# Patient Record
Sex: MALE | Race: WHITE | NOT HISPANIC OR LATINO | Employment: OTHER | URBAN - METROPOLITAN AREA
[De-identification: names, ages, dates, MRNs, and addresses within clinical notes are randomized per-mention and may not be internally consistent; named-entity substitution may affect disease eponyms.]

---

## 2019-08-22 ENCOUNTER — TRANSFERRED RECORDS (OUTPATIENT)
Dept: HEALTH INFORMATION MANAGEMENT | Facility: CLINIC | Age: 64
End: 2019-08-22

## 2020-01-01 ENCOUNTER — INFUSION THERAPY VISIT (OUTPATIENT)
Dept: INFUSION THERAPY | Facility: CLINIC | Age: 65
End: 2020-01-01
Attending: INTERNAL MEDICINE
Payer: MEDICARE

## 2020-01-01 ENCOUNTER — PATIENT OUTREACH (OUTPATIENT)
Dept: CARE COORDINATION | Facility: CLINIC | Age: 65
End: 2020-01-01

## 2020-01-01 ENCOUNTER — APPOINTMENT (OUTPATIENT)
Dept: LAB | Facility: CLINIC | Age: 65
End: 2020-01-01
Payer: COMMERCIAL

## 2020-01-01 ENCOUNTER — APPOINTMENT (OUTPATIENT)
Dept: MEDSURG UNIT | Facility: CLINIC | Age: 65
End: 2020-01-01
Attending: RADIOLOGY
Payer: MEDICARE

## 2020-01-01 ENCOUNTER — APPOINTMENT (OUTPATIENT)
Dept: INTERVENTIONAL RADIOLOGY/VASCULAR | Facility: CLINIC | Age: 65
End: 2020-01-01
Attending: RADIOLOGY
Payer: MEDICARE

## 2020-01-01 ENCOUNTER — TELEPHONE (OUTPATIENT)
Dept: RADIOLOGY | Facility: CLINIC | Age: 65
End: 2020-01-01

## 2020-01-01 ENCOUNTER — HOSPITAL ENCOUNTER (OUTPATIENT)
Facility: CLINIC | Age: 65
Discharge: HOME | End: 2020-10-02
Attending: RADIOLOGY | Admitting: RADIOLOGY
Payer: MEDICARE

## 2020-01-01 ENCOUNTER — TELEPHONE (OUTPATIENT)
Dept: ONCOLOGY | Facility: CLINIC | Age: 65
End: 2020-01-01

## 2020-01-01 ENCOUNTER — OFFICE VISIT (OUTPATIENT)
Dept: SURGERY | Facility: CLINIC | Age: 65
End: 2020-01-01
Payer: COMMERCIAL

## 2020-01-01 ENCOUNTER — MYC MEDICAL ADVICE (OUTPATIENT)
Dept: ONCOLOGY | Facility: CLINIC | Age: 65
End: 2020-01-01

## 2020-01-01 ENCOUNTER — VIRTUAL VISIT (OUTPATIENT)
Dept: ONCOLOGY | Facility: CLINIC | Age: 65
End: 2020-01-01
Attending: NURSE PRACTITIONER
Payer: COMMERCIAL

## 2020-01-01 ENCOUNTER — TELEPHONE (OUTPATIENT)
Dept: SURGERY | Facility: CLINIC | Age: 65
End: 2020-01-01

## 2020-01-01 ENCOUNTER — APPOINTMENT (OUTPATIENT)
Dept: MEDSURG UNIT | Facility: CLINIC | Age: 65
End: 2020-01-01
Payer: MEDICARE

## 2020-01-01 ENCOUNTER — TELEPHONE (OUTPATIENT)
Dept: INTERVENTIONAL RADIOLOGY/VASCULAR | Facility: CLINIC | Age: 65
End: 2020-01-01

## 2020-01-01 ENCOUNTER — APPOINTMENT (OUTPATIENT)
Dept: INTERVENTIONAL RADIOLOGY/VASCULAR | Facility: CLINIC | Age: 65
End: 2020-01-01
Attending: PHYSICIAN ASSISTANT
Payer: MEDICARE

## 2020-01-01 ENCOUNTER — VIRTUAL VISIT (OUTPATIENT)
Dept: ONCOLOGY | Facility: CLINIC | Age: 65
End: 2020-01-01
Attending: INTERNAL MEDICINE
Payer: MEDICARE

## 2020-01-01 ENCOUNTER — HOSPITAL ENCOUNTER (INPATIENT)
Facility: CLINIC | Age: 65
LOS: 1 days | Discharge: HOME | DRG: 445 | End: 2020-11-19
Attending: INTERNAL MEDICINE | Admitting: INTERNAL MEDICINE
Payer: MEDICARE

## 2020-01-01 ENCOUNTER — PATIENT OUTREACH (OUTPATIENT)
Dept: ONCOLOGY | Facility: CLINIC | Age: 65
End: 2020-01-01

## 2020-01-01 ENCOUNTER — APPOINTMENT (OUTPATIENT)
Dept: GENERAL RADIOLOGY | Facility: CLINIC | Age: 65
End: 2020-01-01
Attending: SURGERY
Payer: MEDICARE

## 2020-01-01 ENCOUNTER — TELEPHONE (OUTPATIENT)
Dept: SURGERY | Facility: CLINIC | Age: 65
End: 2020-01-01
Payer: COMMERCIAL

## 2020-01-01 ENCOUNTER — HOSPITAL ENCOUNTER (OUTPATIENT)
Facility: CLINIC | Age: 65
Discharge: HOME | End: 2020-08-25
Attending: RADIOLOGY | Admitting: RADIOLOGY
Payer: MEDICARE

## 2020-01-01 ENCOUNTER — MYC MEDICAL ADVICE (OUTPATIENT)
Dept: SURGERY | Facility: CLINIC | Age: 65
End: 2020-01-01

## 2020-01-01 ENCOUNTER — VIRTUAL VISIT (OUTPATIENT)
Dept: ONCOLOGY | Facility: CLINIC | Age: 65
End: 2020-01-01
Attending: INTERNAL MEDICINE
Payer: COMMERCIAL

## 2020-01-01 ENCOUNTER — INFUSION THERAPY VISIT (OUTPATIENT)
Dept: ONCOLOGY | Facility: CLINIC | Age: 65
End: 2020-01-01
Attending: NURSE PRACTITIONER
Payer: MEDICARE

## 2020-01-01 ENCOUNTER — HOSPITAL ENCOUNTER (OUTPATIENT)
Facility: CLINIC | Age: 65
Discharge: HOME | End: 2020-09-28
Attending: SURGERY | Admitting: SURGERY
Payer: MEDICARE

## 2020-01-01 ENCOUNTER — HOSPITAL ENCOUNTER (OUTPATIENT)
Facility: CLINIC | Age: 65
Discharge: HOME | End: 2020-08-11
Attending: INTERNAL MEDICINE | Admitting: INTERNAL MEDICINE
Payer: MEDICARE

## 2020-01-01 ENCOUNTER — ANESTHESIA EVENT (OUTPATIENT)
Dept: GASTROENTEROLOGY | Facility: CLINIC | Age: 65
End: 2020-01-01
Payer: MEDICARE

## 2020-01-01 ENCOUNTER — PATIENT OUTREACH (OUTPATIENT)
Dept: GASTROENTEROLOGY | Facility: CLINIC | Age: 65
End: 2020-01-01

## 2020-01-01 ENCOUNTER — DOCUMENTATION ONLY (OUTPATIENT)
Facility: CLINIC | Age: 65
End: 2020-01-01

## 2020-01-01 ENCOUNTER — ANESTHESIA EVENT (OUTPATIENT)
Dept: SURGERY | Facility: CLINIC | Age: 65
End: 2020-01-01
Payer: MEDICARE

## 2020-01-01 ENCOUNTER — HOSPITAL ENCOUNTER (OUTPATIENT)
Facility: CLINIC | Age: 65
Discharge: HOME | End: 2020-09-25
Attending: RADIOLOGY | Admitting: RADIOLOGY
Payer: MEDICARE

## 2020-01-01 ENCOUNTER — HOSPITAL ENCOUNTER (OUTPATIENT)
Facility: CLINIC | Age: 65
Discharge: HOME | End: 2020-10-19
Attending: RADIOLOGY | Admitting: RADIOLOGY
Payer: MEDICARE

## 2020-01-01 ENCOUNTER — APPOINTMENT (OUTPATIENT)
Dept: INTERVENTIONAL RADIOLOGY/VASCULAR | Facility: CLINIC | Age: 65
End: 2020-01-01
Attending: NURSE PRACTITIONER
Payer: MEDICARE

## 2020-01-01 ENCOUNTER — TRANSFERRED RECORDS (OUTPATIENT)
Dept: HEALTH INFORMATION MANAGEMENT | Facility: CLINIC | Age: 65
End: 2020-01-01

## 2020-01-01 ENCOUNTER — INFUSION THERAPY VISIT (OUTPATIENT)
Dept: INFUSION THERAPY | Facility: CLINIC | Age: 65
End: 2020-01-01
Attending: NURSE PRACTITIONER
Payer: MEDICARE

## 2020-01-01 ENCOUNTER — ANCILLARY PROCEDURE (OUTPATIENT)
Dept: GENERAL RADIOLOGY | Facility: CLINIC | Age: 65
End: 2020-01-01
Attending: NURSE PRACTITIONER
Payer: COMMERCIAL

## 2020-01-01 ENCOUNTER — HOSPITAL ENCOUNTER (OUTPATIENT)
Dept: CT IMAGING | Facility: CLINIC | Age: 65
Discharge: HOME | End: 2020-08-19
Attending: INTERNAL MEDICINE | Admitting: INTERNAL MEDICINE
Payer: MEDICARE

## 2020-01-01 ENCOUNTER — HOSPITAL ENCOUNTER (OUTPATIENT)
Facility: CLINIC | Age: 65
Discharge: HOME | End: 2020-10-13
Attending: RADIOLOGY | Admitting: RADIOLOGY
Payer: MEDICARE

## 2020-01-01 ENCOUNTER — HOSPITAL ENCOUNTER (OUTPATIENT)
Facility: AMBULATORY SURGERY CENTER | Age: 65
End: 2020-01-01
Attending: RADIOLOGY
Payer: COMMERCIAL

## 2020-01-01 ENCOUNTER — HOSPITAL ENCOUNTER (OUTPATIENT)
Facility: CLINIC | Age: 65
Discharge: HOME | End: 2020-09-14
Attending: RADIOLOGY | Admitting: RADIOLOGY
Payer: MEDICARE

## 2020-01-01 ENCOUNTER — ANESTHESIA (OUTPATIENT)
Dept: SURGERY | Facility: CLINIC | Age: 65
End: 2020-01-01
Payer: MEDICARE

## 2020-01-01 ENCOUNTER — VIRTUAL VISIT (OUTPATIENT)
Dept: DERMATOLOGY | Facility: CLINIC | Age: 65
End: 2020-01-01
Attending: RADIOLOGY
Payer: COMMERCIAL

## 2020-01-01 ENCOUNTER — APPOINTMENT (OUTPATIENT)
Dept: MRI IMAGING | Facility: CLINIC | Age: 65
DRG: 445 | End: 2020-01-01
Attending: INTERNAL MEDICINE
Payer: MEDICARE

## 2020-01-01 ENCOUNTER — APPOINTMENT (OUTPATIENT)
Dept: MEDSURG UNIT | Facility: CLINIC | Age: 65
End: 2020-01-01
Attending: NURSE PRACTITIONER
Payer: MEDICARE

## 2020-01-01 ENCOUNTER — ANESTHESIA (OUTPATIENT)
Dept: GASTROENTEROLOGY | Facility: CLINIC | Age: 65
End: 2020-01-01
Payer: MEDICARE

## 2020-01-01 ENCOUNTER — HOSPITAL ENCOUNTER (OUTPATIENT)
Dept: CT IMAGING | Facility: CLINIC | Age: 65
End: 2020-12-11
Attending: NURSE PRACTITIONER
Payer: MEDICARE

## 2020-01-01 ENCOUNTER — ANCILLARY PROCEDURE (OUTPATIENT)
Dept: CT IMAGING | Facility: CLINIC | Age: 65
End: 2020-01-01
Attending: NURSE PRACTITIONER
Payer: COMMERCIAL

## 2020-01-01 ENCOUNTER — TELEPHONE (OUTPATIENT)
Dept: GASTROENTEROLOGY | Facility: CLINIC | Age: 65
End: 2020-01-01

## 2020-01-01 ENCOUNTER — PATIENT OUTREACH (OUTPATIENT)
Dept: SURGERY | Facility: CLINIC | Age: 65
End: 2020-01-01

## 2020-01-01 ENCOUNTER — HOSPITAL ENCOUNTER (OUTPATIENT)
Facility: CLINIC | Age: 65
Discharge: HOME | End: 2020-09-18
Attending: RADIOLOGY | Admitting: RADIOLOGY
Payer: MEDICARE

## 2020-01-01 ENCOUNTER — HOSPITAL ENCOUNTER (OUTPATIENT)
Dept: CT IMAGING | Facility: CLINIC | Age: 65
End: 2020-10-19
Attending: PHYSICIAN ASSISTANT | Admitting: RADIOLOGY
Payer: MEDICARE

## 2020-01-01 ENCOUNTER — HOSPITAL ENCOUNTER (OUTPATIENT)
Dept: CT IMAGING | Facility: CLINIC | Age: 65
End: 2020-10-13
Attending: NURSE PRACTITIONER
Payer: MEDICARE

## 2020-01-01 VITALS
HEART RATE: 75 BPM | SYSTOLIC BLOOD PRESSURE: 118 MMHG | RESPIRATION RATE: 14 BRPM | DIASTOLIC BLOOD PRESSURE: 76 MMHG | TEMPERATURE: 97.8 F | OXYGEN SATURATION: 98 %

## 2020-01-01 VITALS
DIASTOLIC BLOOD PRESSURE: 86 MMHG | TEMPERATURE: 98 F | SYSTOLIC BLOOD PRESSURE: 129 MMHG | HEIGHT: 73 IN | HEART RATE: 77 BPM | WEIGHT: 179.1 LBS | OXYGEN SATURATION: 100 % | BODY MASS INDEX: 23.74 KG/M2 | RESPIRATION RATE: 16 BRPM

## 2020-01-01 VITALS
HEART RATE: 62 BPM | DIASTOLIC BLOOD PRESSURE: 65 MMHG | RESPIRATION RATE: 16 BRPM | TEMPERATURE: 97.9 F | SYSTOLIC BLOOD PRESSURE: 118 MMHG | OXYGEN SATURATION: 100 %

## 2020-01-01 VITALS
HEIGHT: 73 IN | TEMPERATURE: 98.5 F | SYSTOLIC BLOOD PRESSURE: 135 MMHG | OXYGEN SATURATION: 99 % | BODY MASS INDEX: 23.52 KG/M2 | DIASTOLIC BLOOD PRESSURE: 77 MMHG | WEIGHT: 177.5 LBS | RESPIRATION RATE: 16 BRPM | HEART RATE: 70 BPM

## 2020-01-01 VITALS
RESPIRATION RATE: 18 BRPM | SYSTOLIC BLOOD PRESSURE: 146 MMHG | BODY MASS INDEX: 24.18 KG/M2 | TEMPERATURE: 97.8 F | OXYGEN SATURATION: 98 % | WEIGHT: 183.3 LBS | DIASTOLIC BLOOD PRESSURE: 88 MMHG | HEART RATE: 83 BPM

## 2020-01-01 VITALS
TEMPERATURE: 97.6 F | OXYGEN SATURATION: 98 % | RESPIRATION RATE: 16 BRPM | WEIGHT: 184.9 LBS | DIASTOLIC BLOOD PRESSURE: 90 MMHG | SYSTOLIC BLOOD PRESSURE: 148 MMHG | HEART RATE: 70 BPM | BODY MASS INDEX: 24.39 KG/M2

## 2020-01-01 VITALS
OXYGEN SATURATION: 100 % | SYSTOLIC BLOOD PRESSURE: 118 MMHG | DIASTOLIC BLOOD PRESSURE: 68 MMHG | RESPIRATION RATE: 16 BRPM | HEART RATE: 62 BPM | TEMPERATURE: 97.9 F

## 2020-01-01 VITALS
SYSTOLIC BLOOD PRESSURE: 123 MMHG | RESPIRATION RATE: 16 BRPM | HEART RATE: 68 BPM | WEIGHT: 174.4 LBS | OXYGEN SATURATION: 99 % | DIASTOLIC BLOOD PRESSURE: 88 MMHG | TEMPERATURE: 98.2 F | HEIGHT: 73 IN | BODY MASS INDEX: 23.11 KG/M2

## 2020-01-01 VITALS
OXYGEN SATURATION: 100 % | TEMPERATURE: 97.7 F | SYSTOLIC BLOOD PRESSURE: 121 MMHG | RESPIRATION RATE: 18 BRPM | HEART RATE: 71 BPM | DIASTOLIC BLOOD PRESSURE: 72 MMHG

## 2020-01-01 VITALS
RESPIRATION RATE: 22 BRPM | OXYGEN SATURATION: 98 % | DIASTOLIC BLOOD PRESSURE: 93 MMHG | SYSTOLIC BLOOD PRESSURE: 133 MMHG | HEART RATE: 66 BPM

## 2020-01-01 VITALS
BODY MASS INDEX: 23.33 KG/M2 | TEMPERATURE: 97.5 F | HEART RATE: 76 BPM | DIASTOLIC BLOOD PRESSURE: 85 MMHG | SYSTOLIC BLOOD PRESSURE: 127 MMHG | HEIGHT: 73 IN | OXYGEN SATURATION: 98 % | RESPIRATION RATE: 16 BRPM | WEIGHT: 176 LBS

## 2020-01-01 VITALS
WEIGHT: 176.3 LBS | RESPIRATION RATE: 18 BRPM | HEART RATE: 73 BPM | TEMPERATURE: 97.4 F | OXYGEN SATURATION: 98 % | DIASTOLIC BLOOD PRESSURE: 80 MMHG | BODY MASS INDEX: 23.26 KG/M2 | SYSTOLIC BLOOD PRESSURE: 148 MMHG

## 2020-01-01 VITALS
BODY MASS INDEX: 24.25 KG/M2 | RESPIRATION RATE: 18 BRPM | WEIGHT: 183 LBS | SYSTOLIC BLOOD PRESSURE: 152 MMHG | HEIGHT: 73 IN | HEART RATE: 71 BPM | TEMPERATURE: 97.9 F | OXYGEN SATURATION: 100 % | DIASTOLIC BLOOD PRESSURE: 93 MMHG

## 2020-01-01 VITALS
RESPIRATION RATE: 16 BRPM | HEART RATE: 76 BPM | DIASTOLIC BLOOD PRESSURE: 57 MMHG | WEIGHT: 176 LBS | OXYGEN SATURATION: 97 % | SYSTOLIC BLOOD PRESSURE: 110 MMHG | HEIGHT: 73 IN | BODY MASS INDEX: 23.33 KG/M2 | TEMPERATURE: 98.4 F

## 2020-01-01 VITALS
TEMPERATURE: 96.4 F | SYSTOLIC BLOOD PRESSURE: 139 MMHG | RESPIRATION RATE: 16 BRPM | BODY MASS INDEX: 23.39 KG/M2 | OXYGEN SATURATION: 99 % | HEART RATE: 71 BPM | DIASTOLIC BLOOD PRESSURE: 80 MMHG | WEIGHT: 177.3 LBS

## 2020-01-01 VITALS
RESPIRATION RATE: 16 BRPM | DIASTOLIC BLOOD PRESSURE: 71 MMHG | HEART RATE: 68 BPM | SYSTOLIC BLOOD PRESSURE: 118 MMHG | OXYGEN SATURATION: 99 % | TEMPERATURE: 96.1 F

## 2020-01-01 VITALS
RESPIRATION RATE: 16 BRPM | DIASTOLIC BLOOD PRESSURE: 83 MMHG | WEIGHT: 176 LBS | TEMPERATURE: 98 F | HEIGHT: 73 IN | SYSTOLIC BLOOD PRESSURE: 128 MMHG | OXYGEN SATURATION: 99 % | BODY MASS INDEX: 23.33 KG/M2 | HEART RATE: 68 BPM

## 2020-01-01 VITALS
HEART RATE: 75 BPM | WEIGHT: 174.4 LBS | RESPIRATION RATE: 16 BRPM | DIASTOLIC BLOOD PRESSURE: 75 MMHG | TEMPERATURE: 96 F | SYSTOLIC BLOOD PRESSURE: 131 MMHG | OXYGEN SATURATION: 100 % | HEIGHT: 73 IN | BODY MASS INDEX: 23.11 KG/M2

## 2020-01-01 VITALS
TEMPERATURE: 98.2 F | RESPIRATION RATE: 16 BRPM | HEART RATE: 75 BPM | DIASTOLIC BLOOD PRESSURE: 85 MMHG | OXYGEN SATURATION: 98 % | WEIGHT: 183.6 LBS | BODY MASS INDEX: 24.33 KG/M2 | HEIGHT: 73 IN | SYSTOLIC BLOOD PRESSURE: 135 MMHG

## 2020-01-01 VITALS
HEART RATE: 64 BPM | TEMPERATURE: 97.6 F | OXYGEN SATURATION: 98 % | RESPIRATION RATE: 14 BRPM | WEIGHT: 178 LBS | SYSTOLIC BLOOD PRESSURE: 116 MMHG | BODY MASS INDEX: 23.48 KG/M2 | DIASTOLIC BLOOD PRESSURE: 78 MMHG

## 2020-01-01 VITALS
BODY MASS INDEX: 22.74 KG/M2 | OXYGEN SATURATION: 98 % | WEIGHT: 171.6 LBS | TEMPERATURE: 98 F | HEART RATE: 68 BPM | HEIGHT: 73 IN | SYSTOLIC BLOOD PRESSURE: 123 MMHG | RESPIRATION RATE: 16 BRPM | DIASTOLIC BLOOD PRESSURE: 74 MMHG

## 2020-01-01 VITALS
TEMPERATURE: 97.6 F | DIASTOLIC BLOOD PRESSURE: 78 MMHG | HEART RATE: 69 BPM | RESPIRATION RATE: 20 BRPM | OXYGEN SATURATION: 100 % | BODY MASS INDEX: 23.71 KG/M2 | SYSTOLIC BLOOD PRESSURE: 133 MMHG | WEIGHT: 179.7 LBS

## 2020-01-01 VITALS
SYSTOLIC BLOOD PRESSURE: 138 MMHG | WEIGHT: 176.3 LBS | TEMPERATURE: 97.9 F | RESPIRATION RATE: 16 BRPM | HEART RATE: 69 BPM | OXYGEN SATURATION: 99 % | HEIGHT: 73 IN | BODY MASS INDEX: 23.37 KG/M2 | DIASTOLIC BLOOD PRESSURE: 78 MMHG

## 2020-01-01 VITALS
HEIGHT: 73 IN | WEIGHT: 180 LBS | DIASTOLIC BLOOD PRESSURE: 78 MMHG | BODY MASS INDEX: 23.86 KG/M2 | SYSTOLIC BLOOD PRESSURE: 138 MMHG | TEMPERATURE: 97.9 F

## 2020-01-01 DIAGNOSIS — K83.09 CHOLANGITIS (H): ICD-10-CM

## 2020-01-01 DIAGNOSIS — Z20.822 COVID-19 RULED OUT: Primary | ICD-10-CM

## 2020-01-01 DIAGNOSIS — C24.0 HILAR CHOLANGIOCARCINOMA (H): Primary | ICD-10-CM

## 2020-01-01 DIAGNOSIS — C22.1 CHOLANGIOCARCINOMA (H): ICD-10-CM

## 2020-01-01 DIAGNOSIS — K83.9 BILE LEAK: Primary | ICD-10-CM

## 2020-01-01 DIAGNOSIS — E11.9 TYPE 2 DIABETES MELLITUS WITHOUT COMPLICATION, WITHOUT LONG-TERM CURRENT USE OF INSULIN (H): ICD-10-CM

## 2020-01-01 DIAGNOSIS — C24.0 HILAR CHOLANGIOCARCINOMA (H): ICD-10-CM

## 2020-01-01 DIAGNOSIS — K83.9 BILE LEAK: ICD-10-CM

## 2020-01-01 DIAGNOSIS — K83.09 CHOLANGITIS (H): Primary | ICD-10-CM

## 2020-01-01 DIAGNOSIS — Z11.59 ENCOUNTER FOR SCREENING FOR OTHER VIRAL DISEASES: Primary | ICD-10-CM

## 2020-01-01 DIAGNOSIS — R93.3 ABNORMAL FINDING ON GI TRACT IMAGING: ICD-10-CM

## 2020-01-01 DIAGNOSIS — C22.1 CHOLANGIOCARCINOMA (H): Primary | ICD-10-CM

## 2020-01-01 DIAGNOSIS — Z11.59 SCREENING FOR VIRAL DISEASE: ICD-10-CM

## 2020-01-01 DIAGNOSIS — T45.1X5A ANEMIA DUE TO ANTINEOPLASTIC CHEMOTHERAPY: ICD-10-CM

## 2020-01-01 DIAGNOSIS — K65.1 INTRA-ABDOMINAL ABSCESS (H): ICD-10-CM

## 2020-01-01 DIAGNOSIS — Z20.822 ENCOUNTER FOR LABORATORY TESTING FOR COVID-19 VIRUS: Primary | ICD-10-CM

## 2020-01-01 DIAGNOSIS — Z20.822 COVID-19 RULED OUT: ICD-10-CM

## 2020-01-01 DIAGNOSIS — Z11.59 SCREENING FOR VIRAL DISEASE: Primary | ICD-10-CM

## 2020-01-01 DIAGNOSIS — Z11.59 ENCOUNTER FOR SCREENING FOR OTHER VIRAL DISEASES: ICD-10-CM

## 2020-01-01 DIAGNOSIS — E63.9 NUTRITIONAL DEFICIENCY: ICD-10-CM

## 2020-01-01 DIAGNOSIS — R53.0 NEOPLASTIC MALIGNANT RELATED FATIGUE: ICD-10-CM

## 2020-01-01 DIAGNOSIS — D64.81 ANEMIA DUE TO ANTINEOPLASTIC CHEMOTHERAPY: ICD-10-CM

## 2020-01-01 LAB
ABO + RH BLD: NORMAL
ABO + RH BLD: NORMAL
ALBUMIN FLD-MCNC: 0.6 G/DL
ALBUMIN SERPL-MCNC: 1.8 G/DL (ref 3.4–5)
ALBUMIN SERPL-MCNC: 1.9 G/DL (ref 3.4–5)
ALBUMIN SERPL-MCNC: 2.1 G/DL (ref 3.4–5)
ALBUMIN SERPL-MCNC: 2.2 G/DL (ref 3.4–5)
ALBUMIN SERPL-MCNC: 2.2 G/DL (ref 3.4–5)
ALBUMIN SERPL-MCNC: 2.4 G/DL (ref 3.4–5)
ALBUMIN SERPL-MCNC: 2.4 G/DL (ref 3.4–5)
ALBUMIN SERPL-MCNC: 2.5 G/DL (ref 3.4–5)
ALBUMIN SERPL-MCNC: 2.6 G/DL (ref 3.4–5)
ALBUMIN SERPL-MCNC: 2.7 G/DL (ref 3.4–5)
ALP SERPL-CCNC: 264 U/L (ref 40–150)
ALP SERPL-CCNC: 286 U/L (ref 40–150)
ALP SERPL-CCNC: 304 U/L (ref 40–150)
ALP SERPL-CCNC: 384 U/L (ref 40–150)
ALP SERPL-CCNC: 465 U/L (ref 40–150)
ALP SERPL-CCNC: 508 U/L (ref 40–150)
ALP SERPL-CCNC: 514 U/L (ref 40–150)
ALP SERPL-CCNC: 529 U/L (ref 40–150)
ALP SERPL-CCNC: 590 U/L (ref 40–150)
ALP SERPL-CCNC: 591 U/L (ref 40–150)
ALP SERPL-CCNC: 611 U/L (ref 40–150)
ALP SERPL-CCNC: 637 U/L (ref 40–150)
ALP SERPL-CCNC: 687 U/L (ref 40–150)
ALP SERPL-CCNC: 733 U/L (ref 40–150)
ALP SERPL-CCNC: 915 U/L (ref 40–150)
ALT SERPL W P-5'-P-CCNC: 136 U/L (ref 0–70)
ALT SERPL W P-5'-P-CCNC: 29 U/L (ref 0–70)
ALT SERPL W P-5'-P-CCNC: 30 U/L (ref 0–70)
ALT SERPL W P-5'-P-CCNC: 36 U/L (ref 0–70)
ALT SERPL W P-5'-P-CCNC: 41 U/L (ref 0–70)
ALT SERPL W P-5'-P-CCNC: 46 U/L (ref 0–70)
ALT SERPL W P-5'-P-CCNC: 50 U/L (ref 0–70)
ALT SERPL W P-5'-P-CCNC: 51 U/L (ref 0–70)
ALT SERPL W P-5'-P-CCNC: 58 U/L (ref 0–70)
ALT SERPL W P-5'-P-CCNC: 58 U/L (ref 0–70)
ALT SERPL W P-5'-P-CCNC: 60 U/L (ref 0–70)
ALT SERPL W P-5'-P-CCNC: 60 U/L (ref 0–70)
ALT SERPL W P-5'-P-CCNC: 63 U/L (ref 0–70)
ALT SERPL W P-5'-P-CCNC: 63 U/L (ref 0–70)
ALT SERPL W P-5'-P-CCNC: 81 U/L (ref 0–70)
ANION GAP SERPL CALCULATED.3IONS-SCNC: 4 MMOL/L (ref 3–14)
ANION GAP SERPL CALCULATED.3IONS-SCNC: 4 MMOL/L (ref 3–14)
ANION GAP SERPL CALCULATED.3IONS-SCNC: 5 MMOL/L (ref 3–14)
ANION GAP SERPL CALCULATED.3IONS-SCNC: 6 MMOL/L (ref 3–14)
ANION GAP SERPL CALCULATED.3IONS-SCNC: 6 MMOL/L (ref 3–14)
ANION GAP SERPL CALCULATED.3IONS-SCNC: 7 MMOL/L (ref 3–14)
ANION GAP SERPL CALCULATED.3IONS-SCNC: 8 MMOL/L (ref 3–14)
ANION GAP SERPL CALCULATED.3IONS-SCNC: 9 MMOL/L (ref 3–14)
ANION GAP SERPL CALCULATED.3IONS-SCNC: 9 MMOL/L (ref 3–14)
ANISOCYTOSIS BLD QL SMEAR: SLIGHT
APPEARANCE FLD: CLEAR
APTT PPP: 30 SEC (ref 22–37)
AST SERPL W P-5'-P-CCNC: 101 U/L (ref 0–45)
AST SERPL W P-5'-P-CCNC: 169 U/L (ref 0–45)
AST SERPL W P-5'-P-CCNC: 47 U/L (ref 0–45)
AST SERPL W P-5'-P-CCNC: 48 U/L (ref 0–45)
AST SERPL W P-5'-P-CCNC: 57 U/L (ref 0–45)
AST SERPL W P-5'-P-CCNC: 68 U/L (ref 0–45)
AST SERPL W P-5'-P-CCNC: 68 U/L (ref 0–45)
AST SERPL W P-5'-P-CCNC: 70 U/L (ref 0–45)
AST SERPL W P-5'-P-CCNC: 70 U/L (ref 0–45)
AST SERPL W P-5'-P-CCNC: 72 U/L (ref 0–45)
AST SERPL W P-5'-P-CCNC: 76 U/L (ref 0–45)
AST SERPL W P-5'-P-CCNC: 78 U/L (ref 0–45)
AST SERPL W P-5'-P-CCNC: 80 U/L (ref 0–45)
AST SERPL W P-5'-P-CCNC: 86 U/L (ref 0–45)
AST SERPL W P-5'-P-CCNC: 88 U/L (ref 0–45)
BACTERIA SPEC CULT: NO GROWTH
BACTERIA SPEC CULT: NORMAL
BASOPHILS # BLD AUTO: 0 10E9/L (ref 0–0.2)
BASOPHILS # BLD AUTO: 0.1 10E9/L (ref 0–0.2)
BASOPHILS NFR BLD AUTO: 0 %
BASOPHILS NFR BLD AUTO: 0.2 %
BASOPHILS NFR BLD AUTO: 0.3 %
BASOPHILS NFR BLD AUTO: 0.4 %
BASOPHILS NFR BLD AUTO: 0.5 %
BASOPHILS NFR BLD AUTO: 0.6 %
BASOPHILS NFR BLD AUTO: 0.7 %
BASOPHILS NFR BLD AUTO: 0.7 %
BASOPHILS NFR BLD AUTO: 0.8 %
BASOPHILS NFR BLD AUTO: 1 %
BASOPHILS NFR BLD AUTO: 2 %
BILIRUB SERPL-MCNC: 1.6 MG/DL (ref 0.2–1.3)
BILIRUB SERPL-MCNC: 1.7 MG/DL (ref 0.2–1.3)
BILIRUB SERPL-MCNC: 1.7 MG/DL (ref 0.2–1.3)
BILIRUB SERPL-MCNC: 1.8 MG/DL (ref 0.2–1.3)
BILIRUB SERPL-MCNC: 1.8 MG/DL (ref 0.2–1.3)
BILIRUB SERPL-MCNC: 1.9 MG/DL (ref 0.2–1.3)
BILIRUB SERPL-MCNC: 2.2 MG/DL (ref 0.2–1.3)
BILIRUB SERPL-MCNC: 2.4 MG/DL (ref 0.2–1.3)
BILIRUB SERPL-MCNC: 2.5 MG/DL (ref 0.2–1.3)
BILIRUB SERPL-MCNC: 2.9 MG/DL (ref 0.2–1.3)
BILIRUB SERPL-MCNC: 3 MG/DL (ref 0.2–1.3)
BILIRUB SERPL-MCNC: 3.2 MG/DL (ref 0.2–1.3)
BILIRUB SERPL-MCNC: 4.8 MG/DL (ref 0.2–1.3)
BLD GP AB SCN SERPL QL: NORMAL
BLD PROD TYP BPU: NORMAL
BLD PROD TYP BPU: NORMAL
BLD UNIT ID BPU: 0
BLOOD BANK CMNT PATIENT-IMP: NORMAL
BLOOD PRODUCT CODE: NORMAL
BPU ID: NORMAL
BUN SERPL-MCNC: 12 MG/DL (ref 7–30)
BUN SERPL-MCNC: 13 MG/DL (ref 7–30)
BUN SERPL-MCNC: 15 MG/DL (ref 7–30)
BUN SERPL-MCNC: 16 MG/DL (ref 7–30)
BUN SERPL-MCNC: 17 MG/DL (ref 7–30)
BUN SERPL-MCNC: 26 MG/DL (ref 7–30)
BUN SERPL-MCNC: 6 MG/DL (ref 7–30)
BUN SERPL-MCNC: 6 MG/DL (ref 7–30)
BUN SERPL-MCNC: 7 MG/DL (ref 7–30)
BUN SERPL-MCNC: 8 MG/DL (ref 7–30)
BUN SERPL-MCNC: 9 MG/DL (ref 7–30)
CALCIUM SERPL-MCNC: 8.1 MG/DL (ref 8.5–10.1)
CALCIUM SERPL-MCNC: 8.2 MG/DL (ref 8.5–10.1)
CALCIUM SERPL-MCNC: 8.3 MG/DL (ref 8.5–10.1)
CALCIUM SERPL-MCNC: 8.4 MG/DL (ref 8.5–10.1)
CALCIUM SERPL-MCNC: 8.5 MG/DL (ref 8.5–10.1)
CALCIUM SERPL-MCNC: 8.6 MG/DL (ref 8.5–10.1)
CALCIUM SERPL-MCNC: 8.6 MG/DL (ref 8.5–10.1)
CALCIUM SERPL-MCNC: 8.8 MG/DL (ref 8.5–10.1)
CALCIUM SERPL-MCNC: 8.8 MG/DL (ref 8.5–10.1)
CALCIUM SERPL-MCNC: 8.9 MG/DL (ref 8.5–10.1)
CALCIUM SERPL-MCNC: 9.5 MG/DL (ref 8.5–10.1)
CANCER AG19-9 SERPL-ACNC: 325 U/ML (ref 0–37)
CHLORIDE SERPL-SCNC: 100 MMOL/L (ref 94–109)
CHLORIDE SERPL-SCNC: 101 MMOL/L (ref 94–109)
CHLORIDE SERPL-SCNC: 101 MMOL/L (ref 94–109)
CHLORIDE SERPL-SCNC: 102 MMOL/L (ref 94–109)
CHLORIDE SERPL-SCNC: 103 MMOL/L (ref 94–109)
CHLORIDE SERPL-SCNC: 104 MMOL/L (ref 94–109)
CHLORIDE SERPL-SCNC: 106 MMOL/L (ref 94–109)
CHLORIDE SERPL-SCNC: 107 MMOL/L (ref 94–109)
CO2 SERPL-SCNC: 22 MMOL/L (ref 20–32)
CO2 SERPL-SCNC: 23 MMOL/L (ref 20–32)
CO2 SERPL-SCNC: 25 MMOL/L (ref 20–32)
CO2 SERPL-SCNC: 25 MMOL/L (ref 20–32)
CO2 SERPL-SCNC: 27 MMOL/L (ref 20–32)
CO2 SERPL-SCNC: 28 MMOL/L (ref 20–32)
COLOR FLD: YELLOW
COPATH REPORT: NORMAL
COPATH REPORT: NORMAL
CREAT BLD-MCNC: 0.8 MG/DL (ref 0.66–1.25)
CREAT SERPL-MCNC: 0.66 MG/DL (ref 0.66–1.25)
CREAT SERPL-MCNC: 0.7 MG/DL (ref 0.66–1.25)
CREAT SERPL-MCNC: 0.72 MG/DL (ref 0.66–1.25)
CREAT SERPL-MCNC: 0.74 MG/DL (ref 0.66–1.25)
CREAT SERPL-MCNC: 0.75 MG/DL (ref 0.66–1.25)
CREAT SERPL-MCNC: 0.76 MG/DL (ref 0.66–1.25)
CREAT SERPL-MCNC: 0.78 MG/DL (ref 0.66–1.25)
CREAT SERPL-MCNC: 0.8 MG/DL (ref 0.66–1.25)
CREAT SERPL-MCNC: 0.82 MG/DL (ref 0.66–1.25)
CREAT SERPL-MCNC: 0.83 MG/DL (ref 0.66–1.25)
CREAT SERPL-MCNC: 0.84 MG/DL (ref 0.66–1.25)
CREAT SERPL-MCNC: 0.84 MG/DL (ref 0.66–1.25)
CREAT SERPL-MCNC: 0.88 MG/DL (ref 0.66–1.25)
CREAT SERPL-MCNC: 0.89 MG/DL (ref 0.66–1.25)
CREAT SERPL-MCNC: 0.91 MG/DL (ref 0.66–1.25)
DIFFERENTIAL METHOD BLD: ABNORMAL
EOSINOPHIL # BLD AUTO: 0 10E9/L (ref 0–0.7)
EOSINOPHIL # BLD AUTO: 0 10E9/L (ref 0–0.7)
EOSINOPHIL # BLD AUTO: 0.1 10E9/L (ref 0–0.7)
EOSINOPHIL # BLD AUTO: 0.1 10E9/L (ref 0–0.7)
EOSINOPHIL NFR BLD AUTO: 0 %
EOSINOPHIL NFR BLD AUTO: 0 %
EOSINOPHIL NFR BLD AUTO: 0.8 %
EOSINOPHIL NFR BLD AUTO: 1 %
EOSINOPHIL NFR BLD AUTO: 1 %
EOSINOPHIL NFR BLD AUTO: 1.1 %
EOSINOPHIL NFR BLD AUTO: 1.3 %
EOSINOPHIL NFR BLD AUTO: 1.7 %
EOSINOPHIL NFR BLD AUTO: 1.8 %
EOSINOPHIL NFR BLD AUTO: 2.1 %
EOSINOPHIL NFR BLD AUTO: 2.1 %
EOSINOPHIL NFR BLD AUTO: 2.9 %
EOSINOPHIL NFR BLD AUTO: 3.2 %
ERYTHROCYTE [DISTWIDTH] IN BLOOD BY AUTOMATED COUNT: 15.8 % (ref 10–15)
ERYTHROCYTE [DISTWIDTH] IN BLOOD BY AUTOMATED COUNT: 15.8 % (ref 10–15)
ERYTHROCYTE [DISTWIDTH] IN BLOOD BY AUTOMATED COUNT: 16 % (ref 10–15)
ERYTHROCYTE [DISTWIDTH] IN BLOOD BY AUTOMATED COUNT: 16.1 % (ref 10–15)
ERYTHROCYTE [DISTWIDTH] IN BLOOD BY AUTOMATED COUNT: 16.6 % (ref 10–15)
ERYTHROCYTE [DISTWIDTH] IN BLOOD BY AUTOMATED COUNT: 16.7 % (ref 10–15)
ERYTHROCYTE [DISTWIDTH] IN BLOOD BY AUTOMATED COUNT: 16.9 % (ref 10–15)
ERYTHROCYTE [DISTWIDTH] IN BLOOD BY AUTOMATED COUNT: 17.3 % (ref 10–15)
ERYTHROCYTE [DISTWIDTH] IN BLOOD BY AUTOMATED COUNT: 17.4 % (ref 10–15)
ERYTHROCYTE [DISTWIDTH] IN BLOOD BY AUTOMATED COUNT: 17.7 % (ref 10–15)
ERYTHROCYTE [DISTWIDTH] IN BLOOD BY AUTOMATED COUNT: 18.3 % (ref 10–15)
ERYTHROCYTE [DISTWIDTH] IN BLOOD BY AUTOMATED COUNT: 18.8 % (ref 10–15)
ERYTHROCYTE [DISTWIDTH] IN BLOOD BY AUTOMATED COUNT: 19.4 % (ref 10–15)
ERYTHROCYTE [DISTWIDTH] IN BLOOD BY AUTOMATED COUNT: 20.1 % (ref 10–15)
ERYTHROCYTE [DISTWIDTH] IN BLOOD BY AUTOMATED COUNT: 20.2 % (ref 10–15)
GFR SERPL CREATININE-BSD FRML MDRD: 87 ML/MIN/{1.73_M2}
GFR SERPL CREATININE-BSD FRML MDRD: 88 ML/MIN/{1.73_M2}
GFR SERPL CREATININE-BSD FRML MDRD: 88 ML/MIN/{1.73_M2}
GFR SERPL CREATININE-BSD FRML MDRD: >90 ML/MIN/{1.73_M2}
GLUCOSE BLDC GLUCOMTR-MCNC: 110 MG/DL (ref 70–99)
GLUCOSE BLDC GLUCOMTR-MCNC: 111 MG/DL (ref 70–99)
GLUCOSE BLDC GLUCOMTR-MCNC: 149 MG/DL (ref 70–99)
GLUCOSE BLDC GLUCOMTR-MCNC: 149 MG/DL (ref 70–99)
GLUCOSE BLDC GLUCOMTR-MCNC: 177 MG/DL (ref 70–99)
GLUCOSE BLDC GLUCOMTR-MCNC: 190 MG/DL (ref 70–99)
GLUCOSE BLDC GLUCOMTR-MCNC: 205 MG/DL (ref 70–99)
GLUCOSE BLDC GLUCOMTR-MCNC: 208 MG/DL (ref 70–99)
GLUCOSE BLDC GLUCOMTR-MCNC: 228 MG/DL (ref 70–99)
GLUCOSE BLDC GLUCOMTR-MCNC: 93 MG/DL (ref 70–99)
GLUCOSE SERPL-MCNC: 105 MG/DL (ref 70–99)
GLUCOSE SERPL-MCNC: 127 MG/DL (ref 70–99)
GLUCOSE SERPL-MCNC: 151 MG/DL (ref 70–99)
GLUCOSE SERPL-MCNC: 162 MG/DL (ref 70–99)
GLUCOSE SERPL-MCNC: 174 MG/DL (ref 70–99)
GLUCOSE SERPL-MCNC: 186 MG/DL (ref 70–99)
GLUCOSE SERPL-MCNC: 188 MG/DL (ref 70–99)
GLUCOSE SERPL-MCNC: 192 MG/DL (ref 70–99)
GLUCOSE SERPL-MCNC: 206 MG/DL (ref 70–99)
GLUCOSE SERPL-MCNC: 228 MG/DL (ref 70–99)
GLUCOSE SERPL-MCNC: 229 MG/DL (ref 70–99)
GLUCOSE SERPL-MCNC: 266 MG/DL (ref 70–99)
GLUCOSE SERPL-MCNC: 268 MG/DL (ref 70–99)
GLUCOSE SERPL-MCNC: 275 MG/DL (ref 70–99)
GLUCOSE SERPL-MCNC: 97 MG/DL (ref 70–99)
GRAM STN SPEC: NORMAL
HCT VFR BLD AUTO: 23.7 % (ref 40–53)
HCT VFR BLD AUTO: 24.4 % (ref 40–53)
HCT VFR BLD AUTO: 24.7 % (ref 40–53)
HCT VFR BLD AUTO: 25 % (ref 40–53)
HCT VFR BLD AUTO: 25.9 % (ref 40–53)
HCT VFR BLD AUTO: 26.4 % (ref 40–53)
HCT VFR BLD AUTO: 27.5 % (ref 40–53)
HCT VFR BLD AUTO: 28 % (ref 40–53)
HCT VFR BLD AUTO: 29.1 % (ref 40–53)
HCT VFR BLD AUTO: 32.3 % (ref 40–53)
HCT VFR BLD AUTO: 33.8 % (ref 40–53)
HCT VFR BLD AUTO: 34.8 % (ref 40–53)
HCT VFR BLD AUTO: 36.6 % (ref 40–53)
HCT VFR BLD AUTO: 37.6 % (ref 40–53)
HCT VFR BLD AUTO: 39.6 % (ref 40–53)
HGB BLD-MCNC: 10.6 G/DL (ref 13.3–17.7)
HGB BLD-MCNC: 11.2 G/DL (ref 13.3–17.7)
HGB BLD-MCNC: 11.3 G/DL (ref 13.3–17.7)
HGB BLD-MCNC: 11.9 G/DL (ref 13.3–17.7)
HGB BLD-MCNC: 12.4 G/DL (ref 13.3–17.7)
HGB BLD-MCNC: 12.8 G/DL (ref 13.3–17.7)
HGB BLD-MCNC: 7.9 G/DL (ref 13.3–17.7)
HGB BLD-MCNC: 8 G/DL (ref 13.3–17.7)
HGB BLD-MCNC: 8.1 G/DL (ref 13.3–17.7)
HGB BLD-MCNC: 8.3 G/DL (ref 13.3–17.7)
HGB BLD-MCNC: 8.6 G/DL (ref 13.3–17.7)
HGB BLD-MCNC: 8.7 G/DL (ref 13.3–17.7)
HGB BLD-MCNC: 9 G/DL (ref 13.3–17.7)
HGB BLD-MCNC: 9.2 G/DL (ref 13.3–17.7)
HGB BLD-MCNC: 9.5 G/DL (ref 13.3–17.7)
IMM GRANULOCYTES # BLD: 0 10E9/L (ref 0–0.4)
IMM GRANULOCYTES # BLD: 0.1 10E9/L (ref 0–0.4)
IMM GRANULOCYTES NFR BLD: 0.3 %
IMM GRANULOCYTES NFR BLD: 0.3 %
IMM GRANULOCYTES NFR BLD: 0.4 %
IMM GRANULOCYTES NFR BLD: 0.5 %
IMM GRANULOCYTES NFR BLD: 0.5 %
IMM GRANULOCYTES NFR BLD: 0.6 %
IMM GRANULOCYTES NFR BLD: 0.7 %
IMM GRANULOCYTES NFR BLD: 0.8 %
IMM GRANULOCYTES NFR BLD: 1.1 %
IMM GRANULOCYTES NFR BLD: 1.2 %
INR PPP: 1.4 (ref 0.86–1.14)
LABORATORY COMMENT REPORT: NORMAL
LYMPHOCYTES # BLD AUTO: 0.5 10E9/L (ref 0.8–5.3)
LYMPHOCYTES # BLD AUTO: 0.6 10E9/L (ref 0.8–5.3)
LYMPHOCYTES # BLD AUTO: 0.6 10E9/L (ref 0.8–5.3)
LYMPHOCYTES # BLD AUTO: 0.7 10E9/L (ref 0.8–5.3)
LYMPHOCYTES # BLD AUTO: 0.7 10E9/L (ref 0.8–5.3)
LYMPHOCYTES # BLD AUTO: 0.8 10E9/L (ref 0.8–5.3)
LYMPHOCYTES # BLD AUTO: 0.9 10E9/L (ref 0.8–5.3)
LYMPHOCYTES # BLD AUTO: 1 10E9/L (ref 0.8–5.3)
LYMPHOCYTES # BLD AUTO: 1.1 10E9/L (ref 0.8–5.3)
LYMPHOCYTES # BLD AUTO: 1.2 10E9/L (ref 0.8–5.3)
LYMPHOCYTES # BLD AUTO: 1.5 10E9/L (ref 0.8–5.3)
LYMPHOCYTES # BLD AUTO: 1.7 10E9/L (ref 0.8–5.3)
LYMPHOCYTES # BLD AUTO: 1.7 10E9/L (ref 0.8–5.3)
LYMPHOCYTES # BLD AUTO: 2.3 10E9/L (ref 0.8–5.3)
LYMPHOCYTES # BLD AUTO: 2.8 10E9/L (ref 0.8–5.3)
LYMPHOCYTES NFR BLD AUTO: 14.8 %
LYMPHOCYTES NFR BLD AUTO: 14.9 %
LYMPHOCYTES NFR BLD AUTO: 16.7 %
LYMPHOCYTES NFR BLD AUTO: 18.8 %
LYMPHOCYTES NFR BLD AUTO: 19.4 %
LYMPHOCYTES NFR BLD AUTO: 20.5 %
LYMPHOCYTES NFR BLD AUTO: 23.4 %
LYMPHOCYTES NFR BLD AUTO: 23.8 %
LYMPHOCYTES NFR BLD AUTO: 24 %
LYMPHOCYTES NFR BLD AUTO: 24.3 %
LYMPHOCYTES NFR BLD AUTO: 33.9 %
LYMPHOCYTES NFR BLD AUTO: 34.3 %
LYMPHOCYTES NFR BLD AUTO: 35.1 %
LYMPHOCYTES NFR BLD AUTO: 47 %
LYMPHOCYTES NFR BLD AUTO: 51 %
LYMPHOCYTES NFR FLD MANUAL: 38 %
Lab: NORMAL
MAGNESIUM SERPL-MCNC: 1.4 MG/DL (ref 1.6–2.3)
MAGNESIUM SERPL-MCNC: 1.5 MG/DL (ref 1.6–2.3)
MAGNESIUM SERPL-MCNC: 1.6 MG/DL (ref 1.6–2.3)
MAGNESIUM SERPL-MCNC: 1.9 MG/DL (ref 1.6–2.3)
MAGNESIUM SERPL-MCNC: 1.9 MG/DL (ref 1.6–2.3)
MAGNESIUM SERPL-MCNC: 2 MG/DL (ref 1.6–2.3)
MCH RBC QN AUTO: 26.5 PG (ref 26.5–33)
MCH RBC QN AUTO: 26.6 PG (ref 26.5–33)
MCH RBC QN AUTO: 26.8 PG (ref 26.5–33)
MCH RBC QN AUTO: 26.9 PG (ref 26.5–33)
MCH RBC QN AUTO: 27.2 PG (ref 26.5–33)
MCH RBC QN AUTO: 27.7 PG (ref 26.5–33)
MCH RBC QN AUTO: 28 PG (ref 26.5–33)
MCH RBC QN AUTO: 28.3 PG (ref 26.5–33)
MCH RBC QN AUTO: 28.7 PG (ref 26.5–33)
MCH RBC QN AUTO: 28.7 PG (ref 26.5–33)
MCH RBC QN AUTO: 28.8 PG (ref 26.5–33)
MCH RBC QN AUTO: 28.9 PG (ref 26.5–33)
MCH RBC QN AUTO: 29 PG (ref 26.5–33)
MCH RBC QN AUTO: 29.2 PG (ref 26.5–33)
MCH RBC QN AUTO: 29.4 PG (ref 26.5–33)
MCHC RBC AUTO-ENTMCNC: 32.3 G/DL (ref 31.5–36.5)
MCHC RBC AUTO-ENTMCNC: 32.5 G/DL (ref 31.5–36.5)
MCHC RBC AUTO-ENTMCNC: 32.5 G/DL (ref 31.5–36.5)
MCHC RBC AUTO-ENTMCNC: 32.6 G/DL (ref 31.5–36.5)
MCHC RBC AUTO-ENTMCNC: 32.7 G/DL (ref 31.5–36.5)
MCHC RBC AUTO-ENTMCNC: 32.8 G/DL (ref 31.5–36.5)
MCHC RBC AUTO-ENTMCNC: 32.9 G/DL (ref 31.5–36.5)
MCHC RBC AUTO-ENTMCNC: 33 G/DL (ref 31.5–36.5)
MCHC RBC AUTO-ENTMCNC: 33 G/DL (ref 31.5–36.5)
MCHC RBC AUTO-ENTMCNC: 33.1 G/DL (ref 31.5–36.5)
MCHC RBC AUTO-ENTMCNC: 33.2 G/DL (ref 31.5–36.5)
MCHC RBC AUTO-ENTMCNC: 33.2 G/DL (ref 31.5–36.5)
MCHC RBC AUTO-ENTMCNC: 33.3 G/DL (ref 31.5–36.5)
MCV RBC AUTO: 81 FL (ref 78–100)
MCV RBC AUTO: 81 FL (ref 78–100)
MCV RBC AUTO: 82 FL (ref 78–100)
MCV RBC AUTO: 83 FL (ref 78–100)
MCV RBC AUTO: 84 FL (ref 78–100)
MCV RBC AUTO: 84 FL (ref 78–100)
MCV RBC AUTO: 86 FL (ref 78–100)
MCV RBC AUTO: 87 FL (ref 78–100)
MCV RBC AUTO: 88 FL (ref 78–100)
MCV RBC AUTO: 88 FL (ref 78–100)
MCV RBC AUTO: 89 FL (ref 78–100)
MCV RBC AUTO: 89 FL (ref 78–100)
MONOCYTES # BLD AUTO: 0 10E9/L (ref 0–1.3)
MONOCYTES # BLD AUTO: 0.1 10E9/L (ref 0–1.3)
MONOCYTES # BLD AUTO: 0.2 10E9/L (ref 0–1.3)
MONOCYTES # BLD AUTO: 0.3 10E9/L (ref 0–1.3)
MONOCYTES # BLD AUTO: 0.3 10E9/L (ref 0–1.3)
MONOCYTES # BLD AUTO: 0.5 10E9/L (ref 0–1.3)
MONOCYTES # BLD AUTO: 0.6 10E9/L (ref 0–1.3)
MONOCYTES # BLD AUTO: 0.8 10E9/L (ref 0–1.3)
MONOCYTES # BLD AUTO: 0.8 10E9/L (ref 0–1.3)
MONOCYTES NFR BLD AUTO: 1.1 %
MONOCYTES NFR BLD AUTO: 11.7 %
MONOCYTES NFR BLD AUTO: 12.4 %
MONOCYTES NFR BLD AUTO: 12.6 %
MONOCYTES NFR BLD AUTO: 13.7 %
MONOCYTES NFR BLD AUTO: 14.2 %
MONOCYTES NFR BLD AUTO: 14.5 %
MONOCYTES NFR BLD AUTO: 3 %
MONOCYTES NFR BLD AUTO: 6.5 %
MONOCYTES NFR BLD AUTO: 8.2 %
MONOCYTES NFR BLD AUTO: 8.3 %
MONOCYTES NFR BLD AUTO: 9 %
MONOCYTES NFR BLD AUTO: 9.6 %
NEUTROPHILS # BLD AUTO: 1.2 10E9/L (ref 1.6–8.3)
NEUTROPHILS # BLD AUTO: 1.5 10E9/L (ref 1.6–8.3)
NEUTROPHILS # BLD AUTO: 1.9 10E9/L (ref 1.6–8.3)
NEUTROPHILS # BLD AUTO: 2 10E9/L (ref 1.6–8.3)
NEUTROPHILS # BLD AUTO: 2 10E9/L (ref 1.6–8.3)
NEUTROPHILS # BLD AUTO: 2.3 10E9/L (ref 1.6–8.3)
NEUTROPHILS # BLD AUTO: 2.4 10E9/L (ref 1.6–8.3)
NEUTROPHILS # BLD AUTO: 2.5 10E9/L (ref 1.6–8.3)
NEUTROPHILS # BLD AUTO: 2.6 10E9/L (ref 1.6–8.3)
NEUTROPHILS # BLD AUTO: 2.7 10E9/L (ref 1.6–8.3)
NEUTROPHILS # BLD AUTO: 2.9 10E9/L (ref 1.6–8.3)
NEUTROPHILS # BLD AUTO: 3.6 10E9/L (ref 1.6–8.3)
NEUTROPHILS # BLD AUTO: 3.9 10E9/L (ref 1.6–8.3)
NEUTROPHILS # BLD AUTO: 4.2 10E9/L (ref 1.6–8.3)
NEUTROPHILS # BLD AUTO: 4.4 10E9/L (ref 1.6–8.3)
NEUTROPHILS NFR BLD AUTO: 39 %
NEUTROPHILS NFR BLD AUTO: 45 %
NEUTROPHILS NFR BLD AUTO: 46.6 %
NEUTROPHILS NFR BLD AUTO: 50.3 %
NEUTROPHILS NFR BLD AUTO: 53.7 %
NEUTROPHILS NFR BLD AUTO: 59.1 %
NEUTROPHILS NFR BLD AUTO: 63.4 %
NEUTROPHILS NFR BLD AUTO: 64.5 %
NEUTROPHILS NFR BLD AUTO: 65.1 %
NEUTROPHILS NFR BLD AUTO: 65.9 %
NEUTROPHILS NFR BLD AUTO: 67.6 %
NEUTROPHILS NFR BLD AUTO: 68.9 %
NEUTROPHILS NFR BLD AUTO: 70.3 %
NEUTROPHILS NFR BLD AUTO: 73.9 %
NEUTROPHILS NFR BLD AUTO: 74 %
NEUTS BAND NFR FLD MANUAL: 3 %
NRBC # BLD AUTO: 0 10*3/UL
NRBC BLD AUTO-RTO: 0 /100
NUM BPU REQUESTED: 1
OTHER CELLS # BLD MANUAL: 0.1 10E9/L
OTHER CELLS FLD MANUAL: 59 %
OTHER CELLS NFR BLD MANUAL: 1 %
OVALOCYTES BLD QL SMEAR: SLIGHT
PLATELET # BLD AUTO: 140 10E9/L (ref 150–450)
PLATELET # BLD AUTO: 158 10E9/L (ref 150–450)
PLATELET # BLD AUTO: 171 10E9/L (ref 150–450)
PLATELET # BLD AUTO: 172 10E9/L (ref 150–450)
PLATELET # BLD AUTO: 174 10E9/L (ref 150–450)
PLATELET # BLD AUTO: 180 10E9/L (ref 150–450)
PLATELET # BLD AUTO: 205 10E9/L (ref 150–450)
PLATELET # BLD AUTO: 220 10E9/L (ref 150–450)
PLATELET # BLD AUTO: 230 10E9/L (ref 150–450)
PLATELET # BLD AUTO: 271 10E9/L (ref 150–450)
PLATELET # BLD AUTO: 318 10E9/L (ref 150–450)
PLATELET # BLD AUTO: 323 10E9/L (ref 150–450)
PLATELET # BLD AUTO: 540 10E9/L (ref 150–450)
PLATELET # BLD AUTO: 587 10E9/L (ref 150–450)
PLATELET # BLD AUTO: 85 10E9/L (ref 150–450)
PLATELET # BLD EST: ABNORMAL 10*3/UL
POIKILOCYTOSIS BLD QL SMEAR: SLIGHT
POTASSIUM SERPL-SCNC: 3.3 MMOL/L (ref 3.4–5.3)
POTASSIUM SERPL-SCNC: 3.4 MMOL/L (ref 3.4–5.3)
POTASSIUM SERPL-SCNC: 3.5 MMOL/L (ref 3.4–5.3)
POTASSIUM SERPL-SCNC: 3.5 MMOL/L (ref 3.4–5.3)
POTASSIUM SERPL-SCNC: 3.6 MMOL/L (ref 3.4–5.3)
POTASSIUM SERPL-SCNC: 3.7 MMOL/L (ref 3.4–5.3)
POTASSIUM SERPL-SCNC: 3.7 MMOL/L (ref 3.4–5.3)
POTASSIUM SERPL-SCNC: 3.8 MMOL/L (ref 3.4–5.3)
POTASSIUM SERPL-SCNC: 3.8 MMOL/L (ref 3.4–5.3)
POTASSIUM SERPL-SCNC: 3.9 MMOL/L (ref 3.4–5.3)
POTASSIUM SERPL-SCNC: 3.9 MMOL/L (ref 3.4–5.3)
POTASSIUM SERPL-SCNC: 4 MMOL/L (ref 3.4–5.3)
PROMYELOCYTES # BLD MANUAL: 0.1 10E9/L
PROMYELOCYTES NFR BLD MANUAL: 1 %
PROT FLD-MCNC: 1.6 G/DL
PROT SERPL-MCNC: 5.8 G/DL (ref 6.8–8.8)
PROT SERPL-MCNC: 5.8 G/DL (ref 6.8–8.8)
PROT SERPL-MCNC: 5.9 G/DL (ref 6.8–8.8)
PROT SERPL-MCNC: 6 G/DL (ref 6.8–8.8)
PROT SERPL-MCNC: 6.3 G/DL (ref 6.8–8.8)
PROT SERPL-MCNC: 6.5 G/DL (ref 6.8–8.8)
PROT SERPL-MCNC: 6.7 G/DL (ref 6.8–8.8)
PROT SERPL-MCNC: 6.7 G/DL (ref 6.8–8.8)
PROT SERPL-MCNC: 6.9 G/DL (ref 6.8–8.8)
PROT SERPL-MCNC: 7.1 G/DL (ref 6.8–8.8)
PROT SERPL-MCNC: 7.2 G/DL (ref 6.8–8.8)
PROT SERPL-MCNC: 7.3 G/DL (ref 6.8–8.8)
PROT SERPL-MCNC: 7.4 G/DL (ref 6.8–8.8)
PROT SERPL-MCNC: 7.8 G/DL (ref 6.8–8.8)
PROT SERPL-MCNC: 8 G/DL (ref 6.8–8.8)
RBC # BLD AUTO: 2.72 10E12/L (ref 4.4–5.9)
RBC # BLD AUTO: 2.77 10E12/L (ref 4.4–5.9)
RBC # BLD AUTO: 2.82 10E12/L (ref 4.4–5.9)
RBC # BLD AUTO: 2.82 10E12/L (ref 4.4–5.9)
RBC # BLD AUTO: 2.99 10E12/L (ref 4.4–5.9)
RBC # BLD AUTO: 3.03 10E12/L (ref 4.4–5.9)
RBC # BLD AUTO: 3.15 10E12/L (ref 4.4–5.9)
RBC # BLD AUTO: 3.18 10E12/L (ref 4.4–5.9)
RBC # BLD AUTO: 3.39 10E12/L (ref 4.4–5.9)
RBC # BLD AUTO: 4 10E12/L (ref 4.4–5.9)
RBC # BLD AUTO: 4.05 10E12/L (ref 4.4–5.9)
RBC # BLD AUTO: 4.16 10E12/L (ref 4.4–5.9)
RBC # BLD AUTO: 4.43 10E12/L (ref 4.4–5.9)
RBC # BLD AUTO: 4.63 10E12/L (ref 4.4–5.9)
RBC # BLD AUTO: 4.81 10E12/L (ref 4.4–5.9)
RBC INCLUSIONS BLD: SLIGHT
RBC MORPH BLD: ABNORMAL
RBC MORPH BLD: ABNORMAL
SARS-COV-2 RNA SPEC QL NAA+PROBE: NEGATIVE
SARS-COV-2 RNA SPEC QL NAA+PROBE: NORMAL
SARS-COV-2 RNA SPEC QL NAA+PROBE: NOT DETECTED
SARS-COV-2 RNA SPEC QL NAA+PROBE: NOT DETECTED
SODIUM SERPL-SCNC: 132 MMOL/L (ref 133–144)
SODIUM SERPL-SCNC: 133 MMOL/L (ref 133–144)
SODIUM SERPL-SCNC: 133 MMOL/L (ref 133–144)
SODIUM SERPL-SCNC: 134 MMOL/L (ref 133–144)
SODIUM SERPL-SCNC: 134 MMOL/L (ref 133–144)
SODIUM SERPL-SCNC: 135 MMOL/L (ref 133–144)
SODIUM SERPL-SCNC: 136 MMOL/L (ref 133–144)
SODIUM SERPL-SCNC: 136 MMOL/L (ref 133–144)
SODIUM SERPL-SCNC: 137 MMOL/L (ref 133–144)
SODIUM SERPL-SCNC: 139 MMOL/L (ref 133–144)
SODIUM SERPL-SCNC: 139 MMOL/L (ref 133–144)
SPECIMEN EXP DATE BLD: NORMAL
SPECIMEN SOURCE FLD: NORMAL
SPECIMEN SOURCE: NORMAL
TRANSFUSION STATUS PATIENT QL: NORMAL
TRANSFUSION STATUS PATIENT QL: NORMAL
UPPER EUS: NORMAL
VARIANT LYMPHS BLD QL SMEAR: PRESENT
WBC # BLD AUTO: 2.3 10E9/L (ref 4–11)
WBC # BLD AUTO: 2.7 10E9/L (ref 4–11)
WBC # BLD AUTO: 2.8 10E9/L (ref 4–11)
WBC # BLD AUTO: 3.3 10E9/L (ref 4–11)
WBC # BLD AUTO: 3.4 10E9/L (ref 4–11)
WBC # BLD AUTO: 3.7 10E9/L (ref 4–11)
WBC # BLD AUTO: 3.8 10E9/L (ref 4–11)
WBC # BLD AUTO: 4.2 10E9/L (ref 4–11)
WBC # BLD AUTO: 4.3 10E9/L (ref 4–11)
WBC # BLD AUTO: 4.9 10E9/L (ref 4–11)
WBC # BLD AUTO: 5.9 10E9/L (ref 4–11)
WBC # BLD AUTO: 5.9 10E9/L (ref 4–11)
WBC # BLD AUTO: 6 10E9/L (ref 4–11)
WBC # BLD AUTO: 6.6 10E9/L (ref 4–11)
WBC # BLD AUTO: 6.8 10E9/L (ref 4–11)
WBC # FLD AUTO: 447 /UL

## 2020-01-01 PROCEDURE — 250N000011 HC RX IP 250 OP 636: Performed by: NURSE PRACTITIONER

## 2020-01-01 PROCEDURE — 85025 COMPLETE CBC W/AUTO DIFF WBC: CPT | Performed by: INTERNAL MEDICINE

## 2020-01-01 PROCEDURE — 83735 ASSAY OF MAGNESIUM: CPT | Performed by: NURSE PRACTITIONER

## 2020-01-01 PROCEDURE — 85025 COMPLETE CBC W/AUTO DIFF WBC: CPT | Performed by: NURSE PRACTITIONER

## 2020-01-01 PROCEDURE — 96417 CHEMO IV INFUS EACH ADDL SEQ: CPT

## 2020-01-01 PROCEDURE — 99215 OFFICE O/P EST HI 40 MIN: CPT | Mod: 95 | Performed by: INTERNAL MEDICINE

## 2020-01-01 PROCEDURE — 96523 IRRIG DRUG DELIVERY DEVICE: CPT

## 2020-01-01 PROCEDURE — 25000128 H RX IP 250 OP 636: Performed by: PHYSICIAN ASSISTANT

## 2020-01-01 PROCEDURE — G0463 HOSPITAL OUTPT CLINIC VISIT: HCPCS | Mod: 25

## 2020-01-01 PROCEDURE — P9016 RBC LEUKOCYTES REDUCED: HCPCS | Performed by: NURSE PRACTITIONER

## 2020-01-01 PROCEDURE — 88112 CYTOPATH CELL ENHANCE TECH: CPT | Mod: 26 | Performed by: PATHOLOGY

## 2020-01-01 PROCEDURE — C1788 PORT, INDWELLING, IMP: HCPCS | Performed by: SURGERY

## 2020-01-01 PROCEDURE — 25000125 ZZHC RX 250: Performed by: SURGERY

## 2020-01-01 PROCEDURE — 96367 TX/PROPH/DG ADDL SEQ IV INF: CPT

## 2020-01-01 PROCEDURE — 25000128 H RX IP 250 OP 636: Performed by: NURSE PRACTITIONER

## 2020-01-01 PROCEDURE — 250N000011 HC RX IP 250 OP 636: Performed by: INTERNAL MEDICINE

## 2020-01-01 PROCEDURE — 96366 THER/PROPH/DIAG IV INF ADDON: CPT

## 2020-01-01 PROCEDURE — 36415 COLL VENOUS BLD VENIPUNCTURE: CPT | Performed by: INTERNAL MEDICINE

## 2020-01-01 PROCEDURE — 86301 IMMUNOASSAY TUMOR CA 19-9: CPT | Mod: 90 | Performed by: INTERNAL MEDICINE

## 2020-01-01 PROCEDURE — 96413 CHEMO IV INFUSION 1 HR: CPT

## 2020-01-01 PROCEDURE — 40000166 ZZH STATISTIC PP CARE STAGE 1

## 2020-01-01 PROCEDURE — 999N000132 HC STATISTIC PP CARE STAGE 1

## 2020-01-01 PROCEDURE — 49185 SCLEROTX FLUID COLLECTION: CPT | Performed by: RADIOLOGY

## 2020-01-01 PROCEDURE — 99214 OFFICE O/P EST MOD 30 MIN: CPT | Mod: 95 | Performed by: NURSE PRACTITIONER

## 2020-01-01 PROCEDURE — 999N001193 HC VIDEO/TELEPHONE VISIT; NO CHARGE

## 2020-01-01 PROCEDURE — 37000009 ZZH ANESTHESIA TECHNICAL FEE, EACH ADDTL 15 MIN: Performed by: SURGERY

## 2020-01-01 PROCEDURE — 99207 PR APP CREDIT; MD BILLING SHARED VISIT: CPT | Performed by: INTERNAL MEDICINE

## 2020-01-01 PROCEDURE — 83735 ASSAY OF MAGNESIUM: CPT | Performed by: INTERNAL MEDICINE

## 2020-01-01 PROCEDURE — 27210995 ZZH RX 272: Performed by: RADIOLOGY

## 2020-01-01 PROCEDURE — 36561 INSERT TUNNELED CV CATH: CPT | Performed by: SURGERY

## 2020-01-01 PROCEDURE — 99153 MOD SED SAME PHYS/QHP EA: CPT

## 2020-01-01 PROCEDURE — 80053 COMPREHEN METABOLIC PANEL: CPT | Performed by: INTERNAL MEDICINE

## 2020-01-01 PROCEDURE — 99223 1ST HOSP IP/OBS HIGH 75: CPT | Mod: AI | Performed by: INTERNAL MEDICINE

## 2020-01-01 PROCEDURE — 250N000011 HC RX IP 250 OP 636: Performed by: STUDENT IN AN ORGANIZED HEALTH CARE EDUCATION/TRAINING PROGRAM

## 2020-01-01 PROCEDURE — U0003 INFECTIOUS AGENT DETECTION BY NUCLEIC ACID (DNA OR RNA); SEVERE ACUTE RESPIRATORY SYNDROME CORONAVIRUS 2 (SARS-COV-2) (CORONAVIRUS DISEASE [COVID-19]), AMPLIFIED PROBE TECHNIQUE, MAKING USE OF HIGH THROUGHPUT TECHNOLOGIES AS DESCRIBED BY CMS-2020-01-R: HCPCS | Performed by: SURGERY

## 2020-01-01 PROCEDURE — 250N000011 HC RX IP 250 OP 636: Performed by: RADIOLOGY

## 2020-01-01 PROCEDURE — 25800030 ZZH RX IP 258 OP 636: Performed by: PHYSICIAN ASSISTANT

## 2020-01-01 PROCEDURE — 258N000003 HC RX IP 258 OP 636: Performed by: RADIOLOGY

## 2020-01-01 PROCEDURE — 99214 OFFICE O/P EST MOD 30 MIN: CPT | Mod: 95 | Performed by: INTERNAL MEDICINE

## 2020-01-01 PROCEDURE — 258N000003 HC RX IP 258 OP 636: Performed by: INTERNAL MEDICINE

## 2020-01-01 PROCEDURE — 40000306 ZZH STATISTIC PRE PROC ASSESS II: Performed by: SURGERY

## 2020-01-01 PROCEDURE — 36591 DRAW BLOOD OFF VENOUS DEVICE: CPT

## 2020-01-01 PROCEDURE — U0003 INFECTIOUS AGENT DETECTION BY NUCLEIC ACID (DNA OR RNA); SEVERE ACUTE RESPIRATORY SYNDROME CORONAVIRUS 2 (SARS-COV-2) (CORONAVIRUS DISEASE [COVID-19]), AMPLIFIED PROBE TECHNIQUE, MAKING USE OF HIGH THROUGHPUT TECHNOLOGIES AS DESCRIBED BY CMS-2020-01-R: HCPCS | Performed by: INTERNAL MEDICINE

## 2020-01-01 PROCEDURE — 272N000004 HC RX 272: Performed by: PHYSICIAN ASSISTANT

## 2020-01-01 PROCEDURE — 99152 MOD SED SAME PHYS/QHP 5/>YRS: CPT | Mod: 26 | Performed by: RADIOLOGY

## 2020-01-01 PROCEDURE — 86850 RBC ANTIBODY SCREEN: CPT | Performed by: NURSE PRACTITIONER

## 2020-01-01 PROCEDURE — 36430 TRANSFUSION BLD/BLD COMPNT: CPT

## 2020-01-01 PROCEDURE — 25000132 ZZH RX MED GY IP 250 OP 250 PS 637: Mod: GY | Performed by: INTERNAL MEDICINE

## 2020-01-01 PROCEDURE — 25500064 ZZH RX 255 OP 636: Performed by: RADIOLOGY

## 2020-01-01 PROCEDURE — 74177 CT ABD & PELVIS W/CONTRAST: CPT

## 2020-01-01 PROCEDURE — 49185 SCLEROTX FLUID COLLECTION: CPT

## 2020-01-01 PROCEDURE — 25000125 ZZHC RX 250: Performed by: NURSE ANESTHETIST, CERTIFIED REGISTERED

## 2020-01-01 PROCEDURE — 87075 CULTR BACTERIA EXCEPT BLOOD: CPT | Performed by: INTERNAL MEDICINE

## 2020-01-01 PROCEDURE — 27210794 ZZH OR GENERAL SUPPLY STERILE: Performed by: SURGERY

## 2020-01-01 PROCEDURE — 0W9G3ZZ DRAINAGE OF PERITONEAL CAVITY, PERCUTANEOUS APPROACH: ICD-10-PCS | Performed by: STUDENT IN AN ORGANIZED HEALTH CARE EDUCATION/TRAINING PROGRAM

## 2020-01-01 PROCEDURE — 258N000003 HC RX IP 258 OP 636: Performed by: NURSE PRACTITIONER

## 2020-01-01 PROCEDURE — 85027 COMPLETE CBC AUTOMATED: CPT | Performed by: INTERNAL MEDICINE

## 2020-01-01 PROCEDURE — 88173 CYTOPATH EVAL FNA REPORT: CPT | Mod: XS | Performed by: INTERNAL MEDICINE

## 2020-01-01 PROCEDURE — 25000128 H RX IP 250 OP 636: Performed by: INTERNAL MEDICINE

## 2020-01-01 PROCEDURE — 99231 SBSQ HOSP IP/OBS SF/LOW 25: CPT | Performed by: INTERNAL MEDICINE

## 2020-01-01 PROCEDURE — 99222 1ST HOSP IP/OBS MODERATE 55: CPT | Mod: GC | Performed by: INTERNAL MEDICINE

## 2020-01-01 PROCEDURE — 77001 FLUOROGUIDE FOR VEIN DEVICE: CPT | Mod: 26 | Performed by: SURGERY

## 2020-01-01 PROCEDURE — 74176 CT ABD & PELVIS W/O CONTRAST: CPT

## 2020-01-01 PROCEDURE — 999N001017 HC STATISTIC GLUCOSE BY METER IP

## 2020-01-01 PROCEDURE — 250N000013 HC RX MED GY IP 250 OP 250 PS 637: Mod: GY | Performed by: INTERNAL MEDICINE

## 2020-01-01 PROCEDURE — 27210886 ZZH ACCESSORY CR5

## 2020-01-01 PROCEDURE — 84157 ASSAY OF PROTEIN OTHER: CPT | Performed by: INTERNAL MEDICINE

## 2020-01-01 PROCEDURE — 258N000003 HC RX IP 258 OP 636: Performed by: PHYSICIAN ASSISTANT

## 2020-01-01 PROCEDURE — 25800030 ZZH RX IP 258 OP 636: Performed by: NURSE ANESTHETIST, CERTIFIED REGISTERED

## 2020-01-01 PROCEDURE — 255N000002 HC RX 255 OP 636: Performed by: RADIOLOGY

## 2020-01-01 PROCEDURE — 87205 SMEAR GRAM STAIN: CPT | Performed by: INTERNAL MEDICINE

## 2020-01-01 PROCEDURE — 49083 ABD PARACENTESIS W/IMAGING: CPT | Performed by: STUDENT IN AN ORGANIZED HEALTH CARE EDUCATION/TRAINING PROGRAM

## 2020-01-01 PROCEDURE — 250N000013 HC RX MED GY IP 250 OP 250 PS 637: Performed by: INTERNAL MEDICINE

## 2020-01-01 PROCEDURE — 37000008 ZZH ANESTHESIA TECHNICAL FEE, 1ST 30 MIN: Performed by: SURGERY

## 2020-01-01 PROCEDURE — U0003 INFECTIOUS AGENT DETECTION BY NUCLEIC ACID (DNA OR RNA); SEVERE ACUTE RESPIRATORY SYNDROME CORONAVIRUS 2 (SARS-COV-2) (CORONAVIRUS DISEASE [COVID-19]), AMPLIFIED PROBE TECHNIQUE, MAKING USE OF HIGH THROUGHPUT TECHNOLOGIES AS DESCRIBED BY CMS-2020-01-R: HCPCS | Performed by: RADIOLOGY

## 2020-01-01 PROCEDURE — 76080 X-RAY EXAM OF FISTULA: CPT

## 2020-01-01 PROCEDURE — 250N000011 HC RX IP 250 OP 636: Performed by: PHYSICIAN ASSISTANT

## 2020-01-01 PROCEDURE — 80053 COMPREHEN METABOLIC PANEL: CPT | Performed by: NURSE PRACTITIONER

## 2020-01-01 PROCEDURE — 250N000009 HC RX 250: Performed by: RADIOLOGY

## 2020-01-01 PROCEDURE — 120N000002 HC R&B MED SURG/OB UMMC

## 2020-01-01 PROCEDURE — 85730 THROMBOPLASTIN TIME PARTIAL: CPT | Performed by: INTERNAL MEDICINE

## 2020-01-01 PROCEDURE — 25000128 H RX IP 250 OP 636: Performed by: NURSE ANESTHETIST, CERTIFIED REGISTERED

## 2020-01-01 PROCEDURE — 71260 CT THORAX DX C+: CPT

## 2020-01-01 PROCEDURE — 25000125 ZZHC RX 250: Performed by: PHYSICIAN ASSISTANT

## 2020-01-01 PROCEDURE — 99239 HOSP IP/OBS DSCHRG MGMT >30: CPT | Performed by: INTERNAL MEDICINE

## 2020-01-01 PROCEDURE — 250N000009 HC RX 250: Performed by: STUDENT IN AN ORGANIZED HEALTH CARE EDUCATION/TRAINING PROGRAM

## 2020-01-01 PROCEDURE — A9585 GADOBUTROL INJECTION: HCPCS | Performed by: INTERNAL MEDICINE

## 2020-01-01 PROCEDURE — 27210995 ZZH RX 272: Performed by: PHYSICIAN ASSISTANT

## 2020-01-01 PROCEDURE — 96368 THER/DIAG CONCURRENT INF: CPT

## 2020-01-01 PROCEDURE — 88313 SPECIAL STAINS GROUP 2: CPT | Mod: TC | Performed by: PATHOLOGY

## 2020-01-01 PROCEDURE — G0008 ADMIN INFLUENZA VIRUS VAC: HCPCS | Performed by: INTERNAL MEDICINE

## 2020-01-01 PROCEDURE — 96361 HYDRATE IV INFUSION ADD-ON: CPT

## 2020-01-01 PROCEDURE — 86901 BLOOD TYPING SEROLOGIC RH(D): CPT | Performed by: NURSE PRACTITIONER

## 2020-01-01 PROCEDURE — 25800030 ZZH RX IP 258 OP 636: Performed by: RADIOLOGY

## 2020-01-01 PROCEDURE — 999N001014 HC STATISTIC CYTO WRIGHT STAIN TC: Performed by: PHYSICIAN ASSISTANT

## 2020-01-01 PROCEDURE — 25800030 ZZH RX IP 258 OP 636: Performed by: NURSE PRACTITIONER

## 2020-01-01 PROCEDURE — 25000125 ZZHC RX 250: Performed by: RADIOLOGY

## 2020-01-01 PROCEDURE — 25000128 H RX IP 250 OP 636: Performed by: SURGERY

## 2020-01-01 PROCEDURE — 99152 MOD SED SAME PHYS/QHP 5/>YRS: CPT

## 2020-01-01 PROCEDURE — 255N000002 HC RX 255 OP 636: Performed by: INTERNAL MEDICINE

## 2020-01-01 PROCEDURE — 88305 TISSUE EXAM BY PATHOLOGIST: CPT | Mod: 26 | Performed by: PATHOLOGY

## 2020-01-01 PROCEDURE — 89051 BODY FLUID CELL COUNT: CPT | Performed by: INTERNAL MEDICINE

## 2020-01-01 PROCEDURE — 272N000686 HC HEADBAND, CERIBELL EEG

## 2020-01-01 PROCEDURE — 74183 MRI ABD W/O CNTR FLWD CNTR: CPT | Mod: 26 | Performed by: RADIOLOGY

## 2020-01-01 PROCEDURE — 37000009 ZZH ANESTHESIA TECHNICAL FEE, EACH ADDTL 15 MIN: Performed by: INTERNAL MEDICINE

## 2020-01-01 PROCEDURE — 43237 ENDOSCOPIC US EXAM ESOPH: CPT | Performed by: INTERNAL MEDICINE

## 2020-01-01 PROCEDURE — 88112 CYTOPATH CELL ENHANCE TECH: CPT | Mod: TC | Performed by: PHYSICIAN ASSISTANT

## 2020-01-01 PROCEDURE — 74183 MRI ABD W/O CNTR FLWD CNTR: CPT

## 2020-01-01 PROCEDURE — 36000054 ZZH SURGERY LEVEL 2 W FLUORO 1ST 30 MIN: Performed by: SURGERY

## 2020-01-01 PROCEDURE — 74177 CT ABD & PELVIS W/CONTRAST: CPT | Mod: 26 | Performed by: RADIOLOGY

## 2020-01-01 PROCEDURE — 71045 X-RAY EXAM CHEST 1 VIEW: CPT | Mod: TC,59

## 2020-01-01 PROCEDURE — 40000277 XR SURGERY CARM FLUORO LESS THAN 5 MIN W STILLS

## 2020-01-01 PROCEDURE — 99215 OFFICE O/P EST HI 40 MIN: CPT | Mod: 95 | Performed by: NURSE PRACTITIONER

## 2020-01-01 PROCEDURE — 82962 GLUCOSE BLOOD TEST: CPT

## 2020-01-01 PROCEDURE — 25500064 ZZH RX 255 OP 636: Performed by: PHYSICIAN ASSISTANT

## 2020-01-01 PROCEDURE — 82962 GLUCOSE BLOOD TEST: CPT | Mod: GZ

## 2020-01-01 PROCEDURE — 87015 SPECIMEN INFECT AGNT CONCNTJ: CPT | Performed by: INTERNAL MEDICINE

## 2020-01-01 PROCEDURE — 86923 COMPATIBILITY TEST ELECTRIC: CPT | Performed by: NURSE PRACTITIONER

## 2020-01-01 PROCEDURE — 99000 SPECIMEN HANDLING OFFICE-LAB: CPT | Performed by: INTERNAL MEDICINE

## 2020-01-01 PROCEDURE — 85004 AUTOMATED DIFF WBC COUNT: CPT | Performed by: INTERNAL MEDICINE

## 2020-01-01 PROCEDURE — 99496 TRANSJ CARE MGMT HIGH F2F 7D: CPT | Mod: 95 | Performed by: NURSE PRACTITIONER

## 2020-01-01 PROCEDURE — 90662 IIV NO PRSV INCREASED AG IM: CPT | Performed by: INTERNAL MEDICINE

## 2020-01-01 PROCEDURE — 74176 CT ABD & PELVIS W/O CONTRAST: CPT | Mod: 26 | Performed by: RADIOLOGY

## 2020-01-01 PROCEDURE — 25800030 ZZH RX IP 258 OP 636: Performed by: INTERNAL MEDICINE

## 2020-01-01 PROCEDURE — 250N000009 HC RX 250: Performed by: NURSE PRACTITIONER

## 2020-01-01 PROCEDURE — 27210903 ZZH KIT CR5

## 2020-01-01 PROCEDURE — 37000008 ZZH ANESTHESIA TECHNICAL FEE, 1ST 30 MIN: Performed by: INTERNAL MEDICINE

## 2020-01-01 PROCEDURE — 88305 TISSUE EXAM BY PATHOLOGIST: CPT | Mod: TC | Performed by: PHYSICIAN ASSISTANT

## 2020-01-01 PROCEDURE — 87070 CULTURE OTHR SPECIMN AEROBIC: CPT | Performed by: INTERNAL MEDICINE

## 2020-01-01 PROCEDURE — 88305 TISSUE EXAM BY PATHOLOGIST: CPT | Performed by: INTERNAL MEDICINE

## 2020-01-01 PROCEDURE — 99203 OFFICE O/P NEW LOW 30 MIN: CPT | Performed by: SURGERY

## 2020-01-01 PROCEDURE — 36000052 ZZH SURGERY LEVEL 2 EA 15 ADDTL MIN: Performed by: SURGERY

## 2020-01-01 PROCEDURE — 43242 EGD US FINE NEEDLE BX/ASPIR: CPT | Performed by: INTERNAL MEDICINE

## 2020-01-01 PROCEDURE — 82042 OTHER SOURCE ALBUMIN QUAN EA: CPT | Performed by: INTERNAL MEDICINE

## 2020-01-01 PROCEDURE — 25800030 ZZH RX IP 258 OP 636: Performed by: SURGERY

## 2020-01-01 PROCEDURE — 00000155 ZZHCL STATISTIC H-CELL BLOCK W/STAIN: Performed by: INTERNAL MEDICINE

## 2020-01-01 PROCEDURE — 86900 BLOOD TYPING SEROLOGIC ABO: CPT | Performed by: NURSE PRACTITIONER

## 2020-01-01 PROCEDURE — 99223 1ST HOSP IP/OBS HIGH 75: CPT | Performed by: INTERNAL MEDICINE

## 2020-01-01 PROCEDURE — 36415 COLL VENOUS BLD VENIPUNCTURE: CPT | Performed by: NURSE PRACTITIONER

## 2020-01-01 PROCEDURE — 85610 PROTHROMBIN TIME: CPT | Performed by: INTERNAL MEDICINE

## 2020-01-01 PROCEDURE — 250N000009 HC RX 250: Performed by: PHYSICIAN ASSISTANT

## 2020-01-01 PROCEDURE — 71000027 ZZH RECOVERY PHASE 2 EACH 15 MINS: Performed by: SURGERY

## 2020-01-01 PROCEDURE — 99152 MOD SED SAME PHYS/QHP 5/>YRS: CPT | Performed by: RADIOLOGY

## 2020-01-01 PROCEDURE — 88172 CYTP DX EVAL FNA 1ST EA SITE: CPT | Performed by: INTERNAL MEDICINE

## 2020-01-01 PROCEDURE — 27110028 ZZH OR GENERAL SUPPLY NON-STERILE: Performed by: SURGERY

## 2020-01-01 PROCEDURE — 999N001018 HC STATISTIC H-CELL BLOCK W/STAIN: Performed by: PHYSICIAN ASSISTANT

## 2020-01-01 PROCEDURE — 99213 OFFICE O/P EST LOW 20 MIN: CPT | Mod: GC | Performed by: RADIOLOGY

## 2020-01-01 PROCEDURE — 250N000012 HC RX MED GY IP 250 OP 636 PS 637: Performed by: INTERNAL MEDICINE

## 2020-01-01 PROCEDURE — 25000128 H RX IP 250 OP 636: Performed by: RADIOLOGY

## 2020-01-01 DEVICE — CATH PORT POWERPORT 8FR SL W/SUTURE PLUGS 1708000: Type: IMPLANTABLE DEVICE | Site: NECK | Status: FUNCTIONAL

## 2020-01-01 RX ORDER — HEPARIN SODIUM (PORCINE) LOCK FLUSH IV SOLN 100 UNIT/ML 100 UNIT/ML
5 SOLUTION INTRAVENOUS
Status: DISCONTINUED | OUTPATIENT
Start: 2020-01-01 | End: 2020-01-01 | Stop reason: HOSPADM

## 2020-01-01 RX ORDER — LORAZEPAM 2 MG/ML
0.5 INJECTION INTRAMUSCULAR EVERY 4 HOURS PRN
Status: CANCELLED
Start: 2020-01-01

## 2020-01-01 RX ORDER — HEPARIN SODIUM (PORCINE) LOCK FLUSH IV SOLN 100 UNIT/ML 100 UNIT/ML
5 SOLUTION INTRAVENOUS
Status: CANCELLED
Start: 2020-01-01

## 2020-01-01 RX ORDER — FENTANYL CITRATE 50 UG/ML
25-50 INJECTION, SOLUTION INTRAMUSCULAR; INTRAVENOUS EVERY 5 MIN PRN
Status: DISCONTINUED | OUTPATIENT
Start: 2020-01-01 | End: 2020-01-01 | Stop reason: HOSPADM

## 2020-01-01 RX ORDER — MEPERIDINE HYDROCHLORIDE 25 MG/ML
25 INJECTION INTRAMUSCULAR; INTRAVENOUS; SUBCUTANEOUS EVERY 30 MIN PRN
Status: CANCELLED | OUTPATIENT
Start: 2020-01-01

## 2020-01-01 RX ORDER — SODIUM CHLORIDE 9 MG/ML
INJECTION, SOLUTION INTRAVENOUS CONTINUOUS
Status: DISCONTINUED | OUTPATIENT
Start: 2020-01-01 | End: 2020-01-01 | Stop reason: HOSPADM

## 2020-01-01 RX ORDER — NALOXONE HYDROCHLORIDE 0.4 MG/ML
.1-.4 INJECTION, SOLUTION INTRAMUSCULAR; INTRAVENOUS; SUBCUTANEOUS
Status: CANCELLED | OUTPATIENT
Start: 2020-01-01

## 2020-01-01 RX ORDER — EPINEPHRINE 1 MG/ML
0.3 INJECTION, SOLUTION INTRAMUSCULAR; SUBCUTANEOUS EVERY 5 MIN PRN
Status: CANCELLED | OUTPATIENT
Start: 2020-01-01

## 2020-01-01 RX ORDER — CEFAZOLIN SODIUM 2 G/100ML
2 INJECTION, SOLUTION INTRAVENOUS
Status: CANCELLED | OUTPATIENT
Start: 2020-01-01

## 2020-01-01 RX ORDER — NALOXONE HYDROCHLORIDE 0.4 MG/ML
.1-.4 INJECTION, SOLUTION INTRAMUSCULAR; INTRAVENOUS; SUBCUTANEOUS
Status: DISCONTINUED | OUTPATIENT
Start: 2020-01-01 | End: 2020-01-01 | Stop reason: HOSPADM

## 2020-01-01 RX ORDER — METHYLPREDNISOLONE SODIUM SUCCINATE 125 MG/2ML
125 INJECTION, POWDER, LYOPHILIZED, FOR SOLUTION INTRAMUSCULAR; INTRAVENOUS
Status: CANCELLED
Start: 2020-01-01

## 2020-01-01 RX ORDER — NICOTINE POLACRILEX 4 MG
15-30 LOZENGE BUCCAL
Status: DISCONTINUED | OUTPATIENT
Start: 2020-01-01 | End: 2020-01-01 | Stop reason: HOSPADM

## 2020-01-01 RX ORDER — HEPARIN SODIUM (PORCINE) LOCK FLUSH IV SOLN 100 UNIT/ML 100 UNIT/ML
5 SOLUTION INTRAVENOUS
Status: COMPLETED | OUTPATIENT
Start: 2020-01-01 | End: 2020-01-01

## 2020-01-01 RX ORDER — LIDOCAINE 40 MG/G
CREAM TOPICAL
Status: DISCONTINUED | OUTPATIENT
Start: 2020-01-01 | End: 2020-01-01 | Stop reason: HOSPADM

## 2020-01-01 RX ORDER — CEFAZOLIN SODIUM 2 G/100ML
2 INJECTION, SOLUTION INTRAVENOUS
Status: COMPLETED | OUTPATIENT
Start: 2020-01-01 | End: 2020-01-01

## 2020-01-01 RX ORDER — METRONIDAZOLE 500 MG/1
500 TABLET ORAL 3 TIMES DAILY
Qty: 15 TABLET | Refills: 0 | Status: SHIPPED | OUTPATIENT
Start: 2020-01-01 | End: 2020-01-01

## 2020-01-01 RX ORDER — SODIUM CHLORIDE 9 MG/ML
1000 INJECTION, SOLUTION INTRAVENOUS CONTINUOUS PRN
Status: CANCELLED
Start: 2020-01-01

## 2020-01-01 RX ORDER — LORAZEPAM 0.5 MG/1
0.5 TABLET ORAL EVERY 4 HOURS PRN
Qty: 30 TABLET | Refills: 5 | Status: CANCELLED | OUTPATIENT
Start: 2020-01-01

## 2020-01-01 RX ORDER — DIPHENHYDRAMINE HYDROCHLORIDE 50 MG/ML
50 INJECTION INTRAMUSCULAR; INTRAVENOUS
Status: CANCELLED
Start: 2020-01-01

## 2020-01-01 RX ORDER — HEPARIN SODIUM (PORCINE) LOCK FLUSH IV SOLN 100 UNIT/ML 100 UNIT/ML
5 SOLUTION INTRAVENOUS
Status: CANCELLED | OUTPATIENT
Start: 2020-01-01

## 2020-01-01 RX ORDER — IODIXANOL 320 MG/ML
50 INJECTION, SOLUTION INTRAVASCULAR ONCE
Status: COMPLETED | OUTPATIENT
Start: 2020-01-01 | End: 2020-01-01

## 2020-01-01 RX ORDER — DRONABINOL 2.5 MG/1
5 CAPSULE ORAL
Status: DISCONTINUED | OUTPATIENT
Start: 2020-01-01 | End: 2020-01-01 | Stop reason: HOSPADM

## 2020-01-01 RX ORDER — AMOXICILLIN 250 MG
1 CAPSULE ORAL 2 TIMES DAILY
Status: DISCONTINUED | OUTPATIENT
Start: 2020-01-01 | End: 2020-01-01 | Stop reason: HOSPADM

## 2020-01-01 RX ORDER — CIPROFLOXACIN 500 MG/1
500 TABLET, FILM COATED ORAL 2 TIMES DAILY
Qty: 14 TABLET | Refills: 0 | Status: SHIPPED | OUTPATIENT
Start: 2020-01-01 | End: 2020-01-01

## 2020-01-01 RX ORDER — PROCHLORPERAZINE MALEATE 10 MG
5 TABLET ORAL EVERY 6 HOURS PRN
Qty: 30 TABLET | Refills: 5 | Status: CANCELLED | OUTPATIENT
Start: 2020-01-01

## 2020-01-01 RX ORDER — HEPARIN SODIUM,PORCINE 10 UNIT/ML
5 VIAL (ML) INTRAVENOUS
Status: CANCELLED | OUTPATIENT
Start: 2020-01-01

## 2020-01-01 RX ORDER — FLUMAZENIL 0.1 MG/ML
0.2 INJECTION, SOLUTION INTRAVENOUS
Status: DISCONTINUED | OUTPATIENT
Start: 2020-01-01 | End: 2020-01-01 | Stop reason: HOSPADM

## 2020-01-01 RX ORDER — ALBUTEROL SULFATE 0.83 MG/ML
2.5 SOLUTION RESPIRATORY (INHALATION)
Status: CANCELLED | OUTPATIENT
Start: 2020-01-01

## 2020-01-01 RX ORDER — DRONABINOL 5 MG/1
5 CAPSULE ORAL
Qty: 60 CAPSULE | Refills: 0 | Status: SHIPPED | OUTPATIENT
Start: 2020-01-01 | End: 2020-01-01

## 2020-01-01 RX ORDER — DOXYCYCLINE 100 MG/10ML
200 INJECTION, POWDER, LYOPHILIZED, FOR SOLUTION INTRAVENOUS ONCE
Status: COMPLETED | OUTPATIENT
Start: 2020-01-01 | End: 2020-01-01

## 2020-01-01 RX ORDER — ONDANSETRON 8 MG/1
8 TABLET, FILM COATED ORAL EVERY 8 HOURS PRN
Qty: 10 TABLET | Refills: 5 | Status: CANCELLED | OUTPATIENT
Start: 2020-01-01

## 2020-01-01 RX ORDER — AMPICILLIN AND SULBACTAM 2; 1 G/1; G/1
3 INJECTION, POWDER, FOR SOLUTION INTRAMUSCULAR; INTRAVENOUS
Status: CANCELLED | OUTPATIENT
Start: 2020-01-01

## 2020-01-01 RX ORDER — CIPROFLOXACIN 500 MG/1
500 TABLET, FILM COATED ORAL 2 TIMES DAILY
Qty: 10 TABLET | Refills: 0 | Status: SHIPPED | OUTPATIENT
Start: 2020-01-01 | End: 2020-01-01

## 2020-01-01 RX ORDER — ALBUTEROL SULFATE 90 UG/1
1-2 AEROSOL, METERED RESPIRATORY (INHALATION)
Status: CANCELLED
Start: 2020-01-01

## 2020-01-01 RX ORDER — CEFAZOLIN SODIUM 1 G/3ML
1 INJECTION, POWDER, FOR SOLUTION INTRAMUSCULAR; INTRAVENOUS SEE ADMIN INSTRUCTIONS
Status: CANCELLED | OUTPATIENT
Start: 2020-01-01

## 2020-01-01 RX ORDER — DEXTROSE MONOHYDRATE 25 G/50ML
25-50 INJECTION, SOLUTION INTRAVENOUS
Status: DISCONTINUED | OUTPATIENT
Start: 2020-01-01 | End: 2020-01-01 | Stop reason: HOSPADM

## 2020-01-01 RX ORDER — LORAZEPAM 0.5 MG/1
0.5 TABLET ORAL EVERY 4 HOURS PRN
Qty: 30 TABLET | Refills: 5 | Status: SHIPPED | OUTPATIENT
Start: 2020-01-01 | End: 2021-01-01

## 2020-01-01 RX ORDER — ZOLPIDEM TARTRATE 5 MG/1
5 TABLET ORAL
Qty: 30 TABLET | Refills: 1 | Status: SHIPPED | OUTPATIENT
Start: 2020-01-01 | End: 2020-01-01

## 2020-01-01 RX ORDER — ONDANSETRON 8 MG/1
8 TABLET, FILM COATED ORAL EVERY 8 HOURS PRN
Status: DISCONTINUED | OUTPATIENT
Start: 2020-01-01 | End: 2020-01-01 | Stop reason: HOSPADM

## 2020-01-01 RX ORDER — SODIUM CHLORIDE, SODIUM LACTATE, POTASSIUM CHLORIDE, CALCIUM CHLORIDE 600; 310; 30; 20 MG/100ML; MG/100ML; MG/100ML; MG/100ML
INJECTION, SOLUTION INTRAVENOUS CONTINUOUS
Status: DISCONTINUED | OUTPATIENT
Start: 2020-01-01 | End: 2020-01-01 | Stop reason: HOSPADM

## 2020-01-01 RX ORDER — ZOLPIDEM TARTRATE 10 MG/1
10 TABLET ORAL
Qty: 30 TABLET | Refills: 0 | Status: SHIPPED | OUTPATIENT
Start: 2020-01-01 | End: 2021-01-01

## 2020-01-01 RX ORDER — LIDOCAINE HYDROCHLORIDE 20 MG/ML
INJECTION, SOLUTION INFILTRATION; PERINEURAL PRN
Status: DISCONTINUED | OUTPATIENT
Start: 2020-01-01 | End: 2020-01-01

## 2020-01-01 RX ORDER — LIDOCAINE 40 MG/G
CREAM TOPICAL
Status: CANCELLED | OUTPATIENT
Start: 2020-01-01

## 2020-01-01 RX ORDER — POTASSIUM CHLORIDE 1500 MG/1
20 TABLET, EXTENDED RELEASE ORAL ONCE
Status: COMPLETED | OUTPATIENT
Start: 2020-01-01 | End: 2020-01-01

## 2020-01-01 RX ORDER — PROPOFOL 10 MG/ML
INJECTION, EMULSION INTRAVENOUS CONTINUOUS PRN
Status: DISCONTINUED | OUTPATIENT
Start: 2020-01-01 | End: 2020-01-01

## 2020-01-01 RX ORDER — DOXYCYCLINE 100 MG/10ML
5 INJECTION, POWDER, LYOPHILIZED, FOR SOLUTION INTRAVENOUS ONCE
Status: COMPLETED | OUTPATIENT
Start: 2020-01-01 | End: 2020-01-01

## 2020-01-01 RX ORDER — ZOLPIDEM TARTRATE 5 MG/1
10 TABLET ORAL
Qty: 30 TABLET | Refills: 0 | Status: SHIPPED | OUTPATIENT
Start: 2020-01-01 | End: 2020-01-01

## 2020-01-01 RX ORDER — SODIUM CHLORIDE, SODIUM LACTATE, POTASSIUM CHLORIDE, CALCIUM CHLORIDE 600; 310; 30; 20 MG/100ML; MG/100ML; MG/100ML; MG/100ML
INJECTION, SOLUTION INTRAVENOUS CONTINUOUS PRN
Status: DISCONTINUED | OUTPATIENT
Start: 2020-01-01 | End: 2020-01-01

## 2020-01-01 RX ORDER — CAPECITABINE 500 MG/1
1000 TABLET, FILM COATED ORAL 2 TIMES DAILY
Qty: 112 TABLET | Refills: 0 | Status: ON HOLD | OUTPATIENT
Start: 2020-01-01 | End: 2020-01-01

## 2020-01-01 RX ORDER — AMOXICILLIN 250 MG
2 CAPSULE ORAL 2 TIMES DAILY
Status: DISCONTINUED | OUTPATIENT
Start: 2020-01-01 | End: 2020-01-01 | Stop reason: HOSPADM

## 2020-01-01 RX ORDER — IOPAMIDOL 755 MG/ML
500 INJECTION, SOLUTION INTRAVASCULAR ONCE
Status: COMPLETED | OUTPATIENT
Start: 2020-01-01 | End: 2020-01-01

## 2020-01-01 RX ORDER — POLYETHYLENE GLYCOL 3350 17 G/17G
17 POWDER, FOR SOLUTION ORAL DAILY
Status: DISCONTINUED | OUTPATIENT
Start: 2020-01-01 | End: 2020-01-01 | Stop reason: HOSPADM

## 2020-01-01 RX ORDER — ATORVASTATIN CALCIUM 40 MG/1
40 TABLET, FILM COATED ORAL AT BEDTIME
Status: DISCONTINUED | OUTPATIENT
Start: 2020-01-01 | End: 2020-01-01 | Stop reason: HOSPADM

## 2020-01-01 RX ORDER — FENTANYL CITRATE 50 UG/ML
INJECTION, SOLUTION INTRAMUSCULAR; INTRAVENOUS PRN
Status: DISCONTINUED | OUTPATIENT
Start: 2020-01-01 | End: 2020-01-01

## 2020-01-01 RX ORDER — ONDANSETRON 8 MG/1
8 TABLET, FILM COATED ORAL EVERY 8 HOURS PRN
Qty: 10 TABLET | Refills: 5 | Status: SHIPPED | OUTPATIENT
Start: 2020-01-01 | End: 2021-01-01

## 2020-01-01 RX ORDER — FENOFIBRATE 160 MG/1
160 TABLET ORAL AT BEDTIME
Status: DISCONTINUED | OUTPATIENT
Start: 2020-01-01 | End: 2020-01-01 | Stop reason: HOSPADM

## 2020-01-01 RX ORDER — NICOTINE POLACRILEX 4 MG
15-30 LOZENGE BUCCAL
Status: CANCELLED | OUTPATIENT
Start: 2020-01-01

## 2020-01-01 RX ORDER — PROPOFOL 10 MG/ML
INJECTION, EMULSION INTRAVENOUS PRN
Status: DISCONTINUED | OUTPATIENT
Start: 2020-01-01 | End: 2020-01-01

## 2020-01-01 RX ORDER — IOPAMIDOL 510 MG/ML
50 INJECTION, SOLUTION INTRAVASCULAR ONCE
Status: COMPLETED | OUTPATIENT
Start: 2020-01-01 | End: 2020-01-01

## 2020-01-01 RX ORDER — MAGNESIUM SULFATE HEPTAHYDRATE 40 MG/ML
2 INJECTION, SOLUTION INTRAVENOUS ONCE
Status: COMPLETED | OUTPATIENT
Start: 2020-01-01 | End: 2020-01-01

## 2020-01-01 RX ORDER — ACETAMINOPHEN 325 MG/1
650 TABLET ORAL
Status: DISCONTINUED | OUTPATIENT
Start: 2020-01-01 | End: 2020-01-01 | Stop reason: HOSPADM

## 2020-01-01 RX ORDER — AMPICILLIN AND SULBACTAM 2; 1 G/1; G/1
3 INJECTION, POWDER, FOR SOLUTION INTRAMUSCULAR; INTRAVENOUS
Status: COMPLETED | OUTPATIENT
Start: 2020-01-01 | End: 2020-01-01

## 2020-01-01 RX ORDER — DEXTROSE MONOHYDRATE 25 G/50ML
25-50 INJECTION, SOLUTION INTRAVENOUS
Status: CANCELLED | OUTPATIENT
Start: 2020-01-01

## 2020-01-01 RX ORDER — PROCHLORPERAZINE MALEATE 5 MG
5 TABLET ORAL EVERY 6 HOURS PRN
Status: DISCONTINUED | OUTPATIENT
Start: 2020-01-01 | End: 2020-01-01 | Stop reason: HOSPADM

## 2020-01-01 RX ORDER — LORAZEPAM 0.5 MG/1
0.5 TABLET ORAL EVERY 4 HOURS PRN
Status: DISCONTINUED | OUTPATIENT
Start: 2020-01-01 | End: 2020-01-01 | Stop reason: HOSPADM

## 2020-01-01 RX ORDER — PIPERACILLIN SODIUM, TAZOBACTAM SODIUM 3; .375 G/15ML; G/15ML
3.38 INJECTION, POWDER, LYOPHILIZED, FOR SOLUTION INTRAVENOUS EVERY 6 HOURS
Status: DISCONTINUED | OUTPATIENT
Start: 2020-01-01 | End: 2020-01-01 | Stop reason: HOSPADM

## 2020-01-01 RX ORDER — IOPAMIDOL 755 MG/ML
112 INJECTION, SOLUTION INTRAVASCULAR ONCE
Status: COMPLETED | OUTPATIENT
Start: 2020-01-01 | End: 2020-01-01

## 2020-01-01 RX ORDER — SIMETHICONE
LIQUID (ML) MISCELLANEOUS PRN
Status: DISCONTINUED | OUTPATIENT
Start: 2020-01-01 | End: 2020-01-01 | Stop reason: HOSPADM

## 2020-01-01 RX ORDER — ZOLPIDEM TARTRATE 5 MG/1
10 TABLET ORAL
Status: DISCONTINUED | OUTPATIENT
Start: 2020-01-01 | End: 2020-01-01 | Stop reason: HOSPADM

## 2020-01-01 RX ORDER — DRONABINOL 5 MG/1
5 CAPSULE ORAL
Qty: 60 CAPSULE | Refills: 0 | Status: SHIPPED | OUTPATIENT
Start: 2020-01-01 | End: 2021-01-01

## 2020-01-01 RX ORDER — BUPIVACAINE HYDROCHLORIDE AND EPINEPHRINE 2.5; 5 MG/ML; UG/ML
INJECTION, SOLUTION INFILTRATION; PERINEURAL PRN
Status: DISCONTINUED | OUTPATIENT
Start: 2020-01-01 | End: 2020-01-01 | Stop reason: HOSPADM

## 2020-01-01 RX ORDER — IODIXANOL 320 MG/ML
150 INJECTION, SOLUTION INTRAVASCULAR ONCE
Status: COMPLETED | OUTPATIENT
Start: 2020-01-01 | End: 2020-01-01

## 2020-01-01 RX ORDER — HEPARIN SODIUM (PORCINE) LOCK FLUSH IV SOLN 100 UNIT/ML 100 UNIT/ML
SOLUTION INTRAVENOUS PRN
Status: DISCONTINUED | OUTPATIENT
Start: 2020-01-01 | End: 2020-01-01 | Stop reason: HOSPADM

## 2020-01-01 RX ORDER — IODIXANOL 320 MG/ML
50 INJECTION, SOLUTION INTRAVASCULAR ONCE
Status: DISCONTINUED | OUTPATIENT
Start: 2020-01-01 | End: 2020-01-01 | Stop reason: HOSPADM

## 2020-01-01 RX ORDER — CEFAZOLIN SODIUM 2 G/100ML
2 INJECTION, SOLUTION INTRAVENOUS
Status: DISCONTINUED | OUTPATIENT
Start: 2020-01-01 | End: 2020-01-01 | Stop reason: HOSPADM

## 2020-01-01 RX ORDER — OXYCODONE HYDROCHLORIDE 5 MG/1
5-10 TABLET ORAL EVERY 4 HOURS PRN
Status: DISCONTINUED | OUTPATIENT
Start: 2020-01-01 | End: 2020-01-01 | Stop reason: HOSPADM

## 2020-01-01 RX ORDER — METRONIDAZOLE 500 MG/1
500 TABLET ORAL 3 TIMES DAILY
Qty: 21 TABLET | Refills: 0 | Status: SHIPPED | OUTPATIENT
Start: 2020-01-01 | End: 2020-01-01

## 2020-01-01 RX ORDER — CIPROFLOXACIN 500 MG/1
500 TABLET, FILM COATED ORAL 2 TIMES DAILY
Qty: 28 TABLET | Refills: 0 | Status: SHIPPED | OUTPATIENT
Start: 2020-01-01 | End: 2021-01-01

## 2020-01-01 RX ORDER — ONDANSETRON 2 MG/ML
INJECTION INTRAMUSCULAR; INTRAVENOUS PRN
Status: DISCONTINUED | OUTPATIENT
Start: 2020-01-01 | End: 2020-01-01

## 2020-01-01 RX ORDER — DEXAMETHASONE 4 MG/1
4 TABLET ORAL
Qty: 3 TABLET | Refills: 1 | Status: SHIPPED | OUTPATIENT
Start: 2020-01-01 | End: 2020-01-01

## 2020-01-01 RX ORDER — METRONIDAZOLE 500 MG/1
500 TABLET ORAL 3 TIMES DAILY
Qty: 42 TABLET | Refills: 0 | Status: SHIPPED | OUTPATIENT
Start: 2020-01-01 | End: 2021-01-01

## 2020-01-01 RX ORDER — CEFAZOLIN SODIUM 1 G/3ML
1 INJECTION, POWDER, FOR SOLUTION INTRAMUSCULAR; INTRAVENOUS SEE ADMIN INSTRUCTIONS
Status: DISCONTINUED | OUTPATIENT
Start: 2020-01-01 | End: 2020-01-01 | Stop reason: HOSPADM

## 2020-01-01 RX ORDER — CLOPIDOGREL BISULFATE 75 MG/1
75 TABLET ORAL AT BEDTIME
Status: DISCONTINUED | OUTPATIENT
Start: 2020-01-01 | End: 2020-01-01 | Stop reason: HOSPADM

## 2020-01-01 RX ORDER — DEXAMETHASONE 4 MG/1
4 TABLET ORAL
Qty: 3 TABLET | Refills: 1 | Status: SHIPPED | OUTPATIENT
Start: 2020-01-01 | End: 2021-01-01

## 2020-01-01 RX ORDER — IOPAMIDOL 755 MG/ML
119 INJECTION, SOLUTION INTRAVASCULAR ONCE
Status: COMPLETED | OUTPATIENT
Start: 2020-01-01 | End: 2020-01-01

## 2020-01-01 RX ORDER — DOXYCYCLINE 100 MG/10ML
200 INJECTION, POWDER, LYOPHILIZED, FOR SOLUTION INTRAVENOUS ONCE
Status: CANCELLED | OUTPATIENT
Start: 2020-01-01 | End: 2020-01-01

## 2020-01-01 RX ORDER — FUROSEMIDE 20 MG
20 TABLET ORAL EVERY EVENING
Status: DISCONTINUED | OUTPATIENT
Start: 2020-01-01 | End: 2020-01-01 | Stop reason: HOSPADM

## 2020-01-01 RX ORDER — GADOBUTROL 604.72 MG/ML
7.5 INJECTION INTRAVENOUS ONCE
Status: COMPLETED | OUTPATIENT
Start: 2020-01-01 | End: 2020-01-01

## 2020-01-01 RX ORDER — PROCHLORPERAZINE MALEATE 10 MG
5 TABLET ORAL EVERY 6 HOURS PRN
Qty: 30 TABLET | Refills: 5 | Status: ON HOLD | OUTPATIENT
Start: 2020-01-01 | End: 2021-01-01

## 2020-01-01 RX ORDER — MAGNESIUM SULFATE HEPTAHYDRATE 40 MG/ML
4 INJECTION, SOLUTION INTRAVENOUS ONCE
Status: COMPLETED | OUTPATIENT
Start: 2020-01-01 | End: 2020-01-01

## 2020-01-01 RX ORDER — SODIUM CHLORIDE 9 MG/ML
INJECTION, SOLUTION INTRAVENOUS CONTINUOUS
Status: CANCELLED | OUTPATIENT
Start: 2020-01-01

## 2020-01-01 RX ADMIN — IODIXANOL 5 ML: 320 INJECTION, SOLUTION INTRAVASCULAR at 11:20

## 2020-01-01 RX ADMIN — Medication 5 ML: at 12:17

## 2020-01-01 RX ADMIN — IODIXANOL 3 ML: 320 INJECTION, SOLUTION INTRAVASCULAR at 10:53

## 2020-01-01 RX ADMIN — MAGNESIUM SULFATE 2 G: 2 INJECTION INTRAVENOUS at 11:13

## 2020-01-01 RX ADMIN — CISPLATIN 53 MG: 1 INJECTION, SOLUTION INTRAVENOUS at 10:09

## 2020-01-01 RX ADMIN — POTASSIUM CHLORIDE: 2 INJECTION, SOLUTION, CONCENTRATE INTRAVENOUS at 10:00

## 2020-01-01 RX ADMIN — DEXAMETHASONE SODIUM PHOSPHATE: 10 INJECTION, SOLUTION INTRAMUSCULAR; INTRAVENOUS at 10:15

## 2020-01-01 RX ADMIN — DOXYCYCLINE 200 MG: 100 INJECTION, POWDER, LYOPHILIZED, FOR SOLUTION INTRAVENOUS at 12:00

## 2020-01-01 RX ADMIN — GADOBUTROL 7.5 ML: 604.72 INJECTION INTRAVENOUS at 20:35

## 2020-01-01 RX ADMIN — SODIUM CHLORIDE 250 ML: 9 INJECTION, SOLUTION INTRAVENOUS at 08:55

## 2020-01-01 RX ADMIN — MIDAZOLAM 0.5 MG: 1 INJECTION INTRAMUSCULAR; INTRAVENOUS at 11:00

## 2020-01-01 RX ADMIN — IODIXANOL 5 ML: 320 INJECTION, SOLUTION INTRAVASCULAR at 10:30

## 2020-01-01 RX ADMIN — ONDANSETRON 4 MG: 2 INJECTION INTRAMUSCULAR; INTRAVENOUS at 14:45

## 2020-01-01 RX ADMIN — DEXAMETHASONE SODIUM PHOSPHATE: 10 INJECTION, SOLUTION INTRAMUSCULAR; INTRAVENOUS at 12:32

## 2020-01-01 RX ADMIN — IOPAMIDOL 112 ML: 755 INJECTION, SOLUTION INTRAVENOUS at 10:14

## 2020-01-01 RX ADMIN — IOPAMIDOL 119 ML: 755 INJECTION, SOLUTION INTRAVASCULAR at 14:02

## 2020-01-01 RX ADMIN — CISPLATIN 53 MG: 1 INJECTION, SOLUTION INTRAVENOUS at 11:47

## 2020-01-01 RX ADMIN — CISPLATIN 53 MG: 1 INJECTION, SOLUTION INTRAVENOUS at 11:55

## 2020-01-01 RX ADMIN — Medication 5 ML: at 15:03

## 2020-01-01 RX ADMIN — GEMCITABINE 2200 MG: 38 INJECTION, SOLUTION INTRAVENOUS at 13:02

## 2020-01-01 RX ADMIN — SODIUM CHLORIDE, POTASSIUM CHLORIDE, SODIUM LACTATE AND CALCIUM CHLORIDE: 600; 310; 30; 20 INJECTION, SOLUTION INTRAVENOUS at 13:58

## 2020-01-01 RX ADMIN — LIDOCAINE HYDROCHLORIDE 4 ML: 10 INJECTION, SOLUTION EPIDURAL; INFILTRATION; INTRACAUDAL; PERINEURAL at 14:18

## 2020-01-01 RX ADMIN — FENOFIBRATE 160 MG: 160 TABLET, FILM COATED ORAL at 04:43

## 2020-01-01 RX ADMIN — CISPLATIN 53 MG: 1 INJECTION, SOLUTION INTRAVENOUS at 10:38

## 2020-01-01 RX ADMIN — SODIUM CHLORIDE 250 ML: 9 INJECTION, SOLUTION INTRAVENOUS at 08:50

## 2020-01-01 RX ADMIN — SODIUM CHLORIDE: 9 INJECTION, SOLUTION INTRAVENOUS at 13:30

## 2020-01-01 RX ADMIN — SODIUM CHLORIDE 250 ML: 9 INJECTION, SOLUTION INTRAVENOUS at 09:57

## 2020-01-01 RX ADMIN — LIDOCAINE HYDROCHLORIDE 50 MG: 20 INJECTION, SOLUTION INFILTRATION; PERINEURAL at 14:01

## 2020-01-01 RX ADMIN — CISPLATIN 53 MG: 1 INJECTION INTRAVENOUS at 10:57

## 2020-01-01 RX ADMIN — LORAZEPAM 0.5 MG: 0.5 TABLET ORAL at 11:46

## 2020-01-01 RX ADMIN — ALCOHOL 5 ML: 0.98 INJECTION INTRASPINAL at 11:39

## 2020-01-01 RX ADMIN — Medication 5 ML: at 14:22

## 2020-01-01 RX ADMIN — DOXYCYCLINE 200 MG: 100 INJECTION, POWDER, LYOPHILIZED, FOR SOLUTION INTRAVENOUS at 15:19

## 2020-01-01 RX ADMIN — FENTANYL CITRATE 25 MCG: 50 INJECTION, SOLUTION INTRAMUSCULAR; INTRAVENOUS at 07:52

## 2020-01-01 RX ADMIN — INSULIN ASPART 1 UNITS: 100 INJECTION, SOLUTION INTRAVENOUS; SUBCUTANEOUS at 21:10

## 2020-01-01 RX ADMIN — LORAZEPAM 0.5 MG: 0.5 TABLET ORAL at 18:35

## 2020-01-01 RX ADMIN — FENTANYL CITRATE 100 MCG: 50 INJECTION, SOLUTION INTRAMUSCULAR; INTRAVENOUS at 11:21

## 2020-01-01 RX ADMIN — SODIUM CHLORIDE, POTASSIUM CHLORIDE, SODIUM LACTATE AND CALCIUM CHLORIDE: 600; 310; 30; 20 INJECTION, SOLUTION INTRAVENOUS at 06:51

## 2020-01-01 RX ADMIN — GEMCITABINE 2200 MG: 38 INJECTION, SOLUTION INTRAVENOUS at 11:27

## 2020-01-01 RX ADMIN — FENOFIBRATE 160 MG: 160 TABLET, FILM COATED ORAL at 22:05

## 2020-01-01 RX ADMIN — IOPAMIDOL 88 ML: 755 INJECTION, SOLUTION INTRAVENOUS at 14:24

## 2020-01-01 RX ADMIN — GEMCITABINE 2200 MG: 38 INJECTION, SOLUTION INTRAVENOUS at 13:08

## 2020-01-01 RX ADMIN — CEFAZOLIN SODIUM 2 G: 2 INJECTION, SOLUTION INTRAVENOUS at 09:51

## 2020-01-01 RX ADMIN — FENTANYL CITRATE 50 MCG: 50 INJECTION, SOLUTION INTRAMUSCULAR; INTRAVENOUS at 10:43

## 2020-01-01 RX ADMIN — INFLUENZA A VIRUS A/MICHIGAN/45/2015 X-275 (H1N1) ANTIGEN (FORMALDEHYDE INACTIVATED), INFLUENZA A VIRUS A/SINGAPORE/INFIMH-16-0019/2016 IVR-186 (H3N2) ANTIGEN (FORMALDEHYDE INACTIVATED), INFLUENZA B VIRUS B/PHUKET/3073/2013 ANTIGEN (FORMALDEHYDE INACTIVATED), AND INFLUENZA B VIRUS B/MARYLAND/15/2016 BX-69A ANTIGEN (FORMALDEHYDE INACTIVATED) 0.7 ML: 60; 60; 60; 60 INJECTION, SUSPENSION INTRAMUSCULAR at 10:58

## 2020-01-01 RX ADMIN — CLOPIDOGREL BISULFATE 75 MG: 75 TABLET ORAL at 22:05

## 2020-01-01 RX ADMIN — FUROSEMIDE 20 MG: 20 TABLET ORAL at 20:48

## 2020-01-01 RX ADMIN — PROPOFOL 30 MG: 10 INJECTION, EMULSION INTRAVENOUS at 07:32

## 2020-01-01 RX ADMIN — CEFAZOLIN SODIUM 2 G: 2 INJECTION, SOLUTION INTRAVENOUS at 07:32

## 2020-01-01 RX ADMIN — LISINOPRIL 30 MG: 20 TABLET ORAL at 20:47

## 2020-01-01 RX ADMIN — POTASSIUM CHLORIDE: 2 INJECTION, SOLUTION, CONCENTRATE INTRAVENOUS at 09:28

## 2020-01-01 RX ADMIN — PIPERACILLIN AND TAZOBACTAM 3.38 G: 3; .375 INJECTION, POWDER, LYOPHILIZED, FOR SOLUTION INTRAVENOUS at 06:33

## 2020-01-01 RX ADMIN — POTASSIUM CHLORIDE: 2 INJECTION, SOLUTION, CONCENTRATE INTRAVENOUS at 09:44

## 2020-01-01 RX ADMIN — DEXAMETHASONE SODIUM PHOSPHATE: 10 INJECTION, SOLUTION INTRAMUSCULAR; INTRAVENOUS at 09:28

## 2020-01-01 RX ADMIN — LIDOCAINE HYDROCHLORIDE 5 ML: 10 INJECTION, SOLUTION EPIDURAL; INFILTRATION; INTRACAUDAL; PERINEURAL at 10:55

## 2020-01-01 RX ADMIN — SODIUM CHLORIDE 5 MG: 9 INJECTION INTRAMUSCULAR; INTRAVENOUS; SUBCUTANEOUS at 11:30

## 2020-01-01 RX ADMIN — CISPLATIN 53 MG: 1 INJECTION, SOLUTION INTRAVENOUS at 10:19

## 2020-01-01 RX ADMIN — ALCOHOL 4 ML: 0.98 INJECTION INTRASPINAL at 14:29

## 2020-01-01 RX ADMIN — PIPERACILLIN AND TAZOBACTAM 3.38 G: 3; .375 INJECTION, POWDER, LYOPHILIZED, FOR SOLUTION INTRAVENOUS at 04:40

## 2020-01-01 RX ADMIN — ATORVASTATIN CALCIUM 40 MG: 40 TABLET, FILM COATED ORAL at 04:42

## 2020-01-01 RX ADMIN — Medication 5 ML: at 14:54

## 2020-01-01 RX ADMIN — MIDAZOLAM 0.5 MG: 1 INJECTION INTRAMUSCULAR; INTRAVENOUS at 14:10

## 2020-01-01 RX ADMIN — GEMCITABINE 2000 MG: 38 INJECTION, SOLUTION INTRAVENOUS at 11:11

## 2020-01-01 RX ADMIN — DRONABINOL 5 MG: 2.5 CAPSULE ORAL at 16:23

## 2020-01-01 RX ADMIN — ATORVASTATIN CALCIUM 40 MG: 40 TABLET, FILM COATED ORAL at 22:05

## 2020-01-01 RX ADMIN — PIPERACILLIN AND TAZOBACTAM 3.38 G: 3; .375 INJECTION, POWDER, LYOPHILIZED, FOR SOLUTION INTRAVENOUS at 11:06

## 2020-01-01 RX ADMIN — SODIUM CHLORIDE 250 ML: 9 INJECTION, SOLUTION INTRAVENOUS at 09:19

## 2020-01-01 RX ADMIN — SODIUM CHLORIDE 60 ML: 9 INJECTION, SOLUTION INTRAVENOUS at 14:24

## 2020-01-01 RX ADMIN — INSULIN ASPART 1 UNITS: 100 INJECTION, SOLUTION INTRAVENOUS; SUBCUTANEOUS at 04:16

## 2020-01-01 RX ADMIN — PIPERACILLIN AND TAZOBACTAM 3.38 G: 3; .375 INJECTION, POWDER, LYOPHILIZED, FOR SOLUTION INTRAVENOUS at 00:31

## 2020-01-01 RX ADMIN — AMPICILLIN SODIUM AND SULBACTAM SODIUM 3 G: 2; 1 INJECTION, POWDER, FOR SOLUTION INTRAMUSCULAR; INTRAVENOUS at 11:31

## 2020-01-01 RX ADMIN — INSULIN ASPART 1 UNITS: 100 INJECTION, SOLUTION INTRAVENOUS; SUBCUTANEOUS at 00:35

## 2020-01-01 RX ADMIN — FENTANYL CITRATE 25 MCG: 50 INJECTION, SOLUTION INTRAMUSCULAR; INTRAVENOUS at 14:10

## 2020-01-01 RX ADMIN — INSULIN ASPART 1 UNITS: 100 INJECTION, SOLUTION INTRAVENOUS; SUBCUTANEOUS at 07:53

## 2020-01-01 RX ADMIN — LIDOCAINE HYDROCHLORIDE 5 ML: 10 INJECTION, SOLUTION EPIDURAL; INFILTRATION; INTRACAUDAL; PERINEURAL at 10:50

## 2020-01-01 RX ADMIN — SODIUM CHLORIDE: 9 INJECTION, SOLUTION INTRAVENOUS at 09:51

## 2020-01-01 RX ADMIN — SODIUM CHLORIDE 250 ML: 9 INJECTION, SOLUTION INTRAVENOUS at 11:14

## 2020-01-01 RX ADMIN — MIDAZOLAM 1 MG: 1 INJECTION INTRAMUSCULAR; INTRAVENOUS at 07:27

## 2020-01-01 RX ADMIN — MIDAZOLAM 1 MG: 1 INJECTION INTRAMUSCULAR; INTRAVENOUS at 11:01

## 2020-01-01 RX ADMIN — DEXAMETHASONE SODIUM PHOSPHATE: 10 INJECTION, SOLUTION INTRAMUSCULAR; INTRAVENOUS at 11:04

## 2020-01-01 RX ADMIN — DEXAMETHASONE SODIUM PHOSPHATE: 10 INJECTION, SOLUTION INTRAMUSCULAR; INTRAVENOUS at 10:06

## 2020-01-01 RX ADMIN — SODIUM CHLORIDE 250 ML: 9 INJECTION, SOLUTION INTRAVENOUS at 09:25

## 2020-01-01 RX ADMIN — SODIUM CHLORIDE 200 MG: 9 INJECTION INTRAMUSCULAR; INTRAVENOUS; SUBCUTANEOUS at 11:20

## 2020-01-01 RX ADMIN — MIDAZOLAM 2 MG: 1 INJECTION INTRAMUSCULAR; INTRAVENOUS at 12:51

## 2020-01-01 RX ADMIN — LIDOCAINE HYDROCHLORIDE 5 ML: 10 INJECTION, SOLUTION EPIDURAL; INFILTRATION; INTRACAUDAL; PERINEURAL at 10:46

## 2020-01-01 RX ADMIN — PROPOFOL 20 MG: 10 INJECTION, EMULSION INTRAVENOUS at 14:06

## 2020-01-01 RX ADMIN — LIDOCAINE HYDROCHLORIDE 5 ML: 10 INJECTION, SOLUTION EPIDURAL; INFILTRATION; INTRACAUDAL; PERINEURAL at 11:17

## 2020-01-01 RX ADMIN — LORAZEPAM 0.5 MG: 0.5 TABLET ORAL at 11:14

## 2020-01-01 RX ADMIN — CEFAZOLIN SODIUM 2 G: 2 INJECTION, SOLUTION INTRAVENOUS at 09:17

## 2020-01-01 RX ADMIN — SODIUM CHLORIDE, POTASSIUM CHLORIDE, SODIUM LACTATE AND CALCIUM CHLORIDE: 600; 310; 30; 20 INJECTION, SOLUTION INTRAVENOUS at 07:25

## 2020-01-01 RX ADMIN — CEFAZOLIN SODIUM 2 G: 2 INJECTION, SOLUTION INTRAVENOUS at 13:31

## 2020-01-01 RX ADMIN — FENTANYL CITRATE 25 MCG: 50 INJECTION, SOLUTION INTRAMUSCULAR; INTRAVENOUS at 07:34

## 2020-01-01 RX ADMIN — Medication 5 ML: at 12:10

## 2020-01-01 RX ADMIN — FENTANYL CITRATE 25 MCG: 50 INJECTION, SOLUTION INTRAMUSCULAR; INTRAVENOUS at 07:39

## 2020-01-01 RX ADMIN — CEFAZOLIN SODIUM 2 G: 2 INJECTION, SOLUTION INTRAVENOUS at 09:04

## 2020-01-01 RX ADMIN — IODIXANOL 5 ML: 320 INJECTION, SOLUTION INTRAVASCULAR at 14:00

## 2020-01-01 RX ADMIN — POTASSIUM CHLORIDE: 2 INJECTION, SOLUTION, CONCENTRATE INTRAVENOUS at 12:53

## 2020-01-01 RX ADMIN — ALCOHOL 4 ML: 0.98 INJECTION INTRASPINAL at 13:00

## 2020-01-01 RX ADMIN — Medication 5 ML: at 09:37

## 2020-01-01 RX ADMIN — SODIUM CHLORIDE 250 ML: 9 INJECTION, SOLUTION INTRAVENOUS at 09:56

## 2020-01-01 RX ADMIN — SODIUM CHLORIDE 250 ML: 9 INJECTION, SOLUTION INTRAVENOUS at 09:11

## 2020-01-01 RX ADMIN — POTASSIUM CHLORIDE: 2 INJECTION, SOLUTION, CONCENTRATE INTRAVENOUS at 10:28

## 2020-01-01 RX ADMIN — TOPICAL ANESTHETIC 1 EACH: 200 SPRAY DENTAL; PERIODONTAL at 13:58

## 2020-01-01 RX ADMIN — CISPLATIN 53 MG: 1 INJECTION INTRAVENOUS at 13:06

## 2020-01-01 RX ADMIN — GEMCITABINE 2200 MG: 38 INJECTION, SOLUTION INTRAVENOUS at 12:21

## 2020-01-01 RX ADMIN — MIDAZOLAM 2 MG: 1 INJECTION INTRAMUSCULAR; INTRAVENOUS at 12:06

## 2020-01-01 RX ADMIN — FENTANYL CITRATE 50 MCG: 50 INJECTION, SOLUTION INTRAMUSCULAR; INTRAVENOUS at 11:02

## 2020-01-01 RX ADMIN — GEMCITABINE 2200 MG: 38 INJECTION, SOLUTION INTRAVENOUS at 12:01

## 2020-01-01 RX ADMIN — CISPLATIN 53 MG: 1 INJECTION, SOLUTION INTRAVENOUS at 10:47

## 2020-01-01 RX ADMIN — POTASSIUM CHLORIDE: 2 INJECTION, SOLUTION, CONCENTRATE INTRAVENOUS at 10:30

## 2020-01-01 RX ADMIN — LIDOCAINE HYDROCHLORIDE 4 ML: 10 INJECTION, SOLUTION EPIDURAL; INFILTRATION; INTRACAUDAL; PERINEURAL at 13:01

## 2020-01-01 RX ADMIN — GEMCITABINE 2200 MG: 38 INJECTION, SOLUTION INTRAVENOUS at 12:07

## 2020-01-01 RX ADMIN — Medication 500 UNITS: at 14:37

## 2020-01-01 RX ADMIN — FENTANYL CITRATE 25 MCG: 50 INJECTION, SOLUTION INTRAMUSCULAR; INTRAVENOUS at 11:00

## 2020-01-01 RX ADMIN — PROPOFOL 150 MCG/KG/MIN: 10 INJECTION, EMULSION INTRAVENOUS at 07:32

## 2020-01-01 RX ADMIN — CLOPIDOGREL BISULFATE 75 MG: 75 TABLET ORAL at 04:43

## 2020-01-01 RX ADMIN — POTASSIUM CHLORIDE: 2 INJECTION, SOLUTION, CONCENTRATE INTRAVENOUS at 09:50

## 2020-01-01 RX ADMIN — Medication 5 ML: at 10:04

## 2020-01-01 RX ADMIN — MIDAZOLAM 1 MG: 1 INJECTION INTRAMUSCULAR; INTRAVENOUS at 10:44

## 2020-01-01 RX ADMIN — FENTANYL CITRATE 100 MCG: 50 INJECTION, SOLUTION INTRAMUSCULAR; INTRAVENOUS at 12:06

## 2020-01-01 RX ADMIN — IOPAMIDOL 94 ML: 755 INJECTION, SOLUTION INTRAVENOUS at 15:15

## 2020-01-01 RX ADMIN — Medication 5 ML: at 12:48

## 2020-01-01 RX ADMIN — SODIUM CHLORIDE, PRESERVATIVE FREE 500 UNITS: 5 INJECTION INTRAVENOUS at 12:45

## 2020-01-01 RX ADMIN — FENTANYL CITRATE 100 MCG: 50 INJECTION, SOLUTION INTRAMUSCULAR; INTRAVENOUS at 12:50

## 2020-01-01 RX ADMIN — GEMCITABINE 2200 MG: 38 INJECTION, SOLUTION INTRAVENOUS at 13:36

## 2020-01-01 RX ADMIN — MIDAZOLAM 4 MG: 1 INJECTION INTRAMUSCULAR; INTRAVENOUS at 11:21

## 2020-01-01 RX ADMIN — CISPLATIN 53 MG: 1 INJECTION, SOLUTION INTRAVENOUS at 12:21

## 2020-01-01 RX ADMIN — POTASSIUM CHLORIDE: 2 INJECTION, SOLUTION, CONCENTRATE INTRAVENOUS at 10:44

## 2020-01-01 RX ADMIN — SODIUM CHLORIDE: 9 INJECTION, SOLUTION INTRAVENOUS at 11:31

## 2020-01-01 RX ADMIN — MAGNESIUM SULFATE HEPTAHYDRATE: 500 INJECTION, SOLUTION INTRAMUSCULAR; INTRAVENOUS at 09:21

## 2020-01-01 RX ADMIN — Medication 5 ML: at 12:50

## 2020-01-01 RX ADMIN — DEXAMETHASONE SODIUM PHOSPHATE: 10 INJECTION, SOLUTION INTRAMUSCULAR; INTRAVENOUS at 08:57

## 2020-01-01 RX ADMIN — MIDAZOLAM 1 MG: 1 INJECTION INTRAMUSCULAR; INTRAVENOUS at 07:32

## 2020-01-01 RX ADMIN — INSULIN ASPART 1 UNITS: 100 INJECTION, SOLUTION INTRAVENOUS; SUBCUTANEOUS at 16:31

## 2020-01-01 RX ADMIN — IOPAMIDOL 20 ML: 510 INJECTION, SOLUTION INTRAVASCULAR at 14:21

## 2020-01-01 RX ADMIN — SODIUM CHLORIDE: 9 INJECTION, SOLUTION INTRAVENOUS at 09:16

## 2020-01-01 RX ADMIN — FENTANYL CITRATE 25 MCG: 50 INJECTION, SOLUTION INTRAMUSCULAR; INTRAVENOUS at 08:02

## 2020-01-01 RX ADMIN — SODIUM CHLORIDE: 9 INJECTION, SOLUTION INTRAVENOUS at 09:03

## 2020-01-01 RX ADMIN — DEXAMETHASONE SODIUM PHOSPHATE: 10 INJECTION, SOLUTION INTRAMUSCULAR; INTRAVENOUS at 11:14

## 2020-01-01 RX ADMIN — PIPERACILLIN AND TAZOBACTAM 3.38 G: 3; .375 INJECTION, POWDER, LYOPHILIZED, FOR SOLUTION INTRAVENOUS at 16:25

## 2020-01-01 RX ADMIN — SODIUM CHLORIDE 200 MG: 9 INJECTION INTRAMUSCULAR; INTRAVENOUS; SUBCUTANEOUS at 13:19

## 2020-01-01 RX ADMIN — SODIUM CHLORIDE 250 ML: 9 INJECTION, SOLUTION INTRAVENOUS at 11:43

## 2020-01-01 RX ADMIN — GEMCITABINE 2200 MG: 38 INJECTION, SOLUTION INTRAVENOUS at 11:31

## 2020-01-01 RX ADMIN — DEXAMETHASONE SODIUM PHOSPHATE: 10 INJECTION, SOLUTION INTRAMUSCULAR; INTRAVENOUS at 10:07

## 2020-01-01 RX ADMIN — POTASSIUM CHLORIDE 20 MEQ: 1500 TABLET, EXTENDED RELEASE ORAL at 11:37

## 2020-01-01 RX ADMIN — DEXAMETHASONE SODIUM PHOSPHATE: 10 INJECTION, SOLUTION INTRAMUSCULAR; INTRAVENOUS at 09:26

## 2020-01-01 RX ADMIN — CISPLATIN 50 MG: 1 INJECTION, SOLUTION INTRAVENOUS at 10:07

## 2020-01-01 RX ADMIN — SODIUM CHLORIDE, PRESERVATIVE FREE 5 ML: 5 INJECTION INTRAVENOUS at 11:10

## 2020-01-01 RX ADMIN — GEMCITABINE 2200 MG: 38 INJECTION, SOLUTION INTRAVENOUS at 14:22

## 2020-01-01 RX ADMIN — PROPOFOL 150 MCG/KG/MIN: 10 INJECTION, EMULSION INTRAVENOUS at 14:01

## 2020-01-01 RX ADMIN — ALCOHOL 5 ML: 0.98 INJECTION INTRASPINAL at 12:07

## 2020-01-01 RX ADMIN — MAGNESIUM SULFATE IN WATER 4 G: 40 INJECTION, SOLUTION INTRAVENOUS at 10:29

## 2020-01-01 RX ADMIN — LIDOCAINE HYDROCHLORIDE 60 MG: 20 INJECTION, SOLUTION INFILTRATION; PERINEURAL at 07:32

## 2020-01-01 RX ADMIN — LIDOCAINE HYDROCHLORIDE 5 ML: 10 INJECTION, SOLUTION EPIDURAL; INFILTRATION; INTRACAUDAL; PERINEURAL at 10:57

## 2020-01-01 RX ADMIN — IODIXANOL 2 ML: 320 INJECTION, SOLUTION INTRAVASCULAR at 14:17

## 2020-01-01 RX ADMIN — IODIXANOL 5 ML: 320 INJECTION, SOLUTION INTRAVASCULAR at 13:00

## 2020-01-01 SDOH — HEALTH STABILITY: MENTAL HEALTH: CURRENT SMOKER: 0

## 2020-01-01 ASSESSMENT — PAIN SCALES - GENERAL
PAINLEVEL: NO PAIN (0)
PAINLEVEL: NO PAIN (0)
PAINLEVEL: MODERATE PAIN (4)
PAINLEVEL: NO PAIN (0)

## 2020-01-01 ASSESSMENT — ACTIVITIES OF DAILY LIVING (ADL)
ADLS_ACUITY_SCORE: 13
ADLS_ACUITY_SCORE: 13
ADLS_ACUITY_SCORE: 15
ADLS_ACUITY_SCORE: 13

## 2020-01-01 ASSESSMENT — MIFFLIN-ST. JEOR
SCORE: 1627.21
SCORE: 1625.4
SCORE: 1627.21
SCORE: 1627.21
SCORE: 1641.27
SCORE: 1661.68
SCORE: 1645.35
SCORE: 1619.95
SCORE: 1634.01
SCORE: 1619.82
SCORE: 1658.96
SCORE: 1627.21
SCORE: 1628.57
SCORE: 1607.25

## 2020-01-01 ASSESSMENT — LIFESTYLE VARIABLES: TOBACCO_USE: 0

## 2020-03-27 ENCOUNTER — TELEPHONE (OUTPATIENT)
Dept: GASTROENTEROLOGY | Facility: CLINIC | Age: 65
End: 2020-03-27

## 2020-03-27 NOTE — TELEPHONE ENCOUNTER
Referral from Abby Ramos    [facility], failed ERCP per EUS something in bile duct. Patient is outpatient now, holding plavix. Coordinating outpatient procedures under Covid protocol. Called wife to update would have more info but procedure not likely for Monday    [phone] wife mobile phone, Marcia  [phone], patients cell phone    Advised procedure note likely Monday, will keep in contact regarding when schedule to occur.    Rosalind Whatley, RN Care Coordinator

## 2020-03-30 ENCOUNTER — PREP FOR PROCEDURE (OUTPATIENT)
Dept: GASTROENTEROLOGY | Facility: CLINIC | Age: 65
End: 2020-03-30

## 2020-03-30 ENCOUNTER — TRANSCRIBE ORDERS (OUTPATIENT)
Dept: OTHER | Age: 65
End: 2020-03-30

## 2020-03-30 ENCOUNTER — TELEPHONE (OUTPATIENT)
Dept: GASTROENTEROLOGY | Facility: CLINIC | Age: 65
End: 2020-03-30

## 2020-03-30 DIAGNOSIS — K83.1 BILIARY STRICTURE (H): Primary | ICD-10-CM

## 2020-03-30 RX ORDER — FENOFIBRATE 160 MG/1
160 TABLET ORAL AT BEDTIME
Status: ON HOLD | COMMUNITY
End: 2021-01-01

## 2020-03-30 RX ORDER — SILDENAFIL CITRATE 20 MG/1
20 TABLET ORAL PRN
Status: ON HOLD | COMMUNITY
End: 2021-01-01

## 2020-03-30 RX ORDER — ATORVASTATIN CALCIUM 40 MG/1
40 TABLET, FILM COATED ORAL AT BEDTIME
Status: ON HOLD | COMMUNITY
End: 2021-01-01

## 2020-03-30 RX ORDER — LISINOPRIL 30 MG/1
30 TABLET ORAL EVERY EVENING
Status: ON HOLD | COMMUNITY
End: 2021-01-01

## 2020-03-30 RX ORDER — GLIPIZIDE 10 MG/1
10 TABLET ORAL AT BEDTIME
Status: ON HOLD | COMMUNITY
End: 2021-01-01

## 2020-03-30 RX ORDER — CLOPIDOGREL BISULFATE 75 MG/1
75 TABLET ORAL AT BEDTIME
Status: ON HOLD | COMMUNITY
End: 2021-01-01

## 2020-03-30 RX ORDER — FUROSEMIDE 20 MG
20 TABLET ORAL
COMMUNITY
End: 2021-01-01

## 2020-03-30 NOTE — TELEPHONE ENCOUNTER
Spoke to patient in regards to scheduled procedure. Informed patient he is scheduled with Dr. Strauss on 3/31/2020. Patient asked that I call his wife to go over scheduling details because she writes down the details.    LVM for patient wife to call back to go over scheduling information.

## 2020-03-31 ENCOUNTER — ANESTHESIA (OUTPATIENT)
Dept: SURGERY | Facility: CLINIC | Age: 65
End: 2020-03-31
Payer: MEDICARE

## 2020-03-31 ENCOUNTER — PATIENT OUTREACH (OUTPATIENT)
Dept: GASTROENTEROLOGY | Facility: CLINIC | Age: 65
End: 2020-03-31

## 2020-03-31 ENCOUNTER — HOSPITAL ENCOUNTER (OUTPATIENT)
Facility: CLINIC | Age: 65
Discharge: HOME | End: 2020-03-31
Attending: INTERNAL MEDICINE | Admitting: INTERNAL MEDICINE
Payer: MEDICARE

## 2020-03-31 ENCOUNTER — ANESTHESIA EVENT (OUTPATIENT)
Dept: SURGERY | Facility: CLINIC | Age: 65
End: 2020-03-31
Payer: MEDICARE

## 2020-03-31 ENCOUNTER — APPOINTMENT (OUTPATIENT)
Dept: GENERAL RADIOLOGY | Facility: CLINIC | Age: 65
End: 2020-03-31
Attending: INTERNAL MEDICINE
Payer: MEDICARE

## 2020-03-31 VITALS
HEART RATE: 71 BPM | HEIGHT: 73 IN | WEIGHT: 208.56 LBS | RESPIRATION RATE: 16 BRPM | DIASTOLIC BLOOD PRESSURE: 91 MMHG | SYSTOLIC BLOOD PRESSURE: 130 MMHG | TEMPERATURE: 98.3 F | BODY MASS INDEX: 27.64 KG/M2 | OXYGEN SATURATION: 96 %

## 2020-03-31 DIAGNOSIS — K83.1 BILIARY STRICTURE (H): Primary | ICD-10-CM

## 2020-03-31 DIAGNOSIS — K83.1 BILIARY STRICTURE (H): ICD-10-CM

## 2020-03-31 LAB
ALBUMIN SERPL-MCNC: 3.4 G/DL (ref 3.4–5)
ALP SERPL-CCNC: 335 U/L (ref 40–150)
ALT SERPL W P-5'-P-CCNC: 124 U/L (ref 0–70)
AMYLASE SERPL-CCNC: 169 U/L (ref 30–110)
AMYLASE SERPL-CCNC: 191 U/L (ref 30–110)
ANION GAP SERPL CALCULATED.3IONS-SCNC: 8 MMOL/L (ref 3–14)
AST SERPL W P-5'-P-CCNC: 86 U/L (ref 0–45)
BILIRUB SERPL-MCNC: 10.8 MG/DL (ref 0.2–1.3)
BUN SERPL-MCNC: 18 MG/DL (ref 7–30)
CALCIUM SERPL-MCNC: 9.7 MG/DL (ref 8.5–10.1)
CHLORIDE SERPL-SCNC: 101 MMOL/L (ref 94–109)
CO2 SERPL-SCNC: 23 MMOL/L (ref 20–32)
CREAT SERPL-MCNC: 1.08 MG/DL (ref 0.66–1.25)
ERCP: NORMAL
ERYTHROCYTE [DISTWIDTH] IN BLOOD BY AUTOMATED COUNT: 13.2 % (ref 10–15)
GFR SERPL CREATININE-BSD FRML MDRD: 71 ML/MIN/{1.73_M2}
GLUCOSE BLDC GLUCOMTR-MCNC: 220 MG/DL (ref 70–99)
GLUCOSE BLDC GLUCOMTR-MCNC: 268 MG/DL (ref 70–99)
GLUCOSE SERPL-MCNC: 234 MG/DL (ref 70–99)
HCT VFR BLD AUTO: 50.3 % (ref 40–53)
HGB BLD-MCNC: 16.3 G/DL (ref 13.3–17.7)
INR PPP: 1.07 (ref 0.86–1.14)
INTERPRETATION ECG - MUSE: NORMAL
LIPASE SERPL-CCNC: 2170 U/L (ref 73–393)
LIPASE SERPL-CCNC: 2520 U/L (ref 73–393)
MCH RBC QN AUTO: 30.5 PG (ref 26.5–33)
MCHC RBC AUTO-ENTMCNC: 32.4 G/DL (ref 31.5–36.5)
MCV RBC AUTO: 94 FL (ref 78–100)
PLATELET # BLD AUTO: 295 10E9/L (ref 150–450)
POTASSIUM SERPL-SCNC: 4 MMOL/L (ref 3.4–5.3)
PROT SERPL-MCNC: 7.6 G/DL (ref 6.8–8.8)
RBC # BLD AUTO: 5.34 10E12/L (ref 4.4–5.9)
SODIUM SERPL-SCNC: 132 MMOL/L (ref 133–144)
WBC # BLD AUTO: 7.4 10E9/L (ref 4–11)

## 2020-03-31 PROCEDURE — 36000059 ZZH SURGERY LEVEL 3 EA 15 ADDTL MIN UMMC: Performed by: INTERNAL MEDICINE

## 2020-03-31 PROCEDURE — 93010 ELECTROCARDIOGRAM REPORT: CPT | Mod: 59 | Performed by: INTERNAL MEDICINE

## 2020-03-31 PROCEDURE — 25800030 ZZH RX IP 258 OP 636: Performed by: NURSE ANESTHETIST, CERTIFIED REGISTERED

## 2020-03-31 PROCEDURE — 71000027 ZZH RECOVERY PHASE 2 EACH 15 MINS: Performed by: INTERNAL MEDICINE

## 2020-03-31 PROCEDURE — 27210794 ZZH OR GENERAL SUPPLY STERILE: Performed by: INTERNAL MEDICINE

## 2020-03-31 PROCEDURE — 37000008 ZZH ANESTHESIA TECHNICAL FEE, 1ST 30 MIN: Performed by: INTERNAL MEDICINE

## 2020-03-31 PROCEDURE — C1769 GUIDE WIRE: HCPCS | Performed by: INTERNAL MEDICINE

## 2020-03-31 PROCEDURE — 25000125 ZZHC RX 250: Performed by: NURSE ANESTHETIST, CERTIFIED REGISTERED

## 2020-03-31 PROCEDURE — 25500064 ZZH RX 255 OP 636: Performed by: INTERNAL MEDICINE

## 2020-03-31 PROCEDURE — 71000012 ZZH RECOVERY PHASE 1 LEVEL 1 FIRST HR: Performed by: INTERNAL MEDICINE

## 2020-03-31 PROCEDURE — 40000170 ZZH STATISTIC PRE-PROCEDURE ASSESSMENT II: Performed by: INTERNAL MEDICINE

## 2020-03-31 PROCEDURE — 82962 GLUCOSE BLOOD TEST: CPT | Mod: 91

## 2020-03-31 PROCEDURE — 25000566 ZZH SEVOFLURANE, EA 15 MIN: Performed by: INTERNAL MEDICINE

## 2020-03-31 PROCEDURE — 85610 PROTHROMBIN TIME: CPT | Performed by: INTERNAL MEDICINE

## 2020-03-31 PROCEDURE — 88112 CYTOPATH CELL ENHANCE TECH: CPT | Performed by: INTERNAL MEDICINE

## 2020-03-31 PROCEDURE — 37000009 ZZH ANESTHESIA TECHNICAL FEE, EACH ADDTL 15 MIN: Performed by: INTERNAL MEDICINE

## 2020-03-31 PROCEDURE — 40000065 ZZH STATISTIC EKG NON-CHARGEABLE

## 2020-03-31 PROCEDURE — C1877 STENT, NON-COAT/COV W/O DEL: HCPCS | Performed by: INTERNAL MEDICINE

## 2020-03-31 PROCEDURE — 40000279 XR SURGERY CARM FLUORO GREATER THAN 5 MIN W STILLS: Mod: TC

## 2020-03-31 PROCEDURE — 36000061 ZZH SURGERY LEVEL 3 W FLUORO 1ST 30 MIN - UMMC: Performed by: INTERNAL MEDICINE

## 2020-03-31 PROCEDURE — 25000125 ZZHC RX 250: Performed by: INTERNAL MEDICINE

## 2020-03-31 PROCEDURE — 83690 ASSAY OF LIPASE: CPT | Mod: 91 | Performed by: INTERNAL MEDICINE

## 2020-03-31 PROCEDURE — C1726 CATH, BAL DIL, NON-VASCULAR: HCPCS | Performed by: INTERNAL MEDICINE

## 2020-03-31 PROCEDURE — 36415 COLL VENOUS BLD VENIPUNCTURE: CPT | Performed by: INTERNAL MEDICINE

## 2020-03-31 PROCEDURE — 25800030 ZZH RX IP 258 OP 636: Performed by: ANESTHESIOLOGY

## 2020-03-31 PROCEDURE — 80053 COMPREHEN METABOLIC PANEL: CPT | Performed by: INTERNAL MEDICINE

## 2020-03-31 PROCEDURE — 25000128 H RX IP 250 OP 636: Performed by: NURSE ANESTHETIST, CERTIFIED REGISTERED

## 2020-03-31 PROCEDURE — 82150 ASSAY OF AMYLASE: CPT | Performed by: INTERNAL MEDICINE

## 2020-03-31 PROCEDURE — 85027 COMPLETE CBC AUTOMATED: CPT | Performed by: INTERNAL MEDICINE

## 2020-03-31 PROCEDURE — 88305 TISSUE EXAM BY PATHOLOGIST: CPT | Performed by: INTERNAL MEDICINE

## 2020-03-31 DEVICE — IMPLANTABLE DEVICE
Type: IMPLANTABLE DEVICE | Site: BILE DUCT | Status: NON-FUNCTIONAL
Removed: 2020-05-12

## 2020-03-31 RX ORDER — NALOXONE HYDROCHLORIDE 0.4 MG/ML
.1-.4 INJECTION, SOLUTION INTRAMUSCULAR; INTRAVENOUS; SUBCUTANEOUS
Status: DISCONTINUED | OUTPATIENT
Start: 2020-03-31 | End: 2020-03-31 | Stop reason: HOSPADM

## 2020-03-31 RX ORDER — METOPROLOL TARTRATE 1 MG/ML
1-2 INJECTION, SOLUTION INTRAVENOUS EVERY 5 MIN PRN
Status: DISCONTINUED | OUTPATIENT
Start: 2020-03-31 | End: 2020-03-31 | Stop reason: HOSPADM

## 2020-03-31 RX ORDER — MEPERIDINE HYDROCHLORIDE 25 MG/ML
12.5 INJECTION INTRAMUSCULAR; INTRAVENOUS; SUBCUTANEOUS
Status: DISCONTINUED | OUTPATIENT
Start: 2020-03-31 | End: 2020-03-31 | Stop reason: HOSPADM

## 2020-03-31 RX ORDER — FLUMAZENIL 0.1 MG/ML
0.2 INJECTION, SOLUTION INTRAVENOUS
Status: DISCONTINUED | OUTPATIENT
Start: 2020-03-31 | End: 2020-03-31 | Stop reason: HOSPADM

## 2020-03-31 RX ORDER — ONDANSETRON 2 MG/ML
4 INJECTION INTRAMUSCULAR; INTRAVENOUS EVERY 30 MIN PRN
Status: DISCONTINUED | OUTPATIENT
Start: 2020-03-31 | End: 2020-03-31 | Stop reason: HOSPADM

## 2020-03-31 RX ORDER — LIDOCAINE HYDROCHLORIDE 20 MG/ML
INJECTION, SOLUTION INFILTRATION; PERINEURAL PRN
Status: DISCONTINUED | OUTPATIENT
Start: 2020-03-31 | End: 2020-03-31

## 2020-03-31 RX ORDER — SODIUM CHLORIDE, SODIUM LACTATE, POTASSIUM CHLORIDE, CALCIUM CHLORIDE 600; 310; 30; 20 MG/100ML; MG/100ML; MG/100ML; MG/100ML
INJECTION, SOLUTION INTRAVENOUS CONTINUOUS
Status: DISCONTINUED | OUTPATIENT
Start: 2020-03-31 | End: 2020-03-31 | Stop reason: HOSPADM

## 2020-03-31 RX ORDER — LIDOCAINE 40 MG/G
CREAM TOPICAL
Status: DISCONTINUED | OUTPATIENT
Start: 2020-03-31 | End: 2020-03-31 | Stop reason: HOSPADM

## 2020-03-31 RX ORDER — ALBUTEROL SULFATE 0.83 MG/ML
2.5 SOLUTION RESPIRATORY (INHALATION) EVERY 4 HOURS PRN
Status: DISCONTINUED | OUTPATIENT
Start: 2020-03-31 | End: 2020-03-31 | Stop reason: HOSPADM

## 2020-03-31 RX ORDER — IOPAMIDOL 510 MG/ML
INJECTION, SOLUTION INTRAVASCULAR PRN
Status: DISCONTINUED | OUTPATIENT
Start: 2020-03-31 | End: 2020-03-31 | Stop reason: HOSPADM

## 2020-03-31 RX ORDER — HYDROMORPHONE HYDROCHLORIDE 1 MG/ML
.3-.5 INJECTION, SOLUTION INTRAMUSCULAR; INTRAVENOUS; SUBCUTANEOUS EVERY 10 MIN PRN
Status: DISCONTINUED | OUTPATIENT
Start: 2020-03-31 | End: 2020-03-31 | Stop reason: HOSPADM

## 2020-03-31 RX ORDER — FENTANYL CITRATE 50 UG/ML
25-50 INJECTION, SOLUTION INTRAMUSCULAR; INTRAVENOUS
Status: DISCONTINUED | OUTPATIENT
Start: 2020-03-31 | End: 2020-03-31 | Stop reason: HOSPADM

## 2020-03-31 RX ORDER — DIMENHYDRINATE 50 MG/ML
25 INJECTION, SOLUTION INTRAMUSCULAR; INTRAVENOUS
Status: DISCONTINUED | OUTPATIENT
Start: 2020-03-31 | End: 2020-03-31 | Stop reason: HOSPADM

## 2020-03-31 RX ORDER — EPHEDRINE SULFATE 50 MG/ML
INJECTION, SOLUTION INTRAMUSCULAR; INTRAVENOUS; SUBCUTANEOUS PRN
Status: DISCONTINUED | OUTPATIENT
Start: 2020-03-31 | End: 2020-03-31

## 2020-03-31 RX ORDER — ONDANSETRON 2 MG/ML
INJECTION INTRAMUSCULAR; INTRAVENOUS PRN
Status: DISCONTINUED | OUTPATIENT
Start: 2020-03-31 | End: 2020-03-31

## 2020-03-31 RX ORDER — LEVOFLOXACIN 5 MG/ML
INJECTION, SOLUTION INTRAVENOUS PRN
Status: DISCONTINUED | OUTPATIENT
Start: 2020-03-31 | End: 2020-03-31

## 2020-03-31 RX ORDER — ONDANSETRON 4 MG/1
4 TABLET, ORALLY DISINTEGRATING ORAL EVERY 30 MIN PRN
Status: DISCONTINUED | OUTPATIENT
Start: 2020-03-31 | End: 2020-03-31 | Stop reason: HOSPADM

## 2020-03-31 RX ORDER — HYDRALAZINE HYDROCHLORIDE 20 MG/ML
2.5-5 INJECTION INTRAMUSCULAR; INTRAVENOUS EVERY 10 MIN PRN
Status: DISCONTINUED | OUTPATIENT
Start: 2020-03-31 | End: 2020-03-31 | Stop reason: HOSPADM

## 2020-03-31 RX ORDER — FENTANYL CITRATE 50 UG/ML
INJECTION, SOLUTION INTRAMUSCULAR; INTRAVENOUS PRN
Status: DISCONTINUED | OUTPATIENT
Start: 2020-03-31 | End: 2020-03-31

## 2020-03-31 RX ORDER — INDOMETHACIN 50 MG/1
100 SUPPOSITORY RECTAL
Status: COMPLETED | OUTPATIENT
Start: 2020-03-31 | End: 2020-03-31

## 2020-03-31 RX ADMIN — FENTANYL CITRATE 125 MCG: 50 INJECTION, SOLUTION INTRAMUSCULAR; INTRAVENOUS at 09:58

## 2020-03-31 RX ADMIN — ONDANSETRON 4 MG: 2 INJECTION INTRAMUSCULAR; INTRAVENOUS at 10:46

## 2020-03-31 RX ADMIN — LEVOFLOXACIN 500 MG: 5 INJECTION, SOLUTION INTRAVENOUS at 10:15

## 2020-03-31 RX ADMIN — FENTANYL CITRATE 25 MCG: 50 INJECTION, SOLUTION INTRAMUSCULAR; INTRAVENOUS at 10:34

## 2020-03-31 RX ADMIN — PHENYLEPHRINE HYDROCHLORIDE 100 MCG: 10 INJECTION INTRAVENOUS at 10:06

## 2020-03-31 RX ADMIN — PHENYLEPHRINE HYDROCHLORIDE 200 MCG: 10 INJECTION INTRAVENOUS at 10:10

## 2020-03-31 RX ADMIN — SODIUM CHLORIDE, POTASSIUM CHLORIDE, SODIUM LACTATE AND CALCIUM CHLORIDE: 600; 310; 30; 20 INJECTION, SOLUTION INTRAVENOUS at 09:48

## 2020-03-31 RX ADMIN — Medication 10 MG: at 10:14

## 2020-03-31 RX ADMIN — GLUCAGON HYDROCHLORIDE 0.4 MG: KIT at 10:31

## 2020-03-31 RX ADMIN — PHENYLEPHRINE HYDROCHLORIDE 200 MCG: 10 INJECTION INTRAVENOUS at 10:22

## 2020-03-31 RX ADMIN — ROCURONIUM BROMIDE 100 MG: 10 INJECTION INTRAVENOUS at 09:58

## 2020-03-31 RX ADMIN — LIDOCAINE HYDROCHLORIDE 100 MG: 20 INJECTION, SOLUTION INFILTRATION; PERINEURAL at 09:58

## 2020-03-31 RX ADMIN — SUGAMMADEX 300 MG: 100 INJECTION, SOLUTION INTRAVENOUS at 11:01

## 2020-03-31 ASSESSMENT — MIFFLIN-ST. JEOR: SCORE: 1774.88

## 2020-03-31 NOTE — ANESTHESIA PREPROCEDURE EVALUATION
"Anesthesia Pre-Procedure Evaluation    Patient: Fuentes Estrada   MRN:     [id number] Gender:   male   Age:    65 year old :      1955        Preoperative Diagnosis: Biliary stricture [K83.1]   Procedure(s):  ENDOSCOPIC RETROGRADE CHOLANGIOPANCREATOGRAPHY     LABS:  CBC:   Lab Results   Component Value Date    WBC 6.5 2002    HGB 16.8 2002    HCT 47.2 2002     BMP:   Lab Results   Component Value Date     (H) 2002    POTASSIUM 3.7 2002    CHLORIDE 101 2002    CO2 28 2002    BUN 12 2002    CR 1.2 2002     (H) 2002     COAGS: No results found for: PTT, INR, FIBR  POC: No results found for: BGM, HCG, HCGS  OTHER:   Lab Results   Component Value Date    PH 5.5 2002    ERIC 9.7 2002    ALBUMIN 5.5 (H) 2002    PROTTOTAL 8.1 2002    ALT 70 2002    AST 46 2002    ALKPHOS 90 2002    BILITOTAL 1.6 2002    BILIDIRECT 0.4 2002    TSH 3.60 2002    T4 0.7 2002        Preop Vitals    BP Readings from Last 3 Encounters:   02 128/88   02 138/88   02 144/100    Pulse Readings from Last 3 Encounters:   02 65   02 76   02 64      Resp Readings from Last 3 Encounters:   No data found for Resp    SpO2 Readings from Last 3 Encounters:   No data found for SpO2      Temp Readings from Last 1 Encounters:   No data found for Temp    Ht Readings from Last 1 Encounters:   02 1.861 m (6' 1.25\")      Wt Readings from Last 1 Encounters:   02 102.5 kg (226 lb)    Estimated body mass index is 29.61 kg/m  as calculated from the following:    Height as of 02: 1.861 m (6' 1.25\").    Weight as of 02: 102.5 kg (226 lb).     LDA:        Past Medical History:   Diagnosis Date     Essential hypertension, benign     Hypertension, Benign     Nonspecific abnormal results of liver function study     Hx of elevated LFT's      Past Surgical History:   Procedure " "Laterality Date     HC KNEE SCOPE, DIAGNOSTIC  1995    Arthroscopy, Knee; left     HC VASECTOMY UNILAT/BILAT W POSTOP SEMEN      Vasectomy      Allergies   Allergen Reactions     Metformin Other (See Comments)     \" I think my kidneys shut down\"     No Known Drug Allergies         Anesthesia Evaluation     . Pt has had prior anesthetic. Type: General    History of anesthetic complications          ROS/MED HX    ENT/Pulmonary:  - neg pulmonary ROS     Neurologic:     (+)CVA without deficits    Cardiovascular:     (+) Dyslipidemia, hypertension----. Taking blood thinners : Instructions Given to patient: plavix. . . :. .       METS/Exercise Tolerance:     Hematologic:  - neg hematologic  ROS       Musculoskeletal:  - neg musculoskeletal ROS       GI/Hepatic: Comment: Biliary stricture  Pancreatitis     (+) GERD       Renal/Genitourinary:  - ROS Renal section negative       Endo:     (+) type II DM Using insulin .      Psychiatric:  - neg psychiatric ROS       Infectious Disease:  - neg infectious disease ROS       Malignancy:      - no malignancy   Other: Comment: Hx EtOH                    JZG FV AN PHYSICAL EXAM    Assessment:   ASA SCORE: 2    H&P: History and physical reviewed and following examination; no interval change.   Smoking Status:  Non-Smoker/Unknown   NPO Status: NPO Appropriate     Plan:   Anes. Type:  General   Pre-Medication: None   Induction:  IV (Standard)   Airway: ETT; Oral   Access/Monitoring: PIV   Maintenance: Balanced     Postop Plan:   Postop Pain: Opioids  Postop Sedation/Airway: Not planned  Disposition: Outpatient     PONV Management:   Adult Risk Factors:, Non-Smoker, Postop Opioids   Prevention: Ondansetron     CONSENT:      Blood Products: Consent Deferred (Minimal Blood Loss)       Comments for Plan/Consent:  03/27/20; 1032; ETT; 8.0; Cuffed; Oral, Grade 1, IV Induction, Preoxygenation, Atraumatic, Dentition Unchanged, ETCO2, Glottis Clear, Bilateral Equal Breath Sounds, Stylet; Easy; " Videoscope; 4, Adult; 1; 03/27/20; 5783                 Monae Saucedo MD

## 2020-03-31 NOTE — ANESTHESIA POSTPROCEDURE EVALUATION
Anesthesia POST Procedure Evaluation    Patient: Fuentes Estrada   MRN:     [id number] Gender:   male   Age:    65 year old :      1955        Preoperative Diagnosis: Biliary stricture [K83.1]   Procedure(s):  ENDOSCOPIC RETROGRADE CHOLANGIOPANCREATOGRAPHY, WITH with biliary sphincterotomy, biopsies and brushings, biliary stent placement   Postop Comments: No value filed.     Anesthesia Type: General       Disposition: Outpatient   Postop Pain Control: Uneventful            Sign Out: Well controlled pain   PONV: No   Neuro/Psych: Uneventful            Sign Out: Acceptable/Baseline neuro status   Airway/Respiratory: Uneventful            Sign Out: Acceptable/Baseline resp. status   CV/Hemodynamics: Uneventful            Sign Out: Acceptable CV status   Other NRE: NONE   DID A NON-ROUTINE EVENT OCCUR? No         Last Anesthesia Record Vitals:  CRNA VITALS  3/31/2020 1036 - 3/31/2020 1136      3/31/2020             Pulse:  100    SpO2:  99 %    Resp Rate (observed):  (!) 3          Last PACU Vitals:  Vitals Value Taken Time   /91 3/31/2020 11:50 AM   Temp 36.8  C (98.3  F) 3/31/2020 11:30 AM   Pulse 71 3/31/2020 11:50 AM   Resp 18 3/31/2020 11:45 AM   SpO2 96 % 3/31/2020 11:54 AM   Temp src     NIBP     Pulse     SpO2     Resp     Temp     Ht Rate     Temp 2     Vitals shown include unvalidated device data.      Electronically Signed By: Monae Saucedo MD, 2020, 1:22 PM

## 2020-03-31 NOTE — OR NURSING
Discharge instructions reviewed with patient's wife Robyn over the phone.  Amylase and lipase results reviewed with MD and pt okay for discharge.

## 2020-03-31 NOTE — DISCHARGE INSTRUCTIONS
[facility]  Same-Day Surgery   Adult Discharge Orders & Instructions     For 24 hours after surgery    1. Get plenty of rest.  A responsible adult must stay with you for at least 24 hours after you leave the hospital.   2. Do not drive or use heavy equipment.  If you have weakness or tingling, don't drive or use heavy equipment until this feeling goes away.  3. Do not drink alcohol.  4. Avoid strenuous or risky activities.  Ask for help when climbing stairs.   5. You may feel lightheaded.  IF so, sit for a few minutes before standing.  Have someone help you get up.   6. If you have nausea (feel sick to your stomach): Drink only clear liquids such as apple juice, ginger ale, broth or 7-Up.  Rest may also help.  Be sure to drink enough fluids.  Move to a regular diet as you feel able.  7. You may have a slight fever. Call the doctor if your fever is over 100 F (37.7 C) (taken under the tongue) or lasts longer than 24 hours.  8. You may have a dry mouth, a sore throat, muscle aches or trouble sleeping.  These should go away after 24 hours.  9. Do not make important or legal decisions.   Call your doctor for any of the followin.  Signs of infection (fever, growing tenderness at the surgery site, a large amount of drainage or bleeding, severe pain, foul-smelling drainage, redness, swelling).    2. It has been over 8 to 10 hours since surgery and you are still not able to urinate (pass water).    3.  Headache for over 24 hours.    4.  Numbness, tingling or weakness the day after surgery (if you had spinal anesthesia).  To contact a doctor, call Dr Strauss's office at [phone] or:        [phone] and ask for the resident on call for Gastroenterology (answered 24 hours a day)      Emergency Department:    [facility]: [phone]

## 2020-03-31 NOTE — H&P
"[facility]  GASTROENTEROLOGY H&P NOTE  Fuentes Estrada [id number]   03/31/2020    CC: Biliary stricture    HPI:  64 yo M with HTN, DM, with history of stroke on Plavix, s/p cholecystectomy 8/2019 presented with nausea/vomiting and was found to have a biliary stricture (images not available for review) subsequently developed jaundice. He underwent ERCP on 3/25/20 with failed cannulation. Repeat ERCP with biliary rendezvous was attempted on 3/27/20, which was also unsuccessful. The mass was sampled by FNA with results pending. Subsequently, he was referred for ERCP.     Currently, he is asymptomatic without any abdominal pain, fever, nausea, vomiting.       ERCP 3/25/20 Dr. Ramos   - Failed biliary cannulation despite precut sphincterotomy .  - Normal pancreatogram. One temporary stent was placed  into the ventral pancreatic duct.      EUS/ERCP 3/27/20  Impression:                              - Failed CBD cannulation                       - 2.3 x 1.3 mass seen in proximal CBD sp FNA                       - Unable to pass wire for EUS rendezvous                       - Indomethacin given to decrease risk of post-ERCP                        pancreatitis.                       - Failed CBD cannulation                       - 2.3 x 1.3 mass seen in proximal CBD sp FNA                       - Unable to pass wire for EUS rendezvous                       - Indomethacin given to decrease risk of post-ERCP                        pancreatitis.      Past Medical History:   Diagnosis Date     Essential hypertension, benign     Hypertension, Benign     Nonspecific abnormal results of liver function study     Hx of elevated LFT's       Past Surgical History:   Procedure Laterality Date     HC KNEE SCOPE, DIAGNOSTIC  1995    Arthroscopy, Knee; left     HC VASECTOMY UNILAT/BILAT W POSTOP SEMEN      Vasectomy          Allergies   Allergen Reactions     Metformin Other (See Comments)     \" I think my kidneys shut down\"     No " Known Drug Allergies          Current Facility-Administered Medications:      indomethacin (INDOCIN) 50 MG Suppository 100 mg, 100 mg, Rectal, Once PRN, Philip Solorio MD     lactated ringers infusion, , Intravenous, Continuous, Monae Saucedo MD     lidocaine (LMX4) cream, , Topical, Q1H PRN, Philip Solorio MD     lidocaine (LMX4) cream, , Topical, Q1H PRN, Monae Saucedo MD     lidocaine 1 % 0.1-1 mL, 0.1-1 mL, Other, Q1H PRN, Philip Solorio MD     lidocaine 1 % 0.1-1 mL, 0.1-1 mL, Other, Q1H PRN, Monae Saucedo MD     sodium chloride (PF) 0.9% PF flush 3 mL, 3 mL, Intracatheter, q1 min prn, Philip Solorio MD     sodium chloride (PF) 0.9% PF flush 3 mL, 3 mL, Intracatheter, Q8H, Philip Solorio MD     sodium chloride (PF) 0.9% PF flush 3 mL, 3 mL, Intravenous, q1 min prn, Philip Solorio MD     sodium chloride (PF) 0.9% PF flush 3 mL, 3 mL, Intracatheter, q1 min prn, Monae Saucedo MD     sodium chloride (PF) 0.9% PF flush 3 mL, 3 mL, Intracatheter, Q8H, Monae Saucedo MD    Family History   Problem Relation Age of Onset     Arthritis Mother         RA     Diabetes Father         diet controlled     Hypertension Father        Social History     Socioeconomic History     Marital status:      Spouse name: Robyn     Number of children: 2     Years of education: 14     Highest education level: Not on file   Occupational History     Occupation: self employed   Social Needs     Financial resource strain: Not on file     Food insecurity     Worry: Not on file     Inability: Not on file     Transportation needs     Medical: Not on file     Non-medical: Not on file   Tobacco Use     Smoking status: Never Smoker   Substance and Sexual Activity     Alcohol use: Yes     Comment: occaisional      Drug use: No     Sexual activity: Yes     Partners: Female   Lifestyle     Physical activity     Days per week: Not on file     Minutes per session: Not on file     Stress: Not on file   Relationships  "    Social connections     Talks on phone: Not on file     Gets together: Not on file     Attends Sikh service: Not on file     Active member of club or organization: Not on file     Attends meetings of clubs or organizations: Not on file     Relationship status: Not on file     Intimate partner violence     Fear of current or ex partner: Not on file     Emotionally abused: Not on file     Physically abused: Not on file     Forced sexual activity: Not on file   Other Topics Concern      Service No     Blood Transfusions No     Caffeine Concern No     Occupational Exposure No     Hobby Hazards No     Sleep Concern No     Stress Concern No     Weight Concern No     Special Diet No     Back Care No     Exercise Yes     Bike Helmet No     Seat Belt Yes     Self-Exams No   Social History Narrative     Not on file       Review Of Systems  Skin: negative  Eyes: negative  Ears/Nose/Throat: negative  Respiratory: No shortness of breath, dyspnea on exertion, cough, or hemoptysis  Cardiovascular: negative  Gastrointestinal: negative  Genitourinary: negative  Musculoskeletal: negative  Neurologic: negative  Psychiatric: negative  Hematologic/Lymphatic/Immunologic: negative  Endocrine: negative      OBJECTIVE:  VS: /86   Temp 98.2  F (36.8  C) (Oral)   Resp 16   Ht 1.854 m (6' 1\")   Wt 94.6 kg (208 lb 8.9 oz)   SpO2 96%   BMI 27.52 kg/m     GEN: NAD, comfortable  CV:  RRR, no M/G/R  PULM:  CTA B/L  ABD: Normoactive bowel sounds, soft, ND, NT, no HSM  SKIN: Jaundice   EXT: No edema   NEURO: A&Ox3    REVIEW OF LABORATORY, PATHOLOGY AND IMAGING RESULTS:  BMP  Recent Labs   Lab 03/31/20 0902   *   POTASSIUM 4.0   CHLORIDE 101   ERIC 9.7   CO2 23   BUN 18   CR 1.08   *       CBC  Recent Labs   Lab 03/31/20 0902   WBC 7.4   RBC 5.34   HGB 16.3   HCT 50.3   MCV 94   MCH 30.5   MCHC 32.4   RDW 13.2          INRNo lab results found in last 7 days.    LFTs  Recent Labs   Lab 03/31/20 0902 "   ALKPHOS 335*   AST 86*   *   BILITOTAL 10.8*   PROTTOTAL 7.6   ALBUMIN 3.4        PANC  Recent Labs   Lab 03/31/20  0902   AMYLASE 191*       IMPRESSION:  Fuentes Estrada is a 65 year old male with biliary stricture s/p multiple ERCP and EUS with FNA at outside hospital who is now presenting for repeat ERCP.    1) Biliary stricture:  -Pre-procedure labs  -Procedure along with risks and benefits were discussed  -Proceed with ERCP      Philip Solorio MD  Therapeutic Endoscopy Fellow

## 2020-03-31 NOTE — OR NURSING
Blood sugar of 268 discussed with Dr. Saucedo and patient at bedside.  Plan for patient to resume home regimen when pt leaves hospital.  No intervention at this time.

## 2020-03-31 NOTE — PROGRESS NOTES
Called [facility]- requested images pushed asap for procedure today.    Rosalind Whatley, RN Care Coordinator

## 2020-04-01 LAB
COPATH REPORT: NORMAL
COPATH REPORT: NORMAL

## 2020-04-02 ENCOUNTER — ANCILLARY PROCEDURE (OUTPATIENT)
Dept: CT IMAGING | Facility: CLINIC | Age: 65
End: 2020-04-02
Attending: INTERNAL MEDICINE
Payer: COMMERCIAL

## 2020-04-02 DIAGNOSIS — K83.1 BILIARY STRICTURE (H): ICD-10-CM

## 2020-04-02 RX ORDER — IOPAMIDOL 755 MG/ML
127 INJECTION, SOLUTION INTRAVASCULAR ONCE
Status: COMPLETED | OUTPATIENT
Start: 2020-04-02 | End: 2020-04-02

## 2020-04-02 RX ADMIN — IOPAMIDOL 127 ML: 755 INJECTION, SOLUTION INTRAVASCULAR at 11:02

## 2020-04-02 NOTE — TELEPHONE ENCOUNTER
RECORDS STATUS - ALL OTHER DIAGNOSIS      RECORDS RECEIVED FROM: [facility]/CE - [facility] (Internal Referral)   DATE RECEIVED: 4/2/20   NOTES STATUS DETAILS   OFFICE NOTE from referring provider [facility]    OFFICE NOTE from medical oncologist     DISCHARGE SUMMARY from hospital Epic & CE 3/31/20: Epic    3/24/20, 3/23/20: [facility]   DISCHARGE REPORT from the ER NA    OPERATIVE REPORT [facility] &  - [facility] 3/31/20: Endoscopic Retrograde - [facility]    3/23/20: ERCP ([facility])   MEDICATION LIST [facility]   CLINICAL TRIAL TREATMENTS TO DATE     LABS     PATHOLOGY REPORTS [facility] 3/31/20: Surg Path ([facility])    3/27/20: [facility]   ANYTHING RELATED TO DIAGNOSIS Epic 3/31/20   GENONOMIC TESTING     TYPE:     IMAGING (NEED IMAGES & REPORT)     XR PACS 3/31/20 - [facility]    3/25/20 - [facility], Report in CE   CT SCANS PACS 3/31/20 - [facility]    3/23/20 - [facility], Report in CE   MRI PACS 3/24/20 - [facility], Report in CE   MAMMO     ULTRASOUND     PET

## 2020-04-03 ENCOUNTER — PATIENT OUTREACH (OUTPATIENT)
Dept: GASTROENTEROLOGY | Facility: CLINIC | Age: 65
End: 2020-04-03

## 2020-04-03 ENCOUNTER — TELEPHONE (OUTPATIENT)
Dept: GASTROENTEROLOGY | Facility: CLINIC | Age: 65
End: 2020-04-03

## 2020-04-05 ENCOUNTER — ANESTHESIA (OUTPATIENT)
Dept: SURGERY | Facility: CLINIC | Age: 65
DRG: 919 | End: 2020-04-05
Payer: MEDICARE

## 2020-04-05 ENCOUNTER — TELEPHONE (OUTPATIENT)
Dept: GASTROENTEROLOGY | Facility: CLINIC | Age: 65
End: 2020-04-05

## 2020-04-05 ENCOUNTER — TRANSFERRED RECORDS (OUTPATIENT)
Dept: HEALTH INFORMATION MANAGEMENT | Facility: CLINIC | Age: 65
End: 2020-04-05

## 2020-04-05 ENCOUNTER — HOSPITAL ENCOUNTER (INPATIENT)
Facility: CLINIC | Age: 65
LOS: 3 days | Discharge: HOME | DRG: 919 | End: 2020-04-08
Attending: INTERNAL MEDICINE | Admitting: INTERNAL MEDICINE
Payer: MEDICARE

## 2020-04-05 ENCOUNTER — ANESTHESIA EVENT (OUTPATIENT)
Dept: SURGERY | Facility: CLINIC | Age: 65
DRG: 919 | End: 2020-04-05
Payer: MEDICARE

## 2020-04-05 DIAGNOSIS — K83.1 BILIARY STRICTURE (H): ICD-10-CM

## 2020-04-05 DIAGNOSIS — D50.0 IRON DEFICIENCY ANEMIA DUE TO CHRONIC BLOOD LOSS: Primary | ICD-10-CM

## 2020-04-05 DIAGNOSIS — K92.1 MELENA: ICD-10-CM

## 2020-04-05 LAB — GLUCOSE BLDC GLUCOMTR-MCNC: 375 MG/DL (ref 70–99)

## 2020-04-05 PROCEDURE — 86901 BLOOD TYPING SEROLOGIC RH(D): CPT | Performed by: STUDENT IN AN ORGANIZED HEALTH CARE EDUCATION/TRAINING PROGRAM

## 2020-04-05 PROCEDURE — 85610 PROTHROMBIN TIME: CPT | Performed by: HOSPITALIST

## 2020-04-05 PROCEDURE — 36415 COLL VENOUS BLD VENIPUNCTURE: CPT | Performed by: HOSPITALIST

## 2020-04-05 PROCEDURE — 36415 COLL VENOUS BLD VENIPUNCTURE: CPT | Performed by: INTERNAL MEDICINE

## 2020-04-05 PROCEDURE — 40000141 ZZH STATISTIC PERIPHERAL IV START W/O US GUIDANCE

## 2020-04-05 PROCEDURE — 99223 1ST HOSP IP/OBS HIGH 75: CPT | Mod: AI | Performed by: INTERNAL MEDICINE

## 2020-04-05 PROCEDURE — 36415 COLL VENOUS BLD VENIPUNCTURE: CPT | Performed by: STUDENT IN AN ORGANIZED HEALTH CARE EDUCATION/TRAINING PROGRAM

## 2020-04-05 PROCEDURE — 80053 COMPREHEN METABOLIC PANEL: CPT | Performed by: INTERNAL MEDICINE

## 2020-04-05 PROCEDURE — 86900 BLOOD TYPING SEROLOGIC ABO: CPT | Performed by: STUDENT IN AN ORGANIZED HEALTH CARE EDUCATION/TRAINING PROGRAM

## 2020-04-05 PROCEDURE — 00000146 ZZHCL STATISTIC GLUCOSE BY METER IP

## 2020-04-05 PROCEDURE — 86850 RBC ANTIBODY SCREEN: CPT | Performed by: STUDENT IN AN ORGANIZED HEALTH CARE EDUCATION/TRAINING PROGRAM

## 2020-04-05 PROCEDURE — 86923 COMPATIBILITY TEST ELECTRIC: CPT | Performed by: STUDENT IN AN ORGANIZED HEALTH CARE EDUCATION/TRAINING PROGRAM

## 2020-04-05 PROCEDURE — 85027 COMPLETE CBC AUTOMATED: CPT | Performed by: INTERNAL MEDICINE

## 2020-04-05 PROCEDURE — 12000001 ZZH R&B MED SURG/OB UMMC

## 2020-04-05 RX ORDER — ONDANSETRON 4 MG/1
4 TABLET, ORALLY DISINTEGRATING ORAL EVERY 6 HOURS PRN
Status: DISCONTINUED | OUTPATIENT
Start: 2020-04-05 | End: 2020-04-08 | Stop reason: HOSPADM

## 2020-04-05 RX ORDER — LANOLIN ALCOHOL/MO/W.PET/CERES
6 CREAM (GRAM) TOPICAL ONCE
Status: COMPLETED | OUTPATIENT
Start: 2020-04-05 | End: 2020-04-06

## 2020-04-05 RX ORDER — NICOTINE POLACRILEX 4 MG
15-30 LOZENGE BUCCAL
Status: DISCONTINUED | OUTPATIENT
Start: 2020-04-05 | End: 2020-04-05

## 2020-04-05 RX ORDER — DEXTROSE MONOHYDRATE 25 G/50ML
25-50 INJECTION, SOLUTION INTRAVENOUS
Status: DISCONTINUED | OUTPATIENT
Start: 2020-04-05 | End: 2020-04-08 | Stop reason: HOSPADM

## 2020-04-05 RX ORDER — NALOXONE HYDROCHLORIDE 0.4 MG/ML
.1-.4 INJECTION, SOLUTION INTRAMUSCULAR; INTRAVENOUS; SUBCUTANEOUS
Status: DISCONTINUED | OUTPATIENT
Start: 2020-04-05 | End: 2020-04-08 | Stop reason: HOSPADM

## 2020-04-05 RX ORDER — NICOTINE POLACRILEX 4 MG
15-30 LOZENGE BUCCAL
Status: DISCONTINUED | OUTPATIENT
Start: 2020-04-05 | End: 2020-04-08 | Stop reason: HOSPADM

## 2020-04-05 RX ORDER — ONDANSETRON 2 MG/ML
4 INJECTION INTRAMUSCULAR; INTRAVENOUS EVERY 6 HOURS PRN
Status: DISCONTINUED | OUTPATIENT
Start: 2020-04-05 | End: 2020-04-08 | Stop reason: HOSPADM

## 2020-04-05 RX ORDER — DEXTROSE MONOHYDRATE 25 G/50ML
25-50 INJECTION, SOLUTION INTRAVENOUS
Status: DISCONTINUED | OUTPATIENT
Start: 2020-04-05 | End: 2020-04-05

## 2020-04-05 RX ORDER — SODIUM CHLORIDE, SODIUM LACTATE, POTASSIUM CHLORIDE, CALCIUM CHLORIDE 600; 310; 30; 20 MG/100ML; MG/100ML; MG/100ML; MG/100ML
INJECTION, SOLUTION INTRAVENOUS CONTINUOUS
Status: ACTIVE | OUTPATIENT
Start: 2020-04-05 | End: 2020-04-06

## 2020-04-05 RX ORDER — ACETAMINOPHEN 325 MG/1
975 TABLET ORAL EVERY 8 HOURS PRN
Status: DISCONTINUED | OUTPATIENT
Start: 2020-04-05 | End: 2020-04-08 | Stop reason: HOSPADM

## 2020-04-05 RX ORDER — LIDOCAINE 40 MG/G
CREAM TOPICAL
Status: DISCONTINUED | OUTPATIENT
Start: 2020-04-05 | End: 2020-04-08 | Stop reason: HOSPADM

## 2020-04-05 ASSESSMENT — ACTIVITIES OF DAILY LIVING (ADL)
TOILETING: 0-->INDEPENDENT
TRANSFERRING: 0-->INDEPENDENT
COGNITION: 0 - NO COGNITION ISSUES REPORTED
BATHING: 0-->INDEPENDENT
DRESS: 0-->INDEPENDENT
AMBULATION: 0-->INDEPENDENT
RETIRED_COMMUNICATION: 0-->UNDERSTANDS/COMMUNICATES WITHOUT DIFFICULTY
FALL_HISTORY_WITHIN_LAST_SIX_MONTHS: NO
SWALLOWING: 0-->SWALLOWS FOODS/LIQUIDS WITHOUT DIFFICULTY
RETIRED_EATING: 0-->INDEPENDENT

## 2020-04-05 NOTE — TELEPHONE ENCOUNTER
Called from wife on Sunday. Notes ERCP went well, was feeling better after first couple days - improved jaundice, pruritis, urine getting lighter in color. Yesterday, noted decreased appetite and early satiety. Having normal BMs and still ongoing improvement in jaundice and pruritis. This AM, woke up with unsteadiness on feet/feeling weak. Feeling better now. Temp 97.1 this AM, however had sensation of alternating of hot/cold last night. No abdominal pain, headache, shortness of breath, new cough, loss of taste/smell, chest tightness.    Seeing Dr. Hale tomorrow for virtual visit.     As feeling better now, planning to monitor symptomatically and recheck temperature later today. Advised to call back or seek care more urgently if recurrent/progressive symptoms or new concerning symptoms. Lower suspicion for COVID without respiratory symptoms/fever, however with recent healthcare contact and anorexia/fatigue, advised to call local health system in [facility] do discuss if testing indicated - wife is familiar with their system and will call.     Cody Vargas MD  GI Fellow  p [phone]

## 2020-04-05 NOTE — TELEPHONE ENCOUNTER
Wife called back around 1:45 pm.     Note still no fever (97.1). However, her  now notes that he has been having black, tarry BMs for past couple of days. 3 episodes today. No red blood in BMs. Vomited last night, had appearance of food, no blood. Still feeling very weak. Able to eat couple bites of sandwich and jello for lunch. No lightheadedness/dizziness. No abdominal pain. No shortness of breath. On Plavix, restarted Thursday - took on Thursday and Friday. No NSAIDs.     Overall concerning for UGIB such as postsphincterotomy bleeding given recent ERCP. On way to [facility] ED now for evaluation. Advised they can call [facility] to discuss need for transfer here based on initial evaluation.     Cody Vargas MD  GI Fellow  p [phone]

## 2020-04-05 NOTE — PROGRESS NOTES
[facility]  Transfer Triage Note    Date of call: 04/05/20  Time of call: 4:52 PM    Is pandemic COVID-19 a concern? NO    Reason for transfer: Patient has established care here  Further diagnostic work up, management, and consultation for specialized care   Diagnosis: New diagnosis of bile duct cancer, presenting to OSH with melena    Outside Records: Not available  Additional records requested to be faxed to [phone].    Stability of Patient: Patient is vitally stable, with no critical labs, and will likely remain stable throughout the transfer process  ICU: No    Expected Time of Arrival for Transfer: 0-8 hours    Arrival Location:  [address] [address] Phone: [phone]    Recommendations for Management and Stabilization: Not needed    Additional Comments     Mr. Estrada is a 65 year old man with a new diagnosis of bile duct cancer with biliary strictures, HTN, DM, CVA on Plavix, s/p cholecystectomy in 2019 who presented to an outside hospital in [facility] with melena and generalized weakness.  No concern for COVID.  ED provider had a discussion with GI fellow at [facility] Cody Vargas.  Basic labs sent, notable for lipase 969, glucose in the 500s, Hgb 12.  Vitally stable.    Will transfer to med/surg bed, no tele.    Liana Rehman MD

## 2020-04-05 NOTE — ANESTHESIA PREPROCEDURE EVALUATION
Anesthesia Pre-Procedure Evaluation    Patient: Fuentes Estrada   MRN:     [id number] Gender:   male   Age:    65 year old :      1955        Preoperative Diagnosis: GI bleed [K92.2]   Procedure(s):  ESOPHAGOGASTRODUODENOSCOPY (EGD)  ENDOSCOPIC RETROGRADE CHOLANGIOPANCREATOGRAPHY         Current Outpatient Medications   Medication Sig Dispense Refill     atorvastatin (LIPITOR) 40 MG tablet Take 40 mg by mouth daily       clopidogrel (PLAVIX) 75 MG tablet Take 75 mg by mouth daily       empagliflozin (JARDIANCE) 10 MG TABS tablet Take 10 mg by mouth daily       fenofibrate (TRIGLIDE/LOFIBRA) 160 MG tablet Take 160 mg by mouth daily       furosemide (LASIX) 20 MG tablet Take 20 mg by mouth every evening        glipiZIDE (GLUCOTROL) 10 MG tablet Take 10 mg by mouth 2 times daily (before meals)       insulin glargine (LANTUS PEN) 100 UNIT/ML pen Inject 24 Units Subcutaneous At Bedtime        lisinopril (ZESTRIL) 30 MG tablet Take 30 mg by mouth every evening        NORVASC 10 MG OR TABS 1 tab po QD (Once per day) 90 3     sildenafil (REVATIO) 20 MG tablet Take 20 mg by mouth as needed          LABS:  CBC:   Lab Results   Component Value Date    WBC 7.4 2020    WBC 6.5 2002    HGB 16.3 2020    HGB 16.8 2002    HCT 50.3 2020    HCT 47.2 2002     2020     BMP:   Lab Results   Component Value Date     (L) 2020     (H) 2002    POTASSIUM 4.0 2020    POTASSIUM 3.7 2002    CHLORIDE 101 2020    CHLORIDE 101 2002    CO2 23 2020    CO2 28 2002    BUN 18 2020    BUN 12 2002    CR 1.08 2020    CR 1.2 2002     (H) 2020     (H) 2002     COAGS:   Lab Results   Component Value Date    INR 1.07 2020     POC:   Lab Results   Component Value Date     (H) 2020     OTHER:   Lab Results   Component Value Date    PH 5.5 2002    ERIC 9.7 2020     "ALBUMIN 3.4 03/31/2020    PROTTOTAL 7.6 03/31/2020     (H) 03/31/2020    AST 86 (H) 03/31/2020    ALKPHOS 335 (H) 03/31/2020    BILITOTAL 10.8 (H) 03/31/2020    BILIDIRECT 0.4 06/12/2002    LIPASE 2,170 (H) 03/31/2020    AMYLASE 169 (H) 03/31/2020    TSH 3.60 01/21/2002    T4 0.7 01/21/2002        Preop Vitals    BP Readings from Last 3 Encounters:   03/31/20 (!) 130/91   06/12/02 128/88   01/31/02 138/88    Pulse Readings from Last 3 Encounters:   03/31/20 71   06/12/02 65   01/31/02 76      Resp Readings from Last 3 Encounters:   03/31/20 16    SpO2 Readings from Last 3 Encounters:   03/31/20 96%      Temp Readings from Last 1 Encounters:   03/31/20 36.8  C (98.3  F) (Oral)    Ht Readings from Last 1 Encounters:   03/31/20 1.854 m (6' 1\")      Wt Readings from Last 1 Encounters:   03/31/20 94.6 kg (208 lb 8.9 oz)    Estimated body mass index is 27.52 kg/m  as calculated from the following:    Height as of 3/31/20: 1.854 m (6' 1\").    Weight as of 3/31/20: 94.6 kg (208 lb 8.9 oz).     LDA:        Past Medical History:   Diagnosis Date     Essential hypertension, benign     Hypertension, Benign     Nonspecific abnormal results of liver function study     Hx of elevated LFT's      Past Surgical History:   Procedure Laterality Date     ENDOSCOPIC RETROGRADE CHOLANGIOPANCREATOGRAM N/A 3/31/2020    Procedure: ENDOSCOPIC RETROGRADE CHOLANGIOPANCREATOGRAPHY, WITH with biliary sphincterotomy, biopsies and brushings, biliary stent placement;  Surgeon: Win Strauss MD;  Location: [facility] OR      KNEE SCOPE, DIAGNOSTIC  1995    Arthroscopy, Knee; left     HC VASECTOMY UNILAT/BILAT W POSTOP SEMEN      Vasectomy      Allergies   Allergen Reactions     Metformin Other (See Comments)     \" I think my kidneys shut down\"     No Known Drug Allergies         Anesthesia Evaluation     . Pt has had prior anesthetic. Type: General    History of anesthetic complications          ROS/MED HX    ENT/Pulmonary:  - neg pulmonary " ROS     Neurologic:     (+)CVA without deficits    Cardiovascular:     (+) Dyslipidemia, hypertension----. Taking blood thinners : . . . :. .       METS/Exercise Tolerance:     Hematologic:  - neg hematologic  ROS       Musculoskeletal:  - neg musculoskeletal ROS       GI/Hepatic: Comment: Biliary stricture  Pancreatitis     (+) GERD       Renal/Genitourinary:  - ROS Renal section negative       Endo:     (+) type II DM Using insulin .      Psychiatric:  - neg psychiatric ROS       Infectious Disease:  - neg infectious disease ROS       Malignancy:      - no malignancy   Other: Comment: Hx EtOH                        PHYSICAL EXAM:   Mental Status/Neuro: A/A/O; Age Appropriate   Airway: Facies: Feasible  Mallampati: II  Mouth/Opening: Full  TM distance: > 6 cm  Neck ROM: Full   Respiratory: Auscultation: CTAB     Resp. Rate: Normal     Resp. Effort: Normal      CV: Rhythm: Regular  Rate: Age appropriate  Heart: Normal Sounds   Comments:      Dental: Normal Dentition                Assessment:   ASA SCORE: 3 emergent     Smoking Status:  Non-Smoker/Unknown   NPO Status: NPO Appropriate     Plan:   Anes. Type:  General   Pre-Medication: None   Induction:  IV (Standard)   Airway: ETT; Oral   Access/Monitoring: PIV   Maintenance: Balanced     Postop Plan:   Postop Pain: Opioids  Postop Sedation/Airway: Not planned  Disposition: Inpatient/Admit     PONV Management:   Adult Risk Factors:, Non-Smoker, Postop Opioids   Prevention: Ondansetron     CONSENT: Direct conversation   Plan and risks discussed with: Patient   Blood Products: Consented (ALL Blood Products)       Comments for Plan/Consent:  65 year old male, ASA 3E, presenting for EGD with repeat ERCP for possible UGIB in setting of recent ERCP.  PMH significant for DM2, HTN, GERD, prior CVA on Plavix.   - GETA, RSI   - single IV    Alex Delarosa III, MD on 4/5/2020 at 4:01 PM                   Alex Delarosa III, MD

## 2020-04-06 ENCOUNTER — PRE VISIT (OUTPATIENT)
Dept: SURGERY | Facility: CLINIC | Age: 65
End: 2020-04-06

## 2020-04-06 ENCOUNTER — VIRTUAL VISIT (OUTPATIENT)
Dept: SURGERY | Facility: CLINIC | Age: 65
End: 2020-04-06
Attending: SURGERY
Payer: COMMERCIAL

## 2020-04-06 ENCOUNTER — APPOINTMENT (OUTPATIENT)
Dept: GENERAL RADIOLOGY | Facility: CLINIC | Age: 65
DRG: 919 | End: 2020-04-06
Attending: INTERNAL MEDICINE
Payer: MEDICARE

## 2020-04-06 ENCOUNTER — TELEPHONE (OUTPATIENT)
Dept: GASTROENTEROLOGY | Facility: CLINIC | Age: 65
End: 2020-04-06

## 2020-04-06 ENCOUNTER — APPOINTMENT (OUTPATIENT)
Dept: CT IMAGING | Facility: CLINIC | Age: 65
DRG: 919 | End: 2020-04-06
Attending: STUDENT IN AN ORGANIZED HEALTH CARE EDUCATION/TRAINING PROGRAM
Payer: MEDICARE

## 2020-04-06 DIAGNOSIS — Z53.9 ERRONEOUS ENCOUNTER--DISREGARD: Primary | ICD-10-CM

## 2020-04-06 LAB
ABO + RH BLD: NORMAL
ABO + RH BLD: NORMAL
ALBUMIN SERPL-MCNC: 2.6 G/DL (ref 3.4–5)
ALP SERPL-CCNC: 146 U/L (ref 40–150)
ALT SERPL W P-5'-P-CCNC: 49 U/L (ref 0–70)
AMYLASE SERPL-CCNC: 76 U/L (ref 30–110)
ANION GAP SERPL CALCULATED.3IONS-SCNC: 10 MMOL/L (ref 3–14)
AST SERPL W P-5'-P-CCNC: 37 U/L (ref 0–45)
BILIRUB SERPL-MCNC: 3.4 MG/DL (ref 0.2–1.3)
BLD GP AB SCN SERPL QL: NORMAL
BLD PROD TYP BPU: NORMAL
BLD PROD TYP BPU: NORMAL
BLD UNIT ID BPU: 0
BLOOD BANK CMNT PATIENT-IMP: NORMAL
BLOOD PRODUCT CODE: NORMAL
BPU ID: NORMAL
BUN SERPL-MCNC: 66 MG/DL (ref 7–30)
CALCIUM SERPL-MCNC: 8.8 MG/DL (ref 8.5–10.1)
CHLORIDE SERPL-SCNC: 105 MMOL/L (ref 94–109)
CO2 SERPL-SCNC: 20 MMOL/L (ref 20–32)
CREAT SERPL-MCNC: 1.05 MG/DL (ref 0.66–1.25)
ERCP: NORMAL
ERYTHROCYTE [DISTWIDTH] IN BLOOD BY AUTOMATED COUNT: 12.1 % (ref 10–15)
ERYTHROCYTE [DISTWIDTH] IN BLOOD BY AUTOMATED COUNT: 12.4 % (ref 10–15)
ERYTHROCYTE [DISTWIDTH] IN BLOOD BY AUTOMATED COUNT: 12.4 % (ref 10–15)
GFR SERPL CREATININE-BSD FRML MDRD: 73 ML/MIN/{1.73_M2}
GLUCOSE BLDC GLUCOMTR-MCNC: 240 MG/DL (ref 70–99)
GLUCOSE BLDC GLUCOMTR-MCNC: 240 MG/DL (ref 70–99)
GLUCOSE BLDC GLUCOMTR-MCNC: 246 MG/DL (ref 70–99)
GLUCOSE BLDC GLUCOMTR-MCNC: 252 MG/DL (ref 70–99)
GLUCOSE BLDC GLUCOMTR-MCNC: 259 MG/DL (ref 70–99)
GLUCOSE BLDC GLUCOMTR-MCNC: 267 MG/DL (ref 70–99)
GLUCOSE BLDC GLUCOMTR-MCNC: 287 MG/DL (ref 70–99)
GLUCOSE BLDC GLUCOMTR-MCNC: 370 MG/DL (ref 70–99)
GLUCOSE SERPL-MCNC: 389 MG/DL (ref 70–99)
HCT VFR BLD AUTO: 22 % (ref 40–53)
HCT VFR BLD AUTO: 25.4 % (ref 40–53)
HCT VFR BLD AUTO: 30.1 % (ref 40–53)
HGB BLD-MCNC: 7.3 G/DL (ref 13.3–17.7)
HGB BLD-MCNC: 8.2 G/DL (ref 13.3–17.7)
HGB BLD-MCNC: 8.4 G/DL (ref 13.3–17.7)
HGB BLD-MCNC: 9.9 G/DL (ref 13.3–17.7)
INR PPP: 1.2 (ref 0.86–1.14)
LACTATE BLD-SCNC: 1.5 MMOL/L (ref 0.7–2)
LIPASE SERPL-CCNC: 830 U/L (ref 73–393)
MCH RBC QN AUTO: 30.7 PG (ref 26.5–33)
MCH RBC QN AUTO: 31.3 PG (ref 26.5–33)
MCH RBC QN AUTO: 31.8 PG (ref 26.5–33)
MCHC RBC AUTO-ENTMCNC: 32.9 G/DL (ref 31.5–36.5)
MCHC RBC AUTO-ENTMCNC: 33.1 G/DL (ref 31.5–36.5)
MCHC RBC AUTO-ENTMCNC: 33.2 G/DL (ref 31.5–36.5)
MCV RBC AUTO: 93 FL (ref 78–100)
MCV RBC AUTO: 94 FL (ref 78–100)
MCV RBC AUTO: 96 FL (ref 78–100)
NUM BPU REQUESTED: 1
PLATELET # BLD AUTO: 190 10E9/L (ref 150–450)
PLATELET # BLD AUTO: 257 10E9/L (ref 150–450)
PLATELET # BLD AUTO: 350 10E9/L (ref 150–450)
POTASSIUM SERPL-SCNC: 4.5 MMOL/L (ref 3.4–5.3)
PROT SERPL-MCNC: 5.8 G/DL (ref 6.8–8.8)
RBC # BLD AUTO: 2.33 10E12/L (ref 4.4–5.9)
RBC # BLD AUTO: 2.64 10E12/L (ref 4.4–5.9)
RBC # BLD AUTO: 3.23 10E12/L (ref 4.4–5.9)
SODIUM SERPL-SCNC: 135 MMOL/L (ref 133–144)
SPECIMEN EXP DATE BLD: NORMAL
TRANSFUSION STATUS PATIENT QL: NORMAL
TRANSFUSION STATUS PATIENT QL: NORMAL
WBC # BLD AUTO: 11.6 10E9/L (ref 4–11)
WBC # BLD AUTO: 11.7 10E9/L (ref 4–11)
WBC # BLD AUTO: 8.7 10E9/L (ref 4–11)

## 2020-04-06 PROCEDURE — 99233 SBSQ HOSP IP/OBS HIGH 50: CPT | Mod: GC | Performed by: INTERNAL MEDICINE

## 2020-04-06 PROCEDURE — 40000279 XR SURGERY CARM FLUORO GREATER THAN 5 MIN W STILLS: Mod: TC

## 2020-04-06 PROCEDURE — 83605 ASSAY OF LACTIC ACID: CPT | Performed by: INTERNAL MEDICINE

## 2020-04-06 PROCEDURE — 36415 COLL VENOUS BLD VENIPUNCTURE: CPT | Performed by: STUDENT IN AN ORGANIZED HEALTH CARE EDUCATION/TRAINING PROGRAM

## 2020-04-06 PROCEDURE — 83690 ASSAY OF LIPASE: CPT | Performed by: STUDENT IN AN ORGANIZED HEALTH CARE EDUCATION/TRAINING PROGRAM

## 2020-04-06 PROCEDURE — C1877 STENT, NON-COAT/COV W/O DEL: HCPCS | Performed by: INTERNAL MEDICINE

## 2020-04-06 PROCEDURE — C1726 CATH, BAL DIL, NON-VASCULAR: HCPCS | Performed by: INTERNAL MEDICINE

## 2020-04-06 PROCEDURE — 25000128 H RX IP 250 OP 636: Performed by: STUDENT IN AN ORGANIZED HEALTH CARE EDUCATION/TRAINING PROGRAM

## 2020-04-06 PROCEDURE — C1769 GUIDE WIRE: HCPCS | Performed by: INTERNAL MEDICINE

## 2020-04-06 PROCEDURE — 25000128 H RX IP 250 OP 636: Performed by: ANESTHESIOLOGY

## 2020-04-06 PROCEDURE — C1874 STENT, COATED/COV W/DEL SYS: HCPCS | Performed by: INTERNAL MEDICINE

## 2020-04-06 PROCEDURE — 82962 GLUCOSE BLOOD TEST: CPT | Mod: GT

## 2020-04-06 PROCEDURE — P9016 RBC LEUKOCYTES REDUCED: HCPCS | Performed by: STUDENT IN AN ORGANIZED HEALTH CARE EDUCATION/TRAINING PROGRAM

## 2020-04-06 PROCEDURE — 71000015 ZZH RECOVERY PHASE 1 LEVEL 2 EA ADDTL HR: Performed by: INTERNAL MEDICINE

## 2020-04-06 PROCEDURE — 71000014 ZZH RECOVERY PHASE 1 LEVEL 2 FIRST HR: Performed by: INTERNAL MEDICINE

## 2020-04-06 PROCEDURE — 40000170 ZZH STATISTIC PRE-PROCEDURE ASSESSMENT II: Performed by: INTERNAL MEDICINE

## 2020-04-06 PROCEDURE — 27210794 ZZH OR GENERAL SUPPLY STERILE: Performed by: INTERNAL MEDICINE

## 2020-04-06 PROCEDURE — 25000125 ZZHC RX 250: Performed by: INTERNAL MEDICINE

## 2020-04-06 PROCEDURE — 25000132 ZZH RX MED GY IP 250 OP 250 PS 637: Performed by: STUDENT IN AN ORGANIZED HEALTH CARE EDUCATION/TRAINING PROGRAM

## 2020-04-06 PROCEDURE — 36000059 ZZH SURGERY LEVEL 3 EA 15 ADDTL MIN UMMC: Performed by: INTERNAL MEDICINE

## 2020-04-06 PROCEDURE — 25000125 ZZHC RX 250: Performed by: NURSE ANESTHETIST, CERTIFIED REGISTERED

## 2020-04-06 PROCEDURE — 25000131 ZZH RX MED GY IP 250 OP 636 PS 637: Performed by: STUDENT IN AN ORGANIZED HEALTH CARE EDUCATION/TRAINING PROGRAM

## 2020-04-06 PROCEDURE — 36000061 ZZH SURGERY LEVEL 3 W FLUORO 1ST 30 MIN - UMMC: Performed by: INTERNAL MEDICINE

## 2020-04-06 PROCEDURE — 85018 HEMOGLOBIN: CPT | Performed by: INTERNAL MEDICINE

## 2020-04-06 PROCEDURE — 25000128 H RX IP 250 OP 636: Performed by: NURSE ANESTHETIST, CERTIFIED REGISTERED

## 2020-04-06 PROCEDURE — 71250 CT THORAX DX C-: CPT

## 2020-04-06 PROCEDURE — 37000008 ZZH ANESTHESIA TECHNICAL FEE, 1ST 30 MIN: Performed by: INTERNAL MEDICINE

## 2020-04-06 PROCEDURE — 0F7D8DZ DILATION OF PANCREATIC DUCT WITH INTRALUMINAL DEVICE, VIA NATURAL OR ARTIFICIAL OPENING ENDOSCOPIC: ICD-10-PCS | Performed by: INTERNAL MEDICINE

## 2020-04-06 PROCEDURE — 37000009 ZZH ANESTHESIA TECHNICAL FEE, EACH ADDTL 15 MIN: Performed by: INTERNAL MEDICINE

## 2020-04-06 PROCEDURE — 25000566 ZZH SEVOFLURANE, EA 15 MIN: Performed by: INTERNAL MEDICINE

## 2020-04-06 PROCEDURE — 25000132 ZZH RX MED GY IP 250 OP 250 PS 637: Performed by: ANESTHESIOLOGY

## 2020-04-06 PROCEDURE — 36415 COLL VENOUS BLD VENIPUNCTURE: CPT | Performed by: INTERNAL MEDICINE

## 2020-04-06 PROCEDURE — 25800030 ZZH RX IP 258 OP 636: Performed by: STUDENT IN AN ORGANIZED HEALTH CARE EDUCATION/TRAINING PROGRAM

## 2020-04-06 PROCEDURE — C9113 INJ PANTOPRAZOLE SODIUM, VIA: HCPCS | Performed by: STUDENT IN AN ORGANIZED HEALTH CARE EDUCATION/TRAINING PROGRAM

## 2020-04-06 PROCEDURE — 12000001 ZZH R&B MED SURG/OB UMMC

## 2020-04-06 PROCEDURE — 82150 ASSAY OF AMYLASE: CPT | Performed by: STUDENT IN AN ORGANIZED HEALTH CARE EDUCATION/TRAINING PROGRAM

## 2020-04-06 PROCEDURE — 25800030 ZZH RX IP 258 OP 636: Performed by: INTERNAL MEDICINE

## 2020-04-06 PROCEDURE — 85027 COMPLETE CBC AUTOMATED: CPT | Performed by: STUDENT IN AN ORGANIZED HEALTH CARE EDUCATION/TRAINING PROGRAM

## 2020-04-06 PROCEDURE — 0F798DZ DILATION OF COMMON BILE DUCT WITH INTRALUMINAL DEVICE, VIA NATURAL OR ARTIFICIAL OPENING ENDOSCOPIC: ICD-10-PCS | Performed by: INTERNAL MEDICINE

## 2020-04-06 PROCEDURE — 0W3P8ZZ CONTROL BLEEDING IN GASTROINTESTINAL TRACT, VIA NATURAL OR ARTIFICIAL OPENING ENDOSCOPIC: ICD-10-PCS | Performed by: INTERNAL MEDICINE

## 2020-04-06 PROCEDURE — 25800030 ZZH RX IP 258 OP 636: Performed by: ANESTHESIOLOGY

## 2020-04-06 PROCEDURE — 25500064 ZZH RX 255 OP 636: Performed by: INTERNAL MEDICINE

## 2020-04-06 PROCEDURE — 25000125 ZZHC RX 250: Performed by: ANESTHESIOLOGY

## 2020-04-06 PROCEDURE — 25000128 H RX IP 250 OP 636: Performed by: INTERNAL MEDICINE

## 2020-04-06 PROCEDURE — 25800030 ZZH RX IP 258 OP 636: Performed by: NURSE ANESTHETIST, CERTIFIED REGISTERED

## 2020-04-06 DEVICE — STENT ENDOPROS BILIARY GORE VIABIL W/O HL 10X80MM VN1008200
Type: IMPLANTABLE DEVICE | Site: BILE DUCT | Status: NON-FUNCTIONAL
Removed: 2020-04-16

## 2020-04-06 DEVICE — STENT FREEMAN PANCREA FLEX 4FRX2CM STR: Type: IMPLANTABLE DEVICE | Site: PANCREATIC DUCT | Status: FUNCTIONAL

## 2020-04-06 RX ORDER — PROPOFOL 10 MG/ML
INJECTION, EMULSION INTRAVENOUS PRN
Status: DISCONTINUED | OUTPATIENT
Start: 2020-04-06 | End: 2020-04-06

## 2020-04-06 RX ORDER — LEVOFLOXACIN 5 MG/ML
INJECTION, SOLUTION INTRAVENOUS PRN
Status: DISCONTINUED | OUTPATIENT
Start: 2020-04-06 | End: 2020-04-06

## 2020-04-06 RX ORDER — SODIUM CHLORIDE, SODIUM LACTATE, POTASSIUM CHLORIDE, CALCIUM CHLORIDE 600; 310; 30; 20 MG/100ML; MG/100ML; MG/100ML; MG/100ML
INJECTION, SOLUTION INTRAVENOUS CONTINUOUS
Status: DISCONTINUED | OUTPATIENT
Start: 2020-04-06 | End: 2020-04-07

## 2020-04-06 RX ORDER — NALOXONE HYDROCHLORIDE 0.4 MG/ML
.1-.4 INJECTION, SOLUTION INTRAMUSCULAR; INTRAVENOUS; SUBCUTANEOUS
Status: ACTIVE | OUTPATIENT
Start: 2020-04-06 | End: 2020-04-07

## 2020-04-06 RX ORDER — LIDOCAINE HYDROCHLORIDE 20 MG/ML
INJECTION, SOLUTION INFILTRATION; PERINEURAL PRN
Status: DISCONTINUED | OUTPATIENT
Start: 2020-04-06 | End: 2020-04-06

## 2020-04-06 RX ORDER — ONDANSETRON 2 MG/ML
4 INJECTION INTRAMUSCULAR; INTRAVENOUS EVERY 30 MIN PRN
Status: DISCONTINUED | OUTPATIENT
Start: 2020-04-06 | End: 2020-04-06 | Stop reason: HOSPADM

## 2020-04-06 RX ORDER — SODIUM CHLORIDE 9 MG/ML
INJECTION, SOLUTION INTRAVENOUS CONTINUOUS PRN
Status: DISCONTINUED | OUTPATIENT
Start: 2020-04-06 | End: 2020-04-06

## 2020-04-06 RX ORDER — ESMOLOL HYDROCHLORIDE 10 MG/ML
INJECTION INTRAVENOUS PRN
Status: DISCONTINUED | OUTPATIENT
Start: 2020-04-06 | End: 2020-04-06

## 2020-04-06 RX ORDER — ONDANSETRON 4 MG/1
4 TABLET, ORALLY DISINTEGRATING ORAL EVERY 30 MIN PRN
Status: DISCONTINUED | OUTPATIENT
Start: 2020-04-06 | End: 2020-04-06 | Stop reason: HOSPADM

## 2020-04-06 RX ORDER — FENOFIBRATE 160 MG/1
160 TABLET ORAL DAILY
Status: DISCONTINUED | OUTPATIENT
Start: 2020-04-06 | End: 2020-04-08 | Stop reason: HOSPADM

## 2020-04-06 RX ORDER — HYDROMORPHONE HYDROCHLORIDE 1 MG/ML
.3-.5 INJECTION, SOLUTION INTRAMUSCULAR; INTRAVENOUS; SUBCUTANEOUS EVERY 5 MIN PRN
Status: DISCONTINUED | OUTPATIENT
Start: 2020-04-06 | End: 2020-04-06 | Stop reason: HOSPADM

## 2020-04-06 RX ORDER — ATORVASTATIN CALCIUM 40 MG/1
40 TABLET, FILM COATED ORAL DAILY
Status: DISCONTINUED | OUTPATIENT
Start: 2020-04-06 | End: 2020-04-08 | Stop reason: HOSPADM

## 2020-04-06 RX ORDER — INDOMETHACIN 50 MG/1
SUPPOSITORY RECTAL PRN
Status: DISCONTINUED | OUTPATIENT
Start: 2020-04-06 | End: 2020-04-06 | Stop reason: HOSPADM

## 2020-04-06 RX ORDER — EPHEDRINE SULFATE 50 MG/ML
INJECTION, SOLUTION INTRAMUSCULAR; INTRAVENOUS; SUBCUTANEOUS PRN
Status: DISCONTINUED | OUTPATIENT
Start: 2020-04-06 | End: 2020-04-06

## 2020-04-06 RX ORDER — FENTANYL CITRATE 50 UG/ML
25-50 INJECTION, SOLUTION INTRAMUSCULAR; INTRAVENOUS
Status: DISCONTINUED | OUTPATIENT
Start: 2020-04-06 | End: 2020-04-06 | Stop reason: HOSPADM

## 2020-04-06 RX ORDER — FLUMAZENIL 0.1 MG/ML
0.2 INJECTION, SOLUTION INTRAVENOUS
Status: ACTIVE | OUTPATIENT
Start: 2020-04-06 | End: 2020-04-07

## 2020-04-06 RX ORDER — IOPAMIDOL 510 MG/ML
INJECTION, SOLUTION INTRAVASCULAR PRN
Status: DISCONTINUED | OUTPATIENT
Start: 2020-04-06 | End: 2020-04-06 | Stop reason: HOSPADM

## 2020-04-06 RX ORDER — SODIUM CHLORIDE, SODIUM LACTATE, POTASSIUM CHLORIDE, CALCIUM CHLORIDE 600; 310; 30; 20 MG/100ML; MG/100ML; MG/100ML; MG/100ML
INJECTION, SOLUTION INTRAVENOUS CONTINUOUS
Status: DISCONTINUED | OUTPATIENT
Start: 2020-04-06 | End: 2020-04-06 | Stop reason: HOSPADM

## 2020-04-06 RX ORDER — LABETALOL 20 MG/4 ML (5 MG/ML) INTRAVENOUS SYRINGE
5 EVERY 10 MIN PRN
Status: DISCONTINUED | OUTPATIENT
Start: 2020-04-06 | End: 2020-04-06 | Stop reason: HOSPADM

## 2020-04-06 RX ORDER — LANOLIN ALCOHOL/MO/W.PET/CERES
6 CREAM (GRAM) TOPICAL
Status: DISCONTINUED | OUTPATIENT
Start: 2020-04-06 | End: 2020-04-08 | Stop reason: HOSPADM

## 2020-04-06 RX ADMIN — HUMAN INSULIN 3 UNITS: 100 INJECTION, SOLUTION SUBCUTANEOUS at 14:31

## 2020-04-06 RX ADMIN — PHENYLEPHRINE HYDROCHLORIDE 150 MCG: 10 INJECTION INTRAVENOUS at 12:50

## 2020-04-06 RX ADMIN — PANTOPRAZOLE SODIUM 40 MG: 40 INJECTION, POWDER, LYOPHILIZED, FOR SOLUTION INTRAVENOUS at 20:18

## 2020-04-06 RX ADMIN — INSULIN ASPART 3 UNITS: 100 INJECTION, SOLUTION INTRAVENOUS; SUBCUTANEOUS at 20:11

## 2020-04-06 RX ADMIN — MELATONIN TAB 3 MG 6 MG: 3 TAB at 00:05

## 2020-04-06 RX ADMIN — SODIUM CHLORIDE: 900 INJECTION INTRAVENOUS at 12:34

## 2020-04-06 RX ADMIN — SODIUM CHLORIDE, POTASSIUM CHLORIDE, SODIUM LACTATE AND CALCIUM CHLORIDE: 600; 310; 30; 20 INJECTION, SOLUTION INTRAVENOUS at 21:22

## 2020-04-06 RX ADMIN — INSULIN ASPART 5 UNITS: 100 INJECTION, SOLUTION INTRAVENOUS; SUBCUTANEOUS at 03:55

## 2020-04-06 RX ADMIN — SUGAMMADEX 200 MG: 100 INJECTION, SOLUTION INTRAVENOUS at 14:04

## 2020-04-06 RX ADMIN — PROPOFOL 100 MG: 10 INJECTION, EMULSION INTRAVENOUS at 12:48

## 2020-04-06 RX ADMIN — PROPOFOL 50 MG: 10 INJECTION, EMULSION INTRAVENOUS at 12:50

## 2020-04-06 RX ADMIN — PANTOPRAZOLE SODIUM 40 MG: 40 INJECTION, POWDER, LYOPHILIZED, FOR SOLUTION INTRAVENOUS at 06:09

## 2020-04-06 RX ADMIN — PHENYLEPHRINE HYDROCHLORIDE 150 MCG: 10 INJECTION INTRAVENOUS at 12:48

## 2020-04-06 RX ADMIN — INSULIN ASPART 5 UNITS: 100 INJECTION, SOLUTION INTRAVENOUS; SUBCUTANEOUS at 00:54

## 2020-04-06 RX ADMIN — FENTANYL CITRATE 100 MCG: 50 INJECTION, SOLUTION INTRAMUSCULAR; INTRAVENOUS at 12:55

## 2020-04-06 RX ADMIN — LABETALOL 20 MG/4 ML (5 MG/ML) INTRAVENOUS SYRINGE 5 MG: at 15:17

## 2020-04-06 RX ADMIN — INSULIN ASPART 3 UNITS: 100 INJECTION, SOLUTION INTRAVENOUS; SUBCUTANEOUS at 16:05

## 2020-04-06 RX ADMIN — ROCURONIUM BROMIDE 20 MG: 10 INJECTION INTRAVENOUS at 13:43

## 2020-04-06 RX ADMIN — SODIUM CHLORIDE, POTASSIUM CHLORIDE, SODIUM LACTATE AND CALCIUM CHLORIDE: 600; 310; 30; 20 INJECTION, SOLUTION INTRAVENOUS at 03:56

## 2020-04-06 RX ADMIN — Medication 100 MG: at 12:49

## 2020-04-06 RX ADMIN — FENOFIBRATE 160 MG: 160 TABLET ORAL at 08:18

## 2020-04-06 RX ADMIN — Medication 5 MG: at 12:48

## 2020-04-06 RX ADMIN — GLUCAGON HYDROCHLORIDE 0.4 MG: KIT at 13:24

## 2020-04-06 RX ADMIN — SODIUM CHLORIDE, POTASSIUM CHLORIDE, SODIUM LACTATE AND CALCIUM CHLORIDE 500 ML: 600; 310; 30; 20 INJECTION, SOLUTION INTRAVENOUS at 00:04

## 2020-04-06 RX ADMIN — ROCURONIUM BROMIDE 50 MG: 10 INJECTION INTRAVENOUS at 12:49

## 2020-04-06 RX ADMIN — ESMOLOL HYDROCHLORIDE 10 MG: 10 INJECTION, SOLUTION INTRAVENOUS at 13:31

## 2020-04-06 RX ADMIN — INSULIN GLARGINE 10 UNITS: 100 INJECTION, SOLUTION SUBCUTANEOUS at 00:04

## 2020-04-06 RX ADMIN — EMPAGLIFLOZIN 10 MG: 10 TABLET, FILM COATED ORAL at 08:18

## 2020-04-06 RX ADMIN — SODIUM CHLORIDE, POTASSIUM CHLORIDE, SODIUM LACTATE AND CALCIUM CHLORIDE: 600; 310; 30; 20 INJECTION, SOLUTION INTRAVENOUS at 15:00

## 2020-04-06 RX ADMIN — MIDAZOLAM 2 MG: 1 INJECTION INTRAMUSCULAR; INTRAVENOUS at 12:34

## 2020-04-06 RX ADMIN — MELATONIN TAB 3 MG 6 MG: 3 TAB at 20:33

## 2020-04-06 RX ADMIN — INSULIN ASPART 3 UNITS: 100 INJECTION, SOLUTION INTRAVENOUS; SUBCUTANEOUS at 08:18

## 2020-04-06 RX ADMIN — LIDOCAINE HYDROCHLORIDE 100 MG: 20 INJECTION, SOLUTION INFILTRATION; PERINEURAL at 12:48

## 2020-04-06 RX ADMIN — SODIUM CHLORIDE, POTASSIUM CHLORIDE, SODIUM LACTATE AND CALCIUM CHLORIDE: 600; 310; 30; 20 INJECTION, SOLUTION INTRAVENOUS at 00:05

## 2020-04-06 RX ADMIN — LABETALOL 20 MG/4 ML (5 MG/ML) INTRAVENOUS SYRINGE 5 MG: at 15:07

## 2020-04-06 RX ADMIN — LEVOFLOXACIN 500 MG: 5 INJECTION, SOLUTION INTRAVENOUS at 13:00

## 2020-04-06 RX ADMIN — ONDANSETRON 4 MG: 2 INJECTION INTRAMUSCULAR; INTRAVENOUS at 14:02

## 2020-04-06 RX ADMIN — ATORVASTATIN CALCIUM 40 MG: 40 TABLET, FILM COATED ORAL at 08:18

## 2020-04-06 RX ADMIN — ESMOLOL HYDROCHLORIDE 10 MG: 10 INJECTION, SOLUTION INTRAVENOUS at 14:04

## 2020-04-06 ASSESSMENT — ACTIVITIES OF DAILY LIVING (ADL)
ADLS_ACUITY_SCORE: 12
ADLS_ACUITY_SCORE: 12
ADLS_ACUITY_SCORE: 15
ADLS_ACUITY_SCORE: 12
ADLS_ACUITY_SCORE: 12

## 2020-04-06 NOTE — PLAN OF CARE
Pt afebrile. Pt BP remains orthostatic MD aware. HR WNL except with exertion. UP to BR 's but recovers within minutes once at rest. Pt refusing to sit at bs to void or use bsc. Up with SBA of 2 persons due to light headedness. Pt has received 500cc LR bolus x1 with IVF cont at 125/hr. Pt voiding good amts w/o difficulty. Urine clear soledad. Triggered Sirs earlier MD notified. Lactic=1.5  BS checks 389 with pm lantus 10 units given and 5 units novolog s/s. Recheck =370 with 5 units s/s coverage. Pt has had 1 med size black stool, soft and formed. Abd round, +bs, +gas. Pt denied any nausea. IV protonix to start this am. NPO since 0000 for probable EGD/ERCP. Hgb=9.9 Cont to monitor closely.

## 2020-04-06 NOTE — PROGRESS NOTES
"CLINICAL NUTRITION SERVICES - ASSESSMENT NOTE     Nutrition Prescription    RECOMMENDATIONS FOR MDs/PROVIDERS TO ORDER:  Recommend checking Mg and PO4 for review d/t concern for depletion secondary to poor po intakes PTA.  Once pt appropriate to resume solid foods, recommend High Calorie/High Protein diet d/t pt's higher estimated nutritional needs.    Malnutrition Status:    Unable to determine at this time      Recommendations already ordered by Registered Dietitian (RD):  None at this time    Future/Additional Recommendations:  Once diet adv beyond CLs, pst-op, recommend obtaining calorie counts to monitor po progress and assess adequacy d/t h/o wt loss PTA and help determine need for supplementation.     REASON FOR ASSESSMENT  Fuentes Estrada is a/an 65 year old male assessed by the dietitian for Admission Nutrition Risk Screen for unintentional loss of 10# or more in the past two months and reduced oral intake over the last month    NUTRITION HISTORY  Unable to obtain secondary to pt in OR. Per H&P, pt presents with episodes of  vomiting, after eating heavier foods and consuming liquids and bowel movements reported to be irregular over the past week. No c/o nausea reported.     CURRENT NUTRITION ORDERS  Diet: NPO since MN      LABS  No BMP, Mg or PO4 obtained yet today  BUN 66 (high); suspect r/t GI bleed    MEDICATIONS  Medications reviewed    ANTHROPOMETRICS  Height: 0 cm (Data Unavailable). Height of 185.4 cm (6'1\") per chart review.  Most Recent Weight: 93.1 kg (205 lb 3.2 oz) - Admit wt (4/5)  IBW: 83.6 kg  BMI: Overweight BMI 25-29.9  Weight History: Unable to obtain secondary to pt off unit for OR. Per review of Care Everywhere, noted pt weighed 96.6 kg (213 lbs) on 3/27/20. Wt loss of 8 lbs (3.8% loss) x > week.  Wt Readings from Last 10 Encounters:   04/05/20 93.1 kg (205 lb 3.2 oz)   03/31/20 94.6 kg (208 lb 8.9 oz)   06/12/02 102.5 kg (226 lb)   01/31/02 105.5 kg (232 lb 8 oz)   01/21/02 103.9 kg (229 " lb)       Dosing Weight: 93 kg    ASSESSED NUTRITION NEEDS  Estimated Energy Needs: 1276-7836 kcals/day (25 - 30 kcals/kg)  Justification: Maintenance  Estimated Protein Needs: 110-140 grams protein/day (1.2 - 1.5 grams of pro/kg)  Justification: Post-op and Repletion  Estimated Fluid Needs: (1 mL/kcal)   Justification: Maintenance    PHYSICAL FINDINGS  See malnutrition section below.  Unable to assess at this time    MALNUTRITION  % Intake: Unable to assess  % Weight Loss: > 2% in 1 week (severe)  Subcutaneous Fat Loss: Unable to assess d/t unable to see pt at this time.  Muscle Loss: Unable to assess at this time  Fluid Accumulation/Edema: None noted per chart review  Malnutrition Diagnosis: Unable to determine at this time    NUTRITION DIAGNOSIS  Unintended weight loss related to question inadequate nutritional intakes and dehydration secondary to episodes of vomiting, after eating heavier foods and consuming liquids PTA as evidenced by Wt loss of 8 lbs (3.8% loss) x > week.      INTERVENTIONS  Implementation  Nutrition Education: No education needs assessed at this time     Goals  1. Wt not to decline < 93 kg   2. Diet adv within 24 to 48 hrs and pt able to consume > 75% of his meals TID over next 5-7 days.      Monitoring/Evaluation  Progress toward goals will be monitored and evaluated per protocol.  Fern Reyes RD,LD  7C pager [phone]

## 2020-04-06 NOTE — TELEPHONE ENCOUNTER
[facility] Call Center    Phone Message    May a detailed message be left on voicemail: yes     Reason for Call: Patient is currently inpatient at [facility] and had a procedure today. Patient's wife Robyn is trying to get information on how patient is doing. Robyn stated she called the hospital and was told Dr. Strauss is included on how patient is doing.  Please call Robyn at [phone] to discuss if Dr. Strauss is aware of how patient is doing.    Action Taken: Message routed to:  [facility] ([facility]    Travel Screening: Not Applicable

## 2020-04-06 NOTE — PROVIDER NOTIFICATION
Notified MD at 0140   Regarding: Pt triggered SIRS with changes in HR and orthostatic BP. Pt with noted COTO and light headedness OOB.  Hgb=9.9, Pt had Med size BM soft, formed and black.    Spoke with: Dr. Desouza at 0145    Orders were obtained.     Comments: Monitor pt at this time for stools, changes in status. Await result from Lactic acid level.

## 2020-04-06 NOTE — PLAN OF CARE
/65 (BP Location: Left arm)   Pulse 73   Temp 97.7  F (36.5  C) (Oral)   Resp 16   Wt 93.1 kg (205 lb 3.2 oz)   SpO2 98%   BMI 27.07 kg/m      A&Ox4. Flat affect. Up from procedure around 1530. Denies pain and nausea. Taking in sips of clears. Voiding spont in commode. Still complaining of lightheadedness when getting up. PIV infusing MIVF. BG checks q4h. Pt to go down to CT soon.

## 2020-04-06 NOTE — PROGRESS NOTES
[facility], [place]    Progress Note - [facility] 4 Service        Date of Admission:  4/5/2020    Assessment & Plan   Fuentes Estrada is a 65 year old male with past medical history of HTN, insulin dependent diabetes, CVA, and recently diagnosed cholangiocarcinoma who was seen at OSH for melena and lightheadedness and subsequently transferred to [facility] on 4/5/2020 for further care.      # Suspected GI bleed with acute blood loss anemia  # Cholangiocarcinoma, recent dx   ERCPs on 3/25, 3/27, and 3/31 with 10fr x 7cm Sofflex plastic stent placed in CBD at time of last procedure. Brushings consistent with adenocarcinoma. Onset of melena was after procedure on 3/31. Was noted to have acute hgb drop from 16 to 8.2.    - pantoprazole 40 mg IV BID  - continue LR @ 150 cc/hr   - trend hgb q8h, transfuse for <7  - trend LFTs  - type and screen, consented for blood  - NPO  - GI consult, plan for EGD/ERCP this AM     # Hyperglycemia  # Insulin-Dependent Diabetes  Home regimen of empaglifozin 10 mg, glipizide 10 mg BID and lantus 24 U at bedtime. Has not been taking much PO including medications. BG >500 at OSH and trending down.   - 10 units glargine now, with resumption of PTA 24 units qPM  - holding PTA glipizide, empaglifozin  - medium SSI, hypoglycemic protocol     # Hx CVA  # HTN  - cont PTA atorvastatin, fenofibrate   - hold PTA Plavix (last dose on Fri 4/3)  - hold PTA amlodipine, fenofibrate, lisinopril, until hgb and BP stable       Diet: NPO for Medical/Clinical Reasons Except for: Meds    Fluids: LR at 150 ml/hr  Lines: PIV  DVT Prophylaxis: Pneumatic Compression Devices  Em Catheter: not present  Code Status: Full Code    Family: Updated Wife Robyn ([phone]) on 4/6    Disposition Plan   Expected discharge: 2 - 3 days, recommended once hemoglobin stable.  Entered: Hussain Irizarry DO 04/06/2020, 6:41 AM     The patient's care was discussed with the Attending Physician,   Santiago.    Hussain Irizarry, DO  Internal Medicine PGY3  Pager [phone] (Text Page)  ______________________________________________________________________    Interval History   Nursing notes reviewed. Patient reports a pink tinged stool yesterday, but has noted multiple dark stools since his last ERCP. Currently denies any chest pain, nausea, abdominal pain, or bloating.     Data reviewed today: I reviewed all medications, new labs and imaging results over the last 24 hours.     Physical Exam   Vital Signs: Temp: 97.7  F (36.5  C) Temp src: Oral BP: 100/63(sitting) Pulse: 101   Resp: 18 SpO2: 98 % O2 Device: None (Room air)    Weight: 205 lbs 3.2 oz  Constitutional: NAD, pleasant, cooperative  HEENT: Sclera anicteric, Normal oropharynx without ulcers or exudate, MMM  CV: RRR, no murmurs, gallops or rubs. JVD normal   Respiratory: CTAB, no wheezing, crackles or rales. Non-labored  Abd: Soft, NT/ND, +bs  Skin: No lesions or rashes over exposed areas, normal color, texture and turgor  Neuro: AAO x 3    Data   Recent Labs   Lab 04/06/20  0613 04/05/20  2312 03/31/20  1300 03/31/20  0950 03/31/20  0902   WBC  --  11.6*  --   --  7.4   HGB 8.2* 9.9*  --   --  16.3   MCV  --  93  --   --  94   PLT  --  350  --   --  295   INR  --  1.20*  --  1.07  --    NA  --  135  --   --  132*   POTASSIUM  --  4.5  --   --  4.0   CHLORIDE  --  105  --   --  101   CO2  --  20  --   --  23   BUN  --  66*  --   --  18   CR  --  1.05  --   --  1.08   ANIONGAP  --  10  --   --  8   ERIC  --  8.8  --   --  9.7   GLC  --  389*  --   --  234*   ALBUMIN  --  2.6*  --   --  3.4   PROTTOTAL  --  5.8*  --   --  7.6   BILITOTAL  --  3.4*  --   --  10.8*   ALKPHOS  --  146  --   --  335*   ALT  --  49  --   --  124*   AST  --  37  --   --  86*   LIPASE 830*  --  2,170*  --  2,520*     No results found for this or any previous visit (from the past 24 hour(s)).

## 2020-04-06 NOTE — CONSULTS
GASTROENTEROLOGY CONSULTATION      Date of Admission:  4/5/2020          ASSESSMENT AND RECOMMENDATIONS:      65 year old male with a history of HTN, DM, CVA on Plavix as well as a history of CCY 8/2019; who was diagnosed with CCA via ERCP on 3/31/2019 (failed to cannulate biliary duct on ERCP 3/25, and with rendezvous 3/27 after her had presented with nausea, vomiting and jaundice, with elevated CA 19-9) with biopsy showing poorly differentiated adenocarcinoma; admitted from an OSH with melena and lightheadedness.    #. Upper GI bleed  Based on presentation with melena,  as well as elevated BUN/Cr ratio (high +LR). Risk factors include recent sphincterotomy on Plavix. Possible differentials include post-sphincterotomy bleed but also considering PUD, gastritis, severe (LA Grade C/D) esophagitis, AVMs, Dieulafoy lesions.   No history of liver disease.   Last Plavix dose 4/3/2020     #. Cholangiocarcinoma  Recently diagnosed, poorly differentiated based on biopsy and located in the mid-duct. Improved TB since his ERCP (10.8 - 3.4), normalized liver enzymes.  Per chart, yet to have planned visits with Medical and Surgical Oncology.    RECOMMENDATIONS  -- Ensure two large bore IVs  -- Adequate volume resuscitation with IVF, pRBC  -- Monitor Hgb closely   Transfuse for Hgb<7 (improved outcomes with restricted vs liberal threshold)  -- Continue to hold Plavix   -- IV pantoprazole 40 mg BID  -- Monitor stool output  -- NPO    -- Plan for EGD/ERCP today   Will likely need a covered stent and spyglass with biopsies in upstream bile ducts  -- Trend LFTs  -- Additional recommendations after procedure    Gastroenterology follow up recommendations: TBD    Thank you for involving us in this patient's care. Please do not hesitate to contact the GI service with any questions or concerns.     Patient care plan discussed with Dr. Strauss, GI staff physician.    Arik Cervantes MD  Gastroenterology   PGY 5  ([phone])            Chief Complaint:   We were asked by Dr. Desouza of Medicine to evaluate this patient with melena  History is obtained from the patient and the medical record.          History of Present Illness:   Fuentes Estrada is a 65 year old male with a history of HTN, DM, CVA on Plavix as well as a history of CCY 8/2019; who was diagnosed with CCA via ERCP on 3/31/2019 (failed to cannulate biliary duct on ERCP 3/25, and with rendezvous 3/27 after her had presented with nausea, vomiting and jaundice, with elevated CA 19-9) with biopsy showing poorly differentiated adenocarcinoma; admitted from an OSH with melena and lightheadedness.  He has also had nausea and non-bloody vomiting and after development of melena, decided to be seen in the ED. He was noted to have a ~4 g drop in Hgb on evaluation at the OSH and was transferred her given the need for a possible ERCP.             Past Medical History:   Reviewed and edited as appropriate  Past Medical History:   Diagnosis Date     Essential hypertension, benign     Hypertension, Benign     Nonspecific abnormal results of liver function study     Hx of elevated LFT's            Past Surgical History:   Reviewed and edited as appropriate   Past Surgical History:   Procedure Laterality Date     ENDOSCOPIC RETROGRADE CHOLANGIOPANCREATOGRAM N/A 3/31/2020    Procedure: ENDOSCOPIC RETROGRADE CHOLANGIOPANCREATOGRAPHY, WITH with biliary sphincterotomy, biopsies and brushings, biliary stent placement;  Surgeon: Win Strauss MD;  Location: [facility] OR      KNEE SCOPE, DIAGNOSTIC  1995    Arthroscopy, Knee; left     HC VASECTOMY UNILAT/BILAT W POSTOP SEMEN      Vasectomy          Previous Endoscopy:   No results found for this or any previous visit.         Social History:   Reviewed and edited as appropriate  Social History     Socioeconomic History     Marital status:      Spouse name: Robyn     Number of children: 2     Years of education: 14      "Highest education level: Not on file   Occupational History     Occupation: self employed   Social Needs     Financial resource strain: Not on file     Food insecurity     Worry: Not on file     Inability: Not on file     Transportation needs     Medical: Not on file     Non-medical: Not on file   Tobacco Use     Smoking status: Never Smoker   Substance and Sexual Activity     Alcohol use: Yes     Comment: occaisional      Drug use: No     Sexual activity: Yes     Partners: Female   Lifestyle     Physical activity     Days per week: Not on file     Minutes per session: Not on file     Stress: Not on file   Relationships     Social connections     Talks on phone: Not on file     Gets together: Not on file     Attends Jain service: Not on file     Active member of club or organization: Not on file     Attends meetings of clubs or organizations: Not on file     Relationship status: Not on file     Intimate partner violence     Fear of current or ex partner: Not on file     Emotionally abused: Not on file     Physically abused: Not on file     Forced sexual activity: Not on file   Other Topics Concern      Service No     Blood Transfusions No     Caffeine Concern No     Occupational Exposure No     Hobby Hazards No     Sleep Concern No     Stress Concern No     Weight Concern No     Special Diet No     Back Care No     Exercise Yes     Bike Helmet No     Seat Belt Yes     Self-Exams No   Social History Narrative     Not on file          Family History:   Reviewed and edited as appropriate  No known history of gastrointestinal/liver disease or  gastrointestinal malignancies       Allergies:   Reviewed and edited as appropriate     Allergies   Allergen Reactions     Metformin Other (See Comments)     \" I think my kidneys shut down\"     No Known Drug Allergies           Medications:     Medications Prior to Admission   Medication Sig Dispense Refill Last Dose     atorvastatin (LIPITOR) 40 MG tablet Take 40 mg " by mouth daily        clopidogrel (PLAVIX) 75 MG tablet Take 75 mg by mouth daily        empagliflozin (JARDIANCE) 10 MG TABS tablet Take 10 mg by mouth daily        fenofibrate (TRIGLIDE/LOFIBRA) 160 MG tablet Take 160 mg by mouth daily        furosemide (LASIX) 20 MG tablet Take 20 mg by mouth every evening         glipiZIDE (GLUCOTROL) 10 MG tablet Take 10 mg by mouth 2 times daily (before meals)        insulin glargine (LANTUS PEN) 100 UNIT/ML pen Inject 24 Units Subcutaneous At Bedtime         lisinopril (ZESTRIL) 30 MG tablet Take 30 mg by mouth every evening         NORVASC 10 MG OR TABS 1 tab po QD (Once per day) 90 3      sildenafil (REVATIO) 20 MG tablet Take 20 mg by mouth as needed              Review of Systems:     A complete review of systems was performed and is negative except as noted in the HPI           Physical Exam:   /61 (BP Location: Left arm)   Pulse 91   Temp 97.7  F (36.5  C) (Oral)   Resp 16   Wt 93.1 kg (205 lb 3.2 oz)   SpO2 98%   BMI 27.07 kg/m    Wt:   Wt Readings from Last 2 Encounters:   04/05/20 93.1 kg (205 lb 3.2 oz)   03/31/20 94.6 kg (208 lb 8.9 oz)      Constitutional: cooperative, pleasant,    Eyes: Sclera anicteric   Ears/nose/mouth/throat: Normal oropharynx    Neck: supple   CV: No edema  Respiratory: Unlabored breathing  Lymph: NAD  Abdomen: Soft, nondistended, +bs, no hepatosplenomegaly, nontender, no peritoneal signs  Skin: warm, perfused   Neuro: AAO x 3   Psych: Normal affect  MSK: In bed         Data:   Labs and imaging below were independently reviewed and interpreted    BMP  Recent Labs   Lab 04/05/20 2312 03/31/20  0902    132*   POTASSIUM 4.5 4.0   CHLORIDE 105 101   ERIC 8.8 9.7   CO2 20 23   BUN 66* 18   CR 1.05 1.08   * 234*     CBC  Recent Labs   Lab 04/06/20  0613 04/05/20 2312 03/31/20  0902   WBC  --  11.6* 7.4   RBC  --  3.23* 5.34   HGB 8.2* 9.9* 16.3   HCT  --  30.1* 50.3   MCV  --  93 94   MCH  --  30.7 30.5   MCHC  --  32.9  32.4   RDW  --  12.1 13.2   PLT  --  350 295     INR  Recent Labs   Lab 04/05/20  2312 03/31/20  0950   INR 1.20* 1.07     LFTs  Recent Labs   Lab 04/05/20  2312 03/31/20  0902   ALKPHOS 146 335*   AST 37 86*   ALT 49 124*   BILITOTAL 3.4* 10.8*   PROTTOTAL 5.8* 7.6   ALBUMIN 2.6* 3.4      PANC  Recent Labs   Lab 04/06/20  0613 03/31/20  1300 03/31/20  0902   LIPASE 830* 2,170* 2,520*   AMYLASE 76 169* 191*       Imaging:  CT ABDOMEN PELVIS W CONTRAST, 4/2/2020 11:28 AM  FINDINGS:   Abdomen and pelvis:   Postsurgical changes of cholecystectomy. Medial to the surgical clips,  there is ill-defined soft tissue attenuation measuring 2.1 x 1.2 cm  (series 7, image 133). This appears to be at the level of the biliary  structures seen on cholangiogram 3/31/2020. Ill-defined hazy  attenuation extends from this region superiorly into the hilum (series  7, image 127), with apparent intraluminal involvement of the central  right hepatic duct (series 7, image 123). Extending superiorly from  the tip of the biliary stent, there is circumferential biliary  enhancement extending into the proximal left and right bile ducts.  There is no left intrahepatic biliary dilation, similar to CT  3/23/2020, and there is unchanged mild posterior right intrahepatic  biliary dilation upstream to the soft tissue described above without  anterior right intrahepatic biliary dilation. Suspected type IIIB  biliary anatomy although this is not well visualized. A  hyperdense/enhancing focus seen within the upper common bile duct on  CT 3/23/2020 is no longer appreciated, possibly removed during recent  biopsy (series 3 image 52 of exam dated 3/23/2020).    There are a few mildly prominent upper abdominal lymph nodes, for  example a 1.2 cm lymph node anterior to the common hepatic artery  (series 7, image 126). Replaced right common hepatic artery arising  from the SMA.   No areas of abnormal focal arterial enhancement in the liver. No  suspicious  liver mass. The spleen, adrenal glands, pancreas, kidneys,  and urinary bladder appear normal. Mildly enlarged prostate. No  intra-abdominal free air or free fluid. No abnormally dilated loops of  bowel. Mild colonic diverticulosis. The appendix is normal. Mild  atherosclerotic calcifications in the normal caliber abdominal aorta.  The major abdominal vasculature appears patent.   Lower chest:   The heart is not enlarged. No pericardial or pleural effusion.  Bibasilar dependent atelectasis without consolidation.   Bones and soft tissues:   Tiny fat-containing umbilical hernia. Mild multilevel degenerative  changes in the spine. No acute or worrisome osseous lesions.                                                                    IMPRESSION:   1. Ill-defined hazy lesion in the yousuf hepatis and ill-defined  intraluminal density in the central posterior right hepatic duct with  unchanged mild upstream posterior right intrahepatic biliary dilation,  likely in the setting of type IIIB biliary anatomy although this is  not well visualized. Findings are compatible with cholangiocarcinoma.  2. Soft tissue attenuation medial to the cholecystectomy clips may  represent a portion of the mass versus an ill-defined lymph node.  3. Biliary enhancement in the yousuf hepatis involving the central left  and anterior right hepatic ducts may represent neoplasm versus  inflammation from recent intervention.  4. Several borderline enlarged upper abdominal lymph nodes.

## 2020-04-06 NOTE — ANESTHESIA POSTPROCEDURE EVALUATION
Anesthesia POST Procedure Evaluation    Patient: Fuentes Estrada   MRN:     [id number] Gender:   male   Age:    65 year old :      1955        Preoperative Diagnosis: GI bleed [K92.2]   Procedure(s):  ESOPHAGOGASTRODUODENOSCOPY (EGD)  ENDOSCOPIC RETROGRADE CHOLANGIOPANCREATOGRAPHY   Postop Comments: No value filed.     Anesthesia Type: General       Disposition: Admission   Postop Pain Control: Uneventful            Sign Out: Well controlled pain   PONV: No   Neuro/Psych: Uneventful            Sign Out: Acceptable/Baseline neuro status   Airway/Respiratory: Uneventful            Sign Out: Acceptable/Baseline resp. status   CV/Hemodynamics: Uneventful            Sign Out: Acceptable CV status   Other NRE: NONE   DID A NON-ROUTINE EVENT OCCUR? No    Event details/Postop Comments:  I assessed the patient in PACU prior to discharge.  The patient was awake and alert with minimal to moderate pain which was well controlled with medications.  The patient denied any significant nausea.  The patient's heart rate and blood pressure with consistent with his preoperative measurements, and he was breathing comfortably without any signs of respiratory distress.  There were no readily apparent anesthetic complications.  All his questions were answered.         Last Anesthesia Record Vitals:  CRNA VITALS  2020 1344 - 2020 1444      2020             Pulse:  81    SpO2:  100 %    Resp Rate (observed):  (!) 3    Resp Rate (set):  10          Last PACU Vitals:  Vitals Value Taken Time   /94 2020  3:00 PM   Temp 36.7  C (98.1  F) 2020  2:45 PM   Pulse 82 2020  3:00 PM   Resp 19 2020  2:45 PM   SpO2 99 % 2020  3:03 PM   Temp src     NIBP     Pulse     SpO2     Resp     Temp     Ht Rate     Temp 2     Vitals shown include unvalidated device data.      Electronically Signed By: Edmund Guajardo MD, 2020, 3:05 PM

## 2020-04-06 NOTE — BRIEF OP NOTE
[facility], [place]    Brief Operative Note    Pre-operative diagnosis: GI bleed [K92.2]  Post-operative diagnosis Post sphincterotomy bleeding    Procedure: Procedure(s):  ESOPHAGOGASTRODUODENOSCOPY (EGD)  ENDOSCOPIC RETROGRADE CHOLANGIOPANCREATOGRAPHY  Surgeon: Surgeon(s) and Role:     * Win Strauss MD - Primary     * Philip Solorio MD - Fellow - Assisting  Anesthesia: General   Estimated blood loss: None  Drains: None  Specimens: * No specimens in log *  Complications: None.  Implants:   Implant Name Type Inv. Item Serial No.  Lot No. LRB No. Used Action   STENT STRAUSS PANCREA FLEX [id number] STR Stent STENT STRAUSS PANCREA FLEX [id number]  [place] [id number] N/A 1 Implanted   STENT ENDOPROS BILIARY GORE VIABIL W/O HL [id number] Stent STENT ENDOPROS BILIARY GORE VIABIL W/O HL [id number] [place]  N/A 1 Implanted       Findings:  - A previously placed stent had migrated out of the biliary tree.   - After careful examination and interrogation very mild bleeding was noted at the prior sphincterotomy.   - Selective ventral duct cannulation was followed by prophylactic pancreatic stenting with a soft 4Fr by 2cm Strauss stent.   - The area was then treated with epinephrine and bipolar cautery.  - A single moderate biliary stricture was found in the upper third of the main bile duct.   - One 10mm by 8cm Viabil covered metal stent was placed into the common bile duct.       Recommendations:  - Observe patient in PACU. Then transfer to hospital [facility] for ongoing care.   - Resume Plavix (clopidogrel) at prior dose in 5 days.   - If he develops abdominal pain, then check amylase/lipase.  - Confirm spontaneous stent passage by performing a KUB x-ray in 7 weeks.   - Findings were discussed with the primary team.      Philip Solorio MD  Interventional Endoscopy Fellow

## 2020-04-06 NOTE — OR NURSING
Blood retrieved from blood bank and two person verified with CRNA and RN. Hand off given to Crissy HIGH.  CRNA will start blood.

## 2020-04-06 NOTE — ANESTHESIA CARE TRANSFER NOTE
Patient: Fuentes Estrada    Procedure(s):  ESOPHAGOGASTRODUODENOSCOPY (EGD)  ENDOSCOPIC RETROGRADE CHOLANGIOPANCREATOGRAPHY    Diagnosis: GI bleed [K92.2]  Diagnosis Additional Information: No value filed.    Anesthesia Type:   General     Note:    Patient transferred to:PACU  Comments: Anesthesia Care Transfer Note    Patient: Fuentes Estrada    Transferred to: PACU with supplemental O2    Patient vital signs: stable    Airway: none    Monitors on, VSS, breathing spontaneously, report to RN      Crissy Elizabeth CRNA   4/6/2020 2:17 PMHandoff Report: Identifed the Patient, Identified the Reponsible Provider, Reviewed the pertinent medical history, Discussed the surgical course, Reviewed Intra-OP anesthesia mangement and issues during anesthesia, Set expectations for post-procedure period and Allowed opportunity for questions and acknowledgement of understanding      Vitals: (Last set prior to Anesthesia Care Transfer)    CRNA VITALS  4/6/2020 1344 - 4/6/2020 1417      4/6/2020             Pulse:  81    SpO2:  100 %    Resp Rate (observed):  14    Resp Rate (set):  10                Electronically Signed By: VICKI Buenrostro CRNA  April 6, 2020  2:17 PM

## 2020-04-06 NOTE — PROGRESS NOTES
Brief GI progress note    --Please check STAT labs including CBC, CMP, lipase, amylase, INR  --Given tachycardia to 105, please ensure adequate fluid resuscitation (LR 250cc bolus, [id number])  --Please page GI if signs of clinical decompensation  --NPO at midnight for anticipated EGD/ERCP in AM     Recommendations shared with Dr. Quan.     Staffed with Dr. Carlos A Jerome MD, CHQ  Gastroenterology Fellow, PGY4  Pager [phone]

## 2020-04-06 NOTE — H&P
[facility], [place]    History and Physical - [facility] Night Service        Date of Admission:  4/5/2020    Assessment & Plan   Fuentes Estrada is a 65 year old male with past medical history of HTN, insulin dependent diabetes, CVA, and recently diagnosed cholangiocarcinoma who was seen at OSH for melena and lightheadedness and subsequently transferred to [facility] on 4/5/2020 for further care.     Suspected GI Bleed  Cholangiocarcinoma  Recent ERCP on 3/31/2020  Acute Normocytic Anemia  Patient presented with melena and symptomatic anemia (tachycardia, lightheadedness) x 1 day. Concern for post-ERCP complication vs. alternative GI bleed (e.g., PUD). Will fluid replete. Patient's most recent hospitalization complicated by pancreatitis, will check lipase, though no abd pain at this time.   - pantoprazole 40 mg IV BID  - IV LR bolus + maintenance   - CBC, type & screen, lipase, amylase now  - patient has been consented for blood  - hemoglobin check if acute bleeding  - insertion of 2nd peripheral IV  - NPO at [facility]  - GI consult (plan for EGD/ERCP on 4/6)    Hyperglycemia  Insulin-Dependent Diabetes  Patient's glucose >500 at OSH. Secondary to missing multiple doses of PTA medications. Glucoses down to 300s at time of presentation to [facility].  - 10 units glargine now, with resumption of PTA 24 units qPM  - cont PTA jardiance  - hold PTA glipizide  - medium SSI, hypoglycemic protocol    Hx CVA  HTN  - cont PTA atorvastatin, fenofibrate   - hold PTA Plavix (last dose on Fri 4/3)  - hold PTA amlodipine, fenofibrate, lisinopril - due to orthostatic symptoms       Diet: NPO at midnight  Fluids: 250 cc lR bolus + 125 cc continuous  DVT Prophylaxis: Pneumatic Compression Devices  Em Catheter: not present  Code Status: Full    Disposition Plan   Expected discharge: 2 - 3 days, recommended to prior living arrangement once hemoglobin stable and consultations/procedures completed..  Entered: Timothy Ortiz  MD Deo 04/05/2020, 11:21 PM       The patient's care was discussed with the Attending Physician, Dr. Quan.    Sapna Desouza MD  [facility], [place]  Pager: [phone]  Please see sticky note for cross cover information  ______________________________________________________________________    Chief Complaint   Melena, Lightheadedness    History is obtained from the patient    History of Present Illness   Fuentes Estrada is a 65 year old male with past medical history of HTN, insulin dependent diabetes, CVA, and recently diagnosed cholangiocarcinoma who was seen at OSH for melena and lightheadedness and subsequently transferred to [facility] on 4/5/2020 for further care.     Patient states that his bowel movements have been irregular over the past week and a half due to multiple procedures; however, on morning of admission patient reports that stools were black and tarry. He has never had this happen to him before. In addition, the patient reports generalized weakness and lightheadedness with sitting and standing. He denies abdominal pain with bowel movements.     Patient has intermittently has vomiting with eating. He denies feelings of nausea throughout the day but will have the urge to vomiting after eating heavier foods and consuming liquids. Emesis is nonbilious and nobloody. Patient mentions that he has been unable to take his medications and has not taken his lantus x 2 days.     OSH Course  - Patient tachycardia with intermittent hypotension (lowest SBP to 80s)  - Hgb 12.6, Na 129, Cr 1.2, Gluc 566  - Rx: 2 L fluid bolus, 10 units lantus    Review of Systems    The 10 point Review of Systems is negative other than noted in the HPI or here.     Past Medical History    I have reviewed this patient's medical history and updated it with pertinent information if needed.   Past Medical History:   Diagnosis Date     Essential hypertension, benign     Hypertension,  Benign     Nonspecific abnormal results of liver function study     Hx of elevated LFT's        Past Surgical History   I have reviewed this patient's surgical history and updated it with pertinent information if needed.  Past Surgical History:   Procedure Laterality Date     ENDOSCOPIC RETROGRADE CHOLANGIOPANCREATOGRAM N/A 3/31/2020    Procedure: ENDOSCOPIC RETROGRADE CHOLANGIOPANCREATOGRAPHY, WITH with biliary sphincterotomy, biopsies and brushings, biliary stent placement;  Surgeon: Win Strauss MD;  Location: [facility] OR     HC KNEE SCOPE, DIAGNOSTIC      Arthroscopy, Knee; left     HC VASECTOMY UNILAT/BILAT W POSTOP SEMEN      Vasectomy        Social History   I have reviewed this patient's social history and updated it with pertinent information if needed. Fuentes Estrada  reports that he has never smoked. He does not have any smokeless tobacco history on file. He reports current alcohol use (4-5 beers a day). He reports that he does not use drugs.    Family History   I have reviewed this patient's family history and updated it with pertinent information if needed.   Family History   Problem Relation Age of Onset     Arthritis Mother         RA     Diabetes Father         diet controlled     Hypertension Father        Prior to Admission Medications   Prior to Admission Medications   Prescriptions Last Dose Informant Patient Reported? Taking?   NORVASC 10 MG OR TABS   No No   Si tab po QD (Once per day)   atorvastatin (LIPITOR) 40 MG tablet   Yes No   Sig: Take 40 mg by mouth daily   clopidogrel (PLAVIX) 75 MG tablet   Yes No   Sig: Take 75 mg by mouth daily   empagliflozin (JARDIANCE) 10 MG TABS tablet   Yes No   Sig: Take 10 mg by mouth daily   fenofibrate (TRIGLIDE/LOFIBRA) 160 MG tablet   Yes No   Sig: Take 160 mg by mouth daily   furosemide (LASIX) 20 MG tablet   Yes No   Sig: Take 20 mg by mouth every evening    glipiZIDE (GLUCOTROL) 10 MG tablet   Yes No   Sig: Take 10 mg by mouth 2 times  "daily (before meals)   insulin glargine (LANTUS PEN) 100 UNIT/ML pen   Yes No   Sig: Inject 24 Units Subcutaneous At Bedtime    lisinopril (ZESTRIL) 30 MG tablet   Yes No   Sig: Take 30 mg by mouth every evening    sildenafil (REVATIO) 20 MG tablet   Yes No   Sig: Take 20 mg by mouth as needed      Facility-Administered Medications: None     Allergies   Allergies   Allergen Reactions     Metformin Other (See Comments)     \" I think my kidneys shut down\"     No Known Drug Allergies        Physical Exam   Vital Signs: Temp: 98.4  F (36.9  C) Temp src: Oral BP: (!) 142/82 Pulse: 105   Resp: 18 SpO2: 99 % O2 Device: None (Room air)    Weight: 205 lbs 3.2 oz  GENERAL: well-appearing, alert and no distress  EYES: EOMI, sclera icterus bilaterally   HENT: NCAT, oropharynx without ulcers or lesions  NECK: no cervical lymphadenopathy, no asymmetry, masses, or scars; RESP: normal respiratory effort on room air, CTAB - no rales, rhonchi or wheezes  CV: tachycardia, regular rhythm, normal S1 S2, no S3 or S4, no murmur, click or rub; pedal and posterior tibial pulses +2 bilaterally   ABDOMEN: normoactive bowel sounds, soft, nontender, nondistended, no HSM or masses   MS/Ext: WWP, no pedal edema  SKIN: no suspicious lesions or rashes on exposed skin  NEURO: AOx3, spontaneous movement of all extremitiesbilaterally  PSYCH: interactive, affect normal/bright, speech normal      Data   Data reviewed today: I reviewed all medications, new labs and imaging results over the last 24 hours. I personally reviewed outside hospital labs and previous CT imaging and EGD/ERCP reports.     Recent Labs   Lab 04/05/20  2312 03/31/20  1300 03/31/20  0950 03/31/20  0902   WBC 11.6*  --   --  7.4   HGB 9.9*  --   --  16.3   MCV 93  --   --  94     --   --  295   INR 1.20*  --  1.07  --      --   --  132*   POTASSIUM 4.5  --   --  4.0   CHLORIDE 105  --   --  101   CO2 20  --   --  23   BUN 66*  --   --  18   CR 1.05  --   --  1.08 "   ANIONGAP 10  --   --  8   ERIC 8.8  --   --  9.7   *  --   --  234*   ALBUMIN 2.6*  --   --  3.4   PROTTOTAL 5.8*  --   --  7.6   BILITOTAL 3.4*  --   --  10.8*   ALKPHOS 146  --   --  335*   ALT 49  --   --  124*   AST 37  --   --  86*   LIPASE  --  2,170*  --  2,520*

## 2020-04-06 NOTE — TELEPHONE ENCOUNTER
Returned call. Robyn very anxious. Said OR RN will work to have Dr. Strauss call her after his EGD today.     Rosalind Whatley, JASMIN Care Coordinator

## 2020-04-06 NOTE — PROGRESS NOTES
Admitted/transferred from:   2 RN full except under PIV x2  skin assessment completed by Rosalind Kaye, JASMIN and Cody Wagner RN.  Skin assessment finding: skin intact, no problems   Interventions/actions: other N/A     Will continue to monitor.

## 2020-04-07 LAB
ALBUMIN SERPL-MCNC: 2.5 G/DL (ref 3.4–5)
ALP SERPL-CCNC: 115 U/L (ref 40–150)
ALT SERPL W P-5'-P-CCNC: 44 U/L (ref 0–70)
AMYLASE SERPL-CCNC: 302 U/L (ref 30–110)
ANION GAP SERPL CALCULATED.3IONS-SCNC: 7 MMOL/L (ref 3–14)
AST SERPL W P-5'-P-CCNC: 48 U/L (ref 0–45)
BILIRUB SERPL-MCNC: 2.6 MG/DL (ref 0.2–1.3)
BUN SERPL-MCNC: 47 MG/DL (ref 7–30)
CALCIUM SERPL-MCNC: 8.4 MG/DL (ref 8.5–10.1)
CHLORIDE SERPL-SCNC: 108 MMOL/L (ref 94–109)
CO2 SERPL-SCNC: 25 MMOL/L (ref 20–32)
CREAT SERPL-MCNC: 1.19 MG/DL (ref 0.66–1.25)
ERYTHROCYTE [DISTWIDTH] IN BLOOD BY AUTOMATED COUNT: 12.2 % (ref 10–15)
GFR SERPL CREATININE-BSD FRML MDRD: 63 ML/MIN/{1.73_M2}
GLUCOSE BLDC GLUCOMTR-MCNC: 129 MG/DL (ref 70–99)
GLUCOSE BLDC GLUCOMTR-MCNC: 135 MG/DL (ref 70–99)
GLUCOSE BLDC GLUCOMTR-MCNC: 171 MG/DL (ref 70–99)
GLUCOSE BLDC GLUCOMTR-MCNC: 201 MG/DL (ref 70–99)
GLUCOSE BLDC GLUCOMTR-MCNC: 205 MG/DL (ref 70–99)
GLUCOSE BLDC GLUCOMTR-MCNC: 237 MG/DL (ref 70–99)
GLUCOSE SERPL-MCNC: 136 MG/DL (ref 70–99)
HCT VFR BLD AUTO: 20.8 % (ref 40–53)
HGB BLD-MCNC: 7 G/DL (ref 13.3–17.7)
HGB BLD-MCNC: 7.5 G/DL (ref 13.3–17.7)
LIPASE SERPL-CCNC: 2301 U/L (ref 73–393)
LIPASE SERPL-CCNC: 3722 U/L (ref 73–393)
MCH RBC QN AUTO: 31.8 PG (ref 26.5–33)
MCHC RBC AUTO-ENTMCNC: 33.7 G/DL (ref 31.5–36.5)
MCV RBC AUTO: 95 FL (ref 78–100)
PLATELET # BLD AUTO: 187 10E9/L (ref 150–450)
POTASSIUM SERPL-SCNC: 4 MMOL/L (ref 3.4–5.3)
PROT SERPL-MCNC: 4.7 G/DL (ref 6.8–8.8)
RBC # BLD AUTO: 2.2 10E12/L (ref 4.4–5.9)
SODIUM SERPL-SCNC: 140 MMOL/L (ref 133–144)
WBC # BLD AUTO: 6.9 10E9/L (ref 4–11)

## 2020-04-07 PROCEDURE — 80053 COMPREHEN METABOLIC PANEL: CPT | Performed by: STUDENT IN AN ORGANIZED HEALTH CARE EDUCATION/TRAINING PROGRAM

## 2020-04-07 PROCEDURE — 36415 COLL VENOUS BLD VENIPUNCTURE: CPT

## 2020-04-07 PROCEDURE — 00000146 ZZHCL STATISTIC GLUCOSE BY METER IP

## 2020-04-07 PROCEDURE — 99233 SBSQ HOSP IP/OBS HIGH 50: CPT | Mod: GC | Performed by: INTERNAL MEDICINE

## 2020-04-07 PROCEDURE — 85018 HEMOGLOBIN: CPT | Performed by: HOSPITALIST

## 2020-04-07 PROCEDURE — 25000132 ZZH RX MED GY IP 250 OP 250 PS 637

## 2020-04-07 PROCEDURE — 85027 COMPLETE CBC AUTOMATED: CPT | Performed by: STUDENT IN AN ORGANIZED HEALTH CARE EDUCATION/TRAINING PROGRAM

## 2020-04-07 PROCEDURE — C9113 INJ PANTOPRAZOLE SODIUM, VIA: HCPCS | Performed by: STUDENT IN AN ORGANIZED HEALTH CARE EDUCATION/TRAINING PROGRAM

## 2020-04-07 PROCEDURE — 25000128 H RX IP 250 OP 636: Performed by: STUDENT IN AN ORGANIZED HEALTH CARE EDUCATION/TRAINING PROGRAM

## 2020-04-07 PROCEDURE — 25000132 ZZH RX MED GY IP 250 OP 250 PS 637: Performed by: STUDENT IN AN ORGANIZED HEALTH CARE EDUCATION/TRAINING PROGRAM

## 2020-04-07 PROCEDURE — 83690 ASSAY OF LIPASE: CPT

## 2020-04-07 PROCEDURE — 12000001 ZZH R&B MED SURG/OB UMMC

## 2020-04-07 PROCEDURE — 36415 COLL VENOUS BLD VENIPUNCTURE: CPT | Performed by: STUDENT IN AN ORGANIZED HEALTH CARE EDUCATION/TRAINING PROGRAM

## 2020-04-07 PROCEDURE — 82150 ASSAY OF AMYLASE: CPT

## 2020-04-07 PROCEDURE — 25800030 ZZH RX IP 258 OP 636: Performed by: STUDENT IN AN ORGANIZED HEALTH CARE EDUCATION/TRAINING PROGRAM

## 2020-04-07 PROCEDURE — 36415 COLL VENOUS BLD VENIPUNCTURE: CPT | Performed by: HOSPITALIST

## 2020-04-07 PROCEDURE — 83690 ASSAY OF LIPASE: CPT | Performed by: STUDENT IN AN ORGANIZED HEALTH CARE EDUCATION/TRAINING PROGRAM

## 2020-04-07 RX ORDER — POLYETHYLENE GLYCOL 3350 17 G/17G
17 POWDER, FOR SOLUTION ORAL DAILY
Status: DISCONTINUED | OUTPATIENT
Start: 2020-04-08 | End: 2020-04-08 | Stop reason: HOSPADM

## 2020-04-07 RX ADMIN — ATORVASTATIN CALCIUM 40 MG: 40 TABLET, FILM COATED ORAL at 08:18

## 2020-04-07 RX ADMIN — MENTHOL 1 PATCH: 205.5 PATCH TOPICAL at 20:06

## 2020-04-07 RX ADMIN — MELATONIN TAB 3 MG 6 MG: 3 TAB at 20:09

## 2020-04-07 RX ADMIN — PANTOPRAZOLE SODIUM 40 MG: 40 INJECTION, POWDER, LYOPHILIZED, FOR SOLUTION INTRAVENOUS at 08:18

## 2020-04-07 RX ADMIN — ACETAMINOPHEN 975 MG: 325 TABLET, FILM COATED ORAL at 21:11

## 2020-04-07 RX ADMIN — OMEPRAZOLE 20 MG: 20 CAPSULE, DELAYED RELEASE ORAL at 16:01

## 2020-04-07 RX ADMIN — SODIUM CHLORIDE, POTASSIUM CHLORIDE, SODIUM LACTATE AND CALCIUM CHLORIDE: 600; 310; 30; 20 INJECTION, SOLUTION INTRAVENOUS at 03:49

## 2020-04-07 RX ADMIN — FENOFIBRATE 160 MG: 160 TABLET ORAL at 08:18

## 2020-04-07 RX ADMIN — ACETAMINOPHEN 975 MG: 325 TABLET, FILM COATED ORAL at 14:20

## 2020-04-07 RX ADMIN — INSULIN ASPART 1 UNITS: 100 INJECTION, SOLUTION INTRAVENOUS; SUBCUTANEOUS at 00:22

## 2020-04-07 ASSESSMENT — ACTIVITIES OF DAILY LIVING (ADL)
ADLS_ACUITY_SCORE: 12

## 2020-04-07 ASSESSMENT — PAIN DESCRIPTION - DESCRIPTORS: DESCRIPTORS: ACHING;DULL;CONSTANT

## 2020-04-07 NOTE — PROGRESS NOTES
[facility], [place]    Progress Note - [facility] 4 Service        Date of Admission:  4/5/2020    Assessment & Plan   Fuentes Estrada is a 65 year old male with past medical history of HTN, insulin dependent diabetes, CVA, and recently diagnosed cholangiocarcinoma who was seen at OSH for melena and lightheadedness and subsequently transferred to [facility] on 4/5/2020 for further care.     Changes for today:   - Hgb check q12 hours   - Carb consistent diet   - PO omeprazole BID   - Discontinue LR      # Suspected GI bleed with acute blood loss anemia  # Cholangiocarcinoma, recent dx   ERCPs on 3/25, 3/27, and 3/31 with 10fr x 7cm Sofflex plastic stent placed in CBD at time of last procedure. Brushings consistent with adenocarcinoma. Onset of melena was after procedure on 3/31. Was noted to have acute hgb drop from 16 to 8.2.  GI performed ERCP on 4/6/2020, where it was noted that there was bleeding from prior sphincterotomy and ventral duct was cannulated with prophylactic pancreatic stenting. There was also noted to be a moderate biliary stricture in the upper third of the main bile duct; stent was placed in common bile duct.    - discontinue pantoprazole 40 mg IV BID  - start PO omeprazole BID   - trend hgb q12h, transfuse for <7 (next check at 2 pm)  - trend LFTs  - type and screen, consented for blood  - GI consulted, appreciate recs   - will need KUB within ~ 1 week to assess for passage of stent      # Hyperglycemia  # Insulin-Dependent Diabetes  Home regimen of empaglifozin 10 mg, glipizide 10 mg BID and lantus 24 U at bedtime. Has not been taking much PO including medications. BG >500 at OSH and trending down.   - 24 units glargine qPM  - holding PTA glipizide, empaglifozin  - medium SSI, hypoglycemic protocol  - carb consistent diet      # Hx CVA  # HTN  - cont PTA atorvastatin, fenofibrate   - hold PTA Plavix (last dose on Fri 4/3); resume 4/11/2020 (5 days after EGD)  - hold PTA  amlodipine, fenofibrate, lisinopril, until hgb and BP stable    # Pulmonary nodules   Seen on prior CT chest; repeat CT chest from 4/6/2020 confirms presence.   - Will need repeat CT chest in 3-6 months      Diet: Consistent Carbohydrate Diet 0686-0132 Calories: Moderate Consistent CHO (4-6 CHO units/meal)    Fluids: Stopped   Lines: PIV  DVT Prophylaxis: Pneumatic Compression Devices  Em Catheter: not present  Code Status: Full Code    Family: Updated Wife Robyn ([phone]) on 4/7/2020    Disposition Plan   Expected discharge: 2 - 3 days, recommended once hemoglobin stable.  Entered: Emerald Slater MD 04/07/2020, 11:31 AM     The patient's care was discussed with the Attending Physician, Dr. Henderson.    Emerald Slater,  PGY-2  [phone]  ______________________________________________________________________    Interval History   Nursing and provider notes reviewed. Patient reports no abdominal pain. Still having some fatigue. Feeling hungry. Asking whether he can leave today.     Data reviewed today: I reviewed all medications, new labs and imaging results over the last 24 hours.     Physical Exam   Vital Signs: Temp: 98.3  F (36.8  C) Temp src: Oral BP: 122/65 Pulse: 76 Heart Rate: 72 Resp: 16 SpO2: 98 % O2 Device: None (Room air) Oxygen Delivery: 2 LPM  Weight: 205 lbs 3.2 oz  Constitutional: NAD, pleasant, cooperative  HEENT: Sclera anicteric, Normal oropharynx without ulcers or exudate, MMM  CV: RRR, no murmurs, gallops or rubs. JVD normal   Respiratory: CTAB, no wheezing, crackles or rales. Non-labored  Abd: Soft, NT/ND, +bs  Skin: No lesions or rashes over exposed areas, normal color, texture and turgor  Neuro: AAO x 3    Data   Recent Labs   Lab 04/07/20  0604 04/06/20  2228 04/06/20  1628 04/06/20  0613 04/05/20  2312   WBC 6.9 8.7 11.7*  --  11.6*   HGB 7.0* 7.3* 8.4* 8.2* 9.9*   MCV 95 94 96  --  93    190 257  --  350   INR  --   --   --   --  1.20*     --   --   --  135    POTASSIUM 4.0  --   --   --  4.5   CHLORIDE 108  --   --   --  105   CO2 25  --   --   --  20   BUN 47*  --   --   --  66*   CR 1.19  --   --   --  1.05   ANIONGAP 7  --   --   --  10   ERIC 8.4*  --   --   --  8.8   *  --   --   --  389*   ALBUMIN 2.5*  --   --   --  2.6*   PROTTOTAL 4.7*  --   --   --  5.8*   BILITOTAL 2.6*  --   --   --  3.4*   ALKPHOS 115  --   --   --  146   ALT 44  --   --   --  49   AST 48*  --   --   --  37   LIPASE 3,722*  --   --  830*  --      Recent Results (from the past 24 hour(s))   XR Surgery BRIGID G/T 5 Min Fluoro w Stills    Narrative    This exam was marked as non-reportable because it will not be read by a   radiologist or a [place] non-radiologist provider.         CT Chest w/o Contrast    Narrative    CT CHEST W/O CONTRAST 4/6/2020 5:58 PM    Comparison: CT abdomen pelvis 4/2/2020, 3/23/2020, MR abdomen  3/24/2020     History: Assessing for possible pulm metastasis. Recent dx of  cholangiocarcinoma with multiple recent ERCP. Concern at OSH for  possible lung nodules.    Technique: CT of the chest obtained without intravenous contrast.  Axial, coronal, and sagittal reconstructions were obtained and  reviewed.    Findings:    Chest: The heart size is within normal limits. No pericardial  effusion. Mild to moderate coronary artery calcification. There is an  aberrant course of the right subclavian artery with a diverticulum of  K[place]. No enlarged thoracic lymph nodes or thyroid nodule.    The central tracheobronchial tree is patent. No pleural effusion or  pneumothorax. Mild linear lung base atelectasis.    Several subcentimeter pulmonary nodules, with index nodules described  below from series 4:  Image 273: Solid 4 mm pulmonary nodule in the medial right lower lobe  base, unchanged from 3/23/2020.  Image 51: Solid 3 mm pulmonary nodule in the right upper lobe apex.  Image 94: Solid 3 mm pulmonary nodule in the lateral right upper lobe.  Images 162 and 161:2 solid 3  mm pulmonary nodules in the lateral right  upper lobe adjacent to the minor fissure.  Image 98: Solid 2 mm pulmonary nodule in the lateral left upper lobe.  Additional sub-4 mm pulmonary nodules bilaterally.     Upper abdomen: Evaluation of the upper abdomen is limited both by the  field of view and lack of intravenous contrast. Postoperative changes  of cholecystectomy. There is a common bile duct stent. The spleen and  adrenal glands are normal. Small sliding-type hiatal hernia. Linear  hyperattenuating structure in the splenic flexure of the colon,  possibly a biliary stent.    Bones: Flowing ossification of the right anterior vertebral bodies  from T8 to T11 with relative preservation of the disc spaces.      Impression    Impression:   1. Multiple indeterminate sub-5 mm pulmonary nodules in the lungs.  While the 4 mm pulmonary nodule in the medial right lower lobe is  unchanged from 3/23/2020, the remainder of the nodules were not  visualized on the prior abdomen/pelvis CT. Recommend follow-up CT  chest in 3-6 months.  2. Flowing right anterior ossification from T8-T11 with relative  preservation of disc spaces, which may represent diffuse idiopathic  skeletal hyperostosis.  3. Biliary stent within the common bile duct. Additional suspected  biliary stent in the splenic flexure of the colon.    I have personally reviewed the examination and initial interpretation  and I agree with the findings.    LUIS FERNANDO MARTINEZ MD

## 2020-04-07 NOTE — PLAN OF CARE
/65   Pulse 76   Temp 98.3  F (36.8  C)   Resp 16   Wt 93.1 kg (205 lb 3.2 oz)   SpO2 98%   BMI 27.07 kg/m      A&Ox4. Denies nausea. Having lower back pain, gave tylenol and heat pack PIV x2 saline locked. BG checked before meals and sliding scale insulin given. Hgb lower today. Recheck hgb 7.5. No s/s of active bleeding. No stool. Voiding spont. Eating well. SBA. Walked in the halls.     Addendum: per pt he stated he had a small formed black stool

## 2020-04-07 NOTE — PLAN OF CARE
/55 (BP Location: Right arm)   Pulse 69   Temp 97.7  F (36.5  C) (Oral)   Resp 18   Wt 93.1 kg (205 lb 3.2 oz)   SpO2 98%   BMI 27.07 kg/m      Reason for Admission: GI bleed    Neuro:A&Ox4  Behavior: Calm and cooperative.   Activity:SBA to bathroom with IV pump, wc ride in hallway per pt request.   Vitals: VSS on room air. Capno cont.   Lines: L PIV SL, R PIV infusing LR @ 150ml/hr.  Cardiac:WNL, denies chest pain. Denies lightheadedness this shift.   Respiratory:Lungs clear bilat, no SOB noted.   GI/: +BS all quad. Voiding spont adequate amounts-soledad color.   Skin:Juandiced skin including yellow scleras.   Endo:BG Q 4 hr with sliding scale coverage. Lantas given this HS.   Pain: Denies pain.   Diet:Clear liquids tolerating well.   Labs/Imaging:  Hgb 7.3, hematocrit 22.0.  , 171, 129.    New Changes this shift:  -Melatonin given at HS per pt req.  -Hgb 7.3, hematocrit 22.0. No transfusion needed at 0000. Cont to observe and recheck Hgb in am and plan to transfuse if hgb <7.0.     Plan today:  Cont POC

## 2020-04-07 NOTE — PROGRESS NOTES
GI Progress Note  Date of Service:  4/7/2020      Assessment:    65 year old male with a history of HTN, DM, CVA on Plavix as well as a history of CCY 8/2019; who was diagnosed with CCA via ERCP on 3/31/2019 (failed to cannulate biliary duct on ERCP 3/25, and with rendezvous 3/27 after her had presented with nausea, vomiting and jaundice, with elevated CA 19-9) with biopsy showing poorly differentiated adenocarcinoma; admitted from an OSH with melena and lightheadedness.     #. Upper GI bleed  Likely related to post-sphincterotomy now s/p EGD and ERCP noting a slow bleed at sphincterotomy s/p epi/bipolar cautery and covered stent placement (of note, previously placed stent had migrated out of the biliary tree). Prophylactic pancreatic stenting with a soft 4Fr by 2cm Strauss stent.      #. Cholangiocarcinoma  Recently diagnosed, poorly differentiated based on biopsy and located in the mid-duct. Improved TB since his ERCP (10.8 - 3.4), normalized liver enzymes. Per chart, yet to have planned visits with Medical and Surgical Oncology.  There is a concern for metastasis in lung per outside radiologist.     RECOMMENDATIONS  -- CT Chest to assess for any lesions  -- Ensure two large bore IVs  -- Adequate volume resuscitation with IVF, pRBC  -- Monitor Hgb closely                  Transfuse for Hgb<7 (improved outcomes with restricted vs liberal threshold)  -- Continue to hold Plavix, resume after 5 days   -- IV pantoprazole 40 mg BID  -- Monitor stool output  -- Confirm spontaneous stent passage by performing a KUB x-ray in 7 weeks.     -- Trend LFTs  -- Additional recommendations after procedure        Patient was discussed with Dr. Carlos A Cervantes MD   GI Fellow  Pager: [phone]      __________________________________________________________________________________  Subjective:  Nursing notes reviewed and noted.  Tolerating a diet, no pain.  No nausea or vomiting, no melena    Physical  Examination:  Vital Signs:  /55 (BP Location: Right arm)   Pulse 69   Temp 97.7  F (36.5  C) (Oral)   Resp 18   Wt 93.1 kg (205 lb 3.2 oz)   SpO2 98%   BMI 27.07 kg/m     General:  NAD. Resting comfortably.  Chest:  Unlabored   Cardiovascular: RRR. No m/r/g.   Abdomen:  Soft, NT, ND.   Extremities:  No peripheral edema.    Neurological:  Grossly normal.     DATA:  Labs:    Reviewed and noted

## 2020-04-07 NOTE — PROVIDER NOTIFICATION
0000: Notified Provider of Hgb 7.3, hematocrit 22.0.    Return call back: No transfusion at this time. Cont to observe. Recheck Hgb in am and plan to transfuse if hgb <7.0.

## 2020-04-08 ENCOUNTER — PATIENT OUTREACH (OUTPATIENT)
Dept: CARE COORDINATION | Facility: CLINIC | Age: 65
End: 2020-04-08

## 2020-04-08 VITALS
OXYGEN SATURATION: 99 % | SYSTOLIC BLOOD PRESSURE: 131 MMHG | BODY MASS INDEX: 27.07 KG/M2 | WEIGHT: 205.2 LBS | TEMPERATURE: 97.5 F | HEART RATE: 72 BPM | DIASTOLIC BLOOD PRESSURE: 72 MMHG | RESPIRATION RATE: 18 BRPM

## 2020-04-08 LAB
ALBUMIN SERPL-MCNC: 2.6 G/DL (ref 3.4–5)
ALP SERPL-CCNC: 132 U/L (ref 40–150)
ALT SERPL W P-5'-P-CCNC: 60 U/L (ref 0–70)
ANION GAP SERPL CALCULATED.3IONS-SCNC: 4 MMOL/L (ref 3–14)
AST SERPL W P-5'-P-CCNC: 68 U/L (ref 0–45)
BILIRUB SERPL-MCNC: 2.3 MG/DL (ref 0.2–1.3)
BUN SERPL-MCNC: 22 MG/DL (ref 7–30)
CALCIUM SERPL-MCNC: 8.2 MG/DL (ref 8.5–10.1)
CHLORIDE SERPL-SCNC: 111 MMOL/L (ref 94–109)
CO2 SERPL-SCNC: 25 MMOL/L (ref 20–32)
CREAT SERPL-MCNC: 0.98 MG/DL (ref 0.66–1.25)
ERYTHROCYTE [DISTWIDTH] IN BLOOD BY AUTOMATED COUNT: 12.2 % (ref 10–15)
GFR SERPL CREATININE-BSD FRML MDRD: 79 ML/MIN/{1.73_M2}
GLUCOSE BLDC GLUCOMTR-MCNC: 136 MG/DL (ref 70–99)
GLUCOSE BLDC GLUCOMTR-MCNC: 142 MG/DL (ref 70–99)
GLUCOSE BLDC GLUCOMTR-MCNC: 221 MG/DL (ref 70–99)
GLUCOSE SERPL-MCNC: 130 MG/DL (ref 70–99)
HCT VFR BLD AUTO: 21 % (ref 40–53)
HGB BLD-MCNC: 7 G/DL (ref 13.3–17.7)
MCH RBC QN AUTO: 31.7 PG (ref 26.5–33)
MCHC RBC AUTO-ENTMCNC: 33.3 G/DL (ref 31.5–36.5)
MCV RBC AUTO: 95 FL (ref 78–100)
PLATELET # BLD AUTO: 228 10E9/L (ref 150–450)
POTASSIUM SERPL-SCNC: 3.8 MMOL/L (ref 3.4–5.3)
PROT SERPL-MCNC: 5.2 G/DL (ref 6.8–8.8)
RBC # BLD AUTO: 2.21 10E12/L (ref 4.4–5.9)
SODIUM SERPL-SCNC: 140 MMOL/L (ref 133–144)
WBC # BLD AUTO: 6 10E9/L (ref 4–11)

## 2020-04-08 PROCEDURE — 00000146 ZZHCL STATISTIC GLUCOSE BY METER IP

## 2020-04-08 PROCEDURE — 99239 HOSP IP/OBS DSCHRG MGMT >30: CPT | Mod: GC | Performed by: INTERNAL MEDICINE

## 2020-04-08 PROCEDURE — 85027 COMPLETE CBC AUTOMATED: CPT | Performed by: STUDENT IN AN ORGANIZED HEALTH CARE EDUCATION/TRAINING PROGRAM

## 2020-04-08 PROCEDURE — 25000132 ZZH RX MED GY IP 250 OP 250 PS 637: Performed by: STUDENT IN AN ORGANIZED HEALTH CARE EDUCATION/TRAINING PROGRAM

## 2020-04-08 PROCEDURE — 40000893 ZZH STATISTIC PT IP EVAL DEFER: Performed by: REHABILITATION PRACTITIONER

## 2020-04-08 PROCEDURE — 80053 COMPREHEN METABOLIC PANEL: CPT | Performed by: STUDENT IN AN ORGANIZED HEALTH CARE EDUCATION/TRAINING PROGRAM

## 2020-04-08 PROCEDURE — 36415 COLL VENOUS BLD VENIPUNCTURE: CPT | Performed by: STUDENT IN AN ORGANIZED HEALTH CARE EDUCATION/TRAINING PROGRAM

## 2020-04-08 RX ORDER — PNV NO.95/FERROUS FUM/FOLIC AC 28MG-0.8MG
1 TABLET ORAL
Qty: 180 TABLET | Refills: 1 | Status: ON HOLD | OUTPATIENT
Start: 2020-04-08 | End: 2021-01-01

## 2020-04-08 RX ADMIN — OMEPRAZOLE 20 MG: 20 CAPSULE, DELAYED RELEASE ORAL at 08:15

## 2020-04-08 RX ADMIN — ATORVASTATIN CALCIUM 40 MG: 40 TABLET, FILM COATED ORAL at 08:15

## 2020-04-08 RX ADMIN — FENOFIBRATE 160 MG: 160 TABLET ORAL at 08:15

## 2020-04-08 ASSESSMENT — ACTIVITIES OF DAILY LIVING (ADL)
ADLS_ACUITY_SCORE: 12
ADLS_ACUITY_SCORE: 12
ADLS_ACUITY_SCORE: 10
ADLS_ACUITY_SCORE: 12

## 2020-04-08 NOTE — PROGRESS NOTES
Gastroenterology Inpatient Sign Off Note     65 year old male with a history of HTN, DM, CVA on Plavix as well as a history of CCY 8/2019; who was diagnosed with CCA via ERCP on 3/31/2019 (failed to cannulate biliary duct on ERCP 3/25, and with rendezvous 3/27 after her had presented with nausea, vomiting and jaundice, with elevated CA 19-9) with biopsy showing poorly differentiated adenocarcinoma; admitted from an OSH with melena and lightheadedness.     His upper GI bleed is related to post-sphincterotomy and he underwent an EGD and ERCP noting a slow bleed at sphincterotomy s/p epi/bipolar cautery and covered stent placement (of note, previously placed stent had migrated out of the biliary tree). Prophylactic pancreatic stenting with a soft 4Fr by 2cm Strauss stent.       For his CCA, CT of the chest was not very revealing. He will have an EUS for LN biopsy and follow up with Dr. Hale.      PLAN  -- Continue to hold Plavix, resume after 5 days post procedure  -- Start Iron supplementation   Please educate on dark stools and constipation associated with iron   Bowel regimen with MiraLAX  -- Can use omeprazole 20 mg daily  -- Confirm spontaneous stent passage by performing a KUB x-ray in 6 weeks.     -- EUS as an outpatient    Inpatient GI consults service will sign off. No further recommendations at this time. If primary team has addition questions, please page consult fellow listed in [place].    Current GI Consult Staff: Dr. Arellano       Staff responsible for outpatient care: Dr. Baumann for EUS     Arik Cervantes MD  GI fellow

## 2020-04-08 NOTE — TELEPHONE ENCOUNTER
*See Pre-Visit from 4/7/20*    I spoke with Fuentes's wife Robyn & rescheduled an appt with Dr Hale for 4/14/20 at 1030am as requested via IB from Carey MURILLO. Pt was IP at time of previous scheduled appt.

## 2020-04-08 NOTE — DISCHARGE SUMMARY
[facility], [place]  Discharge Summary - Medicine & Pediatrics       Date of Admission:  4/5/2020  Date of Discharge:  4/8/2020  1:28 PM  Discharging Provider: Dr. Henderson  Discharge Service: Umesh Ferrer    Discharge Diagnoses   GI bleed  Acute blood loss anemia  Cholangiocarcinoma   Hyperglycemia  Pulmonary nodules  Type II DM  Hx of CVA  HTN    Follow-ups Needed After Discharge   Follow-up Appointments     Adult [facility] Follow-up and recommended labs and tests      Follow up with primary care provider, Dahlia Alvarez, within 7 days for   hospital follow- up.  The following labs/tests are recommended: CBC.      Appointments on [place] and/or [facility] (with [facility] or [facility]   provider or service). Call [phone] if you haven't heard regarding   these appointments within 7 days of discharge.         Follow Up and recommended labs and tests      Follow up in GI clinic  Abdominal xray in 7 days  Repeat CBC tomorrow  Follow up with PCP in 7 days             Discharge Disposition   Discharged to home  Condition at discharge: Stable    Hospital Course   Fuentes Estrada was admitted on 4/5/2020 for acute blood loss anemia.  The following problems were addressed during his hospitalization:    # Suspected GI bleed with acute blood loss anemia  # Cholangiocarcinoma, recent dx   ERCPs on 3/25, 3/27, and 3/31 with 10fr x 7cm Sofflex plastic stent placed in CBD at time of last procedure and brushings consistent with adenocarcinoma. Onset of melena was after procedure on 3/31. Was noted to have acute hgb drop from 16 to 8.2.  GI performed ERCP on 4/6/2020, where it was noted that there was bleeding from prior sphincterotomy and ventral duct was cannulated with prophylactic pancreatic stenting. There was also noted to be a moderate biliary stricture in the upper third of the main bile duct; stent was placed in common bile duct. He is planning to meet with surgery team to discuss possible  intervention but will require EUS to assess lymphadenopathy (reactive in setting of multiple ERCPs vs metastasis) to determine need for possible chemotherapy. He did have CT chest here to assess for possible lung nodules that were appreciated at OSH, but was felt these sub 5mm nodules are less likely to represent metastasis.   - KUB in 1 week to assess for passage of stent  - Follow up with GI as an outpatient   - CBC tomorrow    # Hyperglycemia  # Insulin-Dependent Diabetes  Home regimen of empaglifozin 10 mg, glipizide 10 mg BID and lantus 24 U at bedtime. Has not been taking much PO including medications. BG >500 at OSH and trending down.   - 24 units glargine qPM  - Resume glipizide, empaglifozin     # Hx CVA  # HTN  - Resume PTA atorvastatin, fenofibrate, amlodipine, fenofibrate, lisinopril  - Resume plavix on 4/12    # Pulmonary nodules   4 mm nodule in medial RLL unchanged from 3/23; Multple indeterminate <5mm nodules new from 3/23. Less likely to represtent   - Repeat CT chest in 3-6 months     Consultations This Hospital Stay      GI PANCREATICOBILIARY ADULT IP CONSULT    Code Status   Full Code     The patient was discussed with DO Umesh Dean 4 Service  [facility], [place]  Pager: [phone]  ______________________________________________________________________    Physical Exam   Vital Signs: Temp: 97.5  F (36.4  C) Temp src: Oral BP: 131/72 Pulse: 72 Heart Rate: 83 Resp: 18 SpO2: 99 % O2 Device: None (Room air)    Weight: 205 lbs 3.2 oz  Constitutional: NAD, pleasant, cooperative  HEENT: Sclera anicteric, Normal oropharynx without ulcers or exudate, MMM  CV: RRR, no murmurs, gallops or rubs. JVD normal   Respiratory: CTAB, no wheezing, crackles or rales. Non-labored  Abd: Soft, NT/ND, +bs, no HSM  Skin: No lesions or rashes over exposed areas, normal color, texture and turgor  Neuro: AAO x 3        Primary Care Physician   Dahlia Alvarez    Discharge  Orders      X-ray KUB     CBC with platelets    Last Lab Result: Hemoglobin (g/dL)       Date                     Value                 04/08/2020               7.0 (L)          ----------     GASTROENTEROLOGY ADULT REF CONSULT ONLY      Discharge Instructions    Resume pre procedure diet     Discharge Instructions    Restart home medications.     Reason for your hospital stay    Dear Fuentes Estrada    Your were hospitalized at [facility] with blood loss anemia and treated with repeat ERCP.  You will need repeat labs drawn tomorrow to check your blood counts and an X-ray in 7 days.     We are suggesting the following medication changes:  - Start iron supplements  - Resume plavix on 4/12    Please get the following tests done:  - CBC on 4/9    Please set up an appointment with:  - GI  - your PCP in 7 days     Adult [facility] Follow-up and recommended labs and tests    Follow up with primary care provider, Dahlia Alvarez, within 7 days for hospital follow- up.  The following labs/tests are recommended: CBC.      Appointments on [place] and/or [facility] (with [facility] or [facility] provider or service). Call [phone] if you haven't heard regarding these appointments within 7 days of discharge.     Follow Up and recommended labs and tests    Follow up in GI clinic  Abdominal xray in 7 days  Repeat CBC tomorrow  Follow up with PCP in 7 days     Activity    Your activity upon discharge: activity as tolerated     Full Code     Diet    Follow this diet upon discharge: Orders Placed This Encounter      Consistent Carbohydrate Diet 6259-7360 Calories: Moderate Consistent CHO (4-6 CHO units/meal)       Significant Results and Procedures   Most Recent 3 CBC's:  Recent Labs   Lab Test 04/08/20  0624 04/07/20  1342 04/07/20  0604 04/06/20  2228   WBC 6.0  --  6.9 8.7   HGB 7.0* 7.5* 7.0* 7.3*   MCV 95  --  95 94     --  187 190     Most Recent 3 BMP's:  Recent Labs   Lab Test 04/08/20  0624  04/07/20  0604 04/05/20  2312    140 135   POTASSIUM 3.8 4.0 4.5   CHLORIDE 111* 108 105   CO2 25 25 20   BUN 22 47* 66*   CR 0.98 1.19 1.05   ANIONGAP 4 7 10   ERIC 8.2* 8.4* 8.8   * 136* 389*     Most Recent 2 LFT's:  Recent Labs   Lab Test 04/08/20  0624 04/07/20  0604   AST 68* 48*   ALT 60 44   ALKPHOS 132 115   BILITOTAL 2.3* 2.6*   ,   Results for orders placed or performed during the hospital encounter of 04/05/20   XR Surgery BRIGID G/T 5 Min Fluoro w Stills    Narrative    This exam was marked as non-reportable because it will not be read by a   radiologist or a [place] non-radiologist provider.         CT Chest w/o Contrast    Narrative    CT CHEST W/O CONTRAST 4/6/2020 5:58 PM    Comparison: CT abdomen pelvis 4/2/2020, 3/23/2020, MR abdomen  3/24/2020     History: Assessing for possible pulm metastasis. Recent dx of  cholangiocarcinoma with multiple recent ERCP. Concern at OSH for  possible lung nodules.    Technique: CT of the chest obtained without intravenous contrast.  Axial, coronal, and sagittal reconstructions were obtained and  reviewed.    Findings:    Chest: The heart size is within normal limits. No pericardial  effusion. Mild to moderate coronary artery calcification. There is an  aberrant course of the right subclavian artery with a diverticulum of  Komme[place]. No enlarged thoracic lymph nodes or thyroid nodule.    The central tracheobronchial tree is patent. No pleural effusion or  pneumothorax. Mild linear lung base atelectasis.    Several subcentimeter pulmonary nodules, with index nodules described  below from series 4:  Image 273: Solid 4 mm pulmonary nodule in the medial right lower lobe  base, unchanged from 3/23/2020.  Image 51: Solid 3 mm pulmonary nodule in the right upper lobe apex.  Image 94: Solid 3 mm pulmonary nodule in the lateral right upper lobe.  Images 162 and 161:2 solid 3 mm pulmonary nodules in the lateral right  upper lobe adjacent to the minor  fissure.  Image 98: Solid 2 mm pulmonary nodule in the lateral left upper lobe.  Additional sub-4 mm pulmonary nodules bilaterally.     Upper abdomen: Evaluation of the upper abdomen is limited both by the  field of view and lack of intravenous contrast. Postoperative changes  of cholecystectomy. There is a common bile duct stent. The spleen and  adrenal glands are normal. Small sliding-type hiatal hernia. Linear  hyperattenuating structure in the splenic flexure of the colon,  possibly a biliary stent.    Bones: Flowing ossification of the right anterior vertebral bodies  from T8 to T11 with relative preservation of the disc spaces.      Impression    Impression:   1. Multiple indeterminate sub-5 mm pulmonary nodules in the lungs.  While the 4 mm pulmonary nodule in the medial right lower lobe is  unchanged from 3/23/2020, the remainder of the nodules were not  visualized on the prior abdomen/pelvis CT. Recommend follow-up CT  chest in 3-6 months.  2. Flowing right anterior ossification from T8-T11 with relative  preservation of disc spaces, which may represent diffuse idiopathic  skeletal hyperostosis.  3. Biliary stent within the common bile duct. Additional suspected  biliary stent in the splenic flexure of the colon.    I have personally reviewed the examination and initial interpretation  and I agree with the findings.    LUIS FERNANDO MARTINEZ MD       Discharge Medications   Current Discharge Medication List      START taking these medications    Details   Ferrous Sulfate (IRON) 325 (65 Fe) MG tablet Take 1 tablet by mouth 3 times daily (with meals)  Qty: 180 tablet, Refills: 1    Associated Diagnoses: Iron deficiency anemia due to chronic blood loss         CONTINUE these medications which have NOT CHANGED    Details   atorvastatin (LIPITOR) 40 MG tablet Take 40 mg by mouth daily      clopidogrel (PLAVIX) 75 MG tablet Take 75 mg by mouth daily      empagliflozin (JARDIANCE) 10 MG TABS tablet Take 10 mg by  "mouth daily      fenofibrate (TRIGLIDE/LOFIBRA) 160 MG tablet Take 160 mg by mouth daily      furosemide (LASIX) 20 MG tablet Take 20 mg by mouth every evening       glipiZIDE (GLUCOTROL) 10 MG tablet Take 10 mg by mouth 2 times daily (before meals)      insulin glargine (LANTUS PEN) 100 UNIT/ML pen Inject 24 Units Subcutaneous At Bedtime     Comments: If Lantus is not covered by insurance, may substitute Basaglar at same dose and frequency.        lisinopril (ZESTRIL) 30 MG tablet Take 30 mg by mouth every evening       NORVASC 10 MG OR TABS 1 tab po QD (Once per day)  Qty: 90, Refills: 3      sildenafil (REVATIO) 20 MG tablet Take 20 mg by mouth as needed           Allergies   Allergies   Allergen Reactions     Metformin Other (See Comments)     \" I think my kidneys shut down\"     No Known Drug Allergies       "

## 2020-04-08 NOTE — PLAN OF CARE
Shift: 0700 - 1330  VS: Temp: 97.5  F (36.4  C) Temp src: Oral BP: 131/72 Pulse: 72 Heart Rate: 83 Resp: 18 SpO2: 99 % O2 Device: None (Room air)    Pain: Denies pain.   Neuro: A&Ox4. Pleasant and cooperative with care.   Cardiac:   WDL  Respiratory: Lung sounds clear/diminished on RA.   GI/Diet/Appetite: Mod Consistent carb/CHO 1600 - 1900. Appetite is fair/good. LBM 4/6. Refused miralax.  & 221 this shift.   :  Voiding w/o Difficulty.   LDA's: PIV to Left and right upper extremities removed and dressings applied prior to discharge.   Skin: Intact.   Activity: independent  Tests/Procedures:   Pertinent Labs/Lab Collection:      Plan: Patient discharged today at ~1330. Discharge orders reviewed with patient, questions and concerns addressed. PIV's removed. Personal belongings sent with patient. Patient's spouse here to  patient to transfer home. Patient transferred to [facility] via  by staff.

## 2020-04-08 NOTE — PLAN OF CARE
AVSS on RA. A+OX4. Pain in low back controlled with tylenol, heat packs, icy hot patch (took off aqua K when patch placed), and ambulated. Denies abdominal pain and nausea. Moderate carb diet with good PO. BG AC+HS with sliding scale coverage. UAL, steady on feet. Voiding, not saving, reports having BM earlier today. Sclera jaundiced. Melatonin given for sleep.     Plan: check Hgb in AM, if stable, patient plans to discharge to home.

## 2020-04-08 NOTE — PLAN OF CARE
VSS. Pt denies c/o pain. Pt tolerating DM diet; denies N/V. +gas/stool. UOP adequate- pt not always saving. PIV SLx2. Pt up ad yonas. PLAN: continue POC, possible discharge today if HGB remains >7.

## 2020-04-08 NOTE — PLAN OF CARE
PT-7C- PT Consult Order received, per chart review and communication with interdisciplinary care team, pt is mobilizing IND in room and PT witnessed pt walking hallways IND, no skilled need for PT at this time. PT will defer evaluation.

## 2020-04-09 ENCOUNTER — CARE COORDINATION (OUTPATIENT)
Dept: GASTROENTEROLOGY | Facility: CLINIC | Age: 65
End: 2020-04-09

## 2020-04-09 ENCOUNTER — HOSPITAL ENCOUNTER (OUTPATIENT)
Facility: CLINIC | Age: 65
End: 2020-04-09
Attending: INTERNAL MEDICINE | Admitting: INTERNAL MEDICINE
Payer: MEDICARE

## 2020-04-09 ENCOUNTER — TRANSFERRED RECORDS (OUTPATIENT)
Dept: HEALTH INFORMATION MANAGEMENT | Facility: CLINIC | Age: 65
End: 2020-04-09

## 2020-04-09 ENCOUNTER — TELEPHONE (OUTPATIENT)
Dept: GASTROENTEROLOGY | Facility: CLINIC | Age: 65
End: 2020-04-09

## 2020-04-09 DIAGNOSIS — K83.1 BILIARY STRICTURE (H): Primary | ICD-10-CM

## 2020-04-09 NOTE — TELEPHONE ENCOUNTER
Returned call, also discussed post op items:  Post ERCP (4/6/2020) with Dr. Strauss: Follow-up    Post procedure recommendations:   Resume Plavix (clopidogrel) at prior dose in 5 days.    - Confirm spontaneous pancreatic stent passage by     performing a KUB x-ray in 6 weeks.   - Findings were discussed with the primary team and pts   wife     - Jose felt not appropraite for this session, but     further evealuation of staging including review of chest    CT, consideration of abdominal imaging, possible EUS,     and discussion with oncology as well as surgical oncology     Orders placed: KUB, stent book noted    Patient states; stamina reduced, but overall feeling better. Hgb 8.1, up from 7.  No bm in 1 day, no abd pain, will give Miralax if no BM tonight.    Wondering about when to check hgb next. Will ask Dr. Strauss    Advised for upcoming EUS w/Pastor. Visit scheduled with Geoffrey on 4/13    Clinic contact and scheduling numbers verified for future questions/concerns.    Rosalind Whatley, RN Care Coordinator

## 2020-04-09 NOTE — TELEPHONE ENCOUNTER
[facility] Call Center    Phone Message    May a detailed message be left on voicemail: yes     Reason for Call: Other: Pt had a procedure recently with Dr Strauss, and was discharged yesterday. They were instructed to keep an eye on his stool to detemine if the bleeding has stopped. Please call to discuss, as pt has not had any yet. Thanks!     Action Taken: Message routed to:  [facility] ([facility]): Jana Campa    Travel Screening: Not Applicable

## 2020-04-09 NOTE — PROGRESS NOTES
[facility]: Post-Discharge Note  SITUATION                                                      Admission:    Admission Date: 04/05/20   Reason for Admission: GI bleed  Discharge:   Discharge Date: 04/08/20  Discharge Diagnosis: GI bleed  Discharge Service: Medicine and Pediatrics    BACKGROUND                                                      Fuentes Estrada is a 65 year old male with past medical history of HTN, insulin dependent diabetes, CVA, and recently diagnosed cholangiocarcinoma who was seen at OSH for melena and lightheadedness and subsequently transferred to [facility] on 4/5/2020 for further care.      Patient states that his bowel movements have been irregular over the past week and a half due to multiple procedures; however, on morning of admission patient reports that stools were black and tarry. He has never had this happen to him before. In addition, the patient reports generalized weakness and lightheadedness with sitting and standing. He denies abdominal pain with bowel movements.      Patient has intermittently has vomiting with eating. He denies feelings of nausea throughout the day but will have the urge to vomiting after eating heavier foods and consuming liquids. Emesis is nonbilious and nobloody. Patient mentions that he has been unable to take his medications and has not taken his lantus x 2 days.     ASSESSMENT      Discharge Assessment  Patient reports symptoms are: Improved  Does the patient have all of their medications?: Yes  Does patient know what their new medications are for?: Yes  Does patient have a follow-up appointment scheduled?: Yes  Does patient have any other questions or concerns?: No    Post-op  Did the patient have surgery or a procedure: Yes  Bleeding: none  Fever: No  Chills: No  Eating & Drinking: eating and drinking without complaints/concerns  PO Intake: other(Consistent Carbohydrate Diet 8548-2741 Calories: Moderate Consistent CHO (4-6 CHO  units/meal))  Bowel Function: (No BM yet.)  Urinary Status: voiding without complaint/concerns    PLAN                                                      Outpatient Plan:      Follow up with primary care provider, Dahlia Alvarez, within 7 days for   hospital follow- up.  The following labs/tests are recommended: CBC.       Appointments on [place] and/or [facility] (with [facility] or [facility]   provider or service). Call [phone] if you haven't heard regarding   these appointments within 7 days of discharge.        Follow Up and recommended labs and tests     Follow up in GI clinic  Abdominal xray in 7 days  Repeat CBC tomorrow  Follow up with PCP in 7 days       Future Appointments   Date Time Provider Department Center   4/13/2020 10:30 AM Oswaldo Hale MD [facility]           Praveena Sheth [facility]

## 2020-04-13 ENCOUNTER — PRE VISIT (OUTPATIENT)
Dept: SURGERY | Facility: CLINIC | Age: 65
End: 2020-04-13

## 2020-04-13 ENCOUNTER — VIRTUAL VISIT (OUTPATIENT)
Dept: SURGERY | Facility: CLINIC | Age: 65
End: 2020-04-13
Attending: SURGERY
Payer: MEDICARE

## 2020-04-13 DIAGNOSIS — K83.1 BILIARY STRICTURE (H): ICD-10-CM

## 2020-04-13 PROCEDURE — 40000114 ZZH STATISTIC NO CHARGE CLINIC VISIT

## 2020-04-13 NOTE — PROGRESS NOTES
"Fuentes Estrada is a 65 year old male who is being evaluated via a billable video visit.      The patient has been notified of following:     \"This video visit will be conducted via a call between you and your physician/provider. We have found that certain health care needs can be provided without the need for an in-person physical exam.  This service lets us provide the care you need with a video conversation.  If a prescription is necessary we can send it directly to your pharmacy.  If lab work is needed we can place an order for that and you can then stop by our lab to have the test done at a later time.    Video visits are billed at different rates depending on your insurance coverage.  Please reach out to your insurance provider with any questions.    If during the course of the call the physician/provider feels a video visit is not appropriate, you will not be charged for this service.\"    Patient has given verbal consent for Video visit? Yes    How would you like to obtain your AVS? [place]art    Patient would like the video invitation sent by: Text to cell phone: [phone]          I have reviewed and updated the patient's allergies and medication list.    Concerns: Patient has no new concerns since referral.      Refills: N/A      GENARO Nichols            Video Start Time: 10:40    Additional provider notes: TELEPHONE NOTE       REASON FOR EVALUATION:  Newly diagnosed cholangiocarcinoma.      HISTORY OF PRESENT ILLNESS:  Mr. Estrada is a 65-year-old gentleman who has been found to have a cholangiocarcinoma of the mid bile duct, essentially adjacent to the cystic duct confluence.  He has been evaluated by Dr. Win Strauss.  The patient has had a CT scan of his chest, abdomen and pelvis as well as an outside MRI.  Although he was initially felt to have an extrahepatic bile duct tumor alone, on his CT scan of the abdomen and pelvis, there was concern for a possible right hepatic duct obstruction due to " a second lesion as well.  In addition to that, he has a CT scan of his chest, which reveals multifocal bilateral small lung nodules, several of which appear to be new.  He has not had a  evaluated.      We spent 30 minutes in an effort to get video conferencing up and running; however, we could not make the connection with the patient, and therefore his visit was performed as a phone call with the patient, his wife and their daughter.  I explained the implications of a cholangiocarcinoma and the typical recommendation of surgical resection.  However, there are a lot of unknowns currently for this patient, which I made clear to them.  I discussed several options.   1.  If this turns out to be extrahepatic disease only, that would be a fairly straightforward extrahepatic bile duct resection with Bianca-en-Y reconstruction.  I discussed, however, that there is a lesion in the right duct that is identified on further testing and that this would also require a right hepatectomy in addition to the extrahepatic biliary resection.     2.  Finally, I discussed the need for further evaluation of these lung lesions.  Although they are very small measuring only about 3 mm, they do appear to be new and therefore in my mind require further evaluation to be sure that they do not represent metastatic disease.  I did make it clear to the patient and his family that if he did have metastatic disease proven that there would be no role for surgical intervention and that he would be treated with chemotherapy instead.      The patient will be undergoing an endoscopic ultrasound next week to evaluate the nadira basin to be sure that he does not have lymph node metastases.  If he does, I would recommend upfront chemotherapy.  In addition, they will be evaluating his right duct by ultrasound.  If they can see a mass, certainly they could biopsy it.  Otherwise, I have also spoken to Dr. Strauss about a SpyGlass procedure to evaluate the  duct with direct visualization.      I will plan to forward Mr. Garay chart to our Pulmonary Nodule Clinic to assess the best means by which to assess these liver lesions and whether a biopsy can or will be performed.  Otherwise, we will wait for the final results of the endoscopic ultrasound for lymph node evaluation as well as evaluation of the right-sided hepatic duct.      I offered to the family that we would communicate with them by the end of the day on Friday as to the plan for the pulmonary nodules.  With regard to overall surgical planning, I offered that I would speak to them next week a few days after the endoscopic ultrasound so we will have biopsy results back.  Assuming he does not have any evidence of nadira or metastatic disease, we can get him on the operating room schedule within the next few weeks.  If the lymph nodes or lung lesions turn out to be positive, then chemotherapy would be preferable rather than upfront surgery.  The patient and his family voiced understanding of all these issues, although they are somewhat complicated.  We will keep them informed as we gather more information and the plan develops.        As I said, we spent 30 minutes trying to get a video conferencing set up.  Ultimately, we switched to a phone conversation, which lasted 29-1/2 minutes.  I spent an additional 20 minutes in review of his imaging history and coordination of his care with my colleagues.           Video-Visit Details    Type of service:  Video Visit    Video End Time (time video stopped): 11:10    Originating Location (pt. Location): home  Distant Location (provider location):  [facility]     Mode of Communication:  Video Conference via "[employer]

## 2020-04-14 ENCOUNTER — PREP FOR PROCEDURE (OUTPATIENT)
Dept: GASTROENTEROLOGY | Facility: CLINIC | Age: 65
End: 2020-04-14

## 2020-04-14 ENCOUNTER — CARE COORDINATION (OUTPATIENT)
Dept: GASTROENTEROLOGY | Facility: CLINIC | Age: 65
End: 2020-04-14

## 2020-04-14 DIAGNOSIS — K83.1 BILE DUCT STRICTURE (H): Primary | ICD-10-CM

## 2020-04-14 NOTE — PROGRESS NOTES
Care Coordination Telephone Call  [facility]     Called patient to discuss plan for change of procedure for next week to include ERCP and possible spyglass.  Wife voiced understanding.  However they had been told to restart Plavix on Sunday but wife held the Plavix because of possible procedure.  Last dose of Plavix was 4/4/20.      I have reviewed preoperative instructions with his wife.   NPO solid food for 8 hours prior to procedure; clear liquids up to 2 hours prior to procedure.  They will contact the PCP to discuss Insulin dosage prior to procedure.  She is aware of location and date of procedure.    They will await call with time of procedure    I have asked the patient to call with any additional questions or concerns and have provided my contact information.    Plan:  EUS/ERCP possible spyglass    Mary MORGANN, HNBC, STAR-T  RN Care Coordinator  [facility]  Ph: [phone]  FAX: [phone]

## 2020-04-16 ENCOUNTER — APPOINTMENT (OUTPATIENT)
Dept: GENERAL RADIOLOGY | Facility: CLINIC | Age: 65
End: 2020-04-16
Attending: INTERNAL MEDICINE
Payer: MEDICARE

## 2020-04-16 ENCOUNTER — ANESTHESIA EVENT (OUTPATIENT)
Dept: SURGERY | Facility: CLINIC | Age: 65
End: 2020-04-16
Payer: MEDICARE

## 2020-04-16 ENCOUNTER — ANESTHESIA (OUTPATIENT)
Dept: SURGERY | Facility: CLINIC | Age: 65
End: 2020-04-16
Payer: MEDICARE

## 2020-04-16 ENCOUNTER — HOSPITAL ENCOUNTER (OUTPATIENT)
Facility: CLINIC | Age: 65
Discharge: HOME | End: 2020-04-16
Attending: INTERNAL MEDICINE | Admitting: INTERNAL MEDICINE
Payer: MEDICARE

## 2020-04-16 VITALS
BODY MASS INDEX: 27.14 KG/M2 | WEIGHT: 204.81 LBS | HEART RATE: 58 BPM | TEMPERATURE: 98 F | OXYGEN SATURATION: 100 % | HEIGHT: 73 IN | RESPIRATION RATE: 16 BRPM | SYSTOLIC BLOOD PRESSURE: 141 MMHG | DIASTOLIC BLOOD PRESSURE: 83 MMHG

## 2020-04-16 DIAGNOSIS — K83.1 BILE DUCT STRICTURE (H): ICD-10-CM

## 2020-04-16 LAB
ALBUMIN SERPL-MCNC: 3.4 G/DL (ref 3.4–5)
ALP SERPL-CCNC: 162 U/L (ref 40–150)
ALT SERPL W P-5'-P-CCNC: 68 U/L (ref 0–70)
AMYLASE SERPL-CCNC: 164 U/L (ref 30–110)
ANION GAP SERPL CALCULATED.3IONS-SCNC: 8 MMOL/L (ref 3–14)
AST SERPL W P-5'-P-CCNC: 51 U/L (ref 0–45)
BILIRUB SERPL-MCNC: 1.9 MG/DL (ref 0.2–1.3)
BUN SERPL-MCNC: 8 MG/DL (ref 7–30)
CALCIUM SERPL-MCNC: 8.6 MG/DL (ref 8.5–10.1)
CHLORIDE SERPL-SCNC: 106 MMOL/L (ref 94–109)
CO2 SERPL-SCNC: 24 MMOL/L (ref 20–32)
CREAT SERPL-MCNC: 1.03 MG/DL (ref 0.66–1.25)
ERYTHROCYTE [DISTWIDTH] IN BLOOD BY AUTOMATED COUNT: 15.8 % (ref 10–15)
GFR SERPL CREATININE-BSD FRML MDRD: 75 ML/MIN/{1.73_M2}
GLUCOSE BLDC GLUCOMTR-MCNC: 104 MG/DL (ref 70–99)
GLUCOSE BLDC GLUCOMTR-MCNC: 156 MG/DL (ref 70–99)
GLUCOSE SERPL-MCNC: 167 MG/DL (ref 70–99)
HCT VFR BLD AUTO: 32.7 % (ref 40–53)
HGB BLD-MCNC: 10.3 G/DL (ref 13.3–17.7)
LIPASE SERPL-CCNC: 585 U/L (ref 73–393)
MCH RBC QN AUTO: 30.8 PG (ref 26.5–33)
MCHC RBC AUTO-ENTMCNC: 31.5 G/DL (ref 31.5–36.5)
MCV RBC AUTO: 98 FL (ref 78–100)
PLATELET # BLD AUTO: 408 10E9/L (ref 150–450)
POTASSIUM SERPL-SCNC: 3.4 MMOL/L (ref 3.4–5.3)
PROT SERPL-MCNC: 6.7 G/DL (ref 6.8–8.8)
RBC # BLD AUTO: 3.34 10E12/L (ref 4.4–5.9)
SODIUM SERPL-SCNC: 138 MMOL/L (ref 133–144)
WBC # BLD AUTO: 5.5 10E9/L (ref 4–11)

## 2020-04-16 PROCEDURE — 71000027 ZZH RECOVERY PHASE 2 EACH 15 MINS: Performed by: INTERNAL MEDICINE

## 2020-04-16 PROCEDURE — 82962 GLUCOSE BLOOD TEST: CPT

## 2020-04-16 PROCEDURE — 40000279 XR SURGERY CARM FLUORO GREATER THAN 5 MIN W STILLS: Mod: TC

## 2020-04-16 PROCEDURE — 36000064 ZZH SURGERY LEVEL 4 EA 15 ADDTL MIN - UMMC: Performed by: INTERNAL MEDICINE

## 2020-04-16 PROCEDURE — 88172 CYTP DX EVAL FNA 1ST EA SITE: CPT | Performed by: INTERNAL MEDICINE

## 2020-04-16 PROCEDURE — 25800030 ZZH RX IP 258 OP 636: Performed by: NURSE ANESTHETIST, CERTIFIED REGISTERED

## 2020-04-16 PROCEDURE — 88173 CYTOPATH EVAL FNA REPORT: CPT | Performed by: INTERNAL MEDICINE

## 2020-04-16 PROCEDURE — C1874 STENT, COATED/COV W/DEL SYS: HCPCS | Performed by: INTERNAL MEDICINE

## 2020-04-16 PROCEDURE — 82150 ASSAY OF AMYLASE: CPT | Performed by: INTERNAL MEDICINE

## 2020-04-16 PROCEDURE — 25000125 ZZHC RX 250: Performed by: INTERNAL MEDICINE

## 2020-04-16 PROCEDURE — 83690 ASSAY OF LIPASE: CPT | Performed by: INTERNAL MEDICINE

## 2020-04-16 PROCEDURE — 25000128 H RX IP 250 OP 636: Performed by: NURSE ANESTHETIST, CERTIFIED REGISTERED

## 2020-04-16 PROCEDURE — 71000014 ZZH RECOVERY PHASE 1 LEVEL 2 FIRST HR: Performed by: INTERNAL MEDICINE

## 2020-04-16 PROCEDURE — 40000170 ZZH STATISTIC PRE-PROCEDURE ASSESSMENT II: Performed by: INTERNAL MEDICINE

## 2020-04-16 PROCEDURE — 25500064 ZZH RX 255 OP 636: Performed by: INTERNAL MEDICINE

## 2020-04-16 PROCEDURE — 37000009 ZZH ANESTHESIA TECHNICAL FEE, EACH ADDTL 15 MIN: Performed by: INTERNAL MEDICINE

## 2020-04-16 PROCEDURE — 88305 TISSUE EXAM BY PATHOLOGIST: CPT | Performed by: INTERNAL MEDICINE

## 2020-04-16 PROCEDURE — 27210794 ZZH OR GENERAL SUPPLY STERILE: Performed by: INTERNAL MEDICINE

## 2020-04-16 PROCEDURE — 80053 COMPREHEN METABOLIC PANEL: CPT | Performed by: INTERNAL MEDICINE

## 2020-04-16 PROCEDURE — 37000008 ZZH ANESTHESIA TECHNICAL FEE, 1ST 30 MIN: Performed by: INTERNAL MEDICINE

## 2020-04-16 PROCEDURE — 85027 COMPLETE CBC AUTOMATED: CPT | Performed by: INTERNAL MEDICINE

## 2020-04-16 PROCEDURE — 00000155 ZZHCL STATISTIC H-CELL BLOCK W/STAIN: Performed by: INTERNAL MEDICINE

## 2020-04-16 PROCEDURE — 36415 COLL VENOUS BLD VENIPUNCTURE: CPT | Performed by: INTERNAL MEDICINE

## 2020-04-16 PROCEDURE — 25000566 ZZH SEVOFLURANE, EA 15 MIN: Performed by: INTERNAL MEDICINE

## 2020-04-16 PROCEDURE — C1726 CATH, BAL DIL, NON-VASCULAR: HCPCS | Performed by: INTERNAL MEDICINE

## 2020-04-16 PROCEDURE — 36000066 ZZH SURGERY LEVEL 4 W FLUORO 1ST 30 MIN - UMMC: Performed by: INTERNAL MEDICINE

## 2020-04-16 PROCEDURE — C1769 GUIDE WIRE: HCPCS | Performed by: INTERNAL MEDICINE

## 2020-04-16 PROCEDURE — 25000125 ZZHC RX 250: Performed by: NURSE ANESTHETIST, CERTIFIED REGISTERED

## 2020-04-16 DEVICE — STENT ENDOPROS BILIARY GORE VIABIL W/O HL 10X80MM VN1008200: Type: IMPLANTABLE DEVICE | Site: BILE DUCT | Status: FUNCTIONAL

## 2020-04-16 RX ORDER — LIDOCAINE 40 MG/G
CREAM TOPICAL
Status: DISCONTINUED | OUTPATIENT
Start: 2020-04-16 | End: 2020-04-16 | Stop reason: HOSPADM

## 2020-04-16 RX ORDER — INDOMETHACIN 50 MG/1
100 SUPPOSITORY RECTAL
Status: COMPLETED | OUTPATIENT
Start: 2020-04-16 | End: 2020-04-16

## 2020-04-16 RX ORDER — GLYCOPYRROLATE 0.2 MG/ML
INJECTION, SOLUTION INTRAMUSCULAR; INTRAVENOUS PRN
Status: DISCONTINUED | OUTPATIENT
Start: 2020-04-16 | End: 2020-04-16

## 2020-04-16 RX ORDER — ONDANSETRON 2 MG/ML
INJECTION INTRAMUSCULAR; INTRAVENOUS PRN
Status: DISCONTINUED | OUTPATIENT
Start: 2020-04-16 | End: 2020-04-16

## 2020-04-16 RX ORDER — SODIUM CHLORIDE, SODIUM LACTATE, POTASSIUM CHLORIDE, CALCIUM CHLORIDE 600; 310; 30; 20 MG/100ML; MG/100ML; MG/100ML; MG/100ML
INJECTION, SOLUTION INTRAVENOUS CONTINUOUS
Status: DISCONTINUED | OUTPATIENT
Start: 2020-04-16 | End: 2020-04-16 | Stop reason: HOSPADM

## 2020-04-16 RX ORDER — NALOXONE HYDROCHLORIDE 0.4 MG/ML
.1-.4 INJECTION, SOLUTION INTRAMUSCULAR; INTRAVENOUS; SUBCUTANEOUS
Status: CANCELLED | OUTPATIENT
Start: 2020-04-16 | End: 2020-04-17

## 2020-04-16 RX ORDER — FENTANYL CITRATE 50 UG/ML
25-50 INJECTION, SOLUTION INTRAMUSCULAR; INTRAVENOUS
Status: DISCONTINUED | OUTPATIENT
Start: 2020-04-16 | End: 2020-04-16 | Stop reason: HOSPADM

## 2020-04-16 RX ORDER — LIDOCAINE HYDROCHLORIDE 20 MG/ML
INJECTION, SOLUTION INFILTRATION; PERINEURAL PRN
Status: DISCONTINUED | OUTPATIENT
Start: 2020-04-16 | End: 2020-04-16

## 2020-04-16 RX ORDER — MEPERIDINE HYDROCHLORIDE 25 MG/ML
12.5 INJECTION INTRAMUSCULAR; INTRAVENOUS; SUBCUTANEOUS
Status: DISCONTINUED | OUTPATIENT
Start: 2020-04-16 | End: 2020-04-16 | Stop reason: HOSPADM

## 2020-04-16 RX ORDER — FENTANYL CITRATE 50 UG/ML
INJECTION, SOLUTION INTRAMUSCULAR; INTRAVENOUS PRN
Status: DISCONTINUED | OUTPATIENT
Start: 2020-04-16 | End: 2020-04-16

## 2020-04-16 RX ORDER — NALOXONE HYDROCHLORIDE 0.4 MG/ML
.1-.4 INJECTION, SOLUTION INTRAMUSCULAR; INTRAVENOUS; SUBCUTANEOUS
Status: DISCONTINUED | OUTPATIENT
Start: 2020-04-16 | End: 2020-04-16 | Stop reason: HOSPADM

## 2020-04-16 RX ORDER — IOPAMIDOL 510 MG/ML
INJECTION, SOLUTION INTRAVASCULAR PRN
Status: DISCONTINUED | OUTPATIENT
Start: 2020-04-16 | End: 2020-04-16 | Stop reason: HOSPADM

## 2020-04-16 RX ORDER — LEVOFLOXACIN 5 MG/ML
INJECTION, SOLUTION INTRAVENOUS PRN
Status: DISCONTINUED | OUTPATIENT
Start: 2020-04-16 | End: 2020-04-16

## 2020-04-16 RX ORDER — ONDANSETRON 4 MG/1
4 TABLET, ORALLY DISINTEGRATING ORAL EVERY 30 MIN PRN
Status: DISCONTINUED | OUTPATIENT
Start: 2020-04-16 | End: 2020-04-16 | Stop reason: HOSPADM

## 2020-04-16 RX ORDER — ONDANSETRON 2 MG/ML
4 INJECTION INTRAMUSCULAR; INTRAVENOUS EVERY 30 MIN PRN
Status: DISCONTINUED | OUTPATIENT
Start: 2020-04-16 | End: 2020-04-16 | Stop reason: HOSPADM

## 2020-04-16 RX ORDER — SODIUM CHLORIDE, SODIUM LACTATE, POTASSIUM CHLORIDE, CALCIUM CHLORIDE 600; 310; 30; 20 MG/100ML; MG/100ML; MG/100ML; MG/100ML
INJECTION, SOLUTION INTRAVENOUS CONTINUOUS PRN
Status: DISCONTINUED | OUTPATIENT
Start: 2020-04-16 | End: 2020-04-16

## 2020-04-16 RX ORDER — FLUMAZENIL 0.1 MG/ML
0.2 INJECTION, SOLUTION INTRAVENOUS
Status: CANCELLED | OUTPATIENT
Start: 2020-04-16 | End: 2020-04-17

## 2020-04-16 RX ORDER — PROPOFOL 10 MG/ML
INJECTION, EMULSION INTRAVENOUS PRN
Status: DISCONTINUED | OUTPATIENT
Start: 2020-04-16 | End: 2020-04-16

## 2020-04-16 RX ORDER — HYDROMORPHONE HYDROCHLORIDE 1 MG/ML
.3-.5 INJECTION, SOLUTION INTRAMUSCULAR; INTRAVENOUS; SUBCUTANEOUS EVERY 10 MIN PRN
Status: DISCONTINUED | OUTPATIENT
Start: 2020-04-16 | End: 2020-04-16 | Stop reason: HOSPADM

## 2020-04-16 RX ADMIN — PHENYLEPHRINE HYDROCHLORIDE 100 MCG: 10 INJECTION INTRAVENOUS at 12:02

## 2020-04-16 RX ADMIN — LEVOFLOXACIN 500 MG: 5 INJECTION, SOLUTION INTRAVENOUS at 11:40

## 2020-04-16 RX ADMIN — PHENYLEPHRINE HYDROCHLORIDE 100 MCG: 10 INJECTION INTRAVENOUS at 11:55

## 2020-04-16 RX ADMIN — SODIUM CHLORIDE, POTASSIUM CHLORIDE, SODIUM LACTATE AND CALCIUM CHLORIDE: 600; 310; 30; 20 INJECTION, SOLUTION INTRAVENOUS at 14:23

## 2020-04-16 RX ADMIN — FENTANYL CITRATE 100 MCG: 50 INJECTION, SOLUTION INTRAMUSCULAR; INTRAVENOUS at 11:02

## 2020-04-16 RX ADMIN — ROCURONIUM BROMIDE 20 MG: 10 INJECTION INTRAVENOUS at 12:47

## 2020-04-16 RX ADMIN — SUGAMMADEX 200 MG: 100 INJECTION, SOLUTION INTRAVENOUS at 14:17

## 2020-04-16 RX ADMIN — Medication 90 MG: at 11:02

## 2020-04-16 RX ADMIN — GLYCOPYRROLATE 0.2 MG: 0.2 INJECTION, SOLUTION INTRAMUSCULAR; INTRAVENOUS at 11:26

## 2020-04-16 RX ADMIN — PHENYLEPHRINE HYDROCHLORIDE 100 MCG: 10 INJECTION INTRAVENOUS at 12:05

## 2020-04-16 RX ADMIN — PHENYLEPHRINE HYDROCHLORIDE 100 MCG: 10 INJECTION INTRAVENOUS at 11:26

## 2020-04-16 RX ADMIN — PHENYLEPHRINE HYDROCHLORIDE 100 MCG: 10 INJECTION INTRAVENOUS at 12:21

## 2020-04-16 RX ADMIN — PROPOFOL 130 MG: 10 INJECTION, EMULSION INTRAVENOUS at 11:02

## 2020-04-16 RX ADMIN — ONDANSETRON 4 MG: 2 INJECTION INTRAMUSCULAR; INTRAVENOUS at 14:11

## 2020-04-16 RX ADMIN — LIDOCAINE HYDROCHLORIDE 100 MG: 20 INJECTION, SOLUTION INFILTRATION; PERINEURAL at 11:02

## 2020-04-16 RX ADMIN — ROCURONIUM BROMIDE 50 MG: 10 INJECTION INTRAVENOUS at 11:09

## 2020-04-16 RX ADMIN — PHENYLEPHRINE HYDROCHLORIDE 0.4 MCG/KG/MIN: 10 INJECTION INTRAVENOUS at 12:31

## 2020-04-16 RX ADMIN — PHENYLEPHRINE HYDROCHLORIDE 100 MCG: 10 INJECTION INTRAVENOUS at 12:12

## 2020-04-16 RX ADMIN — SODIUM CHLORIDE, POTASSIUM CHLORIDE, SODIUM LACTATE AND CALCIUM CHLORIDE: 600; 310; 30; 20 INJECTION, SOLUTION INTRAVENOUS at 10:53

## 2020-04-16 RX ADMIN — PHENYLEPHRINE HYDROCHLORIDE 100 MCG: 10 INJECTION INTRAVENOUS at 11:46

## 2020-04-16 RX ADMIN — FENTANYL CITRATE 50 MCG: 50 INJECTION, SOLUTION INTRAMUSCULAR; INTRAVENOUS at 13:45

## 2020-04-16 RX ADMIN — PHENYLEPHRINE HYDROCHLORIDE 100 MCG: 10 INJECTION INTRAVENOUS at 11:18

## 2020-04-16 ASSESSMENT — MIFFLIN-ST. JEOR: SCORE: 1757.88

## 2020-04-16 NOTE — ANESTHESIA CARE TRANSFER NOTE
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    Anesthesia Type:   General     Note:  Airway :Nasal Cannula  Patient transferred to:PACU  Comments: Awake with good patent airway.  VSSHandoff Report: Identifed the Patient, Identified the Reponsible Provider, Reviewed the pertinent medical history, Discussed the surgical course, Reviewed Intra-OP anesthesia mangement and issues during anesthesia, Set expectations for post-procedure period and Allowed opportunity for questions and acknowledgement of understanding      Vitals: (Last set prior to Anesthesia Care Transfer)    CRNA VITALS  4/16/2020 1403 - 4/16/2020 1439      4/16/2020             Pulse:  73    SpO2:  100 %                Electronically Signed By: VICKI Thacker CRNA  April 16, 2020  2:39 PM

## 2020-04-16 NOTE — OR NURSING
Dr. Baumann here discussing procedure findings with Pt.  Pt. Also needs to be seen by Dr. Strauss prior to discharge per Dr. BRITTANI Strauss.

## 2020-04-16 NOTE — OR NURSING
Dr. BRITTANI Strauss called writer. He will call Pt's wife.  Pt to hold Plavix for 2 days. Pt understands and he is comfortable with discharge instructions.  Dr. Sanchez here now speaking with Pt.  Pt to restart Plavix on Saturday 4/18/16 and Pt verbally confirms understanding.

## 2020-04-16 NOTE — DISCHARGE INSTRUCTIONS
[facility]  Same-Day Surgery   Adult Discharge Orders & Instructions     For 24 hours after surgery    1. Get plenty of rest.  A responsible adult must stay with you for at least 24 hours after you leave the hospital.   2. Do not drive or use heavy equipment.  If you have weakness or tingling, don't drive or use heavy equipment until this feeling goes away.  3. Do not drink alcohol.  4. Avoid strenuous or risky activities.  Ask for help when climbing stairs.   5. You may feel lightheaded.  IF so, sit for a few minutes before standing.  Have someone help you get up.   6. If you have nausea (feel sick to your stomach): Drink only clear liquids such as apple juice, ginger ale, broth or 7-Up.  Rest may also help.  Be sure to drink enough fluids.  Move to a regular diet as you feel able.  7. You may have a slight fever. Call the doctor if your fever is over 100 F (37.7 C) (taken under the tongue) or lasts longer than 24 hours.  8. You may have a dry mouth, a sore throat, muscle aches or trouble sleeping.  These should go away after 24 hours.  9. Do not make important or legal decisions.   Call your doctor for any of the followin.  Signs of infection (fever, growing tenderness at the surgery site, a large amount of drainage or bleeding, severe pain, foul-smelling drainage, redness, swelling).    2. It has been over 8 to 10 hours since surgery and you are still not able to urinate (pass water).    3.  Headache for over 24 hours.      To contact doctor hernandez, call [phone]  or:        [phone] and ask for the resident on call for gastroenterology (answered 24 hours a day)      Emergency Department:    [facility]: [phone]

## 2020-04-16 NOTE — OR NURSING
Dr. Baumann has been text paged to clarify when Pt can resume his Plavix and to call Pt's wife.

## 2020-04-16 NOTE — BRIEF OP NOTE
[facility], [place]    Brief Operative Note    Pre-operative diagnosis: Bile duct stricture [K83.1]  Post-operative diagnosis Same as pre-operative diagnosis    Procedure: Procedure(s):  ENDOSCOPIC ULTRASOUND, ESOPHAGOSCOPY / UPPER GASTROINTESTINAL TRACT (GI)with Fine Needle biopsy  ENDOSCOPIC RETROGRADE CHOLANGIOPANCREATOGRAPHY, WITH DIRECT DUCT VISUALIZATION, USING PANCREATICOBILIARY FIBEROPTIC PROBE; Bile Duct Stent Exchange and Biopsy,balloon sweep of bile duct  Surgeon: Surgeon(s) and Role:  Panel 1:     * Cody Baumann MD - Primary  Panel 2:     * Win Strauss MD - Primary     * Philip Solorio MD - Fellow - Assisting  Anesthesia: General   Estimated blood loss: None  Drains: None  Specimens:   ID Type Source Tests Collected by Time Destination   A : Gerardo Hepatis Lymph  node Tissue Lymph Node, Gerardo Hepatis FINE NEEDLE ASPIRATION, OR DOCUMENTATION ONLY Cody Baumann MD 4/16/2020 12:40 PM    B : Bile Duct at Bifurcation  Tissue Bile Duct SURGICAL PATHOLOGY EXAM Cody Baumann MD 4/16/2020  1:48 PM    C : Common Hepatic Bile Duct    R/O Tumor vs. Inflammation Tissue Bile Duct SURGICAL PATHOLOGY EXAM Win Strauss MD 4/16/2020  1:51 PM        Complications: None.  Implants:   Implant Name Type Inv. Item Serial No.  Lot No. LRB No. Used Action   STENT ENDOPROS BILIARY GORE VIABIL W/O HL [id number] Stent STENT ENDOPROS BILIARY GORE VIABIL W/O HL [id number] Foremost  N/A 1 Explanted   STENT ENDOPROS BILIARY GORE VIABIL W/O HL [id number] Stent STENT ENDOPROS BILIARY GORE VIABIL W/O HL [id number] Foremost   1 Implanted         Findings:   Please see procedure note for details    EUS:  -The right posterior duct appears to drain in to the cystic duct remnant, both of which had wall thickening and contained tumor.   - The bifurcation and right interior duct did not  appear to be involved.   - Gerardo hepatis lymph nodes were biopsies.   - There were not masses seen in the liver or the left adrenal gland.      ERCP  -Previously placed metal stent was removed prior to EUS.  -The plastic pancreatic stent was not removed.  -Cholangiogram again demonstrated improvement of the stricture and normal right anterior and left intrahepatics.  -SpyGlass was used for ductoscopy. The bifurcation was normal and was biopsied. There was inflammation at the common hepatic artery, which was also biopsied  -Successful placement of [id number] covered metal stent.         Recommendations:  -Monitor patient in PACU as per protocol with possible discharge home today.  -Ice chips for now.  -Hold antiplatelets.  -Follow-up path.      Philip Solorio MD  Interventional Endoscopy Fellow

## 2020-04-16 NOTE — ANESTHESIA POSTPROCEDURE EVALUATION
Anesthesia POST Procedure Evaluation    Patient: Fuentes Estrada   MRN:     [id number] Gender:   male   Age:    65 year old :      1955        Preoperative Diagnosis: Bile duct stricture [K83.1]   Procedure(s):  ENDOSCOPIC ULTRASOUND, ESOPHAGOSCOPY / UPPER GASTROINTESTINAL TRACT (GI)with Fine Needle biopsy  ENDOSCOPIC RETROGRADE CHOLANGIOPANCREATOGRAPHY, WITH DIRECT DUCT VISUALIZATION, USING PANCREATICOBILIARY FIBEROPTIC PROBE; Bile Duct Stent Exchange and Biopsy,balloon sweep of bile duct   Postop Comments: No value filed.     Anesthesia Type: General       Disposition: Outpatient   Postop Pain Control: Uneventful            Sign Out: Well controlled pain   PONV: No   Neuro/Psych: Uneventful            Sign Out: Acceptable/Baseline neuro status   Airway/Respiratory: Uneventful            Sign Out: Acceptable/Baseline resp. status   CV/Hemodynamics: Uneventful            Sign Out: Acceptable CV status   Other NRE: NONE   DID A NON-ROUTINE EVENT OCCUR? No         Last Anesthesia Record Vitals:  CRNA VITALS  2020 1403 - 2020 1503      2020             Pulse:  73    SpO2:  100 %          Last PACU Vitals:  Vitals Value Taken Time   /72 2020  3:00 PM   Temp 36.9  C (98.5  F) 2020  3:00 PM   Pulse 60 2020  3:00 PM   Resp 16 2020  3:00 PM   SpO2 100 % 2020  3:06 PM   Temp src     NIBP     Pulse     SpO2     Resp     Temp     Ht Rate     Temp 2     Vitals shown include unvalidated device data.      Electronically Signed By: Margaret Diaz MD, 2020, 3:06 PM

## 2020-04-16 NOTE — OR NURSING
Dr. BRITTANI Strauss was text paged by RENATE Arredondo RN re: his need to speak with Pt. Prior to his discharge.  No response as of this time.

## 2020-04-16 NOTE — ANESTHESIA PREPROCEDURE EVALUATION
Anesthesia Pre-Procedure Evaluation    Patient: Fuentes Estrada   MRN:     [id number] Gender:   male   Age:    65 year old :      1955        Preoperative Diagnosis: Bile duct stricture [K83.1]   Procedure(s):  ENDOSCOPIC ULTRASOUND, ESOPHAGOSCOPY / UPPER GASTROINTESTINAL TRACT (GI)with possible biopsy  ENDOSCOPIC RETROGRADE CHOLANGIOPANCREATOGRAPHY, WITH DIRECT DUCT VISUALIZATION, USING PANCREATICOBILIARY FIBEROPTIC PROBE     LABS:  CBC:   Lab Results   Component Value Date    WBC 6.0 2020    WBC 6.9 2020    HGB 7.0 (L) 2020    HGB 7.5 (L) 2020    HCT 21.0 (L) 2020    HCT 20.8 (L) 2020     2020     2020     BMP:   Lab Results   Component Value Date     2020     2020    POTASSIUM 3.8 2020    POTASSIUM 4.0 2020    CHLORIDE 111 (H) 2020    CHLORIDE 108 2020    CO2 25 2020    CO2 25 2020    BUN 22 2020    BUN 47 (H) 2020    CR 0.98 2020    CR 1.19 2020     (H) 2020     (H) 2020     COAGS:   Lab Results   Component Value Date    INR 1.20 (H) 2020     POC:   Lab Results   Component Value Date     (H) 2020     OTHER:   Lab Results   Component Value Date    PH 5.5 2002    ERIC 8.2 (L) 2020    ALBUMIN 2.6 (L) 2020    PROTTOTAL 5.2 (L) 2020    ALT 60 2020    AST 68 (H) 2020    ALKPHOS 132 2020    BILITOTAL 2.3 (H) 2020    BILIDIRECT 0.4 2002    LIPASE 2,301 (H) 2020    AMYLASE 302 (H) 2020    TSH 3.60 2002    T4 0.7 2002        Preop Vitals    BP Readings from Last 3 Encounters:   20 123/74   20 131/72   20 (!) 130/91    Pulse Readings from Last 3 Encounters:   20 67   20 72   20 71      Resp Readings from Last 3 Encounters:   20 18   20 18   20 16    SpO2 Readings from Last 3 Encounters:   20  "99%   04/08/20 99%   03/31/20 96%      Temp Readings from Last 1 Encounters:   04/16/20 37  C (98.6  F) (Oral)    Ht Readings from Last 1 Encounters:   04/16/20 1.854 m (6' 1\")      Wt Readings from Last 1 Encounters:   04/16/20 92.9 kg (204 lb 12.9 oz)    Estimated body mass index is 27.02 kg/m  as calculated from the following:    Height as of this encounter: 1.854 m (6' 1\").    Weight as of this encounter: 92.9 kg (204 lb 12.9 oz).     LDA:        Past Medical History:   Diagnosis Date     Essential hypertension, benign     Hypertension, Benign     Nonspecific abnormal results of liver function study     Hx of elevated LFT's      Past Surgical History:   Procedure Laterality Date     ENDOSCOPIC RETROGRADE CHOLANGIOPANCREATOGRAM N/A 3/31/2020    Procedure: ENDOSCOPIC RETROGRADE CHOLANGIOPANCREATOGRAPHY, WITH with biliary sphincterotomy, biopsies and brushings, biliary stent placement;  Surgeon: Win Strauss MD;  Location: [facility] OR     ENDOSCOPIC RETROGRADE CHOLANGIOPANCREATOGRAM N/A 4/6/2020    Procedure: ENDOSCOPIC RETROGRADE CHOLANGIOPANCREATOGRAPHY;  Surgeon: Win Strauss MD;  Location: [facility] OR     ESOPHAGOSCOPY, GASTROSCOPY, DUODENOSCOPY (EGD), COMBINED N/A 4/6/2020    Procedure: ESOPHAGOGASTRODUODENOSCOPY (EGD);  Surgeon: Win Strauss MD;  Location: [facility] OR      KNEE SCOPE, DIAGNOSTIC  1995    Arthroscopy, Knee; left     HC VASECTOMY UNILAT/BILAT W POSTOP SEMEN      Vasectomy      Allergies   Allergen Reactions     Metformin Other (See Comments)     \" I think my kidneys shut down\"     No Known Drug Allergies         Anesthesia Evaluation     . Pt has had prior anesthetic. Type: General    History of anesthetic complications          ROS/MED HX    ENT/Pulmonary:  - neg pulmonary ROS     Neurologic:     (+)CVA without deficits    Cardiovascular:     (+) Dyslipidemia, hypertension----. Taking blood thinners : . . . :. .       METS/Exercise Tolerance:     Hematologic:  - neg hematologic  " ROS       Musculoskeletal:  - neg musculoskeletal ROS       GI/Hepatic: Comment: Biliary stricture  Pancreatitis     (+) GERD       Renal/Genitourinary:  - ROS Renal section negative       Endo:     (+) type II DM Using insulin .      Psychiatric:  - neg psychiatric ROS       Infectious Disease:  - neg infectious disease ROS       Malignancy:      - no malignancy   Other: Comment: Hx EtOH                        PHYSICAL EXAM:   Mental Status/Neuro: A/A/O; Age Appropriate   Airway: Facies: Feasible  Mallampati: II  Mouth/Opening: Full  TM distance: > 6 cm  Neck ROM: Full   Respiratory: Auscultation: CTAB     Resp. Rate: Normal     Resp. Effort: Normal      CV: Rhythm: Regular  Rate: Age appropriate  Heart: Normal Sounds   Comments:      Dental: Normal Dentition                Assessment:   ASA SCORE: 3    H&P: History and physical reviewed and following examination; no interval change.   Smoking Status:  Non-Smoker/Unknown   NPO Status: NPO Appropriate     Plan:   Anes. Type:  General   Pre-Medication: Midazolam   Induction:  IV (Standard)   Airway: ETT; Oral   Access/Monitoring: PIV   Maintenance: Balanced     Postop Plan:   Postop Pain: Opioids  Postop Sedation/Airway: Not planned  Disposition: Outpatient     PONV Management:   Adult Risk Factors:, Non-Smoker, Postop Opioids   Prevention: Ondansetron     CONSENT: Direct conversation   Plan and risks discussed with: Patient                      Margaret Diaz MD

## 2020-04-17 LAB
COPATH REPORT: NORMAL
COPATH REPORT: NORMAL
ERCP: NORMAL

## 2020-04-20 ENCOUNTER — VIRTUAL VISIT (OUTPATIENT)
Dept: SURGERY | Facility: CLINIC | Age: 65
End: 2020-04-20
Attending: SURGERY
Payer: MEDICARE

## 2020-04-20 DIAGNOSIS — C24.9 CHOLANGIOCARCINOMA DETERMINED BY BIOPSY OF BILIARY TRACT (H): Primary | ICD-10-CM

## 2020-04-20 PROCEDURE — 40000114 ZZH STATISTIC NO CHARGE CLINIC VISIT

## 2020-04-20 NOTE — PROGRESS NOTES
"Fuentes Estrada is a 65 year old male who is being evaluated via a billable telephone visit.      The patient has been notified of following:     \"This telephone visit will be conducted via a call between you and your physician/provider. We have found that certain health care needs can be provided without the need for a physical exam.  This service lets us provide the care you need with a short phone conversation.  If a prescription is necessary we can send it directly to your pharmacy.  If lab work is needed we can place an order for that and you can then stop by our lab to have the test done at a later time.    Telephone visits are billed at different rates depending on your insurance coverage. During this emergency period, for some insurers they may be billed the same as an in-person visit.  Please reach out to your insurance provider with any questions.    If during the course of the call the physician/provider feels a telephone visit is not appropriate, you will not be charged for this service.\"    Pt has no needs or concerns today.  Manda Lopez CMA on 4/20/2020 at 11:14 AM      Patient has given verbal consent for Telephone visit?  Yes    How would you like to obtain your AVS? MyChart    Additional provider notes: ESTABLISHED PATIENT VISIT      REASON FOR EVALUATION:  Followup extrahepatic cholangiocarcinoma.      HISTORY OF PRESENT ILLNESS:  Mr. Estrada had an endoscopic ultrasound as well as a SpyGlass procedure performed last week.  In addition, his chest CT was reviewed by our [facility].  CT scan review essentially led to a discussion between me and one of the thoracic surgeons here and because of the small size of these lesions in the chest and their somewhat benign-appearing features, we decided against pursuing any additional biopsies at this point for the lung.  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  However, he has an aberrant right hepatic duct that drains into the cystic duct and therefore is also obstructed by tumor.  Furthermore, on the endoscopic ultrasound, Dr. Baumann felt that the tumor was extending along the right posterior duct into the liver parenchyma itself.  A lymph node was biopsied and returned negative.      I had a long discussion with Mr. Estrada, his wife and daughter during which I recommended that the patient undergo an extrahepatic bile duct resection in addition to a hepatectomy because of the tumor extension into the liver.  I discussed some of the details of a right hepatectomy with extrahepatic bile duct resection as well as risks which include but are not limited to bleeding, infection, anastomotic leak, bile leak, venous thromboembolism, pneumonia and a risk of mortality that I quoted at less than 2%.  I also discussed the risk of temporary liver insufficiency with ascites, jaundice, etc.      I offered to get the patient on the operating room schedule in the coming weeks.  He stated that he was somewhat weak because of his recent procedures and would like to delay just a little bit.  Based on that, we will get him on the operating room schedule in 3 weeks' time.  Prior to that visit, he will have a preop visit as well.  We will touch base with him prior to surgery to be sure that he has recovered his strength adequately before moving forward.      I will look forward to seeing Mr. Estrada in the operating room for a planned open right hepatectomy with extrahepatic bile duct resection and portal lymph node dissection in approximately 3 weeks' time.      A total of over 30 minutes was spent phone call Mr. Estrada and his family and additional 15 minutes was spent in coordination of his care.         Phone call duration:

## 2020-04-21 LAB — UPPER EUS: NORMAL

## 2020-04-22 PROBLEM — C24.9: Status: ACTIVE | Noted: 2020-04-22

## 2020-04-23 ENCOUNTER — PATIENT OUTREACH (OUTPATIENT)
Dept: SURGERY | Facility: CLINIC | Age: 65
End: 2020-04-23

## 2020-04-23 NOTE — TELEPHONE ENCOUNTER
Surgical Oncology RN Care Coordination Note:     Called and spoke with patients wife regarding medication question. Informed her the plavix will need to be held for 5 days prior to surgery and that he needs to touch base with his PCP regarding whether they are ok with him just holding or if they feel he needs to be bridged. Informed her its best the touch base with managing team especially since he has had to hold the medication a number of times in the base few weeks for previous procedures. Informed her the PAC team will go over in detail what medications they want him to take otherwise at the visit that is scheduled. She has our number should any further questions arise or she will send mychart.     Carey Rodriguez RN, BSN  Care Coordinator   [phone]

## 2020-04-23 NOTE — TELEPHONE ENCOUNTER
FUTURE VISIT INFORMATION      SURGERY INFORMATION:    Date: 20    Location: [facility] OR    Surgeon:  Oswaldo Hale MD     Anesthesia Type:  General    Procedure: Open right hepatectomy extra-hepatic bile duct resection portal lymph node dissection, intraoperative liver ultrasound     Consult: Virtual visit     RECORDS REQUESTED FROM:       Primary Care Provider:  Tolu Noland      Most recent EKG+ Tracin20

## 2020-04-27 ENCOUNTER — NURSE TRIAGE (OUTPATIENT)
Dept: NURSING | Facility: CLINIC | Age: 65
End: 2020-04-27

## 2020-04-27 NOTE — TELEPHONE ENCOUNTER
Returned call to discuss symptoms.     Stents placed 4/16.    Patient states he's weak and head hurts, has fever. No other pain, no nausea or vomiting. No jaundice/dark urine. Took the boat out Saturday, thinks maybe he picked something up.    Symptoms of pain/headache less likely stent related. Referred patient to call his PCP to continue fever work up. Patient agreed to call regular MD.    Rosalind Whatley, RN Care Coordinator

## 2020-04-27 NOTE — TELEPHONE ENCOUNTER
"[facility]: Nurse Triage Note  SITUATION/BACKGROUND                                                      Fuentes Estrada is a 65 year old male who's  calls to report he is running a fever    Description:  Chills,fever at 7:30 am -102.0  Given tylenol  9am-101.0  Onset/duration: chill last night -fever noted this morning  Precip. factors:  ENDOSCOPIC RETROGRADE CHOLANGIOPANCREATOGRAPHY -done 4/16/20 Dr. Strauss  Scheduled for Open right hepatectomy and extra-hepatic bile duct resection and portal lymph node dissection, intra-operative liver ultrasound, Open right hepatectomy and extra-hepatic bile duct resection and portal lymph node dissection-5/12/20  Associated symptoms:  headache  Improves/worsens symptoms:  -  Pain scale (0-10)   0/10    MEDICATIONS:   Taking medication(s) as prescribed? Yes  Taking over the counter medication(s?) Yes  Any barriers to taking medication(s) as prescribed?  No  Medication(s) improving/managing symptoms?  Yes    Allergies:   Allergies   Allergen Reactions     Metformin Other (See Comments)     \" I think my kidneys shut down\"     No Known Drug Allergies        ASSESSMENT      65 year old male who has been found to have a cholangiocarcinoma of the mid bile duct.  He is scheduled for surgery with Dr. Hale on 5/12/20  He had a ERCP on 4/16/20 by Dr. Strauss.  He has been recovering at home His wife calls this morning because last night he was very chilled and again this morning   She checked his temperature this morning at 7:30am -102.0  She gave him Tylenol -which brought it down to 101.0.  He c/o headache but no other symptoms   He denies cough,confusion,SOB,N/V,muscle aches,sore thorat.  Denies issues or changes with urination or bowel movements.  Bri states he has been lightly active.He has been drinking and eating well.      Instructed her to continue to give him tylenol as direct for comfort.  Cool compress to forehead or luke warm shower to bring down " fever.    Continue to monitor temperature every 4 hours    Rest..  Call back if new symptoms arise    Will forward to surgery pool to call back with further recommendations   ...  Call back if   RECOMMENDATION/PLAN                                                      RECOMMENDED DISPOSITION:  See above  Will comply with recommendation: Yes    If further questions/concerns or if symptoms do not improve, worsen or new symptoms develop, call your PCP or [phone] to talk with the Resident on call, as soon as possible.    Guideline used: fever  Telephone Triage Protocols for Nurses, Fifth Edition, Kriss Borrego, JASMIN, RN

## 2020-05-01 DIAGNOSIS — Z11.59 ENCOUNTER FOR SCREENING FOR OTHER VIRAL DISEASES: Primary | ICD-10-CM

## 2020-05-04 ENCOUNTER — OFFICE VISIT (OUTPATIENT)
Dept: SURGERY | Facility: CLINIC | Age: 65
End: 2020-05-04
Payer: COMMERCIAL

## 2020-05-04 ENCOUNTER — PRE VISIT (OUTPATIENT)
Dept: SURGERY | Facility: CLINIC | Age: 65
End: 2020-05-04

## 2020-05-04 ENCOUNTER — ANESTHESIA EVENT (OUTPATIENT)
Dept: SURGERY | Facility: CLINIC | Age: 65
End: 2020-05-04

## 2020-05-04 VITALS
HEIGHT: 73 IN | TEMPERATURE: 98.1 F | OXYGEN SATURATION: 99 % | BODY MASS INDEX: 27.17 KG/M2 | RESPIRATION RATE: 15 BRPM | WEIGHT: 205 LBS | DIASTOLIC BLOOD PRESSURE: 79 MMHG | SYSTOLIC BLOOD PRESSURE: 117 MMHG | HEART RATE: 70 BPM

## 2020-05-04 DIAGNOSIS — Z01.818 PREOP EXAMINATION: ICD-10-CM

## 2020-05-04 DIAGNOSIS — Z01.818 PREOP EXAMINATION: Primary | ICD-10-CM

## 2020-05-04 LAB
ABO + RH BLD: NORMAL
ABO + RH BLD: NORMAL
ALBUMIN SERPL-MCNC: 3.9 G/DL (ref 3.4–5)
ALP SERPL-CCNC: 162 U/L (ref 40–150)
ALT SERPL W P-5'-P-CCNC: 60 U/L (ref 0–70)
ANION GAP SERPL CALCULATED.3IONS-SCNC: 7 MMOL/L (ref 3–14)
AST SERPL W P-5'-P-CCNC: 50 U/L (ref 0–45)
BILIRUB SERPL-MCNC: 1.1 MG/DL (ref 0.2–1.3)
BLD GP AB SCN SERPL QL: NORMAL
BLOOD BANK CMNT PATIENT-IMP: NORMAL
BUN SERPL-MCNC: 10 MG/DL (ref 7–30)
CALCIUM SERPL-MCNC: 9.3 MG/DL (ref 8.5–10.1)
CHLORIDE SERPL-SCNC: 103 MMOL/L (ref 94–109)
CO2 SERPL-SCNC: 25 MMOL/L (ref 20–32)
CREAT SERPL-MCNC: 1.19 MG/DL (ref 0.66–1.25)
ERYTHROCYTE [DISTWIDTH] IN BLOOD BY AUTOMATED COUNT: 14.2 % (ref 10–15)
GFR SERPL CREATININE-BSD FRML MDRD: 63 ML/MIN/{1.73_M2}
GLUCOSE SERPL-MCNC: 296 MG/DL (ref 70–99)
HBA1C MFR BLD: 6 % (ref 0–5.6)
HCT VFR BLD AUTO: 39.9 % (ref 40–53)
HGB BLD-MCNC: 12.7 G/DL (ref 13.3–17.7)
INR PPP: 1.16 (ref 0.86–1.14)
MCH RBC QN AUTO: 30.4 PG (ref 26.5–33)
MCHC RBC AUTO-ENTMCNC: 31.8 G/DL (ref 31.5–36.5)
MCV RBC AUTO: 96 FL (ref 78–100)
PLATELET # BLD AUTO: 359 10E9/L (ref 150–450)
POTASSIUM SERPL-SCNC: 4.1 MMOL/L (ref 3.4–5.3)
PREALB SERPL IA-MCNC: 23 MG/DL (ref 15–45)
PROT SERPL-MCNC: 7.7 G/DL (ref 6.8–8.8)
RBC # BLD AUTO: 4.18 10E12/L (ref 4.4–5.9)
SODIUM SERPL-SCNC: 135 MMOL/L (ref 133–144)
SPECIMEN EXP DATE BLD: NORMAL
WBC # BLD AUTO: 7.2 10E9/L (ref 4–11)

## 2020-05-04 ASSESSMENT — LIFESTYLE VARIABLES: TOBACCO_USE: 0

## 2020-05-04 ASSESSMENT — PAIN SCALES - GENERAL: PAINLEVEL: NO PAIN (0)

## 2020-05-04 ASSESSMENT — MIFFLIN-ST. JEOR: SCORE: 1758.75

## 2020-05-04 NOTE — ANESTHESIA PREPROCEDURE EVALUATION
"Anesthesia Pre-Procedure Evaluation    Patient: Fuentes Estrada   MRN:     [id number] Gender:   male   Age:    65 year old :      1955        Preoperative Diagnosis: * No surgery found *        LABS:  CBC:   Lab Results   Component Value Date    WBC 5.5 2020    WBC 6.0 2020    HGB 10.3 (L) 2020    HGB 7.0 (L) 2020    HCT 32.7 (L) 2020    HCT 21.0 (L) 2020     2020     2020     BMP:   Lab Results   Component Value Date     2020     2020    POTASSIUM 3.4 2020    POTASSIUM 3.8 2020    CHLORIDE 106 2020    CHLORIDE 111 (H) 2020    CO2 24 2020    CO2 25 2020    BUN 8 2020    BUN 22 2020    CR 1.03 2020    CR 0.98 2020     (H) 2020     (H) 2020     COAGS:   Lab Results   Component Value Date    INR 1.20 (H) 2020     POC:   Lab Results   Component Value Date     (H) 2020     OTHER:   Lab Results   Component Value Date    PH 5.5 2002    ERIC 8.6 2020    ALBUMIN 3.4 2020    PROTTOTAL 6.7 (L) 2020    ALT 68 2020    AST 51 (H) 2020    ALKPHOS 162 (H) 2020    BILITOTAL 1.9 (H) 2020    BILIDIRECT 0.4 2002    LIPASE 585 (H) 2020    AMYLASE 164 (H) 2020    TSH 3.60 2002    T4 0.7 2002        Preop Vitals    BP Readings from Last 3 Encounters:   20 (!) 141/83   20 131/72   20 (!) 130/91    Pulse Readings from Last 3 Encounters:   20 58   20 72   20 71      Resp Readings from Last 3 Encounters:   20 16   20 18   20 16    SpO2 Readings from Last 3 Encounters:   20 100%   20 99%   20 96%      Temp Readings from Last 1 Encounters:   20 98  F (36.7  C) (Oral)    Ht Readings from Last 1 Encounters:   20 1.854 m (6' 1\")      Wt Readings from Last 1 Encounters:   20 92.9 kg (204 lb " "12.9 oz)    Estimated body mass index is 27.02 kg/m  as calculated from the following:    Height as of 4/16/20: 1.854 m (6' 1\").    Weight as of 4/16/20: 92.9 kg (204 lb 12.9 oz).     LDA:        Past Medical History:   Diagnosis Date     Essential hypertension, benign     Hypertension, Benign     Nonspecific abnormal results of liver function study     Hx of elevated LFT's      Past Surgical History:   Procedure Laterality Date     ENDOSCOPIC RETROGRADE CHOLANGIOPANCREATOGRAM N/A 3/31/2020    Procedure: ENDOSCOPIC RETROGRADE CHOLANGIOPANCREATOGRAPHY, WITH with biliary sphincterotomy, biopsies and brushings, biliary stent placement;  Surgeon: Win Strauss MD;  Location: [facility] OR     ENDOSCOPIC RETROGRADE CHOLANGIOPANCREATOGRAM N/A 4/6/2020    Procedure: ENDOSCOPIC RETROGRADE CHOLANGIOPANCREATOGRAPHY;  Surgeon: Win Strauss MD;  Location: [facility] OR     ENDOSCOPIC RETROGRADE CHOLANGIOPANCREATOGRAM WITH SPYGLASS  4/16/2020    Procedure: ENDOSCOPIC RETROGRADE CHOLANGIOPANCREATOGRAPHY, WITH DIRECT DUCT VISUALIZATION, USING PANCREATICOBILIARY FIBEROPTIC PROBE; Bile Duct Stent Exchange and Biopsy,balloon sweep of bile duct;  Surgeon: Win Strauss MD;  Location: [facility] OR     ENDOSCOPIC ULTRASOUND UPPER GASTROINTESTINAL TRACT (GI) N/A 4/16/2020    Procedure: ENDOSCOPIC ULTRASOUND, ESOPHAGOSCOPY / UPPER GASTROINTESTINAL TRACT (GI)with Fine Needle biopsy;  Surgeon: Cody Baumann MD;  Location: [facility] OR     ESOPHAGOSCOPY, GASTROSCOPY, DUODENOSCOPY (EGD), COMBINED N/A 4/6/2020    Procedure: ESOPHAGOGASTRODUODENOSCOPY (EGD);  Surgeon: Win Strauss MD;  Location: [facility] OR      KNEE SCOPE, DIAGNOSTIC  1995    Arthroscopy, Knee; left     HC VASECTOMY UNILAT/BILAT W POSTOP SEMEN      Vasectomy      Allergies   Allergen Reactions     Metformin Other (See Comments)     \" I think my kidneys shut down\"     No Known Drug Allergies         Anesthesia Evaluation     . Pt has had prior anesthetic.     No " history of anesthetic complications          ROS/MED HX    ENT/Pulmonary:     (+)ELENA risk factors snores loudly, hypertension, , . .   (-) tobacco use and asthma   Neurologic:     (+)CVA date: 2010 with deficits- short term memory difficulties,     Cardiovascular:     (+) Dyslipidemia, hypertension----. Taking blood thinners Pt has received instructions: Instructions Given to patient: last dose will be evening of 5/6/2020. . . :. . Previous cardiac testing date:results:date: results:ECG reviewed date:4/5/2020 results:SR  Non specific T abnormalities, lateral leads date: results:          METS/Exercise Tolerance:  3 - Able to walk 1-2 blocks without stopping   Hematologic:  - neg hematologic  ROS       Musculoskeletal:  - neg musculoskeletal ROS       GI/Hepatic:     (+) cholecystitis/cholelithiasis (s/p cholecystectomy 8/2019), Other GI/Hepatic cholangiocarcinoma     (-) GERD   Renal/Genitourinary:  - ROS Renal section negative       Endo:     (+) type II DM Last HgA1c: 6.0 date: 5/4/2020 Using insulin - not using insulin pump .      Psychiatric:         Infectious Disease: Comment: Fever 102 one week ago, had negative COVID test in [place], [place] per patient report  (+) Recent Fever,       Malignancy:   (+) Malignancy History of GI  GI CA Active status post,         Other:    (+) No chance of pregnancy no H/O Chronic Pain,                       PHYSICAL EXAM:   Mental Status/Neuro: A/A/O; Age Appropriate   Airway: Facies: Feasible  Mallampati: III  Mouth/Opening: Full  TM distance: > 6 cm  Neck ROM: Full   Respiratory: Auscultation: CTAB     Resp. Rate: Normal     Resp. Effort: Normal      CV: Rhythm: Regular  Rate: Age appropriate  Heart: Normal Sounds  Edema: None   Comments:      Dental: Normal Dentition                JZG FV AN PLAN NO PONV RULE       PAC Discussion and Assessment    ASA Classification: 3  Case is suitable for: [place]  Anesthetic techniques and relevant risks discussed: GA with regional block  for post-op pain control  Invasive monitoring and risk discussed: No  Types:   Possibility and Risk of blood transfusion discussed: Yes  NPO instructions given:   Additional anesthetic preparation and risks discussed:   Needs early admission to pre-op area:   Other:     PAC Resident/NP Anesthesia Assessment:  Fuentes Estrada is a 65 year old male scheduled for Open right hepatectomy, extra-hepatic bile duct resection, portal lymph node dissection, intraoperative liver ultrasound on 5/12/2020 by Dr. Hale in treatment of cholangiocarcinoma.  PAC referral for risk assessment and optimization for anesthesia with comorbid conditions of hypertension, hyperlipidemia,h/o CVA in 2010, insulin-dependent diabetes:    Pre-operative considerations:  1.  Cardiac:  Functional status- METS 3-4. Pt is very fatigued lately.  He states he can climb a flight of stairs without cardiopulmonary symptoms.  High risk surgery with 11% (RCRI 3) risk of major adverse cardiac event.   -hypertension, lasix and lisinopril at bedtime.  BP of 117/79 in clinic today  -hyperlipidemia, Lipitor at bedtime  -EKG 4/5/2020: SR, non specific T abnormalities, lateral leads  -denies chest pain, SOB, COTO, palpitations    2.  Pulm:  Airway feasible.  ELENA risk: Intermediate  -never smoker    3.  GI:  Risk of PONV score = 3.  If > 2, anti-emetic intervention recommended.  Pt reported PONV after knee surgery 30 years ago.  -s/p cholecystectomy 8/2019  -cholangiocarcinoma, procedure as above    4.  Endo:  -IDDM, will adjust Lantus to 80% of ususal dose evening before surgery.  Oral diabetes medicines also taken in the evening.    5. Neuro  -h/o CVA x 2.  Sept and Dec of 2010.  Has some short term memory difficulties still.  No other deficits.  -on Plavix, last dose scheduled for evening of 5/6/2020    6.  ID:  -had 102 temperature 4/26 for two days.  Had COVID19 test in [place], [place] which was negative per patient report.  -pt will repeat COVID19 test within 72 hours  of DOS    VTE risk: 3%    Patient is optimized and is acceptable candidate for the proposed procedure.      **For further details of assessment, testing, and physical exam please see H and P completed on same date.          Kraon Dent PA-C, [facility]      Reviewed and Signed by PAC Mid-Level Provider/Resident  Mid-Level Provider/Resident: Karon Dent PA-C  Date: 5/4/2020  Time:     Attending Anesthesiologist Anesthesia Assessment:        Anesthesiologist:   Date:   Time:   Pass/Fail:   Disposition:     PAC Pharmacist Assessment:        Pharmacist:   Date:   Time:    aKron Dent PA-C

## 2020-05-04 NOTE — PATIENT INSTRUCTIONS
Preparing for Your Surgery      Name:  Fuentes Estrada   MRN:  [id number]   :  1955   Today's Date:  2020     Arriving for surgery:  Surgery date:  2020  Arrival time:  6:00 am  Due to the COVID 19 crisis, we are trying to keep our patients safe from others who might have respiratory illnesses so the hospital is implementing a no visitor policy.  Please come to:       [facility] [place] [address]  [address] [place]  [address]    - ? parking has been suspended due to the COVID 19 Crisis   -    Please proceed to the Surgery Lounge on the 3rd floor. [phone]?  - ?If you are in need of directions, wheelchair or escort please stop at the Information Desk in the lobby.      What can I eat or drink?  -  You may have solid food or milk products until 8 hours prior to your surgery.(midnight)  -  You may have water, apple juice or 7up/Sprite until 2 hours prior to your surgery.(until 6 am arrival time)    Which medicines can I take?          Hold Aspirin, vitamins and supplements one week prior to surgery.        Hold Ibuprofen for 24 hours and/or Naproxen for 48 hours prior to surgery.         Plan for Plavix (clopidogre)---take last dose on 20    Please take 80% of usual pm insulin glargine (lantus) the evening before surgery---(19 units)      -  Please take these medications the day of surgery  NONE      How do I prepare myself?  -  Take two showers: one the night before surgery; and one the morning of surgery.         Use Scrubcare or Hibiclens to wash from neck down, leave soap on your skin for up to one minute.        Do not get soap in your eyes or ears.  You may use your own shampoo and conditioner; no other hair products.   -  Do NOT use lotion, powder, deodorant, or antiperspirant the day of your surgery.  -  Do NOT wear any makeup, fingernail polish or jewelry.  - Do not bring your own medications to the hospital, except for inhalers and eye    drops.  -  Bring your ID and insurance card.         Questions or Concerns:  -Please call the Pre Admission Nursing Office  at [phone].         -If you have health changes between today and your surgery please call your surgeon.     For questions after surgery please call your surgeons office.           AFTER YOUR SURGERY  Breathing exercises   Breathing exercises help you recover faster. Take deep breaths and let the air out slowly. This will:     Help you wake up after surgery.    Help prevent complications like pneumonia.  Preventing complications will help you go home sooner.   We may give you a breathing device (incentive spirometer) to encourage you to breathe deeply.   Nausea and vomiting   You may feel sick to your stomach after surgery; if so, let your nurse know.    Pain control:  After surgery, you may have pain. Our goal is to help you manage your pain. Pain medicine will help you feel comfortable enough to do activities that will help you heal.  These activities may include breathing exercises, walking and physical therapy.   To help your health care team treat your pain we will ask: 1) If you have pain  2) where it is located 3) describe your pain in your words  Methods of pain control include medications given by mouth, vein or by nerve block for some surgeries.  Sequential Compression Device (SCD):  You may need to wear SCD S (also called pneumo boots)on your legs or feet. These are wraps connected to a machine that pumps in air and releases it. The repeated pumping helps prevent blood clots from forming.

## 2020-05-04 NOTE — H&P
Pre-Operative H & P     CC:  Preoperative exam to assess for increased cardiopulmonary risk while undergoing surgery and anesthesia.    Date of Encounter: 5/4/2020  Primary Care Physician:  Tolu Noland  Associated Diagnosis: cholangiocarcinoma    HPI  Fuentes Estrada is a 65 year old male who presents for pre-operative H & P in preparation for  Open right hepatectomy, extra-hepatic bile duct resection, portal lymph node dissection, intraoperative liver ultrasound  with Dr. Hale on 5/12/2020 at [facility]. General anesthesia with a block.    Guerrero Estrada is a 65-year-old male with a history of hypertension, hyperlipidemia, CVA x2 in 2010, and insulin-dependent diabetes.  Patient had a cholecystectomy in August 2019.  In March 2020 he presented with nausea and vomiting and was found to have a biliary stricture, he then developed jaundice.  He underwent ERCP with failed cannulation on both 3/25/2020 and 3/27/2020 at outside facility.  On 3/31/2020 he underwent ERCP here at the [facility] with successful placement of stent into the common bile duct.  He was then admitted from 4/5/2020 to 4/8/2020 due to GI bleed and acute blood loss anemia.  Subsequent ERCP inpatient revealed a migrated stent out of the biliary tree and mild bleeding at the prior sphincterotomy.  Mr. Estrada had an endoscopic ultrasound as well as a SpyGlass procedure on 4/16/2020. In addition, his chest CT was reviewed by the [facility] and due to the small size of the lung lesions and benign appearing features biopsies were not pursued.  Endoscopic ultrasound revealed an extrahepatic cholangiocarcinoma arising at the cystic duct confluence with the common hepatic duct.  However, he has an aberrant right hepatic duct that drains into the cystic duct and therefore is also obstructed by tumor.  Furthermore, on the endoscopic ultrasound, Dr. Baumann felt that the tumor was extending along the  right posterior duct into the liver parenchyma itself.  A lymph node was biopsied and returned negative.  Patient has been counseled on the above procedure.    Today the patient denies any cardiopulmonary symptoms.  He was tested approximately 1 week ago for COVID-19 and per his report the result was negative.  He will have to be tested again 72 hours prior to the day of surgery.    History is obtained from the patient.     Past Medical History  Past Medical History:   Diagnosis Date     Cerebrovascular accident (CVA) (H) 12/2010     Essential hypertension, benign     Hypertension, Benign     Nonspecific abnormal results of liver function study     Hx of elevated LFT's       Past Surgical History  Past Surgical History:   Procedure Laterality Date     ENDOSCOPIC RETROGRADE CHOLANGIOPANCREATOGRAM N/A 3/31/2020    Procedure: ENDOSCOPIC RETROGRADE CHOLANGIOPANCREATOGRAPHY, WITH with biliary sphincterotomy, biopsies and brushings, biliary stent placement;  Surgeon: Win Strauss MD;  [facility]     ENDOSCOPIC RETROGRADE CHOLANGIOPANCREATOGRAM N/A 4/6/2020    Procedure: ENDOSCOPIC RETROGRADE CHOLANGIOPANCREATOGRAPHY;  Surgeon: Win Strauss MD;  Location: [facility]     ENDOSCOPIC RETROGRADE CHOLANGIOPANCREATOGRAM WITH SPYGLASS  4/16/2020    Procedure: ENDOSCOPIC RETROGRADE CHOLANGIOPANCREATOGRAPHY, WITH DIRECT DUCT VISUALIZATION, USING PANCREATICOBILIARY FIBEROPTIC PROBE; Bile Duct Stent Exchange and Biopsy,balloon sweep of bile duct;  Surgeon: Win Strauss MD;  Location: [facility] OR     ENDOSCOPIC ULTRASOUND UPPER GASTROINTESTINAL TRACT (GI) N/A 4/16/2020    Procedure: ENDOSCOPIC ULTRASOUND, ESOPHAGOSCOPY / UPPER GASTROINTESTINAL TRACT (GI)with Fine Needle biopsy;  Surgeon: Cody Baumann MD;  Location: [facility] OR     ESOPHAGOSCOPY, GASTROSCOPY, DUODENOSCOPY (EGD), COMBINED N/A 4/6/2020    Procedure: ESOPHAGOGASTRODUODENOSCOPY (EGD);  Surgeon: Win Strauss MD;  [facility]     HC  "KNEE SCOPE, DIAGNOSTIC  1995    Arthroscopy, Knee; left     HC VASECTOMY UNILAT/BILAT W POSTOP SEMEN      Vasectomy       Hx of Blood transfusions/reactions: denies     Hx of abnormal bleeding or anti-platelet use: Plavix, last dose prior to surgery will be 5/6/2020    Menstrual history: No LMP for male patient.:     Steroid use in the last year: denies    Personal or FH with difficulty with Anesthesia:  PONV after knee surgery 30 years ago.  No recurrence.    Prior to Admission Medications  Current Outpatient Medications   Medication Sig Dispense Refill     atorvastatin (LIPITOR) 40 MG tablet Take 40 mg by mouth At Bedtime        clopidogrel (PLAVIX) 75 MG tablet Take 75 mg by mouth At Bedtime        empagliflozin (JARDIANCE) 10 MG TABS tablet Take 10 mg by mouth At Bedtime        fenofibrate (TRIGLIDE/LOFIBRA) 160 MG tablet Take 160 mg by mouth At Bedtime        Ferrous Sulfate (IRON) 325 (65 Fe) MG tablet Take 1 tablet by mouth 3 times daily (with meals) 180 tablet 1     furosemide (LASIX) 20 MG tablet Take 20 mg by mouth every evening        glipiZIDE (GLUCOTROL) 10 MG tablet Take 10 mg by mouth At Bedtime        insulin glargine (LANTUS PEN) 100 UNIT/ML pen Inject 24 Units Subcutaneous At Bedtime        lisinopril (ZESTRIL) 30 MG tablet Take 30 mg by mouth every evening        sildenafil (REVATIO) 20 MG tablet Take 20 mg by mouth as needed         Allergies  Allergies   Allergen Reactions     Metformin Other (See Comments)     \" I think my kidneys shut down\"     No Known Drug Allergies        Social History  Social History     Socioeconomic History     Marital status:      Spouse name: Robyn     Number of children: 2     Years of education: 14     Highest education level: Not on file   Occupational History     Occupation: self employed   Social Needs     Financial resource strain: Not on file     Food insecurity     Worry: Not on file     Inability: Not on file     Transportation needs     Medical: Not " on file     Non-medical: Not on file   Tobacco Use     Smoking status: Never Smoker     Smokeless tobacco: Never Used   Substance and Sexual Activity     Alcohol use: Yes     Comment: occaisional      Drug use: No     Sexual activity: Yes     Partners: Female   Lifestyle     Physical activity     Days per week: Not on file     Minutes per session: Not on file     Stress: Not on file   Relationships     Social connections     Talks on phone: Not on file     Gets together: Not on file     Attends Faith service: Not on file     Active member of club or organization: Not on file     Attends meetings of clubs or organizations: Not on file     Relationship status: Not on file     Intimate partner violence     Fear of current or ex partner: Not on file     Emotionally abused: Not on file     Physically abused: Not on file     Forced sexual activity: Not on file   Other Topics Concern      Service No     Blood Transfusions No     Caffeine Concern No     Occupational Exposure No     Hobby Hazards No     Sleep Concern No     Stress Concern No     Weight Concern No     Special Diet No     Back Care No     Exercise Yes     Bike Helmet No     Seat Belt Yes     Self-Exams No   Social History Narrative     Not on file       Family History  Family History   Problem Relation Age of Onset     Arthritis Mother         RA     Diabetes Father         diet controlled     Hypertension Father            Anesthesia Evaluation     . Pt has had prior anesthetic.     No history of anesthetic complications          ROS/MED HX    The complete review of systems is negative other than noted in the HPI or here.   ENT/Pulmonary:     (+)ELENA risk factors snores loudly, hypertension, , . .   (-) tobacco use and asthma   Neurologic:     (+)CVA date: 2010 with deficits- short term memory difficulties,     Cardiovascular:     (+) Dyslipidemia, hypertension----. Taking blood thinners Pt has received instructions: Instructions Given to patient:  "last dose will be evening of 5/6/2020. . . :. . Previous cardiac testing date:results:date: results:ECG reviewed date:4/5/2020 results:SR  Non specific T abnormalities, lateral leads date: results:          METS/Exercise Tolerance:  3 - Able to walk 1-2 blocks without stopping   Hematologic:  - neg hematologic  ROS       Musculoskeletal:  - neg musculoskeletal ROS       GI/Hepatic:     (+) cholecystitis/cholelithiasis (s/p cholecystectomy 8/2019), Other GI/Hepatic cholangiocarcinoma     (-) GERD   Renal/Genitourinary:  - ROS Renal section negative       Endo:     (+) type II DM Last HgA1c: 6.0 date: 5/4/2020 Using insulin - not using insulin pump .      Psychiatric:         Infectious Disease: Comment: Fever 102 one week ago, had negative COVID test in [place], [place] per patient report  (+) Recent Fever,       Malignancy:   (+) Malignancy History of GI  GI CA Active status post,         Other:    (+) No chance of pregnancy no H/O Chronic Pain,           PHYSICAL EXAM:   Mental Status/Neuro: A/A/O; Age Appropriate   Airway: Facies: Feasible  Mallampati: III  Mouth/Opening: Full  TM distance: > 6 cm  Neck ROM: Full   Respiratory: Auscultation: CTAB     Resp. Rate: Normal     Resp. Effort: Normal      CV: Rhythm: Regular  Rate: Age appropriate  Heart: Normal Sounds  Edema: None   Comments:      Dental: Normal Dentition            Temp: 98.1  F (36.7  C) Temp src: Oral BP: 117/79 Pulse: 70   Resp: 15 SpO2: 99 %         205 lbs 0 oz  6' 1\"   Body mass index is 27.05 kg/m .       Physical Exam  Constitutional: Awake, alert, cooperative, no apparent distress, and appears stated age.  Eyes: Pupils equal, round and reactive to light, extra ocular muscles intact, sclera clear, conjunctiva normal.  HENT: Normocephalic, oral pharynx with moist mucus membranes, good dentition. No goiter appreciated.   Respiratory: Clear to auscultation bilaterally, no crackles or wheezing.  Cardiovascular: Regular rate and rhythm, normal S1 and S2, " and no murmur noted.  Carotids +2, no bruits. No edema. Palpable pulses to radial  DP and PT arteries.   GI: Normal bowel sounds  Lymph/Hematologic: No cervical lymphadenopathy and no supraclavicular lymphadenopathy.  Genitourinary:  deferred  Skin: Warm and dry.   Musculoskeletal: Full ROM of neck. There is no redness, warmth, or swelling of the joints. Gross motor strength is normal.    Neurologic: Awake, alert, oriented to name, place and time. Cranial nerves II-XII are grossly intact. Gait is normal.   Neuropsychiatric: Calm, cooperative. Normal affect.     Labs: (personally reviewed)  Component      Latest Ref Rng & Units 5/4/2020   Sodium      133 - 144 mmol/L 135   Potassium      3.4 - 5.3 mmol/L 4.1   Chloride      94 - 109 mmol/L 103   Carbon Dioxide      20 - 32 mmol/L 25   Anion Gap      3 - 14 mmol/L 7   Glucose      70 - 99 mg/dL 296 (H)   Urea Nitrogen      7 - 30 mg/dL 10   Creatinine      0.66 - 1.25 mg/dL 1.19   GFR Estimate      >60 mL/min/1.73:m2 63   GFR Estimate If Black      >60 mL/min/1.73:m2 73   Calcium      8.5 - 10.1 mg/dL 9.3   Bilirubin Total      0.2 - 1.3 mg/dL 1.1   Albumin      3.4 - 5.0 g/dL 3.9   Protein Total      6.8 - 8.8 g/dL 7.7   Alkaline Phosphatase      40 - 150 U/L 162 (H)   ALT      0 - 70 U/L 60   AST      0 - 45 U/L 50 (H)     Component      Latest Ref Rng & Units 5/4/2020   WBC      4.0 - 11.0 10e9/L 7.2   RBC Count      4.4 - 5.9 10e12/L 4.18 (L)   Hemoglobin      13.3 - 17.7 g/dL 12.7 (L)   Hematocrit      40.0 - 53.0 % 39.9 (L)   MCV      78 - 100 fl 96   MCH      26.5 - 33.0 pg 30.4   MCHC      31.5 - 36.5 g/dL 31.8   RDW      10.0 - 15.0 % 14.2   Platelet Count      150 - 450 10e9/L 359     Component      Latest Ref Rng & Units 5/4/2020   INR      0.86 - 1.14 1.16 (H)     Component      Latest Ref Rng & Units 5/4/2020   Prealbumin      15 - 45 mg/dL 23     Component      Latest Ref Rng & Units 5/4/2020   Hemoglobin A1C      0 - 5.6 % 6.0 (H)     EKG: Personally  reviewed but formal cardiology read pendin2020  SR  Non specific T abnormality, lateral leads    CT CHEST W/O CONTRAST 2020 5:58 PM  Impression:   1. Multiple indeterminate sub-5 mm pulmonary nodules in the lungs.  While the 4 mm pulmonary nodule in the medial right lower lobe is  unchanged from 3/23/2020, the remainder of the nodules were not  visualized on the prior abdomen/pelvis CT. Recommend follow-up CT  chest in 3-6 months.  2. Flowing right anterior ossification from T8-T11 with relative  preservation of disc spaces, which may represent diffuse idiopathic  skeletal hyperostosis.  3. Biliary stent within the common bile duct. Additional suspected  biliary stent in the splenic flexure of the colon.    EXAMINATION: CT ABDOMEN PELVIS W CONTRAST, 2020 11:28 AM  IMPRESSION:   1. Ill-defined hazy lesion in the yousuf hepatis and ill-defined  intraluminal density in the central posterior right hepatic duct with  unchanged mild upstream posterior right intrahepatic biliary dilation,  likely in the setting of type IIIB biliary anatomy although this is  not well visualized. Findings are compatible with cholangiocarcinoma.  2. Soft tissue attenuation medial to the cholecystectomy clips may  represent a portion of the mass versus an ill-defined lymph node.  3. Biliary enhancement in the yousuf hepatis involving the central left  and anterior right hepatic ducts may represent neoplasm versus  inflammation from recent intervention.  4. Several borderline enlarged upper abdominal lymph nodes.    ERCP 2020:  Impression:          - Two stents from the pancreatic duct and the common                        bile duct were seen in the major papilla. The covered                        metal stent was removed prior to EUS.                        - The right anterior and left intrahepatic ducts were                        normal on occlusion cholangiogram, while the the right                        posterior duct  was not seen.                        - Direct ductoscopy demonstrated abnormal mucosa                        extending from the papilla to about mid common hepatic                        duct cw tumor at the cystic takeoff level and stent                        induced changes above that to 2 cm below the                        bifurcation, above which mucosa appeared normal.                        - SpyBites biopsies taken form the normal bifurcation                        and the inflammatory apperaring common hepatic duct                        above obvious tumor                        - One 10mm by 80mm covered Viabil metal stent was placed                        into the common bile duct.     Outside records reviewed from: care everywhere    ASSESSMENT and PLAN  Fuentes Estrada is a 65 year old male scheduled for Open right hepatectomy, extra-hepatic bile duct resection, portal lymph node dissection, intraoperative liver ultrasound on 5/12/2020 by Dr. Hale in treatment of cholangiocarcinoma.  PAC referral for risk assessment and optimization for anesthesia with comorbid conditions of hypertension, hyperlipidemia,h/o CVA in 2010, insulin-dependent diabetes:    Pre-operative considerations:  1.  Cardiac:  Functional status- METS 3-4. Pt is very fatigued lately.  He states he can climb a flight of stairs without cardiopulmonary symptoms.  High risk surgery with 11% (RCRI 3) risk of major adverse cardiac event.   -hypertension, lasix and lisinopril at bedtime.  BP of 117/79 in clinic today  -hyperlipidemia, Lipitor at bedtime  -EKG 4/5/2020: SR, non specific T abnormalities, lateral leads  -denies chest pain, SOB, COTO, palpitations    2.  Pulm:  Airway feasible.  ELENA risk: Intermediate  -never smoker    3.  GI:  Risk of PONV score = 3.  If > 2, anti-emetic intervention recommended.  Pt reported PONV after knee surgery 30 years ago.  -s/p cholecystectomy 8/2019  -cholangiocarcinoma, procedure as above    4.   Endo:  -IDDM, will adjust Lantus to 80% of ususal dose evening before surgery.  Oral diabetes medicines also taken in the evening.    5. Neuro  -h/o CVA x 2.  Sept and Dec of 2010.  Has some short term memory difficulties still.  No other deficits.  -on Plavix, last dose scheduled for evening of 5/6/2020    6.  ID:  -had 102 temperature 4/26 for two days.  Had COVID19 test in [place], [place] which was negative per patient report.  -pt will repeat COVID19 test within 72 hours of DOS    VTE risk: 3%    Patient is optimized and is acceptable candidate for the proposed procedure.        Karon Dent PA-C  Preoperative Assessment Center  [facility]  Cl[facility] and [facility] Center  Phone: [phone]  Fax: [phone]

## 2020-05-06 ENCOUNTER — DOCUMENTATION ONLY (OUTPATIENT)
Dept: SURGERY | Facility: CLINIC | Age: 65
End: 2020-05-06

## 2020-05-06 DIAGNOSIS — Z01.818 PREOP EXAMINATION: Primary | ICD-10-CM

## 2020-05-06 NOTE — PROGRESS NOTES
Blood bank contacted me with report that patient received unit RBC 4/6/20.  Pt had denies prior transfusions.  Pt needs redraw of Type and Screen within 72 hours of surgery.  DOS 5/12/2020.  Spoke to patient and his wife, Janeen.  They will be in town 5/9/2020 for COVID19 test.  They now plan on [place] [facility] [place] [facility] for Type and Screen.  Order placed    Karon Dent PA-C

## 2020-05-09 ENCOUNTER — OFFICE VISIT (OUTPATIENT)
Dept: URGENT CARE | Facility: URGENT CARE | Age: 65
End: 2020-05-09
Attending: PHYSICIAN ASSISTANT
Payer: COMMERCIAL

## 2020-05-09 DIAGNOSIS — Z01.818 PREOP EXAMINATION: ICD-10-CM

## 2020-05-09 PROCEDURE — 99207 ZZC NO BILLABLE SERVICE THIS VISIT: CPT

## 2020-05-09 PROCEDURE — 99000 SPECIMEN HANDLING OFFICE-LAB: CPT | Performed by: PHYSICIAN ASSISTANT

## 2020-05-09 PROCEDURE — 87635 SARS-COV-2 COVID-19 AMP PRB: CPT | Performed by: PHYSICIAN ASSISTANT

## 2020-05-10 LAB
SARS-COV-2 RNA SPEC QL NAA+PROBE: NOT DETECTED
SPECIMEN SOURCE: NORMAL

## 2020-05-11 ENCOUNTER — MYC MEDICAL ADVICE (OUTPATIENT)
Dept: SURGERY | Facility: CLINIC | Age: 65
End: 2020-05-11
Payer: COMMERCIAL

## 2020-05-11 ENCOUNTER — DOCUMENTATION ONLY (OUTPATIENT)
Dept: SURGERY | Facility: CLINIC | Age: 65
End: 2020-05-11

## 2020-05-11 ENCOUNTER — ANESTHESIA EVENT (OUTPATIENT)
Dept: SURGERY | Facility: CLINIC | Age: 65
DRG: 405 | End: 2020-05-11
Payer: MEDICARE

## 2020-05-11 ENCOUNTER — HOSPITAL ENCOUNTER (OUTPATIENT)
Facility: CLINIC | Age: 65
Setting detail: SPECIMEN
DRG: 405 | End: 2020-05-11
Attending: SURGERY | Admitting: SURGERY
Payer: MEDICARE

## 2020-05-11 DIAGNOSIS — Z01.818 PREOP EXAMINATION: Primary | ICD-10-CM

## 2020-05-11 DIAGNOSIS — Z01.818 PREOP EXAMINATION: ICD-10-CM

## 2020-05-11 LAB
ABO + RH BLD: NORMAL
ABO + RH BLD: NORMAL
BLD GP AB SCN SERPL QL: NORMAL
BLOOD BANK CMNT PATIENT-IMP: NORMAL
SPECIMEN EXP DATE BLD: NORMAL

## 2020-05-11 PROCEDURE — 86900 BLOOD TYPING SEROLOGIC ABO: CPT | Performed by: PHYSICIAN ASSISTANT

## 2020-05-11 PROCEDURE — 86901 BLOOD TYPING SEROLOGIC RH(D): CPT | Performed by: PHYSICIAN ASSISTANT

## 2020-05-11 PROCEDURE — 86850 RBC ANTIBODY SCREEN: CPT | Performed by: PHYSICIAN ASSISTANT

## 2020-05-11 ASSESSMENT — LIFESTYLE VARIABLES: TOBACCO_USE: 0

## 2020-05-11 NOTE — PROGRESS NOTES
Spoke to Patient's wife, Janeen regarding Fuentes's Type and Screen.  Janeen and Fuentes are coming to the [facility], the evening before the patient surgery.  They are staying at a local hotel.  Patient's wife tells me that she would like to have his type and screen drawn this evening so as to not delay surgery tomorrow.  Patient had a blood transfusion on 4/6/2020.  I contacted the lab at the [facility] and while they do not do outpatient lab, they understand the patient's need to have this done ahead of time so they have instructed him to enter through the emergency department [facility] to have this lab drawn.  Patient's wife expresses understanding.

## 2020-05-11 NOTE — ANESTHESIA PREPROCEDURE EVALUATION
Anesthesia Pre-Procedure Evaluation    Patient: Fuentes Estrada   MRN:     [id number] Gender:   male   Age:    65 year old :      1955        Preoperative Diagnosis: Cholangiocarcinoma determined by biopsy of biliary tract (H) [C24.9]   Procedure(s):  Open right hepatectomy  extra-hepatic bile duct resection  portal lymph node dissection, intraoperative liver ultrasound     LABS:  CBC:   Lab Results   Component Value Date    WBC 7.2 2020    WBC 5.5 2020    HGB 12.7 (L) 2020    HGB 10.3 (L) 2020    HCT 39.9 (L) 2020    HCT 32.7 (L) 2020     2020     2020     BMP:   Lab Results   Component Value Date     2020     2020    POTASSIUM 4.1 2020    POTASSIUM 3.4 2020    CHLORIDE 103 2020    CHLORIDE 106 2020    CO2 25 2020    CO2 24 2020    BUN 10 2020    BUN 8 2020    CR 1.19 2020    CR 1.03 2020     (H) 2020     (H) 2020     COAGS:   Lab Results   Component Value Date    INR 1.16 (H) 2020     POC:   Lab Results   Component Value Date     (H) 2020     OTHER:   Lab Results   Component Value Date    PH 5.5 2002    A1C 6.0 (H) 2020    ERIC 9.3 2020    ALBUMIN 3.9 2020    PROTTOTAL 7.7 2020    ALT 60 2020    AST 50 (H) 2020    ALKPHOS 162 (H) 2020    BILITOTAL 1.1 2020    BILIDIRECT 0.4 2002    LIPASE 585 (H) 2020    AMYLASE 164 (H) 2020    TSH 3.60 2002    T4 0.7 2002        Preop Vitals    BP Readings from Last 3 Encounters:   20 117/79   20 (!) 141/83   20 131/72    Pulse Readings from Last 3 Encounters:   20 70   20 58   20 72      Resp Readings from Last 3 Encounters:   20 15   20 16   20 18    SpO2 Readings from Last 3 Encounters:   20 99%   20 100%   20 99%      Temp  "Readings from Last 1 Encounters:   05/04/20 36.7  C (98.1  F) (Oral)    Ht Readings from Last 1 Encounters:   05/04/20 1.854 m (6' 1\")      Wt Readings from Last 1 Encounters:   05/04/20 93 kg (205 lb)    Estimated body mass index is 27.05 kg/m  as calculated from the following:    Height as of 5/4/20: 1.854 m (6' 1\").    Weight as of 5/4/20: 93 kg (205 lb).     LDA:        Past Medical History:   Diagnosis Date     Cerebrovascular accident (CVA) (H) 12/2010     Essential hypertension, benign     Hypertension, Benign     Nonspecific abnormal results of liver function study     Hx of elevated LFT's      Past Surgical History:   Procedure Laterality Date     ENDOSCOPIC RETROGRADE CHOLANGIOPANCREATOGRAM N/A 3/31/2020    Procedure: ENDOSCOPIC RETROGRADE CHOLANGIOPANCREATOGRAPHY, WITH with biliary sphincterotomy, biopsies and brushings, biliary stent placement;  Surgeon: Win Strauss MD;  Location: [facility] OR     ENDOSCOPIC RETROGRADE CHOLANGIOPANCREATOGRAM N/A 4/6/2020    Procedure: ENDOSCOPIC RETROGRADE CHOLANGIOPANCREATOGRAPHY;  Surgeon: Win Strauss MD;  [facility]     ENDOSCOPIC RETROGRADE CHOLANGIOPANCREATOGRAM WITH SPYGLASS  4/16/2020    Procedure: ENDOSCOPIC RETROGRADE CHOLANGIOPANCREATOGRAPHY, WITH DIRECT DUCT VISUALIZATION, USING PANCREATICOBILIARY FIBEROPTIC PROBE; Bile Duct Stent Exchange and Biopsy,balloon sweep of bile duct;  Surgeon: Win Strauss MD;  [facility]     ENDOSCOPIC ULTRASOUND UPPER GASTROINTESTINAL TRACT (GI) N/A 4/16/2020    Procedure: ENDOSCOPIC ULTRASOUND, ESOPHAGOSCOPY / UPPER GASTROINTESTINAL TRACT (GI)with Fine Needle biopsy;  Surgeon: Cody Baumann MD;  [facility]     ESOPHAGOSCOPY, GASTROSCOPY, DUODENOSCOPY (EGD), COMBINED N/A 4/6/2020    Procedure: ESOPHAGOGASTRODUODENOSCOPY (EGD);  Surgeon: Win Strauss MD;  Location[facility]      KNEE SCOPE, DIAGNOSTIC  1995    Arthroscopy, Knee; left     HC VASECTOMY UNILAT/BILAT W POSTOP " "SEMEN      Vasectomy      Allergies   Allergen Reactions     Metformin Other (See Comments)     \" I think my kidneys shut down\"     No Known Drug Allergies         Anesthesia Evaluation     . Pt has had prior anesthetic.     No history of anesthetic complications          ROS/MED HX    ENT/Pulmonary:     (+)ELENA risk factors snores loudly, hypertension, , . .   (-) tobacco use   Neurologic:     (+)CVA date: 2010 with deficits- short term memory difficulties,     Cardiovascular:     (+) Dyslipidemia, hypertension----. Taking blood thinners Pt has received instructions: Instructions Given to patient: last dose will be evening of 5/6/2020. . . :. . Previous cardiac testing date:results:date: results:ECG reviewed date:4/5/2020 results:SR  Non specific T abnormalities, lateral leads date: results:          METS/Exercise Tolerance:  3 - Able to walk 1-2 blocks without stopping   Hematologic:  - neg hematologic  ROS       Musculoskeletal:  - neg musculoskeletal ROS       GI/Hepatic:     (+) cholecystitis/cholelithiasis (s/p cholecystectomy 8/2019), Other GI/Hepatic cholangiocarcinoma     (-) GERD   Renal/Genitourinary:  - ROS Renal section negative       Endo:     (+) type II DM Last HgA1c: 6.0 date: 5/4/2020 Using insulin - not using insulin pump .      Psychiatric:         Infectious Disease: Comment: Fever 102 one week ago, had negative COVID test in [place], [place] per patient report  (+) Recent Fever,       Malignancy:   (+) Malignancy History of GI  GI CA Active status post,         Other:    - neg other ROS                     PHYSICAL EXAM:   Mental Status/Neuro: A/A/O; Age Appropriate   Airway: Facies: Feasible  Mallampati: II  Mouth/Opening: Full  TM distance: > 6 cm  Neck ROM: Full   Respiratory: Auscultation: CTAB     Resp. Rate: Normal     Resp. Effort: Normal      CV: Rhythm: Regular  Rate: Age appropriate  Heart: Normal Sounds  Edema: None   Comments:                      Assessment:   ASA SCORE: 3    H&P: History " and physical reviewed and following examination; no interval change.   Smoking Status:  Non-Smoker/Unknown   NPO Status: NPO Appropriate     Plan:   Anes. Type:  General; Epidural; For Post-op pain in coordination with surgeon     Epidural Details:  Catheter; Thoracic   Pre-Medication: None   Induction:  IV (Standard)   Airway: ETT; Oral   Access/Monitoring: PIV; 2nd PIV; A-Line; FloTrac   Maintenance: Balanced     Blood products: PRBC     Drips/Meds: Dexmedetomidine; Sufentanil; Phenylephrine     Advanced Monitoring: BIS     Postop Plan:   Postop Pain: Opioids  Postop Sedation/Airway: Not planned  Disposition: Inpatient/Admit     PONV Management:   Adult Risk Factors:, Non-Smoker, Postop Opioids   Prevention: Ondansetron, Dexamethasone, Propofol     CONSENT: Direct conversation   Plan and risks discussed with: Patient   Blood Products: Consented (ALL Blood Products)                   Bashir Sanchez MD

## 2020-05-12 ENCOUNTER — HOSPITAL ENCOUNTER (INPATIENT)
Facility: CLINIC | Age: 65
LOS: 6 days | Discharge: HOME | DRG: 405 | End: 2020-05-18
Attending: SURGERY | Admitting: SURGERY
Payer: MEDICARE

## 2020-05-12 ENCOUNTER — ANESTHESIA (OUTPATIENT)
Dept: SURGERY | Facility: CLINIC | Age: 65
DRG: 405 | End: 2020-05-12
Payer: MEDICARE

## 2020-05-12 DIAGNOSIS — Z78.9 DEEP VEIN THROMBOSIS (DVT) PROPHYLAXIS PRESCRIBED AT DISCHARGE: Primary | ICD-10-CM

## 2020-05-12 DIAGNOSIS — C22.1 CHOLANGIOCARCINOMA (H): ICD-10-CM

## 2020-05-12 DIAGNOSIS — C24.9 CHOLANGIOCARCINOMA DETERMINED BY BIOPSY OF BILIARY TRACT (H): ICD-10-CM

## 2020-05-12 DIAGNOSIS — R18.8 INTRAABDOMINAL FLUID COLLECTION: ICD-10-CM

## 2020-05-12 LAB
ALBUMIN SERPL-MCNC: 3 G/DL (ref 3.4–5)
ALP SERPL-CCNC: 101 U/L (ref 40–150)
ALT SERPL W P-5'-P-CCNC: 215 U/L (ref 0–70)
ANION GAP SERPL CALCULATED.3IONS-SCNC: 8 MMOL/L (ref 3–14)
AST SERPL W P-5'-P-CCNC: 321 U/L (ref 0–45)
BASE DEFICIT BLDA-SCNC: 0.5 MMOL/L
BASE DEFICIT BLDA-SCNC: 0.6 MMOL/L
BASE DEFICIT BLDA-SCNC: 0.7 MMOL/L
BASE DEFICIT BLDA-SCNC: 1.8 MMOL/L
BASE DEFICIT BLDA-SCNC: 2.5 MMOL/L
BILIRUB SERPL-MCNC: 2 MG/DL (ref 0.2–1.3)
BUN SERPL-MCNC: 13 MG/DL (ref 7–30)
CA-I BLD-MCNC: 3.9 MG/DL (ref 4.4–5.2)
CA-I BLD-MCNC: 4.4 MG/DL (ref 4.4–5.2)
CA-I BLD-MCNC: 4.6 MG/DL (ref 4.4–5.2)
CA-I BLD-MCNC: 4.6 MG/DL (ref 4.4–5.2)
CA-I BLD-MCNC: 4.8 MG/DL (ref 4.4–5.2)
CALCIUM SERPL-MCNC: 8.3 MG/DL (ref 8.5–10.1)
CHLORIDE SERPL-SCNC: 108 MMOL/L (ref 94–109)
CO2 SERPL-SCNC: 23 MMOL/L (ref 20–32)
CREAT SERPL-MCNC: 0.93 MG/DL (ref 0.66–1.25)
CREAT SERPL-MCNC: 1.02 MG/DL (ref 0.66–1.25)
ERYTHROCYTE [DISTWIDTH] IN BLOOD BY AUTOMATED COUNT: 13.2 % (ref 10–15)
GFR SERPL CREATININE-BSD FRML MDRD: 76 ML/MIN/{1.73_M2}
GFR SERPL CREATININE-BSD FRML MDRD: 85 ML/MIN/{1.73_M2}
GLUCOSE BLD-MCNC: 200 MG/DL (ref 70–99)
GLUCOSE BLD-MCNC: 203 MG/DL (ref 70–99)
GLUCOSE BLD-MCNC: 205 MG/DL (ref 70–99)
GLUCOSE BLD-MCNC: 211 MG/DL (ref 70–99)
GLUCOSE BLD-MCNC: 219 MG/DL (ref 70–99)
GLUCOSE BLDC GLUCOMTR-MCNC: 154 MG/DL (ref 70–99)
GLUCOSE BLDC GLUCOMTR-MCNC: 160 MG/DL (ref 70–99)
GLUCOSE BLDC GLUCOMTR-MCNC: 174 MG/DL (ref 70–99)
GLUCOSE SERPL-MCNC: 171 MG/DL (ref 70–99)
GRAM STN SPEC: ABNORMAL
HCO3 BLD-SCNC: 20 MMOL/L (ref 21–28)
HCO3 BLD-SCNC: 22 MMOL/L (ref 21–28)
HCO3 BLD-SCNC: 23 MMOL/L (ref 21–28)
HCT VFR BLD AUTO: 34.6 % (ref 40–53)
HGB BLD-MCNC: 11 G/DL (ref 13.3–17.7)
HGB BLD-MCNC: 11.1 G/DL (ref 13.3–17.7)
HGB BLD-MCNC: 11.9 G/DL (ref 13.3–17.7)
HGB BLD-MCNC: 12.8 G/DL (ref 13.3–17.7)
HGB BLD-MCNC: 12.9 G/DL (ref 13.3–17.7)
HGB BLD-MCNC: 13 G/DL (ref 13.3–17.7)
HGB BLD-MCNC: 13.1 G/DL (ref 13.3–17.7)
INR PPP: 1.4 (ref 0.86–1.14)
INTERPRETATION ECG - MUSE: NORMAL
LACTATE BLD-SCNC: 1.1 MMOL/L (ref 0.7–2)
LACTATE BLD-SCNC: 1.2 MMOL/L (ref 0.7–2)
LACTATE BLD-SCNC: 1.4 MMOL/L (ref 0.7–2)
LACTATE BLD-SCNC: 2 MMOL/L (ref 0.7–2)
MAGNESIUM SERPL-MCNC: 2 MG/DL (ref 1.6–2.3)
MCH RBC QN AUTO: 30 PG (ref 26.5–33)
MCHC RBC AUTO-ENTMCNC: 32.1 G/DL (ref 31.5–36.5)
MCV RBC AUTO: 94 FL (ref 78–100)
O2/TOTAL GAS SETTING VFR VENT: 30 %
O2/TOTAL GAS SETTING VFR VENT: 40 %
OXYHGB MFR BLD: 98 % (ref 92–100)
PCO2 BLD: 29 MM HG (ref 35–45)
PCO2 BLD: 32 MM HG (ref 35–45)
PCO2 BLD: 32 MM HG (ref 35–45)
PCO2 BLD: 33 MM HG (ref 35–45)
PCO2 BLD: 35 MM HG (ref 35–45)
PH BLD: 7.43 PH (ref 7.35–7.45)
PH BLD: 7.44 PH (ref 7.35–7.45)
PH BLD: 7.45 PH (ref 7.35–7.45)
PH BLD: 7.45 PH (ref 7.35–7.45)
PH BLD: 7.46 PH (ref 7.35–7.45)
PHOSPHATE SERPL-MCNC: 3.1 MG/DL (ref 2.5–4.5)
PLATELET # BLD AUTO: 382 10E9/L (ref 150–450)
PO2 BLD: 153 MM HG (ref 80–105)
PO2 BLD: 156 MM HG (ref 80–105)
PO2 BLD: 159 MM HG (ref 80–105)
PO2 BLD: 164 MM HG (ref 80–105)
PO2 BLD: 179 MM HG (ref 80–105)
POTASSIUM BLD-SCNC: 3.8 MMOL/L (ref 3.4–5.3)
POTASSIUM BLD-SCNC: 3.9 MMOL/L (ref 3.4–5.3)
POTASSIUM BLD-SCNC: 4 MMOL/L (ref 3.4–5.3)
POTASSIUM BLD-SCNC: 4.4 MMOL/L (ref 3.4–5.3)
POTASSIUM BLD-SCNC: 4.8 MMOL/L (ref 3.4–5.3)
POTASSIUM SERPL-SCNC: 4.3 MMOL/L (ref 3.4–5.3)
POTASSIUM SERPL-SCNC: 4.4 MMOL/L (ref 3.4–5.3)
PROT SERPL-MCNC: 5.7 G/DL (ref 6.8–8.8)
RBC # BLD AUTO: 3.7 10E12/L (ref 4.4–5.9)
SODIUM BLD-SCNC: 137 MMOL/L (ref 133–144)
SODIUM BLD-SCNC: 138 MMOL/L (ref 133–144)
SODIUM BLD-SCNC: 139 MMOL/L (ref 133–144)
SODIUM SERPL-SCNC: 139 MMOL/L (ref 133–144)
SPECIMEN SOURCE: ABNORMAL
WBC # BLD AUTO: 13.3 10E9/L (ref 4–11)

## 2020-05-12 PROCEDURE — 40000171 ZZH STATISTIC PRE-PROCEDURE ASSESSMENT III: Performed by: SURGERY

## 2020-05-12 PROCEDURE — 85027 COMPLETE CBC AUTOMATED: CPT | Performed by: NEUROLOGICAL SURGERY

## 2020-05-12 PROCEDURE — 25800030 ZZH RX IP 258 OP 636: Performed by: STUDENT IN AN ORGANIZED HEALTH CARE EDUCATION/TRAINING PROGRAM

## 2020-05-12 PROCEDURE — 25000128 H RX IP 250 OP 636: Performed by: STUDENT IN AN ORGANIZED HEALTH CARE EDUCATION/TRAINING PROGRAM

## 2020-05-12 PROCEDURE — 85018 HEMOGLOBIN: CPT | Performed by: ANESTHESIOLOGY

## 2020-05-12 PROCEDURE — 87070 CULTURE OTHR SPECIMN AEROBIC: CPT | Performed by: SURGERY

## 2020-05-12 PROCEDURE — 36000070 ZZH SURGERY LEVEL 5 EA 15 ADDTL MIN - UMMC: Performed by: SURGERY

## 2020-05-12 PROCEDURE — 25800030 ZZH RX IP 258 OP 636: Performed by: ANESTHESIOLOGY

## 2020-05-12 PROCEDURE — 40000014 ZZH STATISTIC ARTERIAL MONITORING DAILY

## 2020-05-12 PROCEDURE — 87015 SPECIMEN INFECT AGNT CONCNTJ: CPT | Performed by: SURGERY

## 2020-05-12 PROCEDURE — 87076 CULTURE ANAEROBE IDENT EACH: CPT | Performed by: SURGERY

## 2020-05-12 PROCEDURE — 88331 PATH CONSLTJ SURG 1 BLK 1SPC: CPT | Performed by: SURGERY

## 2020-05-12 PROCEDURE — 0F160ZB BYPASS LEFT HEPATIC DUCT TO SMALL INTESTINE, OPEN APPROACH: ICD-10-PCS | Performed by: SURGERY

## 2020-05-12 PROCEDURE — 25800030 ZZH RX IP 258 OP 636: Performed by: NEUROLOGICAL SURGERY

## 2020-05-12 PROCEDURE — 00000146 ZZHCL STATISTIC GLUCOSE BY METER IP

## 2020-05-12 PROCEDURE — P9041 ALBUMIN (HUMAN),5%, 50ML: HCPCS | Performed by: STUDENT IN AN ORGANIZED HEALTH CARE EDUCATION/TRAINING PROGRAM

## 2020-05-12 PROCEDURE — 83735 ASSAY OF MAGNESIUM: CPT | Performed by: NEUROLOGICAL SURGERY

## 2020-05-12 PROCEDURE — 07TD0ZZ RESECTION OF AORTIC LYMPHATIC, OPEN APPROACH: ICD-10-PCS | Performed by: SURGERY

## 2020-05-12 PROCEDURE — 25000125 ZZHC RX 250: Performed by: STUDENT IN AN ORGANIZED HEALTH CARE EDUCATION/TRAINING PROGRAM

## 2020-05-12 PROCEDURE — 25000128 H RX IP 250 OP 636

## 2020-05-12 PROCEDURE — 88304 TISSUE EXAM BY PATHOLOGIST: CPT | Performed by: SURGERY

## 2020-05-12 PROCEDURE — 40000275 ZZH STATISTIC RCP TIME EA 10 MIN

## 2020-05-12 PROCEDURE — 82565 ASSAY OF CREATININE: CPT | Performed by: ANESTHESIOLOGY

## 2020-05-12 PROCEDURE — 25000128 H RX IP 250 OP 636: Performed by: NEUROLOGICAL SURGERY

## 2020-05-12 PROCEDURE — 25000132 ZZH RX MED GY IP 250 OP 250 PS 637: Performed by: STUDENT IN AN ORGANIZED HEALTH CARE EDUCATION/TRAINING PROGRAM

## 2020-05-12 PROCEDURE — 37000008 ZZH ANESTHESIA TECHNICAL FEE, 1ST 30 MIN: Performed by: SURGERY

## 2020-05-12 PROCEDURE — 36000068 ZZH SURGERY LEVEL 5 1ST 30 MIN - UMMC: Performed by: SURGERY

## 2020-05-12 PROCEDURE — 85610 PROTHROMBIN TIME: CPT | Performed by: NEUROLOGICAL SURGERY

## 2020-05-12 PROCEDURE — 71000015 ZZH RECOVERY PHASE 1 LEVEL 2 EA ADDTL HR: Performed by: SURGERY

## 2020-05-12 PROCEDURE — 87075 CULTR BACTERIA EXCEPT BLOOD: CPT | Performed by: SURGERY

## 2020-05-12 PROCEDURE — 25000131 ZZH RX MED GY IP 250 OP 636 PS 637: Performed by: NEUROLOGICAL SURGERY

## 2020-05-12 PROCEDURE — 0FT10ZZ RESECTION OF RIGHT LOBE LIVER, OPEN APPROACH: ICD-10-PCS | Performed by: SURGERY

## 2020-05-12 PROCEDURE — 88305 TISSUE EXAM BY PATHOLOGIST: CPT | Performed by: SURGERY

## 2020-05-12 PROCEDURE — 36415 COLL VENOUS BLD VENIPUNCTURE: CPT | Performed by: ANESTHESIOLOGY

## 2020-05-12 PROCEDURE — 25000128 H RX IP 250 OP 636: Performed by: ANESTHESIOLOGY

## 2020-05-12 PROCEDURE — 82947 ASSAY GLUCOSE BLOOD QUANT: CPT | Performed by: SURGERY

## 2020-05-12 PROCEDURE — 25800030 ZZH RX IP 258 OP 636: Performed by: NURSE ANESTHETIST, CERTIFIED REGISTERED

## 2020-05-12 PROCEDURE — 25000566 ZZH SEVOFLURANE, EA 15 MIN: Performed by: SURGERY

## 2020-05-12 PROCEDURE — 25000125 ZZHC RX 250: Performed by: NURSE ANESTHETIST, CERTIFIED REGISTERED

## 2020-05-12 PROCEDURE — 93010 ELECTROCARDIOGRAM REPORT: CPT | Mod: 59 | Performed by: INTERNAL MEDICINE

## 2020-05-12 PROCEDURE — 82803 BLOOD GASES ANY COMBINATION: CPT | Performed by: SURGERY

## 2020-05-12 PROCEDURE — 87185 SC STD ENZYME DETCJ PER NZM: CPT | Performed by: SURGERY

## 2020-05-12 PROCEDURE — 0HB7XZZ EXCISION OF ABDOMEN SKIN, EXTERNAL APPROACH: ICD-10-PCS | Performed by: SURGERY

## 2020-05-12 PROCEDURE — 82330 ASSAY OF CALCIUM: CPT | Performed by: SURGERY

## 2020-05-12 PROCEDURE — 25000128 H RX IP 250 OP 636: Performed by: NURSE ANESTHETIST, CERTIFIED REGISTERED

## 2020-05-12 PROCEDURE — 87077 CULTURE AEROBIC IDENTIFY: CPT | Performed by: SURGERY

## 2020-05-12 PROCEDURE — 40000065 ZZH STATISTIC EKG NON-CHARGEABLE

## 2020-05-12 PROCEDURE — BF0C1ZZ PLAIN RADIOGRAPHY OF HEPATOBILIARY SYSTEM, ALL USING LOW OSMOLAR CONTRAST: ICD-10-PCS | Performed by: SURGERY

## 2020-05-12 PROCEDURE — 87205 SMEAR GRAM STAIN: CPT | Performed by: SURGERY

## 2020-05-12 PROCEDURE — 25000131 ZZH RX MED GY IP 250 OP 636 PS 637: Performed by: SURGERY

## 2020-05-12 PROCEDURE — 82810 BLOOD GASES O2 SAT ONLY: CPT | Performed by: SURGERY

## 2020-05-12 PROCEDURE — 84295 ASSAY OF SERUM SODIUM: CPT | Performed by: SURGERY

## 2020-05-12 PROCEDURE — 12000001 ZZH R&B MED SURG/OB UMMC

## 2020-05-12 PROCEDURE — 80053 COMPREHEN METABOLIC PANEL: CPT | Performed by: NEUROLOGICAL SURGERY

## 2020-05-12 PROCEDURE — 87186 SC STD MICRODIL/AGAR DIL: CPT | Performed by: SURGERY

## 2020-05-12 PROCEDURE — 84132 ASSAY OF SERUM POTASSIUM: CPT | Performed by: ANESTHESIOLOGY

## 2020-05-12 PROCEDURE — 25800030 ZZH RX IP 258 OP 636

## 2020-05-12 PROCEDURE — 84100 ASSAY OF PHOSPHORUS: CPT | Performed by: NEUROLOGICAL SURGERY

## 2020-05-12 PROCEDURE — 88307 TISSUE EXAM BY PATHOLOGIST: CPT | Performed by: SURGERY

## 2020-05-12 PROCEDURE — 27210794 ZZH OR GENERAL SUPPLY STERILE: Performed by: SURGERY

## 2020-05-12 PROCEDURE — 83605 ASSAY OF LACTIC ACID: CPT | Performed by: SURGERY

## 2020-05-12 PROCEDURE — 71000014 ZZH RECOVERY PHASE 1 LEVEL 2 FIRST HR: Performed by: SURGERY

## 2020-05-12 PROCEDURE — 84132 ASSAY OF SERUM POTASSIUM: CPT | Performed by: SURGERY

## 2020-05-12 PROCEDURE — 37000009 ZZH ANESTHESIA TECHNICAL FEE, EACH ADDTL 15 MIN: Performed by: SURGERY

## 2020-05-12 PROCEDURE — 88309 TISSUE EXAM BY PATHOLOGIST: CPT | Performed by: SURGERY

## 2020-05-12 RX ORDER — CEFOXITIN 1 G/1
1 INJECTION, POWDER, FOR SOLUTION INTRAVENOUS
Status: DISCONTINUED | OUTPATIENT
Start: 2020-05-12 | End: 2020-05-12 | Stop reason: HOSPADM

## 2020-05-12 RX ORDER — ACETAMINOPHEN 325 MG/1
975 TABLET ORAL ONCE
Status: COMPLETED | OUTPATIENT
Start: 2020-05-12 | End: 2020-05-12

## 2020-05-12 RX ORDER — NALOXONE HYDROCHLORIDE 0.4 MG/ML
.1-.4 INJECTION, SOLUTION INTRAMUSCULAR; INTRAVENOUS; SUBCUTANEOUS
Status: DISCONTINUED | OUTPATIENT
Start: 2020-05-12 | End: 2020-05-12

## 2020-05-12 RX ORDER — HYDRALAZINE HYDROCHLORIDE 20 MG/ML
2.5-5 INJECTION INTRAMUSCULAR; INTRAVENOUS EVERY 10 MIN PRN
Status: DISCONTINUED | OUTPATIENT
Start: 2020-05-12 | End: 2020-05-12 | Stop reason: HOSPADM

## 2020-05-12 RX ORDER — FENTANYL CITRATE 50 UG/ML
25-50 INJECTION, SOLUTION INTRAMUSCULAR; INTRAVENOUS
Status: DISCONTINUED | OUTPATIENT
Start: 2020-05-12 | End: 2020-05-12 | Stop reason: HOSPADM

## 2020-05-12 RX ORDER — FUROSEMIDE 10 MG/ML
INJECTION INTRAMUSCULAR; INTRAVENOUS PRN
Status: DISCONTINUED | OUTPATIENT
Start: 2020-05-12 | End: 2020-05-12

## 2020-05-12 RX ORDER — DEXTROSE MONOHYDRATE 100 MG/ML
INJECTION, SOLUTION INTRAVENOUS CONTINUOUS PRN
Status: DISCONTINUED | OUTPATIENT
Start: 2020-05-12 | End: 2020-05-18 | Stop reason: HOSPADM

## 2020-05-12 RX ORDER — ONDANSETRON 2 MG/ML
4 INJECTION INTRAMUSCULAR; INTRAVENOUS EVERY 30 MIN PRN
Status: DISCONTINUED | OUTPATIENT
Start: 2020-05-12 | End: 2020-05-12 | Stop reason: HOSPADM

## 2020-05-12 RX ORDER — ALBUTEROL SULFATE 0.83 MG/ML
2.5 SOLUTION RESPIRATORY (INHALATION) EVERY 4 HOURS PRN
Status: DISCONTINUED | OUTPATIENT
Start: 2020-05-12 | End: 2020-05-12 | Stop reason: HOSPADM

## 2020-05-12 RX ORDER — SODIUM CHLORIDE, SODIUM GLUCONATE, SODIUM ACETATE, POTASSIUM CHLORIDE AND MAGNESIUM CHLORIDE 526; 502; 368; 37; 30 MG/100ML; MG/100ML; MG/100ML; MG/100ML; MG/100ML
INJECTION, SOLUTION INTRAVENOUS CONTINUOUS PRN
Status: DISCONTINUED | OUTPATIENT
Start: 2020-05-12 | End: 2020-05-12

## 2020-05-12 RX ORDER — DEXAMETHASONE SODIUM PHOSPHATE 4 MG/ML
INJECTION, SOLUTION INTRA-ARTICULAR; INTRALESIONAL; INTRAMUSCULAR; INTRAVENOUS; SOFT TISSUE PRN
Status: DISCONTINUED | OUTPATIENT
Start: 2020-05-12 | End: 2020-05-12

## 2020-05-12 RX ORDER — SODIUM CHLORIDE, SODIUM LACTATE, POTASSIUM CHLORIDE, CALCIUM CHLORIDE 600; 310; 30; 20 MG/100ML; MG/100ML; MG/100ML; MG/100ML
INJECTION, SOLUTION INTRAVENOUS CONTINUOUS
Status: DISCONTINUED | OUTPATIENT
Start: 2020-05-12 | End: 2020-05-12 | Stop reason: HOSPADM

## 2020-05-12 RX ORDER — GABAPENTIN 300 MG/1
300 CAPSULE ORAL ONCE
Status: COMPLETED | OUTPATIENT
Start: 2020-05-12 | End: 2020-05-12

## 2020-05-12 RX ORDER — LIDOCAINE 40 MG/G
CREAM TOPICAL
Status: DISCONTINUED | OUTPATIENT
Start: 2020-05-12 | End: 2020-05-18 | Stop reason: HOSPADM

## 2020-05-12 RX ORDER — CALCIUM CHLORIDE 100 MG/ML
INJECTION INTRAVENOUS; INTRAVENTRICULAR PRN
Status: DISCONTINUED | OUTPATIENT
Start: 2020-05-12 | End: 2020-05-12

## 2020-05-12 RX ORDER — NALOXONE HYDROCHLORIDE 0.4 MG/ML
.1-.4 INJECTION, SOLUTION INTRAMUSCULAR; INTRAVENOUS; SUBCUTANEOUS
Status: DISCONTINUED | OUTPATIENT
Start: 2020-05-12 | End: 2020-05-18 | Stop reason: HOSPADM

## 2020-05-12 RX ORDER — NALOXONE HYDROCHLORIDE 0.4 MG/ML
.1-.4 INJECTION, SOLUTION INTRAMUSCULAR; INTRAVENOUS; SUBCUTANEOUS
Status: DISCONTINUED | OUTPATIENT
Start: 2020-05-12 | End: 2020-05-12 | Stop reason: HOSPADM

## 2020-05-12 RX ORDER — NICOTINE POLACRILEX 4 MG
15-30 LOZENGE BUCCAL
Status: DISCONTINUED | OUTPATIENT
Start: 2020-05-12 | End: 2020-05-18 | Stop reason: HOSPADM

## 2020-05-12 RX ORDER — SODIUM CHLORIDE, SODIUM LACTATE, POTASSIUM CHLORIDE, CALCIUM CHLORIDE 600; 310; 30; 20 MG/100ML; MG/100ML; MG/100ML; MG/100ML
INJECTION, SOLUTION INTRAVENOUS CONTINUOUS
Status: DISCONTINUED | OUTPATIENT
Start: 2020-05-12 | End: 2020-05-14

## 2020-05-12 RX ORDER — DEXMEDETOMIDINE HYDROCHLORIDE 4 UG/ML
0.2-0.7 INJECTION, SOLUTION INTRAVENOUS CONTINUOUS
Status: DISCONTINUED | OUTPATIENT
Start: 2020-05-12 | End: 2020-05-12 | Stop reason: HOSPADM

## 2020-05-12 RX ORDER — ALBUMIN HUMAN 5 %
INTRAVENOUS SOLUTION INTRAVENOUS PRN
Status: DISCONTINUED | OUTPATIENT
Start: 2020-05-12 | End: 2020-05-12

## 2020-05-12 RX ORDER — SODIUM CHLORIDE, SODIUM LACTATE, POTASSIUM CHLORIDE, CALCIUM CHLORIDE 600; 310; 30; 20 MG/100ML; MG/100ML; MG/100ML; MG/100ML
INJECTION, SOLUTION INTRAVENOUS CONTINUOUS PRN
Status: DISCONTINUED | OUTPATIENT
Start: 2020-05-12 | End: 2020-05-12

## 2020-05-12 RX ORDER — NALBUPHINE HYDROCHLORIDE 20 MG/ML
2.5-5 INJECTION, SOLUTION INTRAMUSCULAR; INTRAVENOUS; SUBCUTANEOUS EVERY 6 HOURS PRN
Status: DISCONTINUED | OUTPATIENT
Start: 2020-05-12 | End: 2020-05-18 | Stop reason: HOSPADM

## 2020-05-12 RX ORDER — LIDOCAINE 40 MG/G
CREAM TOPICAL
Status: DISCONTINUED | OUTPATIENT
Start: 2020-05-12 | End: 2020-05-12 | Stop reason: HOSPADM

## 2020-05-12 RX ORDER — HYDROMORPHONE HYDROCHLORIDE 1 MG/ML
.3-.5 INJECTION, SOLUTION INTRAMUSCULAR; INTRAVENOUS; SUBCUTANEOUS EVERY 5 MIN PRN
Status: DISCONTINUED | OUTPATIENT
Start: 2020-05-12 | End: 2020-05-12 | Stop reason: HOSPADM

## 2020-05-12 RX ORDER — CEFOXITIN 2 G/1
2 INJECTION, POWDER, FOR SOLUTION INTRAVENOUS
Status: COMPLETED | OUTPATIENT
Start: 2020-05-12 | End: 2020-05-12

## 2020-05-12 RX ORDER — DEXTROSE MONOHYDRATE 25 G/50ML
25-50 INJECTION, SOLUTION INTRAVENOUS
Status: DISCONTINUED | OUTPATIENT
Start: 2020-05-12 | End: 2020-05-12

## 2020-05-12 RX ORDER — LIDOCAINE HYDROCHLORIDE AND EPINEPHRINE 15; 5 MG/ML; UG/ML
INJECTION, SOLUTION EPIDURAL PRN
Status: DISCONTINUED | OUTPATIENT
Start: 2020-05-12 | End: 2020-05-12

## 2020-05-12 RX ORDER — DEXTROSE MONOHYDRATE 25 G/50ML
25-50 INJECTION, SOLUTION INTRAVENOUS
Status: DISCONTINUED | OUTPATIENT
Start: 2020-05-12 | End: 2020-05-18 | Stop reason: HOSPADM

## 2020-05-12 RX ORDER — ONDANSETRON 2 MG/ML
INJECTION INTRAMUSCULAR; INTRAVENOUS PRN
Status: DISCONTINUED | OUTPATIENT
Start: 2020-05-12 | End: 2020-05-12

## 2020-05-12 RX ORDER — HEPARIN SODIUM 5000 [USP'U]/.5ML
5000 INJECTION, SOLUTION INTRAVENOUS; SUBCUTANEOUS
Status: COMPLETED | OUTPATIENT
Start: 2020-05-12 | End: 2020-05-12

## 2020-05-12 RX ORDER — NICOTINE POLACRILEX 4 MG
15-30 LOZENGE BUCCAL
Status: DISCONTINUED | OUTPATIENT
Start: 2020-05-12 | End: 2020-05-12

## 2020-05-12 RX ORDER — FLUMAZENIL 0.1 MG/ML
0.2 INJECTION, SOLUTION INTRAVENOUS
Status: DISCONTINUED | OUTPATIENT
Start: 2020-05-12 | End: 2020-05-12 | Stop reason: HOSPADM

## 2020-05-12 RX ORDER — ONDANSETRON 4 MG/1
4 TABLET, ORALLY DISINTEGRATING ORAL EVERY 30 MIN PRN
Status: DISCONTINUED | OUTPATIENT
Start: 2020-05-12 | End: 2020-05-12 | Stop reason: HOSPADM

## 2020-05-12 RX ORDER — PROPOFOL 10 MG/ML
INJECTION, EMULSION INTRAVENOUS PRN
Status: DISCONTINUED | OUTPATIENT
Start: 2020-05-12 | End: 2020-05-12

## 2020-05-12 RX ORDER — LABETALOL HYDROCHLORIDE 5 MG/ML
10 INJECTION, SOLUTION INTRAVENOUS
Status: DISCONTINUED | OUTPATIENT
Start: 2020-05-12 | End: 2020-05-12 | Stop reason: HOSPADM

## 2020-05-12 RX ORDER — NALBUPHINE HYDROCHLORIDE 10 MG/ML
2.5-5 INJECTION, SOLUTION INTRAMUSCULAR; INTRAVENOUS; SUBCUTANEOUS EVERY 6 HOURS PRN
Status: DISCONTINUED | OUTPATIENT
Start: 2020-05-12 | End: 2020-05-12

## 2020-05-12 RX ORDER — PROPOFOL 10 MG/ML
INJECTION, EMULSION INTRAVENOUS CONTINUOUS PRN
Status: DISCONTINUED | OUTPATIENT
Start: 2020-05-12 | End: 2020-05-12

## 2020-05-12 RX ORDER — LIDOCAINE HYDROCHLORIDE 20 MG/ML
INJECTION, SOLUTION INFILTRATION; PERINEURAL PRN
Status: DISCONTINUED | OUTPATIENT
Start: 2020-05-12 | End: 2020-05-12

## 2020-05-12 RX ADMIN — CEFOXITIN SODIUM 1 G: 2 POWDER, FOR SOLUTION INTRAVENOUS at 14:45

## 2020-05-12 RX ADMIN — SODIUM CHLORIDE, SODIUM GLUCONATE, SODIUM ACETATE, POTASSIUM CHLORIDE AND MAGNESIUM CHLORIDE: 526; 502; 368; 37; 30 INJECTION, SOLUTION INTRAVENOUS at 08:46

## 2020-05-12 RX ADMIN — METRONIDAZOLE 500 MG: 500 INJECTION, SOLUTION INTRAVENOUS at 14:45

## 2020-05-12 RX ADMIN — ONDANSETRON 4 MG: 2 INJECTION INTRAMUSCULAR; INTRAVENOUS at 18:33

## 2020-05-12 RX ADMIN — INSULIN ASPART 1 UNITS: 100 INJECTION, SOLUTION INTRAVENOUS; SUBCUTANEOUS at 23:09

## 2020-05-12 RX ADMIN — ALBUMIN (HUMAN) 250 ML: 12.5 SOLUTION INTRAVENOUS at 13:37

## 2020-05-12 RX ADMIN — SODIUM CHLORIDE, SODIUM GLUCONATE, SODIUM ACETATE, POTASSIUM CHLORIDE AND MAGNESIUM CHLORIDE: 526; 502; 368; 37; 30 INJECTION, SOLUTION INTRAVENOUS at 11:50

## 2020-05-12 RX ADMIN — PHENYLEPHRINE HYDROCHLORIDE 25 MCG: 10 INJECTION INTRAVENOUS at 12:52

## 2020-05-12 RX ADMIN — PHENYLEPHRINE HYDROCHLORIDE 200 MCG: 10 INJECTION INTRAVENOUS at 08:55

## 2020-05-12 RX ADMIN — PHENYLEPHRINE HYDROCHLORIDE 25 MCG: 10 INJECTION INTRAVENOUS at 12:32

## 2020-05-12 RX ADMIN — SUGAMMADEX 200 MG: 100 INJECTION, SOLUTION INTRAVENOUS at 18:36

## 2020-05-12 RX ADMIN — SODIUM CHLORIDE, POTASSIUM CHLORIDE, SODIUM LACTATE AND CALCIUM CHLORIDE: 600; 310; 30; 20 INJECTION, SOLUTION INTRAVENOUS at 20:30

## 2020-05-12 RX ADMIN — CALCIUM CHLORIDE 500 MG: 100 INJECTION, SOLUTION INTRAVENOUS at 16:51

## 2020-05-12 RX ADMIN — PROPOFOL 50 MG: 10 INJECTION, EMULSION INTRAVENOUS at 09:20

## 2020-05-12 RX ADMIN — ROCURONIUM BROMIDE 50 MG: 10 INJECTION INTRAVENOUS at 11:14

## 2020-05-12 RX ADMIN — PROPOFOL 100 MG: 10 INJECTION, EMULSION INTRAVENOUS at 08:49

## 2020-05-12 RX ADMIN — PHENYLEPHRINE HYDROCHLORIDE 200 MCG: 10 INJECTION INTRAVENOUS at 11:00

## 2020-05-12 RX ADMIN — LIDOCAINE HYDROCHLORIDE 100 MG: 20 INJECTION, SOLUTION INFILTRATION; PERINEURAL at 08:49

## 2020-05-12 RX ADMIN — LIDOCAINE HYDROCHLORIDE,EPINEPHRINE BITARTRATE 3 ML: 15; .005 INJECTION, SOLUTION EPIDURAL; INFILTRATION; INTRACAUDAL; PERINEURAL at 08:25

## 2020-05-12 RX ADMIN — ACETAMINOPHEN 975 MG: 325 TABLET, FILM COATED ORAL at 08:15

## 2020-05-12 RX ADMIN — PROPOFOL 50 MCG/KG/MIN: 10 INJECTION, EMULSION INTRAVENOUS at 09:25

## 2020-05-12 RX ADMIN — SODIUM CHLORIDE, SODIUM GLUCONATE, SODIUM ACETATE, POTASSIUM CHLORIDE AND MAGNESIUM CHLORIDE: 526; 502; 368; 37; 30 INJECTION, SOLUTION INTRAVENOUS at 09:25

## 2020-05-12 RX ADMIN — ROCURONIUM BROMIDE 100 MG: 10 INJECTION INTRAVENOUS at 08:49

## 2020-05-12 RX ADMIN — FUROSEMIDE 10 MG: 10 INJECTION, SOLUTION INTRAVENOUS at 12:06

## 2020-05-12 RX ADMIN — PHYTONADIONE 5 MG: 10 INJECTION, EMULSION INTRAMUSCULAR; INTRAVENOUS; SUBCUTANEOUS at 18:47

## 2020-05-12 RX ADMIN — ROCURONIUM BROMIDE 30 MG: 10 INJECTION INTRAVENOUS at 17:06

## 2020-05-12 RX ADMIN — SODIUM CHLORIDE, SODIUM GLUCONATE, SODIUM ACETATE, POTASSIUM CHLORIDE AND MAGNESIUM CHLORIDE: 526; 502; 368; 37; 30 INJECTION, SOLUTION INTRAVENOUS at 13:47

## 2020-05-12 RX ADMIN — METRONIDAZOLE 500 MG: 500 INJECTION, SOLUTION INTRAVENOUS at 09:10

## 2020-05-12 RX ADMIN — SUFENTANIL CITRATE 0.1 MCG/KG/HR: 50 INJECTION EPIDURAL; INTRAVENOUS at 09:25

## 2020-05-12 RX ADMIN — BUPIVACAINE HYDROCHLORIDE 10 ML/HR: 7.5 INJECTION, SOLUTION EPIDURAL; RETROBULBAR at 19:29

## 2020-05-12 RX ADMIN — FENTANYL CITRATE 50 MCG: 50 INJECTION, SOLUTION INTRAMUSCULAR; INTRAVENOUS at 08:14

## 2020-05-12 RX ADMIN — ALBUMIN (HUMAN) 250 ML: 12.5 SOLUTION INTRAVENOUS at 14:37

## 2020-05-12 RX ADMIN — CEFOXITIN SODIUM 1 G: 2 POWDER, FOR SOLUTION INTRAVENOUS at 16:45

## 2020-05-12 RX ADMIN — PHENYLEPHRINE HYDROCHLORIDE 100 MCG: 10 INJECTION INTRAVENOUS at 09:25

## 2020-05-12 RX ADMIN — SODIUM CHLORIDE, POTASSIUM CHLORIDE, SODIUM LACTATE AND CALCIUM CHLORIDE: 600; 310; 30; 20 INJECTION, SOLUTION INTRAVENOUS at 17:42

## 2020-05-12 RX ADMIN — FENTANYL CITRATE 100 MCG: 50 INJECTION, SOLUTION INTRAMUSCULAR; INTRAVENOUS at 18:40

## 2020-05-12 RX ADMIN — PHENYLEPHRINE HYDROCHLORIDE 0.3 MCG/KG/MIN: 10 INJECTION INTRAVENOUS at 10:10

## 2020-05-12 RX ADMIN — FUROSEMIDE 10 MG: 10 INJECTION, SOLUTION INTRAVENOUS at 12:18

## 2020-05-12 RX ADMIN — FENTANYL CITRATE 50 MCG: 50 INJECTION, SOLUTION INTRAMUSCULAR; INTRAVENOUS at 09:20

## 2020-05-12 RX ADMIN — PHENYLEPHRINE HYDROCHLORIDE 50 MCG: 10 INJECTION INTRAVENOUS at 10:08

## 2020-05-12 RX ADMIN — ROCURONIUM BROMIDE 50 MG: 10 INJECTION INTRAVENOUS at 12:39

## 2020-05-12 RX ADMIN — DEXAMETHASONE SODIUM PHOSPHATE 6 MG: 4 INJECTION, SOLUTION INTRA-ARTICULAR; INTRALESIONAL; INTRAMUSCULAR; INTRAVENOUS; SOFT TISSUE at 09:15

## 2020-05-12 RX ADMIN — GABAPENTIN 300 MG: 300 CAPSULE ORAL at 08:15

## 2020-05-12 RX ADMIN — HEPARIN SODIUM 5000 UNITS: 5000 INJECTION, SOLUTION INTRAVENOUS; SUBCUTANEOUS at 08:28

## 2020-05-12 RX ADMIN — PROPOFOL 30 MG: 10 INJECTION, EMULSION INTRAVENOUS at 09:35

## 2020-05-12 RX ADMIN — FENTANYL CITRATE 100 MCG: 50 INJECTION, SOLUTION INTRAMUSCULAR; INTRAVENOUS at 08:49

## 2020-05-12 RX ADMIN — DEXMEDETOMIDINE 0.2 MCG/KG/HR: 100 INJECTION, SOLUTION, CONCENTRATE INTRAVENOUS at 09:25

## 2020-05-12 RX ADMIN — CEFOXITIN SODIUM 2 G: 2 POWDER, FOR SOLUTION INTRAVENOUS at 09:10

## 2020-05-12 RX ADMIN — ROCURONIUM BROMIDE 50 MG: 10 INJECTION INTRAVENOUS at 14:28

## 2020-05-12 RX ADMIN — SODIUM CHLORIDE, SODIUM GLUCONATE, SODIUM ACETATE, POTASSIUM CHLORIDE AND MAGNESIUM CHLORIDE: 526; 502; 368; 37; 30 INJECTION, SOLUTION INTRAVENOUS at 14:38

## 2020-05-12 RX ADMIN — INSULIN GLARGINE 10 UNITS: 100 INJECTION, SOLUTION SUBCUTANEOUS at 23:08

## 2020-05-12 RX ADMIN — MIDAZOLAM 1 MG: 1 INJECTION INTRAMUSCULAR; INTRAVENOUS at 08:14

## 2020-05-12 ASSESSMENT — MIFFLIN-ST. JEOR: SCORE: 1750.88

## 2020-05-12 NOTE — ANESTHESIA PROCEDURE NOTES
Arterial Line Procedure Note  Staff -   Anesthesiologist:  Wachter, Sarah, MD  Resident/Fellow: Bashir Sanchez MD    Performed By: resident  Procedure performed by resident/CRNA in presence of a teaching physician.          Location: In OR After Induction  Procedure Start/Stop Times:     patient identified, IV checked, site marked, risks and benefits discussed, informed consent, monitors and equipment checked, pre-op evaluation and at physician/surgeon's request      Correct Patient: Yes      Correct Position: Yes      Correct Site: Yes      Correct Procedure: Yes      Correct Laterality:  N/A    Site Marked:  N/A  Line Placement:     Procedure:  Arterial Line    Insertion Site:  Radial    Insertion laterality:  Right    Skin Prep: Chloraprep      Patient Prep: mask, sterile gloves, hat and hand hygiene      Local skin infiltration:  None    Ultrasound Guided?: No      Catheter size:  20 gauge, Quick cath    Dressing:  Tegaderm    Complications:  None obvious    Arterial waveform: Yes      IBP within 10% of NIBP: Yes

## 2020-05-12 NOTE — PROGRESS NOTES
Epidural placed. Pt tolerated procedure without complications. Meds administered per mar. Heparin ok to administer

## 2020-05-12 NOTE — PROGRESS NOTES
"RN RECOVERY NOTE:  Assigned as pt nurse while in recovery  In pt chart accessing medical records to obtain relevant knowledge of pt health status/background/history/conditions in order to provide pt with safe nursing care while in PACU.   PACU orders released prior to pt arrival   Dr. Tay called and notified that pt is not very responsive and having long periods of apnea   Dr. Tay @ pt bedside @ 2038. States he will place sign out.   @ 2047 Dr. Richard issa paged the following:  \"Fuentes Estrada in PACU 35 has lab results available for review at your convince. Just notifying you Call if you have any new orders. Thank you very much.  Saman RN  *[phone]\"  Mouth moisturizer applied   Art line removed. 4x4 gauze and Tegaderm dressing in place after manual pressure held x15 mins  Wife would like to be notified by 7C Nurse once pt is settled.      "

## 2020-05-12 NOTE — ANESTHESIA PROCEDURE NOTES
Epidural Procedure Note  Staff -   Anesthesiologist:  Cody Rodriguez MD  Resident/Fellow: Cady Tipton MD    Performed By: resident  Procedure performed by resident/CRNA in presence of a teaching physician.          Location: Pre-op     Procedure start time:  5/12/2020 8:00 AM     Procedure end time:  5/12/2020 8:20 AM   Pre-procedure checklist:   patient identified, IV checked, site marked, risks and benefits discussed, informed consent, monitors and equipment checked, pre-op evaluation and post-op pain management      Correct Patient: Yes      Correct Position: Yes      Correct Site: Yes      Correct Procedure: Yes      Correct Laterality:  N/A    Site Marked:  N/A  Procedure:     Procedure:  Epidural catheter    ASA:  3    Position:  Sitting    Sterile Prep: chloraprep      Insertion site:  T11-12 and T8-9    Local skin infiltration:  1% lidocaine    amount (mL):  3    Approach:  Right paramedian    Needle gauge (G):  17    Needle Length (in):  3.5    Block Needle Type:  Touhy    Injection Technique:  LORT air and LORT saline    RAYMOND at (cm):  5.5    Attempts:  1    Redirects:  0    Catheter gauge (G):  20    Catheter threaded easily: Yes      Threaded to cm at skin:  10    Threaded in epidural space (cm):  4.5    Paresthesias:  No    Aspiration negative for Heme or CSF: Yes      Test dose (mL):  3    Test dose negative for signs of intravascular, subdural or intrathecal injection: Yes

## 2020-05-13 ENCOUNTER — APPOINTMENT (OUTPATIENT)
Dept: OCCUPATIONAL THERAPY | Facility: CLINIC | Age: 65
DRG: 405 | End: 2020-05-13
Attending: NEUROLOGICAL SURGERY
Payer: MEDICARE

## 2020-05-13 LAB
ALBUMIN SERPL-MCNC: 3 G/DL (ref 3.4–5)
ALP SERPL-CCNC: 112 U/L (ref 40–150)
ALT SERPL W P-5'-P-CCNC: 457 U/L (ref 0–70)
ANION GAP SERPL CALCULATED.3IONS-SCNC: 10 MMOL/L (ref 3–14)
AST SERPL W P-5'-P-CCNC: 736 U/L (ref 0–45)
BILIRUB SERPL-MCNC: 2.2 MG/DL (ref 0.2–1.3)
BUN SERPL-MCNC: 16 MG/DL (ref 7–30)
CALCIUM SERPL-MCNC: 8.7 MG/DL (ref 8.5–10.1)
CHLORIDE SERPL-SCNC: 107 MMOL/L (ref 94–109)
CO2 SERPL-SCNC: 22 MMOL/L (ref 20–32)
CREAT SERPL-MCNC: 1.16 MG/DL (ref 0.66–1.25)
ERYTHROCYTE [DISTWIDTH] IN BLOOD BY AUTOMATED COUNT: 13.3 % (ref 10–15)
GFR SERPL CREATININE-BSD FRML MDRD: 65 ML/MIN/{1.73_M2}
GLUCOSE BLDC GLUCOMTR-MCNC: 142 MG/DL (ref 70–99)
GLUCOSE BLDC GLUCOMTR-MCNC: 145 MG/DL (ref 70–99)
GLUCOSE BLDC GLUCOMTR-MCNC: 149 MG/DL (ref 70–99)
GLUCOSE BLDC GLUCOMTR-MCNC: 154 MG/DL (ref 70–99)
GLUCOSE BLDC GLUCOMTR-MCNC: 171 MG/DL (ref 70–99)
GLUCOSE SERPL-MCNC: 173 MG/DL (ref 70–99)
HCT VFR BLD AUTO: 36.5 % (ref 40–53)
HGB BLD-MCNC: 11.2 G/DL (ref 13.3–17.7)
INR PPP: 1.53 (ref 0.86–1.14)
MAGNESIUM SERPL-MCNC: 2 MG/DL (ref 1.6–2.3)
MCH RBC QN AUTO: 29.8 PG (ref 26.5–33)
MCHC RBC AUTO-ENTMCNC: 30.7 G/DL (ref 31.5–36.5)
MCV RBC AUTO: 97 FL (ref 78–100)
PHOSPHATE SERPL-MCNC: 3.6 MG/DL (ref 2.5–4.5)
PLATELET # BLD AUTO: 332 10E9/L (ref 150–450)
POTASSIUM SERPL-SCNC: 4.6 MMOL/L (ref 3.4–5.3)
PROT SERPL-MCNC: 5.7 G/DL (ref 6.8–8.8)
RBC # BLD AUTO: 3.76 10E12/L (ref 4.4–5.9)
SODIUM SERPL-SCNC: 139 MMOL/L (ref 133–144)
WBC # BLD AUTO: 12.6 10E9/L (ref 4–11)

## 2020-05-13 PROCEDURE — 36415 COLL VENOUS BLD VENIPUNCTURE: CPT | Performed by: NEUROLOGICAL SURGERY

## 2020-05-13 PROCEDURE — 85027 COMPLETE CBC AUTOMATED: CPT | Performed by: NEUROLOGICAL SURGERY

## 2020-05-13 PROCEDURE — 80053 COMPREHEN METABOLIC PANEL: CPT | Performed by: NEUROLOGICAL SURGERY

## 2020-05-13 PROCEDURE — 00000146 ZZHCL STATISTIC GLUCOSE BY METER IP

## 2020-05-13 PROCEDURE — 84100 ASSAY OF PHOSPHORUS: CPT | Performed by: NEUROLOGICAL SURGERY

## 2020-05-13 PROCEDURE — 97165 OT EVAL LOW COMPLEX 30 MIN: CPT | Mod: GO

## 2020-05-13 PROCEDURE — 25000132 ZZH RX MED GY IP 250 OP 250 PS 637: Performed by: NEUROLOGICAL SURGERY

## 2020-05-13 PROCEDURE — 25000128 H RX IP 250 OP 636: Performed by: NURSE PRACTITIONER

## 2020-05-13 PROCEDURE — 85610 PROTHROMBIN TIME: CPT | Performed by: NEUROLOGICAL SURGERY

## 2020-05-13 PROCEDURE — 25800030 ZZH RX IP 258 OP 636: Performed by: NEUROLOGICAL SURGERY

## 2020-05-13 PROCEDURE — 25800030 ZZH RX IP 258 OP 636: Performed by: NURSE PRACTITIONER

## 2020-05-13 PROCEDURE — 12000001 ZZH R&B MED SURG/OB UMMC

## 2020-05-13 PROCEDURE — 97530 THERAPEUTIC ACTIVITIES: CPT | Mod: GO

## 2020-05-13 PROCEDURE — 25000132 ZZH RX MED GY IP 250 OP 250 PS 637: Performed by: PHYSICIAN ASSISTANT

## 2020-05-13 PROCEDURE — 83735 ASSAY OF MAGNESIUM: CPT | Performed by: NEUROLOGICAL SURGERY

## 2020-05-13 PROCEDURE — 99024 POSTOP FOLLOW-UP VISIT: CPT | Performed by: PHYSICIAN ASSISTANT

## 2020-05-13 RX ORDER — HYDROXYZINE HYDROCHLORIDE 25 MG/1
25 TABLET, FILM COATED ORAL EVERY 6 HOURS PRN
Status: DISCONTINUED | OUTPATIENT
Start: 2020-05-13 | End: 2020-05-18 | Stop reason: HOSPADM

## 2020-05-13 RX ORDER — HYDROXYZINE HYDROCHLORIDE 25 MG/1
50 TABLET, FILM COATED ORAL EVERY 6 HOURS PRN
Status: DISCONTINUED | OUTPATIENT
Start: 2020-05-13 | End: 2020-05-18 | Stop reason: HOSPADM

## 2020-05-13 RX ORDER — NALOXONE HYDROCHLORIDE 0.4 MG/ML
.1-.4 INJECTION, SOLUTION INTRAMUSCULAR; INTRAVENOUS; SUBCUTANEOUS
Status: DISCONTINUED | OUTPATIENT
Start: 2020-05-13 | End: 2020-05-18 | Stop reason: HOSPADM

## 2020-05-13 RX ORDER — DIPHENHYDRAMINE HCL 25 MG
25 CAPSULE ORAL EVERY 6 HOURS PRN
Status: DISCONTINUED | OUTPATIENT
Start: 2020-05-13 | End: 2020-05-18 | Stop reason: HOSPADM

## 2020-05-13 RX ADMIN — INSULIN ASPART 1 UNITS: 100 INJECTION, SOLUTION INTRAVENOUS; SUBCUTANEOUS at 07:13

## 2020-05-13 RX ADMIN — DIPHENHYDRAMINE HYDROCHLORIDE 25 MG: 25 CAPSULE ORAL at 18:44

## 2020-05-13 RX ADMIN — SODIUM CHLORIDE, POTASSIUM CHLORIDE, SODIUM LACTATE AND CALCIUM CHLORIDE: 600; 310; 30; 20 INJECTION, SOLUTION INTRAVENOUS at 03:05

## 2020-05-13 RX ADMIN — BUPIVACAINE HYDROCHLORIDE: 7.5 INJECTION, SOLUTION EPIDURAL; RETROBULBAR at 18:47

## 2020-05-13 RX ADMIN — INSULIN ASPART 1 UNITS: 100 INJECTION, SOLUTION INTRAVENOUS; SUBCUTANEOUS at 03:09

## 2020-05-13 RX ADMIN — INSULIN ASPART 1 UNITS: 100 INJECTION, SOLUTION INTRAVENOUS; SUBCUTANEOUS at 19:52

## 2020-05-13 RX ADMIN — INSULIN ASPART 1 UNITS: 100 INJECTION, SOLUTION INTRAVENOUS; SUBCUTANEOUS at 11:14

## 2020-05-13 RX ADMIN — SODIUM CHLORIDE, POTASSIUM CHLORIDE, SODIUM LACTATE AND CALCIUM CHLORIDE: 600; 310; 30; 20 INJECTION, SOLUTION INTRAVENOUS at 18:44

## 2020-05-13 RX ADMIN — INSULIN ASPART 1 UNITS: 100 INJECTION, SOLUTION INTRAVENOUS; SUBCUTANEOUS at 15:55

## 2020-05-13 RX ADMIN — HYDROXYZINE HYDROCHLORIDE 50 MG: 25 TABLET ORAL at 15:53

## 2020-05-13 ASSESSMENT — ACTIVITIES OF DAILY LIVING (ADL)
ADLS_ACUITY_SCORE: 16
ADLS_ACUITY_SCORE: 15
PREVIOUS_RESPONSIBILITIES: MEAL PREP;HOUSEKEEPING;LAUNDRY;SHOPPING;YARDWORK;MEDICATION MANAGEMENT;FINANCES;DRIVING;WORK
ADLS_ACUITY_SCORE: 14
ADLS_ACUITY_SCORE: 16
ADLS_ACUITY_SCORE: 16
ADLS_ACUITY_SCORE: 14

## 2020-05-13 NOTE — PLAN OF CARE
Discharge Planner OT   Patient plan for discharge: home with assist   Current status: Eval completed and tx initiated. Pt performed bed mobility with SBA, SBA for sit<>transfer, pt c/o dizziness with standing, BP taken and had dropped to 76/51, assisted pt in returning to supine immediately and RN notified. Th returned later in day to trial post-op ambulation, performed bed mobility with SBA using log roll and took seated rest break to adjust to positional change, /69 while sitting. SBA for sit<>stand transfer, BP 76/40 (MAP 53) when standing with dizziness noted. Pt returned back to supine with SBA, BP recovered to normal and RN notified to contact MD team.   Barriers to return to prior living situation: deconditioning, fatigue, post-surgical precautions, medical status   Recommendations for discharge: home with assist pending progress in IP therapy  Rationale for recommendations: Assist as needed for heavier IADLs to maintain post-surgical precautions.        Entered by: Nikki Heller 05/13/2020 2:55 PM

## 2020-05-13 NOTE — PLAN OF CARE
PT-7C-  PT Orders received, per chart review and communication with interdisciplinary care team, pt with low blood pressure when up, unable to tolerate PT Evaluation. PT will hold evaluation, and initiate as indicated.

## 2020-05-13 NOTE — PLAN OF CARE
POD 1 Exploratory laparotomy, Open right hepatectomy, Radical extra hepatic bile duct resection, Portal lymph node dissection, Intraoperative ultrasound, Intraoperative air angiogram,  Bianca-En-Y left hepaticojejunostomy, Excisin of skin lesion (per MD note).   A&OX4. VSS on room air. CAPNO in place. Patient arrived from PACU around 2230. Patient stated that pain was well control with Bupivacaine with Hydromorphone going at 10mL/hr. Denies nausea and vomiting. Epidural site with some dry drainage. NPO, patient given swabs for oral hydration. SAL to bulb suction with red bright output, SAL site with dressing clean dry and intact. Em catheter in place with adequate output. PIV infusing MIVF. On Blood sugar check every 4 hours, insulin coverage given per sliding scale. Transverse abdominal incision with surgical glue open to air, abdominal binder in place. PCD's on. Patient tolerated dangle, stood at the bedside of the bed with 2 assist. IS instruction given. Denies passing gas, no post op BM. Calls appropriately. Continue with plan of care.

## 2020-05-13 NOTE — ANESTHESIA CARE TRANSFER NOTE
Patient: Fuentes Estrada    Procedure(s):  Exploratory Laparotomy, Open right hepatectomy, Radical Extra-Hepatic Bile Duct Resection, Portal Lymph Node Dissection, Intraoperative Ultrasound, Intra Air-Cholangiogram ,Bianca-En-Y Left Hepaticojejunostomy, Excision Skin Lesion  extra-hepatic bile duct resection  portal lymph node dissection, intraoperative liver ultrasound    Diagnosis: Cholangiocarcinoma determined by biopsy of biliary tract (H) [C24.9]  Diagnosis Additional Information: No value filed.    Anesthesia Type:   General, Epidural, For Post-op pain in coordination with surgeon     Note:  Airway :Face Mask  Patient transferred to:PACU  Handoff Report: Identifed the Patient, Identified the Reponsible Provider, Reviewed the pertinent medical history, Discussed the surgical course, Reviewed Intra-OP anesthesia mangement and issues during anesthesia, Set expectations for post-procedure period and Allowed opportunity for questions and acknowledgement of understanding      Vitals: (Last set prior to Anesthesia Care Transfer)    CRNA VITALS  5/12/2020 1841 - 5/12/2020 1919      5/12/2020             Ht Rate:  83    Resp Rate (observed):  16    EKG:  NSR                Electronically Signed By: VICKI Cisneros CRNA  May 12, 2020  7:19 PM

## 2020-05-13 NOTE — PROGRESS NOTES
REGIONAL ANESTHESIA PAIN SERVICE EPIDURAL NOTE  Fuentes Estrada is a 65 year old male with cholangiocarcinoma who is now POD #1 Exploratory laparotomy, right hepatectomy, radical extra hepatic bile duct resection, portal lymph node dissection, intraoperative ultrasound and air angiogram, garry-en-y left hepaticojejunostomy; T7-8 epidural catheter for postoperative pain management.      Subjective and Interval History Overnight no acute events. Patient seen at 0820, reports excellent pain control, rating pain 0/10 at rest and able to get IS to 1250 mL with epidural infusion continuous rate + PCEA.  Denies LE weakness, paresthesias, circumoral numbness, metallic taste or tinnitus.  Patient plans to stand and ambulate with assistance. States he feels hungry, no flatus yet, NPO, urinary catheter in place.    Antithrombotic/Thrombolytic Therapy ordered: None ordered    Analgesic Medications:  Medications related to Pain Management (From now, onward)    Start     Dose/Rate Route Frequency Ordered Stop    05/13/20 0859  hydrOXYzine (ATARAX) tablet 25 mg      25 mg Oral EVERY 6 HOURS PRN 05/13/20 0900      05/13/20 0859  hydrOXYzine (ATARAX) tablet 50 mg      50 mg Oral EVERY 6 HOURS PRN 05/13/20 0900      05/13/20 0845  HYDROmorphone (DILAUDID) 3 mcg/mL, bupivacaine (MARCAINE) 0.0625 % in sodium chloride 0.9 % 250 mL EPIDURAL PCEA (patient controlled epidural)       EPIDURAL PCA 05/13/20 0835      05/12/20 2153  lidocaine 1 % 0.1-1 mL      0.1-1 mL Other EVERY 1 HOUR PRN 05/12/20 2153      05/12/20 2153  lidocaine (LMX4) cream       Topical EVERY 1 HOUR PRN 05/12/20 2153      05/12/20 0629  nalbuphine (NUBAIN) injection 2.5-5 mg      2.5-5 mg Intravenous EVERY 6 HOURS PRN 05/12/20 0629             Objective:  Lab Results:   Recent Labs   Lab Test 05/13/20  0656   WBC 12.6*   RBC 3.76*   HGB 11.2*   HCT 36.5*   MCV 97   MCH 29.8   MCHC 30.7*   RDW 13.3          Lab Results   Component Value Date    INR 1.53 05/13/2020  "   INR 1.40 05/12/2020    INR 1.16 05/04/2020       Vitals:    Temp:  [96.9  F (36.1  C)-99.8  F (37.7  C)] 97.1  F (36.2  C)  Pulse:  [73-84] 84  Heart Rate:  [73-83] 79  Resp:  [12-18] 17  BP: ()/(66-89) 138/79  MAP:  [75 mmHg-105 mmHg] 77 mmHg  Arterial Line BP: (106-131)/(55-95) 108/60  SpO2:  [96 %-100 %] 97 %  /79 (BP Location: Right arm)   Pulse 84   Temp 97.1  F (36.2  C) (Oral)   Resp 17   Ht 1.854 m (6' 1\")   Wt 92.2 kg (203 lb 4.2 oz)   SpO2 97%   BMI 26.82 kg/m         Exam:   GEN: alert and no distress  NEURO/MSK: Strength BLE 5/5  and overall symmetric  SKIN: Epidural catheter site scant maroon heme at insertion site,otherwise dressing c/d/i, no tenderness, erythema, edema     Assessment and Plan:  65 year old male with cholangiocarcinoma who is now POD #1 Exploratory laparotomy, right hepatectomy, radical extra hepatic bile duct resection, portal lymph node dissection, intraoperative ultrasound and air angiogram, garry-en-y left hepaticojejunostomy; T7-8 epidural catheter for postoperative pain management.    Patient is receiving adequate analgesia with current multimodal therapy including epidural infusion HYDROmorphone (DILAUDID) 3 mcg/mL, bupivacaine (MARCAINE) 0.0625 % at 10 mL/hr with PCEA 2 mL Q 30 min lockout.  Motor function intact and adequate sensory block, is meeting activity goals.  No evidence of adverse side effects related to local anesthetic. Adequate urine output.      - continue current epidural infusion HYDROmorphone (DILAUDID) 3 mcg/mL, bupivacaine (MARCAINE) 0.0625 % at 10 mL/hr with PCEA 2 mL Q 30 min lockout. Keep epidural in place until tolerating PO intake, max 5 days  - antithrombotic/thrombolytic therapy None ordered. Please contact RAPS (#[phone]) prior to any medication changes  - will continue to follow and adjust as needed    - discussed plan with attending anesthesiologist    VICKI Davidson CNP  Regional Anesthesia Pain Service  5/13/2020 9:49 " AM    [facility] Contact Info (24 hour job code pager is the last 4 digits) For in-house use only:   Job code ID: [place] [id number]   [place] 0599  Peds [phone]  [employer] phone: dial * * * [phone], enter jobcode ID, then enter call-back number.    Text: Use [employer] on the Intranet <Paging/Directory> tab and enter Jobcode ID.   If no call back at any time, contact the hospital  and ask for RAPS attending or backup

## 2020-05-13 NOTE — PLAN OF CARE
VSS on RA. Pain well controlled with PCEA marcaine/diaudid. Having a lot of itching, Atarax given with no relief (MD notified). Very hypotensive when standing and symptomatic; normalizes once back in bed but unable to ambulate (MD notified). No other LAST symptoms noted, dressing c/d/I. Tolerating sips and ice chips w/o nausea. Denies passing flatus/BM. Abd incision c/d/I, binder on. SAL with bloody drainage. Em in place with good output. BG Q4 hrs, sliding scale coverage. Using IS, SCDs on. Continue attempting with activity out of bed.

## 2020-05-13 NOTE — BRIEF OP NOTE
[facility], [place]    Brief Operative Note    Pre-operative diagnosis: Cholangiocarcinoma determined by biopsy of biliary tract (H) [C24.9]  Post-operative diagnosis Same as pre-operative diagnosis    Procedure:  1. Exploratory laparotomy  2. Open right hepatectomy  3. Radical extra hepatic bile duct resection  4. Portal lymph node dissectin  5. Intraoperative ultrasound  6. Intraoperative air angiogram  7. Bianca-En-Y left hepaticojejunostomy  7. Excisin of skin lesion    Surgeon: Surgeon(s) and Role:     * Oswaldo Hale MD - Primary     * Edmund Lilly MD - Assisting     * Sung Raoms MD - Resident - Assisting  Anesthesia: Combined General with Block   Estimated blood loss: 300 ml  Drains: Vinicio-Chakraborty  Specimens:   ID Type Source Tests Collected by Time Destination   1 : Bile Duct Stent  Tissue Other ANAEROBIC BACTERIAL CULTURE, GRAM STAIN, TISSUE CULTURE AEROBIC BACTERIAL (Canceled) Oswaldo Hale MD 5/12/2020 11:55 AM    A : Skin Lesion Tissue Other SURGICAL PATHOLOGY EXAM Oswaldo Hale MD 5/12/2020  9:21 AM    B : Peritoneal Nodule Tissue Other SURGICAL PATHOLOGY EXAM Oswaldo Hale MD 5/12/2020  9:42 AM    C : Distal Bile Duct Margin Tissue Bile Duct SURGICAL PATHOLOGY EXAM Oswaldo Hale MD 5/12/2020 11:55 AM    D : Common Hepatic Duct Margin Tissue Other SURGICAL PATHOLOGY EXAM Oswaldo Hale MD 5/12/2020 12:31 PM    E : Right posterior hepatic duct debris Tissue Other SURGICAL PATHOLOGY EXAM Oswaldo Hale MD 5/12/2020  1:34 PM    F : Extra hepatic duct Tissue Other SURGICAL PATHOLOGY EXAM Oswaldo Hale MD 5/12/2020  1:35 PM    G : Liver - Right Lobe Organ Liver SURGICAL PATHOLOGY EXAM Oswaldo Hale MD 5/12/2020  3:27 PM    H : Portal Lymph Node 1A Tissue Lymph Node SURGICAL PATHOLOGY EXAM Oswaldo Hale MD 5/12/2020  3:43 PM    I : Portal Lymph Node 1B Tissue Lymph Node SURGICAL PATHOLOGY EXAM Oswaldo Hale MD  5/12/2020  3:44 PM    J : Portal Lymph Node 2A Tissue Lymph Node SURGICAL PATHOLOGY EXAM Oswaldo Hale MD 5/12/2020  3:47 PM    K : Portal Lymph Node 2B Tissue Lymph Node SURGICAL PATHOLOGY EXAM Oswaldo Hale MD 5/12/2020  3:47 PM    L : Portal Lymph Node 3A Tissue Lymph Node SURGICAL PATHOLOGY EXAM Oswaldo Hale MD 5/12/2020  3:48 PM    M : Portal Lymph Node 3B Tissue Lymph Node SURGICAL PATHOLOGY EXAM Oswaldo Hale MD 5/12/2020  3:48 PM    N : Portal Lymph Node 4A Tissue Lymph Node SURGICAL PATHOLOGY EXAM Oswaldo Hale MD 5/12/2020  3:53 PM    O : Portal Lymph Node 4B Tissue Lymph Node SURGICAL PATHOLOGY EXAM Oswaldo Hale MD 5/12/2020  3:53 PM    P : falciform ligament Tissue Other SURGICAL PATHOLOGY EXAM Oswaldo Hale MD 5/12/2020  4:06 PM      Findings:   Somewhat fibrotic appearing liver. No evidence of metastatic disease. One peritoneal nodule was negative for cancer on frozen section. Right sided biliary tree appeared soft, but there was evidence of debris within the duct. This was sent for pathology. Given debris the right hepatic lobe was resected. Frozen section of margins negative for tumor: distal CBD, right hepaiv duct and left hepatic duct.    Complications: None.  Implants:   Implant Name Type Inv. Item Serial No.  Lot No. LRB No. Used Action   STENT [employer] ([place] BILIARY SOF-FLEX [id number] Stent [employer]ENT [employer] ([place] SAMIRA SOF-FLEX [id number]  [facility] [employer] [id number] N/A 1 Explanted

## 2020-05-13 NOTE — PROGRESS NOTES
"POST OP CHECK     S: Patient seen at the bedside, sleeping comfortably but awakens easily. Pain well controlled, denies nausea/vomiting. Lyman in place.     O:   /72   Pulse 82   Temp 98.6  F (37  C) (Oral)   Resp 18   Ht 1.854 m (6' 1\")   Wt 92.2 kg (203 lb 4.2 oz)   SpO2 96%   BMI 26.82 kg/m      General: NAD  CV: Non-cyanotic.   Resp: Nonlabored breathing, 2L NC   Abd: Abd present. soft, appropriately tender, incisions c/d/i. SAL drain, sanguinous output.   : lyman present, draining light yellow urine (~50cc in bag)   Ext: wwp. SCDs in place.     SAL: 100cc    A/P: Fuentes Estrada is a 65 year old male with history of cholangiocarcinoma now POD0 from open right hepatectomy, radical extra hepatic bile duct resection, portal LN dissection, RNY hepaticojejunostomy. Hemodynamically stable, well controlled pain.     PCEA   PRN zofran/compazine   NPO, LR @ 125ml/hr   ISS/glargine at bedtime   Maintain lyman, monitor UOP   SAL drain to bulb suction   AM labs   DVT ppx: SCDs, OOB as tolerated     Remainder of cares per primary team.     Alexandra Isabel MD  General Surgery, PGY1  [phone]          "

## 2020-05-13 NOTE — PROGRESS NOTES
Admitted/transferred from:   2 RN skin assessment completed by Ana Little RN and  Silvana Alfredo RN  Skin assessment finding: Epidural site in middle back with some drainage.   Interventions/actions: None at this time.     Will continue to monitor.

## 2020-05-13 NOTE — ANESTHESIA POSTPROCEDURE EVALUATION
Anesthesia POST Procedure Evaluation    Patient: Fuentes Estrada   MRN:     [id number] Gender:   male   Age:    65 year old :      1955        Preoperative Diagnosis: Cholangiocarcinoma determined by biopsy of biliary tract (H) [C24.9]   Procedure(s):  Exploratory Laparotomy, Open right hepatectomy, Radical Extra-Hepatic Bile Duct Resection, Portal Lymph Node Dissection, Intraoperative Ultrasound, Intra Air-Cholangiogram ,Bianca-En-Y Left Hepaticojejunostomy, Excision Skin Lesion  extra-hepatic bile duct resection  portal lymph node dissection, intraoperative liver ultrasound   Postop Comments: No value filed.     Anesthesia Type: General, Epidural, For Post-op pain in coordination with surgeon       Disposition: Admission   Postop Pain Control: Uneventful            Sign Out: Well controlled pain   PONV: No   Neuro/Psych: Uneventful            Sign Out: Acceptable/Baseline neuro status   Airway/Respiratory: Uneventful            Sign Out: Acceptable/Baseline resp. status   CV/Hemodynamics: Uneventful            Sign Out: Acceptable CV status   Other NRE: NONE   DID A NON-ROUTINE EVENT OCCUR? No         Last Anesthesia Record Vitals:  CRNA VITALS  2020 1841 - 2020 1941      2020             Ht Rate:  83    Resp Rate (observed):  16    EKG:  NSR          Last PACU Vitals:  Vitals Value Taken Time   /74 2020  8:30 PM   Temp 36.3  C (97.4  F) 2020  7:45 PM   Pulse 78 2020  8:30 PM   Resp 17 2020  8:30 PM   SpO2 100 % 2020  8:41 PM   Temp src     NIBP     Pulse     SpO2     Resp     Temp     Ht Rate 83 2020  7:17 PM   Temp 2     Vitals shown include unvalidated device data.      Electronically Signed By: Adam Bonilla MD, May 12, 2020, 8:43 PM

## 2020-05-13 NOTE — PROGRESS NOTES
05/13/20 1400   Quick Adds   Type of Visit Initial Occupational Therapy Evaluation   Living Environment   Lives With spouse   Living Arrangements house   Home Accessibility no concerns   Transportation Anticipated car, drives self;family or friend will provide   Living Environment Comment Pt lives in a house with his wife, reports there are no stairs to enter or within the home, all needs met on main level.    Self-Care   Usual Activity Tolerance good   Current Activity Tolerance fair   Equipment Currently Used at Home none   Activity/Exercise/Self-Care Comment Pt does not report using any AE at home.    Functional Level   Ambulation 0-->independent   Transferring 0-->independent   Toileting 0-->independent   Bathing 0-->independent   Dressing 0-->independent   Eating 0-->independent   Communication 0-->understands/communicates without difficulty   Swallowing 0-->swallows foods/liquids without difficulty   Cognition 0 - no cognition issues reported   Fall history within last six months no   Which of the above functional risks had a recent onset or change? ambulation;transferring;toileting;bathing;dressing   Prior Functional Level Comment Pt is independent in all functional mobility and ADLs at baseline.    General Information   Onset of Illness/Injury or Date of Surgery - Date 05/12/20   Referring Physician Yuliet Lundberg, JULES    Patient/Family Goals Statement return home   Additional Occupational Profile Info/Pertinent History of Current Problem Fuentes Estrada is a 65 year old male with history of cholangiocarcinoma now POD1 from open right hepatectomy, radical extra hepatic bile duct resection, portal LN dissection, RNY hepaticojejunostomy   Precautions/Limitations abdominal precautions   Weight-Bearing Status - LUE full weight-bearing   Weight-Bearing Status - RUE full weight-bearing   Weight-Bearing Status - LLE full weight-bearing   Weight-Bearing Status - RLE full weight-bearing   General Info Comments  Activity: up with assist   Cognitive Status Examination   Orientation orientation to person, place and time   Level of Consciousness alert   Follows Commands (Cognition) WNL   Memory intact   Attention No deficits were identified   Cognitive Comment No cognition concerns noted.    Visual Perception   Visual Perception No deficits were identified;Wears glasses   Sensory Examination   Sensory Quick Adds No deficits were identified   Pain Assessment   Patient Currently in Pain No  (no incisional pain reported)   Integumentary/Edema   Integumentary/Edema no deficits were identifed   Posture   Posture forward head position   Range of Motion (ROM)   ROM Quick Adds No deficits were identified   ROM Comment BUEs WFL    Strength   Manual Muscle Testing Quick Adds No deficits were identified   Strength Comments BUEs WFL    Hand Strength   Hand Strength Comments Bilateral  strength WFL    Coordination   Upper Extremity Coordination No deficits were identified   Gross Motor Coordination No deficits identified    Fine Motor Coordination Not tested    Mobility   Bed Mobility Comments SBA    Transfer Skill: Sit to Stand   Level of [place]: Sit/Stand stand-by assist   Balance   Balance Comments Unable to formally assess balance on this date, no LOB with static standing.    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    Activities of Daily Living Analysis   Impairments Contributing to Impaired Activities of Daily Living strength decreased;post surgical precautions   General Therapy Interventions   Planned Therapy Interventions ADL retraining;IADL retraining;progressive activity/exercise   Clinical Impression   Criteria for Skilled Therapeutic Interventions Met yes, treatment indicated   OT  "Diagnosis Decreased IADL-I   Influenced by the following impairments general deconditioning, fatigue, and post-surgical precautions   Assessment of Occupational Performance 1-3 Performance Deficits   Identified Performance Deficits home management, community mobility, ambulation, bathing    Clinical Decision Making (Complexity) Moderate complexity   Therapy Frequency Daily   Predicted Duration of Therapy Intervention (days/wks) 5/20/2020   Anticipated Discharge Disposition Home with Assist   Risks and Benefits of Treatment have been explained. Yes   Patient, Family & other staff in agreement with plan of care Yes   Clinical Impression Comments Pt presents to OT today with general deconditioning, fatigue, and post-surgical precautions, all leading to decreased IADL-I. Pt to benefit from skilled OT services to address the above problem list.    [facility] AM-PAC  \"6 Clicks\" Daily Activity Inpatient Short Form   1. Putting on and taking off regular lower body clothing? 3 - A Little   2. Bathing (including washing, rinsing, drying)? 3 - A Little   3. Toileting, which includes using toilet, bedpan or urinal? 3 - A Little   4. Putting on and taking off regular upper body clothing? 4 - None   5. Taking care of personal grooming such as brushing teeth? 4 - None   6. Eating meals? 4 - None   Daily Activity Raw Score (Score out of 24.Lower scores equate to lower levels of function) 21   Total Evaluation Time   Total Evaluation Time (Minutes) 4     "

## 2020-05-13 NOTE — PROGRESS NOTES
REGIONAL ANESTHESIA PAIN SERVICE ([facility]APS) EVALUATION:  - Time: 1915  - Evaluation: Patient seen and evaluated in the PACU, following prolonged surgical course. A T8/9 epidural catheter was inserted prior to surgery, though infusion was not initiated during the intraoperative period. Per chart review, it was noted that the patient required pressor support in order to maintain an adequate blood pressure during the case. In light of this, the decision was made to hold epidural infusion until the patient was determined to be hemodynamically stable.    Upon PACU evaluation, the patient was not requiring blood pressure support, therefore the epidural infusion was initiated. Patient reported feeling comfortable, having minimal pain.     General: alert and no distress  Catheter system integrity: intact  Skin: dressing clean, dry and intact    Current vitals: /74, P 94    - Assessment/Plan    INTERVENTION: Epidural infusion started    MEDICATION: Bupivacaine 0.0625 with 3mcg/ml Hydromorphone; rate 10mL/hr     - Follow up @ 2000: Patient reported feeling comfortable, blood pressure noted to be lower. BP: 104/71    - patient can be evaluated to receive local anesthetic bolus Q 12 hr PRN pain not controlled with continuous infusion.  Bedside nurse must page RAPS to request bolus    Alexis Mayfield MD  Anesthesiology    RAPS Contact Info (24 hour job code pager is the last 4 digits) For in-house use only:  [employer] phone: [place] [phone], [place] [phone], [facility] [phone], then enter call-back number.    Text: Use [employer] on the Intranet <Paging/Directory> tab and enter Jobcode ID.   If no call back at any time, contact the hospital  and ask for RAPS attending or backup

## 2020-05-13 NOTE — PROGRESS NOTES
REGIONAL ANESTHESIA PAIN SERVICE EPIDURAL NOTE  Fuentes Estrada is a 65 year old male with cholangiocarcinoma who is now POD #1 Exploratory laparotomy, right hepatectomy, radical extra hepatic bile duct resection, portal lymph node dissection, intraoperative ultrasound and air angiogram, garry-en-y left hepaticojejunostomy; T7-8 epidural catheter for postoperative pain management.      Subjective and Interval History Overnight no acute events. Patient seen at 0820, reports excellent pain control, rating pain 0/10 at rest and able to get IS to 1250 mL with epidural infusion continuous rate + PCEA.  Denies LE weakness, paresthesias, circumoral numbness, metallic taste or tinnitus.  Patient plans to stand and ambulate with assistance. States he feels hungry, no flatus yet, NPO, urinary catheter in place.     Antithrombotic/Thrombolytic Therapy ordered: None ordered     Analgesic Medications:              Medications related to Pain Management (From now, onward)     Start     Dose/Rate Route Frequency Ordered Stop     05/13/20 0859   hydrOXYzine (ATARAX) tablet 25 mg      25 mg Oral EVERY 6 HOURS PRN 05/13/20 0900       05/13/20 0859   hydrOXYzine (ATARAX) tablet 50 mg      50 mg Oral EVERY 6 HOURS PRN 05/13/20 0900       05/13/20 0845   HYDROmorphone (DILAUDID) 3 mcg/mL, bupivacaine (MARCAINE) 0.0625 % in sodium chloride 0.9 % 250 mL EPIDURAL PCEA (patient controlled epidural)        EPIDURAL PCA 05/13/20 0835       05/12/20 2153   lidocaine 1 % 0.1-1 mL      0.1-1 mL Other EVERY 1 HOUR PRN 05/12/20 2153       05/12/20 2153   lidocaine (LMX4) cream        Topical EVERY 1 HOUR PRN 05/12/20 2153       05/12/20 0629   nalbuphine (NUBAIN) injection 2.5-5 mg      2.5-5 mg Intravenous EVERY 6 HOURS PRN 05/12/20 0629               Objective:  Lab Results:       Recent Labs   Lab Test 05/13/20  0656   WBC 12.6*   RBC 3.76*   HGB 11.2*   HCT 36.5*   MCV 97   MCH 29.8   MCHC 30.7*   RDW 13.3                  Lab Results  "  Component Value Date     INR 1.53 05/13/2020     INR 1.40 05/12/2020     INR 1.16 05/04/2020         Vitals:              Temp:  [96.9  F (36.1  C)-99.8  F (37.7  C)] 97.1  F (36.2  C)  Pulse:  [73-84] 84  Heart Rate:  [73-83] 79  Resp:  [12-18] 17  BP: ()/(66-89) 138/79  MAP:  [75 mmHg-105 mmHg] 77 mmHg  Arterial Line BP: (106-131)/(55-95) 108/60  SpO2:  [96 %-100 %] 97 %  /79 (BP Location: Right arm)   Pulse 84   Temp 97.1  F (36.2  C) (Oral)   Resp 17   Ht 1.854 m (6' 1\")   Wt 92.2 kg (203 lb 4.2 oz)   SpO2 97%   BMI 26.82 kg/m          Exam:   GEN: alert and no distress  NEURO/MSK: Strength BLE 5/5  and overall symmetric  SKIN: Epidural catheter site scant maroon heme at insertion site,otherwise dressing c/d/i, no tenderness, erythema, edema              Assessment and Plan:  65 year old male with cholangiocarcinoma who is now POD #1 Exploratory laparotomy, right hepatectomy, radical extra hepatic bile duct resection, portal lymph node dissection, intraoperative ultrasound and air angiogram, garry-en-y left hepaticojejunostomy; T7-8 epidural catheter for postoperative pain management.     Patient is receiving adequate analgesia with current multimodal therapy including epidural infusion HYDROmorphone (DILAUDID) 3 mcg/mL, bupivacaine (MARCAINE) 0.0625 % at 10 mL/hr with PCEA 2 mL Q 30 min lockout.  Motor function intact and adequate sensory block, is meeting activity goals.  No evidence of adverse side effects related to local anesthetic. Adequate urine output.       - continue current epidural infusion HYDROmorphone (DILAUDID) 3 mcg/mL, bupivacaine (MARCAINE) 0.0625 % at 10 mL/hr with PCEA 2 mL Q 30 min lockout. Keep epidural in place until tolerating PO intake, max 5 days  - antithrombotic/thrombolytic therapy None ordered. Please contact RAPS (#[phone]) prior to any medication changes  - will continue to follow and adjust as needed      Dave Lyons MD  Perioperative and " Interventional Pain Service  5/13/2020 12:48 PM  [facility] 24-hour Job Code Pagers (for in-house use only, may text page using [employer])  [place]:  [phone]  [place]:  [phone]  Peds [facility]: [phone]

## 2020-05-13 NOTE — PROGRESS NOTES
Surgical Oncology Progress Note    Interval History:  No acute events overnight. Pain controlled. Denies nausea. No flatus or stool yet.     Physical Exam:   Temp:  [96.9  F (36.1  C)-99.8  F (37.7  C)] 97.1  F (36.2  C)  Pulse:  [73-84] 84  Heart Rate:  [73-83] 79  Resp:  [12-18] 17  BP: ()/(66-89) 138/79  MAP:  [75 mmHg-105 mmHg] 77 mmHg  Arterial Line BP: (106-131)/(55-95) 108/60  SpO2:  [96 %-100 %] 97 %  General: Alert, oriented, appears comfortable, NAD.  Respiratory: breathing non labored  Abdomen: Abdomen is soft, non-tender, non-distended. Incision is clean, dry and intact. Drain with serosanguinous output.     Data:   All laboratory and imaging data in the past 24 hours reviewed  I/O last 3 completed shifts:  In: 6000 [I.V.:2500]  Out: 2785 [Urine:2175; Drains:310; Blood:300]  Recent Labs   Lab Test 05/13/20 0656 05/1955 05/12/20 1646 05/04/20  1327   WBC 12.6* 13.3*  --   --  7.2   HGB 11.2* 11.1* 11.0*   < > 12.7*    382  --   --  359   INR 1.53* 1.40*  --   --  1.16*    < > = values in this interval not displayed.      Recent Labs   Lab Test 05/13/20 0656 05/1955 05/12/20 1646 05/12/20  0809 05/04/20  1327    139 138   < >  --  135   POTASSIUM 4.6 4.4 4.8   < > 4.3 4.1   CHLORIDE 107 108  --   --   --  103   CO2 22 23  --   --   --  25   BUN 16 13  --   --   --  10   CR 1.16 0.93  --   --  1.02 1.19   ANIONGAP 10 8  --   --   --  7   ERIC 8.7 8.3*  --   --   --  9.3   * 171* 205*   < >  --  296*    < > = values in this interval not displayed.     Recent Labs   Lab Test 05/13/20  0656   PROTTOTAL 5.7*   ALBUMIN 3.0*   BILITOTAL 2.2*   ALKPHOS 112   *   *     Assessment and Plan:     Fuentes Estrada is a 65 year old male with history of cholangiocarcinoma now POD1 from open right hepatectomy, radical extra hepatic bile duct resection, portal LN dissection, RNY hepaticojejunostomy.      - PCEA, atarax for itching prn  - NPO, ice chips ok,   -  Keep lyman catheter  - sliding scale insulin, lantus 10  - PT/OT/IS    Seen with Chief resident who will discuss with Staff.     Yuliet Lundberg PA-C   Surgical Oncology

## 2020-05-14 ENCOUNTER — APPOINTMENT (OUTPATIENT)
Dept: GENERAL RADIOLOGY | Facility: CLINIC | Age: 65
DRG: 405 | End: 2020-05-14
Attending: SURGERY
Payer: MEDICARE

## 2020-05-14 ENCOUNTER — APPOINTMENT (OUTPATIENT)
Dept: OCCUPATIONAL THERAPY | Facility: CLINIC | Age: 65
DRG: 405 | End: 2020-05-14
Attending: SURGERY
Payer: MEDICARE

## 2020-05-14 PROBLEM — E83.39 HYPOPHOSPHATEMIA: Status: ACTIVE | Noted: 2020-05-14

## 2020-05-14 PROBLEM — J98.11 ATELECTASIS: Status: ACTIVE | Noted: 2020-05-14

## 2020-05-14 LAB
ALBUMIN SERPL-MCNC: 2.6 G/DL (ref 3.4–5)
ALBUMIN UR-MCNC: NEGATIVE MG/DL
ALP SERPL-CCNC: 85 U/L (ref 40–150)
ALT SERPL W P-5'-P-CCNC: 351 U/L (ref 0–70)
ANION GAP SERPL CALCULATED.3IONS-SCNC: 10 MMOL/L (ref 3–14)
ANION GAP SERPL CALCULATED.3IONS-SCNC: 6 MMOL/L (ref 3–14)
APPEARANCE UR: CLEAR
AST SERPL W P-5'-P-CCNC: 317 U/L (ref 0–45)
BILIRUB SERPL-MCNC: 1.8 MG/DL (ref 0.2–1.3)
BILIRUB UR QL STRIP: NEGATIVE
BUN SERPL-MCNC: 20 MG/DL (ref 7–30)
BUN SERPL-MCNC: 22 MG/DL (ref 7–30)
CALCIUM SERPL-MCNC: 7.9 MG/DL (ref 8.5–10.1)
CALCIUM SERPL-MCNC: 8.5 MG/DL (ref 8.5–10.1)
CHLORIDE SERPL-SCNC: 105 MMOL/L (ref 94–109)
CHLORIDE SERPL-SCNC: 107 MMOL/L (ref 94–109)
CO2 SERPL-SCNC: 23 MMOL/L (ref 20–32)
CO2 SERPL-SCNC: 25 MMOL/L (ref 20–32)
COLOR UR AUTO: YELLOW
CREAT SERPL-MCNC: 0.92 MG/DL (ref 0.66–1.25)
CREAT SERPL-MCNC: 0.93 MG/DL (ref 0.66–1.25)
ERYTHROCYTE [DISTWIDTH] IN BLOOD BY AUTOMATED COUNT: 13.2 % (ref 10–15)
ERYTHROCYTE [DISTWIDTH] IN BLOOD BY AUTOMATED COUNT: 13.2 % (ref 10–15)
GFR SERPL CREATININE-BSD FRML MDRD: 84 ML/MIN/{1.73_M2}
GFR SERPL CREATININE-BSD FRML MDRD: 85 ML/MIN/{1.73_M2}
GLUCOSE BLDC GLUCOMTR-MCNC: 144 MG/DL (ref 70–99)
GLUCOSE BLDC GLUCOMTR-MCNC: 144 MG/DL (ref 70–99)
GLUCOSE BLDC GLUCOMTR-MCNC: 156 MG/DL (ref 70–99)
GLUCOSE BLDC GLUCOMTR-MCNC: 165 MG/DL (ref 70–99)
GLUCOSE BLDC GLUCOMTR-MCNC: 180 MG/DL (ref 70–99)
GLUCOSE BLDC GLUCOMTR-MCNC: 212 MG/DL (ref 70–99)
GLUCOSE BLDC GLUCOMTR-MCNC: 255 MG/DL (ref 70–99)
GLUCOSE SERPL-MCNC: 162 MG/DL (ref 70–99)
GLUCOSE SERPL-MCNC: 170 MG/DL (ref 70–99)
GLUCOSE UR STRIP-MCNC: >1000 MG/DL
HCT VFR BLD AUTO: 30 % (ref 40–53)
HCT VFR BLD AUTO: 33 % (ref 40–53)
HGB BLD-MCNC: 10.3 G/DL (ref 13.3–17.7)
HGB BLD-MCNC: 9.5 G/DL (ref 13.3–17.7)
HGB UR QL STRIP: ABNORMAL
INR PPP: 1.95 (ref 0.86–1.14)
KETONES UR STRIP-MCNC: 5 MG/DL
LACTATE BLD-SCNC: 2.3 MMOL/L (ref 0.7–2)
LACTATE BLD-SCNC: 3 MMOL/L (ref 0.7–2)
LEUKOCYTE ESTERASE UR QL STRIP: ABNORMAL
MAGNESIUM SERPL-MCNC: 1.8 MG/DL (ref 1.6–2.3)
MCH RBC QN AUTO: 30.1 PG (ref 26.5–33)
MCH RBC QN AUTO: 30.2 PG (ref 26.5–33)
MCHC RBC AUTO-ENTMCNC: 31.2 G/DL (ref 31.5–36.5)
MCHC RBC AUTO-ENTMCNC: 31.7 G/DL (ref 31.5–36.5)
MCV RBC AUTO: 95 FL (ref 78–100)
MCV RBC AUTO: 97 FL (ref 78–100)
MUCOUS THREADS #/AREA URNS LPF: PRESENT /LPF
NITRATE UR QL: NEGATIVE
PH UR STRIP: 5.5 PH (ref 5–7)
PHOSPHATE SERPL-MCNC: 2.1 MG/DL (ref 2.5–4.5)
PLATELET # BLD AUTO: 265 10E9/L (ref 150–450)
PLATELET # BLD AUTO: 295 10E9/L (ref 150–450)
POTASSIUM SERPL-SCNC: 4.1 MMOL/L (ref 3.4–5.3)
POTASSIUM SERPL-SCNC: 4.1 MMOL/L (ref 3.4–5.3)
PROT SERPL-MCNC: 4.8 G/DL (ref 6.8–8.8)
RBC # BLD AUTO: 3.16 10E12/L (ref 4.4–5.9)
RBC # BLD AUTO: 3.41 10E12/L (ref 4.4–5.9)
RBC #/AREA URNS AUTO: 6 /HPF (ref 0–2)
SODIUM SERPL-SCNC: 138 MMOL/L (ref 133–144)
SODIUM SERPL-SCNC: 138 MMOL/L (ref 133–144)
SOURCE: ABNORMAL
SP GR UR STRIP: 1.03 (ref 1–1.03)
SQUAMOUS #/AREA URNS AUTO: <1 /HPF (ref 0–1)
UROBILINOGEN UR STRIP-MCNC: NORMAL MG/DL (ref 0–2)
WBC # BLD AUTO: 11.3 10E9/L (ref 4–11)
WBC # BLD AUTO: 11.5 10E9/L (ref 4–11)
WBC #/AREA URNS AUTO: 4 /HPF (ref 0–5)

## 2020-05-14 PROCEDURE — 97530 THERAPEUTIC ACTIVITIES: CPT | Mod: GO

## 2020-05-14 PROCEDURE — 25800030 ZZH RX IP 258 OP 636: Performed by: NEUROLOGICAL SURGERY

## 2020-05-14 PROCEDURE — 25800030 ZZH RX IP 258 OP 636: Performed by: PHYSICIAN ASSISTANT

## 2020-05-14 PROCEDURE — 97535 SELF CARE MNGMENT TRAINING: CPT | Mod: GO

## 2020-05-14 PROCEDURE — 00000146 ZZHCL STATISTIC GLUCOSE BY METER IP

## 2020-05-14 PROCEDURE — 83605 ASSAY OF LACTIC ACID: CPT | Performed by: SURGERY

## 2020-05-14 PROCEDURE — 25000132 ZZH RX MED GY IP 250 OP 250 PS 637: Performed by: SURGERY

## 2020-05-14 PROCEDURE — 85610 PROTHROMBIN TIME: CPT | Performed by: SURGERY

## 2020-05-14 PROCEDURE — 99024 POSTOP FOLLOW-UP VISIT: CPT | Performed by: PHYSICIAN ASSISTANT

## 2020-05-14 PROCEDURE — 71045 X-RAY EXAM CHEST 1 VIEW: CPT

## 2020-05-14 PROCEDURE — 25000125 ZZHC RX 250: Performed by: PHYSICIAN ASSISTANT

## 2020-05-14 PROCEDURE — 84100 ASSAY OF PHOSPHORUS: CPT | Performed by: SURGERY

## 2020-05-14 PROCEDURE — 85027 COMPLETE CBC AUTOMATED: CPT | Performed by: SURGERY

## 2020-05-14 PROCEDURE — 83735 ASSAY OF MAGNESIUM: CPT | Performed by: SURGERY

## 2020-05-14 PROCEDURE — 80048 BASIC METABOLIC PNL TOTAL CA: CPT | Performed by: SURGERY

## 2020-05-14 PROCEDURE — 40000141 ZZH STATISTIC PERIPHERAL IV START W/O US GUIDANCE

## 2020-05-14 PROCEDURE — 81001 URINALYSIS AUTO W/SCOPE: CPT | Performed by: SURGERY

## 2020-05-14 PROCEDURE — 36415 COLL VENOUS BLD VENIPUNCTURE: CPT | Performed by: SURGERY

## 2020-05-14 PROCEDURE — 12000001 ZZH R&B MED SURG/OB UMMC

## 2020-05-14 PROCEDURE — 25800030 ZZH RX IP 258 OP 636: Performed by: SURGERY

## 2020-05-14 PROCEDURE — 25000128 H RX IP 250 OP 636: Performed by: NEUROLOGICAL SURGERY

## 2020-05-14 PROCEDURE — 25800030 ZZH RX IP 258 OP 636: Performed by: NURSE PRACTITIONER

## 2020-05-14 PROCEDURE — 25000128 H RX IP 250 OP 636: Performed by: NURSE PRACTITIONER

## 2020-05-14 PROCEDURE — 87040 BLOOD CULTURE FOR BACTERIA: CPT | Performed by: SURGERY

## 2020-05-14 PROCEDURE — 80053 COMPREHEN METABOLIC PANEL: CPT | Performed by: SURGERY

## 2020-05-14 RX ORDER — ACETAMINOPHEN 325 MG/1
650 TABLET ORAL ONCE
Status: COMPLETED | OUTPATIENT
Start: 2020-05-14 | End: 2020-05-14

## 2020-05-14 RX ORDER — DEXTROSE MONOHYDRATE, SODIUM CHLORIDE, AND POTASSIUM CHLORIDE 50; 1.49; 4.5 G/1000ML; G/1000ML; G/1000ML
INJECTION, SOLUTION INTRAVENOUS CONTINUOUS
Status: DISCONTINUED | OUTPATIENT
Start: 2020-05-14 | End: 2020-05-15

## 2020-05-14 RX ORDER — ACETAMINOPHEN 325 MG/1
650 TABLET ORAL ONCE
Status: COMPLETED | OUTPATIENT
Start: 2020-05-14 | End: 2020-05-15

## 2020-05-14 RX ORDER — PIPERACILLIN SODIUM, TAZOBACTAM SODIUM 3; .375 G/15ML; G/15ML
3.38 INJECTION, POWDER, LYOPHILIZED, FOR SOLUTION INTRAVENOUS EVERY 6 HOURS
Status: DISCONTINUED | OUTPATIENT
Start: 2020-05-14 | End: 2020-05-17

## 2020-05-14 RX ADMIN — POTASSIUM PHOSPHATE, MONOBASIC AND POTASSIUM PHOSPHATE, DIBASIC 15 MMOL: 224; 236 INJECTION, SOLUTION INTRAVENOUS at 13:46

## 2020-05-14 RX ADMIN — PIPERACILLIN AND TAZOBACTAM 3.38 G: 3; .375 INJECTION, POWDER, FOR SOLUTION INTRAVENOUS at 18:35

## 2020-05-14 RX ADMIN — INSULIN ASPART 1 UNITS: 100 INJECTION, SOLUTION INTRAVENOUS; SUBCUTANEOUS at 04:07

## 2020-05-14 RX ADMIN — POTASSIUM CHLORIDE, DEXTROSE MONOHYDRATE AND SODIUM CHLORIDE: 150; 5; 450 INJECTION, SOLUTION INTRAVENOUS at 08:34

## 2020-05-14 RX ADMIN — INSULIN ASPART 1 UNITS: 100 INJECTION, SOLUTION INTRAVENOUS; SUBCUTANEOUS at 12:52

## 2020-05-14 RX ADMIN — BUPIVACAINE HYDROCHLORIDE: 7.5 INJECTION, SOLUTION EPIDURAL; RETROBULBAR at 17:14

## 2020-05-14 RX ADMIN — ACETAMINOPHEN 650 MG: 325 TABLET, FILM COATED ORAL at 00:25

## 2020-05-14 RX ADMIN — INSULIN ASPART 1 UNITS: 100 INJECTION, SOLUTION INTRAVENOUS; SUBCUTANEOUS at 08:34

## 2020-05-14 RX ADMIN — SODIUM CHLORIDE 500 ML: 9 INJECTION, SOLUTION INTRAVENOUS at 00:45

## 2020-05-14 RX ADMIN — INSULIN ASPART 2 UNITS: 100 INJECTION, SOLUTION INTRAVENOUS; SUBCUTANEOUS at 19:55

## 2020-05-14 RX ADMIN — SODIUM CHLORIDE 500 ML: 9 INJECTION, SOLUTION INTRAVENOUS at 00:26

## 2020-05-14 RX ADMIN — PHYTONADIONE 5 MG: 10 INJECTION, EMULSION INTRAMUSCULAR; INTRAVENOUS; SUBCUTANEOUS at 12:49

## 2020-05-14 RX ADMIN — INSULIN ASPART 3 UNITS: 100 INJECTION, SOLUTION INTRAVENOUS; SUBCUTANEOUS at 15:44

## 2020-05-14 RX ADMIN — INSULIN GLARGINE 10 UNITS: 100 INJECTION, SOLUTION SUBCUTANEOUS at 21:30

## 2020-05-14 RX ADMIN — INSULIN ASPART 1 UNITS: 100 INJECTION, SOLUTION INTRAVENOUS; SUBCUTANEOUS at 00:20

## 2020-05-14 RX ADMIN — INSULIN GLARGINE 10 UNITS: 100 INJECTION, SOLUTION SUBCUTANEOUS at 00:20

## 2020-05-14 RX ADMIN — SODIUM CHLORIDE, POTASSIUM CHLORIDE, SODIUM LACTATE AND CALCIUM CHLORIDE: 600; 310; 30; 20 INJECTION, SOLUTION INTRAVENOUS at 00:26

## 2020-05-14 ASSESSMENT — ACTIVITIES OF DAILY LIVING (ADL)
ADLS_ACUITY_SCORE: 14
ADLS_ACUITY_SCORE: 14
ADLS_ACUITY_SCORE: 15
ADLS_ACUITY_SCORE: 16
ADLS_ACUITY_SCORE: 15
ADLS_ACUITY_SCORE: 15

## 2020-05-14 ASSESSMENT — PAIN DESCRIPTION - DESCRIPTORS
DESCRIPTORS: DISCOMFORT

## 2020-05-14 ASSESSMENT — MIFFLIN-ST. JEOR: SCORE: 1795.95

## 2020-05-14 NOTE — PLAN OF CARE
Discharge Planner OT   Patient plan for discharge: Pt would like to return home.   Current status: Therapist educated pt on abdominal precautions and log roll transfer/good body mechanics for transfer supine<->seated EOB. Pt completed transfer sit<->standing ambulation to bathroom with CGA and vc's. Pt completed self cares standing at sink with setup. Pt required Min A and vc's to don/doff shorts. Pt ambulated ~100ft x 2 with CGA, vc's and one seated rest break. Pt tolerated therapy session well. VSS.   Barriers to return to prior living situation: Current medical status, Fatigue, Weakness/decreased endurance.   Recommendations for discharge: Anticipate pt will discharge home with A and HEP.   Rationale for recommendations: Pt will benefit from continued therapy while IP to maximize functional independence, strength and endurance for ADLs/IADLs.        Entered by: Giovanni Lowry 05/14/2020 10:19 AM

## 2020-05-14 NOTE — PLAN OF CARE
Tmax 100.4, team aware. Pain controlled with PCEA marcaine/dilaudid. Having some itching, improved once abd binder removed. No other LAST symptoms, dressing c/d/I. Tolerating clears w/o nausea. Reports passing flatus, no BM. Em in place with good output. SAL with serosanguinous drainage. Phytonadione infused for INR 1.9, lovenox held. Phos currently infusing, recheck tomorrow. Up with 1+GB. Using IS. Continue with POC.

## 2020-05-14 NOTE — OP NOTE
Procedure Date: 05/12/2020      PREOPERATIVE DIAGNOSIS:  Hilar cholangiocarcinoma.      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      PROCEDURES:   1.  Exploratory laparotomy.   2.  Radical extrahepatic bile duct resection.   3.  Open right hepatectomy.   4.  Portal lymph node dissection.   5.  Intraoperative hepatic ultrasound.   6.  Intraoperative air cholangiogram.   7.  Bianca-en-Y hepaticojejunostomy to the left hepatic duct.   8.  Peritoneal nodule biopsy.   9.  Excision of skin lesion.      SURGEON:  Oswaldo Hale MD    FIRST ASSISTANT:  Edmund Lilly MD.  (Please note that the complexity of this case required my partner, Dr. Hussain Lilly to act as first assistant)      SECOND ASSISTANT:  Sung Ramos MD, PGY-5      ANESTHESIA:  General.      ESTIMATED BLOOD LOSS:  300 mL.      SPECIMENS:   1.  Bile duct stent.   2.  Skin lesion.   3.  Peritoneal nodule.   4.  Distal bile duct margin.   5.  Common hepatic duct margin.   6.  Right posterior hepatic duct debris.   7.  Extrahepatic biliary tree.   8.  Liver right lobe including segments 5, 6, 7 and 8.   9.  Portal lymph node 1A.   10. Portal lymph node 1B.   11.  Portal lymph node 2A.   12. Portal lymph node 2B.   13.  Portal lymph node 3A.     14.  Portal lymph node 3B.     15.  Portal lymph node 4A.   16.  Portal lymph node 4B.   17.  Falciform ligament.      DESCRIPTION OF PROCEDURE:  Mr. Estrada was taken to the operating room, placed supine on the operating table, put to sleep by Anesthesia, prepped and draped sterilely.  Pause for the cause was performed and was accurate.        We entered the abdomen through a right subcostal incision with a midline extension.  The patient had what appeared to be a seborrheic keratosis lying along the incision and this was resected and passed off for permanent pathologic evaluation as part of our incision.  We entered the abdomen without difficulty and placed a  self-retaining retractor.  The patient's liver appeared somewhat fibrotic and nodular, but there was no evidence of gross cirrhosis.  We inspected the peritoneal surfaces of the abdomen and we did identify 1 peritoneal nodule on the anterior surface of the duodenum with some local fatty tissue adherent to it.  This was resected sharply and sent for frozen section.  This revealed no evidence of malignancy.        Satisfied with that, we began our portal dissection.  We began by taking down some adhesions to the undersurface of the liver in the area of the previous gallbladder surgery.  We then divided the soft tissue of the yousuf immediately along the superior border of the duodenum and head of the pancreas.  We identified the hepatic artery in this location and dissected the tissue along its lateral border.  We then worked in a medial to lateral fashion, gently removing all of the portal soft tissue.  The tumor itself could be felt in the mid portion of the extrahepatic bile duct extending proximally.  It appeared to also involve the cystic duct stump.  We spent several hours working on this dissection, specifically because the tumor was extremely adherent to the portal vein as well as a replaced right hepatic artery, which subsequently had an anterior and posterior branch that were essentially initially inseparable from the tumor.  Because of the difficulty with this dissection, I required the assistance of 1 of my partners as we moved through the remainder of the case.  After several hours of extremely tedious dissection, we were able to peel initially the left hepatic artery off of the medial aspect of the tumor.  The left hepatic artery was arising from the celiac trunk in typical fashion.  The main portal vein was for the most part free of tumor; however, the portal vein at its proximal extent adjacent to the right portal takeoff was extremely adherent to the tumor, which again led to a prolonged, very tedious  dissection, but ultimately was able to be freed from the tumor.  The patient had a replaced right hepatic artery, which came posterior to the tumor, but then actually coursed somewhat medial and divided into an anterior and posterior branches.  Again, this led to a fairly tedious dissection in order to tease the tumor off of these vessels in an effort initially to preserve them.        At this point in the case, I was considering the possibility of being able to perform a completely extrahepatic resection, particularly in light of the fact that the patient seemed to have some fibrosis, likely due to his biliary obstruction.  From an overall risk stratification, I was hoping that if we could remove the tumor with negative margins on the proximal end, that we might be able to preserve the right lobe of his liver.  Once the major vasculature seemed free of the tumor, at this point I decided to divide the distal bile duct at the neck of the pancreas.  It was divided using the Bovie electrocautery and the biliary stent that was previously placed was removed and sent for cultures.  A rim of tissue from the distal remnant duct was removed and sent for frozen section.  It revealed no evidence of malignancy.  The distal stump was then oversewn using a 3-0 silk suture.  We then continued our proximal dissection, particularly on the lateral aspect there was some remaining tissue that was taken down.  At the hilum of the liver, we were able to encircle what would typically be the common hepatic duct; however, in this patient, he had an aberrant right hepatic system that was essentially entering into his cystic duct/tumor in a separate confluence.  This is what led to the patient having an obstructed right biliary system despite having a metal stent in place.        At this point, I divided the bile duct above the previously-placed metal stent which I labeled as common hepatic duct, although it was essentially the left-sided  biliary system.  We divided it and sent frozen sections again from the margin, which returned as negative.  Of note, where we divided the duct, there were actually 2 lumens, the medial of which seem to be feeding potentially the right anterior biliary tree and the lateral lumen draining the left liver.  Finally, we divided what appeared to be the right hepatic duct in order to remove the extrahepatic biliary tree.  The specimen was passed off for permanent pathologic evaluation; however, in then dividing the right posterior duct, there was fairly solid debris emanating from the liver.  Despite a fairly exhaustive effort to preserve this patient's right lobe, given the clearly chronically damaged nature of the right drainage system and the significant debris that was emanating from it, I felt it if I tried to make an anastomosis in this area, it would essentially no doubt lead to chronic recurrent cholangitis issues.  With that in mind, I decided there was really no other way to ensure complete resection of all the abnormal tissue without removing the right lobe of his liver.  We, therefore, at this point divided his right hepatic artery as well as his right portal vein which had been previously skeletonized.  These were both divided using a white load stapling device.  The liver demarcated along Cantlie's line.  We used Bovie electrocautery to yannick Cantlie's line.  We did perform an exhaustive hepatic ultrasound as well to confirm all of our internal anatomy.  We used the Harmonic scalpel to divide the superficial tissues of the liver and then used multiple firings of the white load stapling device to divide the liver parenchyma itself.  Of note, the liver was extremely fibrotic and difficult to manipulate throughout this portion of the procedure.  The right hepatic vein was similarly divided using a white load stapling device.  Ultimately, the right lobe of the liver including segments 5, 6, 7 and 8 were removed  and passed off the table for permanent evaluation.  The right hepatic duct was marked with a suture for pathologic evaluation.  We controlled a few bleeding points from the cut surface of the liver using the argon beam coagulator as well as a couple of 3-0 silk sutures; however, for the most part, hemostasis throughout the transection was quite good.  We did apply a Quinton maneuver during the resectional portion of this case and the total Quinton time was approximately 20 minutes.        At this point, we went ahead with our portal lymph node dissection.  We removed all of the soft tissue from the medial, lateral and posterior yousuf.  Nodes were somewhat enlarged and in a couple of cases were quite firm.  Each node was divided in 2 to ensure adequate pathologic evaluation.  They were labeled as 1A, 1B, 2A, 2B, 3A, 3B, 4A, 4B accordingly.  Once the entire yousuf had been completely skeletonized and all of the lymphatic tissue removed, we inspected once again for hemostasis, which was excellent.  As part of our dissection, we also used several surgical clips in an effort to yannick our surgical field in the area of the yousuf in the event that this patient required future radiation therapy.        At this point, with the resection complete, we performed an air cholangiogram, particularly because I had a left-sided bile duct that had 2 lumens within it.  We initially injected the medial lumen which immediately filled the entirety of the left lobe remnant biliary tree.  We then injected the medial lumen, which appeared to course through segment 4B, but drain a fairly small area.  The air cholangiogram was performed under ultrasound guidance with the liver under a pool of saline.  At no time during our air injection did we identify any air leakage from the cut surface of the liver.        Satisfied with that, we went ahead with our reconstruction.  We created a Bianca-en-Y limb by dividing a segment of proximal jejunum that  appeared to reach without tension towards the hilum of the liver.  We divided the bowel using a blue load stapling device and then divided the marginal artery using a white load stapling device because this was really the only way we could get a loop to reach easily.  We created a side-to-side jejunojejunostomy using a blue load stapled technique and closed the common enterotomy using a TA-60 blue load.  The Bianca limb was approximately 50 cm long.  We initially tried to make an antecolic reconstruction; however, it seemed that there was slight tension when we laid the limb  there.  We therefore created a defect in the transverse colon mesentery to the right of the middle colic vessels and brought our Bianca limb through this.  In this way, there was absolutely no tension when we created our left-sided hepaticojejunostomy.  We essentially created a double-barrel anastomosis into a single hole in the jejunum.  We sewed the common outer wall of the left-sided hepatic duct into the side of the jejunum using interrupted 4-0 PDS sutures.        Dr. Lilly's involvement in the case ended with the completion of the resectional portion of the surgery.  The reconstruction was performed by me and Dr. Ramos acting as first assistant.      We irrigated the abdomen with multiple liters of saline at this point.  Hemostasis was excellent.  There was no evidence of bile leakage at any time from the cut surface of the liver and our air cholangiogram did not reveal any evidence of leakage.  Given the complexity of the case, I did place a #15 fluted drain out through the patient's left abdomen and left it across our anastomosis and adjacent to the cut surface of the liver in the right upper quadrant.  It was tacked in place using a 3-0 nylon suture.  The abdominal wall was then closed in 2 layers using 0 looped PDS.  The subcutaneous tissues were irrigated thoroughly and the skin was closed in 2 layers using 3-0 Vicryl followed by 4-0  Monocryl followed by Dermabond.  The patient tolerated the procedure well, was extubated in the operating room and transferred to recovery room in stable condition.  I was present throughout the entire procedure as described above.        Of note, this case should qualify for a complexity 22 modifier.  It was extremely difficult to dissect this tumor off of the portal vasculature and ultimately required the assistance of a second attending surgeon.  This case took approximately 10 hours to complete, which was nearly double what a similar case would have taken.         NATHAN SAHA MD             D: 2020   T: 2020   MT: LUCY      Name:     SARAH PAINTING   MRN:      [id number]        Account:        [id number]   :      1955           Procedure Date: 2020      Document: [id number]

## 2020-05-14 NOTE — PROGRESS NOTES
[facility]ESTHESIA PAIN [facility]ICE ([facility]) EVALUATION:  - Time: 1830, patient evaluated on evening rounds   - Evaluation: Patient reports pain intensity with current therapy 7/10 at rest and 10/10 with activity. States that he is having more significant pain since the epidural was turned down this morning, though indicates his pain his most significant when he moves about.   General: healthy, alert and no distress  Catheter system integrity: intact  Skin: dressing clean, dry and intact   .  Current vitals: /88, P 86, MAP 92    - Assessment/Plan    PAIN: moderately well controlled    INTERVENTION: Bolus administered    MEDICATION: PF bupivacaine 0.125%, total bolus 8 mL,  mL via each catheter    PROCEDURE: Clinician bolus via epidural catheter; administered without complication with negative aspirate before and between each mL.    No symptoms of local anesthetic systemic toxicity (LAST). Remained with and assessed patient for 10 min post-injection. BP, P and MAP stable    POST-PROCEDURE: Bedside RN aware of need to continue BP, P and MAP monitoring Q 10 min for an additional 30 min. Contact [facility] (jobcode ID [id number]) if any of the following: patient experiencing any untoward effects, SBP< 90, P < 50 or > 120, MAP < 60     - patient can be evaluated to receive local anesthetic bolus Q 12 hr PRN pain not controlled with continuous infusion.  Bedside nurse must page R[facility] to request bolus    Alexis Mayfield MD  PGY3, Anesthesiology    [facility] Contact Info (24 hour job code pager is the last 4 digits) For in-house use only:  [employer] phone: [place] [phone], [place] [phone], [employer] [phone], then enter call-back number.    Text: Use [email or web address] on the Intranet <Paging/Directory> tab and enter Jobcode ID.   If no call back at any time, contact the hospital  and ask for [facility] attending or backup

## 2020-05-14 NOTE — PLAN OF CARE
[id number] Transverse abdominal incision C/D/I with dermabond. Hypoactive bowel sounds, no flatus. Denies nausea. Pain adequately managed with PCEA. Epidural site C/D/I, denies LAST symptoms. NPO and ice chips. BG every 4 hrs, insulin given per MAR. SAL with small amount of output. Em in place with adequate urine output. Pt reported itchiness to abdomen only; pt requested abdominal binder to be removed while in bed to help minimize itchiness. Facial flushing improving. Not out of bed. Intermittently tachypneic, temp max 100.5, does not meet parameters to notify team. OVSS on room air. Capno discontinued, on continuous pulse ox.

## 2020-05-14 NOTE — PROGRESS NOTES
Surg Onc note    Doing ok. Pain controlled. Having some itching. Had issues with orthostatic hypotension yesterday. No nausea or vomiting. Not yet passing flatus.    Tm 101.4, o/w VS WNL  UOP: 450/1.6  Drain: 60/400    NAD laying in bed  Abd soft, non-distended, incision c/d/i, drain serosanguinous  WWP    Labs: Reviewed. LFTs improving. Cr of 0.92. Hgb of 9.5.    A/P: 65 year-old POD 2 from extrahepatic bile duct resection, right hepatectomy, and portal lymphnode dissection. Doing well.  -Pain: Discussed with the regional pain team. Will decrease his epidural rate in half given itching and ortho stasis. If still having issues tomorrow will perform tap blocks and remove epidural.  -Clear liquids today  -Switch IVF to mIVF today  -SAL bulb to thumbprint suction  -INR is 1.9 will give 5mg of IV vit K  -Em to stay today because of orthostasis  -Hold ppx today (INR of 1.9)    Sung Ramos  PGY 7

## 2020-05-14 NOTE — PLAN OF CARE
POD 2 Exploratory laparotomy, Open right hepatectomy, Radical extra hepatic bile duct resection, Portal lymph node dissection, Intraoperative ultrasound, Intraoperative air angiogram,  Bianca-En-Y left hepaticojejunostomy, Excisin of skin lesion (per MD note).   A&OX4. VSS on room air ex tachycardic and spiked a temp max of 101.4. Patient also triggered sepsis protocol, lactic came back at 3.3. MD notified about temp, pule and lactate. 1L of bolus given. Tylenol given for high temp, recheck was 99.9. Patient stated that pain was well control with Bupivacaine with Hydromorphone going at 10cc/hr. Epidural site with some dry drainage.  NPO ex ice chips and meds. SAL to bulb suction with serosanguinous output. Em catheter in place with adequate output. PIV infusing MIVF. On Blood sugar check every 4 hours, insulin coverage given per sliding scale. Transverse abdominal incision with surgical glue open to air, abdominal binder removed due to itchiness. Passing gas, no post op BM. Calls appropriately. Continue with plan of care

## 2020-05-14 NOTE — PROGRESS NOTES
REGIONAL ANESTHESIA PAIN SERVICE EPIDURAL NOTE  Fuentes Estrada is a 65 year old malewith cholangiocarcinoma who is now POD # 2 Exploratory laparotomy, right hepatectomy, radical extra hepatic bile duct resection, portal lymph node dissection, intraoperative ultrasound and air angiogram, garry-en-y left hepaticojejunostomy; s/p T7-8 epidural catheter (catheter day #2) for postoperative pain management.      Subjective and Interval History Overnight events: no acute event. Patient seen at 0945, reports adequate pain control with epidural infusion and current  analgesic medications (see below).  Denies new weakness, paresthesias, circumoral numbness, metallic taste or tinnitus.  Patient has a h/o CVA and reports baseline LE weakness.  Patient able to ambulate with OT today. Patient is ambulating with standby assit.  Currently tolerating a liquid diet, denies nausea/vomiting, urinary catheter in place.    Pain Intensity using Numerical Rating Scale (NRS):  0/10, with activities: 10/10        Antithrombotic/Thrombolytic Therapy ordered:  None ordered at this time.    Analgesic Medications:  Medications related to Pain Management (From now, onward)    Start     Dose/Rate Route Frequency Ordered Stop    05/13/20 1821  diphenhydrAMINE (BENADRYL) capsule 25 mg      25 mg Oral EVERY 6 HOURS PRN 05/13/20 1821      05/13/20 0859  hydrOXYzine (ATARAX) tablet 25 mg      25 mg Oral EVERY 6 HOURS PRN 05/13/20 0900      05/13/20 0859  hydrOXYzine (ATARAX) tablet 50 mg      50 mg Oral EVERY 6 HOURS PRN 05/13/20 0900      05/13/20 0845  HYDROmorphone (DILAUDID) 3 mcg/mL, bupivacaine (MARCAINE) 0.0625 % in sodium chloride 0.9 % 250 mL EPIDURAL PCEA (patient controlled epidural)       EPIDURAL PCA 05/13/20 0835      05/12/20 2153  lidocaine 1 % 0.1-1 mL      0.1-1 mL Other EVERY 1 HOUR PRN 05/12/20 2153      05/12/20 2153  lidocaine (LMX4) cream       Topical EVERY 1 HOUR PRN 05/12/20 2153      05/12/20 0629  nalbuphine (NUBAIN)  "injection 2.5-5 mg      2.5-5 mg Intravenous EVERY 6 HOURS PRN 05/12/20 0629             Objective:  Lab Results:   Recent Labs   Lab Test 05/14/20  0450   WBC 11.5*   RBC 3.16*   HGB 9.5*   HCT 30.0*   MCV 95   MCH 30.1   MCHC 31.7   RDW 13.2          Lab Results   Component Value Date    INR 1.95 05/14/2020    INR 1.53 05/13/2020    INR 1.40 05/12/2020       Vitals:    Temp:  [96.7  F (35.9  C)-101.4  F (38.6  C)] 100.4  F (38  C)  Pulse:  [] 82  Heart Rate:  [] 107  Resp:  [20-27] 20  BP: ()/(58-83) 92/58  SpO2:  [91 %-100 %] 91 %  BP 92/58 (BP Location: Right arm)   Pulse 82   Temp 100.4  F (38  C) (Oral)   Resp 20   Ht 1.854 m (6' 1\")   Wt 96.7 kg (213 lb 3.2 oz)   SpO2 91%   BMI 28.13 kg/m         Exam:   GEN: alert and no distress  NEURO/MSK: Moving UE and LE independently. Ambulating.  Strength LE strength intact  and overall symmetric.  SKIN: Epidural catheter site with dressing c/d/i, no tenderness, no erythema, dry heme, no edema     Assessment and Plan:  Patient is receiving adequate analgesia with current multimodal therapy including T7-8 epidural catheter infusion of   HYDROmorphone (DILAUDID) 3 mcg/mL, bupivacaine (MARCAINE) 0.0625 % in sodium chloride 0.9 % 250 mL EPIDURAL Infusion 10 mL/h. Primary team was concerned with orthostatic hypotension and patient's report of pruritis.  Primary team discussed possibly discontinuing the epidural and doing TAP block instead.  [facility] staff (Dr. Lyons) recommend decreasing Epidural catheter rate instead to 6ml/hour and this was done at 0950am on 5/14/2020.   Will reassess on 5/15/2020 for possible epidural catheter removal.     Motor function intact and is meeting activity goals.  NO evidence of adverse side effects related to local anesthetic. Adequate urine output.      - continue current epidural infusion of HYDROmorphone (DILAUDID) 3 mcg/mL, bupivacaine (MARCAINE) 0.0625 % in sodium chloride 0.9 % 250 mL,  6ml/hour.  - " antithrombotic/thrombolytic therapy none ordered at this time,  - will continue to follow and adjust as needed    - discussed plan with attending anesthesiologist    Celine Bhatti PA-C  [facility] Anesthesia Pain Service  5/14/2020 1:52 PM    [facility] Contact Info (24 hour job code pager is the last 4 digits) For in-house use only:   Job code ID: [id number]   [place] 0599  Peds [id number]  [place] phone: dial * [phone], enter jobcode ID, then enter call-back number.    Text: Use [employer] on the Intranet <Paging/Directory> tab and enter Jobcode ID.   If no call back at any time, contact the hospital  and ask for [facility] attending or backup

## 2020-05-14 NOTE — PROGRESS NOTES
Sepsis Evaluation Progress Note    I was called to see Fuentes Estrada due to abnormal vital signs triggering the Sepsis SIRS screening alert. He is not known to have an infection.     Physical Exam   Vital Signs:  Temp: 100.7  F (38.2  C) Temp src: Oral BP: 134/70 Pulse: 101 Heart Rate: 107 Resp: 24 SpO2: 94 % O2 Device: None (Room air)      Lab:  Lactic Acid   Date Value Ref Range Status   05/12/2020 2.0 0.7 - 2.0 mmol/L Final     Lactate for Sepsis Protocol   Date Value Ref Range Status   05/14/2020 3.0 (H) 0.7 - 2.0 mmol/L Final     Comment:     Value above critical limit       The patient is at baseline mental status. Patient reports he feels 'fine.' Denies fevers, chills, chest pain or SOB. Hasn't been out of bed today and has not been using IS.     The rest of their physical exam is significant for:   Gen: awake, NAD  CV: tachy low 100s, regular to palpation   Resp: unlabored breathing, sating mid 90s on RA.   Abd: soft, non distended, mildly tender to palpation. No rebound or guarding. Incision c/d/i.   : Em in place, clear soledad urine in bag.   Ext: warm, well perfused. No significant edema.     Assessment & Plan   NO EVIDENCE OF SEPSIS at this time.  Vital sign, physical exam, and lab findings are likely due to hypovolemia, ?atelectasis. .    -1L bolus  -UA, CBC, BMP, CXR, blood cultures  -Recheck lactic acid     Disposition: The patient will remain on the current unit. We will continue to monitor this patient closely..  Alexandra Isabel MD    Sepsis Criteria   Sepsis: 2+ SIRS criteria due to infection  Severe Sepsis: Sepsis AND 1+ new sign of acute organ dysfunction (Note: lactate >2 is organ dysfunction)  Septic Shock: Sepsis AND hypotension despite volume resuscitation with 30 ml/kg crystalloid

## 2020-05-15 ENCOUNTER — HOME INFUSION (PRE-WILLOW HOME INFUSION) (OUTPATIENT)
Dept: PHARMACY | Facility: CLINIC | Age: 65
End: 2020-05-15

## 2020-05-15 ENCOUNTER — APPOINTMENT (OUTPATIENT)
Dept: OCCUPATIONAL THERAPY | Facility: CLINIC | Age: 65
DRG: 405 | End: 2020-05-15
Attending: SURGERY
Payer: MEDICARE

## 2020-05-15 LAB
ALBUMIN SERPL-MCNC: 2.4 G/DL (ref 3.4–5)
ALP SERPL-CCNC: 88 U/L (ref 40–150)
ALT SERPL W P-5'-P-CCNC: 219 U/L (ref 0–70)
ANION GAP SERPL CALCULATED.3IONS-SCNC: 5 MMOL/L (ref 3–14)
AST SERPL W P-5'-P-CCNC: 103 U/L (ref 0–45)
BILIRUB SERPL-MCNC: 2 MG/DL (ref 0.2–1.3)
BUN SERPL-MCNC: 13 MG/DL (ref 7–30)
CALCIUM SERPL-MCNC: 8 MG/DL (ref 8.5–10.1)
CHLORIDE SERPL-SCNC: 106 MMOL/L (ref 94–109)
CO2 SERPL-SCNC: 26 MMOL/L (ref 20–32)
COPATH REPORT: NORMAL
CREAT SERPL-MCNC: 0.95 MG/DL (ref 0.66–1.25)
ERYTHROCYTE [DISTWIDTH] IN BLOOD BY AUTOMATED COUNT: 13.1 % (ref 10–15)
GFR SERPL CREATININE-BSD FRML MDRD: 82 ML/MIN/{1.73_M2}
GLUCOSE BLDC GLUCOMTR-MCNC: 176 MG/DL (ref 70–99)
GLUCOSE BLDC GLUCOMTR-MCNC: 191 MG/DL (ref 70–99)
GLUCOSE BLDC GLUCOMTR-MCNC: 203 MG/DL (ref 70–99)
GLUCOSE BLDC GLUCOMTR-MCNC: 205 MG/DL (ref 70–99)
GLUCOSE BLDC GLUCOMTR-MCNC: 207 MG/DL (ref 70–99)
GLUCOSE BLDC GLUCOMTR-MCNC: 281 MG/DL (ref 70–99)
GLUCOSE SERPL-MCNC: 186 MG/DL (ref 70–99)
HCT VFR BLD AUTO: 32.2 % (ref 40–53)
HGB BLD-MCNC: 10.2 G/DL (ref 13.3–17.7)
INR PPP: 1.77 (ref 0.86–1.14)
MAGNESIUM SERPL-MCNC: 2 MG/DL (ref 1.6–2.3)
MCH RBC QN AUTO: 29.9 PG (ref 26.5–33)
MCHC RBC AUTO-ENTMCNC: 31.7 G/DL (ref 31.5–36.5)
MCV RBC AUTO: 94 FL (ref 78–100)
PHOSPHATE SERPL-MCNC: 1.5 MG/DL (ref 2.5–4.5)
PHOSPHATE SERPL-MCNC: 1.8 MG/DL (ref 2.5–4.5)
PLATELET # BLD AUTO: 214 10E9/L (ref 150–450)
POTASSIUM SERPL-SCNC: 3.7 MMOL/L (ref 3.4–5.3)
PROT SERPL-MCNC: 4.8 G/DL (ref 6.8–8.8)
RBC # BLD AUTO: 3.41 10E12/L (ref 4.4–5.9)
SODIUM SERPL-SCNC: 138 MMOL/L (ref 133–144)
WBC # BLD AUTO: 9.6 10E9/L (ref 4–11)

## 2020-05-15 PROCEDURE — 25000125 ZZHC RX 250: Performed by: PHYSICIAN ASSISTANT

## 2020-05-15 PROCEDURE — 97530 THERAPEUTIC ACTIVITIES: CPT | Mod: GO | Performed by: OCCUPATIONAL THERAPIST

## 2020-05-15 PROCEDURE — 25800030 ZZH RX IP 258 OP 636: Performed by: PHYSICIAN ASSISTANT

## 2020-05-15 PROCEDURE — 97535 SELF CARE MNGMENT TRAINING: CPT | Mod: GO | Performed by: OCCUPATIONAL THERAPIST

## 2020-05-15 PROCEDURE — 25000132 ZZH RX MED GY IP 250 OP 250 PS 637: Performed by: SURGERY

## 2020-05-15 PROCEDURE — 36415 COLL VENOUS BLD VENIPUNCTURE: CPT | Performed by: SURGERY

## 2020-05-15 PROCEDURE — 00000146 ZZHCL STATISTIC GLUCOSE BY METER IP

## 2020-05-15 PROCEDURE — 12000001 ZZH R&B MED SURG/OB UMMC

## 2020-05-15 PROCEDURE — 85027 COMPLETE CBC AUTOMATED: CPT | Performed by: SURGERY

## 2020-05-15 PROCEDURE — 84100 ASSAY OF PHOSPHORUS: CPT | Performed by: SURGERY

## 2020-05-15 PROCEDURE — 25800030 ZZH RX IP 258 OP 636: Performed by: NEUROLOGICAL SURGERY

## 2020-05-15 PROCEDURE — 83735 ASSAY OF MAGNESIUM: CPT | Performed by: SURGERY

## 2020-05-15 PROCEDURE — 85610 PROTHROMBIN TIME: CPT | Performed by: SURGERY

## 2020-05-15 PROCEDURE — 25000128 H RX IP 250 OP 636: Performed by: NEUROLOGICAL SURGERY

## 2020-05-15 PROCEDURE — 80053 COMPREHEN METABOLIC PANEL: CPT | Performed by: SURGERY

## 2020-05-15 RX ADMIN — POTASSIUM CHLORIDE, DEXTROSE MONOHYDRATE AND SODIUM CHLORIDE: 150; 5; 450 INJECTION, SOLUTION INTRAVENOUS at 01:41

## 2020-05-15 RX ADMIN — PIPERACILLIN AND TAZOBACTAM 3.38 G: 3; .375 INJECTION, POWDER, FOR SOLUTION INTRAVENOUS at 12:08

## 2020-05-15 RX ADMIN — INSULIN ASPART 1 UNITS: 100 INJECTION, SOLUTION INTRAVENOUS; SUBCUTANEOUS at 08:09

## 2020-05-15 RX ADMIN — BUPIVACAINE HYDROCHLORIDE: 7.5 INJECTION, SOLUTION EPIDURAL; RETROBULBAR at 21:35

## 2020-05-15 RX ADMIN — INSULIN ASPART 2 UNITS: 100 INJECTION, SOLUTION INTRAVENOUS; SUBCUTANEOUS at 20:30

## 2020-05-15 RX ADMIN — INSULIN ASPART 3 UNITS: 100 INJECTION, SOLUTION INTRAVENOUS; SUBCUTANEOUS at 16:47

## 2020-05-15 RX ADMIN — ACETAMINOPHEN 650 MG: 325 TABLET, FILM COATED ORAL at 01:22

## 2020-05-15 RX ADMIN — INSULIN ASPART 2 UNITS: 100 INJECTION, SOLUTION INTRAVENOUS; SUBCUTANEOUS at 04:50

## 2020-05-15 RX ADMIN — POTASSIUM PHOSPHATE, MONOBASIC AND POTASSIUM PHOSPHATE, DIBASIC 20 MMOL: 224; 236 INJECTION, SOLUTION INTRAVENOUS at 12:46

## 2020-05-15 RX ADMIN — PIPERACILLIN AND TAZOBACTAM 3.38 G: 3; .375 INJECTION, POWDER, FOR SOLUTION INTRAVENOUS at 17:51

## 2020-05-15 RX ADMIN — PIPERACILLIN AND TAZOBACTAM 3.38 G: 3; .375 INJECTION, POWDER, FOR SOLUTION INTRAVENOUS at 06:11

## 2020-05-15 RX ADMIN — INSULIN ASPART 2 UNITS: 100 INJECTION, SOLUTION INTRAVENOUS; SUBCUTANEOUS at 12:08

## 2020-05-15 RX ADMIN — PIPERACILLIN AND TAZOBACTAM 3.38 G: 3; .375 INJECTION, POWDER, FOR SOLUTION INTRAVENOUS at 01:20

## 2020-05-15 RX ADMIN — INSULIN ASPART 2 UNITS: 100 INJECTION, SOLUTION INTRAVENOUS; SUBCUTANEOUS at 01:26

## 2020-05-15 ASSESSMENT — MIFFLIN-ST. JEOR: SCORE: 1809.1

## 2020-05-15 ASSESSMENT — PAIN DESCRIPTION - DESCRIPTORS
DESCRIPTORS: BURNING
DESCRIPTORS: ACHING;DISCOMFORT

## 2020-05-15 ASSESSMENT — ACTIVITIES OF DAILY LIVING (ADL)
ADLS_ACUITY_SCORE: 13
ADLS_ACUITY_SCORE: 16
ADLS_ACUITY_SCORE: 13
ADLS_ACUITY_SCORE: 15

## 2020-05-15 NOTE — PLAN OF CARE
7C PT DEFER - per discussion with OT, patient with no acute PT needs at this time, OT to continue to follow in house.    PT -   Discharge Planner PT   Patient plan for discharge: home with family support  Current status: not assessed  Barriers to return to prior living situation: medical status  Recommendations for discharge: defer to OT  Rationale for recommendations: not assessed  Entered by: MAYA BULL 05/15/2020 2:58 PM

## 2020-05-15 NOTE — PLAN OF CARE
MaxTemp 101.3, hypertensive, OVSS on 1.5L NC. Denies pain, hydromorphone/bupivacaine epidural in use and scheduled tylenol given for fever. Abd incision LATISHA with liquid bandage, no drainage. SAL with minimal output to thumb print bulb suction. Em patent with adequate UOP. Q4hr blood glucose monitorin and 205. Sliding scale insulin given per orders. Right PIV infusing MIVF, left PIV saline locked. On clear liquid diet, denies nausea/vomiting. Continue with POC.

## 2020-05-15 NOTE — PROGRESS NOTES
REGIONAL ANESTHESIA PAIN SERVICE EPIDURAL NOTE  Fuentes Estrada is a 65 year old male POD # 3 Exploratory laparotomy, right hepatectomy, radical extra hepatic bile duct resection, portal lymph node dissection, intraoperative ultrasound and air angiogram, garry-en-y left hepaticojejunostomy; s/p T7-8 epidural catheter (catheter day #2) for postoperative pain management.      Subjective and Interval History Overnight events: elevate temperature of 102.7 in afternoon, improved ow of 98.9F and elevated INR. Patient seen at 2:30pm, reports adequate pain control with epidural infusion and current analgesic medications (see below).  denies weakness, paresthesias, circumoral numbness, metallic taste or tinnitus.  Patient is ambulating without assistance-much improved.  Currently tolerating a liquid diet, denies nausea/vomiting, urinary catheter in place.    Pain Intensity using Numerical Rating Scale (NRS):  0/10 at rest, 5/10 with activity      Antithrombotic/Thrombolytic Therapy ordered:  None ordered at this time.  Last dose of Heparin 5000mg S C on 5/12/2020.    Analgesic Medications:  Medications related to Pain Management (From now, onward)    Start     Dose/Rate Route Frequency Ordered Stop    05/14/20 2200  HYDROmorphone (DILAUDID) 3 mcg/mL, bupivacaine (MARCAINE) 0.0625 % in sodium chloride 0.9 % 250 mL EPIDURAL PCEA (patient controlled epidural)       EPIDURAL PCA 05/14/20 1758      05/13/20 1821  diphenhydrAMINE (BENADRYL) capsule 25 mg      25 mg Oral EVERY 6 HOURS PRN 05/13/20 1821      05/13/20 0859  hydrOXYzine (ATARAX) tablet 25 mg      25 mg Oral EVERY 6 HOURS PRN 05/13/20 0900      05/13/20 0859  hydrOXYzine (ATARAX) tablet 50 mg      50 mg Oral EVERY 6 HOURS PRN 05/13/20 0900      05/12/20 2153  lidocaine 1 % 0.1-1 mL      0.1-1 mL Other EVERY 1 HOUR PRN 05/12/20 2153      05/12/20 2153  lidocaine (LMX4) cream       Topical EVERY 1 HOUR PRN 05/12/20 2153      05/12/20 0629  nalbuphine (NUBAIN) injection  "2.5-5 mg      2.5-5 mg Intravenous EVERY 6 HOURS PRN 05/12/20 0629             Objective:  Lab Results:   Recent Labs   Lab Test 05/15/20  0828   WBC 9.6   RBC 3.41*   HGB 10.2*   HCT 32.2*   MCV 94   MCH 29.9   MCHC 31.7   RDW 13.1          Lab Results   Component Value Date    INR 1.77 05/15/2020    INR 1.95 05/14/2020    INR 1.53 05/13/2020       Vitals:    Temp:  [98.9  F (37.2  C)-102.7  F (39.3  C)] 98.9  F (37.2  C)  Pulse:  [] 101  Heart Rate:  [] 100  Resp:  [18-20] 20  BP: (153-174)/(79-82) 158/81  SpO2:  [87 %-93 %] 92 %  BP (!) 158/81 (BP Location: Right arm)   Pulse 101   Temp 98.9  F (37.2  C) (Oral)   Resp 20   Ht 1.854 m (6' 1\")   Wt 98 kg (216 lb 1.6 oz)   SpO2 92%   BMI 28.51 kg/m         Exam:   GEN: alert and no distress  NEURO/MSK: Moving UE and LE independently  Strength in LE 5/5 overall symmetric.  SKIN: Epidural catheter site is with dressing c/d/i, no tenderness, no erythema, dry heme-unchanged from yesterday, no edema     Assessment and Plan:  Patient is receiving adequate analgesia with current multimodal therapy including T7-8 epidural catheter infusion of   HYDROmorphone (DILAUDID) 3 mcg/mL, bupivacaine (MARCAINE) 0.0625 % in sodium chloride 0.9 % 250 mL EPIDURAL Infusion at 6mL/h.  Is meeting activity goals.  NO evidence of adverse side effects related to local anesthetic. Adequate urine output.      -Because of elevated INR of 1.77 at 0828 on 5/15/2020- the epidural catheter will NOT be removed at this time, will continue to monitor.  Primary team aware. Pt is aware of the situation as well and is okay with epidural infusion at this time.    - continue current epidural infusion:HYDROmorphone (DILAUDID) 3 mcg/mL, bupivacaine (MARCAINE) 0.0625 % in sodium chloride 0.9 % 250 mL EPIDURAL Infusion at 6mL/h.     - antithrombotic/thrombolytic therapy None ordered. Please contact RAPS (#[phone]) prior to any medication changes  - will continue to follow and adjust as " needed    - discussed plan with attending anesthesiologist, Dr. Aburto who has been in communication with the surgery team and had discussed options of using vitamin K to normalize INR safely for epidural catheter removal.    Celine Bhatti PA-C  Regional Anesthesia Pain Service  5/15/2020 10:14 AM    RAPS Contact Info (24 hour job code pager is the last 4 digits) For in-house use only:   Job code ID: [id number]   [place] 0599  Peds [phone]  [place] phone: dial * * * [phone], enter jobcode ID, then enter call-back number.    Text: Use [employer]COM on the Intranet <Paging/Directory> tab and enter Jobcode ID.   If no call back at any time, contact the hospital  and ask for RAPS attending or backup     PAIN MEDICINE ATTENDING ATTESTATION  I saw the patient at the bedside along with the pain medicine team.  I was actively involved in evaluation, physical examination and development of the plan of care.  Please refer to Celine Bhatti PAC note above for the full details.

## 2020-05-15 NOTE — PROGRESS NOTES
Antimicrobial Stewardship Team Note    Antimicrobial Stewardship Program - A joint venture between [facility] and  Physicians to optimize antibiotic management.  NOT a formal consult - Restricted Antimicrobial Review     Patient: Fuentes Estrada  MRN: [id number]  Allergies: Metformin and No known drug allergies    Brief Summary: Fuentes Estrada is a 65 year old male with a PMH of HTN, HLD, CVA x2 in 2010, insulin-dependent diabetes, and recent diagnosis of extrahepatic cholangiocarcinoma in 4/2020. He was admitted on 5/12 for planned open extrahepatic bile duct resection, right hepatectomy, radical extra hepatic bile duct resection, portal lymphnode dissection and left hepaticojejunostomy. SAL drain in place draining 250-300 ml of  serosanguinous fluid. Incision is noted to be clean dry and intact. Patient is being managed with epidural PCEA for postoperative pain.     On 5/14, patient triggered sepsis with fever temp 100.7F, , RR 24 with SpO2 of 94 % on room air, and lactic acid 3. Patient denied chills, chest pain or SOB. Patient was given 1L IVF bolus and blood and urine cultures were collected. Patient later spiked another fever temp 102.7. He was initiated on empiric piperacillin/tazobactam. Blood culture from 5/14 with no growth x1 day. Surgical tissue cultures of bile duct from 5/12 with heavy pan-susceptible Enterococcus faecium and light S. anginosus and coagulase negative staphylococcus (CoNS) in the aerobic culture and Veillonella species in anaerobic culture.         Active Anti-infective Medications   (From admission, onward)                Start     Stop    05/14/20 1800  piperacillin-tazobactam  3.375 g,   Intravenous,   EVERY 6 HOURS     Possible post-op infection        --          Assessment:   Patient with recent open extrahepatic bile duct resection, right hepatectomy, radical extra hepatic bile duct resection, portal lymphnode dissection and left hepaticojejunostomy  initiated on empiric piperacillin/tazobactam. WBC and fever trending down since initiation of empiric antibiotic. Preliminary blood culture with no growth to date and surgical bile duct tissue from 5/12 positive for pan-susceptible Enterococcus faecium, S. anginosus, and Veillonella species in anaerobic culture. Recommend deescalating piperacillin/tazobactam to ampicillin/sulbactam 3g every 6 hours based on culture result as there is no indication for Pseudomonal coverage. Ampicillin/sulbactam will provide adequate coverage for all organisms growing on culture.      Recommendations:  Deescalate piperacillin/tazobactam to ampicillin/sulbactam 3g q6h    Pharmacy took the following actions: Called/paged provider, Electronic note created.    Discussed with ID Staff: Stanley Al MD, [facility]  Fernanda Umanzor, PharmD, BCIDP  Pager: [phone]    Vital Signs/Clinical Features:  Vitals         05/13 0700  -  05/14 0659 05/14 0700  -  05/15 0659 05/15 0700  -  05/15 1233   Most Recent    Temp ( F) 96.7 -  101.4    98.9 -  102.7       98.9 (37.2)    Pulse 96 -  101    82 -  101       101    Heart Rate 92 -  114    90 -  100       100    Resp 20 -  27    18 -  20       20    /72 -  154/74    92/58 -  174/82       158/81    SpO2 (%) 92 -  100    87 -  93    90 -  92     92            Labs  Estimated Creatinine Clearance: 93 mL/min (based on SCr of 0.95 mg/dL).  Recent Labs   Lab Test 05/12/20  0809 05/1953 05/13/20  0656 05/14/20  0015 05/14/20  0450 05/15/20  0828   CR 1.02 0.93 1.16 0.93 0.92 0.95       Recent Labs   Lab Test 05/04/20  1327  05/12/20  1646 05/1953 05/13/20  0656 05/14/20  0015 05/14/20  0450 05/15/20  0828   WBC 7.2  --   --  13.3* 12.6* 11.3* 11.5* 9.6   HGB 12.7*   < > 11.0* 11.1* 11.2* 10.3* 9.5* 10.2*   HCT 39.9*  --   --  34.6* 36.5* 33.0* 30.0* 32.2*   MCV 96  --   --  94 97 97 95 94     --   --  382 332 295 265 214    < > = values in this interval not displayed.        Recent Labs   Lab Test 04/16/20  0851 05/04/20  1327 05/12/20  1953/20  0656 05/14/20  0450 05/15/20  0828   BILITOTAL 1.9* 1.1 2.0* 2.2* 1.8* 2.0*   ALKPHOS 162* 162* 101 112 85 88   ALBUMIN 3.4 3.9 3.0* 3.0* 2.6* 2.4*   AST 51* 50* 321* 736* 317* 103*   ALT 68 60 215* 457* 351* 219*       Recent Labs   Lab Test 05/12/20  1100 05/12/20  1300 05/12/20  1400 05/12/20  1500 05/14/20  0450   LACT 1.1 1.2 1.4 2.0 2.3*             Culture Results:  7-Day Micro Results       Procedure Component Value Units Date/Time    Blood culture [[id number]] Collected:  05/14/20 0019    Order Status:  Completed Lab Status:  Preliminary result Updated:  05/15/20 0656    Specimen:  Blood      Specimen Description Blood Left Arm     Culture Micro No growth after 1 day    Blood culture [[id number]] Collected:  05/14/20 0014    Order Status:  Completed Lab Status:  Preliminary result Updated:  05/15/20 0655    Specimen:  Blood      Specimen Description Blood Right Arm     Culture Micro No growth after 1 day    Anaerobic bacterial culture [[id number]]  (Abnormal) Collected:  05/12/20 1155    Order Status:  Completed Lab Status:  Preliminary result Updated:  05/15/20 1144    Specimen:  Tissue from Other      Specimen Description Bile Stent BILE DUCT STENT     Special Requests Received in anaerobic tubes.     Culture Micro Light growth  Veillonella species  Susceptibility testing not routinely done        Light growth  Prevotella species  Beta lactamase positive  Susceptibility testing not routinely done      Gram stain [[id number]]  (Abnormal) Collected:  05/12/20 1155    Order Status:  Completed Lab Status:  Final result Updated:  05/12/20 1545    Specimen:  Tissue from Other      Specimen Description Bile Stent BILE DUCT STENT SOFT TISSUE USED     Gram Stain Moderate  Gram positive cocci        Rare  Gram positive rods        Many  WBC'S seen  predominantly PMN's        Quantification of host cells and microbiological organisms was done on a  cytocentrifuged   preparation.      Tissue Culture Aerobic Bacterial [[id number]] Collected:  05/12/20 1155    Order Status:  Canceled Lab Status:  No result     Specimen:  Tissue from Other     Miscellaneous Culture Aerobic Bacterial [[id number]]  (Abnormal)  (Susceptibility) Collected:  05/12/20 1155    Order Status:  Completed Lab Status:  Preliminary result Updated:  05/15/20 1431    Specimen:  Bile      Specimen Description Bile Stent BILE DUCT STENT     Culture Micro Heavy growth  Enterococcus faecium        Light growth  Streptococcus anginosus  Susceptibility testing not routinely done        Light growth  Coagulase negative Staphylococcus  Susceptibility testing not routinely done      Susceptibility       Enterococcus faecium (1)       Antibiotic Interpretation Sensitivity Method Status    AMPICILLIN Sensitive <=2 ug/mL MARBELLA Preliminary    CIPROFLOXACIN [*]  Sensitive 1 ug/mL MARBELLA Preliminary    LEVOFLOXACIN [*]  Sensitive 2 ug/mL MARBELLA Preliminary    PENICILLIN Sensitive <=0.12 ug/mL MARBELLA Preliminary    Streptomycin 2000 [*]  Sensitive   MARBELLA Preliminary     No high level streptomycin resistance found    TETRACYCLINE [*]  Sensitive <=1 ug/mL MARBELLA Preliminary    VANCOMYCIN Sensitive <=0.5 ug/mL MARBELLA Preliminary    Gentamicin Screen Sensitive   MARBELLA Preliminary     No high level gentamicin resistance found - If no high level gentamicin   resistance is found, combination therapy with an aminoglycoside may be   indicated for serious enterococcal infections such as bacteremia and   endocarditis.    LINEZOLID [*]  Sensitive 2 ug/mL MARBELLA Preliminary    Quinupristin/Dalfopr [*]  Sensitive 1 ug/ml MARBELLA Preliminary    TIGECYCLINE [*]  Sensitive <=0.12 ug/mL MARBELLA Preliminary    NITROFURANTOIN [*]  Resistant 128 ug/mL MARBELLA Preliminary               [*]   Suppressed Antibiotic                           Recent Labs   Lab Test 05/14/20  0158   URINEPH 5.5   NITRITE Negative   LEUKEST Trace*   WBCU 4                         Imaging: Xr Chest  Port 1 View    Result Date: 5/14/2020  XR CHEST PORT 1 VW on 5/14/2020 12:31 AM. INDICATION: Fever, tachycardia. COMPARISON: CT dated 4/6/2020 FINDINGS: Portable AP semiupright radiograph of the chest. 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 Trachea is clear. Cardiac silhouette is within normal limits. Aortic arch calcification. Pulmonary vasculature is distinct. No pneumothorax or pleural effusions. Right greater than left basilar streaky airspace opacities. Partially visualized stent and surgical clips in the right upper quadrant. No acute osseous abnormality.     IMPRESSION: Right greater than left basilar streaky airspace opacities may represent atelectasis, infection and/or aspiration. I have personally reviewed the examination and initial interpretation and I agree with the findings. ACOSTA IRAHETA MD

## 2020-05-15 NOTE — PROGRESS NOTES
"Surg Onc note  05/15/2020    Pain controlled. Itching persists. Tolerated some water. Febrile to 102.7 yesterday. Hyperglycemic. Elevated INR persists.     BP (!) 158/81 (BP Location: Right arm)   Pulse 101   Temp 98.9  F (37.2  C) (Oral)   Resp 20   Ht 1.854 m (6' 1\")   Wt 98 kg (216 lb 1.6 oz)   SpO2 92%   BMI 28.51 kg/m      UOP 1800, 475 since midnight   , 40 s/s    NAD laying in bed  Abd soft, non-distended, incision c/d/i, drain serosanguinous  WWP    Labs:   WBC 9.6 from 11.5  Hg 10.2 from 9.5  INR 1.77 from 1.95     A/P: 65 year-old POD3 from extrahepatic bile duct resection, right hepatectomy, and portal lymph node dissection. Doing well.    -discussed with the regional pain team. Elevated INR so unable to pull catheter  -clear liquids   -discontinue IVF  -SAL bulb to thumbprint suction  -continue to trend INR  -will leave lyman for today as epidural is in  -hold ppx due to INR    Karon Cummings MD PGY3  General Surgery    "

## 2020-05-15 NOTE — PLAN OF CARE
"BP (!) 174/82 (BP Location: Right arm)   Pulse 101   Temp 102.2  F (39  C) (Oral)   Resp 18   Ht 1.854 m (6' 1\")   Wt 96.7 kg (213 lb 3.2 oz)   SpO2 90%   BMI 28.13 kg/m      Tmax 102.7, HR in 100s, BP elevated. MD aware and told this RN she would look into the possibility for Tylenol/ibuprofen to help decrease temp. Awaiting orders. Pain comfortably controlled with PCEA dilaudid/bupivacaine. Up with SBA. Dizziness/ orthostatic hypotension from yesterday appears to be resolved. Em in place with adequate UOP. Cath cares completed. Em likely to come out tomorrow. Tolerating clear liq diet with no N/V. BG corrected with sliding scale insulin Q4h. Denies passing stool/flatus. Incision with dermabond c/d/intact. SAL to thumbprint suction with serosanguinous output. PIV infusing MIVF. Wife was called and updated around 1800. Pt resting comfortably between cares and prefers to be disrupted as little as possible. Monitor VS closely. Continue POC.   "

## 2020-05-15 NOTE — PROGRESS NOTES
Transition Planning Inquiry     D:  ID has recs for IV antibiotics.  Insurance inquiry pending with [facility] in the event Mr. Estrada may need IV medications once stable for discharge.  A/P:  Inpatient cares continue per MD team plans of care.  The inpatient Care Management Team will continue to follow for transition needs prn.    DWAYNE Barreto., R.N., P.H.N..  Care Coordinator     Pager   [facility]

## 2020-05-15 NOTE — PLAN OF CARE
Discharge Planner OT   Patient plan for discharge: home  Current status: OT: OT educated pt on functional trnasfers w/in precuations to increase ind. Pt completed STS transfers w/ SBA, in room and abdi ambulation ~200ft total w/ SBA. VSS on RA> mod A sitting EOB> supine. VSS  Barriers to return to prior living situation: medical status  Recommendations for discharge: Home w/ OP therapy  Rationale for recommendations: to increase ind in ADLS/IADLS       Entered by: Jane Layne 05/15/2020 4:17 PM

## 2020-05-15 NOTE — PROGRESS NOTES
Therapy: IV ABX  Insurance: [facility] Medicare Plan    No coverage for IV ABX  (Pt would have coverage for short term TCU or IC). Below is what pt would be responsible for if pt wanted to go w [facility]    Pt would have to self-pay for the per-[place] (daily) & Drug      If not homebound, nursing would also be self-pay (per visit)    Cost for Zosyn 3.375gm Q6 is $71.09 per day for drug and supplies.  IF on a CADD monthly charge of $57.40  If not homebound RN visits $90.00 per visit.    IN reference to admission date 05/12/2020 [facility]C to check IV ABX coverage.    Please contact Intake with any questions, [phone] or In Basket pool, [facility] ([phone]).

## 2020-05-15 NOTE — PLAN OF CARE
Tmax 101, OVSS. Pt up with asssit of 1. Ambulated in halls. Em patent with adequate UOP. Passing gas, no BM. Tolerating full liquid diet. Insulin given per sliding scale. SAL with moderate amount of serosang output. Pain controlled with Bupivacaine/Hydromorphone epidural. Cont. POC.

## 2020-05-16 ENCOUNTER — APPOINTMENT (OUTPATIENT)
Dept: INTERVENTIONAL RADIOLOGY/VASCULAR | Facility: CLINIC | Age: 65
DRG: 405 | End: 2020-05-16
Attending: RADIOLOGY
Payer: MEDICARE

## 2020-05-16 ENCOUNTER — APPOINTMENT (OUTPATIENT)
Dept: CT IMAGING | Facility: CLINIC | Age: 65
DRG: 405 | End: 2020-05-16
Attending: NEUROLOGICAL SURGERY
Payer: MEDICARE

## 2020-05-16 LAB
ALBUMIN SERPL-MCNC: 2.4 G/DL (ref 3.4–5)
ALP SERPL-CCNC: 134 U/L (ref 40–150)
ALT SERPL W P-5'-P-CCNC: 166 U/L (ref 0–70)
AMYLASE FLD-CCNC: 48 U/L
ANION GAP SERPL CALCULATED.3IONS-SCNC: 8 MMOL/L (ref 3–14)
AST SERPL W P-5'-P-CCNC: 66 U/L (ref 0–45)
BILIRUB FLD-MCNC: 1.6 MG/DL
BILIRUB FLD-MCNC: 3.4 MG/DL
BILIRUB SERPL-MCNC: 2.5 MG/DL (ref 0.2–1.3)
BUN SERPL-MCNC: 12 MG/DL (ref 7–30)
CALCIUM SERPL-MCNC: 8 MG/DL (ref 8.5–10.1)
CHLORIDE SERPL-SCNC: 104 MMOL/L (ref 94–109)
CO2 SERPL-SCNC: 24 MMOL/L (ref 20–32)
CREAT SERPL-MCNC: 0.95 MG/DL (ref 0.66–1.25)
ERYTHROCYTE [DISTWIDTH] IN BLOOD BY AUTOMATED COUNT: 12.9 % (ref 10–15)
GFR SERPL CREATININE-BSD FRML MDRD: 83 ML/MIN/{1.73_M2}
GLUCOSE BLDC GLUCOMTR-MCNC: 142 MG/DL (ref 70–99)
GLUCOSE BLDC GLUCOMTR-MCNC: 154 MG/DL (ref 70–99)
GLUCOSE BLDC GLUCOMTR-MCNC: 162 MG/DL (ref 70–99)
GLUCOSE BLDC GLUCOMTR-MCNC: 180 MG/DL (ref 70–99)
GLUCOSE BLDC GLUCOMTR-MCNC: 216 MG/DL (ref 70–99)
GLUCOSE SERPL-MCNC: 121 MG/DL (ref 70–99)
GRAM STN SPEC: NORMAL
HCT VFR BLD AUTO: 35.4 % (ref 40–53)
HGB BLD-MCNC: 11.1 G/DL (ref 13.3–17.7)
INR PPP: 1.59 (ref 0.86–1.14)
MCH RBC QN AUTO: 29.5 PG (ref 26.5–33)
MCHC RBC AUTO-ENTMCNC: 31.4 G/DL (ref 31.5–36.5)
MCV RBC AUTO: 94 FL (ref 78–100)
PHOSPHATE SERPL-MCNC: 2.1 MG/DL (ref 2.5–4.5)
PLATELET # BLD AUTO: 296 10E9/L (ref 150–450)
POTASSIUM SERPL-SCNC: 3.3 MMOL/L (ref 3.4–5.3)
PROT SERPL-MCNC: 5.3 G/DL (ref 6.8–8.8)
RBC # BLD AUTO: 3.76 10E12/L (ref 4.4–5.9)
SODIUM SERPL-SCNC: 136 MMOL/L (ref 133–144)
SPECIMEN SOURCE FLD: NORMAL
SPECIMEN SOURCE: NORMAL
SPECIMEN SOURCE: NORMAL
WBC # BLD AUTO: 10.2 10E9/L (ref 4–11)

## 2020-05-16 PROCEDURE — C1729 CATH, DRAINAGE: HCPCS

## 2020-05-16 PROCEDURE — 25000125 ZZHC RX 250: Performed by: PHYSICIAN ASSISTANT

## 2020-05-16 PROCEDURE — 25000128 H RX IP 250 OP 636: Performed by: PHYSICIAN ASSISTANT

## 2020-05-16 PROCEDURE — 32555 ASPIRATE PLEURA W/ IMAGING: CPT

## 2020-05-16 PROCEDURE — 82150 ASSAY OF AMYLASE: CPT | Performed by: SURGERY

## 2020-05-16 PROCEDURE — 87186 SC STD MICRODIL/AGAR DIL: CPT | Performed by: SURGERY

## 2020-05-16 PROCEDURE — 84100 ASSAY OF PHOSPHORUS: CPT | Performed by: SURGERY

## 2020-05-16 PROCEDURE — 25000132 ZZH RX MED GY IP 250 OP 250 PS 637: Performed by: SURGERY

## 2020-05-16 PROCEDURE — 85610 PROTHROMBIN TIME: CPT | Performed by: SURGERY

## 2020-05-16 PROCEDURE — 87070 CULTURE OTHR SPECIMN AEROBIC: CPT | Performed by: SURGERY

## 2020-05-16 PROCEDURE — 40000141 ZZH STATISTIC PERIPHERAL IV START W/O US GUIDANCE

## 2020-05-16 PROCEDURE — 80053 COMPREHEN METABOLIC PANEL: CPT | Performed by: SURGERY

## 2020-05-16 PROCEDURE — 27210732 ZZH ACCESSORY CR1

## 2020-05-16 PROCEDURE — 27211039 ZZH NEEDLE CR2

## 2020-05-16 PROCEDURE — 25000128 H RX IP 250 OP 636: Performed by: NEUROLOGICAL SURGERY

## 2020-05-16 PROCEDURE — 25000128 H RX IP 250 OP 636: Performed by: SURGERY

## 2020-05-16 PROCEDURE — 49405 IMAGE CATH FLUID COLXN VISC: CPT

## 2020-05-16 PROCEDURE — C1769 GUIDE WIRE: HCPCS

## 2020-05-16 PROCEDURE — 74177 CT ABD & PELVIS W/CONTRAST: CPT

## 2020-05-16 PROCEDURE — 12000001 ZZH R&B MED SURG/OB UMMC

## 2020-05-16 PROCEDURE — 0W993ZZ DRAINAGE OF RIGHT PLEURAL CAVITY, PERCUTANEOUS APPROACH: ICD-10-PCS | Performed by: RADIOLOGY

## 2020-05-16 PROCEDURE — 87077 CULTURE AEROBIC IDENTIFY: CPT | Performed by: SURGERY

## 2020-05-16 PROCEDURE — 85027 COMPLETE CBC AUTOMATED: CPT | Performed by: SURGERY

## 2020-05-16 PROCEDURE — 00000146 ZZHCL STATISTIC GLUCOSE BY METER IP

## 2020-05-16 PROCEDURE — 87015 SPECIMEN INFECT AGNT CONCNTJ: CPT | Performed by: SURGERY

## 2020-05-16 PROCEDURE — 87205 SMEAR GRAM STAIN: CPT | Performed by: SURGERY

## 2020-05-16 PROCEDURE — 82247 BILIRUBIN TOTAL: CPT | Performed by: SURGERY

## 2020-05-16 PROCEDURE — 25800030 ZZH RX IP 258 OP 636: Performed by: PHYSICIAN ASSISTANT

## 2020-05-16 PROCEDURE — 36415 COLL VENOUS BLD VENIPUNCTURE: CPT | Performed by: SURGERY

## 2020-05-16 PROCEDURE — 27210738 ZZH ACCESSORY CR2

## 2020-05-16 RX ORDER — DEXTROSE MONOHYDRATE 25 G/50ML
25-50 INJECTION, SOLUTION INTRAVENOUS
Status: DISCONTINUED | OUTPATIENT
Start: 2020-05-16 | End: 2020-05-16

## 2020-05-16 RX ORDER — FENTANYL CITRATE 50 UG/ML
25-50 INJECTION, SOLUTION INTRAMUSCULAR; INTRAVENOUS EVERY 5 MIN PRN
Status: DISCONTINUED | OUTPATIENT
Start: 2020-05-16 | End: 2020-05-16 | Stop reason: HOSPADM

## 2020-05-16 RX ORDER — ACETAMINOPHEN 325 MG/1
650 TABLET ORAL ONCE
Status: COMPLETED | OUTPATIENT
Start: 2020-05-16 | End: 2020-05-16

## 2020-05-16 RX ORDER — NALOXONE HYDROCHLORIDE 0.4 MG/ML
.1-.4 INJECTION, SOLUTION INTRAMUSCULAR; INTRAVENOUS; SUBCUTANEOUS
Status: DISCONTINUED | OUTPATIENT
Start: 2020-05-16 | End: 2020-05-16 | Stop reason: HOSPADM

## 2020-05-16 RX ORDER — IOPAMIDOL 755 MG/ML
132 INJECTION, SOLUTION INTRAVASCULAR ONCE
Status: COMPLETED | OUTPATIENT
Start: 2020-05-16 | End: 2020-05-16

## 2020-05-16 RX ORDER — NICOTINE POLACRILEX 4 MG
15-30 LOZENGE BUCCAL
Status: DISCONTINUED | OUTPATIENT
Start: 2020-05-16 | End: 2020-05-16

## 2020-05-16 RX ADMIN — PIPERACILLIN AND TAZOBACTAM 3.38 G: 3; .375 INJECTION, POWDER, FOR SOLUTION INTRAVENOUS at 00:14

## 2020-05-16 RX ADMIN — INSULIN ASPART 1 UNITS: 100 INJECTION, SOLUTION INTRAVENOUS; SUBCUTANEOUS at 04:23

## 2020-05-16 RX ADMIN — FENTANYL CITRATE 50 MCG: 50 INJECTION INTRAMUSCULAR; INTRAVENOUS at 14:25

## 2020-05-16 RX ADMIN — PIPERACILLIN AND TAZOBACTAM 3.38 G: 3; .375 INJECTION, POWDER, FOR SOLUTION INTRAVENOUS at 18:24

## 2020-05-16 RX ADMIN — INSULIN ASPART 1 UNITS: 100 INJECTION, SOLUTION INTRAVENOUS; SUBCUTANEOUS at 00:14

## 2020-05-16 RX ADMIN — INSULIN ASPART 1 UNITS: 100 INJECTION, SOLUTION INTRAVENOUS; SUBCUTANEOUS at 16:45

## 2020-05-16 RX ADMIN — LIDOCAINE HYDROCHLORIDE 6 ML: 10 INJECTION, SOLUTION EPIDURAL; INFILTRATION; INTRACAUDAL; PERINEURAL at 14:28

## 2020-05-16 RX ADMIN — PIPERACILLIN AND TAZOBACTAM 3.38 G: 3; .375 INJECTION, POWDER, FOR SOLUTION INTRAVENOUS at 06:30

## 2020-05-16 RX ADMIN — IOPAMIDOL 132 ML: 755 INJECTION, SOLUTION INTRAVENOUS at 08:52

## 2020-05-16 RX ADMIN — PIPERACILLIN AND TAZOBACTAM 3.38 G: 3; .375 INJECTION, POWDER, FOR SOLUTION INTRAVENOUS at 12:02

## 2020-05-16 RX ADMIN — INSULIN ASPART 1 UNITS: 100 INJECTION, SOLUTION INTRAVENOUS; SUBCUTANEOUS at 12:02

## 2020-05-16 RX ADMIN — FENTANYL CITRATE 50 MCG: 50 INJECTION INTRAMUSCULAR; INTRAVENOUS at 14:35

## 2020-05-16 RX ADMIN — INSULIN ASPART 2 UNITS: 100 INJECTION, SOLUTION INTRAVENOUS; SUBCUTANEOUS at 20:20

## 2020-05-16 RX ADMIN — ACETAMINOPHEN 650 MG: 325 TABLET, FILM COATED ORAL at 04:26

## 2020-05-16 RX ADMIN — POTASSIUM PHOSPHATE, MONOBASIC AND POTASSIUM PHOSPHATE, DIBASIC 15 MMOL: 224; 236 INJECTION, SOLUTION INTRAVENOUS at 12:02

## 2020-05-16 ASSESSMENT — ACTIVITIES OF DAILY LIVING (ADL)
ADLS_ACUITY_SCORE: 15
ADLS_ACUITY_SCORE: 15
ADLS_ACUITY_SCORE: 13
ADLS_ACUITY_SCORE: 13
ADLS_ACUITY_SCORE: 15

## 2020-05-16 ASSESSMENT — PAIN DESCRIPTION - DESCRIPTORS
DESCRIPTORS: SORE
DESCRIPTORS: BURNING

## 2020-05-16 NOTE — PLAN OF CARE
Assumed care of pt at 1900 on 5/15/20. VSS. Pt c/o pain managed using PRN and scheduled analgesics- including epidural with dilaudid. Pt tolerating full liquid diet well; denies c/o N/V. +BM and gas. UOP adequate via lyman. PIV SL and other running TKO between abx. Incision LATISHA and CDI. SAL with copious output. Pt up with 1-2 assist. PLAN: continue POC

## 2020-05-16 NOTE — PROGRESS NOTES
Surg onc note    Doing ok. Had continued low grade fever. Tolerating fulls. No nausea. Passing flatus.     101.3, VS o/w wnl  UOP: 250/1.3  Drain: 230/280    NAD laying in bed  Abd soft, non-distended, incision c/d/i, drain serous.   WWP    Labs: Pending    A/P: 65 year-old POD 4 from right hepatectomy and extrahepatic bile duct resection. Persistent fevers, otherwise doing well.  -Given persistent fevers we will obtain a CT scan with IV and oral contrast to evaluate for undrained fluid collection.   -Epidural for pain. Pending INR will discuss removal.  -Regular diet today  -SAL to thumbprint suction  -Em to remain until epidural removed.   -Will start lovenox ppx if INR is improved this AM.    Sung Ramos  PGY 7

## 2020-05-16 NOTE — PLAN OF CARE
Hypertensive, within notifying parameters, OVSS. Pt up with assist of 1. Ambulated in halls. Pt had drain placed in IR. Flushed per orders with moderate amount of brown liquid output. Pain controlled with epidural. SAL with moderate amount of serosang output. Em patent with adequate UOP. Tolerating regular diet. Insulin given per sliding scale. Cont. POC.

## 2020-05-16 NOTE — CONSULTS
INTERVENTIONAL RADIOLOGY CONSULT    HPI: Patient is a 64 yo M with cholangioCA, s/p partial hepatectomy and RYHJ, has fever and CT shows right pleural fluid and perihepatic air containing collection. CT drain and dx/ther right thoracentesis requested.    Patient is on IR schedule today for CT drain and right thoracentesis. Labs WNL for procedure. No NPO required. Consent will be done prior to procedure.    Lab Results   Component Value Date    INR 1.59 05/16/2020    INR 1.77 05/15/2020     Lab Results   Component Value Date     05/16/2020     05/15/2020       Please contact the IR charge RN at [phone] for estimated time of procedure.     Discussed with Dr. Palomares.    Reta Purcell MD  Interventional Radiology   Pager [phone]

## 2020-05-16 NOTE — PROGRESS NOTES
REGIONAL ANESTHESIA PAIN SERVICE EPIDURAL NOTE  Fuentes Estrada is a 65 year old male with cholangiocarcinoma who is now POD #4 Exploratory laparotomy, right hepatectomy, radical extra hepatic bile duct resection, portal lymph node dissection, intraoperative ultrasound and air angiogram, garry-en-y left hepaticojejunostomy; T7-8 epidural catheter for postoperative pain management.      Subjective and Interval History Overnight no acute events. Patient  reports excellent pain control, rating pain 0/10 at rest.  Denies LE weakness, paresthesias, circumoral numbness, metallic taste or tinnitus. He plans to increase his mobility today.      Antithrombotic/Thrombolytic Therapy ordered: None ordered     Analgesic Medications:              Medications related to Pain Management (From now, onward)     Start     Dose/Rate Route Frequency Ordered Stop     05/13/20 0859   hydrOXYzine (ATARAX) tablet 25 mg      25 mg Oral EVERY 6 HOURS PRN 05/13/20 0900       05/13/20 0859   hydrOXYzine (ATARAX) tablet 50 mg      50 mg Oral EVERY 6 HOURS PRN 05/13/20 0900       05/13/20 0845   HYDROmorphone (DILAUDID) 3 mcg/mL, bupivacaine (MARCAINE) 0.0625 % in sodium chloride 0.9 % 250 mL EPIDURAL PCEA (patient controlled epidural)        EPIDURAL PCA 05/13/20 0835       05/12/20 2153   lidocaine 1 % 0.1-1 mL      0.1-1 mL Other EVERY 1 HOUR PRN 05/12/20 2153       05/12/20 2153   lidocaine (LMX4) cream        Topical EVERY 1 HOUR PRN 05/12/20 2153       05/12/20 0629   nalbuphine (NUBAIN) injection 2.5-5 mg      2.5-5 mg Intravenous EVERY 6 HOURS PRN 05/12/20 0629               Objective:  Lab Results:       Recent Labs   Lab Test 05/13/20  0656   WBC 12.6*   RBC 3.76*   HGB 11.2*   HCT 36.5*   MCV 97   MCH 29.8   MCHC 30.7*   RDW 13.3                  Lab Results   Component Value Date     INR 1.53 05/13/2020     INR 1.40 05/12/2020     INR 1.16 05/04/2020         Vitals:    /77 (BP Location: Right arm)   Pulse 91   Temp 37  C  "(98.6  F) (Oral)   Resp 18   Ht 1.854 m (6' 1\")   Wt 98 kg (216 lb 1.6 oz)   SpO2 93%   BMI 28.51 kg/m            Exam:   GEN: alert and no distress  NEURO/MSK: Strength BLE 5/5  and overall symmetric  SKIN: Epidural catheter site scant maroon heme at insertion site,otherwise dressing c/d/i, no tenderness, erythema, edema              Assessment and Plan:  65 year old male with cholangiocarcinoma who is now POD #4 Exploratory laparotomy, right hepatectomy, radical extra hepatic bile duct resection, portal lymph node dissection, intraoperative ultrasound and air angiogram, garry-en-y left hepaticojejunostomy; T7-8 epidural catheter for postoperative pain management.     Patient is receiving adequate analgesia with current multimodal therapy including epidural infusion HYDROmorphone (DILAUDID) 3 mcg/mL, bupivacaine (MARCAINE) 0.0625 % at 10 mL/hr with PCEA 2 mL Q 30 min lockout.  Motor function intact and adequate sensory block, is meeting activity goals.  No evidence of adverse side effects related to local anesthetic. Adequate urine output.       - Continue current epidural infusion HYDROmorphone (DILAUDID) 3 mcg/mL, bupivacaine (MARCAINE) 0.0625 % at 10 mL/hr with PCEA 2 mL Q 30 min lockout. Keep epidural in place until INR improves to less than 1.5 at a minimum. Plan to remove tomorrow on 5/17/20 if INR is appropriate  - antithrombotic/thrombolytic therapy None ordered. Please contact [facility] (#[id number]) prior to any medication changes  - will continue to follow and adjust as needed      Oswaldo Byers MD  Perioperative and Interventional Pain Service  5/13/2020 12:48 PM  PIPS 24-hour Job Code Pagers (for in-house use only, may text page using [employer])  [place]:  [phone]  [place]:  [phone]  Peds PIPS: [phone]        "

## 2020-05-16 NOTE — PROCEDURES
[facility], [place]    Procedure: IR Procedure Note    Date/Time: 5/16/2020 3:56 PM  Performed by: Michael Madrid DO  Authorized by: Michael Madrid DO   IR Fellow Physician:  Radiology Resident Physician: Julius  Other(s) attending procedure: Jazzy    UNIVERSAL PROTOCOL   Site Marked: Yes  Prior Images Obtained and Reviewed:  Yes  Required items: Required blood products, implants, devices and special equipment available    Patient identity confirmed:  Verbally with patient, arm band, provided demographic data and hospital-assigned identification number  Patient was reevaluated immediately before administering moderate or deep sedation or anesthesia  Confirmation Checklist:  Patient's identity using two indicators, relevant allergies, procedure was appropriate and matched the consent or emergent situation and correct equipment/implants were available  Time out: Immediately prior to the procedure a time out was called    Universal Protocol: the Joint Commission Universal Protocol was followed    Preparation: Patient was prepped and draped in usual sterile fashion           ANESTHESIA    Anesthesia: Local infiltration  Local Anesthetic:  Lidocaine 1% without epinephrine      SEDATION    Patient Sedated: No (Fentanyl only)    See dictated procedure note for full details.  Findings: 1. RUQ subcapsular walled off collection, abdominal drain with dark brown clear fluid  2. Moderate right pleural effusion, Straw color fluid on aspiration.    Specimens: none and fluid and/or tissue for laboratory analysis and culture    Complications: None    Condition: Stable    Plan: Routine post-procedural management.  Drain cares: RUQ drain to gravity. Flush 10cc NS TID. Record output.  Repeat CT and possibly a Sinogram when output less than 10cc daily.      PROCEDURE   Patient Tolerance:  Patient tolerated the procedure well with no immediate complications    Length of time  physician/provider present for 1:1 monitoring during sedation: 0

## 2020-05-16 NOTE — PROGRESS NOTES
REGIONAL ANESTHESIA PAIN SERVICE EPIDURAL NOTE  Fuentes Estrada is a 65 year old male with cholangiocarcinoma who is now POD #4 Exploratory laparotomy, right hepatectomy, radical extra hepatic bile duct resection, portal lymph node dissection, intraoperative ultrasound and air angiogram, garry-en-y left hepaticojejunostomy; T7-8 epidural catheter for postoperative pain management.      Subjective and Interval History Overnight no acute events. Patient  reports excellent pain control, rating pain 0/10 at rest.  Denies LE weakness, paresthesias, circumoral numbness, metallic taste or tinnitus. He plans to increase his mobility today.      Antithrombotic/Thrombolytic Therapy ordered: None ordered     Analgesic Medications:                                           Medications related to Pain Management (From now, onward)     Start     Dose/Rate Route Frequency Ordered Stop     05/13/20 0859   hydrOXYzine (ATARAX) tablet 25 mg      25 mg Oral EVERY 6 HOURS PRN 05/13/20 0900       05/13/20 0859   hydrOXYzine (ATARAX) tablet 50 mg      50 mg Oral EVERY 6 HOURS PRN 05/13/20 0900       05/13/20 0845   HYDROmorphone (DILAUDID) 3 mcg/mL, bupivacaine (MARCAINE) 0.0625 % in sodium chloride 0.9 % 250 mL EPIDURAL PCEA (patient controlled epidural)        EPIDURAL PCA 05/13/20 0835       05/12/20 2153   lidocaine 1 % 0.1-1 mL      0.1-1 mL Other EVERY 1 HOUR PRN 05/12/20 2153       05/12/20 2153   lidocaine (LMX4) cream        Topical EVERY 1 HOUR PRN 05/12/20 2153       05/12/20 0629   nalbuphine (NUBAIN) injection 2.5-5 mg      2.5-5 mg Intravenous EVERY 6 HOURS PRN 05/12/20 0629               Objective:  Lab Results:         Recent Labs   Lab Test 05/13/20  0656   WBC 12.6*   RBC 3.76*   HGB 11.2*   HCT 36.5*   MCV 97   MCH 29.8   MCHC 30.7*   RDW 13.3                      Lab Results   Component Value Date     INR 1.53 05/13/2020     INR 1.40 05/12/2020     INR 1.16 05/04/2020         Vitals:    /77 (BP Location:  "Right arm)   Pulse 91   Temp 37  C (98.6  F) (Oral)   Resp 18   Ht 1.854 m (6' 1\")   Wt 98 kg (216 lb 1.6 oz)   SpO2 93%   BMI 28.51 kg/m             Exam:   GEN: alert and no distress  NEURO/MSK: Strength BLE 5/5  and overall symmetric  SKIN: Epidural catheter site scant maroon heme at insertion site,otherwise dressing c/d/i, no tenderness, erythema, edema              Assessment and Plan:  65 year old male with cholangiocarcinoma who is now POD #4 Exploratory laparotomy, right hepatectomy, radical extra hepatic bile duct resection, portal lymph node dissection, intraoperative ultrasound and air angiogram, garry-en-y left hepaticojejunostomy; T7-8 epidural catheter for postoperative pain management.     Patient is receiving adequate analgesia with current multimodal therapy including epidural infusion HYDROmorphone (DILAUDID) 3 mcg/mL, bupivacaine (MARCAINE) 0.0625 % at 10 mL/hr with PCEA 2 mL Q 30 min lockout.  Motor function intact and adequate sensory block, is meeting activity goals.  No evidence of adverse side effects related to local anesthetic. Adequate urine output.       - Continue current epidural infusion HYDROmorphone (DILAUDID) 3 mcg/mL, bupivacaine (MARCAINE) 0.0625 % at 10 mL/hr with PCEA 2 mL Q 30 min lockout. Keep epidural in place until INR improves to less than 1.5 at a minimum. Plan to remove tomorrow on 5/17/20 if INR is appropriate  - antithrombotic/thrombolytic therapy None ordered. Please contact [facility] (#[id number]) prior to any medication changes  - will continue to follow and adjust as needed        Cody Rodriguez MD   Perioperative and Interventional Pain Service  5/13/2020 6:08 PM     PIPS 24-hour Job Code Pagers (for in-house use only, may text page using [employer])  [place]:  [phone]  [place]:  [phone]  Peds PIPS: [phone]       "

## 2020-05-16 NOTE — PROGRESS NOTES
Patient Name: Fuentes Estrada  Medical Record Number: [id number]  Today's Date: 5/16/2020    Procedure: Thoracentesis, Abdominal fluid drain placement  Proceduralist: Dr. Madrid, Dr. ADAMS Purcell.    Patient in room: 1353  Abdominal drain Procedure Start: 1425  Abodminal drain Procedure end: 1500  Right thoracentsis start: 1519  Right thoracentesis end: 1541  Sedation medications administered:  100 mcg Fentanyl.   Patient out of room:  1550    Report given to: C. R.N.      Other Notes: Pt arrived to IR room CT 2 from 3308-07 7C. Consent reviewed. Pt denies any questions or concerns regarding procedure. Pt positioned supine for abdominal drain and sitting upright for thoracentesis and monitored per protocol. Pt tolerated procedure without any noted complications. Pt transferred back to [facility].    Labs sent for both procedures.    Right pleural fluid removed:  900cc

## 2020-05-16 NOTE — PLAN OF CARE
OT: Pt at IR when OT attempted. OT will check back as available/appropriate or reschedule for 5/17/20.

## 2020-05-17 ENCOUNTER — APPOINTMENT (OUTPATIENT)
Dept: OCCUPATIONAL THERAPY | Facility: CLINIC | Age: 65
DRG: 405 | End: 2020-05-17
Attending: SURGERY
Payer: MEDICARE

## 2020-05-17 LAB
ALBUMIN SERPL-MCNC: 2.1 G/DL (ref 3.4–5)
ALP SERPL-CCNC: 173 U/L (ref 40–150)
ALT SERPL W P-5'-P-CCNC: 102 U/L (ref 0–70)
ANION GAP SERPL CALCULATED.3IONS-SCNC: 5 MMOL/L (ref 3–14)
AST SERPL W P-5'-P-CCNC: 42 U/L (ref 0–45)
BILIRUB SERPL-MCNC: 2.5 MG/DL (ref 0.2–1.3)
BUN SERPL-MCNC: 11 MG/DL (ref 7–30)
CALCIUM SERPL-MCNC: 7.6 MG/DL (ref 8.5–10.1)
CHLORIDE SERPL-SCNC: 105 MMOL/L (ref 94–109)
CO2 SERPL-SCNC: 25 MMOL/L (ref 20–32)
CREAT SERPL-MCNC: 0.8 MG/DL (ref 0.66–1.25)
ERYTHROCYTE [DISTWIDTH] IN BLOOD BY AUTOMATED COUNT: 13.2 % (ref 10–15)
GFR SERPL CREATININE-BSD FRML MDRD: >90 ML/MIN/{1.73_M2}
GLUCOSE BLDC GLUCOMTR-MCNC: 147 MG/DL (ref 70–99)
GLUCOSE BLDC GLUCOMTR-MCNC: 177 MG/DL (ref 70–99)
GLUCOSE BLDC GLUCOMTR-MCNC: 204 MG/DL (ref 70–99)
GLUCOSE BLDC GLUCOMTR-MCNC: 221 MG/DL (ref 70–99)
GLUCOSE BLDC GLUCOMTR-MCNC: 233 MG/DL (ref 70–99)
GLUCOSE SERPL-MCNC: 127 MG/DL (ref 70–99)
HCT VFR BLD AUTO: 34.1 % (ref 40–53)
HGB BLD-MCNC: 11 G/DL (ref 13.3–17.7)
INR PPP: 1.54 (ref 0.86–1.14)
MAGNESIUM SERPL-MCNC: 1.9 MG/DL (ref 1.6–2.3)
MCH RBC QN AUTO: 29.7 PG (ref 26.5–33)
MCHC RBC AUTO-ENTMCNC: 32.3 G/DL (ref 31.5–36.5)
MCV RBC AUTO: 92 FL (ref 78–100)
PHOSPHATE SERPL-MCNC: 2.3 MG/DL (ref 2.5–4.5)
PLATELET # BLD AUTO: 281 10E9/L (ref 150–450)
POTASSIUM SERPL-SCNC: 3.5 MMOL/L (ref 3.4–5.3)
PROT SERPL-MCNC: 4.8 G/DL (ref 6.8–8.8)
RBC # BLD AUTO: 3.7 10E12/L (ref 4.4–5.9)
SODIUM SERPL-SCNC: 136 MMOL/L (ref 133–144)
WBC # BLD AUTO: 9.8 10E9/L (ref 4–11)

## 2020-05-17 PROCEDURE — 97530 THERAPEUTIC ACTIVITIES: CPT | Mod: GO | Performed by: OCCUPATIONAL THERAPIST

## 2020-05-17 PROCEDURE — 84100 ASSAY OF PHOSPHORUS: CPT | Performed by: NEUROLOGICAL SURGERY

## 2020-05-17 PROCEDURE — 36415 COLL VENOUS BLD VENIPUNCTURE: CPT | Performed by: NEUROLOGICAL SURGERY

## 2020-05-17 PROCEDURE — 83735 ASSAY OF MAGNESIUM: CPT | Performed by: NEUROLOGICAL SURGERY

## 2020-05-17 PROCEDURE — 25000128 H RX IP 250 OP 636: Performed by: NEUROLOGICAL SURGERY

## 2020-05-17 PROCEDURE — 85610 PROTHROMBIN TIME: CPT | Performed by: NEUROLOGICAL SURGERY

## 2020-05-17 PROCEDURE — 85027 COMPLETE CBC AUTOMATED: CPT | Performed by: SURGERY

## 2020-05-17 PROCEDURE — 12000001 ZZH R&B MED SURG/OB UMMC

## 2020-05-17 PROCEDURE — 85027 COMPLETE CBC AUTOMATED: CPT | Performed by: NEUROLOGICAL SURGERY

## 2020-05-17 PROCEDURE — 00000146 ZZHCL STATISTIC GLUCOSE BY METER IP

## 2020-05-17 PROCEDURE — 25000132 ZZH RX MED GY IP 250 OP 250 PS 637: Performed by: SURGERY

## 2020-05-17 PROCEDURE — 80053 COMPREHEN METABOLIC PANEL: CPT | Performed by: NEUROLOGICAL SURGERY

## 2020-05-17 RX ORDER — HYDROMORPHONE HYDROCHLORIDE 1 MG/ML
.3-.5 INJECTION, SOLUTION INTRAMUSCULAR; INTRAVENOUS; SUBCUTANEOUS
Status: DISCONTINUED | OUTPATIENT
Start: 2020-05-17 | End: 2020-05-18 | Stop reason: HOSPADM

## 2020-05-17 RX ORDER — OXYCODONE HYDROCHLORIDE 5 MG/1
5-10 TABLET ORAL EVERY 4 HOURS PRN
Status: DISCONTINUED | OUTPATIENT
Start: 2020-05-17 | End: 2020-05-18 | Stop reason: HOSPADM

## 2020-05-17 RX ORDER — LEVOFLOXACIN 500 MG/1
500 TABLET, FILM COATED ORAL DAILY
Status: DISCONTINUED | OUTPATIENT
Start: 2020-05-17 | End: 2020-05-18 | Stop reason: HOSPADM

## 2020-05-17 RX ORDER — METRONIDAZOLE 250 MG/1
250 TABLET ORAL 3 TIMES DAILY
Status: DISCONTINUED | OUTPATIENT
Start: 2020-05-17 | End: 2020-05-18

## 2020-05-17 RX ADMIN — LEVOFLOXACIN 500 MG: 500 TABLET, FILM COATED ORAL at 10:00

## 2020-05-17 RX ADMIN — PIPERACILLIN AND TAZOBACTAM 3.38 G: 3; .375 INJECTION, POWDER, FOR SOLUTION INTRAVENOUS at 06:31

## 2020-05-17 RX ADMIN — INSULIN ASPART 2 UNITS: 100 INJECTION, SOLUTION INTRAVENOUS; SUBCUTANEOUS at 12:07

## 2020-05-17 RX ADMIN — METRONIDAZOLE 250 MG: 250 TABLET ORAL at 20:26

## 2020-05-17 RX ADMIN — INSULIN ASPART 1 UNITS: 100 INJECTION, SOLUTION INTRAVENOUS; SUBCUTANEOUS at 00:52

## 2020-05-17 RX ADMIN — PIPERACILLIN AND TAZOBACTAM 3.38 G: 3; .375 INJECTION, POWDER, FOR SOLUTION INTRAVENOUS at 00:52

## 2020-05-17 RX ADMIN — METRONIDAZOLE 250 MG: 250 TABLET ORAL at 10:00

## 2020-05-17 RX ADMIN — INSULIN ASPART 2 UNITS: 100 INJECTION, SOLUTION INTRAVENOUS; SUBCUTANEOUS at 16:47

## 2020-05-17 RX ADMIN — METRONIDAZOLE 250 MG: 250 TABLET ORAL at 14:16

## 2020-05-17 RX ADMIN — INSULIN ASPART 2 UNITS: 100 INJECTION, SOLUTION INTRAVENOUS; SUBCUTANEOUS at 20:25

## 2020-05-17 ASSESSMENT — ACTIVITIES OF DAILY LIVING (ADL)
ADLS_ACUITY_SCORE: 15

## 2020-05-17 NOTE — PLAN OF CARE
Assumed care of pt at 1900 on 5/16/20. VSS. Pt c/o pain managed using PRN and scheduled analgesics- including epidural with dilaudid. Pt tolerating regular diet well; denies c/o N/V. +BM and gas. UOP adequate via lyman. PIV running TKO between abx. Incision LATISHA and CDI. SAL with copious output. New IR drain to RLQ with moderate output- irrigated with NS q8h. Pt up with 1 assist. PLAN: continue POC

## 2020-05-17 NOTE — PROGRESS NOTES
REGIONAL ANESTHESIA PAIN SERVICE EPIDURAL NOTE  Fuentes Estrada is a 65 year old male with cholangiocarcinoma who is now POD #5 Exploratory laparotomy, right hepatectomy, radical extra hepatic bile duct resection, portal lymph node dissection, intraoperative ultrasound and air angiogram, garry-en-y left hepaticojejunostomy; T7-8 epidural catheter for postoperative pain management.      Subjective and Interval History Overnight no acute events. Patient  reports excellent pain control, rating pain 0/10 at rest.  Denies LE weakness, paresthesias, circumoral numbness, metallic taste or tinnitus.       Antithrombotic/Thrombolytic Therapy ordered: None ordered     Analgesic Medications:                                           Medications related to Pain Management (From now, onward)     Start     Dose/Rate Route Frequency Ordered Stop     05/13/20 0859   hydrOXYzine (ATARAX) tablet 25 mg      25 mg Oral EVERY 6 HOURS PRN 05/13/20 0900       05/13/20 0859   hydrOXYzine (ATARAX) tablet 50 mg      50 mg Oral EVERY 6 HOURS PRN 05/13/20 0900       05/13/20 0845   HYDROmorphone (DILAUDID) 3 mcg/mL, bupivacaine (MARCAINE) 0.0625 % in sodium chloride 0.9 % 250 mL EPIDURAL PCEA (patient controlled epidural)        EPIDURAL PCA 05/13/20 0835       05/12/20 2153   lidocaine 1 % 0.1-1 mL      0.1-1 mL Other EVERY 1 HOUR PRN 05/12/20 2153       05/12/20 2153   lidocaine (LMX4) cream        Topical EVERY 1 HOUR PRN 05/12/20 2153       05/12/20 0629   nalbuphine (NUBAIN) injection 2.5-5 mg      2.5-5 mg Intravenous EVERY 6 HOURS PRN 05/12/20 0629               Objective:  Lab Results:         Recent Labs   Lab Test 05/13/20  0656   WBC 12.6*   RBC 3.76*   HGB 11.2*   HCT 36.5*   MCV 97   MCH 29.8   MCHC 30.7*   RDW 13.3                      Lab Results   Component Value Date     INR 1.53 05/13/2020     INR 1.40 05/12/2020     INR 1.16 05/04/2020         Vitals:    /77 (BP Location: Right arm)   Pulse 91   Temp 37  C (98.6  " F) (Oral)   Resp 18   Ht 1.854 m (6' 1\")   Wt 98 kg (216 lb 1.6 oz)   SpO2 93%   BMI 28.51 kg/m             Exam:   GEN: alert and no distress  NEURO/MSK: Strength BLE 5/5  and overall symmetric  SKIN: Epidural catheter site scant maroon heme at insertion site,otherwise dressing c/d/i, no tenderness, erythema, edema              Assessment and Plan:  65 year old male with cholangiocarcinoma who is now POD #5 Exploratory laparotomy, right hepatectomy, radical extra hepatic bile duct resection, portal lymph node dissection, intraoperative ultrasound and air angiogram, garry-en-y left hepaticojejunostomy; T7-8 epidural catheter for postoperative pain management.     - epidural catheter removed at 0900  - recommend q4 hr neuro checks today given elevated INR at time removal. This should not hinder discharge if planning to send patient home today.     Bob Quintana MD      PIPS 24-hour Job Code Pagers (for in-house use only, may text page using [employer])  [place]:  [phone]  [place]:  [phone]  Peds PIPS: [phone]       "

## 2020-05-17 NOTE — PLAN OF CARE
Hypertensive, OVSS. Pt up with assist of 1. Ambulated in halls. Voids spont post lyman removal, Pt did not measure fisrt void. Passing gas, no BM. Tolerating regular diet. Epidural removed, denies pain. PIV saline locked. IR drain irrigated per orders with moderate amount of brown liquid drainage. SAL with moderate amount of serosang drainage. Cont. POC.

## 2020-05-17 NOTE — CONSULTS
05/17/20 1140 Marcella Barajas, RN     Pt seen at bedside for SAL and drain cares. Pt. RD correctly on himself with flushing drain with 10ml Sterile NS. Reviewed how to record drainage from both drains. Observed demonstration of emptying SAL and compressing the bulb and site cares. States he has some support at home do help with cares. Literature given: Handwashing and Skin Care, Drainage Tube Home Care Instructions, Flushing Your Drain with Saline. States he understands who to call with problems with drain and understands all information presented.

## 2020-05-17 NOTE — PROGRESS NOTES
"36.5*    MCV 97   MCH 29.8   MCHC 30.7*   RDW 13.3                      Lab Results   Component Value Date     INR 1.53 05/13/2020     INR 1.40 05/12/2020     INR 1.16 05/04/2020         Vitals:    /77 (BP Location: Right arm)   Pulse 91   Temp 37  C (98.6  F) (Oral)   Resp 18   Ht 1.854 m (6' 1\")   Wt 98 kg (216 lb 1.6 oz)   SpO2 93%   BMI 28.51 kg/m             Exam:   GEN: alert and no distress  NEURO/MSK: Strength BLE 5/5  and overall symmetric  SKIN: Epidural catheter site scant maroon heme at insertion site,otherwise dressing c/d/i, no tenderness, erythema, edema              Assessment and Plan:  65 year old male with cholangiocarcinoma who is now POD #5 Exploratory laparotomy, right hepatectomy, radical extra hepatic bile duct resection, portal lymph node dissection, intraoperative ultrasound and air angiogram, garry-en-y left hepaticojejunostomy; T7-8 epidural catheter for postoperative pain management.     - epidural catheter removed at 0900  - recommend q4 hr neuro checks today given INR even very slightly greater than 1.5 at time of removal. This should not hinder discharge if planning to send patient home today.     Cody Rodriguez MD   May 17, 2020 10:43 AM         PIPS 24-hour Job Code Pagers (for in-house use only, may text page using [employer])  [place]:  [phone]  [place]:  [phone]  Peds PIPS: [phone]    "

## 2020-05-17 NOTE — PROGRESS NOTES
REGIONAL ANESTHESIA PAIN SERVICE BRIEF NOTE    Discussed options for erector spinae catheter with the patient. He prefers to leave catheter in place on discharge home. Printed paperwork regarding home use of nerve block catheter and reviewed with patient. Instructions on catheter removal included. Questions answered. The patient was given contact information/when to call if issues arise.    Bob Quintana MD

## 2020-05-17 NOTE — PROGRESS NOTES
"Surgical Oncology Progress Note  05/17/2020    Tolerated IR thoracentesis and abdominal drain placement yesterday. 1L removed during thoracentesis. Bed is very uncomfortable and wants to go home. Tmax 100.2 around 10 pm, has since defervesced.     BP (!) 140/85 (BP Location: Right arm)   Pulse 96   Temp 98  F (36.7  C) (Oral)   Resp 18   Ht 1.854 m (6' 1\")   Wt 98 kg (216 lb 1.6 oz)   SpO2 98%   BMI 28.51 kg/m      UOP 1100, 200 since midnight  LUQ , 110 since midnight  R IR drain 250, 50 since midnight    PE  NAD  NLB RA  Abd soft, nondistended, incision c/d/i  L SAL serous, R drain serosanguinous   Lyman soledad urine     Labs  WBC 0.8 from 10.2  INR 1.54 from 1.59  Tbili 2.5 from 2.5  Alk phos 173   from 166  AST 42 from 66  K 3.5  Mg 1.9  Phos 2.3  Albumin 2.1    Abdominal fluid bili 3.4, amylase 48, gram stain negative, cultures pending   Pleural fluid bili 1.6, gram stain negative, cultures pending     A/P: 65M POD5 s/p right hepatectomy with extrahepatic bile duct resection. Found to have right thoracic and abdominal fluid collection s/p IR thoracentesis and abdominal drain placement 5/16.     -epidural to be removed, will start PO pain meds   -regular diet  -phos repletion   -follow up drain cultures, switch to PO levaquin/flagyl, discontinue zosyn  -hold off on lovenox for today, recheck INR tomorrow  -follow up stable but elevated bilirubin tomorrow   -discontinue lyman  -encourage ambulation  -drain teaching needed    Seen with Dr. William.    Karon Cummings MD PGY3  General Surgery      "

## 2020-05-17 NOTE — PLAN OF CARE
7C OT    Discharge Planner OT   Patient plan for discharge: Home with assist from wife  Current status: Pt accurately describing abdominal precautions. Pt reporting no concerns about ADL performance, eager to return home and sleep in own bed. Ambulated x120 ft with no AE and SBA, pt moving slowly, steady on his feet. Pt demonstrated sit <> stand on toilet using grab bars, within precautions, reporting has counter for support at home. Educated pt in fall risk reduction during showering; pt verbalized understanding. No shower chair in room, alerted RN who plans to get chair for pt if desired for showering later in the morning.   Barriers to return to prior living situation: medical status.  Recommendations for discharge: Home with assist from wife for IADLs  Rationale for recommendations: Pt demonstrating understanding of abdominal precautions during functional mobility. Pt with safe discharge plan home with assist.        Entered by: Vilma Campbell 05/17/2020 11:04 AM

## 2020-05-18 VITALS
RESPIRATION RATE: 18 BRPM | TEMPERATURE: 98.1 F | WEIGHT: 216.1 LBS | HEART RATE: 96 BPM | BODY MASS INDEX: 28.64 KG/M2 | HEIGHT: 73 IN | DIASTOLIC BLOOD PRESSURE: 83 MMHG | SYSTOLIC BLOOD PRESSURE: 146 MMHG | OXYGEN SATURATION: 96 %

## 2020-05-18 PROBLEM — J90 PLEURAL EFFUSION: Status: ACTIVE | Noted: 2020-05-18

## 2020-05-18 PROBLEM — K83.9 BILE LEAK: Status: ACTIVE | Noted: 2020-05-18

## 2020-05-18 PROBLEM — R18.8 INTRAABDOMINAL FLUID COLLECTION: Status: ACTIVE | Noted: 2020-05-18

## 2020-05-18 LAB
ALBUMIN SERPL-MCNC: 2.1 G/DL (ref 3.4–5)
ALP SERPL-CCNC: 199 U/L (ref 40–150)
ALT SERPL W P-5'-P-CCNC: 81 U/L (ref 0–70)
ANION GAP SERPL CALCULATED.3IONS-SCNC: 7 MMOL/L (ref 3–14)
AST SERPL W P-5'-P-CCNC: 40 U/L (ref 0–45)
BILIRUB SERPL-MCNC: 1.8 MG/DL (ref 0.2–1.3)
BUN SERPL-MCNC: 12 MG/DL (ref 7–30)
CALCIUM SERPL-MCNC: 7.6 MG/DL (ref 8.5–10.1)
CHLORIDE SERPL-SCNC: 104 MMOL/L (ref 94–109)
CO2 SERPL-SCNC: 24 MMOL/L (ref 20–32)
CREAT SERPL-MCNC: 0.67 MG/DL (ref 0.66–1.25)
ERYTHROCYTE [DISTWIDTH] IN BLOOD BY AUTOMATED COUNT: 13.5 % (ref 10–15)
GFR SERPL CREATININE-BSD FRML MDRD: >90 ML/MIN/{1.73_M2}
GLUCOSE BLDC GLUCOMTR-MCNC: 131 MG/DL (ref 70–99)
GLUCOSE BLDC GLUCOMTR-MCNC: 149 MG/DL (ref 70–99)
GLUCOSE BLDC GLUCOMTR-MCNC: 150 MG/DL (ref 70–99)
GLUCOSE SERPL-MCNC: 135 MG/DL (ref 70–99)
HCT VFR BLD AUTO: 33 % (ref 40–53)
HGB BLD-MCNC: 10.6 G/DL (ref 13.3–17.7)
INR PPP: 1.42 (ref 0.86–1.14)
MCH RBC QN AUTO: 29.8 PG (ref 26.5–33)
MCHC RBC AUTO-ENTMCNC: 32.1 G/DL (ref 31.5–36.5)
MCV RBC AUTO: 93 FL (ref 78–100)
PLATELET # BLD AUTO: 301 10E9/L (ref 150–450)
POTASSIUM SERPL-SCNC: 3.3 MMOL/L (ref 3.4–5.3)
PROT SERPL-MCNC: 4.8 G/DL (ref 6.8–8.8)
RBC # BLD AUTO: 3.56 10E12/L (ref 4.4–5.9)
SODIUM SERPL-SCNC: 135 MMOL/L (ref 133–144)
WBC # BLD AUTO: 8.1 10E9/L (ref 4–11)

## 2020-05-18 PROCEDURE — 80053 COMPREHEN METABOLIC PANEL: CPT | Performed by: SURGERY

## 2020-05-18 PROCEDURE — 99024 POSTOP FOLLOW-UP VISIT: CPT | Performed by: PHYSICIAN ASSISTANT

## 2020-05-18 PROCEDURE — 36415 COLL VENOUS BLD VENIPUNCTURE: CPT | Performed by: SURGERY

## 2020-05-18 PROCEDURE — 00000146 ZZHCL STATISTIC GLUCOSE BY METER IP

## 2020-05-18 PROCEDURE — 85027 COMPLETE CBC AUTOMATED: CPT | Performed by: SURGERY

## 2020-05-18 PROCEDURE — 85610 PROTHROMBIN TIME: CPT | Performed by: SURGERY

## 2020-05-18 PROCEDURE — 25000132 ZZH RX MED GY IP 250 OP 250 PS 637: Performed by: SURGERY

## 2020-05-18 PROCEDURE — 25000128 H RX IP 250 OP 636: Performed by: NEUROLOGICAL SURGERY

## 2020-05-18 RX ORDER — POLYETHYLENE GLYCOL 3350 17 G/17G
17 POWDER, FOR SOLUTION ORAL DAILY
Status: DISCONTINUED | OUTPATIENT
Start: 2020-05-18 | End: 2020-05-18 | Stop reason: HOSPADM

## 2020-05-18 RX ORDER — POLYETHYLENE GLYCOL 3350 17 G/17G
17 POWDER, FOR SOLUTION ORAL DAILY
Qty: 14 PACKET | Refills: 0 | Status: SHIPPED | OUTPATIENT
Start: 2020-05-18 | End: 2020-01-01

## 2020-05-18 RX ORDER — LEVOFLOXACIN 500 MG/1
500 TABLET, FILM COATED ORAL DAILY
Qty: 3 TABLET | Refills: 0 | Status: SHIPPED | OUTPATIENT
Start: 2020-05-19 | End: 2020-05-22

## 2020-05-18 RX ORDER — BISACODYL 10 MG
10 SUPPOSITORY, RECTAL RECTAL
Status: DISCONTINUED | OUTPATIENT
Start: 2020-05-18 | End: 2020-05-18 | Stop reason: HOSPADM

## 2020-05-18 RX ORDER — METRONIDAZOLE 500 MG/1
500 TABLET ORAL 3 TIMES DAILY
Qty: 9 TABLET | Refills: 0 | Status: SHIPPED | OUTPATIENT
Start: 2020-05-18 | End: 2020-05-21

## 2020-05-18 RX ORDER — OXYCODONE HYDROCHLORIDE 5 MG/1
5-10 TABLET ORAL EVERY 4 HOURS PRN
Qty: 20 TABLET | Refills: 0 | Status: ON HOLD | OUTPATIENT
Start: 2020-05-18 | End: 2021-01-01

## 2020-05-18 RX ORDER — METRONIDAZOLE 500 MG/1
500 TABLET ORAL 3 TIMES DAILY
Status: DISCONTINUED | OUTPATIENT
Start: 2020-05-18 | End: 2020-05-18 | Stop reason: HOSPADM

## 2020-05-18 RX ADMIN — LEVOFLOXACIN 500 MG: 500 TABLET, FILM COATED ORAL at 07:52

## 2020-05-18 RX ADMIN — INSULIN ASPART 1 UNITS: 100 INJECTION, SOLUTION INTRAVENOUS; SUBCUTANEOUS at 00:41

## 2020-05-18 RX ADMIN — METRONIDAZOLE 250 MG: 250 TABLET ORAL at 07:52

## 2020-05-18 RX ADMIN — ENOXAPARIN SODIUM 40 MG: 40 INJECTION SUBCUTANEOUS at 09:51

## 2020-05-18 ASSESSMENT — ACTIVITIES OF DAILY LIVING (ADL)
ADLS_ACUITY_SCORE: 15

## 2020-05-18 NOTE — PROGRESS NOTES
Dr. Amy Thomas here aware of pt status, consents signed for central line and blood REGIONAL ANESTHESIA PAIN SERVICE EPIDURAL NOTE  Fuentes Estrada is a 65 year old malewith cholangiocarcinoma who is now POD # 2 Exploratory laparotomy, right hepatectomy, radical extra hepatic bile duct resection, portal lymph node dissection, intraoperative ultrasound and air angiogram, garry-en-y left hepaticojejunostomy; s/p T7-8 epidural catheter (catheter day #2) for postoperative pain management.       Subjective and Interval History Overnight events: no acute event. Patient seen at 0945, reports adequate pain control with epidural infusion and current  analgesic medications (see below).  Denies new weakness, paresthesias, circumoral numbness, metallic taste or tinnitus.  Patient has a h/o CVA and reports baseline LE weakness.  Patient able to ambulate with OT today. Patient is ambulating with standby assit.  Currently tolerating a liquid diet, denies nausea/vomiting, urinary catheter in place.     Pain Intensity using Numerical Rating Scale (NRS):  0/10, with activities: 10/10           Antithrombotic/Thrombolytic Therapy ordered:  None ordered at this time.     Analgesic Medications:              Medications related to Pain Management (From now, onward)     Start     Dose/Rate Route Frequency Ordered Stop     05/13/20 1821   diphenhydrAMINE (BENADRYL) capsule 25 mg      25 mg Oral EVERY 6 HOURS PRN 05/13/20 1821       05/13/20 0859   hydrOXYzine (ATARAX) tablet 25 mg      25 mg Oral EVERY 6 HOURS PRN 05/13/20 0900       05/13/20 0859   hydrOXYzine (ATARAX) tablet 50 mg      50 mg Oral EVERY 6 HOURS PRN 05/13/20 0900       05/13/20 0845   HYDROmorphone (DILAUDID) 3 mcg/mL, bupivacaine (MARCAINE) 0.0625 % in sodium chloride 0.9 % 250 mL EPIDURAL PCEA (patient controlled epidural)        EPIDURAL PCA 05/13/20 0835       05/12/20 2153   lidocaine 1 % 0.1-1 mL      0.1-1 mL Other EVERY 1 HOUR PRN 05/12/20 2153       05/12/20 2153   lidocaine (LMX4) cream        Topical EVERY 1 HOUR PRN 05/12/20 2153       05/12/20  "0629   nalbuphine (NUBAIN) injection 2.5-5 mg      2.5-5 mg Intravenous EVERY 6 HOURS PRN 05/12/20 0629               Objective:  Lab Results:       Recent Labs   Lab Test 05/14/20  0450   WBC 11.5*   RBC 3.16*   HGB 9.5*   HCT 30.0*   MCV 95   MCH 30.1   MCHC 31.7   RDW 13.2                  Lab Results   Component Value Date     INR 1.95 05/14/2020     INR 1.53 05/13/2020     INR 1.40 05/12/2020         Vitals:              Temp:  [96.7  F (35.9  C)-101.4  F (38.6  C)] 100.4  F (38  C)  Pulse:  [] 82  Heart Rate:  [] 107  Resp:  [20-27] 20  BP: ()/(58-83) 92/58  SpO2:  [91 %-100 %] 91 %  BP 92/58 (BP Location: Right arm)   Pulse 82   Temp 100.4  F (38  C) (Oral)   Resp 20   Ht 1.854 m (6' 1\")   Wt 96.7 kg (213 lb 3.2 oz)   SpO2 91%   BMI 28.13 kg/m          Exam:   GEN: alert and no distress  NEURO/MSK: Moving UE and LE independently. Ambulating.  Strength LE strength intact  and overall symmetric.  SKIN: Epidural catheter site with dressing c/d/i, no tenderness, no erythema, dry heme, no edema                 Assessment and Plan:  Patient is receiving adequate analgesia with current multimodal therapy including T7-8 epidural catheter infusion of   HYDROmorphone (DILAUDID) 3 mcg/mL, bupivacaine (MARCAINE) 0.0625 % in sodium chloride 0.9 % 250 mL EPIDURAL Infusion 10 mL/h. Primary team was concerned with orthostatic hypotension and patient's report of pruritis.  Primary team discussed possibly discontinuing the epidural and doing TAP block instead.  RAPS staff (Dr. Lyons) recommend decreasing Epidural catheter rate instead to 6ml/hour and this was done at 0950am on 5/14/2020.   Will reassess on 5/15/2020 for possible epidural catheter removal.      Motor function intact and is meeting activity goals.  NO evidence of adverse side effects related to local anesthetic. Adequate urine output.       - continue current epidural infusion of HYDROmorphone (DILAUDID) 3 mcg/mL, bupivacaine " (MARCAINE) 0.0625 % in sodium chloride 0.9 % 250 mL,  6ml/hour.  - antithrombotic/thrombolytic therapy none ordered at this time,  - will continue to follow and adjust as needed      Dave Lyons MD  Perioperative and Interventional Pain Service  5/14/2020 2:52 PM  PIPS 24-hour Job Code Pagers (for in-house use only, may text page using [employer])  [place]:  [phone]  [place]:  [phone]  Peds PIPS: [phone]

## 2020-05-18 NOTE — DISCHARGE SUMMARY
[facility] Discharge Summary    Fuentes Estrada MRN# [id number]   Age: 65 year old YOB: 1955     Date of Admission:  5/12/2020  Date of Discharge::  5/18/2020  Admitting Physician:  Oswaldo Hale MD  Discharge Physician:  Oswaldo Hale MD     PCP:  Tolu Noland    Disposition: Patient discharged to home in stable condition.    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  Atelectasis  Pleural effusion  Bile leak leak with peritoneal abscess   Anemia  Hypophosphatemia  Hypokalemia  Hypertension  Diabetes  History of cerebrovascular accident  Hyperlipidemia    Discharge medications  Current Discharge Medication List      START taking these medications    Details   enoxaparin ANTICOAGULANT (LOVENOX) 40 MG/0.4ML syringe Inject 0.4 mLs (40 mg) Subcutaneous every 24 hours for 28 days  Qty: 11.2 mL, Refills: 0    Associated Diagnoses: Deep vein thrombosis (DVT) prophylaxis prescribed at discharge      levofloxacin (LEVAQUIN) 500 MG tablet Take 1 tablet (500 mg) by mouth daily for 3 days  Qty: 3 tablet, Refills: 0    Associated Diagnoses: Intraabdominal fluid collection      metroNIDAZOLE (FLAGYL) 500 MG tablet Take 1 tablet (500 mg) by mouth 3 times daily for 3 days  Qty: 9 tablet, Refills: 0    Associated Diagnoses: Intraabdominal fluid collection      oxyCODONE (ROXICODONE) 5 MG tablet Take 1-2 tablets (5-10 mg) by mouth every 4 hours as needed for moderate to severe pain  Qty: 20 tablet, Refills: 0    Associated Diagnoses: Cholangiocarcinoma (H)      polyethylene glycol (MIRALAX) 17 g packet Take 17 g by mouth daily  Qty: 14 packet, Refills: 0    Associated Diagnoses: Cholangiocarcinoma (H)      sodium chloride, PF, 0.9% PF flush Irrigate with 10 mLs as  directed every 8 hours for 14 days  Qty: 420 mL, Refills: 0    Comments: Dispense as 10 ml sterile syringes for IR drain flushes.  Associated Diagnoses: Intraabdominal fluid collection         CONTINUE these medications which have NOT CHANGED    Details   atorvastatin (LIPITOR) 40 MG tablet Take 40 mg by mouth At Bedtime       empagliflozin (JARDIANCE) 10 MG TABS tablet Take 10 mg by mouth At Bedtime       fenofibrate (TRIGLIDE/LOFIBRA) 160 MG tablet Take 160 mg by mouth At Bedtime       Ferrous Sulfate (IRON) 325 (65 Fe) MG tablet Take 1 tablet by mouth 3 times daily (with meals)  Qty: 180 tablet, Refills: 1    Associated Diagnoses: Iron deficiency anemia due to chronic blood loss      furosemide (LASIX) 20 MG tablet Take 20 mg by mouth every evening       glipiZIDE (GLUCOTROL) 10 MG tablet Take 10 mg by mouth At Bedtime       lisinopril (ZESTRIL) 30 MG tablet Take 30 mg by mouth every evening       sildenafil (REVATIO) 20 MG tablet Take 20 mg by mouth as needed      clopidogrel (PLAVIX) 75 MG tablet Take 75 mg by mouth At Bedtime       insulin glargine (LANTUS PEN) 100 UNIT/ML pen Inject 24 Units Subcutaneous At Bedtime     Comments: If Lantus is not covered by insurance, may substitute Basaglar at same dose and frequency.             Follow up, Special Instructions:  After Care     Future Labs/Procedures    Activity     Comments:    Your activity upon discharge: no lifting more than 10-15 lbs for 6 weeks. Recommend frequent ambulation.    Discharge Instructions     Comments:    If your travel plans upon discharge include prolonged periods of sitting (a lengthy car or plane ride), it is highly beneficial to get up and walk at least once per hour to help prevent swelling and blood clots.     You may shower after operation, let warm soapy water run over incision and pat dry. Do not submerge, soak, or scrub incision.    Take incentive spirometer home for continued frequent use    You will be discharged with narcotic  "pain medications. Please take them only as needed and do not operate a car or heavy machinery for 24 hours after taking them.  Narcotic pain medications like oxycodone are constipating. Please ensure that you are drinking adequate amounts of fluids and taking stool softeners while you are taking narcotics. Take Miralax as needed for constipation.     Do not drive until you can with stand pressing the brake pedal quickly and fully without pain and you are not distracted by pain.     Call for fever greater than 101.5, chills, red skin around incision, drainage from incision, increased swelling from the incision, bleeding from the incision that is not controlled with light pressure, inability to tolerate diet,new nausea/vomiting or other new/worsening symptoms.   You may also call the surgical oncology nurse care coordinator from 8:00am-4:30pm MARISOL Rodriguez at [phone]. For after hours questions or concerns you can contact the on-call surgical oncology resident (nights and weekends [phone] and ask \"I would like to page the Surgical Oncology Resident on call.\"). In emergencies, call [phone]    Follow Up:  Follow up in clinic with Dr. Hale in 2 weeks. You should be called to make an appointment within 3 business days. If you are not contacted, call [phone] to make an appointment.    Tubes and drains     Comments:    You are going home with the following tubes or drains: IR drain  - Flush IR drain with 10 ml of sterile saline 2 times daily  - Record daily output        Procedures:  5/12/20 Dr. Hale  1.  Exploratory laparotomy.   2.  Radical extrahepatic bile duct resection.   3.  Open right hepatectomy.   4.  Portal lymph node dissection.   5.  Intraoperative hepatic ultrasound.   6.  Intraoperative air cholangiogram.   7.  Bianca-en-Y hepaticojejunostomy to the left hepatic duct.   8.  Peritoneal nodule biopsy.   9.  Excision of skin lesion.     Consultations:  PHARMACY IP CONSULT  OCCUPATIONAL THERAPY " ADULT IP CONSULT  PHYSICAL THERAPY ADULT IP CONSULT  VASCULAR ACCESS CARE ADULT IP CONSULT  INTERVENTIONAL RADIOLOGY ADULT/PEDS IP CONSULT  VASCULAR ACCESS CARE ADULT IP CONSULT  PATIENT LEARNING CENTER IP CONSULT  PATIENT LEARNING CENTER IP CONSULT    Brief HPI:  Fuentes Estrada is a 65 year old male with cholangiocarcinoma.    Hospital Course:  The patient was admitted and underwent the above procedure. The patient tolerated the procedure well. His post operative course was significant for the following.      # Cholangiocarcinoma s/p the above procedure. His diet was advanced as bowel function returned and as tolerated. Surgical SAL drain was removed prior to discharge.   - Available for pain is: oxycodone prn  - Regular diet, protein supplements  - Miralax daily  - Lovenox for 28 days for DVT prophylaxis  - Follow up with Dr. Hale in 2 weeks.   - Follow up with Medical Oncology    # Bile leak with peritoneal abscess: He developed fevers to 102.7. Fever work up included negative blood cultures, negative UA, CXR with atelectasis, CT abdomen and pelvis on 5/16 demonstrated a fluid collection within the resection cavity and right pleural effusion. IR drain placed on 5/16. IR drain cultures with light growth of enterococcus faecium, amylase 48, bilirubin 3.4. Zosyn 5/14-5/17. Levofloxacin/flagyl started 5/17.   - Levofloxacin and flagyl (7 total days of antibiotics)  - IR drain flushes 3 times daily. Record daily output.    # Pleurel effusion on CT scan 5/16. S/p IR thoracentesis on 5/16. Cultures negative, bilirubin 1.6.     # Anemia secondary to expected surgical blood loss and dilution. Monitored and remained stable.     # Hypophosphatemia and hypokalemia: monitor and replaced prn    # History of CVA: resume plavix    # Diabetes: resume prior to admission medications    # Hypertension:  resume prior to admission medications    # Hyperlipemia:  resume prior to admission medications    Prior to discharge pain was  controlled with oral pain medication and the patient was able to ambulate and void without difficulty. The patient and his wife received appropriate education post operatively including lovenox and drain teaching. On POD #6 the patient was discharged to home.    Surgical pathology  SPECIMEN(S):   A: Skin lesion   B: Peritoneal nodule   C: Distal bile duct margin   D: Common hepatic duct margin   E: Right posterior hepatic duct debris   F: Extra hepatic duct   G: Liver, right lobe   H: Lymph node, portal 1A   I: Lymph node, protal 1B   J: Lymph node, portal 2A   K: Lymph node, portal 2B   L: Lymph nodes, portal 3A   M: Lymph nodes, portal 3B   N: Lymph node, portal 4A   O: Lymph node, portal 4B   P: Falciform Ligament     FINAL DIAGNOSIS:   A. Skin lesion, excision:   - Seborrheic keratosis     B. Peritoneal nodule:   - Fibroadipose tissue, negative for malignancy     C. Distal bile duct margin:   - Negative for malignancy     D. Common hepatic duct margin:   - Negative for malignancy     E. Right posterior hepatic duct debris:   - Detached fragments of adenocarcinoma     F. Extrahepatic bile duct, excision:   - Invasive poorly differentiated adenocarcinoma, size 1.6 cm   - Tumor extends beyond the bile duct wall into surrounding connective   tissue   - Tumor extends very close (0.1 mm) to the radial margin   - Tumor present at the right posterior hepatic duct margin (see   microscopic description)   - The remaining margins are uninvolved by carcinoma, see specimens C and G   - Extensive lymphovascular invasion   - Extensive perineural invasion   - See synoptic report for additional details     G. Liver, right lobe:   - Chronic liver disease with bridging fibrosis consistent with large duct   obstruction   - No evidence of active hepatitis, steatosis or necrosis   - No evidence of malignancy including negative margins     H. Lymph node, portal 1A, excision:   - One benign lymph node (0/1)     I. Lymph node, protal  1B, excision:   - One benign lymph node (0/1)     J. Lymph node, portal 2A, excision:   - Benign fibroadipose tissue with acute serositis   - No lymph node identified     K. Lymph node, portal 2B, excision:   - Benign fibroadipose tissue with acute serositis   - No lymph node identified     L. Lymph nodes, portal 3A, excision:   - Metastatic adenocarcinoma in one lymph node (1/1)   - Tumor measures 7 mm in greatest dimension     M. Lymph nodes, portal 3B, excision:   - One benign lymph node (0/1)     N. Lymph node, portal 4A, excision:   - One benign lymph node (0/1)     O. Lymph node, portal 4B, excision:   - One benign lymph node (0/1)     P. Falciform ligament, excision:   - Fibroadipose tissue, negative for malignancy     Yuliet Lundberg PA-C

## 2020-05-18 NOTE — PLAN OF CARE
Hypertensive but below notifying parameters, otherwise AVSS. Denies pain and nausea. Passing flatus but no BM. Tolerating a regular diet. BG checked with meals and no sliding scale insulin was needed. Voiding spontaneously. Abdominal incision is CDI. Abdominal SAL removed, leaking at old site. ABD reinforcement placed, gauze given for home. Abdominal drain with yellow/brown output, irrigated per orders. Pt verbalized understanding of drain care and irrigation, also given drain care info package. Pt self administered lovenox, lovenox education given. AVS reviewed with pt and pt verbalized understanding. PIV removed. Pt to discharge home with family at 1100.

## 2020-05-18 NOTE — PLAN OF CARE
Assumed care of pt at 1900 on 5/17/20. VSS. Pt denies c/o pain. Pt tolerating regular diet well; denies c/o N/V. +BM and gas. UOP adequate. PIV SL. Incision LATISHA and CDI. SAL with large output. New IR drain to RLQ with moderate output- irrigated with NS q8h. Pt up ad yonas to SB assist. PLAN: continue POC

## 2020-05-18 NOTE — PROGRESS NOTES
Surgical Oncology Progress Note    Interval History:  No acute events overnight. Afebrile. Pain controlled. Tolerating regular diet without nausea or vomiting. Passing flatus but no stool yet.     Physical Exam:   Temp:  [98.1  F (36.7  C)-98.6  F (37  C)] 98.1  F (36.7  C)  Heart Rate:  [78-87] 78  Resp:  [16-18] 18  BP: (146-157)/(83-90) 146/83  SpO2:  [95 %-96 %] 96 %  General: Alert, oriented, appears comfortable, NAD.  Respiratory: breathing non labored  Abdomen: Abdomen is soft, non-tender, non-distended. Incision is clean, dry and intact. SAL drain with serous output. IR drain with light green output.     Data:   All laboratory and imaging data in the past 24 hours reviewed  I/O last 3 completed shifts:  In: 870 [P.O.:840; Other:30]  Out: 995 [Urine:200; Drains:795]  Recent Labs   Lab Test 05/18/20  0709 05/17/20  0715 05/16/20  0719   WBC 8.1 9.8 10.2   HGB 10.6* 11.0* 11.1*    281 296   INR 1.42* 1.54* 1.59*      Recent Labs   Lab Test 05/18/20  0709 05/17/20  0715 05/16/20  0719    136 136   POTASSIUM 3.3* 3.5 3.3*   CHLORIDE 104 105 104   CO2 24 25 24   BUN 12 11 12   CR 0.67 0.80 0.95   ANIONGAP 7 5 8   ERIC 7.6* 7.6* 8.0*   * 127* 121*      Recent Labs   Lab Test 05/18/20  0709   PROTTOTAL 4.8*   ALBUMIN 2.1*   BILITOTAL 1.8*   ALKPHOS 199*   AST 40   ALT 81*     Assessment and Plan:     Fuentes Estrada is a 65 year old male with cholangiocarcinoma now POD5 s/p right hepatectomy with extrahepatic bile duct resection. Found to have right thoracic and abdominal fluid collection s/p IR thoracentesis and abdominal drain placement 5/16. Surgical drain removed 5/18.      - Available for pain is: oxycodone prn, dilaudid prn, atarax prn. epidural removed 5/17  - Regular diet, protein supplements  - Pleurel effusion on CT scan 5/16. S/p IR thoracentesis on 5/16. Cultures negative, bilirubin 1.6.   - Bile leak and intraabdominal fluid collection: fever work up included negative blood cultures,  negative UA, CXR with atelectasis, CT abdomen on 5/16 demonstrated a fluid collection and right pleural effusion. IR drain placed on 5/16. IR drain cultures with light growth of enterococcus faecium, amylase 48, bilirubin 3.4. Zosyn 5/14-5/17. Levofloxacin/flagyl started 5/17. IR drain teaching needed.   - Diabetes: sliding scale and lutus   - Hypophosphatemia: monitor and replace prn  - Lovenox for DVT prophylaxis, teaching needed  - Discharge to home today     Seen with Chief resident who will discuss with Staff.     ETHAN AlexisC   Surgical Oncology

## 2020-05-19 ENCOUNTER — PATIENT OUTREACH (OUTPATIENT)
Dept: SURGERY | Facility: CLINIC | Age: 65
End: 2020-05-19

## 2020-05-19 ENCOUNTER — PATIENT OUTREACH (OUTPATIENT)
Dept: CARE COORDINATION | Facility: CLINIC | Age: 65
End: 2020-05-19

## 2020-05-19 LAB
BACTERIA SPEC CULT: ABNORMAL
Lab: ABNORMAL
SPECIMEN SOURCE: ABNORMAL
SPECIMEN SOURCE: ABNORMAL

## 2020-05-19 NOTE — TELEPHONE ENCOUNTER
Post Op Discharge Call    Surgery: Open right hepatectomy     Surgery date: 5/12/2020    Discharge Date:  5/18/20    Immediate Concerns: None at this time.     Pain: Controlled well, no issues with pain management. He is taking oxycodone as needed with good relief. Discussed also using ice and heat as alternatives.     Incision:  No concerns, healing well, no redness, drainage or edema reported.      Drains: IR drain to gravity drainage bag. Discussed flushing instructions in detail with patients wife as well as dressing changes and site care. Discussed plan to monitor drainage output for 24 hour periods and that once he is less than 10 ml of output for 24 hour periods he would need to be set up with IR for repeat CT scan and sinogram to assess for removal based on their note and recommendations. All questions answered regarding drain and she will monitor output.     Diet: Regular, tolerating well. Denies nausea, vomiting.     Bowels:  Passing gas, no bowel movement as of yet. Taking stool softeners daily. Denies constipation and cramping.     Activity: No difficulty with ADLs reported. Patient up independently at home.     Post op/follow up plans: Post op appointment scheduled for 6/1/2020 at 11 am, confirmed date and time with patient.     Patient has our direct number for any questions or concerns that may arise.      Carey Rodriguez RN, BSN  Care Coordinator - Dr. Hale  [phone]

## 2020-05-19 NOTE — PROGRESS NOTES
Patient was contacted by an RN for post DC follow up so no duplicate post DC follow up call will be made     Patient and RN are in touch via [employer].  RN expressed she would be calling patient tomorrow by phone as well.

## 2020-05-20 LAB
BACTERIA SPEC CULT: ABNORMAL
BACTERIA SPEC CULT: ABNORMAL
BACTERIA SPEC CULT: NO GROWTH
BACTERIA SPEC CULT: NO GROWTH
SPECIMEN SOURCE: ABNORMAL
SPECIMEN SOURCE: NORMAL
SPECIMEN SOURCE: NORMAL

## 2020-05-21 LAB
BACTERIA SPEC CULT: NO GROWTH
SPECIMEN SOURCE: NORMAL

## 2020-05-24 ENCOUNTER — TELEPHONE (OUTPATIENT)
Dept: SURGERY | Facility: CLINIC | Age: 65
End: 2020-05-24

## 2020-05-24 NOTE — TELEPHONE ENCOUNTER
Received a call from Fuentes Estrada' wife.     He underwent the procedures listed below on 5/12/20 with Dr. Hale:     1.  Exploratory laparotomy.   2.  Radical extrahepatic bile duct resection.   3.  Open right hepatectomy.   4.  Portal lymph node dissection.   5.  Intraoperative hepatic ultrasound.   6.  Intraoperative air cholangiogram.   7.  Bianca-en-Y hepaticojejunostomy to the left hepatic duct.   8.  Peritoneal nodule biopsy    Post operatively he had a bile leak and peritoneal abscess that was drained by IR. The surgical drain was removed by discharge however the IR drain remains and he received 7 days of antibiotics (Levo/flagyl) on 5/17-5/23.     His wife states he is doing well overall and the last couple days the IR drain had been decreasing steadily until this am. Yesterday, he drained 40cc/24hours, but this am it was 115cc. The fluid doesn't have an odor, and is the same color as earlier -clear, pink, thin fluid. He is not complaining of any pain, his tube site looks good per the wife. Denies any fever, headache, chest pain, SOB, abdominal pain, has an improving appetite, is having BM and has been urinating well. Otherwise, he feels very tired.     Explained that it is expected to be fatigued after a big operation however, this should improve. The fluid sounds serosanguinous in nature and not purulent. Advised that it is ok to monitor for now. If symptoms change and he develops fevers, inability to tolerate PO intake, abdominal pain or change in the quality or color of the fluid to come to the emergency room to be evaluated. Otherwise, they can update the clinic nurse on Monday.     Roxanne Andres MD  PGY-2 General Surgery

## 2020-05-25 ENCOUNTER — TELEPHONE (OUTPATIENT)
Dept: SURGERY | Facility: CLINIC | Age: 65
End: 2020-05-25

## 2020-05-25 NOTE — TELEPHONE ENCOUNTER
"I was paged by Ms. Vernon(wife) regarding increased output from intra-abdominal drain, he had 400 ml today and 107 ml yesterday, prior to that he was having 40's per day, there is more fibrinous material continues to be \"pink\" in color, denies bile or blood, afebrile, he feels well. She has been flushing the tube daily. I reassured Ms Vernon the tube is working and there is some pockets opening, to call if bile or blood. She expressed understanding and felt comfortable with the plan. She will call clinic tomorrow.      Raisa Davidson MD  General surgery resident    "

## 2020-05-26 NOTE — TELEPHONE ENCOUNTER
Surgical Oncology RN Care Coordination Note:     Called and spoke with wife as follow up to call over the weekend. Patient is doing well and discussed increase output in the drain. She states its starting to slow again and they will continue to monitor it and keep us updated. All questions answered and they have no further questions at this time.     Carey Rodriguez, RN, BSN  Care Coordinator   [phone]

## 2020-05-27 DIAGNOSIS — C24.9 CHOLANGIOCARCINOMA DETERMINED BY BIOPSY OF BILIARY TRACT (H): Primary | ICD-10-CM

## 2020-05-28 ENCOUNTER — TELEPHONE (OUTPATIENT)
Dept: ONCOLOGY | Facility: CLINIC | Age: 65
End: 2020-05-28

## 2020-05-28 NOTE — TELEPHONE ENCOUNTER
ONCOLOGY INTAKE: Records Information      APPT INFORMATION:  Referring provider:  Dr. Oswaldo Hale MD  Referring provider s clinic:  [facility]  Reason for visit/diagnosis: Cholangiocarcinoma   Has patient been notified of appointment date and time?: Yes    RECORDS INFORMATION:  Were the records received with the referral (via [employer])? No,Internal Referral      Has patient been seen for any external appt for this diagnosis? No    If yes, where? NA    ADDITIONAL INFORMATION:  Pt wants to come in person to the appointment.

## 2020-06-01 ENCOUNTER — OFFICE VISIT (OUTPATIENT)
Dept: SURGERY | Facility: CLINIC | Age: 65
End: 2020-06-01
Attending: SURGERY
Payer: MEDICARE

## 2020-06-01 DIAGNOSIS — C24.9 CHOLANGIOCARCINOMA DETERMINED BY BIOPSY OF BILIARY TRACT (H): Primary | ICD-10-CM

## 2020-06-01 PROCEDURE — 40000114 ZZH STATISTIC NO CHARGE CLINIC VISIT

## 2020-06-01 PROCEDURE — 40001009 ZZH VIDEO/TELEPHONE VISIT; NO CHARGE

## 2020-06-01 ASSESSMENT — ENCOUNTER SYMPTOMS
FATIGUE: 1
ALTERED TEMPERATURE REGULATION: 1
DECREASED APPETITE: 1
WEIGHT LOSS: 1

## 2020-06-01 NOTE — LETTER
6/1/2020         RE: Fuentes Estrada  [address] [facility] [address]        Dear Colleague,    Thank you for referring your patient, Fuentes Estrada, to the [facility]. Please see a copy of my visit note below.    POSTOP VISIT      REASON FOR EVALUATION:  Postop right hepatectomy with extrahepatic bile duct resection and portal lymph node dissection.      HISTORY OF PRESENT ILLNESS:  Mr. Estrada looks great today.  He had a right hepatectomy with extrahepatic bile duct resection for a hilar/type 3 cholangiocarcinoma extending into his right hepatic duct.  All of his margins of resection were negative.  He had 1 lymph node that was positive.  Of note, for clarification, his pathology report indicates a positive right duct margin, but the patient's right hepatic duct and right lobe of his liver was subsequently resected.  Therefore, the only pertinent surgical margins are the left hepatic duct and the common bile duct margin, both of which were widely negative.  In addition, his hepatic parenchymal margins were negative.      Mr. Estrada looks great today overall.  His incision is healing well.  He does have a drain in place because of a small bile leak, but the fluid in the bag currently is not bilious.  He has been irrigating with 30 mL a day and has a total of about 50-80 mL output, but it does appear serous and I wonder if it actually might be related to a small amount of ascites based on the extent of his surgical resection.      I instructed Mr. Estrada to reduce the number of flushes down to 5 mL twice a day.  I asked him to call us later this week to discuss how much was coming out of the drain at that point.  I am hopeful that his drain will be able to be removed within a week or two.  Prior to removal.  We will plan a CT scan at his next visit and that was based on a request by my discussion with Dr. Reta Purcell from our Interventional Radiology group who I spoke to regarding  plans for removal of his drain down the road.  Overall, Mr. Estrada looks great.  His wife joined us by phone today, so she also was part of the discussion.  She had no real additional questions for me today.      We will follow up with Mr. Estrada later this week by telephone and have him back for a CT scan as well as drain removal hopefully in the coming week or two.         Again, thank you for allowing me to participate in the care of your patient.        Sincerely,        Oswaldo Hale MD

## 2020-06-01 NOTE — PROGRESS NOTES
POSTOP VISIT      REASON FOR EVALUATION:  Postop right hepatectomy with extrahepatic bile duct resection and portal lymph node dissection.      HISTORY OF PRESENT ILLNESS:  Mr. Estrada looks great today.  He had a right hepatectomy with extrahepatic bile duct resection for a hilar/type 3 cholangiocarcinoma extending into his right hepatic duct.  All of his margins of resection were negative.  He had 1 lymph node that was positive.  Of note, for clarification, his pathology report indicates a positive right duct margin, but the patient's right hepatic duct and right lobe of his liver was subsequently resected.  Therefore, the only pertinent surgical margins are the left hepatic duct and the common bile duct margin, both of which were widely negative.  In addition, his hepatic parenchymal margins were negative.      Mr. Estrada looks great today overall.  His incision is healing well.  He does have a drain in place because of a small bile leak, but the fluid in the bag currently is not bilious.  He has been irrigating with 30 mL a day and has a total of about 50-80 mL output, but it does appear serous and I wonder if it actually might be related to a small amount of ascites based on the extent of his surgical resection.      I instructed Mr. Estrada to reduce the number of flushes down to 5 mL twice a day.  I asked him to call us later this week to discuss how much was coming out of the drain at that point.  I am hopeful that his drain will be able to be removed within a week or two.  Prior to removal.  We will plan a CT scan at his next visit and that was based on a request by my discussion with Dr. Reta Purcell from our Interventional Radiology group who I spoke to regarding plans for removal of his drain down the road.  Overall, Mr. Estrada looks great.  His wife joined us by phone today, so she also was part of the discussion.  She had no real additional questions for me today.      We will follow up with  Mr. Estrada later this week by telephone and have him back for a CT scan as well as drain removal hopefully in the coming week or two.

## 2020-06-04 ENCOUNTER — PATIENT OUTREACH (OUTPATIENT)
Dept: GASTROENTEROLOGY | Facility: CLINIC | Age: 65
End: 2020-06-04

## 2020-06-04 NOTE — PROGRESS NOTES
"Care Coordination Telephone Call  [facility]     Called patient to check output from SAL drain.       Spoke with his wife.  They have decreased the flush amount since visit with Dr. Hale this past Monday.     Output is slightly cloudy pale yellow about 80 - 100 cc per 24 hours.      Denies fever, chills, nausea or vomiting.  C/o \"exhaustion\" and decreased appetite.  He has intermittent c/o fullness after eating.  He is having formed stool once per day.     They are scheduled for 7/2 oncology appt currently and have not received any change of that at this time.      I have asked the patient to call with any additional questions or concerns and have provided my contact information.    Mary MORGANN, HNBC, STAR-T  RN Care Coordinator  [facility]  Ph: [phone]  FAX: [phone]          "

## 2020-06-05 ENCOUNTER — PATIENT OUTREACH (OUTPATIENT)
Dept: SURGERY | Facility: CLINIC | Age: 65
End: 2020-06-05

## 2020-06-05 DIAGNOSIS — C22.1 CHOLANGIOCARCINOMA (H): Primary | ICD-10-CM

## 2020-06-05 DIAGNOSIS — K83.9 BILE LEAK: ICD-10-CM

## 2020-06-05 NOTE — TELEPHONE ENCOUNTER
Surgical Oncology RN Care Coordination Note:     Called and spoke with patient's wife regarding plan for drain and to have him see the IR team for a drain check with a CT scan prior to the visit d/t ongoing high output from the drain. Informed him they may also set him up for a drain check such as sinogram but that we will defer to their team. She confirmed yesterday he had about 165 ml of clear,yellow output and that they are on track to that same amount today as well. She agreed with plan for patient to see IR and has no further questions at this time.     Message sent to IR team to assist with follow up.     Carey Rodriguez RN, BSN  Care Coordinator   [phone]

## 2020-06-06 ENCOUNTER — TELEPHONE (OUTPATIENT)
Dept: SURGERY | Facility: CLINIC | Age: 65
End: 2020-06-06

## 2020-06-06 NOTE — TELEPHONE ENCOUNTER
Received a call from Fuentes Estrada regarding drainage tube output.     He is post-op right hepatectomy with extrahepatic bile duct resection and portal lymph node dissection on May 12. He had a follow up with Dr. Hale on 6/1 which went well. He still feels well, denying fevers, eating fine, and having BM, but his family member wanted to know if it was ok that the drainage was a darker yellow color. They deny any signs of green fluid suggestive of bile, blood or pus. He will be having a CT scan and drain check with IR soon as well. Discussed that yellow is ok as long as green nor other colors are seen in the fluid. Advised that if he develops fevers or other concerning symptoms, he should be evaluated in the ED.     Roxanne Andres MD  PGY-2 General Surgery

## 2020-06-07 ENCOUNTER — TELEPHONE (OUTPATIENT)
Dept: SURGERY | Facility: CLINIC | Age: 65
End: 2020-06-07

## 2020-06-07 NOTE — TELEPHONE ENCOUNTER
Received call from Fuentes Estrada' wife Marcia.     Surgical Oncology  Dr. Hale   Post op R hepatoectomy w/ extrahepatic bile duct resection and portal lymph node on 5/12/20  Bile leak post operative, currently has a drain in place, in process of coordinating with IR for removal     Patient's wife reports that he has been doing quite well since discharge. His last appt with Dr. Hale went well on 6/1. Yesterday, he engaged in multiple outside activities including boat ride where he pulled the motor up from the water. Since, he has developed intermittent, sharp, R sided abdominal pain along with fatigue. At worst, he rates it 9/10 in severity. He took an oxycodone (left over from surgery) with some relief.     His incision has not changes, looks good without redness or drainage. His drain is putting out yellow fluid, consistent with previous documented reports. Patient denies fever, chills, loss of appetite, nausea, or vomiting. He is tolerating solid foods and staying hydrated. Patient had a bowel movement yesterday, took miralax this morning and continues to pass gas.     I discussed differential for abdominal pain including musculoskeletal vs intraabdominal process. Given new onset of symptoms, I recommend evaluation with imaging and labs. Patient and wife live 2 hours away and [place]. Marcia stated she will continue to monitor pain closely and likely take him to a nearby emergency department if his pain worsens or he develops fever, chills, nausea, vomiting, or any additional symptoms. Encouraged the patient and family to call with any questions or concerns, or present for evaluation promptly.

## 2020-06-08 NOTE — TELEPHONE ENCOUNTER
Action    Action Taken 6/8/20: Imaging from [facility] previously resolved. CE Refreshed, no new  outside information. Path slides requested.    -[facility] Tracking ([facility], Path): [id number]  11:48 AM         RECORDS STATUS - ALL OTHER DIAGNOSIS      RECORDS RECEIVED FROM: [facility] ([facility])   DATE RECEIVED: 6/8/20   NOTES STATUS DETAILS   OFFICE NOTE from referring provider     OFFICE NOTE from medical oncologist     DISCHARGE SUMMARY from hospital     DISCHARGE REPORT from the ER     OPERATIVE REPORT     MEDICATION LIST     CLINICAL TRIAL TREATMENTS TO DATE     LABS     PATHOLOGY REPORTS [facility], Report in CE, Slides requested 6/8/20 3/27/20:    ANYTHING RELATED TO DIAGNOSIS     GENONOMIC TESTING     TYPE:     IMAGING (NEED IMAGES & REPORT)     CT SCANS     MRI     MAMMO     ULTRASOUND     PET

## 2020-06-08 NOTE — TELEPHONE ENCOUNTER
*RESCHEDULED FROM 7/2/20 WITH DR SOLOMON TO 6/9/20 AT 12PM WITH DR SPANGLER*     APPT INFORMATION:  Referring provider:  Dr. Oswaldo Hale MD  Referring provider s clinic:  [facility]  Reason for visit/diagnosis: Cholangiocarcinoma   Has patient been notified of appointment date and time?: Yes     RECORDS INFORMATION:  Were the records received with the referral (via [employer])? No,Internal Referral        Has patient been seen for any external appt for this diagnosis? No     If yes, where? NA     ADDITIONAL INFORMATION:  Rescheduled per Pa  I spoke with Robyn (wife) and confirmed video visit for tomorrow 6/9/20 at 12pm with Dr Spangler

## 2020-06-09 ENCOUNTER — PRE VISIT (OUTPATIENT)
Dept: ONCOLOGY | Facility: CLINIC | Age: 65
End: 2020-06-09

## 2020-06-09 ENCOUNTER — VIRTUAL VISIT (OUTPATIENT)
Dept: ONCOLOGY | Facility: CLINIC | Age: 65
End: 2020-06-09
Attending: SURGERY
Payer: COMMERCIAL

## 2020-06-09 VITALS — HEIGHT: 73 IN | BODY MASS INDEX: 25.58 KG/M2 | WEIGHT: 193 LBS

## 2020-06-09 DIAGNOSIS — C24.0 HILAR CHOLANGIOCARCINOMA (H): ICD-10-CM

## 2020-06-09 DIAGNOSIS — C24.9 CHOLANGIOCARCINOMA DETERMINED BY BIOPSY OF BILIARY TRACT (H): ICD-10-CM

## 2020-06-09 PROBLEM — K83.1 BILE DUCT STRICTURE (H): Status: RESOLVED | Noted: 2020-04-14 | Resolved: 2020-06-09

## 2020-06-09 PROBLEM — R18.8 INTRAABDOMINAL FLUID COLLECTION: Status: RESOLVED | Noted: 2020-05-18 | Resolved: 2020-06-09

## 2020-06-09 PROBLEM — J90 PLEURAL EFFUSION: Status: RESOLVED | Noted: 2020-05-18 | Resolved: 2020-06-09

## 2020-06-09 PROBLEM — J98.11 ATELECTASIS: Status: RESOLVED | Noted: 2020-05-14 | Resolved: 2020-06-09

## 2020-06-09 PROBLEM — K92.1 MELENA: Status: RESOLVED | Noted: 2020-04-05 | Resolved: 2020-06-09

## 2020-06-09 PROBLEM — E83.39 HYPOPHOSPHATEMIA: Status: RESOLVED | Noted: 2020-05-14 | Resolved: 2020-06-09

## 2020-06-09 PROBLEM — K83.9 BILE LEAK: Status: RESOLVED | Noted: 2020-05-18 | Resolved: 2020-06-09

## 2020-06-09 PROBLEM — K83.1 BILIARY STRICTURE (H): Status: RESOLVED | Noted: 2020-03-30 | Resolved: 2020-06-09

## 2020-06-09 PROCEDURE — 99205 OFFICE O/P NEW HI 60 MIN: CPT | Mod: 95 | Performed by: INTERNAL MEDICINE

## 2020-06-09 PROCEDURE — 40001009 ZZH VIDEO/TELEPHONE VISIT; NO CHARGE

## 2020-06-09 ASSESSMENT — MIFFLIN-ST. JEOR: SCORE: 1704.32

## 2020-06-09 ASSESSMENT — PAIN SCALES - GENERAL: PAINLEVEL: NO PAIN (0)

## 2020-06-09 NOTE — PROGRESS NOTES
"Fuentes Estrada is a 65 year old male who is being evaluated via a billable video visit.      The patient has been notified of following:     \"This video visit will be conducted via a call between you and your physician/provider. We have found that certain health care needs can be provided without the need for an in-person physical exam.  This service lets us provide the care you need with a video conversation.  If a prescription is necessary we can send it directly to your pharmacy.  If lab work is needed we can place an order for that and you can then stop by our lab to have the test done at a later time.    Video visits are billed at different rates depending on your insurance coverage.  Please reach out to your insurance provider with any questions.    If during the course of the call the physician/provider feels a video visit is not appropriate, you will not be charged for this service.\"    Patient has given verbal consent for Video visit? Yes    How would you like to obtain your AVS? MyChart    Patient would like the video invitation sent by: Text to cell phone: [phone]     Will anyone else be joining your video visit? No        I have reviewed and updated the patient's allergies and medication list.    Concerns: No concerns  Refills: No refills needed.      Secondary Video Option ([employer]), send text message to: [phone]     Angle Mack CMA    Provider notes:    Mr. Fuentes Estrada is a new patient I am seeing in consultation regarding a recently resected cholangiocarcinoma.    He is 65 years old and about a year ago underwent a cholecystectomy.  About 3 months ago he presented with a stricture in the common bile duct that required stenting and eventually was discovered to have a cholangiocarcinoma.  Aside from the symptoms related to the stricture and attendent procedures he really had not had a lot of symptoms prior to his surgery.  About 4 weeks ago he underwent a right hepatectomy complicated " by a bile leak and intra-abdominal abscess.  He is now back at home recovering.  He is still quite fatigued.  He still has a drain in place which is draining about 40 to 50 cc/day.  He has no ongoing fevers chills or sweats.  He has some increased janelle-incisional pain after he tried to go fishing and lifted a heavy motor earlier this week.  He presently does not have much appetite but that is gradually improving and he feels like he is maintaining his weight.  The remainder of his 10 point review of systems is currently otherwise negative. He has no prior history of liver or bile duct disease.    He has a family history that is notable for multiple breast cancers.  He was unsure of all the details but his maternal grandmother and many other maternal relatives have had breast cancer including a maternal aunt and several cousins.  The youngest was at about age 50. He believes his maternal grandmother also had a separate stomach cancer.    His past medical history is notable for hypertension, elevated lipids, diabetes and a stroke in 2010.    He is  and lives with his wife on [facility] where he works as a .  His daughter lives in [place] also participated in today's video visit.    GENERAL: Healthy, alert, appears uncomfortable, often shifting position during visit  EYES: Eyes grossly normal to inspection.  No discharge or erythema, or obvious scleral/conjunctival abnormalities.  RESP: No audible wheeze, cough, or visible cyanosis.  No visible retractions or increased work of breathing.    SKIN: Visible skin clear. No significant rash, abnormal pigmentation or lesions.  NEURO: Cranial nerves grossly intact.  Mentation and speech appropriate for age.  PSYCH: Mentation appears normal, affect normal/bright, judgement and insight intact, normal speech and appearance well-groomed.    Personally reviewed his imaging and his pathology report and have reviewed all those results with them.  His preoperative  evaluation appeared to show a mass in the common bile duct extending into the right duct.  He has some tiny probably insignificant lung nodules and no other suggestion of metastatic disease on his various imaging studies.  The resected pathology specimen shows that in fact there is a 1.9 cm tumor in a branch of the right hepatic duct which appears to grow out towards and perhaps have compressed the common duct but the anatomy is unclear to me from the report there is no apparent tumor within the common hepatic duct or right hepatic duct.  The surgical margins were negative although the radial margin was quite close.  He had 6 lymph nodes resected 1 of which harbored tumor.  A CA-19-9 level obtained in an outside institution back in March when he had his initial biliary stricture was 562.  His recent lab work here shows normal electrolytes and renal function.  His albumin as of 2 weeks ago was still only 2.1 and his bilirubin was marginally elevated at 1.8.His blood counts were unremarkable with mild normocytic anemia.    Assessment/plan: Stage III  cholangiocarcinoma now status post R0 resection.  Preoperatively this appeared to have type III anatomy of a hilar cholangiocarcinoma, but his pathology specimen suggest is actually an intrahepatic cholangiocarcinoma. (stage is IIIb or IIIC depending on whether we consider this intrahepatic or hilar) For planning purposes going forward that distinction is probably unimportant.  The patient is still recovering from surgery and its complications but seems to be making good progress and barring any further problems anticipate him being ready to start some adjuvant therapy in about a month.    Had a long talk with the patient and his family about the nature of his disease, his prognosis which includes a fairly high risk of recurrence, and the concept of adjuvant chemotherapy.  I explained that the standard for node positive disease would be a course of adjuvant therapy  usually with a fluoropyrimidine, and some would consider the addition of radiation.  I told him I generally do not favor radiation unless the margins were positive and explained that there is relatively little data to make a firm conclusion about that however.  My recommendation was for a 6-month course of adjuvant capecitabine.  I reviewed with him the expected toxicities.  I explained this probably reduces his relative risk of recurrence by about a relative third.  We talked about his diet and he seems to be making of progress we do not need a specific intervention so long as he progresses as he has been.  I offered a visit with a dietitian but he did not feel the need for that right now.  We talked about his family history and I think it would be worthwhile pursuing genetic counseling, and we will go ahead and get that set up for him.    At the end of this long discussion he and his family had had all their questions answered and expressed a good understanding.  We will plan on setting up a visit in approximately 4 weeks at which point we will get a repeat CT scan for baseline imaging purposes and to be sure there is no residual abscess if his scan and lab work and overall condition seem good at that point then we will proceed with the planned course of adjuvant capecitabine.      Video-Visit Details    Type of service:  Video Visit    Video Start Time: 12:00  Video End Time: 1:15    Originating Location (pt. Location): Home    Distant Location (provider location):  [facility]     Platform used for Video Visit: Felipe Beard MD

## 2020-06-09 NOTE — LETTER
"    6/9/2020         RE: Fuentes Estrada  [address] [place]  [place] [address]        Dear Colleague,    Thank you for referring your patient, Fuentes Estrada, to the [facility]. Please see a copy of my visit note below.    Fuentes Estrada is a 65 year old male who is being evaluated via a billable video visit.      The patient has been notified of following:     \"This video visit will be conducted via a call between you and your physician/provider. We have found that certain health care needs can be provided without the need for an in-person physical exam.  This service lets us provide the care you need with a video conversation.  If a prescription is necessary we can send it directly to your pharmacy.  If lab work is needed we can place an order for that and you can then stop by our lab to have the test done at a later time.    Video visits are billed at different rates depending on your insurance coverage.  Please reach out to your insurance provider with any questions.    If during the course of the call the physician/provider feels a video visit is not appropriate, you will not be charged for this service.\"    Patient has given verbal consent for Video visit? Yes    How would you like to obtain your AVS? MyChart    Patient would like the video invitation sent by: Text to cell phone: [phone]     Will anyone else be joining your video visit? No        I have reviewed and updated the patient's allergies and medication list.    Concerns: No concerns  Refills: No refills needed.      Secondary Video Option (D[employer]), send text message to: [phone]     Angle Mack [facility]    Provider notes:    Mr. Fuentes Estrada is a new patient I am seeing in consultation regarding a recently resected cholangiocarcinoma.    He is 65 years old and about a year ago underwent a cholecystectomy.  About 3 months ago he presented with a stricture in the common bile duct that required stenting and eventually was " discovered to have a cholangiocarcinoma.  Aside from the symptoms related to the stricture and attendent procedures he really had not had a lot of symptoms prior to his surgery.  About 4 weeks ago he underwent a right hepatectomy complicated by a bile leak and intra-abdominal abscess.  He is now back at home recovering.  He is still quite fatigued.  He still has a drain in place which is draining about 40 to 50 cc/day.  He has no ongoing fevers chills or sweats.  He has some increased janelle-incisional pain after he tried to go fishing and lifted a heavy motor earlier this week.  He presently does not have much appetite but that is gradually improving and he feels like he is maintaining his weight.  The remainder of his 10 point review of systems is currently otherwise negative. He has no prior history of liver or bile duct disease.    He has a family history that is notable for multiple breast cancers.  He was unsure of all the details but his maternal grandmother and many other maternal relatives have had breast cancer including a maternal aunt and several cousins.  The youngest was at about age 50. He believes his maternal grandmother also had a separate stomach cancer.    His past medical history is notable for hypertension, elevated lipids, diabetes and a stroke in 2010.    He is  and lives with his wife on [facility] where he works as a .  His daughter lives in [place] also participated in today's video visit.    GENERAL: Healthy, alert, appears uncomfortable, often shifting position during visit  EYES: Eyes grossly normal to inspection.  No discharge or erythema, or obvious scleral/conjunctival abnormalities.  RESP: No audible wheeze, cough, or visible cyanosis.  No visible retractions or increased work of breathing.    SKIN: Visible skin clear. No significant rash, abnormal pigmentation or lesions.  NEURO: Cranial nerves grossly intact.  Mentation and speech appropriate for age.  PSYCH:  Mentation appears normal, affect normal/bright, judgement and insight intact, normal speech and appearance well-groomed.    Personally reviewed his imaging and his pathology report and have reviewed all those results with them.  His preoperative evaluation appeared to show a mass in the common bile duct extending into the right duct.  He has some tiny probably insignificant lung nodules and no other suggestion of metastatic disease on his various imaging studies.  The resected pathology specimen shows that in fact there is a 1.9 cm tumor in a branch of the right hepatic duct which appears to grow out towards and perhaps have compressed the common duct but the anatomy is unclear to me from the report there is no apparent tumor within the common hepatic duct or right hepatic duct.  The surgical margins were negative although the radial margin was quite close.  He had 6 lymph nodes resected 1 of which harbored tumor.  A CA-19-9 level obtained in an outside institution back in March when he had his initial biliary stricture was 562.  His recent lab work here shows normal electrolytes and renal function.  His albumin as of 2 weeks ago was still only 2.1 and his bilirubin was marginally elevated at 1.8.His blood counts were unremarkable with mild normocytic anemia.    Assessment/plan: Stage III  cholangiocarcinoma now status post R0 resection.  Preoperatively this appeared to have type III anatomy of a hilar cholangiocarcinoma, but his pathology specimen suggest is actually an intrahepatic cholangiocarcinoma. (stage is IIIb or IIIC depending on whether we consider this intrahepatic or hilar) For planning purposes going forward that distinction is probably unimportant.  The patient is still recovering from surgery and its complications but seems to be making good progress and barring any further problems anticipate him being ready to start some adjuvant therapy in about a month.    Had a long talk with the patient and his  family about the nature of his disease, his prognosis which includes a fairly high risk of recurrence, and the concept of adjuvant chemotherapy.  I explained that the standard for node positive disease would be a course of adjuvant therapy usually with a fluoropyrimidine, and some would consider the addition of radiation.  I told him I generally do not favor radiation unless the margins were positive and explained that there is relatively little data to make a firm conclusion about that however.  My recommendation was for a 6-month course of adjuvant capecitabine.  I reviewed with him the expected toxicities.  I explained this probably reduces his relative risk of recurrence by about a relative third.  We talked about his diet and he seems to be making of progress we do not need a specific intervention so long as he progresses as he has been.  I offered a visit with a dietitian but he did not feel the need for that right now.  We talked about his family history and I think it would be worthwhile pursuing genetic counseling, and we will go ahead and get that set up for him.    At the end of this long discussion he and his family had had all their questions answered and expressed a good understanding.  We will plan on setting up a visit in approximately 4 weeks at which point we will get a repeat CT scan for baseline imaging purposes and to be sure there is no residual abscess if his scan and lab work and overall condition seem good at that point then we will proceed with the planned course of adjuvant capecitabine.      Video-Visit Details    Type of service:  Video Visit    Video Start Time: 12:00  Video End Time: 1:15    Originating Location (pt. Location): Home    Distant Location (provider location):  [facility]     Platform used for Video Visit: Felipe Beard MD

## 2020-06-10 ENCOUNTER — TELEPHONE (OUTPATIENT)
Dept: INTERVENTIONAL RADIOLOGY/VASCULAR | Facility: CLINIC | Age: 65
End: 2020-06-10

## 2020-06-10 ENCOUNTER — PATIENT OUTREACH (OUTPATIENT)
Dept: SURGERY | Facility: CLINIC | Age: 65
End: 2020-06-10

## 2020-06-10 DIAGNOSIS — Z11.59 ENCOUNTER FOR SCREENING FOR OTHER VIRAL DISEASES: ICD-10-CM

## 2020-06-10 PROCEDURE — 99207 ZZC NO CHARGE LOS: CPT

## 2020-06-10 PROCEDURE — 99000 SPECIMEN HANDLING OFFICE-LAB: CPT | Performed by: RADIOLOGY

## 2020-06-10 PROCEDURE — U0003 INFECTIOUS AGENT DETECTION BY NUCLEIC ACID (DNA OR RNA); SEVERE ACUTE RESPIRATORY SYNDROME CORONAVIRUS 2 (SARS-COV-2) (CORONAVIRUS DISEASE [COVID-19]), AMPLIFIED PROBE TECHNIQUE, MAKING USE OF HIGH THROUGHPUT TECHNOLOGIES AS DESCRIBED BY CMS-2020-01-R: HCPCS | Mod: 90 | Performed by: RADIOLOGY

## 2020-06-10 NOTE — TELEPHONE ENCOUNTER
Surgical Oncology RN Care Coordination Note:     Called and spoke with wife regarding drainage color change and likely bile noted in the bag. Informed her that its ok and I had Dr. Hale review imaging as well. He would like them to keep the scheduling consult and imaging studies with IR to further assess the drain and leak. All questions answered and they will keep appointment as scheduled.     Carey Rodriguez RN, BSN  Care Coordinator   [phone]

## 2020-06-11 LAB
SARS-COV-2 RNA SPEC QL NAA+PROBE: NOT DETECTED
SPECIMEN SOURCE: NORMAL

## 2020-06-11 NOTE — TELEPHONE ENCOUNTER
Action 06/11/20 3:25 PM - Martine   Action Taken  Pathology received from [facility] and sent to be reviewed with filled out pathology form.

## 2020-06-12 ENCOUNTER — APPOINTMENT (OUTPATIENT)
Dept: INTERVENTIONAL RADIOLOGY/VASCULAR | Facility: CLINIC | Age: 65
End: 2020-06-12
Attending: NURSE PRACTITIONER
Payer: MEDICARE

## 2020-06-12 ENCOUNTER — APPOINTMENT (OUTPATIENT)
Dept: MEDSURG UNIT | Facility: CLINIC | Age: 65
End: 2020-06-12
Attending: NURSE PRACTITIONER
Payer: MEDICARE

## 2020-06-12 ENCOUNTER — HOSPITAL ENCOUNTER (OUTPATIENT)
Dept: CT IMAGING | Facility: CLINIC | Age: 65
End: 2020-06-12
Attending: NURSE PRACTITIONER
Payer: MEDICARE

## 2020-06-12 ENCOUNTER — HOSPITAL ENCOUNTER (OUTPATIENT)
Facility: CLINIC | Age: 65
Discharge: HOME | End: 2020-06-12
Attending: RADIOLOGY | Admitting: RADIOLOGY
Payer: MEDICARE

## 2020-06-12 VITALS
SYSTOLIC BLOOD PRESSURE: 107 MMHG | TEMPERATURE: 98.5 F | DIASTOLIC BLOOD PRESSURE: 68 MMHG | RESPIRATION RATE: 14 BRPM | HEART RATE: 83 BPM | OXYGEN SATURATION: 96 %

## 2020-06-12 DIAGNOSIS — K83.9 BILE LEAK: ICD-10-CM

## 2020-06-12 DIAGNOSIS — C22.1 CHOLANGIOCARCINOMA (H): ICD-10-CM

## 2020-06-12 DIAGNOSIS — K83.9 BILE LEAK: Primary | ICD-10-CM

## 2020-06-12 LAB — GLUCOSE BLDC GLUCOMTR-MCNC: 131 MG/DL (ref 70–99)

## 2020-06-12 PROCEDURE — C1769 GUIDE WIRE: HCPCS

## 2020-06-12 PROCEDURE — 74150 CT ABDOMEN W/O CONTRAST: CPT

## 2020-06-12 PROCEDURE — 25000128 H RX IP 250 OP 636: Performed by: PHYSICIAN ASSISTANT

## 2020-06-12 PROCEDURE — 25000125 ZZHC RX 250: Performed by: STUDENT IN AN ORGANIZED HEALTH CARE EDUCATION/TRAINING PROGRAM

## 2020-06-12 PROCEDURE — C1729 CATH, DRAINAGE: HCPCS

## 2020-06-12 PROCEDURE — 25500064 ZZH RX 255 OP 636: Performed by: RADIOLOGY

## 2020-06-12 PROCEDURE — 82962 GLUCOSE BLOOD TEST: CPT | Mod: GZ

## 2020-06-12 PROCEDURE — 40000166 ZZH STATISTIC PP CARE STAGE 1

## 2020-06-12 PROCEDURE — 25000128 H RX IP 250 OP 636: Performed by: STUDENT IN AN ORGANIZED HEALTH CARE EDUCATION/TRAINING PROGRAM

## 2020-06-12 PROCEDURE — 49423 EXCHANGE DRAINAGE CATHETER: CPT

## 2020-06-12 PROCEDURE — 99152 MOD SED SAME PHYS/QHP 5/>YRS: CPT

## 2020-06-12 PROCEDURE — 27210886 ZZH ACCESSORY CR5

## 2020-06-12 PROCEDURE — 00000346 ZZHCL STATISTIC REVIEW OUTSIDE SLIDES TC 88321: Performed by: INTERNAL MEDICINE

## 2020-06-12 PROCEDURE — 25800030 ZZH RX IP 258 OP 636: Performed by: PHYSICIAN ASSISTANT

## 2020-06-12 RX ORDER — NALOXONE HYDROCHLORIDE 0.4 MG/ML
.1-.4 INJECTION, SOLUTION INTRAMUSCULAR; INTRAVENOUS; SUBCUTANEOUS
Status: DISCONTINUED | OUTPATIENT
Start: 2020-06-12 | End: 2020-06-12 | Stop reason: HOSPADM

## 2020-06-12 RX ORDER — DEXTROSE MONOHYDRATE 25 G/50ML
25-50 INJECTION, SOLUTION INTRAVENOUS
Status: DISCONTINUED | OUTPATIENT
Start: 2020-06-12 | End: 2020-06-12 | Stop reason: HOSPADM

## 2020-06-12 RX ORDER — AMPICILLIN AND SULBACTAM 2; 1 G/1; G/1
3 INJECTION, POWDER, FOR SOLUTION INTRAMUSCULAR; INTRAVENOUS
Status: COMPLETED | OUTPATIENT
Start: 2020-06-12 | End: 2020-06-12

## 2020-06-12 RX ORDER — NICOTINE POLACRILEX 4 MG
15-30 LOZENGE BUCCAL
Status: DISCONTINUED | OUTPATIENT
Start: 2020-06-12 | End: 2020-06-12 | Stop reason: HOSPADM

## 2020-06-12 RX ORDER — SODIUM CHLORIDE 9 MG/ML
INJECTION, SOLUTION INTRAVENOUS CONTINUOUS
Status: DISCONTINUED | OUTPATIENT
Start: 2020-06-12 | End: 2020-06-12 | Stop reason: HOSPADM

## 2020-06-12 RX ORDER — FENTANYL CITRATE 50 UG/ML
25-50 INJECTION, SOLUTION INTRAMUSCULAR; INTRAVENOUS EVERY 5 MIN PRN
Status: DISCONTINUED | OUTPATIENT
Start: 2020-06-12 | End: 2020-06-12 | Stop reason: HOSPADM

## 2020-06-12 RX ORDER — IODIXANOL 320 MG/ML
50 INJECTION, SOLUTION INTRAVASCULAR ONCE
Status: COMPLETED | OUTPATIENT
Start: 2020-06-12 | End: 2020-06-12

## 2020-06-12 RX ORDER — FLUMAZENIL 0.1 MG/ML
0.2 INJECTION, SOLUTION INTRAVENOUS
Status: DISCONTINUED | OUTPATIENT
Start: 2020-06-12 | End: 2020-06-12 | Stop reason: HOSPADM

## 2020-06-12 RX ORDER — LIDOCAINE 40 MG/G
CREAM TOPICAL
Status: DISCONTINUED | OUTPATIENT
Start: 2020-06-12 | End: 2020-06-12 | Stop reason: HOSPADM

## 2020-06-12 RX ADMIN — IODIXANOL 15 ML: 320 INJECTION, SOLUTION INTRAVASCULAR at 12:45

## 2020-06-12 RX ADMIN — SODIUM CHLORIDE: 9 INJECTION, SOLUTION INTRAVENOUS at 11:47

## 2020-06-12 RX ADMIN — AMPICILLIN SODIUM AND SULBACTAM SODIUM 3 G: 2; 1 INJECTION, POWDER, FOR SOLUTION INTRAMUSCULAR; INTRAVENOUS at 11:47

## 2020-06-12 RX ADMIN — MIDAZOLAM 2 MG: 1 INJECTION INTRAMUSCULAR; INTRAVENOUS at 12:50

## 2020-06-12 RX ADMIN — LIDOCAINE HYDROCHLORIDE 7 ML: 10 INJECTION, SOLUTION EPIDURAL; INFILTRATION; INTRACAUDAL; PERINEURAL at 12:51

## 2020-06-12 RX ADMIN — FENTANYL CITRATE 100 MCG: 50 INJECTION, SOLUTION INTRAMUSCULAR; INTRAVENOUS at 12:50

## 2020-06-12 NOTE — PROGRESS NOTES
Pt tolerated oral intake. Discharge instructions reviewed, copy given to pt. Pt tolerated ambulation. Voided. ALIDA oh'juan. Pt discharged home accompanied by wife.

## 2020-06-12 NOTE — DISCHARGE INSTRUCTIONS
"[facility]  Interventional Radiology   Biliary Tube Change      AFTER YOU GO HOME:    Have an adult stay with you for 24 hours.     Drink plenty of fluids and resume your regular diet.    For 24 hours:     Do not drive or operate machinery at home or at work    No alcoholic beverages    Do not make any important legal decisions    No heavy lifting greater than 10 lb until instructed by your physician     Call Your Physician if:    You develop nausea or vomiting.    Your develop hives or rash or unexplained itching    Additional Instructions: Please call for the following problems:    No fluid draining from the tube, check that the tube is not kinked or if stop cock is closed.    Flush with 10 cc's Normal Saline twice daily, empty bag and monitor output daily subtracting amount inserted.    Skin around tube is red, painful or has any drainage.    You have increased pain in your abdomen    Fever greater than 100.5 F and chills    You feel nauseated and  \"just not right\"      Change dressing every 48 hour or when it gets wet    Interventional Radiology Department                    Physician:  Dr. Zaragoza         Date:June 12, 2020  Telephone numbers: [phone]...Monday-Friday 8:00am-4:30pm                                     [phone].. After 4:30 Monday-Friday, Weekends and Holiday. Ask for the Interventional Radiologist on call. Someone is on call 24 hours a day.                                  "

## 2020-06-12 NOTE — PROGRESS NOTES
Pt arrived on 2a post biliary tube change. VSS RA. Dressing c/d/i. No pain. Pt sipping on water, declines food at this time.

## 2020-06-12 NOTE — PROGRESS NOTES
Patient Name: Fuentes Estrada  Medical Record Number: [id number]  Today's Date: 6/12/2020    Procedure: Sinogram  Proceduralist: Dr. Aaron Reis    Procedure Start: 1230  Procedure end: 1250  Sedation medications administered: 2 mg Versed, 100 mcg Fentanyl     Report given to: 2A RN    Other Notes: Pt arrived to IR room 2 from . Consent reviewed. Pt denies any questions or concerns regarding procedure. Pt positioned on left side and monitored per protocol. Pt tolerated procedure without any noted complications. Pt transferred back to .

## 2020-06-12 NOTE — IP AVS SNAPSHOT
MRN:[id number]                      After Visit Summary   6/12/2020    Fuentes Estrada    MRN: [id number]           Visit Information        Department      6/12/2020 10:35 AM [address] [facility]          Review of your medicines      UNREVIEWED medicines. Ask your doctor about these medicines       Dose / Directions   atorvastatin 40 MG tablet  Commonly known as:  LIPITOR      Dose:  40 mg  Take 40 mg by mouth At Bedtime  Refills:  0     clopidogrel 75 MG tablet  Commonly known as:  PLAVIX      Dose:  75 mg  Take 75 mg by mouth At Bedtime  Refills:  0     empagliflozin 10 MG Tabs tablet  Commonly known as:  JARDIANCE      Dose:  10 mg  Take 10 mg by mouth At Bedtime  Refills:  0     enoxaparin ANTICOAGULANT 40 MG/0.4ML syringe  Commonly known as:  LOVENOX  Used for:  Deep vein thrombosis (DVT) prophylaxis prescribed at discharge      Dose:  40 mg  Inject 0.4 mLs (40 mg) Subcutaneous every 24 hours for 28 days  Quantity:  11.2 mL  Refills:  0     fenofibrate 160 MG tablet  Commonly known as:  TRIGLIDE/LOFIBRA      Dose:  160 mg  Take 160 mg by mouth At Bedtime  Refills:  0     furosemide 20 MG tablet  Commonly known as:  LASIX      Dose:  20 mg  Take 20 mg by mouth every evening  Refills:  0     glipiZIDE 10 MG tablet  Commonly known as:  GLUCOTROL      Dose:  10 mg  Take 10 mg by mouth At Bedtime  Refills:  0     insulin glargine 100 UNIT/ML pen  Commonly known as:  LANTUS PEN      Dose:  24 Units  Inject 24 Units Subcutaneous At Bedtime  Refills:  0     iron 325 (65 Fe) MG tablet  Used for:  Iron deficiency anemia due to chronic blood loss      Dose:  1 tablet  Take 1 tablet by mouth 3 times daily (with meals)  Quantity:  180 tablet  Refills:  1     levofloxacin 500 MG tablet  Commonly known as:  LEVAQUIN  Indication:  Infection Within the Abdomen  Used for:  Intraabdominal fluid collection  Ask about: Should I take this medication?      Dose:  500 mg  Take 1 tablet (500 mg) by mouth daily for  3 days  Quantity:  3 tablet  Refills:  0     lisinopril 30 MG tablet  Commonly known as:  ZESTRIL      Dose:  30 mg  Take 30 mg by mouth every evening  Refills:  0     metroNIDAZOLE 500 MG tablet  Commonly known as:  FLAGYL  Indication:  Infection Within the Abdomen  Used for:  Intraabdominal fluid collection  Ask about: Should I take this medication?      Dose:  500 mg  Take 1 tablet (500 mg) by mouth 3 times daily for 3 days  Quantity:  9 tablet  Refills:  0     oxyCODONE 5 MG tablet  Commonly known as:  ROXICODONE  Used for:  Cholangiocarcinoma (H)      Dose:  5-10 mg  Take 1-2 tablets (5-10 mg) by mouth every 4 hours as needed for moderate to severe pain  Quantity:  20 tablet  Refills:  0     polyethylene glycol 17 g packet  Commonly known as:  MIRALAX  Used for:  Cholangiocarcinoma (H)      Dose:  17 g  Take 17 g by mouth daily  Quantity:  14 packet  Refills:  0     sildenafil 20 MG tablet  Commonly known as:  REVATIO      Dose:  20 mg  Take 20 mg by mouth as needed  Refills:  0     sodium chloride (PF) 0.9% PF flush  Used for:  Intraabdominal fluid collection  Ask about: Should I take this medication?      Dose:  10 mL  Irrigate with 10 mLs as directed every 8 hours for 14 days  Quantity:  420 mL  Refills:  0              Protect others around you: Learn how to safely use, store and throw away your medicines at www.disposemymeds.org.       Follow-ups after your visit       Your next 10 appointments already scheduled    Jun 12, 2020  3:00 PM CDT  LAB with SPECIMEN MGMT  [facility], [place], Lab ([facility], [facility]) [address] [id number]   Please do not eat 10-12 hours before your appointment if you are coming in fasting for labs on lipids, cholesterol, or glucose (sugar). Does not apply to pregnant women. Water, tea and black coffee (with nothing added) is okay. Do not drink other fluids, diet soda or gum. If you have concerns about taking  "your medications, please send a message by clicking on Secure Messaging, Message Your Care Team.        Care Instructions       Further instructions from your care team       [facility]  Interventional Radiology   Biliary Tube Change      AFTER YOU GO HOME:    Have an adult stay with you for 24 hours.     Drink plenty of fluids and resume your regular diet.    For 24 hours:     Do not drive or operate machinery at home or at work    No alcoholic beverages    Do not make any important legal decisions    No heavy lifting greater than 10 lb until instructed by your physician     Call Your Physician if:    You develop nausea or vomiting.    Your develop hives or rash or unexplained itching    Additional Instructions: Please call for the following problems:    No fluid draining from the tube, check that the tube is not kinked or if stop cock is closed.    Flush with 10 cc's Normal Saline twice daily, empty bag and monitor output daily subtracting amount inserted.    Skin around tube is red, painful or has any drainage.    You have increased pain in your abdomen    Fever greater than 100.5 F and chills    You feel nauseated and  \"just not right\"      Change dressing every 48 hour or when it gets wet    Interventional Radiology Department                    Physician:  Dr. Zaragoza         Date:June 12, 2020  Telephone numbers: [phone]...Monday-Friday 8:00am-4:30pm                                     [phone].. After 4:30 Monday-Friday, Weekends and Holiday. Ask for the Interventional Radiologist on call. Someone is on call 24 hours a day.                                    Additional Information About Your Visit       [facility] [employer] Information    [facility] gives you secure access to your electronic health record. If you see a primary care provider, you can also send messages to your care team and make appointments. If you have questions, please call your primary care clinic.  If you do not have a primary " care provider, please call [phone] and they will assist you.       Care EveryWhere ID    This is your Care EveryWhere ID. This could be used by other organizations to access your [facility] medical records  [id number]       Your Vitals Were  Most recent update: 6/12/2020  1:17 PM    Blood Pressure   116/68    Pulse   85    Temperature   98.5  F (36.9  C) (Oral)    Respirations   14    Pulse Oximetry   97%          Primary Care Provider    Tolu Noland      Equal Access to Services    [facility] : Hadii aad ku hadasho Soomaali, waaxda luqadaha, qaybta kaalmada adeegyada, waxay idiin hayaan adeeg kharash la'aan . So [facility] [phone].    ATENCIÓN: Si habla español, tiene a kaufman disposición servicios gratuitos de asistencia lingüística. Llame al [phone].    We comply with applicable federal and state civil rights laws, including the [place] Human Rights Act. We do not discriminate on the basis of race, color, creed, Yarsani, national origin, marital status, age, disability, sex, sexual orientation, or gender identity.       Thank you!    Thank you for choosing [facility] for your care. Our goal is always to provide you with excellent care. Hearing back from our patients is one way we can continue to improve our services. Please take a few minutes to complete the written survey that you may receive in the mail after you visit with us. Thank you!            Medication List      ASK your doctor about these medications          Morning Afternoon Evening Bedtime As Needed    atorvastatin 40 MG tablet  Also known as:  LIPITOR  INSTRUCTIONS:  Take 40 mg by mouth At Bedtime                     clopidogrel 75 MG tablet  Also known as:  PLAVIX  INSTRUCTIONS:  Take 75 mg by mouth At Bedtime                     empagliflozin 10 MG Tabs tablet  Also known as:  JARDIANCE  INSTRUCTIONS:  Take 10 mg by mouth At Bedtime                     enoxaparin ANTICOAGULANT 40 MG/0.4ML syringe  Also known as:   LOVENOX  INSTRUCTIONS:  Inject 0.4 mLs (40 mg) Subcutaneous every 24 hours for 28 days                     fenofibrate 160 MG tablet  Also known as:  TRIGLIDE/LOFIBRA  INSTRUCTIONS:  Take 160 mg by mouth At Bedtime                     furosemide 20 MG tablet  Also known as:  LASIX  INSTRUCTIONS:  Take 20 mg by mouth every evening                     glipiZIDE 10 MG tablet  Also known as:  GLUCOTROL  INSTRUCTIONS:  Take 10 mg by mouth At Bedtime                     insulin glargine 100 UNIT/ML pen  Also known as:  LANTUS PEN  INSTRUCTIONS:  Inject 24 Units Subcutaneous At Bedtime  Doctor's comments:  <!--EPICS-->If Lantus is not covered by insurance, may substitute Basaglar at same dose and frequency.  <!--EPICE-->                     iron 325 (65 Fe) MG tablet  INSTRUCTIONS:  Take 1 tablet by mouth 3 times daily (with meals)                     levofloxacin 500 MG tablet  Also known as:  LEVAQUIN  INSTRUCTIONS:  Take 1 tablet (500 mg) by mouth daily for 3 days  Reason for med:  Infection Within the Abdomen  Ask about: Should I take this medication?                     lisinopril 30 MG tablet  Also known as:  ZESTRIL  INSTRUCTIONS:  Take 30 mg by mouth every evening                     metroNIDAZOLE 500 MG tablet  Also known as:  FLAGYL  INSTRUCTIONS:  Take 1 tablet (500 mg) by mouth 3 times daily for 3 days  Reason for med:  Infection Within the Abdomen  Ask about: Should I take this medication?                     oxyCODONE 5 MG tablet  Also known as:  ROXICODONE  INSTRUCTIONS:  Take 1-2 tablets (5-10 mg) by mouth every 4 hours as needed for moderate to severe pain                     polyethylene glycol 17 g packet  Also known as:  MIRALAX  INSTRUCTIONS:  Take 17 g by mouth daily                     sildenafil 20 MG tablet  Also known as:  REVATIO  INSTRUCTIONS:  Take 20 mg by mouth as needed                     sodium chloride (PF) 0.9% PF flush  INSTRUCTIONS:  Irrigate with 10 mLs as directed every 8 hours for  14 days  Doctor's comments:  Dispense as 10 ml sterile syringes for IR drain flushes.  LAST TAKEN:  June 12, 2020 11:47 AM  Ask about: Should I take this medication?

## 2020-06-12 NOTE — PRE-PROCEDURE
GENERAL PRE-PROCEDURE:   Date/Time:  6/12/2020 11:52 AM    Written consent obtained?: Yes    Risks and benefits: Risks, benefits and alternatives were discussed    Consent given by:  Patient  Patient states understanding of procedure being performed: Yes    Patient's understanding of procedure matches consent: Yes    Procedure consent matches procedure scheduled: Yes    Expected level of sedation:  Moderate  Appropriately NPO:  Yes  ASA Class:  Class 2- mild systemic disease, no acute problems, no functional limitations  Mallampati  :  Grade 1- soft palate, uvula, tonsillar pillars, and posterior pharyngeal wall visible  Lungs:  Lungs clear with good breath sounds bilaterally  Heart:  Normal heart sounds and rate  History & Physical reviewed:  History and physical reviewed and no updates needed  Statement of review:  I have reviewed the lab findings, diagnostic data, medications, and the plan for sedation

## 2020-06-12 NOTE — IP AVS SNAPSHOT
[address] [address]                                    After Visit Summary   6/12/2020    Fuentes Estrada    MRN: [id number]           After Visit Summary Signature Page    I have received my discharge instructions, and my questions have been answered. I have discussed any challenges I see with this plan with the nurse or doctor.    ..........................................................................................................................................  Patient/Patient Representative Signature      ..........................................................................................................................................  Patient Representative Print Name and Relationship to Patient    ..................................................               ................................................  Date                                   Time    ..........................................................................................................................................  Reviewed by Signature/Title    ...................................................              ..............................................  Date                                               Time          22EPIC Rev 08/18

## 2020-06-12 NOTE — PROCEDURES
[facility], [place]    Procedure: IR Procedure Note    Date/Time: 6/12/2020 1:23 PM  Performed by: Alex Walker MD  Authorized by: Alex Walker MD     UNIVERSAL PROTOCOL   Site Marked: NA  Prior Images Obtained and Reviewed:  Yes  Required items: Required blood products, implants, devices and special equipment available    Patient identity confirmed:  Verbally with patient, arm band, provided demographic data and hospital-assigned identification number  Patient was reevaluated immediately before administering moderate or deep sedation or anesthesia  Confirmation Checklist:  Patient's identity using two indicators, relevant allergies, procedure was appropriate and matched the consent or emergent situation and correct equipment/implants were available  Time out: Immediately prior to the procedure a time out was called    Universal Protocol: the Joint Commission Universal Protocol was followed    Preparation: Patient was prepped and draped in usual sterile fashion           ANESTHESIA    Anesthesia: Local infiltration  Local Anesthetic:  Lidocaine 1% without epinephrine      SEDATION    Patient Sedated: Yes    Sedation Type:  Moderate (conscious) sedation  Sedation:  Fentanyl and morphine  Vital signs: Vital signs monitored during sedation    See dictated procedure note for full details.  Findings: Sinogram - no evidence of fistula. There is residual collection, continued outputs  12 Tongan skater exchanged for new same tube    Specimens: none    Complications: None    Condition: Stable    Plan: 1 hr bed rest  Continue flushed 10 ml bid-tid  Return for sinogram, ct, possible removal when outputs <10 cc    PROCEDURE   Patient Tolerance:  Patient tolerated the procedure well with no immediate complications    Length of time physician/provider present for 1:1 monitoring during sedation: 10

## 2020-06-15 LAB — COPATH REPORT: NORMAL

## 2020-06-17 ENCOUNTER — PATIENT OUTREACH (OUTPATIENT)
Dept: ONCOLOGY | Facility: CLINIC | Age: 65
End: 2020-06-17

## 2020-06-17 DIAGNOSIS — C24.0 HILAR CHOLANGIOCARCINOMA (H): Primary | ICD-10-CM

## 2020-06-17 DIAGNOSIS — R63.4 WEIGHT LOSS, UNINTENTIONAL: ICD-10-CM

## 2020-06-18 NOTE — TELEPHONE ENCOUNTER
Surgical Oncology RN Care Coordination Note:     Called and spoke with wife regarding patient s nutrition. Patient s wife reports that he is improving from previous reported my chart message. She states that yesterday he was able to get a good amount of food in. And she will continue to keep track of his oral intake. She also stated that she bought the boost high calorie nutrition shakes and he is going to be drinking two of those a day. Informed her I spoke with Dr. Hale regarding the concerns and he agrees that he needs to make sure that he is getting 2 to 3 nutrition shakes and daily in addition to the other oral intake. She will continue to monitor the oral intake and his weight and call us if there are any concerns or if he continues to lose weight. She will also continue to track the drain output ans keep us updated.     Carey Rodriguez RN, BSN  Care Coordinator   [phone]

## 2020-06-23 ENCOUNTER — TELEPHONE (OUTPATIENT)
Dept: ONCOLOGY | Facility: CLINIC | Age: 65
End: 2020-06-23

## 2020-06-23 NOTE — TELEPHONE ENCOUNTER
Spoke with the patient's wife and she said she need to speak with his insurance before she schedule an appointment to make sure it is covered. She said she will call back to schedule.

## 2020-07-02 ENCOUNTER — PRE VISIT (OUTPATIENT)
Dept: ONCOLOGY | Facility: CLINIC | Age: 65
End: 2020-07-02

## 2020-07-02 ENCOUNTER — HOSPITAL ENCOUNTER (OUTPATIENT)
Dept: CT IMAGING | Facility: CLINIC | Age: 65
Discharge: HOME | End: 2020-07-02
Attending: INTERNAL MEDICINE | Admitting: INTERNAL MEDICINE
Payer: MEDICARE

## 2020-07-02 DIAGNOSIS — C24.0 HILAR CHOLANGIOCARCINOMA (H): ICD-10-CM

## 2020-07-02 LAB
ALBUMIN SERPL-MCNC: 2.7 G/DL (ref 3.4–5)
ALP SERPL-CCNC: 258 U/L (ref 40–150)
ALT SERPL W P-5'-P-CCNC: 44 U/L (ref 0–70)
ANION GAP SERPL CALCULATED.3IONS-SCNC: 6 MMOL/L (ref 3–14)
AST SERPL W P-5'-P-CCNC: 60 U/L (ref 0–45)
BASOPHILS # BLD AUTO: 0 10E9/L (ref 0–0.2)
BASOPHILS NFR BLD AUTO: 0.5 %
BILIRUB SERPL-MCNC: 1.3 MG/DL (ref 0.2–1.3)
BUN SERPL-MCNC: 8 MG/DL (ref 7–30)
CALCIUM SERPL-MCNC: 8.8 MG/DL (ref 8.5–10.1)
CHLORIDE SERPL-SCNC: 99 MMOL/L (ref 94–109)
CO2 SERPL-SCNC: 28 MMOL/L (ref 20–32)
CREAT SERPL-MCNC: 0.87 MG/DL (ref 0.66–1.25)
DIFFERENTIAL METHOD BLD: NORMAL
EOSINOPHIL NFR BLD AUTO: 2.5 %
ERYTHROCYTE [DISTWIDTH] IN BLOOD BY AUTOMATED COUNT: 14 % (ref 10–15)
GFR SERPL CREATININE-BSD FRML MDRD: 89 ML/MIN/{1.73_M2}
GLUCOSE SERPL-MCNC: 277 MG/DL (ref 70–99)
HCT VFR BLD AUTO: 41.2 % (ref 40–53)
HGB BLD-MCNC: 13.4 G/DL (ref 13.3–17.7)
IMM GRANULOCYTES # BLD: 0 10E9/L (ref 0–0.4)
IMM GRANULOCYTES NFR BLD: 0.4 %
LYMPHOCYTES # BLD AUTO: 2.7 10E9/L (ref 0.8–5.3)
LYMPHOCYTES NFR BLD AUTO: 36 %
MCH RBC QN AUTO: 27.7 PG (ref 26.5–33)
MCHC RBC AUTO-ENTMCNC: 32.5 G/DL (ref 31.5–36.5)
MCV RBC AUTO: 85 FL (ref 78–100)
MONOCYTES # BLD AUTO: 0.6 10E9/L (ref 0–1.3)
MONOCYTES NFR BLD AUTO: 7.5 %
NEUTROPHILS # BLD AUTO: 4 10E9/L (ref 1.6–8.3)
NEUTROPHILS NFR BLD AUTO: 53.1 %
NRBC # BLD AUTO: 0 10*3/UL
NRBC BLD AUTO-RTO: 0 /100
PLATELET # BLD AUTO: 297 10E9/L (ref 150–450)
POTASSIUM SERPL-SCNC: 4 MMOL/L (ref 3.4–5.3)
PROT SERPL-MCNC: 7.7 G/DL (ref 6.8–8.8)
RBC # BLD AUTO: 4.84 10E12/L (ref 4.4–5.9)
SODIUM SERPL-SCNC: 133 MMOL/L (ref 133–144)
WBC # BLD AUTO: 7.5 10E9/L (ref 4–11)

## 2020-07-02 PROCEDURE — 71260 CT THORAX DX C+: CPT

## 2020-07-02 PROCEDURE — 36415 COLL VENOUS BLD VENIPUNCTURE: CPT | Performed by: INTERNAL MEDICINE

## 2020-07-02 PROCEDURE — 25000128 H RX IP 250 OP 636: Performed by: INTERNAL MEDICINE

## 2020-07-02 PROCEDURE — 80053 COMPREHEN METABOLIC PANEL: CPT | Performed by: INTERNAL MEDICINE

## 2020-07-02 PROCEDURE — 85025 COMPLETE CBC W/AUTO DIFF WBC: CPT | Performed by: INTERNAL MEDICINE

## 2020-07-02 PROCEDURE — 86301 IMMUNOASSAY TUMOR CA 19-9: CPT | Mod: 90 | Performed by: INTERNAL MEDICINE

## 2020-07-02 PROCEDURE — 99000 SPECIMEN HANDLING OFFICE-LAB: CPT | Performed by: INTERNAL MEDICINE

## 2020-07-02 PROCEDURE — 25000125 ZZHC RX 250: Performed by: INTERNAL MEDICINE

## 2020-07-02 RX ORDER — IOPAMIDOL 755 MG/ML
500 INJECTION, SOLUTION INTRAVASCULAR ONCE
Status: COMPLETED | OUTPATIENT
Start: 2020-07-02 | End: 2020-07-02

## 2020-07-02 RX ADMIN — IOPAMIDOL 95 ML: 755 INJECTION, SOLUTION INTRAVENOUS at 10:56

## 2020-07-02 RX ADMIN — SODIUM CHLORIDE 60 ML: 9 INJECTION, SOLUTION INTRAVENOUS at 10:56

## 2020-07-03 LAB — CANCER AG19-9 SERPL-ACNC: 203 U/ML (ref 0–37)

## 2020-07-06 ENCOUNTER — VIRTUAL VISIT (OUTPATIENT)
Dept: ONCOLOGY | Facility: CLINIC | Age: 65
End: 2020-07-06
Attending: INTERNAL MEDICINE
Payer: COMMERCIAL

## 2020-07-06 ENCOUNTER — MYC MEDICAL ADVICE (OUTPATIENT)
Dept: SURGERY | Facility: CLINIC | Age: 65
End: 2020-07-06

## 2020-07-06 DIAGNOSIS — C24.0 HILAR CHOLANGIOCARCINOMA (H): Primary | ICD-10-CM

## 2020-07-06 PROCEDURE — 99214 OFFICE O/P EST MOD 30 MIN: CPT | Mod: 95 | Performed by: INTERNAL MEDICINE

## 2020-07-06 PROCEDURE — 40001009 ZZH VIDEO/TELEPHONE VISIT; NO CHARGE

## 2020-07-06 NOTE — LETTER
7/6/2020         RE: Fuentes Estrada  [address] [facility] [place] [address]        Dear Colleague,    Thank you for referring your patient, Fuentes Estrada, to the [facility]. Please see a copy of my visit note below.    Fuentes Estrada is a 65 year old male who is being evaluated via a billable video visit.          Secondary Video Option (Doximity), send text message to:  [phone]    I have reviewed and updated the patient's allergies and medication list. Patient was asked if they had any patient reported vital signs to present, if yes, please see documented vitals.  Patient was also asked for their current weight and height, if presented, documented in vitals.    Concerns: Patient would like to follow-up on the drain tube that Dr. Hale had placed. This has been ongoing for 7+ weeks.       Refills: None      César Danielle, EMT    Provider Note:    I am seeing Mr. Estrada today in follow-up of resected cholangiocarcinoma.    He is now about 2 months out from resection of what initially appeared to be a hilar cholangiocarcinoma but in fact may have been a peripheral cholangiocarcinoma growing into or compressing the common bile duct.  He had all of his disease resected as best we can tell with negative margins and one positive lymph node.  His postoperative course was complicated by an abscess which has been drained.  He currently gets about 20 cc of drainage per day.  He is finding the drain to be increasingly uncomfortable and it is interfering with his sleep at night.  He says overall he still not back to his usual self, particularly and that he has some days where he is gets fatigued very easily.  He is eating fairly well at this point.  He has not had any problems with his bowels.  He has no ongoing fevers chills or sweats.  The remainder of a 10 point review of systems is otherwise negative.    GENERAL: Healthy, alert and no distress  EYES: Eyes grossly normal to inspection.  No  discharge or erythema, or obvious scleral/conjunctival abnormalities.  RESP: No audible wheeze, cough, or visible cyanosis.  No visible retractions or increased work of breathing.    SKIN: Visible skin clear. No significant rash, abnormal pigmentation or lesions.  NEURO: Cranial nerves grossly intact.  Mentation and speech appropriate for age.  PSYCH: Mentation appears normal, affect normal/bright, judgement and insight intact, normal speech and appearance well-groomed.    I reviewed his scans and went over the results with him and his family.  The fluid collection in the resection bed is nearly gone but there still is a little bit of fluid there is right pleural effusion is improved but still with a little bit of compressive atelectasis some small lung nodules of uncertain significance do not appear to have changed.  There is nothing readily apparent within his resection bed or liver to suggest tumor growth there.  His lab work shows normal electrolytes and renal function.  His albumin is improved from 2.1 now up to 2.7.  His liver enzymes are nearly normal with mild residual elevation of his alkaline phosphatase.  His blood counts are unremarkable.  His CA-19-9 level is improved but still elevated at about 200.    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  I reviewed our prior discussion about adjuvant therapy and my recommendation for using adjuvant capecitabine.  He has now recovered enough from surgery I think that we can start and I would not want to wait much longer.  I went over the capecitabine in some detail reviewing the expected toxicities primarily mucocutaneous and low blood counts and how we manage them.  He will get some more formal chemotherapy teaching before starting.  We will be touching base with surgery to see that he can get his drain pulled at this point.  He has not  yet met with genetic counseling and will try and get that set up sooner rather than later.    At the end of this long discussion he and his family had had all their questions answered were eager to proceed proceed with the outlined therapy.  We will begin the Xeloda soon as we have his insurance review done and then plan on seeing him before the second cycle for toxicity assessment with further visits as needed for toxicity management.  We will plan on a surveillance visit in approximately 3 months and then every 3 months going forward until 2 years and then every 6 months until 5 years.      Video-Visit Details    Type of service:  Video Visit    Video Start Time: 1:00  Video End Time: 1:30    Originating Location (pt. Location): Home    Distant Location (provider location):  [facility]     Platform used for Video Visit: Felipe Beard MD

## 2020-07-06 NOTE — PROGRESS NOTES
"Fuentes Estrada is a 65 year old male who is being evaluated via a billable video visit.      The patient has been notified of following:     \"This video visit will be conducted via a call between you and your physician/provider. We have found that certain health care needs can be provided without the need for an in-person physical exam.  This service lets us provide the care you need with a video conversation.  If a prescription is necessary we can send it directly to your pharmacy.  If lab work is needed we can place an order for that and you can then stop by our lab to have the test done at a later time.    Video visits are billed at different rates depending on your insurance coverage.  Please reach out to your insurance provider with any questions.    If during the course of the call the physician/provider feels a video visit is not appropriate, you will not be charged for this service.\"    Patient has given verbal consent for Video visit? Yes    How would you like to obtain your AVS? [facility]     Patient would like the video invitation sent by:     Will anyone else be joining your video visit? No         Secondary Video Option (Doximity), send text message to:  [phone]    I have reviewed and updated the patient's allergies and medication list. Patient was asked if they had any patient reported vital signs to present, if yes, please see documented vitals.  Patient was also asked for their current weight and height, if presented, documented in vitals.    Concerns: Patient would like to follow-up on the drain tube that Dr. Hale had placed. This has been ongoing for 7+ weeks.       Refills: None      César Danielle, EMT    Provider Note:    I am seeing Mr. Estrada today in follow-up of resected cholangiocarcinoma.    He is now about 2 months out from resection of what initially appeared to be a hilar cholangiocarcinoma but in fact may have been a peripheral cholangiocarcinoma growing into or compressing the " common bile duct.  He had all of his disease resected as best we can tell with negative margins and one positive lymph node.  His postoperative course was complicated by an abscess which has been drained.  He currently gets about 20 cc of drainage per day.  He is finding the drain to be increasingly uncomfortable and it is interfering with his sleep at night.  He says overall he still not back to his usual self, particularly and that he has some days where he is gets fatigued very easily.  He is eating fairly well at this point.  He has not had any problems with his bowels.  He has no ongoing fevers chills or sweats.  The remainder of a 10 point review of systems is otherwise negative.    GENERAL: Healthy, alert and no distress  EYES: Eyes grossly normal to inspection.  No discharge or erythema, or obvious scleral/conjunctival abnormalities.  RESP: No audible wheeze, cough, or visible cyanosis.  No visible retractions or increased work of breathing.    SKIN: Visible skin clear. No significant rash, abnormal pigmentation or lesions.  NEURO: Cranial nerves grossly intact.  Mentation and speech appropriate for age.  PSYCH: Mentation appears normal, affect normal/bright, judgement and insight intact, normal speech and appearance well-groomed.    I reviewed his scans and went over the results with him and his family.  The fluid collection in the resection bed is nearly gone but there still is a little bit of fluid there is right pleural effusion is improved but still with a little bit of compressive atelectasis some small lung nodules of uncertain significance do not appear to have changed.  There is nothing readily apparent within his resection bed or liver to suggest tumor growth there.  His lab work shows normal electrolytes and renal function.  His albumin is improved from 2.1 now up to 2.7.  His liver enzymes are nearly normal with mild residual elevation of his alkaline phosphatase.  His blood counts are  unremarkable.  His CA-19-9 level is improved but still elevated at about 200.    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  I reviewed our prior discussion about adjuvant therapy and my recommendation for using adjuvant capecitabine.  He has now recovered enough from surgery I think that we can start and I would not want to wait much longer.  I went over the capecitabine in some detail reviewing the expected toxicities primarily mucocutaneous and low blood counts and how we manage them.  He will get some more formal chemotherapy teaching before starting.  We will be touching base with surgery to see that he can get his drain pulled at this point.  He has not yet met with genetic counseling and will try and get that set up sooner rather than later.    At the end of this long discussion he and his family had had all their questions answered were eager to proceed proceed with the outlined therapy.  We will begin the Xeloda soon as we have his insurance review done and then plan on seeing him before the second cycle for toxicity assessment with further visits as needed for toxicity management.  We will plan on a surveillance visit in approximately 3 months and then every 3 months going forward until 2 years and then every 6 months until 5 years.      Video-Visit Details    Type of service:  Video Visit    Video Start Time: 1:00  Video End Time: 1:30    Originating Location (pt. Location): Home    Distant Location (provider location):  [facility]     Platform used for Video Visit: Felipe Beard MD

## 2020-07-09 ENCOUNTER — TELEPHONE (OUTPATIENT)
Dept: ONCOLOGY | Facility: CLINIC | Age: 65
End: 2020-07-09

## 2020-07-09 DIAGNOSIS — C24.0 HILAR CHOLANGIOCARCINOMA (H): Primary | ICD-10-CM

## 2020-07-09 NOTE — TELEPHONE ENCOUNTER
Patient has Medicare Advantage through [employer] [place]. Capecitabine not covered through Medicare Part D. Patient will pay $247.65 for 112 tabs of Capecitabine which is routed through his Part B benefits (20% of total cost of medication.) No grants/funds are currently available to use in conjunction with Medicare Part B for his Dx. Only other financial assistance available is to apply patient for free drug program for Xeloda through the [employer] Access to Care Foundation.     Writer left voicemail requesting call back to discuss.     Joaquin Lee  [facility]   Oncology Pharmacy Liaison  [email or web address]   [phone] (phone)   [phone] (fax)

## 2020-07-09 NOTE — TELEPHONE ENCOUNTER
Oncology/Surgical Oncology Referral Request:     Specialty Requested: Medical Oncology     Referring Provider: Dr Aydin Beard MD    Referring Clinic/Organization: [facility]    Records location: [facility]     Requested Provider (if specified): Not Specified

## 2020-07-10 ENCOUNTER — TELEPHONE (OUTPATIENT)
Dept: ONCOLOGY | Facility: CLINIC | Age: 65
End: 2020-07-10

## 2020-07-10 DIAGNOSIS — Z11.59 ENCOUNTER FOR SCREENING FOR OTHER VIRAL DISEASES: Primary | ICD-10-CM

## 2020-07-10 NOTE — TELEPHONE ENCOUNTER
"Patient has $247.65 (20%) co-pay through Medicare Advantage Plan through [facility] [place]. Writer called and spoke to Robyn, patient's wife, to discuss. Robyn stated \"Fuentes has supplement insurance through [employer]. Would that cover any of the co-pay?\" Writer obtained customer service number off of Fuentes's [employer] card and spoke to [employer] rep regarding patient's coverage. Rep stated that patient's [employer] coverage is Part D only, and does not constitute a \"supplemental\" coverage. Therefore, [employer] coverage cannot be used.    Writer called Robyn back and informed her of this information. Writer then explained the [employer] Access to Care Foundation program and gauged interest in applying. Robyn stated that [employer] and her yearly income is $50k. Writer explained that [facility]'s financial parameters are $100k/annually, and that their financial standing was a good indicator of likely approval.    Writer stated that writer would complete application and have it routed to Dr. Beard. Writer gave an expectation of application being submitted Monday or Tuesday, 7/13 or 7/14, to allow Kisha time to sign. Robyn understood this timeline. Writer gave Robyn writer's direct line should she have any questions/concerns in the meantime.    Robyn thanked writer \"for doing the legwork\" and verbally expressed understanding of our discussion.    [facility]  [facility] [place] [facility]   Oncology Pharmacy Liaison  [email or web address]   [phone] (phone)   [phone] (fax)      "

## 2020-07-11 DIAGNOSIS — Z11.59 ENCOUNTER FOR SCREENING FOR OTHER VIRAL DISEASES: ICD-10-CM

## 2020-07-11 PROCEDURE — U0003 INFECTIOUS AGENT DETECTION BY NUCLEIC ACID (DNA OR RNA); SEVERE ACUTE RESPIRATORY SYNDROME CORONAVIRUS 2 (SARS-COV-2) (CORONAVIRUS DISEASE [COVID-19]), AMPLIFIED PROBE TECHNIQUE, MAKING USE OF HIGH THROUGHPUT TECHNOLOGIES AS DESCRIBED BY CMS-2020-01-R: HCPCS | Performed by: FAMILY MEDICINE

## 2020-07-12 LAB
SARS-COV-2 PCR COMMENT: NORMAL
SARS-COV-2 RNA SPEC QL NAA+PROBE: NEGATIVE
SARS-COV-2 RNA SPEC QL NAA+PROBE: NORMAL
SPECIMEN SOURCE: NORMAL
SPECIMEN SOURCE: NORMAL

## 2020-07-14 ENCOUNTER — MYC MEDICAL ADVICE (OUTPATIENT)
Dept: SURGERY | Facility: CLINIC | Age: 65
End: 2020-07-14

## 2020-07-14 ENCOUNTER — ANCILLARY PROCEDURE (OUTPATIENT)
Dept: GENERAL RADIOLOGY | Facility: CLINIC | Age: 65
End: 2020-07-14
Attending: SURGERY
Payer: COMMERCIAL

## 2020-07-14 DIAGNOSIS — C24.0 HILAR CHOLANGIOCARCINOMA (H): ICD-10-CM

## 2020-07-14 DIAGNOSIS — C22.1 CHOLANGIOCARCINOMA (H): Primary | ICD-10-CM

## 2020-07-14 RX ORDER — IOPAMIDOL 510 MG/ML
150 INJECTION, SOLUTION INTRAVASCULAR ONCE
Status: COMPLETED | OUTPATIENT
Start: 2020-07-14 | End: 2020-07-14

## 2020-07-14 RX ADMIN — IOPAMIDOL 50 ML: 510 INJECTION, SOLUTION INTRAVASCULAR at 14:07

## 2020-07-14 NOTE — PROGRESS NOTES
Interventional Radiology Brief Post Procedure Note    Procedure: Sinogram.    Proceduralist: Lakhwinder Salazar [facility], JULES    Assistant: None    Time Out: Prior to the start of the procedure and with procedural staff participation, I verbally confirmed the patient s identity using two indicators, relevant allergies, that the procedure was appropriate and matched the consent or emergent situation, and that the correct equipment/implants were available. Immediately prior to starting the procedure I conducted the Time Out with the procedural staff and re-confirmed the patient s name, procedure, and site/side. (The Joint Commission universal protocol was followed.)  Yes    Medications   Medication Event Details Admin User Admin Time       Sedation: None.    Findings: Reports net daily output of 20 ml. Denies fever, pain, chills, N/V, or tremor. Sinogram reveals small residual collection, of approximately 5 ml in volume, with communication to small bowel. It is unclear if this communication is via biliary duct or inappropriate fistulization. Drain capped.    Estimated Blood Loss: None    Fluoroscopy Time:  0.5 minute(s)    SPECIMENS: None    Complications: 1. None     Condition: Stable    Plan: Initiate 10 day capping trial. Will discuss with surgery. Follow up per surgery/IR plan.    Comments: See dictated procedure note for full details.    Lakhwinder Salazar PA-C

## 2020-07-16 ENCOUNTER — PRE VISIT (OUTPATIENT)
Dept: ONCOLOGY | Facility: CLINIC | Age: 65
End: 2020-07-16

## 2020-07-17 ENCOUNTER — PATIENT OUTREACH (OUTPATIENT)
Dept: SURGERY | Facility: CLINIC | Age: 65
End: 2020-07-17

## 2020-07-17 ENCOUNTER — MYC MEDICAL ADVICE (OUTPATIENT)
Dept: SURGERY | Facility: CLINIC | Age: 65
End: 2020-07-17

## 2020-07-17 DIAGNOSIS — Z11.59 ENCOUNTER FOR SCREENING FOR OTHER VIRAL DISEASES: Primary | ICD-10-CM

## 2020-07-17 NOTE — TELEPHONE ENCOUNTER
Surgical Oncology RN Care Coordination Note:     Received multiple calls from patients wife regarding drain output after uncapping as directed by Dr. Hale this am. She report he also had a fever of 100.6 but otherwise is feeling well. Reports some exhaustion but that has been continued and apparently is his normal when running a fever.     Informed her that I have forwarded the pictures to Dr. Hale regarding drain output and that I will wait for further direction from him to see if we need to do anything different. At this time advised them to keep the drain attached to the bag until myself or Dr. Hale calls them back today.     They agreed with plan and will await our call.     Carey Rodriguez, RN, BSN  Care Coordinator   [phone]

## 2020-07-20 ENCOUNTER — MYC MEDICAL ADVICE (OUTPATIENT)
Dept: ONCOLOGY | Facility: CLINIC | Age: 65
End: 2020-07-20

## 2020-07-21 ENCOUNTER — TELEPHONE (OUTPATIENT)
Dept: ONCOLOGY | Facility: CLINIC | Age: 65
End: 2020-07-21

## 2020-07-21 NOTE — TELEPHONE ENCOUNTER
Patient's wife, Robyn, called on 7/20 and left a voicemail requesting update on "[employer] Access to Care Foundation application. Writer called Robyn back today, 7/21, at 9:30am. Writer informed Robyn that the application has not yet been submitted as we are still waiting for the application to be signed by Dr. Beard and returned. Robyn verbally understood that writer would contact her once the application is submitted.    Joaquin Lee  [facility]   Oncology Pharmacy Liaison  [email or web address]   [phone] (phone)   [phone] (fax)

## 2020-07-22 DIAGNOSIS — Z11.59 ENCOUNTER FOR SCREENING FOR OTHER VIRAL DISEASES: ICD-10-CM

## 2020-07-22 PROCEDURE — U0003 INFECTIOUS AGENT DETECTION BY NUCLEIC ACID (DNA OR RNA); SEVERE ACUTE RESPIRATORY SYNDROME CORONAVIRUS 2 (SARS-COV-2) (CORONAVIRUS DISEASE [COVID-19]), AMPLIFIED PROBE TECHNIQUE, MAKING USE OF HIGH THROUGHPUT TECHNOLOGIES AS DESCRIBED BY CMS-2020-01-R: HCPCS | Performed by: PHYSICIAN ASSISTANT

## 2020-07-23 DIAGNOSIS — Z01.812 PRE-OPERATIVE LABORATORY EXAMINATION: Primary | ICD-10-CM

## 2020-07-23 LAB
SARS-COV-2 RNA SPEC QL NAA+PROBE: NOT DETECTED
SPECIMEN SOURCE: NORMAL

## 2020-07-24 ENCOUNTER — ANCILLARY PROCEDURE (OUTPATIENT)
Dept: GENERAL RADIOLOGY | Facility: CLINIC | Age: 65
End: 2020-07-24
Attending: PHYSICIAN ASSISTANT
Payer: COMMERCIAL

## 2020-07-24 ENCOUNTER — ANCILLARY PROCEDURE (OUTPATIENT)
Dept: CT IMAGING | Facility: CLINIC | Age: 65
End: 2020-07-24
Attending: PHYSICIAN ASSISTANT
Payer: COMMERCIAL

## 2020-07-24 DIAGNOSIS — C22.1 CHOLANGIOCARCINOMA (H): ICD-10-CM

## 2020-07-24 DIAGNOSIS — K83.9 BILE LEAK: ICD-10-CM

## 2020-07-24 DIAGNOSIS — Z11.59 ENCOUNTER FOR SCREENING FOR OTHER VIRAL DISEASES: Primary | ICD-10-CM

## 2020-07-24 DIAGNOSIS — C22.1 CHOLANGIOCARCINOMA (H): Primary | ICD-10-CM

## 2020-07-24 NOTE — PROGRESS NOTES
Fuentes Estrada  1955  [id number]    Subjective: Mr Estrada is a 65 year old man with a history of cholangiocarcinoma status post partial hepatectomy. He has a perihepatic pigtail drain in place, and was started on a 10 capping trial at last visit. He presents today with his wife for reassessment and possible drain removal.    Patient's wife reports that during the past 10 days the patient experienced fevers on at least 2 occasions up to 100.8F. They responded with Tylenol and uncapped and drained 30-40 ml of fluid. Over the past 3-4 days the uncapped the drain and obtained a few drops of fluid.    Objective:  There were no vitals taken for this visit.     PE:  General: alert, responding appropriately to questions, non-distressed  Resp: non labored    Imaging: Reviewed same day CT abdomen. Drain is stable in position. No enlarging fluid accumulations. Please see radiology report for full details.    Assessment:   Given the reported fevers and drain output during the capping trial we recommend leaving the drain in place for an additional 10 days.    Plan:  -follow up visit in 10 days for repeat CT of the abdomen with IV contrast.   -possible sinogram, pending CT results  -possible drain removal    Assessment and plan discussed with Dr. Hale by Dr. Noemi Talbert, DO  Radiology R4

## 2020-07-30 ENCOUNTER — TELEPHONE (OUTPATIENT)
Dept: RADIOLOGY | Facility: CLINIC | Age: 65
End: 2020-07-30

## 2020-07-31 DIAGNOSIS — Z11.59 ENCOUNTER FOR SCREENING FOR OTHER VIRAL DISEASES: ICD-10-CM

## 2020-07-31 PROCEDURE — U0003 INFECTIOUS AGENT DETECTION BY NUCLEIC ACID (DNA OR RNA); SEVERE ACUTE RESPIRATORY SYNDROME CORONAVIRUS 2 (SARS-COV-2) (CORONAVIRUS DISEASE [COVID-19]), AMPLIFIED PROBE TECHNIQUE, MAKING USE OF HIGH THROUGHPUT TECHNOLOGIES AS DESCRIBED BY CMS-2020-01-R: HCPCS | Performed by: NURSE PRACTITIONER

## 2020-08-01 LAB
SARS-COV-2 RNA SPEC QL NAA+PROBE: NOT DETECTED
SPECIMEN SOURCE: NORMAL

## 2020-08-03 NOTE — PROGRESS NOTES
Interventional Radiology Brief Post Procedure Note    Procedure: Sinogram    Proceduralist: Marcella Dewey PA-C    Assistant: None    Time Out: Prior to the start of the procedure and with procedural staff participation, I verbally confirmed the patient s identity using two indicators, relevant allergies, that the procedure was appropriate and matched the consent or emergent situation, and that the correct equipment/implants were available. Immediately prior to starting the procedure I conducted the Time Out with the procedural staff and re-confirmed the patient s name, procedure, and site/side. (The Joint Commission universal protocol was followed.)  Yes    Medications   Medication Event Details Admin User Admin Time       Sedation: None    Findings: Sinogram of right flank drain showed small cavity with thin fistulous communication to small bowel. Contrast also leaking at the skin with injection. Connected to gravity drainage.    Estimated Blood Loss: None    Fluoroscopy Time:  minute(s)    SPECIMENS: None    Complications: 1. None     Condition: Stable    Plan: Follow up per primary team.     Comments: See dictated procedure note for full details.    Marcella Dewey PA-C

## 2020-08-03 NOTE — TELEPHONE ENCOUNTER
I called and spoke with Bri.  Pt had called on call IR physician 7/30 and was told to uncap his drain.  He was experiencing fevers.  She gave him tylenol as requested, uncapped.  It drained 80cc.  She states that it has put out 30 to 45 ml per day since.  I have spoken with Dr Benavidez IR fellow with update.  Plan is NOT to have CT scan scheduled for today, but to keep the sinogram procedure to assess fistula.  OK for patient to eat as he has been NPO for CT scan.  Dr Purcell to review and plan next steps.  I will get patient scheduled for clinic consult with Dr Purcell to discuss possible interventions per her direction.  DANIELA Dumont, RN, BSN  Interventional Radiology Nurse Coordinator   Phone:  [phone]

## 2020-08-04 NOTE — TELEPHONE ENCOUNTER
"Patient's wife, Robyn, called this writer to discuss Xeloda [employer] Access to [facility] Foundation application progress. Writer informed Robyn that the application had only been returned this morning, 8/4/2020. Writer apologized to Robyn for the long turnaround time, and that further discussion would take place to ensure that these types of requests are more streamlined and expedient. Robyn stated that she and [facility] wanted [facility] to start therapy right away as \"we've been waiting for long enough.\"     Robyn requested further information regarding an initial fill of Xeloda and how delivery would work if [facility] is approved for [facility]. Writer explained that the first prescription could be released to [facility] at any time and can be delivered to their doorstep within a day or two. Writer continued and stated that if [facility] is approved for [facility], the medication will ship directly from [facility]'s partner pharmacy. Robyn verbally expressed understanding, and agreed that they would like to have the therapy started by fulfilling order through [facility] while [employer] processes their application.     Writer explained that SP would reach out to coordinate delivery once the medication is filled/ready. Robyn verbally expressed understanding. Writer reiterated that patient/Robyn could contact this writer with any questions/concerns in the meantime, and apologized again for the delay in application submission.    [facility]   Oncology Pharmacy Liaison  [email or web address]   [phone] (phone)   [phone] (fax)      "

## 2020-08-04 NOTE — TELEPHONE ENCOUNTER
Called to speak to Sarahi. Explained CT findings. I am concerned for recurrent tumor. May also be reason for failure to heal bile leak and prolonged need for drainage.     Have reached out to IR and GI to arrange biopsy asap.    Both voiced understanding.    Will have schedulers contact them as soon as we have definitive biopsy plan.

## 2020-08-04 NOTE — TELEPHONE ENCOUNTER
Called and had long conversation with Robyn regarding CT findings. Voiced concern for recurrent tumor.    I will reach out to GI and IR to determine best approach for biopsy of LN/peritoneal thickening.     Will plan for conference call with Fuentes later today.

## 2020-08-05 NOTE — NURSING NOTE
Chief Complaint   Patient presents with     RECHECK     Patient being seen for follow up.        There were no vitals taken for this visit.    Melisa Hdz CMA  August 5, 2020

## 2020-08-05 NOTE — TELEPHONE ENCOUNTER
Surgical Oncology RN Care Coordination Note:     Called and spoke with patients wife regarding biopsy plans and that I had not heard from Dr. Purcell officially if she feels she would be able to biopsy any thing noted on scan. Informed her that if she is not able to that the GI team did review and we can proceed with arranging an EUS procedure for biopsy ASAP. She verbalized understanding and is aware we will follow up post consult regarding biopsy plans.     Carey Rodriguez RN, BSN  Care Coordinator   [phone]

## 2020-08-05 NOTE — PROGRESS NOTES
"Fuentes Estrada is a 65 year old male who is being evaluated via a billable video visit.      The patient has been notified of following:     \"This video visit will be conducted via a call between you and your physician/provider. We have found that certain health care needs can be provided without the need for an in-person physical exam.  This service lets us provide the care you need with a video conversation.  If a prescription is necessary we can send it directly to your pharmacy.  If lab work is needed we can place an order for that and you can then stop by our lab to have the test done at a later time.    Video visits are billed at different rates depending on your insurance coverage.  Please reach out to your insurance provider with any questions.    If during the course of the call the physician/provider feels a video visit is not appropriate, you will not be charged for this service.\"    Patient has given verbal consent for Video visit? Yes  How would you like to obtain your AVS? MyChart  If you are dropped from the video visit, the video invite should be resent to: Text to cell phone: [phone]  Will anyone else be joining your video visit? No        Video-Visit Details    Type of service:  Video Visit    Distant Location (provider location):  [facility]     Platform used for Video Visit: Felipe Hdz CMA        "

## 2020-08-05 NOTE — PROGRESS NOTES
INTERVENTIONAL RADIOLOGY CONSULTATION    Name: Fuentes Estrada  Age: 65 year old   Referring Physician: Dr. Hale  REASON FOR REFERRAL: Abscess     HPI:   This video visit was conducted with D[employer].  Participants in the video visit include Mr. Estrada, his wife, and his daughter.    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     Postoperative, he developed a collection along the posterior surgical site.  He underwent percutaneous drain placement.  Initial sinogram showed communication with the biliary tree, although the specific location of communication was not well delineated.      His drain remained in place, the output gradually declined, and the drain was ultimately capped.  However, he is now failed to capping trials, as he developed leakage of fluid around the drain site, which is yellow/green by patient description.  He also developed a fever.  Dr. Hale is asked me to manage this drain and consider options to accelerate cavity resolution and drain removal.  At this point, he has undergone a standard drain catheter therapy, but again has failed to pass capping trials.  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  Size of communication not yet determined.  Since uncapping his drain, he has been well, fever free.  Steady daily output in the drainage bag shows a dark yellow to green output consistent with bile.    Unfortunately on his CT performed last week, he has enlarging mesenteric lymph nodes as well as an enlarging soft tissue nodule adjacent to his ascending colon concerning for metastatic disease.  A biopsy has been arranged with Dr. Baumann as percutaneous sampling of the mesenteric nodes is not safe and sampling of the nodule adjacent to right colon will be a challenge  Given close proximity to colon.    PAST MEDICAL HISTORY:   Past  Medical History:   Diagnosis Date     Cerebrovascular accident (CVA) (H) 12/2010     Cholangiocarcinoma (H)      Essential hypertension, benign     Hypertension, Benign     Nonspecific abnormal results of liver function study     Hx of elevated LFT's       PAST SURGICAL HISTORY:   Past Surgical History:   Procedure Laterality Date     ENDOSCOPIC RETROGRADE CHOLANGIOPANCREATOGRAM N/A 3/31/2020    Procedure: ENDOSCOPIC RETROGRADE CHOLANGIOPANCREATOGRAPHY, WITH with biliary sphincterotomy, biopsies and brushings, biliary stent placement;  Surgeon: Win Strauss MD;  [facility]     ENDOSCOPIC RETROGRADE CHOLANGIOPANCREATOGRAM N/A 4/6/2020    Procedure: ENDOSCOPIC RETROGRADE CHOLANGIOPANCREATOGRAPHY;  Surgeon: Win Strauss MD;  [facility]     ENDOSCOPIC RETROGRADE CHOLANGIOPANCREATOGRAM WITH SPYGLASS  4/16/2020    Procedure: ENDOSCOPIC RETROGRADE CHOLANGIOPANCREATOGRAPHY, WITH DIRECT DUCT VISUALIZATION, USING PANCREATICOBILIARY FIBEROPTIC PROBE; Bile Duct Stent Exchange and Biopsy,balloon sweep of bile duct;  Surgeon: Win Strauss MD;  [facility]     ENDOSCOPIC ULTRASOUND UPPER GASTROINTESTINAL TRACT (GI) N/A 4/16/2020    Procedure: ENDOSCOPIC ULTRASOUND, ESOPHAGOSCOPY / UPPER GASTROINTESTINAL TRACT (GI)with Fine Needle biopsy;  Surgeon: Cody Baumann MD;  [facility]     ESOPHAGOSCOPY, GASTROSCOPY, DUODENOSCOPY (EGD), COMBINED N/A 4/6/2020    Procedure: ESOPHAGOGASTRODUODENOSCOPY (EGD);  Surgeon: Win Strauss MD;  [facility]     EXPLORE COMMON BILE DUCT N/A 5/12/2020    Procedure: extra-hepatic bile duct resection;  Surgeon: Oswaldo Hale MD;  [facility]     HC KNEE SCOPE, DIAGNOSTIC  1995    Arthroscopy, Knee; left     HC VASECTOMY UNILAT/BILAT W POSTOP SEMEN      Vasectomy     HEPATECTOMY PARTIAL N/A 5/12/2020    Procedure: Exploratory Laparotomy, Open right hepatectomy, Radical Extra-Hepatic Bile Duct Resection, Portal Lymph Node  Dissection, Intraoperative Ultrasound, Intra Air-Cholangiogram ,Bianca-En-Y Left Hepaticojejunostomy, Excision Skin Lesion;  Surgeon: Oswaldo Hale MD;  [facility]     IR ABSCESS TUBE CHANGE  6/12/2020     IR FOLLOW UP VISIT OUTPATIENT  7/24/2020     IR FOLLOW UP VISIT OUTPATIENT  8/3/2020     IR SINOGRAM INJECTION DIAGNOSTIC  7/14/2020     IR THORACENTESIS  5/16/2020     LYMPHADENECTOMY ABDOMINAL N/A 5/12/2020    Procedure: portal lymph node dissection, intraoperative liver ultrasound;  Surgeon: Oswaldo Hale MD;  Location[facility]       FAMILY HISTORY:   Family History   Problem Relation Age of Onset     Arthritis Mother         RA     Diabetes Father         diet controlled     Hypertension Father        SOCIAL HISTORY:   Social History     Tobacco Use     Smoking status: Never Smoker     Smokeless tobacco: Never Used   Substance Use Topics     Alcohol use: Yes     Comment: occaisional        PROBLEM LIST:   Patient Active Problem List    Diagnosis Date Noted     Hilar cholangiocarcinoma (H) 04/22/2020     Priority: Medium     Mixed hyperlipidemia 06/14/2002     Priority: Medium     Essential hypertension, benign 01/21/2002     Priority: Medium       MEDICATIONS:   Prescription Medications as of 8/5/2020       Rx Number Disp Refills Start End Last Dispensed Date Next Fill Date Owning Pharmacy    atorvastatin (LIPITOR) 40 MG tablet            Sig: Take 40 mg by mouth At Bedtime     Class: Historical    Route: Oral    capecitabine (XELODA) 500 MG tablet  112 tablet 0 8/4/2020 8/18/2020   [facility] - [place], [place] - [address]    Sig: Take 4 tablets (2,000 mg) by mouth 2 times daily for 14 days Days 1 through 14, then off for 7 days.    Class: E-Prescribe    Route: Oral    Non-formulary Exception Code: Specific indication for non-formulary alternative    clopidogrel (PLAVIX) 75 MG tablet            Sig: Take 75 mg by mouth At Bedtime     Class: Historical    Route: Oral     empagliflozin (JARDIANCE) 10 MG TABS tablet            Sig: Take 10 mg by mouth At Bedtime     Class: Historical    Route: Oral    fenofibrate (TRIGLIDE/LOFIBRA) 160 MG tablet            Sig: Take 160 mg by mouth At Bedtime     Class: Historical    Route: Oral    Ferrous Sulfate (IRON) 325 (65 Fe) MG tablet  180 tablet 1 4/8/2020    [facility] [address]    Sig: Take 1 tablet by mouth 3 times daily (with meals)    Class: E-Prescribe    Route: Oral    furosemide (LASIX) 20 MG tablet            Sig: Take 20 mg by mouth every evening     Class: Historical    Route: Oral    glipiZIDE (GLUCOTROL) 10 MG tablet            Sig: Take 10 mg by mouth At Bedtime     Class: Historical    Route: Oral    insulin glargine (LANTUS PEN) 100 UNIT/ML pen            Sig: Inject 24 Units Subcutaneous At Bedtime     Class: Historical    Notes to Pharmacy: If Lantus is not covered by insurance, may substitute Basaglar at same dose and frequency.      Route: Subcutaneous    lisinopril (ZESTRIL) 30 MG tablet            Sig: Take 30 mg by mouth every evening     Class: Historical    Route: Oral    oxyCODONE (ROXICODONE) 5 MG tablet  20 tablet 0 5/18/2020    [address]    Sig: Take 1-2 tablets (5-10 mg) by mouth every 4 hours as needed for moderate to severe pain    Class: E-Prescribe    Earliest Fill Date: 5/18/2020    Route: Oral    polyethylene glycol (MIRALAX) 17 g packet  14 packet 0 5/18/2020    [address]    Sig: Take 17 g by mouth daily    Class: E-Prescribe    Route: Oral    sildenafil (REVATIO) 20 MG tablet            Sig: Take 20 mg by mouth as needed    Class: Historical    Route: Oral    sodium chloride, PF, 0.9% PF flush  300 mL 1 6/22/2020    [facility] [address]    Sig: 10 mLs by Intracatheter route every 24 hours    Class: E-Prescribe     Route: Intracatheter    enoxaparin ANTICOAGULANT (LOVENOX) 40 MG/0.4ML syringe (Ended)  11.2 mL 0 5/18/2020 6/15/2020   [address]    Sig: Inject 0.4 mLs (40 mg) Subcutaneous every 24 hours for 28 days    Class: E-Prescribe    Route: Subcutaneous    levofloxacin (LEVAQUIN) 500 MG tablet (Ended)  3 tablet 0 5/19/2020 5/22/2020   [address]    Sig: Take 1 tablet (500 mg) by mouth daily for 3 days    Class: E-Prescribe    Route: Oral    metroNIDAZOLE (FLAGYL) 500 MG tablet (Ended)  9 tablet 0 5/18/2020 5/21/2020   [address]    Sig: Take 1 tablet (500 mg) by mouth 3 times daily for 3 days    Class: E-Prescribe    Route: Oral    sodium chloride, PF, 0.9% PF flush (Ended)  420 mL 0 5/18/2020 6/1/2020   [address]    Sig: Irrigate with 10 mLs as directed every 8 hours for 14 days    Class: E-Prescribe    Notes to Pharmacy: Dispense as 10 ml sterile syringes for IR drain flushes.    Route: Irrigation          ALLERGIES:   Metformin    ROS:  As above  No dyspnea, CP, fever, n/v.      Physical Examination:   VITALS:   There were no vitals taken for this visit.  Constitutional: healthy, alert and no distress  Head: Normocephalic.   Respiratory: Nonlabored breathing.  Speaks in complete sentences without becoming dyspneic.    Skin: No jaundice  Psychiatric: mentation appears normal, reserved communication    Labs:    BMP RESULTS:  Lab Results   Component Value Date     07/02/2020    POTASSIUM 4.0 07/02/2020    CHLORIDE 99 07/02/2020    CO2 28 07/02/2020    ANIONGAP 6 07/02/2020     (H) 07/02/2020    BUN 8 07/02/2020    CR 0.87 07/02/2020    GFRESTIMATED >90 08/03/2020    GFRESTBLACK >90 08/03/2020    ERIC 8.8 07/02/2020        CBC RESULTS:  Lab Results   Component Value Date     WBC 7.5 07/02/2020    RBC 4.84 07/02/2020    HGB 13.4 07/02/2020    HCT 41.2 07/02/2020    MCV 85 07/02/2020    MCH 27.7 07/02/2020    MCHC 32.5 07/02/2020    RDW 14.0 07/02/2020     07/02/2020       INR/PTT:  Lab Results   Component Value Date    INR 1.42 (H) 05/18/2020       Diagnostic studies: Multiple previous CT and sinogram studies reviewed by me.  Initial sinogram demonstrates communication of abscess cavity with biliary tree although not well delineated given resolution of fluoroscopy. CT shows decompressed abscess cavity, enlarging mesenteric lymph nodes (see report).     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  Although his output declined, he has failed multiple capping trials, and clinically, bile continues to be produced from the cavity.  Further interventional radiology methods to promote cavity resolution requested.      1. Biopsy of mesenteric lymph node with  Dr. Baumann to be done asap.    2. High quality sinogram ( IR room 1, 3, 4 or 5) to identify point of communication with biliary tree (best case scenario would be a small peripheral duct) and probable cavity sclerotherapy with ethanol.  I would rather not put him through biliary diversion or bile duct ethanol ablation at this point, and small leaks typically heal with more conservative measures.     It was a pleasure to conduct this video visit with Mr. Estrada and his family today.  Thank you for involving the interventional radiology service in his care.    I spent a total of 19 minutes on this video visit, over 50% time was for counseling and care coordination.    Reta Purcell MD  Interventional Radiology   Pager [phone]    CC  Patient Care  Team:  Tolu Noland as PCP - General  Layla Hernandez, JANIE as Referring Physician  Rosalind Whatley, RN as Registered Nurse  Carey Rodriguez, RN as Specialty Care Coordinator (Surgery Surgical Oncology)  Oswaldo Hale MD as MD (Surgery)  Aydin Beard MD as MD (Oncology)  Chey Arias, JASMIN as Specialty Care Coordinator (Hematology & Oncology)

## 2020-08-05 NOTE — LETTER
8/5/2020      RE: Fuentes Estrada  [address]  [facility] [address]       Fuentes Estrada is a 65 year old male who is being evaluated via a billable video visit.          Video-Visit Details    Type of service:  Video Visit    Distant Location (provider location):  [facility]     Platform used for Video Visit: Felipe Hdz CMA              INTERVENTIONAL RADIOLOGY CONSULTATION    Name: Fuentes Estrada  Age: 65 year old   Referring Physician: Dr. Hale  REASON FOR REFERRAL: Abscess     HPI:   This video visit was conducted with [employer].  Participants in the video visit include Mr. Estrada, his wife, and his daughter.    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     Postoperative, he developed a collection along the posterior surgical site.  He underwent percutaneous drain placement.  Initial sinogram showed communication with the biliary tree, although the specific location of communication was not well delineated.      His drain remained in place, the output gradually declined, and the drain was ultimately capped.  However, he is now failed to capping trials, as he developed leakage of fluid around the drain site, which is yellow/green by patient description.  He also developed a fever.  Dr. Hale is asked me to manage this drain and consider options to accelerate cavity resolution and drain removal.  At this point, he has undergone a standard drain catheter therapy, but again has failed to pass capping trials.  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  Size of communication not yet determined.  Since uncapping his drain, he has been well, fever free.  Steady daily output in the drainage bag shows a dark yellow to green output consistent with bile.    Unfortunately on his CT performed last week, he has enlarging  mesenteric lymph nodes as well as an enlarging soft tissue nodule adjacent to his ascending colon concerning for metastatic disease.  A biopsy has been arranged with Dr. Baumann as percutaneous sampling of the mesenteric nodes is not safe and sampling of the nodule adjacent to right colon will be a challenge  Given close proximity to colon.    PAST MEDICAL HISTORY:   Past Medical History:   Diagnosis Date     Cerebrovascular accident (CVA) (H) 12/2010     Cholangiocarcinoma (H)      Essential hypertension, benign     Hypertension, Benign     Nonspecific abnormal results of liver function study     Hx of elevated LFT's       PAST SURGICAL HISTORY:   Past Surgical History:   Procedure Laterality Date     ENDOSCOPIC RETROGRADE CHOLANGIOPANCREATOGRAM N/A 3/31/2020    Procedure: ENDOSCOPIC RETROGRADE CHOLANGIOPANCREATOGRAPHY, WITH with biliary sphincterotomy, biopsies and brushings, biliary stent placement;  Surgeon: Win Strauss MD;  [facility]     ENDOSCOPIC RETROGRADE CHOLANGIOPANCREATOGRAM N/A 4/6/2020    Procedure: ENDOSCOPIC RETROGRADE CHOLANGIOPANCREATOGRAPHY;  Surgeon: Win Strauss MD;  [facility]     ENDOSCOPIC RETROGRADE CHOLANGIOPANCREATOGRAM WITH SPYGLASS  4/16/2020    Procedure: ENDOSCOPIC RETROGRADE CHOLANGIOPANCREATOGRAPHY, WITH DIRECT DUCT VISUALIZATION, USING PANCREATICOBILIARY FIBEROPTIC PROBE; Bile Duct Stent Exchange and Biopsy,balloon sweep of bile duct;  Surgeon: Win Strauss MD;  [facility]     ENDOSCOPIC ULTRASOUND UPPER GASTROINTESTINAL TRACT (GI) N/A 4/16/2020    Procedure: ENDOSCOPIC ULTRASOUND, ESOPHAGOSCOPY / UPPER GASTROINTESTINAL TRACT (GI)with Fine Needle biopsy;  Surgeon: Cody Baumann MD;  [facility]     ESOPHAGOSCOPY, GASTROSCOPY, DUODENOSCOPY (EGD), COMBINED N/A 4/6/2020    Procedure: ESOPHAGOGASTRODUODENOSCOPY (EGD);  Surgeon: Win Strauss MD;  [facility]     EXPLORE COMMON BILE DUCT N/A 5/12/2020    Procedure:  extra-hepatic bile duct resection;  Surgeon: Oswaldo Hale MD;  [facility]     HC KNEE SCOPE, DIAGNOSTIC  1995    Arthroscopy, Knee; left     HC VASECTOMY UNILAT/BILAT W POSTOP SEMEN      Vasectomy     HEPATECTOMY PARTIAL N/A 5/12/2020    Procedure: Exploratory Laparotomy, Open right hepatectomy, Radical Extra-Hepatic Bile Duct Resection, Portal Lymph Node Dissection, Intraoperative Ultrasound, Intra Air-Cholangiogram ,Bianca-En-Y Left Hepaticojejunostomy, Excision Skin Lesion;  Surgeon: Oswaldo Hale MD;  [facility]     IR ABSCESS TUBE CHANGE  6/12/2020     IR FOLLOW UP VISIT OUTPATIENT  7/24/2020     IR FOLLOW UP VISIT OUTPATIENT  8/3/2020     IR SINOGRAM INJECTION DIAGNOSTIC  7/14/2020     IR THORACENTESIS  5/16/2020     LYMPHADENECTOMY ABDOMINAL N/A 5/12/2020    Procedure: portal lymph node dissection, intraoperative liver ultrasound;  Surgeon: Oswaldo Hale MD;  [facility]       FAMILY HISTORY:   Family History   Problem Relation Age of Onset     Arthritis Mother         RA     Diabetes Father         diet controlled     Hypertension Father        SOCIAL HISTORY:   Social History     Tobacco Use     Smoking status: Never Smoker     Smokeless tobacco: Never Used   Substance Use Topics     Alcohol use: Yes     Comment: occaisional        PROBLEM LIST:   Patient Active Problem List    Diagnosis Date Noted     Hilar cholangiocarcinoma (H) 04/22/2020     Priority: Medium     Mixed hyperlipidemia 06/14/2002     Priority: Medium     Essential hypertension, benign 01/21/2002     Priority: Medium       MEDICATIONS:   Prescription Medications as of 8/5/2020       Rx Number Disp Refills Start End Last Dispensed Date Next Fill Date Owning Pharmacy    atorvastatin (LIPITOR) 40 MG tablet            Sig: Take 40 mg by mouth At Bedtime     Class: Historical    Route: Oral    capecitabine (XELODA) 500 MG tablet  112 tablet 0 8/4/2020 8/18/2020   [facility] - [place], [place] -  [address]    Sig: Take 4 tablets (2,000 mg) by mouth 2 times daily for 14 days Days 1 through 14, then off for 7 days.    Class: E-Prescribe    Route: Oral    Non-formulary Exception Code: Specific indication for non-formulary alternative    clopidogrel (PLAVIX) 75 MG tablet            Sig: Take 75 mg by mouth At Bedtime     Class: Historical    Route: Oral    empagliflozin (JARDIANCE) 10 MG TABS tablet            Sig: Take 10 mg by mouth At Bedtime     Class: Historical    Route: Oral    fenofibrate (TRIGLIDE/LOFIBRA) 160 MG tablet            Sig: Take 160 mg by mouth At Bedtime     Class: Historical    Route: Oral    Ferrous Sulfate (IRON) 325 (65 Fe) MG tablet  180 tablet 1 4/8/2020    [facility] - [place], [place] - [address]    Sig: Take 1 tablet by mouth 3 times daily (with meals)    Class: E-Prescribe    Route: Oral    furosemide (LASIX) 20 MG tablet            Sig: Take 20 mg by mouth every evening     Class: Historical    Route: Oral    glipiZIDE (GLUCOTROL) 10 MG tablet            Sig: Take 10 mg by mouth At Bedtime     Class: Historical    Route: Oral    insulin glargine (LANTUS PEN) 100 UNIT/ML pen            Sig: Inject 24 Units Subcutaneous At Bedtime     Class: Historical    Notes to Pharmacy: If Lantus is not covered by insurance, may substitute Basaglar at same dose and frequency.      Route: Subcutaneous    lisinopril (ZESTRIL) 30 MG tablet            Sig: Take 30 mg by mouth every evening     Class: Historical    Route: Oral    oxyCODONE (ROXICODONE) 5 MG tablet  20 tablet 0 5/18/2020    [address]    Sig: Take 1-2 tablets (5-10 mg) by mouth every 4 hours as needed for moderate to severe pain    Class: E-Prescribe    Earliest Fill Date: 5/18/2020    Route: Oral    polyethylene glycol (MIRALAX) 17 g packet  14 packet 0 5/18/2020    [address]    Sig: Take 17  g by mouth daily    Class: E-Prescribe    Route: Oral    sildenafil (REVATIO) 20 MG tablet            Sig: Take 20 mg by mouth as needed    Class: Historical    Route: Oral    sodium chloride, PF, 0.9% PF flush  300 mL 1 6/22/2020    [facility] - [place], [place] - [address]    Sig: 10 mLs by Intracatheter route every 24 hours    Class: E-Prescribe    Route: Intracatheter    enoxaparin ANTICOAGULANT (LOVENOX) 40 MG/0.4ML syringe (Ended)  11.2 mL 0 5/18/2020 6/15/2020   [address]    Sig: Inject 0.4 mLs (40 mg) Subcutaneous every 24 hours for 28 days    Class: E-Prescribe    Route: Subcutaneous    levofloxacin (LEVAQUIN) 500 MG tablet (Ended)  3 tablet 0 5/19/2020 5/22/2020   [address]    Sig: Take 1 tablet (500 mg) by mouth daily for 3 days    Class: E-Prescribe    Route: Oral    metroNIDAZOLE (FLAGYL) 500 MG tablet (Ended)  9 tablet 0 5/18/2020 5/21/2020   [address]    Sig: Take 1 tablet (500 mg) by mouth 3 times daily for 3 days    Class: E-Prescribe    Route: Oral    sodium chloride, PF, 0.9% PF flush (Ended)  420 mL 0 5/18/2020 6/1/2020   [address]    Sig: Irrigate with 10 mLs as directed every 8 hours for 14 days    Class: E-Prescribe    Notes to Pharmacy: Dispense as 10 ml sterile syringes for IR drain flushes.    Route: Irrigation          ALLERGIES:   Metformin    ROS:  As above  No dyspnea, CP, fever, n/v.      Physical Examination:   VITALS:   There were no vitals taken for this visit.  Constitutional: healthy, alert and no distress  Head: Normocephalic.   Respiratory: Nonlabored breathing.  Speaks in complete sentences without becoming dyspneic.    Skin: No jaundice  Psychiatric: mentation appears normal, reserved communication    Labs:    BMP  RESULTS:  Lab Results   Component Value Date     07/02/2020    POTASSIUM 4.0 07/02/2020    CHLORIDE 99 07/02/2020    CO2 28 07/02/2020    ANIONGAP 6 07/02/2020     (H) 07/02/2020    BUN 8 07/02/2020    CR 0.87 07/02/2020    GFRESTIMATED >90 08/03/2020    GFRESTBLACK >90 08/03/2020    ERIC 8.8 07/02/2020        CBC RESULTS:  Lab Results   Component Value Date    WBC 7.5 07/02/2020    RBC 4.84 07/02/2020    HGB 13.4 07/02/2020    HCT 41.2 07/02/2020    MCV 85 07/02/2020    MCH 27.7 07/02/2020    MCHC 32.5 07/02/2020    RDW 14.0 07/02/2020     07/02/2020       INR/PTT:  Lab Results   Component Value Date    INR 1.42 (H) 05/18/2020       Diagnostic studies: Multiple previous CT and sinogram studies reviewed by me.  Initial sinogram demonstrates communication of abscess cavity with biliary tree although not well delineated given resolution of fluoroscopy. CT shows decompressed abscess cavity, enlarging mesenteric lymph nodes (see report).     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  Although his output declined, he has failed multiple capping trials, and clinically, bile continues to be produced from the cavity.  Further interventional radiology methods to promote cavity resolution requested.      1. Biopsy of mesenteric lymph node with  Dr. Baumann to be done asap.    2. High quality sinogram ( IR room 1, 3, 4 or 5) to identify point of communication with biliary tree (best case scenario would be a small peripheral duct) and probable cavity sclerotherapy with ethanol.  I would rather not put him through biliary diversion or bile duct ethanol ablation at this point, and small leaks typically heal with more conservative  measures.     It was a pleasure to conduct this video visit with Mr. Estrada and his family today.  Thank you for involving the interventional radiology service in his care.    I spent a total of 19 minutes on this video visit, over 50% time was for counseling and care coordination.    Reta Purcell MD  Interventional Radiology   Pager [phone]    CC  Patient Care Team:  Tolu Noland as PCP - Layla Horton NP as Referring Physician  Rosalind Whatley, RN as Registered Nurse  Carey Rodriguez, RN as Specialty Care Coordinator (Surgery Surgical Oncology)  Oswaldo Hale MD as MD (Surgery)  Aydin Beard MD as MD (Oncology)  Chey Arias, JASMIN as Specialty Care Coordinator (Hematology & Oncology)

## 2020-08-06 NOTE — PATIENT INSTRUCTIONS
Fuentes you have had your virtual visit today with Dr Jazzy HUFF to discuss drain/bile leak.  Your recent CT scan has been reviewed with you during this visit.    Plan:    -Biopsy to be scheduled in IR    -Sinogram with possible sclerotherapy injection to be scheduled in the next month.    IR  or myself will be contacting you to schedule.    Please do not hesitate to contact me with questions or concerns,    DANIELA Dumont RN, BSN  Interventional Radiology Nurse Coordinator   Phone:  [phone]

## 2020-08-07 NOTE — TELEPHONE ENCOUNTER
Per Dr. Rossi  I can do it, please ensure plavix is on hold from today if possible and lovenox on hold 12 hours before procedure     Ordered EUS with FNA, Unit J, 8/11/2020    Will stop Plavix starting today, usually does not bridge.   Orders placed for covid test, scheduling number given.

## 2020-08-07 NOTE — TELEPHONE ENCOUNTER
Patient is scheduled for Eus  with Dr. Grady    Spoke with: wife Robyn    Date of Procedure: 8-11-20    Location: Unit J    Sedation Type  MAC    Informed patient they will need an adult  yes    Informed Patient of COVID Test Requirement yes    Preferred Pharmacy for Pre Prescription chart    Confirmed Nurse will call to complete assessment yes    Additional comments: This was all coordinated by Rosalind Bocanegra . EUS prep was sent to wife via email.

## 2020-08-11 NOTE — ANESTHESIA CARE TRANSFER NOTE
Patient: Fuentes Estrada    Procedure(s):  ENDOSCOPIC ULTRASOUND, ESOPHAGOSCOPY / UPPER GASTROINTESTINAL TRACT (GI)  Esophagogastroduodenoscopy, With Fine Needle Aspiration Biopsy, With Endoscopic Ultrasound Guidance    Diagnosis: Abnormal finding on GI tract imaging [R93.3]  Diagnosis Additional Information: No value filed.    Anesthesia Type:   MAC     Note:  Airway :Room Air  Destination: Endo.  Comments: Alert and exchanging well. VSS. Report given to RN.       Vitals: (Last set prior to Anesthesia Care Transfer)    CRNA VITALS  8/11/2020 1424 - 8/11/2020 1459      8/11/2020             Pulse:  77    Ht Rate:  77    SpO2:  96 %    Resp Rate (observed):  24                Electronically Signed By: VICKI Hewitt CRNA  August 11, 2020  2:59 PM

## 2020-08-11 NOTE — DISCHARGE INSTRUCTIONS
[facility] Endoscopic Ultrasound with Fine Needle Aspiration of soft tissue mass and Amribeth-gastric Lymph node, along with Monitored Anesthesia Care by Dr Guru Rossi  For 24 hours after your procedure  Sedation:  1. Get plenty of rest. A responsible adult must stay with you for at least 24 hours after you leave the hospital.   2. Do not drive or use heavy equipment. If you have weakness or tingling, don't drive or use heavy equipment until this feeling goes away.  3. Do not drink alcohol.  4. Avoid strenuous or risky activities. Ask for help when climbing stairs.   5. You may feel lightheaded. IF so, sit for a few minutes before standing. Have someone help you get up.   6. If you have nausea (feel sick to your stomach): Drink only clear liquids such as apple juice, ginger ale, broth or 7-Up. Rest may also help. Be sure to drink enough fluids. Move to a regular diet as you feel able.  7. You may have a slight fever. Call the doctor if your fever is over 100 F (37.7 C) (taken under the tongue) or lasts longer than 24 hours.  8. You may have a dry mouth, a sore throat, muscle aches or trouble sleeping. These should go away after 24 hours.  9. Do not make important or legal decisions.   Procedural:  1. Start with sips of water. When your gag reflex has returned, you may return to your normal diet as indicated by the physician, along with medicines, and light exercise.  2. Some bloating is normal. You may have large burps or pass air.  3. You may have a sore throat for 2 to 3 days. If so, it may help to:    Use sore throat lozenges.    Gargle as need with salt water up to 4 times a day. Mix 1 cup of warm water with 1 teaspoon of salt. Do not swallow.  4. You may take Tylenol (acetaminophen) for pain unless your doctor has told you not to.   Call right away if you have:  1. Unusual pain in belly or chest pain not relieved with passing air.  2. Severe throat pain or trouble swallowing.  3. Black stools (tar-like looking  bowel movement).  4. Signs of infection (fever).  Follow-up:  __X__ We took small tissue samples. Your doctor will send you the results within two weeks.  If you have severe pain, bleeding, vomit blood, or shortness of breath, go to an emergency room.  If you have questions, call:  Endoscopy: Monday to Friday, 7 a.m. to 4:30 p.m. [phone] (We may have to call you back)  After hours: Hospital  [phone] (Ask for the GI fellow on call)

## 2020-08-11 NOTE — OR NURSING
Procedure: EUS with FNA of GVA soft tissue mass and perigastric lymph node  Sedation: Monitored anesthesia care  Specimens: All specimens collected and taken by pathology  O2: Titrated per anesthesia      Patient tolerated procedure well. Patient stable on transfer to endo recovery by anesthesia staff.

## 2020-08-11 NOTE — ANESTHESIA PREPROCEDURE EVALUATION
Anesthesia Pre-Procedure Evaluation    Patient: Fuentes Estrada   MRN:     [id number] Gender:   male   Age:    65 year old :      1955        Preoperative Diagnosis: Abnormal finding on GI tract imaging [R93.3]   Procedure(s):  ENDOSCOPIC ULTRASOUND, ESOPHAGOSCOPY / UPPER GASTROINTESTINAL TRACT (GI)     LABS:  CBC:   Lab Results   Component Value Date    WBC 7.5 2020    WBC 8.1 2020    HGB 13.4 2020    HGB 10.6 (L) 2020    HCT 41.2 2020    HCT 33.0 (L) 2020     2020     2020     BMP:   Lab Results   Component Value Date     2020     2020    POTASSIUM 4.0 2020    POTASSIUM 3.3 (L) 2020    CHLORIDE 99 2020    CHLORIDE 104 2020    CO2 28 2020    CO2 24 2020    BUN 8 2020    BUN 12 2020    CR 0.87 2020    CR 0.67 2020     (H) 2020     (H) 2020     COAGS:   Lab Results   Component Value Date    INR 1.42 (H) 2020     POC:   Lab Results   Component Value Date     (H) 2020     OTHER:   Lab Results   Component Value Date    PH 7.46 (H) 2020    LACT 2.3 (H) 2020    A1C 6.0 (H) 2020    ERIC 8.8 2020    PHOS 2.3 (L) 2020    MAG 1.9 2020    ALBUMIN 2.7 (L) 2020    PROTTOTAL 7.7 2020    ALT 44 2020    AST 60 (H) 2020    ALKPHOS 258 (H) 2020    BILITOTAL 1.3 2020    BILIDIRECT 0.4 2002    LIPASE 585 (H) 2020    AMYLASE 164 (H) 2020    TSH 3.60 2002    T4 0.7 2002        Preop Vitals    BP Readings from Last 3 Encounters:   20 (P) 112/71   20 (!) 146/83   20 117/79    Pulse Readings from Last 3 Encounters:   20 (P) 80   20 96   20 70      Resp Readings from Last 3 Encounters:   20 (P) 14   20 18   20 15    SpO2 Readings from Last 3 Encounters:   20 (P) 95%   20 96%  "  05/04/20 99%      Temp Readings from Last 1 Encounters:   06/12/20 36.9  C (98.5  F) (Oral)    Ht Readings from Last 1 Encounters:   06/09/20 1.854 m (6' 1\")      Wt Readings from Last 1 Encounters:   06/09/20 87.5 kg (193 lb)    Estimated body mass index is 25.46 kg/m  as calculated from the following:    Height as of 6/9/20: 1.854 m (6' 1\").    Weight as of 6/9/20: 87.5 kg (193 lb).     LDA:  Peripheral IV 07/02/20 Distal;Right Upper arm (Active)   Number of days: 40       Closed/Suction Drain 1 Left LUQ Bulb 15 Hungarian (Active)   Number of days: 91       Open Drain Right Back 12 Hungarian LOT # [id number]  Skater (Active)   Number of days: 60        Past Medical History:   Diagnosis Date     Cerebrovascular accident (CVA) (H) 12/2010     Cholangiocarcinoma (H)      Essential hypertension, benign     Hypertension, Benign     Nonspecific abnormal results of liver function study     Hx of elevated LFT's      Past Surgical History:   Procedure Laterality Date     ENDOSCOPIC RETROGRADE CHOLANGIOPANCREATOGRAM N/A 3/31/2020    Procedure: ENDOSCOPIC RETROGRADE CHOLANGIOPANCREATOGRAPHY, WITH with biliary sphincterotomy, biopsies and brushings, biliary stent placement;  Surgeon: Win Strauss MD;  Location: [facility] OR     ENDOSCOPIC RETROGRADE CHOLANGIOPANCREATOGRAM N/A 4/6/2020    Procedure: ENDOSCOPIC RETROGRADE CHOLANGIOPANCREATOGRAPHY;  Surgeon: Win Strauss MD;  Location: [facility] OR     ENDOSCOPIC RETROGRADE CHOLANGIOPANCREATOGRAM WITH SPYGLASS  4/16/2020    Procedure: ENDOSCOPIC RETROGRADE CHOLANGIOPANCREATOGRAPHY, WITH DIRECT DUCT VISUALIZATION, USING PANCREATICOBILIARY FIBEROPTIC PROBE; Bile Duct Stent Exchange and Biopsy,balloon sweep of bile duct;  Surgeon: Win Strauss MD;  Location: [facility] OR     ENDOSCOPIC ULTRASOUND UPPER GASTROINTESTINAL TRACT (GI) N/A 4/16/2020    Procedure: ENDOSCOPIC ULTRASOUND, ESOPHAGOSCOPY / UPPER GASTROINTESTINAL TRACT (GI)with Fine Needle biopsy;  Surgeon: Cody Baumann " "MD Александр;  Location: [facility] OR     ESOPHAGOSCOPY, GASTROSCOPY, DUODENOSCOPY (EGD), COMBINED N/A 4/6/2020    Procedure: ESOPHAGOGASTRODUODENOSCOPY (EGD);  Surgeon: Win Strauss MD;  Location: [facility] OR     EXPLORE COMMON BILE DUCT N/A 5/12/2020    Procedure: extra-hepatic bile duct resection;  Surgeon: Oswaldo Hale MD;  Location: [facility] OR     HC KNEE SCOPE, DIAGNOSTIC  1995    Arthroscopy, Knee; left     HC VASECTOMY UNILAT/BILAT W POSTOP SEMEN      Vasectomy     HEPATECTOMY PARTIAL N/A 5/12/2020    Procedure: Exploratory Laparotomy, Open right hepatectomy, Radical Extra-Hepatic Bile Duct Resection, Portal Lymph Node Dissection, Intraoperative Ultrasound, Intra Air-Cholangiogram ,Bianca-En-Y Left Hepaticojejunostomy, Excision Skin Lesion;  Surgeon: Oswaldo Hale MD;  Location: [facility] OR     IR ABSCESS TUBE CHANGE  6/12/2020     IR FOLLOW UP VISIT OUTPATIENT  7/24/2020     IR FOLLOW UP VISIT OUTPATIENT  8/3/2020     IR SINOGRAM INJECTION DIAGNOSTIC  7/14/2020     IR THORACENTESIS  5/16/2020     LYMPHADENECTOMY ABDOMINAL N/A 5/12/2020    Procedure: portal lymph node dissection, intraoperative liver ultrasound;  Surgeon: Oswaldo Hale MD;  Location: [facility] OR      Allergies   Allergen Reactions     Metformin Other (See Comments)     \" I think my kidneys shut down\"        Anesthesia Evaluation     . Pt has had prior anesthetic.     No history of anesthetic complications          ROS/MED HX    ENT/Pulmonary:     (+)ELENA risk factors snores loudly, hypertension, , . .   (-) tobacco use   Neurologic:     (+)CVA date: 2010 with deficits- short term memory difficulties,     Cardiovascular:     (+) Dyslipidemia, hypertension----. Taking blood thinners Pt has received instructions: Instructions Given to patient: last dose will be evening of 5/6/2020. . . :. . Previous cardiac testing date:results:date: results:ECG reviewed date:4/5/2020 results:SR  Non specific T abnormalities, lateral leads date: results:        "   METS/Exercise Tolerance:  3 - Able to walk 1-2 blocks without stopping   Hematologic:  - neg hematologic  ROS       Musculoskeletal:  - neg musculoskeletal ROS       GI/Hepatic:     (+) cholecystitis/cholelithiasis (s/p cholecystectomy 8/2019), Other GI/Hepatic cholangiocarcinoma     (-) GERD   Renal/Genitourinary:  - ROS Renal section negative       Endo:     (+) type II DM Last HgA1c: 6.0 date: 5/4/2020 Using insulin - not using insulin pump .      Psychiatric:         Infectious Disease: Comment: Fever 102 one week ago, had negative COVID test in [place], [place] per patient report  (+) Recent Fever,       Malignancy:   (+) Malignancy History of GI  GI CA Active status post,         Other:    - neg other ROS                     PHYSICAL EXAM:   Mental Status/Neuro: A/A/O   Airway: Facies: Feasible  Mallampati: I  Mouth/Opening: Full  TM distance: > 6 cm  Neck ROM: Full   Respiratory: Auscultation: CTAB     Resp. Rate: Normal     Resp. Effort: Normal      CV: Rhythm: Regular  Rate: Age appropriate  Heart: Normal Sounds  Edema: None   Comments:      Dental: Normal Dentition                Assessment:   ASA SCORE: 4    H&P: History and physical reviewed and following examination; no interval change.    NPO Status: NPO Appropriate     Plan:   Anes. Type:  MAC   Pre-Medication: None   Induction:  N/a   Airway: Native Airway   Access/Monitoring: PIV   Maintenance: N/a     Postop Plan:   Postop Pain: None  Postop Sedation/Airway: Not planned       Postop Sedation: Propofol Infusion     PONV Management:    Prevention: Ondansetron, Dexamethasone     CONSENT: Direct conversation   Plan and risks discussed with: Patient   Blood Products: Consent Deferred (Minimal Blood Loss)                   Sarah Wachter, MD

## 2020-08-17 NOTE — LETTER
"    8/17/2020         RE: Fuentes Estrada  [address]  [facility] [place] [facility] [address]        Dear Colleague,    Thank you for referring your patient, Fuentes Estrada, to the [facility]. Please see a copy of my visit note below.    Fuentes Estrada is a 65 year old male who is being evaluated via a billable video visit.      The patient has been notified of following:     \"This video visit will be conducted via a call between you and your physician/provider. We have found that certain health care needs can be provided without the need for an in-person physical exam.  This service lets us provide the care you need with a video conversation.  If a prescription is necessary we can send it directly to your pharmacy.  If lab work is needed we can place an order for that and you can then stop by our lab to have the test done at a later time.    Video visits are billed at different rates depending on your insurance coverage.  Please reach out to your insurance provider with any questions.    If during the course of the call the physician/provider feels a video visit is not appropriate, you will not be charged for this service.\"    Patient has given verbal consent for Video visit? Yes    How would you like to obtain your AVS? MyChart     If you are dropped from the video visit, the video invite should be resent to: Text to cell phone: [phone]     Will anyone else be joining your video visit? No      Vitals - Patient Reported  Weight (Patient Reported): 79.4 kg (175 lb)  Height (Patient Reported): 185.4 cm (6' 0.99\")  BMI (Based on Pt Reported Ht/Wt): 23.09  Pain Score: No Pain (0)    Gio Mace LPN    Video-Visit Details    Type of service:  Video Visit    Video Start Time: 5:30  Video End Time: 5:45    Originating Location (pt. Location): Home    Distant Location (provider location):  [facility]     Platform used for Video Visit: Felipe    I am seeing Mr. Estrada today in regards to " newly recognized metastatic cholangiocarcinoma.    He is now a little over 3 months out from resection of what had initially appeared to be a hilar cholangiocarcinoma but was probably a peripheral cholangiocarcinoma growing into or compressing the common bile duct.  He had all of his disease resected with negative margins and one positive lymph node.  He had a complicated postoperative course with an abscess and ongoing drainage that had delayed starting his adjuvant chemotherapy.  On imaging in the last couple of weeks he appeared to be developing increasing nodularity in the resection bed and regional lymph nodes that has led to an EUS with a fine-needle aspiration of the lymph nodes demonstrating the presence of metastatic disease.  He continues to get 20 to 30 cc of bland drainage from his drain.  He continues to be very fatigued, although he does have days where he is been able to get out and do some yard work or go fishing.  He feels like his appetite is getting worse and he fills up very quickly.  He has no ongoing fevers chills or sweats.  The remainder of his 10 point review of systems otherwise is unremarkable.    GENERAL: Fatigued, somewhat cachectic, alert and no distress  EYES: Eyes grossly normal to inspection.  No discharge or erythema, or obvious scleral/conjunctival abnormalities.  RESP: No audible wheeze, cough, or visible cyanosis.  No visible retractions or increased work of breathing.    SKIN: Visible skin clear. No significant rash, abnormal pigmentation or lesions.  NEURO: Cranial nerves grossly intact.  Mentation and speech appropriate for age.  PSYCH: Mentation appears normal, affect normal/bright, judgement and insight intact, normal speech and appearance well-groomed.    I reviewed with the patient and his wife and daughter the findings of recurrence of his disease on biopsy.  I went over the results of the recent CT scans and where his disease appears to have recurred.    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  I think part of the patient's slow recovery and poor wound healing actually represents the presence of his cancer and while we have not yet been able to remove his drain I do not think I want a wait any longer before getting him started on active treatment.  I am hopeful that with control of his cancer his problems with fatigue and appetite will improve.  At present he does not appear to have any residual active infection and so I do not think we will get into much trouble if we make him neutropenic but I discussed that risk with him and his family.  We discussed that the expectations of treatment are that it would not be curative and the intention is to provide these good control both to improve his quality of life and enhance his survival time.  We discussed the great variability from patient to patient in terms of depth and duration of response.    His most recent CT scan of the abdomen is about 2 weeks old so that is current enough but we have not looked at his chest in more than 6 weeks and so would like to get a fresh CT scan to have a clean baseline before we start.  I am ordering further molecular profiling probably not on this current biopsy which was just an FNA but on his original resected specimen to look for FGF fusions and other alterations that might direct us towards targeted therapies or immunotherapy.      For the immediate term though we will get started on standard chemotherapy with gemcitabine plus cisplatin.  I reviewed the general outline of starting with treatment and reassessing his response every couple of months with continued therapy so long as it is controlling his disease without excess toxicity.  I went over the details of the expected toxicities with the gemcitabine and cisplatin and how we manage those.      He wanted to avoid getting a Port-A-Cath for now and says he has no problems with getting peripheral IVs  at present.      He does not have any significant pain at present that needs management.      He and his family are coping but are a bit overwhelmed at present and are still trying to formulate their questions about the future.      Alex Beard MD        Again, thank you for allowing me to participate in the care of your patient.        Sincerely,        Aydin Beard MD

## 2020-08-17 NOTE — PROGRESS NOTES
"RN Care Coordination Note  Placed call to patient's wife in response to [employer] message. Wife states they did not receive call from Dr Hale last week/weekend. So, they have not officially discussed biopsy results. Explained Dr Beard will be reviewing results during visit today. She also states their daughter from [place] will be joining visit. Wife asks for it not to be discussed they were aware of positive pathology during procedure, as they have been waiting to discuss with daughter until \"offical results were received.\" RNCC will review with Dr Beard.     Wife also states they never followed up with having genetic counseling appointment. She would like to move forward with setting it up. RNCC will send message to scheduling team.           Chey Arias RN, BSN, OCN   RN Care Coordinator   [facility]          "

## 2020-08-17 NOTE — LETTER
"    8/17/2020         RE: Fuentes Estrada  [address] [place] [facility] [place] [address]      Fuentes Estrada is a 65 year old male who is being evaluated via a billable video visit.      The patient has been notified of following:     \"This video visit will be conducted via a call between you and your physician/provider. We have found that certain health care needs can be provided without the need for an in-person physical exam.  This service lets us provide the care you need with a video conversation.  If a prescription is necessary we can send it directly to your pharmacy.  If lab work is needed we can place an order for that and you can then stop by our lab to have the test done at a later time.    Video visits are billed at different rates depending on your insurance coverage.  Please reach out to your insurance provider with any questions.    If during the course of the call the physician/provider feels a video visit is not appropriate, you will not be charged for this service.\"    Patient has given verbal consent for Video visit? Yes    How would you like to obtain your AVS? MyChart     If you are dropped from the video visit, the video invite should be resent to: Text to cell phone: [phone]     Will anyone else be joining your video visit? No      Vitals - Patient Reported  Weight (Patient Reported): 79.4 kg (175 lb)  Height (Patient Reported): 185.4 cm (6' 0.99\")  BMI (Based on Pt Reported Ht/Wt): 23.09  Pain Score: No Pain (0)    Gio Mace LPN    Video-Visit Details    Type of service:  Video Visit    Video Start Time: 5:30  Video End Time: 5:45    Originating Location (pt. Location): Home    Distant Location (provider location):  [facility]     Platform used for Video Visit: Felipe    I am seeing Mr. Estrada today in regards to newly recognized metastatic cholangiocarcinoma.    He is now a little over 3 months out from resection of what had initially appeared to be a hilar " cholangiocarcinoma but was probably a peripheral cholangiocarcinoma growing into or compressing the common bile duct.  He had all of his disease resected with negative margins and one positive lymph node.  He had a complicated postoperative course with an abscess and ongoing drainage that had delayed starting his adjuvant chemotherapy.  On imaging in the last couple of weeks he appeared to be developing increasing nodularity in the resection bed and regional lymph nodes that has led to an EUS with a fine-needle aspiration of the lymph nodes demonstrating the presence of metastatic disease.  He continues to get 20 to 30 cc of bland drainage from his drain.  He continues to be very fatigued, although he does have days where he is been able to get out and do some yard work or go fishing.  He feels like his appetite is getting worse and he fills up very quickly.  He has no ongoing fevers chills or sweats.  The remainder of his 10 point review of systems otherwise is unremarkable.    GENERAL: Fatigued, somewhat cachectic, alert and no distress  EYES: Eyes grossly normal to inspection.  No discharge or erythema, or obvious scleral/conjunctival abnormalities.  RESP: No audible wheeze, cough, or visible cyanosis.  No visible retractions or increased work of breathing.    SKIN: Visible skin clear. No significant rash, abnormal pigmentation or lesions.  NEURO: Cranial nerves grossly intact.  Mentation and speech appropriate for age.  PSYCH: Mentation appears normal, affect normal/bright, judgement and insight intact, normal speech and appearance well-groomed.    I reviewed with the patient and his wife and daughter the findings of recurrence of his disease on biopsy.  I went over the results of the recent CT scans and where his disease appears to have recurred.    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  I think part of the patient's slow recovery and poor wound healing  actually represents the presence of his cancer and while we have not yet been able to remove his drain I do not think I want a wait any longer before getting him started on active treatment.  I am hopeful that with control of his cancer his problems with fatigue and appetite will improve.  At present he does not appear to have any residual active infection and so I do not think we will get into much trouble if we make him neutropenic but I discussed that risk with him and his family.  We discussed that the expectations of treatment are that it would not be curative and the intention is to provide these good control both to improve his quality of life and enhance his survival time.  We discussed the great variability from patient to patient in terms of depth and duration of response.    His most recent CT scan of the abdomen is about 2 weeks old so that is current enough but we have not looked at his chest in more than 6 weeks and so would like to get a fresh CT scan to have a clean baseline before we start.  I am ordering further molecular profiling probably not on this current biopsy which was just an FNA but on his original resected specimen to look for FGF fusions and other alterations that might direct us towards targeted therapies or immunotherapy.      For the immediate term though we will get started on standard chemotherapy with gemcitabine plus cisplatin.  I reviewed the general outline of starting with treatment and reassessing his response every couple of months with continued therapy so long as it is controlling his disease without excess toxicity.  I went over the details of the expected toxicities with the gemcitabine and cisplatin and how we manage those.      He wanted to avoid getting a Port-A-Cath for now and says he has no problems with getting peripheral IVs at present.      He does not have any significant pain at present that needs management.      He and his family are coping but are a bit  overwhelmed at present and are still trying to formulate their questions about the future.      Alex Beard MD

## 2020-08-17 NOTE — PROGRESS NOTES
"Fuentes Estrada is a 65 year old male who is being evaluated via a billable video visit.      The patient has been notified of following:     \"This video visit will be conducted via a call between you and your physician/provider. We have found that certain health care needs can be provided without the need for an in-person physical exam.  This service lets us provide the care you need with a video conversation.  If a prescription is necessary we can send it directly to your pharmacy.  If lab work is needed we can place an order for that and you can then stop by our lab to have the test done at a later time.    Video visits are billed at different rates depending on your insurance coverage.  Please reach out to your insurance provider with any questions.    If during the course of the call the physician/provider feels a video visit is not appropriate, you will not be charged for this service.\"    Patient has given verbal consent for Video visit? Yes    How would you like to obtain your AVS? MyChart     If you are dropped from the video visit, the video invite should be resent to: Text to cell phone: [phone]     Will anyone else be joining your video visit? No      Vitals - Patient Reported  Weight (Patient Reported): 79.4 kg (175 lb)  Height (Patient Reported): 185.4 cm (6' 0.99\")  BMI (Based on Pt Reported Ht/Wt): 23.09  Pain Score: No Pain (0)    Gio Mace LPN    Video-Visit Details    Type of service:  Video Visit    Video Start Time: 5:30  Video End Time: 5:45    Originating Location (pt. Location): Home    Distant Location (provider location):  [facility]     Platform used for Video Visit: Felipe    I am seeing Mr. Estrada today in regards to newly recognized metastatic cholangiocarcinoma.    He is now a little over 3 months out from resection of what had initially appeared to be a hilar cholangiocarcinoma but was probably a peripheral cholangiocarcinoma growing into or " compressing the common bile duct.  He had all of his disease resected with negative margins and one positive lymph node.  He had a complicated postoperative course with an abscess and ongoing drainage that had delayed starting his adjuvant chemotherapy.  On imaging in the last couple of weeks he appeared to be developing increasing nodularity in the resection bed and regional lymph nodes that has led to an EUS with a fine-needle aspiration of the lymph nodes demonstrating the presence of metastatic disease.  He continues to get 20 to 30 cc of bland drainage from his drain.  He continues to be very fatigued, although he does have days where he is been able to get out and do some yard work or go fishing.  He feels like his appetite is getting worse and he fills up very quickly.  He has no ongoing fevers chills or sweats.  The remainder of his 10 point review of systems otherwise is unremarkable.    GENERAL: Fatigued, somewhat cachectic, alert and no distress  EYES: Eyes grossly normal to inspection.  No discharge or erythema, or obvious scleral/conjunctival abnormalities.  RESP: No audible wheeze, cough, or visible cyanosis.  No visible retractions or increased work of breathing.    SKIN: Visible skin clear. No significant rash, abnormal pigmentation or lesions.  NEURO: Cranial nerves grossly intact.  Mentation and speech appropriate for age.  PSYCH: Mentation appears normal, affect normal/bright, judgement and insight intact, normal speech and appearance well-groomed.    I reviewed with the patient and his wife and daughter the findings of recurrence of his disease on biopsy.  I went over the results of the recent CT scans and where his disease appears to have recurred.    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  I think part of the patient's slow recovery and poor wound healing actually represents the presence of his cancer and while we have not yet been able to  remove his drain I do not think I want a wait any longer before getting him started on active treatment.  I am hopeful that with control of his cancer his problems with fatigue and appetite will improve.  At present he does not appear to have any residual active infection and so I do not think we will get into much trouble if we make him neutropenic but I discussed that risk with him and his family.  We discussed that the expectations of treatment are that it would not be curative and the intention is to provide these good control both to improve his quality of life and enhance his survival time.  We discussed the great variability from patient to patient in terms of depth and duration of response.    His most recent CT scan of the abdomen is about 2 weeks old so that is current enough but we have not looked at his chest in more than 6 weeks and so would like to get a fresh CT scan to have a clean baseline before we start.  I am ordering further molecular profiling probably not on this current biopsy which was just an FNA but on his original resected specimen to look for FGF fusions and other alterations that might direct us towards targeted therapies or immunotherapy.      For the immediate term though we will get started on standard chemotherapy with gemcitabine plus cisplatin.  I reviewed the general outline of starting with treatment and reassessing his response every couple of months with continued therapy so long as it is controlling his disease without excess toxicity.  I went over the details of the expected toxicities with the gemcitabine and cisplatin and how we manage those.      He wanted to avoid getting a Port-A-Cath for now and says he has no problems with getting peripheral IVs at present.      He does not have any significant pain at present that needs management.      He and his family are coping but are a bit overwhelmed at present and are still trying to formulate their questions about the  future.      Alex Beard MD

## 2020-08-18 NOTE — ANESTHESIA POSTPROCEDURE EVALUATION
Anesthesia POST Procedure Evaluation    Patient: Fuentes Estrada   MRN:     [id number] Gender:   male   Age:    65 year old :      1955        Preoperative Diagnosis: Abnormal finding on GI tract imaging [R93.3]   Procedure(s):  ENDOSCOPIC ULTRASOUND, ESOPHAGOSCOPY / UPPER GASTROINTESTINAL TRACT (GI)  Esophagogastroduodenoscopy, With Fine Needle Aspiration Biopsy, With Endoscopic Ultrasound Guidance   Postop Comments: No value filed.     Anesthesia Type: MAC       Disposition: Outpatient   Postop Pain Control: Uneventful            Sign Out: Well controlled pain   PONV: No   Neuro/Psych: Uneventful            Sign Out: Acceptable/Baseline neuro status   Airway/Respiratory: Uneventful            Sign Out: Acceptable/Baseline resp. status   CV/Hemodynamics: Uneventful            Sign Out: Acceptable CV status   Other NRE: NONE   DID A NON-ROUTINE EVENT OCCUR? No         Last Anesthesia Record Vitals:  CRNA VITALS  2020 1424 - 2020 1524      2020             Pulse:  77    Ht Rate:  77    SpO2:  96 %    Resp Rate (observed):  24          Last PACU Vitals:  Vitals Value Taken Time   BP     Temp     Pulse     Resp     SpO2     Temp src     NIBP 95/65 2020  2:52 PM   Pulse 77 2020  2:54 PM   SpO2 96 % 2020  2:54 PM   Resp     Temp     Ht Rate 77 2020  2:54 PM   Temp 2           Electronically Signed By: Christopher J. Behrens, MD, 2020, 5:53 PM

## 2020-08-19 NOTE — PROGRESS NOTES
RN Care Coordination Note  Placed call to patient and wife to discuss chemotherapy and resources available at the [facility]. Reviewed administration, side effects (including myelosuppression, nausea/vomiting, diarrhea/constipation, hair loss, mouth sores) and ongoing symptom management by APPs in clinic. Provided phone numbers for triage and after hours care. Highlighted steps to expect when getting chemotherapy (check in, labs, pre- meds, infusion), Discussed that chemo treatment may be delayed a day or two due to blood counts, infusion schedule or patient's need to modify.     Provided patient with [employer] printouts on Cisplatin/Gemzar via [employer].Included a one page resource of symptoms and when to contact the Cancer Clinic with questions or concerns.      [facility] discussed ports, patient will start with peripheral IV's.      Patient's wife also states he has been having trouble sleeping. He has tried melatonin, benadryl, and unisom. He has not found any particularly helpful. Asks if there is something else he can try. RNCC will follow-up with Dr Beard for more recommendations.     Answered all questions to stated satisfaction.                                                                                                                                                  Chey Arias RN, BSN, OCN   RN Care Coordinator   [facility]

## 2020-08-25 NOTE — IP AVS SNAPSHOT
[address] [address]                                    After Visit Summary   8/25/2020    Fuentes Estrada    MRN: [id number]           After Visit Summary Signature Page    I have received my discharge instructions, and my questions have been answered. I have discussed any challenges I see with this plan with the nurse or doctor.    ..........................................................................................................................................  Patient/Patient Representative Signature      ..........................................................................................................................................  Patient Representative Print Name and Relationship to Patient    ..................................................               ................................................  Date                                   Time    ..........................................................................................................................................  Reviewed by Signature/Title    ...................................................              ..............................................  Date                                               Time          22EPIC Rev 08/18

## 2020-08-25 NOTE — IP AVS SNAPSHOT
MRN:[id number]                      After Visit Summary   8/25/2020    Fuentes Estrada    MRN: [id number]           Visit Information        Department      8/25/2020  9:31 AM [address] [facility]          Review of your medicines      UNREVIEWED medicines. Ask your doctor about these medicines       Dose / Directions   atorvastatin 40 MG tablet  Commonly known as:  LIPITOR      Dose:  40 mg  Take 40 mg by mouth At Bedtime  Refills:  0     clopidogrel 75 MG tablet  Commonly known as:  PLAVIX      Dose:  75 mg  Take 75 mg by mouth At Bedtime  Refills:  0     empagliflozin 10 MG Tabs tablet  Commonly known as:  JARDIANCE      Dose:  10 mg  Take 10 mg by mouth At Bedtime  Refills:  0     fenofibrate 160 MG tablet  Commonly known as:  TRIGLIDE/LOFIBRA      Dose:  160 mg  Take 160 mg by mouth At Bedtime  Refills:  0     furosemide 20 MG tablet  Commonly known as:  LASIX      Dose:  20 mg  Take 20 mg by mouth every evening  Refills:  0     glipiZIDE 10 MG tablet  Commonly known as:  GLUCOTROL      Dose:  10 mg  Take 10 mg by mouth At Bedtime  Refills:  0     insulin glargine 100 UNIT/ML pen  Commonly known as:  LANTUS PEN      Dose:  24 Units  Inject 24 Units Subcutaneous At Bedtime  Refills:  0     iron 325 (65 Fe) MG tablet  Used for:  Iron deficiency anemia due to chronic blood loss      Dose:  1 tablet  Take 1 tablet by mouth 3 times daily (with meals)  Quantity:  180 tablet  Refills:  1     lisinopril 30 MG tablet  Commonly known as:  ZESTRIL      Dose:  30 mg  Take 30 mg by mouth every evening  Refills:  0     oxyCODONE 5 MG tablet  Commonly known as:  ROXICODONE  Used for:  Cholangiocarcinoma (H)      Dose:  5-10 mg  Take 1-2 tablets (5-10 mg) by mouth every 4 hours as needed for moderate to severe pain  Quantity:  20 tablet  Refills:  0     polyethylene glycol 17 g packet  Commonly known as:  MIRALAX  Used for:  Cholangiocarcinoma (H)      Dose:  17 g  Take 17 g by mouth daily  Quantity:  14  packet  Refills:  0     sildenafil 20 MG tablet  Commonly known as:  REVATIO      Dose:  20 mg  Take 20 mg by mouth as needed  Refills:  0     zolpidem 5 MG tablet  Commonly known as:  AMBIEN  Used for:  Hilar cholangiocarcinoma (H)      Dose:  5 mg  Take 1 tablet (5 mg) by mouth nightly as needed for sleep  Quantity:  30 tablet  Refills:  1              Protect others around you: Learn how to safely use, store and throw away your medicines at www.disposemymeds.org.       Follow-ups after your visit       Your next 10 appointments already scheduled    Aug 27, 2020  8:00 AM CDT  Level 7 with NL INFUSION CHAIR 5  [facility] ([facility]) [address] [place] [address]  [phone]      Sep 02, 2020  9:00 AM CDT  Level 7 with NL INFUSION CHAIR 4  [facility] ([facility]) [address] [place] [address]  [phone]      Sep 15, 2020  4:20 PM CDT  (Arrive by 4:05 PM)  Video Visit with VICKI Royal CNP  [facility] ([facility]) [address] [place] [address]  [phone] [place] [address]  Note: this is not an onsite visit; there is no need to come to the facility.  Please have a list of all current medications available for appointment.      Sep 16, 2020 12:00 PM CDT  Telephone Visit with Zainab Peguero GC  [facility] ([facility]) [address] [place] [id number]  [phone] [place] [address]  Note: this is not an onsite visit; there is no need to come to the facility.  Please have a list of all current medications available for appointment.      Sep 17, 2020  9:00 AM CDT  Level 7 with NL INFUSION CHAIR 5  [facility] ([facility]) [address] [place] [address]  [phone]      Sep 23, 2020  8:00 AM  CDT  Level 7 with NL INFUSION CHAIR 4  [facility] ([facility]) [address] [place] [address] [address] [place] [address]  [phone]      Oct 23, 2020  9:45 AM CDT  LAB with NL LAB PMC  [facility] ([facility]) [address] [address]  [phone]   Please do not eat 10-12 hours before your appointment if you are coming in fasting for labs on lipids, cholesterol, or glucose (sugar). Does not apply to pregnant women. Water, tea and black coffee (with nothing added) is okay. Do not drink other fluids, diet soda or gum. If you have concerns about taking your medications, please send a message by clicking on Secure Messaging, Message Your Care Team.     Oct 23, 2020 10:30 AM CDT  (Arrive by 10:00 AM)  CT CHEST ABDOMEN PELVIS W/O & W CONTRAST with PHCT1  [facility] CT Scan ([facility]) [address] [address]  [phone]   How do I prepare for my exam? (Food and drink instructions)  To prepare: Do not eat or drink for 2 hours before your exam. If you need to take medicine, you may take it with small sips of water. (We may ask you to take liquid medicine as well.)    How do I prepare for my exam? (Other instructions)  Please arrive 30 minutes early for your CT.  Once in the department you might be asked to drink water 15-20 minutes prior to your exam.  If indicated you may be asked to drink an oral contrast in advance of your CT.  If this is the case, the imaging team will let you know or be in contact with you prior to your appointment    Patients over 70 or patients with diabetes or kidney problems: If you haven t had a blood test (creatinine test) within the last 30 days, the Cardiologist/Radiologist may require you to get this test prior to your exam.    If you have diabetes:  Continue to take your metformin medication on the day of your exam    What should I wear: Please wear loose  clothing, such as a sweat suit or jogging clothes. Avoid snaps, zippers and other metal. We may ask you to undress and put on a hospital gown.    How long does the exam take: Most scans take less than 20 minutes.    What should I bring: Please bring any scans or X-rays taken at other hospitals, if similar tests were done. Also bring a list of your medicines, including vitamins, minerals and over-the-counter drugs. It is safest to leave personal items at home.    Do I need a : No  is needed.    What do I need to tell my doctor?  Be sure to tell your doctor:  * If you have any allergies.  * If there s any chance you are pregnant.  * If you are breastfeeding.    What should I do after the exam: No restrictions, You may resume normal activities.    What is this test: A CT (computed tomography) scan is a series of pictures that allows us to look inside your body. The scanner creates images of the body in cross sections, much like slices of bread. This helps us see any problems more clearly. You may receive contrast (X-ray dye) before or during your scan. You will be asked to drink the contrast.    Who should I call with questions: If you have any questions, please call the Imaging Department where you will have your exam. Directions, parking instructions, and other information is available on our website, [email or web address].     Oct 26, 2020  4:00 PM CDT  (Arrive by 3:45 PM)  Video Visit with Aydin Beard MD  [facility] ([facility]) [address] [place] [address] [phone]   [facility]  Note: this is not an onsite visit; there is no need to come to the facility.  Please have a list of all current medications available for appointment.         Care Instructions       Further instructions from your care team       [facility]  Interventional Radiology   Drainage Tube Instructions      AFTER YOU  "GO HOME:    Have an adult stay with you for 24 hours.     Drink plenty of fluids and resume your regular diet.    For 24 hours:       Do not drive or operate machinery at home or at work    No alcoholic beverages    Do not make any important legal decisions    No heavy lifting greater than 10 lb until instructed by your physician     Call Your Physician if:    You develop nausea or vomiting.    Your develop hives or rash or unexplained itching    Additional Instructions: Please call for the following problems:    No fluid draining from the tube, check that the tube is not kinked or if stop cock is closed.    Flush with 5 cc's Normal Saline every 8 hours, empty bag and monitor output daily subtracting amount inserted.    Skin around tube is red, painful or has any drainage.    You have increased pain in your abdomen    Fever greater than 100.5 F and chills    You feel nauseated and  \"just not right\"      Change dressing every 48 hour or when it gets wet    Interventional Radiology Department    Physician:                  Dr. Purcell          Date:August 25, 2020  Telephone numbers: [phone]...Monday-Friday 8:00am-4:30pm                                     [phone]... After 4:30 Monday-Friday, Weekends and Holiday. Ask for the Interventional Radiologist on call. Someone is on call 24 hours a day.                                     Additional Information About Your Visit       [employer] Information    [employer] gives you secure access to your electronic health record. If you see a primary care provider, you can also send messages to your care team and make appointments. If you have questions, please call your primary care clinic.  If you do not have a primary care provider, please call [phone] and they will assist you.       Care EveryWhere ID    This is your Care EveryWhere ID. This could be used by other organizations to access your [facility] medical records  [id number]       Your Vitals Were  Most " recent update: 8/25/2020 12:09 PM    Blood Pressure   124/86    Pulse   68    Temperature   97.8  F (36.6  C) (Oral)    Respirations   12    Pulse Oximetry   96%          Primary Care Provider    Tolu Noland      Equal Access to Services    DINESH SANTOS : Hadii aad ku hadmeggano Soomaali, waaxda luqadaha, qaybta kaalmada adeegyada, stephanie clevelandn sumeet carbajal laPopeyekarla ilana. So [facility] [phone].    ATENCIÓN: Si habla español, tiene a kaufman disposición servicios gratuitos de asistencia lingüística. Llame al [phone].    We comply with applicable federal and state civil rights laws, including the [place] Human Rights Act. We do not discriminate on the basis of race, color, creed, Congregational, national origin, marital status, age, disability, sex, sexual orientation, or gender identity.       Thank you!    Thank you for choosing [facility] for your care. Our goal is always to provide you with excellent care. Hearing back from our patients is one way we can continue to improve our services. Please take a few minutes to complete the written survey that you may receive in the mail after you visit with us. Thank you!            Medication List      ASK your doctor about these medications          Morning Afternoon Evening Bedtime As Needed    atorvastatin 40 MG tablet  Also known as:  LIPITOR  INSTRUCTIONS:  Take 40 mg by mouth At Bedtime                     clopidogrel 75 MG tablet  Also known as:  PLAVIX  INSTRUCTIONS:  Take 75 mg by mouth At Bedtime                     empagliflozin 10 MG Tabs tablet  Also known as:  JARDIANCE  INSTRUCTIONS:  Take 10 mg by mouth At Bedtime                     fenofibrate 160 MG tablet  Also known as:  TRIGLIDE/LOFIBRA  INSTRUCTIONS:  Take 160 mg by mouth At Bedtime                     furosemide 20 MG tablet  Also known as:  LASIX  INSTRUCTIONS:  Take 20 mg by mouth every evening                     glipiZIDE 10 MG tablet  Also known as:  GLUCOTROL  INSTRUCTIONS:  Take 10 mg by mouth At  Bedtime                     insulin glargine 100 UNIT/ML pen  Also known as:  LANTUS PEN  INSTRUCTIONS:  Inject 24 Units Subcutaneous At Bedtime  Doctor's comments:  <!--EPICS-->If Lantus is not covered by insurance, may substitute Basaglar at same dose and frequency.  <!--EPICE-->                     iron 325 (65 Fe) MG tablet  INSTRUCTIONS:  Take 1 tablet by mouth 3 times daily (with meals)                     lisinopril 30 MG tablet  Also known as:  ZESTRIL  INSTRUCTIONS:  Take 30 mg by mouth every evening                     oxyCODONE 5 MG tablet  Also known as:  ROXICODONE  INSTRUCTIONS:  Take 1-2 tablets (5-10 mg) by mouth every 4 hours as needed for moderate to severe pain                     polyethylene glycol 17 g packet  Also known as:  MIRALAX  INSTRUCTIONS:  Take 17 g by mouth daily                     sildenafil 20 MG tablet  Also known as:  REVATIO  INSTRUCTIONS:  Take 20 mg by mouth as needed                     zolpidem 5 MG tablet  Also known as:  AMBIEN  INSTRUCTIONS:  Take 1 tablet (5 mg) by mouth nightly as needed for sleep

## 2020-08-25 NOTE — PRE-PROCEDURE
GENERAL PRE-PROCEDURE:     Verbal consent obtained?: Yes    Written consent obtained?: Yes    Risks and benefits: Risks, benefits and alternatives were discussed    Consent given by:  Patient  Patient states understanding of procedure being performed: Yes    Patient's understanding of procedure matches consent: Yes    Procedure consent matches procedure scheduled: Yes    Appropriately NPO:  Yes  ASA Class:  Class 2- mild systemic disease, no acute problems, no functional limitations  Mallampati  :  Grade 2- soft palate, base of uvula, tonsillar pillars, and portion of posterior pharyngeal wall visible  Lungs:  Lungs clear with good breath sounds bilaterally (Posterior auscultation not performed. Lungs otherwise clear.)  Heart:  Normal heart sounds and rate and other (comment)  Heart exam comment:  Distant heart sounds  History & Physical reviewed:  History and physical reviewed and no updates needed  Statement of review:  I have reviewed the lab findings, diagnostic data, medications, and the plan for sedation

## 2020-08-25 NOTE — PROCEDURES
Interventional Radiology Brief Post Procedure Note    Procedure: IR SINOGRAM INJECTION THERAPEUTIC    Proceduralist: Reta Purcell MD    Assistant: None    Time Out: Prior to the start of the procedure and with procedural staff participation, I verbally confirmed the patient s identity using two indicators, relevant allergies, that the procedure was appropriate and matched the consent or emergent situation, and that the correct equipment/implants were available. Immediately prior to starting the procedure I conducted the Time Out with the procedural staff and re-confirmed the patient s name, procedure, and site/side. (The Joint Commission universal protocol was followed.)  Yes    Medications   Medication Event Details Admin User Admin Time       Sedation: IR Nurse Monitored Care   Post Procedure Summary:  Prior to the start of the procedure and with procedural staff participation, I verbally confirmed the patient s identity using two indicators, relevant allergies, that the procedure was appropriate and matched the consent or emergent situation, and that the correct equipment/implants were available. Immediately prior to starting the procedure I conducted the Time Out with the procedural staff and re-confirmed the patient s name, procedure, and site/side. (The Joint Commission universal protocol was followed.)  Yes       Sedatives: Fentanyl and Midazolam (Versed) fentanyl 100 mcg, versed 2 mg    Vital signs, airway and pulse oximetry were monitored and remained stable throughout the procedure and sedation was maintained until the procedure was complete.  The patient was monitored by staff until sedation discharge criteria were met.    Patient tolerance: Patient tolerated the procedure well with no immediate complications.    Time of sedation in minutes: 20 minutes minutes from beginning to end of physician one to one monitoring.          Findings:   No identified communication to bile ducts     Estimated Blood  Loss: None    Fluoroscopy Time:  minute(s)    SPECIMENS: None    Complications: 1. None     Condition: Stable    Plan:   1. Drain to gravity, flush TID, record output  2. Return for sinogram, sclerotherapy, possible drain change in 2 weeks. May consider weekly sclerotherapy if patient tolerates and can travel.    Comments: See dictated procedure note for full details.    Reta Purcell MD

## 2020-08-25 NOTE — PROGRESS NOTES
Patient Name: Fuentes Estrada  Medical Record Number: [id number]  Today's Date: 8/25/2020    Procedure: Sinogram with sclerotherapy  Proceduralist: Dr. Purcell    Procedure Start: 1145  Procedure end: 1205  Sedation medications administered: 100 mcg fentanyl and 2 mg versed     Report given to: 2A RN  : n/a    Other Notes: Pt arrived to IR room 1 from . Consent reviewed. Pt denies any questions or concerns regarding procedure. Pt positioned left side down and monitored per protocol. Pt tolerated procedure without any noted complications. Pt transferred back to .

## 2020-08-25 NOTE — PROGRESS NOTES
Discharge instructions completed with all questions answered and copy to patient. Pt ambulates steady on feet and denies pain or dizziness when standing. Tolerates PO fluid well, refuses PO food. Additional supplies provided to patient. PIV removed with catheter intact. Site continues to appear clean, dry, intact, and soft.

## 2020-08-25 NOTE — PROGRESS NOTES
Pt back to unit 2a post sinogram with wife at bedside. Dr. Purcell at bedside to discuss procedure and plan of care. RLQ site appears clean, dry, intact, and soft. Additional supplies and syringes provided to patient. Per IR scheduling, will follow up with patient, wife Robyn aware. Pt declines PO food, takes PO water well.

## 2020-08-25 NOTE — DISCHARGE INSTRUCTIONS
"[facility]  Interventional Radiology   Drainage Tube Instructions      AFTER YOU GO HOME:    Have an adult stay with you for 24 hours.     Drink plenty of fluids and resume your regular diet.    For 24 hours:       Do not drive or operate machinery at home or at work    No alcoholic beverages    Do not make any important legal decisions    No heavy lifting greater than 10 lb until instructed by your physician     Call Your Physician if:    You develop nausea or vomiting.    Your develop hives or rash or unexplained itching    Additional Instructions: Please call for the following problems:    No fluid draining from the tube, check that the tube is not kinked or if stop cock is closed.    Flush with 5 cc's Normal Saline every 8 hours, empty bag and monitor output daily subtracting amount inserted.    Skin around tube is red, painful or has any drainage.    You have increased pain in your abdomen    Fever greater than 100.5 F and chills    You feel nauseated and  \"just not right\"      Change dressing every 48 hour or when it gets wet    Interventional Radiology Department    Physician:                  Dr. Purcell          Date:August 25, 2020  Telephone numbers: [phone]...Monday-Friday 8:00am-4:30pm                                     [phone]... After 4:30 Monday-Friday, Weekends and Holiday. Ask for the Interventional Radiologist on call. Someone is on call 24 hours a day.                                   "

## 2020-08-25 NOTE — PROGRESS NOTES
Pt to unit 2a for sinogram injection, consenting at this time with wife at bedside, PIV in place, abx infusing.

## 2020-08-27 NOTE — PROGRESS NOTES
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     Intervention/Education/Resources Provided:  SW met with him in the infusion center this morning, introduced self and spoke about SW services. Fuentes was pleasant and open to SW visit. SW checked in with Fuentes about how he was feeling today as he was getting started with treatment and he answered, \"oh, wonderful.\" SW checked in about this further asking if that was a true answer or a bit of sarcasm. At this he did get a bit more serious. He states he is doing ok overall, though acknowledges, \"this isn't where I want to be.\" SW agreed and validated those feelings. Brief supportive counseling offered. SW assured Fuentes that these first days are often quite difficult as patients are still adjusting to the diagnosis and don't quite know what to expect. Fuentes agreed. SW checked with Fuentes about how his wife was doing and he states similarly, no specific concerns raised. He denies and resource or financial concerns at this time, but understands SW is available to assist if those needs come up. He feels for now he has good support at home and focus is on getting through treatment.      Assessment/Plan:  SW will continue to remain available for support and resources. SW will try to check in periodically in order to monitor for any challenges with this adjustment to diagnosis and treatment and provide support.     Provided patient/family with contact information and availability.      NORMA Clayton, [facility]  Clinical , Adult Oncology  Pager: [phone]  Phone: [phone]    "

## 2020-08-27 NOTE — PROGRESS NOTES
Infusion Nursing Note:  Fuentes Estrada presents today for C1D1 Cisplatin/Gemzar/IV fluids.    Patient seen by provider today: No   present during visit today: Not Applicable.    Note: Patient and spouse arrived for first chemotherapy cycle. Oriented to Dept., Plan of care, call light. Chemo Education binder given to patient. Discussed chemotherapy regimen for today. Health history reviewed. Questions answered.     Intravenous Access:  Peripheral IV placed. Good blood return. Flushes easily.     Treatment Conditions:  Lab Results   Component Value Date    HGB 11.9 08/27/2020     Lab Results   Component Value Date    WBC 6.8 08/27/2020      Lab Results   Component Value Date    ANEU 4.4 08/27/2020     Lab Results   Component Value Date     08/27/2020      Lab Results   Component Value Date     08/27/2020                   Lab Results   Component Value Date    POTASSIUM 3.6 08/27/2020           Lab Results   Component Value Date    MAG 1.9 08/27/2020            Lab Results   Component Value Date    CR 0.83 08/27/2020                   Lab Results   Component Value Date    ERIC 8.8 08/27/2020                Lab Results   Component Value Date    BILITOTAL 1.6 08/27/2020           Lab Results   Component Value Date    ALBUMIN 2.5 08/27/2020                    Lab Results   Component Value Date    ALT 29 08/27/2020           Lab Results   Component Value Date    AST 57 08/27/2020       Results reviewed, labs MET treatment parameters, ok to proceed with treatment.      Post Infusion Assessment:  Patient tolerated infusion without incident. VSS, asymptomatic.  Patient observed for 15 minutes post chemotherapy.  Blood return noted pre and post infusion.  Site patent and intact, free from redness, edema or discomfort.   No evidence of extravasations.  PIV access discontinued per protocol.       Discharge Plan:   Copy of AVS reviewed with patient and/or family. Reviewed take home meds including paper copy  given for Lorazepam after it was called to pharmacy of their choice. Also reviewed possible side effects and number to call if questions or concerns. Patient will return 9/2 for next appointment.  Patient discharged in stable condition accompanied by: wife.  Departure Mode: Ambulatory.    Catia Bray RN

## 2020-08-27 NOTE — TELEPHONE ENCOUNTER
Prior Authorization Retail Medication Request    Medication/Dose: Lorazepam 0.5mg Tablets  ICD code (if different than what is on RX):    Previously Tried and Failed:    Rationale:      Insurance Name:  [employer]  Insurance ID:        Pharmacy Information (if different than what is on RX)  Name:    Phone:  [phone]

## 2020-08-28 NOTE — TELEPHONE ENCOUNTER
Central Prior Authorization Team   Phone: [phone]      PA Initiation    Medication: Lorazepam 0.5mg Tablets-PA initiated  Insurance Company: [employer] - Phone [phone] Fax [phone]  Pharmacy Filling the Rx: [facility] - [place] - [address]  Filling Pharmacy Phone: [phone]  Filling Pharmacy Fax:    Start Date: 8/28/2020

## 2020-08-31 NOTE — TELEPHONE ENCOUNTER
Prior Authorization Approval    Authorization Effective Date: 8/28/2020  Authorization Expiration Date: 12/31/2020  Medication: Lorazepam 0.5mg Tablets-PA approved  Approved Dose/Quantity:   Reference #:     Insurance Company: [employer] - Phone [phone] Fax [phone]  Expected CoPay:       CoPay Card Available:      Foundation Assistance Needed:    Which Pharmacy is filling the prescription (Not needed for infusion/clinic administered): [facility] - [place], [place] - [address] [facility]  Pharmacy Notified: Yes  Patient Notified: No-Pharmacy will contact

## 2020-09-02 NOTE — PROGRESS NOTES
Infusion Nursing Note:  Fuentes Estrada presents today for C1D8 Cisplatin/Gemzar.    Patient seen by provider today: No   present during visit today: Not Applicable.    Note: Ambulated to IVO with wife. Labs done-OK to proceed with TX. Wife left during infusions.     Intravenous Access:  Peripheral IV placed.    Treatment Conditions:  Lab Results   Component Value Date    HGB 12.4 09/02/2020     Lab Results   Component Value Date    WBC 2.8 09/02/2020      Lab Results   Component Value Date    ANEU 1.9 09/02/2020     Lab Results   Component Value Date     09/02/2020      Lab Results   Component Value Date     09/02/2020                   Lab Results   Component Value Date    POTASSIUM 3.4 09/02/2020           Lab Results   Component Value Date    MAG 1.6 09/02/2020            Lab Results   Component Value Date    CR 0.89 09/02/2020                   Lab Results   Component Value Date    ERIC 8.9 09/02/2020                Lab Results   Component Value Date    BILITOTAL 2.9 09/02/2020           Lab Results   Component Value Date    ALBUMIN 2.7 09/02/2020                    Lab Results   Component Value Date    ALT 58 09/02/2020           Lab Results   Component Value Date    AST 88 09/02/2020       Results reviewed, labs MET treatment parameters, ok to proceed with treatment.      Post Infusion Assessment:  Patient tolerated infusion without incident.  Blood return noted pre and post infusion.  Site patent and intact, free from redness, edema or discomfort.  No evidence of extravasations.  Access discontinued per protocol.       Discharge Plan:   Discharge instructions reviewed with: Patient and Family.  Patient and/or family verbalized understanding of discharge instructions and all questions answered.  Patient discharged in stable condition accompanied by: self and wife.  Departure Mode: Ambulatory.     Leatha Rodriguez RN

## 2020-09-03 NOTE — PROGRESS NOTES
Wife calls with drain concerns/questions.    RUQ drain with persistent bile leak after surgery. Last seen by IR 8/25 for sinogram and sclerotherapy. Drain with 15 mls out per day after flushing (5 ml TID). This is consistent per wife. Pt has had 2 episodes this week where wife has noted leakage on the bandages when she changes them. Usually in the AM. Flushes well, without resistance. No increased pain. No fevers. Site looks WNLs.    Likely due to position of the drain or kinking while sleeping. As the drain seems to be draining and flushing well otherwise, would continue to monitor. As we are going into a long weekend, we can bring him in tomorrow and complete a sinogram to ensure the drain is functional and in adequate position. Robyn states they are 2 hrs away and she is comfortable with my answers to her questions and is willing to continue to monitor. Plan for f/u with IR on 9/14.    Cheri Estrada DNP, APRN  Interventional Radiology   IR on-call pager: [phone]

## 2020-09-04 NOTE — TELEPHONE ENCOUNTER
Patient's wife requests prescriptions be sent to different pharmacy. Request for [facility]. New rx entered and routed to Dr Beard for signature.

## 2020-09-08 NOTE — TELEPHONE ENCOUNTER
Robyn calling with concern about flushes leaking around drain. I instructed her to decrease flushes to 5 mL BID instead of TID. It does not sound like the drain has become displaced. There is no signs of infection or fevers at this time. This is likely a sign of diminishing fluid pocket which may indicate progress. There is no concern to warrant an appointment prior to next Monday's scheduled appointment for sinogram and possible sclerotherapy.    Osmar Laughlin PA-C  Interventional Radiology  [phone]

## 2020-09-14 NOTE — IP AVS SNAPSHOT
[address] [address]                                    After Visit Summary   9/14/2020    Fuentes Estrada    MRN: [id number]           After Visit Summary Signature Page    I have received my discharge instructions, and my questions have been answered. I have discussed any challenges I see with this plan with the nurse or doctor.    ..........................................................................................................................................  Patient/Patient Representative Signature      ..........................................................................................................................................  Patient Representative Print Name and Relationship to Patient    ..................................................               ................................................  Date                                   Time    ..........................................................................................................................................  Reviewed by Signature/Title    ...................................................              ..............................................  Date                                               Time          22EPIC Rev 08/18

## 2020-09-14 NOTE — PROGRESS NOTES
Patient Name: Fuentes Estrada  Medical Record Number: [id number]  Today's Date: 9/14/2020    Procedure: sinogram sclerotherapy possible drain reposition or replacement   Proceduralist:     Procedure Start: 1110  Procedure end: 1230  Sedation medications administered: 4mg versed 100mcg fentanyl     Report given to: Sydnie CASTELLANOS      Pt arrived to IR room 2 from . Consent obtained by Johan VICENTE/ reviewed. Pt denies any questions or concerns regarding procedure. Pt positioned left side laying and monitored per protocol. Per MD cavity volume 5ml . MD injected at 1117-5ml lido 1% to be instilled 20 min per verbal order MD.1139-5ml dehydrated alcohol per MD verbal order instilled. 1200-Doxycycline instilled per order. Pt tolerated procedure without any noted complications. Pt transferred back to .

## 2020-09-14 NOTE — PRE-PROCEDURE
GENERAL PRE-PROCEDURE:     Verbal consent obtained?: Yes    Written consent obtained?: Yes    Risks and benefits: Risks, benefits and alternatives were discussed    Consent given by:  Patient  Patient states understanding of procedure being performed: Yes    Patient's understanding of procedure matches consent: Yes    Procedure consent matches procedure scheduled: Yes    Expected level of sedation:  Moderate  Appropriately NPO:  Yes  ASA Class:  Class 3- Severe systemic disease, definite functional limitations  Mallampati  :  Grade 2- soft palate, base of uvula, tonsillar pillars, and portion of posterior pharyngeal wall visible  Lungs:  Lungs clear with good breath sounds bilaterally  Heart:  Normal heart sounds and rate (Distant heart sounds)  History & Physical reviewed:  History and physical reviewed and no updates needed  Statement of review:  I have reviewed the lab findings, diagnostic data, medications, and the plan for sedation

## 2020-09-14 NOTE — IP AVS SNAPSHOT
MRN:[id number]                      After Visit Summary   9/14/2020    Fuentes Estrada    MRN: [id number]           Visit Information        Department      9/14/2020  9:18 AM [address] [facility]          Review of your medicines      UNREVIEWED medicines. Ask your doctor about these medicines       Dose / Directions   atorvastatin 40 MG tablet  Commonly known as:  LIPITOR      Dose:  40 mg  Take 40 mg by mouth At Bedtime  Refills:  0     clopidogrel 75 MG tablet  Commonly known as:  PLAVIX      Dose:  75 mg  Take 75 mg by mouth At Bedtime  Refills:  0     dronabinol 5 MG capsule  Commonly known as:  MARINOL  Used for:  Hilar cholangiocarcinoma (H)      Dose:  5 mg  Take 1 capsule (5 mg) by mouth 2 times daily (before meals)  Quantity:  60 capsule  Refills:  0     empagliflozin 10 MG Tabs tablet  Commonly known as:  JARDIANCE      Dose:  10 mg  Take 10 mg by mouth At Bedtime  Refills:  0     fenofibrate 160 MG tablet  Commonly known as:  TRIGLIDE/LOFIBRA      Dose:  160 mg  Take 160 mg by mouth At Bedtime  Refills:  0     furosemide 20 MG tablet  Commonly known as:  LASIX      Dose:  20 mg  Take 20 mg by mouth every evening  Refills:  0     glipiZIDE 10 MG tablet  Commonly known as:  GLUCOTROL      Dose:  10 mg  Take 10 mg by mouth At Bedtime  Refills:  0     insulin glargine 100 UNIT/ML pen  Commonly known as:  LANTUS PEN      Dose:  24 Units  Inject 24 Units Subcutaneous At Bedtime  Refills:  0     iron 325 (65 Fe) MG tablet  Used for:  Iron deficiency anemia due to chronic blood loss      Dose:  1 tablet  Take 1 tablet by mouth 3 times daily (with meals)  Quantity:  180 tablet  Refills:  1     lisinopril 30 MG tablet  Commonly known as:  ZESTRIL      Dose:  30 mg  Take 30 mg by mouth every evening  Refills:  0     LORazepam 0.5 MG tablet  Commonly known as:  ATIVAN  Used for:  Hilar cholangiocarcinoma (H)      Dose:  0.5 mg  Take 1 tablet (0.5 mg) by mouth every 4 hours as needed (Anxiety,  Nausea/Vomiting or Sleep)  Quantity:  30 tablet  Refills:  5     ondansetron 8 MG tablet  Commonly known as:  ZOFRAN  Used for:  Hilar cholangiocarcinoma (H)      Dose:  8 mg  Take 1 tablet (8 mg) by mouth every 8 hours as needed (Nausea/Vomiting)  Quantity:  10 tablet  Refills:  5     oxyCODONE 5 MG tablet  Commonly known as:  ROXICODONE  Used for:  Cholangiocarcinoma (H)      Dose:  5-10 mg  Take 1-2 tablets (5-10 mg) by mouth every 4 hours as needed for moderate to severe pain  Quantity:  20 tablet  Refills:  0     polyethylene glycol 17 g packet  Commonly known as:  MIRALAX  Used for:  Cholangiocarcinoma (H)      Dose:  17 g  Take 17 g by mouth daily  Quantity:  14 packet  Refills:  0     prochlorperazine 10 MG tablet  Commonly known as:  COMPAZINE  Used for:  Hilar cholangiocarcinoma (H)      Dose:  5 mg  Take 0.5 tablets (5 mg) by mouth every 6 hours as needed (Nausea/Vomiting)  Quantity:  30 tablet  Refills:  5     sildenafil 20 MG tablet  Commonly known as:  REVATIO      Dose:  20 mg  Take 20 mg by mouth as needed  Refills:  0     zolpidem 10 MG tablet  Commonly known as:  AMBIEN  Used for:  Hilar cholangiocarcinoma (H)      Dose:  10 mg  Take 1 tablet (10 mg) by mouth nightly as needed for sleep  Quantity:  30 tablet  Refills:  0              Protect others around you: Learn how to safely use, store and throw away your medicines at www.disposemymeds.org.       Follow-ups after your visit       Your next 10 appointments already scheduled    Sep 15, 2020  4:20 PM CDT  (Arrive by 4:05 PM)  Video Visit with VICKI Royal CNP  [facility] ([facility]) [address] [place] [address] [phone]   [facility]  Note: this is not an onsite visit; there is no need to come to the facility.  Please have a list of all current medications available for appointment.      Sep 16, 2020  8:30 AM CDT  LAB with NL LAB [facility]3]  [place] ([facility]) [address] [place] [id number] [phone]   Please do not eat 10-12 hours before your appointment if you are coming in fasting for labs on lipids, cholesterol, or glucose (sugar). Does not apply to pregnant women. Water, tea and black coffee (with nothing added) is okay. Do not drink other fluids, diet soda or gum. If you have concerns about taking your medications, please send a message by clicking on Secure Messaging, Message Your Care Team.     Sep 16, 2020  9:00 AM CDT  Level 7 with NL INFUSION CHAIR 2  [facility] ([facility]) [address] [place] [address]  [place] [id number] [phone]      Sep 16, 2020 12:00 PM CDT  Telephone Visit with Zainab Peguero GC  [facility] ([facility]) [address] [facility] [id number] [phone]   [facility]  Note: this is not an onsite visit; there is no need to come to the facility.  Please have a list of all current medications available for appointment.      Sep 21, 2020  9:30 AM CDT  Procedure 4.5 hour with U2A ROOM 4  [address] [facility] ([facility]) [address] [place] [phone]      Sep 21, 2020 11:00 AM CDT  IR SINOGRAM INJECTION THERAPEUTIC with UUIR2  [facility] [place], Interventional Radiology ([facility]) [address] [facility] [address] [phone]   You do not need to do anything special to prepare for this exam.    If you have any questions, please call the Imaging Department where you will have your exam. Directions, parking instructions, and other information are available on our website, [email or web address].     Sep 23, 2020  7:45 AM CDT  LAB with NL LAB [facility]C  [facility] ([facility]) [address] [place] [address]  [address]  [phone]   Please do not eat 10-12 hours before your appointment if you are coming in fasting for labs on lipids, cholesterol, or glucose (sugar). Does not apply to pregnant women. Water, tea and black coffee (with nothing added) is okay. Do not drink other fluids, diet soda or gum. If you have concerns about taking your medications, please send a message by clicking on Secure Messaging, Message Your Care Team.     Sep 23, 2020  8:00 AM CDT  Level 7 with NL INFUSION CHAIR 4  [facility] ([facility]) [address] [place] [address]  [phone]      Oct 07, 2020  8:00 AM CDT  LAB with NL LAB PMC  [facility] ([facility]) [address] [facility] [address]  [phone]   Please do not eat 10-12 hours before your appointment if you are coming in fasting for labs on lipids, cholesterol, or glucose (sugar). Does not apply to pregnant women. Water, tea and black coffee (with nothing added) is okay. Do not drink other fluids, diet soda or gum. If you have concerns about taking your medications, please send a message by clicking on Secure Messaging, Message Your Care Team.     Oct 07, 2020  8:30 AM CDT  Level 7 with NL INFUSION CHAIR 3  [facility] ([facility]) [address] [place] [address] [place] [address]  [phone]         Care Instructions       Further instructions from your care team       [facility]  Interventional Radiology   Drainage Tube Instructions      AFTER YOU GO HOME:    Have an adult stay with you for 24 hours.     Drink plenty of fluids and resume your regular diet.    For 24 hours:       Do not drive or operate machinery at home or at work    No alcoholic beverages    Do not make any important legal decisions    No heavy lifting greater than 10 lb until instructed by your physician     Call Your Physician if:    You develop nausea or  "vomiting.    Your develop hives or rash or unexplained itching    Additional Instructions: Please call for the following problems:    No fluid draining from the tube, check that the tube is not kinked or if stop cock is closed.    Flush with 5 cc's betadine, empty bag and monitor output daily subtracting amount inserted.    Skin around tube is red, painful or has any drainage.    You have increased pain in your abdomen    Fever greater than 100.5 F and chills    You feel nauseated and  \"just not right\"      Change dressing every 48 hour or when it gets wet    Interventional Radiology Department    Physician:                  Dr. Hodge           Date:September 14, 2020  Telephone numbers: [phone]...Monday-Friday 8:00am-4:30pm                                     [phone]... After 4:30 Monday-Friday, Weekends and Holiday. Ask for the Interventional Radiologist on call. Someone is on call 24 hours a day.                                    Additional Information About Your Visit       [facility] Information    [facility] gives you secure access to your electronic health record. If you see a primary care provider, you can also send messages to your care team and make appointments. If you have questions, please call your primary care clinic.  If you do not have a primary care provider, please call [phone] and they will assist you.       Care EveryWhere ID    This is your Care EveryWhere ID. This could be used by other organizations to access your [facility] medical records  [id number]       Your Vitals Were  Most recent update: 9/14/2020 12:58 PM    Blood Pressure   117/69          Pulse   68          Temperature   97.6  F (36.4  C) (Oral)          Respirations   14          Weight   80.7 kg (178 lb)             Pulse Oximetry   99%    BMI (Body Mass Index)   23.48 kg/m           Primary Care Provider    Tolu Noland      Equal Access to Services    DINESH SANTOS AH: Hadii aad jada Charlton " jasen lindahervecatrachita shirareshma weston, stephanie gonzalez ah. So [facility] [phone].    ATENCIÓN: Si devante mendoza, tiene a kaufman disposición servicios gratuitos de asistencia lingüística. Skyeame al [phone].    We comply with applicable federal and state civil rights laws, including the [place] Human Rights Act. We do not discriminate on the basis of race, color, creed, Sabianist, national origin, marital status, age, disability, sex, sexual orientation, or gender identity.       Thank you!    Thank you for choosing [facility] for your care. Our goal is always to provide you with excellent care. Hearing back from our patients is one way we can continue to improve our services. Please take a few minutes to complete the written survey that you may receive in the mail after you visit with us. Thank you!            Medication List      ASK your doctor about these medications          Morning Afternoon Evening Bedtime As Needed    atorvastatin 40 MG tablet  Also known as:  LIPITOR  INSTRUCTIONS:  Take 40 mg by mouth At Bedtime                     clopidogrel 75 MG tablet  Also known as:  PLAVIX  INSTRUCTIONS:  Take 75 mg by mouth At Bedtime                     dronabinol 5 MG capsule  Also known as:  MARINOL  INSTRUCTIONS:  Take 1 capsule (5 mg) by mouth 2 times daily (before meals)                     empagliflozin 10 MG Tabs tablet  Also known as:  JARDIANCE  INSTRUCTIONS:  Take 10 mg by mouth At Bedtime                     fenofibrate 160 MG tablet  Also known as:  TRIGLIDE/LOFIBRA  INSTRUCTIONS:  Take 160 mg by mouth At Bedtime                     furosemide 20 MG tablet  Also known as:  LASIX  INSTRUCTIONS:  Take 20 mg by mouth every evening                     glipiZIDE 10 MG tablet  Also known as:  GLUCOTROL  INSTRUCTIONS:  Take 10 mg by mouth At Bedtime                     insulin glargine 100 UNIT/ML pen  Also known as:  LANTUS PEN  INSTRUCTIONS:  Inject 24 Units Subcutaneous At  Bedtime  Doctor's comments:  <!--EPICS-->If Lantus is not covered by insurance, may substitute Basaglar at same dose and frequency.  <!--EPICE-->                     iron 325 (65 Fe) MG tablet  INSTRUCTIONS:  Take 1 tablet by mouth 3 times daily (with meals)                     lisinopril 30 MG tablet  Also known as:  ZESTRIL  INSTRUCTIONS:  Take 30 mg by mouth every evening                     LORazepam 0.5 MG tablet  Also known as:  ATIVAN  INSTRUCTIONS:  Take 1 tablet (0.5 mg) by mouth every 4 hours as needed (Anxiety, Nausea/Vomiting or Sleep)                     ondansetron 8 MG tablet  Also known as:  ZOFRAN  INSTRUCTIONS:  Take 1 tablet (8 mg) by mouth every 8 hours as needed (Nausea/Vomiting)                     oxyCODONE 5 MG tablet  Also known as:  ROXICODONE  INSTRUCTIONS:  Take 1-2 tablets (5-10 mg) by mouth every 4 hours as needed for moderate to severe pain                     polyethylene glycol 17 g packet  Also known as:  MIRALAX  INSTRUCTIONS:  Take 17 g by mouth daily                     prochlorperazine 10 MG tablet  Also known as:  COMPAZINE  INSTRUCTIONS:  Take 0.5 tablets (5 mg) by mouth every 6 hours as needed (Nausea/Vomiting)                     sildenafil 20 MG tablet  Also known as:  REVATIO  INSTRUCTIONS:  Take 20 mg by mouth as needed                     zolpidem 10 MG tablet  Also known as:  AMBIEN  INSTRUCTIONS:  Take 1 tablet (10 mg) by mouth nightly as needed for sleep

## 2020-09-14 NOTE — PROGRESS NOTES
Discharge instructions completed with all questions answered and copy to patient. Additional supplies and betadine prescription given to patient. Ambulates steady on feet and denies pain or dizziness when standing. Site appears clean, dry, intact, and soft. One week follow-up appointment scheduled, pt and wife aware. PIV removed with catheter intact.

## 2020-09-14 NOTE — PROGRESS NOTES
Pt arrived on 2a post sinogram with sclerotherapy. VSS Ra. Dressing c/d/i. No pain. Family at bedside. Pt sipping on pop.

## 2020-09-16 NOTE — PROGRESS NOTES
Infusion Nursing Note:  Fuentes Estrada presents today for C2 D1 Cisplatin and Gemzar.    Patient seen by provider today: No   present during visit today: Not Applicable.    Note: Spoke with Leeanne Lake regarding missing virtual visit yesterday. RN assessed patient here, no complaints at this time and reported off to Leeanne Lake and she signed chemo for patient for today.We set up an future appt for virtual visit with Leeanne Laek prior to next cycle. Biliary drain intact and denies pain, the tube was manipulated to assist with healing.    Intravenous Access:  Peripheral IV placed.    Treatment Conditions:  Lab Results   Component Value Date    HGB 11.3 09/16/2020     Lab Results   Component Value Date    WBC 4.3 09/16/2020      Lab Results   Component Value Date    ANEU 2.0 09/16/2020     Lab Results   Component Value Date     09/16/2020      Lab Results   Component Value Date     09/16/2020                   Lab Results   Component Value Date    POTASSIUM 3.9 09/16/2020           Lab Results   Component Value Date    MAG 2.0 09/16/2020            Lab Results   Component Value Date    CR 0.84 09/16/2020                   Lab Results   Component Value Date    ERIC 8.9 09/16/2020                Lab Results   Component Value Date    BILITOTAL 1.6 09/16/2020           Lab Results   Component Value Date    ALBUMIN 2.6 09/16/2020                    Lab Results   Component Value Date    ALT 51 09/16/2020           Lab Results   Component Value Date    AST 72 09/16/2020       Results reviewed, labs MET treatment parameters, ok to proceed with treatment.      Post Infusion Assessment:  Patient tolerated infusion without incident  Blood return noted and tolerated infusion without problems.       Discharge Plan:   Discharge instructions reviewed with: Patient.  Patient and/or family verbalized understanding of discharge instructions and all questions answered.  Patient discharged in stable condition  accompanied by: wife.  Departure Mode: Ambulatory.    Lisa Dong RN

## 2020-09-16 NOTE — PATIENT INSTRUCTIONS
Pt to return on 09/23/20 for C2 D8 Cisplatin and Gemzar. Copies of medication list and upcoming appointments given prior to discharge.

## 2020-09-18 NOTE — PROGRESS NOTES
Pt prepped for sinogram.PIV placed and NS started and ABX given.Consent signed and questions answered with wife at bedside Robyn.

## 2020-09-18 NOTE — PROGRESS NOTES
Patient returned to 2A post sinogram and scleroptherapy of R abdomen drain.  VSS.  Denies pain.  Right outer abdominal site C/D/I, no hematoma.  PO fluids at bedside, declined po food.  1 hr bedrest.  Patient and wife aware.

## 2020-09-18 NOTE — IP AVS SNAPSHOT
[address] [address]                                    After Visit Summary   9/18/2020    Fuentes Estrada    MRN: [id number]           After Visit Summary Signature Page    I have received my discharge instructions, and my questions have been answered. I have discussed any challenges I see with this plan with the nurse or doctor.    ..........................................................................................................................................  Patient/Patient Representative Signature      ..........................................................................................................................................  Patient Representative Print Name and Relationship to Patient    ..................................................               ................................................  Date                                   Time    ..........................................................................................................................................  Reviewed by Signature/Title    ...................................................              ..............................................  Date                                               Time          22EPIC Rev 08/18

## 2020-09-18 NOTE — PRE-PROCEDURE
GENERAL PRE-PROCEDURE:   Procedure:  Sclerotherapy  Date/Time:  9/18/2020 9:46 AM    Written consent obtained?: Yes    Risks and benefits: Risks, benefits and alternatives were discussed    Consent given by:  Patient  Patient states understanding of procedure being performed: Yes    Patient's understanding of procedure matches consent: Yes    Procedure consent matches procedure scheduled: Yes    Expected level of sedation:  Moderate  Appropriately NPO:  Yes  ASA Class:  Class 2- mild systemic disease, no acute problems, no functional limitations  Mallampati  :  Grade 2- soft palate, base of uvula, tonsillar pillars, and portion of posterior pharyngeal wall visible  Lungs:  Lungs clear with good breath sounds bilaterally  Heart:  Normal heart sounds and rate  History & Physical reviewed:  History and physical reviewed and no updates needed  Statement of review:  I have reviewed the lab findings, diagnostic data, medications, and the plan for sedation

## 2020-09-18 NOTE — PROGRESS NOTES
Patient tolerated recovery stage well. VSS, right outer abdominal site clean/dry/intact, no hematoma, and denies pain. Patient tolerated PO food and fluids. Teaching was done and discharge instructions were given. Patient ambulated, voided, and PIV was removed.  IR scheduling to call patient/wife to make follow-up appointment for next week.  Patient discharged from the hospital to home with wife @ 1250.

## 2020-09-18 NOTE — PRE-PROCEDURE
GENERAL PRE-PROCEDURE:   Procedure:  Drain sinogram, sclerotherapoy, possible drain exchange  Date/Time:  9/18/2020 9:48 AM    Verbal consent obtained?: Yes    Written consent obtained?: Yes    Risks and benefits: Risks, benefits and alternatives were discussed    Consent given by:  Patient  Patient states understanding of procedure being performed: Yes    Patient's understanding of procedure matches consent: Yes    Procedure consent matches procedure scheduled: Yes    Appropriately NPO:  Yes  ASA Class:  Class 2- mild systemic disease, no acute problems, no functional limitations  Mallampati  :  Grade 2- soft palate, base of uvula, tonsillar pillars, and portion of posterior pharyngeal wall visible  Lungs:  Lungs clear with good breath sounds bilaterally  Heart:  Normal heart sounds and rate  History & Physical reviewed:  History and physical reviewed and no updates needed  Statement of review:  I have reviewed the lab findings, diagnostic data, medications, and the plan for sedation

## 2020-09-18 NOTE — IP AVS SNAPSHOT
MRN:[id number]                      After Visit Summary   9/18/2020    Fuentes Estrada    MRN: [id number]           Visit Information        Department      9/18/2020  8:02 AM [address] [facility]          Review of your medicines      UNREVIEWED medicines. Ask your doctor about these medicines       Dose / Directions   atorvastatin 40 MG tablet  Commonly known as:  LIPITOR      Dose:  40 mg  Take 40 mg by mouth At Bedtime  Refills:  0     clopidogrel 75 MG tablet  Commonly known as:  PLAVIX      Dose:  75 mg  Take 75 mg by mouth At Bedtime  Refills:  0     dronabinol 5 MG capsule  Commonly known as:  MARINOL  Used for:  Hilar cholangiocarcinoma (H)      Dose:  5 mg  Take 1 capsule (5 mg) by mouth 2 times daily (before meals)  Quantity:  60 capsule  Refills:  0     empagliflozin 10 MG Tabs tablet  Commonly known as:  JARDIANCE      Dose:  10 mg  Take 10 mg by mouth At Bedtime  Refills:  0     fenofibrate 160 MG tablet  Commonly known as:  TRIGLIDE/LOFIBRA      Dose:  160 mg  Take 160 mg by mouth At Bedtime  Refills:  0     furosemide 20 MG tablet  Commonly known as:  LASIX      Dose:  20 mg  Take 20 mg by mouth every evening  Refills:  0     glipiZIDE 10 MG tablet  Commonly known as:  GLUCOTROL      Dose:  10 mg  Take 10 mg by mouth At Bedtime  Refills:  0     insulin glargine 100 UNIT/ML pen  Commonly known as:  LANTUS PEN      Dose:  24 Units  Inject 24 Units Subcutaneous At Bedtime  Refills:  0     iron 325 (65 Fe) MG tablet  Used for:  Iron deficiency anemia due to chronic blood loss      Dose:  1 tablet  Take 1 tablet by mouth 3 times daily (with meals)  Quantity:  180 tablet  Refills:  1     lisinopril 30 MG tablet  Commonly known as:  ZESTRIL      Dose:  30 mg  Take 30 mg by mouth every evening  Refills:  0     LORazepam 0.5 MG tablet  Commonly known as:  ATIVAN  Used for:  Hilar cholangiocarcinoma (H)      Dose:  0.5 mg  Take 1 tablet (0.5 mg) by mouth every 4 hours as needed (Anxiety,  Nausea/Vomiting or Sleep)  Quantity:  30 tablet  Refills:  5     ondansetron 8 MG tablet  Commonly known as:  ZOFRAN  Used for:  Hilar cholangiocarcinoma (H)      Dose:  8 mg  Take 1 tablet (8 mg) by mouth every 8 hours as needed (Nausea/Vomiting)  Quantity:  10 tablet  Refills:  5     oxyCODONE 5 MG tablet  Commonly known as:  ROXICODONE  Used for:  Cholangiocarcinoma (H)      Dose:  5-10 mg  Take 1-2 tablets (5-10 mg) by mouth every 4 hours as needed for moderate to severe pain  Quantity:  20 tablet  Refills:  0     polyethylene glycol 17 g packet  Commonly known as:  MIRALAX  Used for:  Cholangiocarcinoma (H)      Dose:  17 g  Take 17 g by mouth daily  Quantity:  14 packet  Refills:  0     prochlorperazine 10 MG tablet  Commonly known as:  COMPAZINE  Used for:  Hilar cholangiocarcinoma (H)      Dose:  5 mg  Take 0.5 tablets (5 mg) by mouth every 6 hours as needed (Nausea/Vomiting)  Quantity:  30 tablet  Refills:  5     sildenafil 20 MG tablet  Commonly known as:  REVATIO      Dose:  20 mg  Take 20 mg by mouth as needed  Refills:  0     zolpidem 10 MG tablet  Commonly known as:  AMBIEN  Used for:  Hilar cholangiocarcinoma (H)      Dose:  10 mg  Take 1 tablet (10 mg) by mouth nightly as needed for sleep  Quantity:  30 tablet  Refills:  0              Protect others around you: Learn how to safely use, store and throw away your medicines at www.disposemymeds.org.       Follow-ups after your visit       Your next 10 appointments already scheduled    Sep 23, 2020  7:45 AM CDT  LAB with NL LAB [facility] ([facility]) [address] [place] [id number] [phone]   Please do not eat 10-12 hours before your appointment if you are coming in fasting for labs on lipids, cholesterol, or glucose (sugar). Does not apply to pregnant women. Water, tea and black coffee (with nothing added) is okay. Do not drink other fluids, diet soda or gum. If you have concerns about taking your  medications, please send a message by clicking on Secure Messaging, Message Your Care Team.     Sep 23, 2020  8:00 AM CDT  Level 7 with NL INFUSION CHAIR 4  [facility] ([facility]) [address] [address]  [phone]      Oct 06, 2020  4:20 PM CDT  (Arrive by 4:05 PM)  Video Visit with VICKI Royal CNP  [facility] ([facility]) [address] [place] [id number] [phone]   [facility]  Note: this is not an onsite visit; there is no need to come to the facility.  Please have a list of all current medications available for appointment.      Oct 07, 2020  8:00 AM CDT  LAB with NL LAB PMC  [facility] ([facility]) [address] [facility] [address]  [phone]   Please do not eat 10-12 hours before your appointment if you are coming in fasting for labs on lipids, cholesterol, or glucose (sugar). Does not apply to pregnant women. Water, tea and black coffee (with nothing added) is okay. Do not drink other fluids, diet soda or gum. If you have concerns about taking your medications, please send a message by clicking on Secure Messaging, Message Your Care Team.     Oct 07, 2020  8:30 AM CDT  Level 7 with NL INFUSION CHAIR 3  [facility] ([facility]) [address] [address]  [phone]      Oct 14, 2020  8:00 AM CDT  LAB with NL LAB PMC  [facility] ([facility]) [address] [address]  [phone]   Please do not eat 10-12 hours before your appointment if you are coming in fasting for labs on lipids, cholesterol, or glucose (sugar). Does not apply to pregnant women. Water, tea and black coffee (with nothing added) is okay. Do not drink other fluids, diet soda or gum. If you have concerns about taking your medications,  please send a message by clicking on Secure Messaging, Message Your Care Team.     Oct 14, 2020  8:30 AM CDT  Level 7 with NL INFUSION CHAIR 3  [facility] ([facility]) [address] [place] [address] [address] [place] [id number] [phone]      Oct 23, 2020  9:45 AM CDT  LAB with NL LAB PMC  [facility] ([facility]) [address] [facility] [id number] [phone]   Please do not eat 10-12 hours before your appointment if you are coming in fasting for labs on lipids, cholesterol, or glucose (sugar). Does not apply to pregnant women. Water, tea and black coffee (with nothing added) is okay. Do not drink other fluids, diet soda or gum. If you have concerns about taking your medications, please send a message by clicking on Secure Messaging, Message Your Care Team.     Oct 23, 2020 10:30 AM CDT  (Arrive by 10:00 AM)  CT CHEST ABDOMEN PELVIS W/O & W CONTRAST with PHCT1  [facility] CT Scan ([facility]) [address] [facility] [id number] [phone]   How do I prepare for my exam? (Food and drink instructions)  To prepare: No Food and Drink Restrictions    How do I prepare for my exam? (Other instructions)  Please arrive 30 minutes early for your CT.  Once in the department you might be asked to drink water 15-20 minutes prior to your exam.  If indicated you may be asked to drink an oral contrast in advance of your CT.  If this is the case, the imaging team will let you know or be in contact with you prior to your appointment    Patients over 70 or patients with diabetes or kidney problems: If you haven t had a blood test (creatinine test) within the last 30 days, the Cardiologist/Radiologist may require you to get this test prior to your exam.    If you have diabetes:  Continue to take your metformin medication on the day of your exam    What should I wear: Please wear loose clothing, such as a sweat suit or jogging  clothes. Avoid snaps, zippers and other metal. We may ask you to undress and put on a hospital gown.    How long does the exam take: Most scans take less than 20 minutes.    What should I bring: Please bring any scans or X-rays taken at other hospitals, if similar tests were done. Also bring a list of your medicines, including vitamins, minerals and over-the-counter drugs. It is safest to leave personal items at home.    Do I need a : No  is needed.    What do I need to tell my doctor?  Be sure to tell your doctor:  * If you have any allergies.  * If there s any chance you are pregnant.  * If you are breastfeeding.    What should I do after the exam: No restrictions, You may resume normal activities.    What is this test: A CT (computed tomography) scan is a series of pictures that allows us to look inside your body. The scanner creates images of the body in cross sections, much like slices of bread. This helps us see any problems more clearly. You may receive contrast (X-ray dye) before or during your scan. You will be asked to drink the contrast.    Who should I call with questions: If you have any questions, please call the Imaging Department where you will have your exam. Directions, parking instructions, and other information is available on our website, [email or web address].     Oct 26, 2020  4:00 PM CDT  (Arrive by 3:45 PM)  Video Visit with Aydin Beard MD  [facility] ([facility]) [address] [place] [id number] [phone]   [facility]  Note: this is not an onsite visit; there is no need to come to the facility.  Please have a list of all current medications available for appointment.         Care Instructions       Further instructions from your care team       [facility]  Interventional Radiology   Drainage Tube Instructions      AFTER YOU GO HOME:    Have an adult stay with you for  "24 hours.     Drink plenty of fluids and resume your regular diet.    For 24 hours:       Do not drive or operate machinery at home or at work    No alcoholic beverages    Do not make any important legal decisions    No heavy lifting greater than 10 lb until instructed by your physician     Call Your Physician if:    You develop nausea or vomiting.    Your develop hives or rash or unexplained itching    Additional Instructions: Please call for the following problems:    No fluid draining from the tube, check that the tube is not kinked or if stop cock is closed.    Flush with 10 cc's Normal Saline, empty bag and monitor output daily subtracting amount inserted.    Skin around tube is red, painful or has any drainage.    You have increased pain in your abdomen    Fever greater than 100.5 F and chills    You feel nauseated and  \"just not right\"      Change dressing every 48 hour or when it gets wet    Interventional Radiology Department    Physician: Dr. Purcell and Aristeo Salazar PA-C                              Date:September 18, 2020  Telephone numbers: [phone]...Monday-Friday 8:00am-4:30pm                                  [phone]... After 4:30 Monday-Friday, Weekends and Holiday. Ask for the Interventional Radiologist on call. Someone is on call 24 hours a day.                                    Additional Information About Your Visit       [employer] Information    [employer] gives you secure access to your electronic health record. If you see a primary care provider, you can also send messages to your care team and make appointments. If you have questions, please call your primary care clinic.  If you do not have a primary care provider, please call [phone] and they will assist you.       Care EveryWhere ID    This is your Care EveryWhere ID. This could be used by other organizations to access your [facility] medical records  [id number]       Your Vitals Were  Most recent update: 9/18/2020 12:18 PM    " "Blood Pressure   109/74 (BP Location: Right arm, Cuff Size: Adult Regular)          Pulse   65          Temperature   98.2  F (36.8  C) (Oral)          Respirations   16          Height   1.854 m (6' 0.99\")             Weight   79.1 kg (174 lb 6.4 oz)    Pulse Oximetry   98%    BMI (Body Mass Index)   23.01 kg/m           Primary Care Provider    Tolu Noland      Equal Access to Services    [facility] : Hadii aad ku hadasho Soomaali, waaxda luqadaha, qaybta kaalmada adeegyada, waxay idiin hayaan adeeg kharash la'aan ah. So [facility] [phone].    ATENCIÓN: Si habla español, tiene a kaufman disposición servicios gratuitos de asistencia lingüística. Llame al [phone].    We comply with applicable federal and state civil rights laws, including the [place] Human Rights Act. We do not discriminate on the basis of race, color, creed, [place]ybeterian, national origin, marital status, age, disability, sex, sexual orientation, or gender identity.       Thank you!    Thank you for choosing [facility] for your care. Our goal is always to provide you with excellent care. Hearing back from our patients is one way we can continue to improve our services. Please take a few minutes to complete the written survey that you may receive in the mail after you visit with us. Thank you!            Medication List      ASK your doctor about these medications          Morning Afternoon Evening Bedtime As Needed    atorvastatin 40 MG tablet  Also known as:  LIPITOR  INSTRUCTIONS:  Take 40 mg by mouth At Bedtime                     clopidogrel 75 MG tablet  Also known as:  PLAVIX  INSTRUCTIONS:  Take 75 mg by mouth At Bedtime                     dronabinol 5 MG capsule  Also known as:  MARINOL  INSTRUCTIONS:  Take 1 capsule (5 mg) by mouth 2 times daily (before meals)                     empagliflozin 10 MG Tabs tablet  Also known as:  JARDIANCE  INSTRUCTIONS:  Take 10 mg by mouth At Bedtime                     fenofibrate 160 MG tablet  Also known " as:  TRIGLIDE/LOFIBRA  INSTRUCTIONS:  Take 160 mg by mouth At Bedtime                     furosemide 20 MG tablet  Also known as:  LASIX  INSTRUCTIONS:  Take 20 mg by mouth every evening                     glipiZIDE 10 MG tablet  Also known as:  GLUCOTROL  INSTRUCTIONS:  Take 10 mg by mouth At Bedtime                     insulin glargine 100 UNIT/ML pen  Also known as:  LANTUS PEN  INSTRUCTIONS:  Inject 24 Units Subcutaneous At Bedtime  Doctor's comments:  <!--EPICS-->If Lantus is not covered by insurance, may substitute Basaglar at same dose and frequency.  <!--EPICE-->                     iron 325 (65 Fe) MG tablet  INSTRUCTIONS:  Take 1 tablet by mouth 3 times daily (with meals)                     lisinopril 30 MG tablet  Also known as:  ZESTRIL  INSTRUCTIONS:  Take 30 mg by mouth every evening                     LORazepam 0.5 MG tablet  Also known as:  ATIVAN  INSTRUCTIONS:  Take 1 tablet (0.5 mg) by mouth every 4 hours as needed (Anxiety, Nausea/Vomiting or Sleep)                     ondansetron 8 MG tablet  Also known as:  ZOFRAN  INSTRUCTIONS:  Take 1 tablet (8 mg) by mouth every 8 hours as needed (Nausea/Vomiting)                     oxyCODONE 5 MG tablet  Also known as:  ROXICODONE  INSTRUCTIONS:  Take 1-2 tablets (5-10 mg) by mouth every 4 hours as needed for moderate to severe pain                     polyethylene glycol 17 g packet  Also known as:  MIRALAX  INSTRUCTIONS:  Take 17 g by mouth daily                     prochlorperazine 10 MG tablet  Also known as:  COMPAZINE  INSTRUCTIONS:  Take 0.5 tablets (5 mg) by mouth every 6 hours as needed (Nausea/Vomiting)                     sildenafil 20 MG tablet  Also known as:  REVATIO  INSTRUCTIONS:  Take 20 mg by mouth as needed                     zolpidem 10 MG tablet  Also known as:  AMBIEN  INSTRUCTIONS:  Take 1 tablet (10 mg) by mouth nightly as needed for sleep

## 2020-09-18 NOTE — DISCHARGE INSTRUCTIONS
"[facility]  Interventional Radiology   Drainage Tube Instructions      AFTER YOU GO HOME:    Have an adult stay with you for 24 hours.     Drink plenty of fluids and resume your regular diet.    For 24 hours:       Do not drive or operate machinery at home or at work    No alcoholic beverages    Do not make any important legal decisions    No heavy lifting greater than 10 lb until instructed by your physician     Call Your Physician if:    You develop nausea or vomiting.    Your develop hives or rash or unexplained itching    Additional Instructions: Please call for the following problems:    No fluid draining from the tube, check that the tube is not kinked or if stop cock is closed.    Flush with 10 cc's Normal Saline, empty bag and monitor output daily subtracting amount inserted.    Skin around tube is red, painful or has any drainage.    You have increased pain in your abdomen    Fever greater than 100.5 F and chills    You feel nauseated and  \"just not right\"      Change dressing every 48 hour or when it gets wet    Interventional Radiology Department    Physician: Dr. Purcell and Aristeo Salazar PA-C                              Date:September 18, 2020  Telephone numbers: [phone]...Monday-Friday 8:00am-4:30pm                                  [phone]... After 4:30 Monday-Friday, Weekends and Holiday. Ask for the Interventional Radiologist on call. Someone is on call 24 hours a day.                                  "

## 2020-09-18 NOTE — PROGRESS NOTES
Patient Name: Fuentes Estrada  Medical Record Number: [id number]  Today's Date: 9/18/2020    Procedure: Sinogram with schlerotherapy of right abdominal drain.  Proceduralist: Dr. ADAMS Purcell, Aristeo Salazar PA-C.    Patient in room: 1022  Procedure Start: 1027  Sinogram end time: 1031  Drain Restitching time out: 1044  Schlerotherapy start: 1055  Procedure End: 1140  Sedation medications administered: 2 mg Versed, 100 mcg Fentanyl.  Patient out of room: 1146    Report given to: MARISA Up    Other Notes: Pt arrived to IR room 2 from 2.A. Consent reviewed. Pt denies any questions or concerns regarding procedure. Pt positioned supine and monitored per protocol. Pt tolerated procedure without any noted complications. Pt transferred back to 2.A.    5 min lidocaine instillation followed by 20 minutes of 98% dehydrated alcohol followed by 5 ml of doxycycline.

## 2020-09-22 NOTE — TELEPHONE ENCOUNTER
Spoke to Robyn and let her know that as long as Dr. Wayne is OK with Monday then we schedule Monday. We will be in touch tomorrow.

## 2020-09-22 NOTE — TELEPHONE ENCOUNTER
Reason for Call:  Other call back    Detailed comments: Robyn-wife calls as Fuentes is scheduled to see Dr Wayne for a consult for a port placement.  She is wondering how soon he could get scheduled for the procedure?  He is currently scheduled next Wednesday 9/30 to have it placed at the  but wanted to have it done in [place] because they live up here but if it they would have to wait longer then they could just have it at the  on 9/30 and not come in for the consult tomorrow.      Phone Number Patient can be reached at: Other phone number:  [phone]    Best Time: any    Can we leave a detailed message on this number? YES    Call taken on 9/22/2020 at 12:37 PM by Anita Zhao

## 2020-09-23 NOTE — LETTER
9/23/2020         RE: Fuentes Estrada  [address] [place]  [facility] [address]        Dear Colleague,    Thank you for referring your patient, Fuentes Estrada, to the [facility]. Please see a copy of my visit note below.    Patient seen in consultation for port placement by Tolu Noland    HPI:  Patient is a 65 year old male who recently underwent surgery for hilar cholangiocarcinoma. The plan at this time is for adjuvant chemotherapy, which he has already started. I am asked to evaluate for port placement. He has no trauma history to the clavicles. No head or neck surgery or radiation. He does not take any blood thinning medications.    Review Of Systems    Skin: negative  Ears/Nose/Throat: negative  Respiratory: No shortness of breath, dyspnea on exertion, cough, or hemoptysis  Cardiovascular: negative  Gastrointestinal: as above  Genitourinary: negative  Musculoskeletal: negative  Neurologic: negative  Hematologic/Lymphatic/Immunologic: negative  Endocrine: negative      Past Medical History:   Diagnosis Date     Cerebrovascular accident (CVA) (H) 12/2010     Cholangiocarcinoma (H)      Essential hypertension, benign     Hypertension, Benign     Nonspecific abnormal results of liver function study     Hx of elevated LFT's       Past Surgical History:   Procedure Laterality Date     ENDOSCOPIC RETROGRADE CHOLANGIOPANCREATOGRAM N/A 3/31/2020    Procedure: ENDOSCOPIC RETROGRADE CHOLANGIOPANCREATOGRAPHY, WITH with biliary sphincterotomy, biopsies and brushings, biliary stent placement;  Surgeon: Win Strauss MD;  Location: [facility] OR     ENDOSCOPIC RETROGRADE CHOLANGIOPANCREATOGRAM N/A 4/6/2020    Procedure: ENDOSCOPIC RETROGRADE CHOLANGIOPANCREATOGRAPHY;  Surgeon: Win Strauss MD;  Location: [facility] OR     ENDOSCOPIC RETROGRADE CHOLANGIOPANCREATOGRAM WITH SPYGLASS  4/16/2020    Procedure: ENDOSCOPIC RETROGRADE CHOLANGIOPANCREATOGRAPHY, WITH DIRECT DUCT VISUALIZATION, USING PANCREATICOBILIARY  FIBEROPTIC PROBE; Bile Duct Stent Exchange and Biopsy,balloon sweep of bile duct;  Surgeon: Win Strauss MD;  Location: [facility] OR     ENDOSCOPIC ULTRASOUND UPPER GASTROINTESTINAL TRACT (GI) N/A 4/16/2020    Procedure: ENDOSCOPIC ULTRASOUND, ESOPHAGOSCOPY / UPPER GASTROINTESTINAL TRACT (GI)with Fine Needle biopsy;  Surgeon: Cody Baumann MD;  Location: [facility]     ENDOSCOPIC ULTRASOUND UPPER GASTROINTESTINAL TRACT (GI) N/A 8/11/2020    Procedure: ENDOSCOPIC ULTRASOUND, ESOPHAGOSCOPY / UPPER GASTROINTESTINAL TRACT (GI);  Surgeon: Guru Bailey Rossi MD;  Location: [facility]     ESOPHAGOSCOPY, GASTROSCOPY, DUODENOSCOPY (EGD), COMBINED N/A 4/6/2020    Procedure: ESOPHAGOGASTRODUODENOSCOPY (EGD);  Surgeon: Win Strauss MD;  Location: [facility] OR     ESOPHAGOSCOPY, GASTROSCOPY, DUODENOSCOPY (EGD), COMBINED N/A 8/11/2020    Procedure: Esophagogastroduodenoscopy, With Fine Needle Aspiration Biopsy, With Endoscopic Ultrasound Guidance;  Surgeon: Guru Bailey Rossi MD;  Location: [facility]     EXPLORE COMMON BILE DUCT N/A 5/12/2020    Procedure: extra-hepatic bile duct resection;  Surgeon: Oswaldo Hale MD;  Location: [facility] OR     HC KNEE SCOPE, DIAGNOSTIC  1995    Arthroscopy, Knee; left     HC VASECTOMY UNILAT/BILAT W POSTOP SEMEN      Vasectomy     HEPATECTOMY PARTIAL N/A 5/12/2020    Procedure: Exploratory Laparotomy, Open right hepatectomy, Radical Extra-Hepatic Bile Duct Resection, Portal Lymph Node Dissection, Intraoperative Ultrasound, Intra Air-Cholangiogram ,Bianca-En-Y Left Hepaticojejunostomy, Excision Skin Lesion;  Surgeon: Oswalod Hale MD;  Location: [facility] OR     IR ABSCESS TUBE CHANGE  6/12/2020     IR FOLLOW UP VISIT OUTPATIENT  7/24/2020     IR FOLLOW UP VISIT OUTPATIENT  8/3/2020     IR SINOGRAM INJECTION DIAGNOSTIC  7/14/2020     IR THORACENTESIS  5/16/2020     LYMPHADENECTOMY ABDOMINAL N/A 5/12/2020    Procedure: portal lymph node dissection, intraoperative  liver ultrasound;  Surgeon: Oswaldo Hale MD;  Location:  OR       Family History   Problem Relation Age of Onset     Arthritis Mother         RA     Diabetes Father         diet controlled     Hypertension Father        Social History     Socioeconomic History     Marital status:      Spouse name: Robyn     Number of children: 2     Years of education: 14     Highest education level: Not on file   Occupational History     Occupation: self employed   Social Needs     Financial resource strain: Not on file     Food insecurity     Worry: Not on file     Inability: Not on file     Transportation needs     Medical: Not on file     Non-medical: Not on file   Tobacco Use     Smoking status: Never Smoker     Smokeless tobacco: Never Used   Substance and Sexual Activity     Alcohol use: Yes     Comment: occaisional      Drug use: No     Sexual activity: Yes     Partners: Female   Lifestyle     Physical activity     Days per week: Not on file     Minutes per session: Not on file     Stress: Not on file   Relationships     Social connections     Talks on phone: Not on file     Gets together: Not on file     Attends [employer] service: Not on file     Active member of club or organization: Not on file     Attends meetings of clubs or organizations: Not on file     Relationship status: Not on file     Intimate partner violence     Fear of current or ex partner: Not on file     Emotionally abused: Not on file     Physically abused: Not on file     Forced sexual activity: Not on file   Other Topics Concern      Service No     Blood Transfusions No     Caffeine Concern No     Occupational Exposure No     Hobby Hazards No     Sleep Concern No     Stress Concern No     Weight Concern No     Special Diet No     Back Care No     Exercise Yes     Bike Helmet No     Seat Belt Yes     Self-Exams No     Parent/sibling w/ CABG, MI or angioplasty before 65F 55M? Not Asked   Social History Narrative     Not on file  "      Current Outpatient Medications   Medication Sig Dispense Refill     atorvastatin (LIPITOR) 40 MG tablet Take 40 mg by mouth At Bedtime        clopidogrel (PLAVIX) 75 MG tablet Take 75 mg by mouth At Bedtime        dronabinol (MARINOL) 5 MG capsule Take 1 capsule (5 mg) by mouth 2 times daily (before meals) 60 capsule 0     empagliflozin (JARDIANCE) 10 MG TABS tablet Take 10 mg by mouth At Bedtime        fenofibrate (TRIGLIDE/LOFIBRA) 160 MG tablet Take 160 mg by mouth At Bedtime        Ferrous Sulfate (IRON) 325 (65 Fe) MG tablet Take 1 tablet by mouth 3 times daily (with meals) 180 tablet 1     furosemide (LASIX) 20 MG tablet Take 20 mg by mouth every evening        glipiZIDE (GLUCOTROL) 10 MG tablet Take 10 mg by mouth At Bedtime        insulin glargine (LANTUS PEN) 100 UNIT/ML pen Inject 24 Units Subcutaneous At Bedtime        lisinopril (ZESTRIL) 30 MG tablet Take 30 mg by mouth every evening        LORazepam (ATIVAN) 0.5 MG tablet Take 1 tablet (0.5 mg) by mouth every 4 hours as needed (Anxiety, Nausea/Vomiting or Sleep) 30 tablet 5     ondansetron (ZOFRAN) 8 MG tablet Take 1 tablet (8 mg) by mouth every 8 hours as needed (Nausea/Vomiting) 10 tablet 5     oxyCODONE (ROXICODONE) 5 MG tablet Take 1-2 tablets (5-10 mg) by mouth every 4 hours as needed for moderate to severe pain (Patient not taking: Reported on 8/27/2020) 20 tablet 0     polyethylene glycol (MIRALAX) 17 g packet Take 17 g by mouth daily 14 packet 0     prochlorperazine (COMPAZINE) 10 MG tablet Take 0.5 tablets (5 mg) by mouth every 6 hours as needed (Nausea/Vomiting) 30 tablet 5     sildenafil (REVATIO) 20 MG tablet Take 20 mg by mouth as needed       zolpidem (AMBIEN) 10 MG tablet Take 1 tablet (10 mg) by mouth nightly as needed for sleep 30 tablet 0       Medications and history reviewed    Physical exam:  Vitals: /78   Temp 97.9  F (36.6  C) (Temporal)   Ht 1.854 m (6' 1\")   Wt 81.6 kg (180 lb)   BMI 23.75 kg/m    BMI= Body mass " index is 23.75 kg/m .    Constitutional: thin, alert, non-distressed   Head: Normo-cephalic, atraumatic, no lesions, masses or tenderness   Cardiovascular: RRR, no new murmurs, +S1, +S2 heart sounds, no clicks, rubs or gallops   Respiratory: CTAB, no rales, rhonchi or wheezing, equal chest rise, good respiratory effort   Gastrointestinal: Soft, non-tender, drainage tube with bilious output   : Deferred  Musculoskeletal: Moves all extremities, normal  strength, no deformities noted   Skin: No suspicious lesions or rashes   Psychiatric: Mentation appears normal, affect appropriate   Hematologic/Lymphatic/Immunologic: Normal cervical and supraclavicular lymph nodes   Patient able to get up on table without difficulty.    Labs show:  Results for orders placed or performed in visit on 09/23/20 (from the past 24 hour(s))   Comprehensive metabolic panel   Result Value Ref Range    Sodium 139 133 - 144 mmol/L    Potassium 3.8 3.4 - 5.3 mmol/L    Chloride 107 94 - 109 mmol/L    Carbon Dioxide 27 20 - 32 mmol/L    Anion Gap 5 3 - 14 mmol/L    Glucose 127 (H) 70 - 99 mg/dL    Urea Nitrogen 6 (L) 7 - 30 mg/dL    Creatinine 0.76 0.66 - 1.25 mg/dL    GFR Estimate >90 >60 mL/min/[1.73_m2]    GFR Estimate If Black >90 >60 mL/min/[1.73_m2]    Calcium 8.9 8.5 - 10.1 mg/dL    Bilirubin Total 1.8 (H) 0.2 - 1.3 mg/dL    Albumin 2.6 (L) 3.4 - 5.0 g/dL    Protein Total 7.1 6.8 - 8.8 g/dL    Alkaline Phosphatase 529 (H) 40 - 150 U/L    ALT 60 0 - 70 U/L    AST 76 (H) 0 - 45 U/L   CBC with platelets differential   Result Value Ref Range    WBC 5.9 4.0 - 11.0 10e9/L    RBC Count 4.05 (L) 4.4 - 5.9 10e12/L    Hemoglobin 11.2 (L) 13.3 - 17.7 g/dL    Hematocrit 33.8 (L) 40.0 - 53.0 %    MCV 84 78 - 100 fl    MCH 27.7 26.5 - 33.0 pg    MCHC 33.1 31.5 - 36.5 g/dL    RDW 18.3 (H) 10.0 - 15.0 %    Platelet Count 540 (H) 150 - 450 10e9/L    Diff Method Manual Differential     % Neutrophils 39.0 %    % Lymphocytes 47.0 %    % Monocytes 9.0 %     % Eosinophils 1.0 %    % Basophils 2.0 %    % Promyelocytes 1.0 %    % Other Cells 1.0 %    Absolute Neutrophil 2.3 1.6 - 8.3 10e9/L    Absolute Lymphocytes 2.8 0.8 - 5.3 10e9/L    Absolute Monocytes 0.5 0.0 - 1.3 10e9/L    Absolute Eosinophils 0.1 0.0 - 0.7 10e9/L    Absolute Basophils 0.1 0.0 - 0.2 10e9/L    Absolute Promyeloctyes 0.1 (H) 0 10e9/L    Absolute Other Cells 0.1 (H) 0 10e9/L    RBC Morphology Consistent with reported results     Platelet Estimate Confirming automated cell count    Magnesium   Result Value Ref Range    Magnesium 1.9 1.6 - 2.3 mg/dL       Imaging shows:  reviewed    Assessment:     ICD-10-CM    1. Hilar cholangiocarcinoma (H)  C24.0 Case Request: ultrasound guided right internal venous access port placement with flouroscopy, possible left     Case Request: ultrasound guided right internal venous access port placement with flouroscopy, possible left     Plan: I recommend US RIJ VAP with flouroscopy. We discussed the procedure in detail. We also discussed the risks, benefits, alternatives and post-op care and restrictions. After our informed discussion we decided to proceed with the proposed surgery.  He has had several procedures with moderate sedation recently and has tolerated anesthesia without complication. He is cleared for anesthesia for this procedure..    Fercho Wayne DO      Again, thank you for allowing me to participate in the care of your patient.        Sincerely,        Fercho Wayne DO

## 2020-09-23 NOTE — PROGRESS NOTES
Infusion Nursing Note:  Fuentes Estrada presents today for C2D8 Cisplatin/Gemzar.    Patient seen by provider today: No   present during visit today: Not Applicable.    Note: N/A.    Intravenous Access:  Peripheral IV placed.    Treatment Conditions:  Lab Results   Component Value Date    HGB 11.2 09/23/2020     Lab Results   Component Value Date    WBC 5.9 09/23/2020      Lab Results   Component Value Date    ANEU 2.3 09/23/2020     Lab Results   Component Value Date     09/23/2020      Lab Results   Component Value Date     09/23/2020                   Lab Results   Component Value Date    POTASSIUM 3.8 09/23/2020           Lab Results   Component Value Date    MAG 1.9 09/23/2020            Lab Results   Component Value Date    CR 0.76 09/23/2020                   Lab Results   Component Value Date    ERIC 8.9 09/23/2020                Lab Results   Component Value Date    BILITOTAL 1.8 09/23/2020           Lab Results   Component Value Date    ALBUMIN 2.6 09/23/2020                    Lab Results   Component Value Date    ALT 60 09/23/2020           Lab Results   Component Value Date    AST 76 09/23/2020       Results reviewed, labs MET treatment parameters, ok to proceed with treatment.      Post Infusion Assessment:  Patient tolerated infusion without incident.  Blood return noted pre and post infusion.  Site patent and intact, free from redness, edema or discomfort.  Access discontinued per protocol.       Discharge Plan:   Discharge instructions reviewed with: Patient.  Patient and/or family verbalized understanding of discharge instructions and all questions answered.  AVS to patient via [employer].  Patient will return 10/7 for next appointment.   Patient discharged in stable condition accompanied by: self and wife.  Departure Mode: Ambulatory.    Leatha Rodriguez RN

## 2020-09-23 NOTE — LETTER
9/23/2020        RE: Fuentes Estrada  [address] [place] [address]        Patient seen in consultation for port placement    HPI:  Patient is a 65 year old male who recently underwent surgery for hilar cholangiocarcinoma. The plan at this time is for adjuvant chemotherapy, which he has already started. I am asked to evaluate for port placement. He has no trauma history to the clavicles. No head or neck surgery or radiation. He does not take any blood thinning medications.    Review Of Systems    Skin: negative  Ears/Nose/Throat: negative  Respiratory: No shortness of breath, dyspnea on exertion, cough, or hemoptysis  Cardiovascular: negative  Gastrointestinal: as above  Genitourinary: negative  Musculoskeletal: negative  Neurologic: negative  Hematologic/Lymphatic/Immunologic: negative  Endocrine: negative      Past Medical History:   Diagnosis Date     Cerebrovascular accident (CVA) (H) 12/2010     Cholangiocarcinoma (H)      Essential hypertension, benign     Hypertension, Benign     Nonspecific abnormal results of liver function study     Hx of elevated LFT's       Past Surgical History:   Procedure Laterality Date     ENDOSCOPIC RETROGRADE CHOLANGIOPANCREATOGRAM N/A 3/31/2020    Procedure: ENDOSCOPIC RETROGRADE CHOLANGIOPANCREATOGRAPHY, WITH with biliary sphincterotomy, biopsies and brushings, biliary stent placement;  Surgeon: Win Strauss MD;  Location: [facility]     ENDOSCOPIC RETROGRADE CHOLANGIOPANCREATOGRAM N/A 4/6/2020    Procedure: ENDOSCOPIC RETROGRADE CHOLANGIOPANCREATOGRAPHY;  Surgeon: Win Strauss MD;  Location: [facility]     ENDOSCOPIC RETROGRADE CHOLANGIOPANCREATOGRAM WITH SPYGLASS  4/16/2020    Procedure: ENDOSCOPIC RETROGRADE CHOLANGIOPANCREATOGRAPHY, WITH DIRECT DUCT VISUALIZATION, USING PANCREATICOBILIARY FIBEROPTIC PROBE; Bile Duct Stent Exchange and Biopsy,balloon sweep of bile duct;  Surgeon: Win Strauss MD;  Location: [facility]     ENDOSCOPIC ULTRASOUND UPPER  GASTROINTESTINAL TRACT (GI) N/A 4/16/2020    Procedure: ENDOSCOPIC ULTRASOUND, ESOPHAGOSCOPY / UPPER GASTROINTESTINAL TRACT (GI)with Fine Needle biopsy;  Surgeon: Cody Baumann MD;  Location: [facility]     ENDOSCOPIC ULTRASOUND UPPER GASTROINTESTINAL TRACT (GI) N/A 8/11/2020    Procedure: ENDOSCOPIC ULTRASOUND, ESOPHAGOSCOPY / UPPER GASTROINTESTINAL TRACT (GI);  Surgeon: Guru Bailey Rossi MD;  [facility]     ESOPHAGOSCOPY, GASTROSCOPY, DUODENOSCOPY (EGD), COMBINED N/A 4/6/2020    Procedure: ESOPHAGOGASTRODUODENOSCOPY (EGD);  Surgeon: Win Strauss MD;  Location: [facility]     ESOPHAGOSCOPY, GASTROSCOPY, DUODENOSCOPY (EGD), COMBINED N/A 8/11/2020    Procedure: Esophagogastroduodenoscopy, With Fine Needle Aspiration Biopsy, With Endoscopic Ultrasound Guidance;  Surgeon: Guru Bailey Rossi MD;  [facility]     EXPLORE COMMON BILE DUCT N/A 5/12/2020    Procedure: extra-hepatic bile duct resection;  Surgeon: Oswaldo Hale MD;  Location: [facility]     HC KNEE SCOPE, DIAGNOSTIC  1995    Arthroscopy, Knee; left     HC VASECTOMY UNILAT/BILAT W POSTOP SEMEN      Vasectomy     HEPATECTOMY PARTIAL N/A 5/12/2020    Procedure: Exploratory Laparotomy, Open right hepatectomy, Radical Extra-Hepatic Bile Duct Resection, Portal Lymph Node Dissection, Intraoperative Ultrasound, Intra Air-Cholangiogram ,Bianca-En-Y Left Hepaticojejunostomy, Excision Skin Lesion;  Surgeon: Oswaldo Hale MD;  Location: [facility]     IR ABSCESS TUBE CHANGE  6/12/2020     IR FOLLOW UP VISIT OUTPATIENT  7/24/2020     IR FOLLOW UP VISIT OUTPATIENT  8/3/2020     IR SINOGRAM INJECTION DIAGNOSTIC  7/14/2020     IR THORACENTESIS  5/16/2020     LYMPHADENECTOMY ABDOMINAL N/A 5/12/2020    Procedure: portal lymph node dissection, intraoperative liver ultrasound;  Surgeon: Oswaldo Hale MD;  Location: [facility]       Family History   Problem Relation Age of Onset     Arthritis Mother         RA     Diabetes  Father         diet controlled     Hypertension Father        Social History     Socioeconomic History     Marital status:      Spouse name: Robyn     Number of children: 2     Years of education: 14     Highest education level: Not on file   Occupational History     Occupation: self employed   Social Needs     Financial resource strain: Not on file     Food insecurity     Worry: Not on file     Inability: Not on file     Transportation needs     Medical: Not on file     Non-medical: Not on file   Tobacco Use     Smoking status: Never Smoker     Smokeless tobacco: Never Used   Substance and Sexual Activity     Alcohol use: Yes     Comment: occaisional      Drug use: No     Sexual activity: Yes     Partners: Female   Lifestyle     Physical activity     Days per week: Not on file     Minutes per session: Not on file     Stress: Not on file   Relationships     Social connections     Talks on phone: Not on file     Gets together: Not on file     Attends Church service: Not on file     Active member of club or organization: Not on file     Attends meetings of clubs or organizations: Not on file     Relationship status: Not on file     Intimate partner violence     Fear of current or ex partner: Not on file     Emotionally abused: Not on file     Physically abused: Not on file     Forced sexual activity: Not on file   Other Topics Concern      Service No     Blood Transfusions No     Caffeine Concern No     Occupational Exposure No     Hobby Hazards No     Sleep Concern No     Stress Concern No     Weight Concern No     Special Diet No     Back Care No     Exercise Yes     Bike Helmet No     Seat Belt Yes     Self-Exams No     Parent/sibling w/ CABG, MI or angioplasty before 65F 55M? Not Asked   Social History Narrative     Not on file       Current Outpatient Medications   Medication Sig Dispense Refill     atorvastatin (LIPITOR) 40 MG tablet Take 40 mg by mouth At Bedtime        clopidogrel (PLAVIX) 75  "MG tablet Take 75 mg by mouth At Bedtime        dronabinol (MARINOL) 5 MG capsule Take 1 capsule (5 mg) by mouth 2 times daily (before meals) 60 capsule 0     empagliflozin (JARDIANCE) 10 MG TABS tablet Take 10 mg by mouth At Bedtime        fenofibrate (TRIGLIDE/LOFIBRA) 160 MG tablet Take 160 mg by mouth At Bedtime        Ferrous Sulfate (IRON) 325 (65 Fe) MG tablet Take 1 tablet by mouth 3 times daily (with meals) 180 tablet 1     furosemide (LASIX) 20 MG tablet Take 20 mg by mouth every evening        glipiZIDE (GLUCOTROL) 10 MG tablet Take 10 mg by mouth At Bedtime        insulin glargine (LANTUS PEN) 100 UNIT/ML pen Inject 24 Units Subcutaneous At Bedtime        lisinopril (ZESTRIL) 30 MG tablet Take 30 mg by mouth every evening        LORazepam (ATIVAN) 0.5 MG tablet Take 1 tablet (0.5 mg) by mouth every 4 hours as needed (Anxiety, Nausea/Vomiting or Sleep) 30 tablet 5     ondansetron (ZOFRAN) 8 MG tablet Take 1 tablet (8 mg) by mouth every 8 hours as needed (Nausea/Vomiting) 10 tablet 5     oxyCODONE (ROXICODONE) 5 MG tablet Take 1-2 tablets (5-10 mg) by mouth every 4 hours as needed for moderate to severe pain (Patient not taking: Reported on 8/27/2020) 20 tablet 0     polyethylene glycol (MIRALAX) 17 g packet Take 17 g by mouth daily 14 packet 0     prochlorperazine (COMPAZINE) 10 MG tablet Take 0.5 tablets (5 mg) by mouth every 6 hours as needed (Nausea/Vomiting) 30 tablet 5     sildenafil (REVATIO) 20 MG tablet Take 20 mg by mouth as needed       zolpidem (AMBIEN) 10 MG tablet Take 1 tablet (10 mg) by mouth nightly as needed for sleep 30 tablet 0       Medications and history reviewed    Physical exam:  Vitals: /78   Temp 97.9  F (36.6  C) (Temporal)   Ht 1.854 m (6' 1\")   Wt 81.6 kg (180 lb)   BMI 23.75 kg/m    BMI= Body mass index is 23.75 kg/m .    Constitutional: thin, alert, non-distressed   Head: Normo-cephalic, atraumatic, no lesions, masses or tenderness   Cardiovascular: RRR, no new " murmurs, +S1, +S2 heart sounds, no clicks, rubs or gallops   Respiratory: CTAB, no rales, rhonchi or wheezing, equal chest rise, good respiratory effort   Gastrointestinal: Soft, non-tender, drainage tube with bilious output   : Deferred  Musculoskeletal: Moves all extremities, normal  strength, no deformities noted   Skin: No suspicious lesions or rashes   Psychiatric: Mentation appears normal, affect appropriate   Hematologic/Lymphatic/Immunologic: Normal cervical and supraclavicular lymph nodes   Patient able to get up on table without difficulty.    Labs show:  Results for orders placed or performed in visit on 09/23/20 (from the past 24 hour(s))   Comprehensive metabolic panel   Result Value Ref Range    Sodium 139 133 - 144 mmol/L    Potassium 3.8 3.4 - 5.3 mmol/L    Chloride 107 94 - 109 mmol/L    Carbon Dioxide 27 20 - 32 mmol/L    Anion Gap 5 3 - 14 mmol/L    Glucose 127 (H) 70 - 99 mg/dL    Urea Nitrogen 6 (L) 7 - 30 mg/dL    Creatinine 0.76 0.66 - 1.25 mg/dL    GFR Estimate >90 >60 mL/min/[1.73_m2]    GFR Estimate If Black >90 >60 mL/min/[1.73_m2]    Calcium 8.9 8.5 - 10.1 mg/dL    Bilirubin Total 1.8 (H) 0.2 - 1.3 mg/dL    Albumin 2.6 (L) 3.4 - 5.0 g/dL    Protein Total 7.1 6.8 - 8.8 g/dL    Alkaline Phosphatase 529 (H) 40 - 150 U/L    ALT 60 0 - 70 U/L    AST 76 (H) 0 - 45 U/L   CBC with platelets differential   Result Value Ref Range    WBC 5.9 4.0 - 11.0 10e9/L    RBC Count 4.05 (L) 4.4 - 5.9 10e12/L    Hemoglobin 11.2 (L) 13.3 - 17.7 g/dL    Hematocrit 33.8 (L) 40.0 - 53.0 %    MCV 84 78 - 100 fl    MCH 27.7 26.5 - 33.0 pg    MCHC 33.1 31.5 - 36.5 g/dL    RDW 18.3 (H) 10.0 - 15.0 %    Platelet Count 540 (H) 150 - 450 10e9/L    Diff Method Manual Differential     % Neutrophils 39.0 %    % Lymphocytes 47.0 %    % Monocytes 9.0 %    % Eosinophils 1.0 %    % Basophils 2.0 %    % Promyelocytes 1.0 %    % Other Cells 1.0 %    Absolute Neutrophil 2.3 1.6 - 8.3 10e9/L    Absolute Lymphocytes 2.8 0.8 -  5.3 10e9/L    Absolute Monocytes 0.5 0.0 - 1.3 10e9/L    Absolute Eosinophils 0.1 0.0 - 0.7 10e9/L    Absolute Basophils 0.1 0.0 - 0.2 10e9/L    Absolute Promyeloctyes 0.1 (H) 0 10e9/L    Absolute Other Cells 0.1 (H) 0 10e9/L    RBC Morphology Consistent with reported results     Platelet Estimate Confirming automated cell count    Magnesium   Result Value Ref Range    Magnesium 1.9 1.6 - 2.3 mg/dL       Imaging shows:  reviewed    Assessment:     ICD-10-CM    1. Hilar cholangiocarcinoma (H)  C24.0 Case Request: ultrasound guided right internal venous access port placement with flouroscopy, possible left     Case Request: ultrasound guided right internal venous access port placement with flouroscopy, possible left     Plan: I recommend US RIJ VAP with flouroscopy. We discussed the procedure in detail. We also discussed the risks, benefits, alternatives and post-op care and restrictions. After our informed discussion we decided to proceed with the proposed surgery.  He has had several procedures with moderate sedation recently and has tolerated anesthesia without complication. He is cleared for anesthesia for this procedure..    Fercho Wayne, DO        Sincerely,        Fercho Wayne, DO

## 2020-09-23 NOTE — PROGRESS NOTES
Patient seen in consultation for port placement    HPI:  Patient is a 65 year old male who recently underwent surgery for hilar cholangiocarcinoma. The plan at this time is for adjuvant chemotherapy, which he has already started. I am asked to evaluate for port placement. He has no trauma history to the clavicles. No head or neck surgery or radiation. He does not take any blood thinning medications.    Review Of Systems    Skin: negative  Ears/Nose/Throat: negative  Respiratory: No shortness of breath, dyspnea on exertion, cough, or hemoptysis  Cardiovascular: negative  Gastrointestinal: as above  Genitourinary: negative  Musculoskeletal: negative  Neurologic: negative  Hematologic/Lymphatic/Immunologic: negative  Endocrine: negative      Past Medical History:   Diagnosis Date     Cerebrovascular accident (CVA) (H) 12/2010     Cholangiocarcinoma (H)      Essential hypertension, benign     Hypertension, Benign     Nonspecific abnormal results of liver function study     Hx of elevated LFT's       Past Surgical History:   Procedure Laterality Date     ENDOSCOPIC RETROGRADE CHOLANGIOPANCREATOGRAM N/A 3/31/2020    Procedure: ENDOSCOPIC RETROGRADE CHOLANGIOPANCREATOGRAPHY, WITH with biliary sphincterotomy, biopsies and brushings, biliary stent placement;  Surgeon: Win Strauss MD;  Location[facility]     ENDOSCOPIC RETROGRADE CHOLANGIOPANCREATOGRAM N/A 4/6/2020    Procedure: ENDOSCOPIC RETROGRADE CHOLANGIOPANCREATOGRAPHY;  Surgeon: Win Strauss MD;  Location: [facility]     ENDOSCOPIC RETROGRADE CHOLANGIOPANCREATOGRAM WITH SPYGLASS  4/16/2020    Procedure: ENDOSCOPIC RETROGRADE CHOLANGIOPANCREATOGRAPHY, WITH DIRECT DUCT VISUALIZATION, USING PANCREATICOBILIARY FIBEROPTIC PROBE; Bile Duct Stent Exchange and Biopsy,balloon sweep of bile duct;  Surgeon: Win Strauss MD;  Location[facility]     ENDOSCOPIC ULTRASOUND UPPER GASTROINTESTINAL TRACT (GI) N/A 4/16/2020    Procedure: ENDOSCOPIC ULTRASOUND,  ESOPHAGOSCOPY / UPPER GASTROINTESTINAL TRACT (GI)with Fine Needle biopsy;  Surgeon: Cody Baumann MD;  Location: [facility]     ENDOSCOPIC ULTRASOUND UPPER GASTROINTESTINAL TRACT (GI) N/A 8/11/2020    Procedure: ENDOSCOPIC ULTRASOUND, ESOPHAGOSCOPY / UPPER GASTROINTESTINAL TRACT (GI);  Surgeon: Guru Bailey Rossi MD;  [facility]     ESOPHAGOSCOPY, GASTROSCOPY, DUODENOSCOPY (EGD), COMBINED N/A 4/6/2020    Procedure: ESOPHAGOGASTRODUODENOSCOPY (EGD);  Surgeon: Win Strauss MD;  [facility]     ESOPHAGOSCOPY, GASTROSCOPY, DUODENOSCOPY (EGD), COMBINED N/A 8/11/2020    Procedure: Esophagogastroduodenoscopy, With Fine Needle Aspiration Biopsy, With Endoscopic Ultrasound Guidance;  Surgeon: Guru Bailey Rossi MD;  [facility]     EXPLORE COMMON BILE DUCT N/A 5/12/2020    Procedure: extra-hepatic bile duct resection;  Surgeon: Oswaldo Hale MD;  Location: [facility]     HC KNEE SCOPE, DIAGNOSTIC  1995    Arthroscopy, Knee; left     HC VASECTOMY UNILAT/BILAT W POSTOP SEMEN      Vasectomy     HEPATECTOMY PARTIAL N/A 5/12/2020    Procedure: Exploratory Laparotomy, Open right hepatectomy, Radical Extra-Hepatic Bile Duct Resection, Portal Lymph Node Dissection, Intraoperative Ultrasound, Intra Air-Cholangiogram ,Bianca-En-Y Left Hepaticojejunostomy, Excision Skin Lesion;  Surgeon: Oswaldo Hale MD;  Location[facility]     IR ABSCESS TUBE CHANGE  6/12/2020     IR FOLLOW UP VISIT OUTPATIENT  7/24/2020     IR FOLLOW UP VISIT OUTPATIENT  8/3/2020     IR SINOGRAM INJECTION DIAGNOSTIC  7/14/2020     IR THORACENTESIS  5/16/2020     LYMPHADENECTOMY ABDOMINAL N/A 5/12/2020    Procedure: portal lymph node dissection, intraoperative liver ultrasound;  Surgeon: Oswaldo Hale MD;  Location: [facility]       Family History   Problem Relation Age of Onset     Arthritis Mother         RA     Diabetes Father         diet controlled     Hypertension Father        Social History      Socioeconomic History     Marital status:      Spouse name: Robyn     Number of children: 2     Years of education: 14     Highest education level: Not on file   Occupational History     Occupation: self employed   Social Needs     Financial resource strain: Not on file     Food insecurity     Worry: Not on file     Inability: Not on file     Transportation needs     Medical: Not on file     Non-medical: Not on file   Tobacco Use     Smoking status: Never Smoker     Smokeless tobacco: Never Used   Substance and Sexual Activity     Alcohol use: Yes     Comment: occaisional      Drug use: No     Sexual activity: Yes     Partners: Female   Lifestyle     Physical activity     Days per week: Not on file     Minutes per session: Not on file     Stress: Not on file   Relationships     Social connections     Talks on phone: Not on file     Gets together: Not on file     Attends Islam service: Not on file     Active member of club or organization: Not on file     Attends meetings of clubs or organizations: Not on file     Relationship status: Not on file     Intimate partner violence     Fear of current or ex partner: Not on file     Emotionally abused: Not on file     Physically abused: Not on file     Forced sexual activity: Not on file   Other Topics Concern      Service No     Blood Transfusions No     Caffeine Concern No     Occupational Exposure No     Hobby Hazards No     Sleep Concern No     Stress Concern No     Weight Concern No     Special Diet No     Back Care No     Exercise Yes     Bike Helmet No     Seat Belt Yes     Self-Exams No     Parent/sibling w/ CABG, MI or angioplasty before 65F 55M? Not Asked   Social History Narrative     Not on file       Current Outpatient Medications   Medication Sig Dispense Refill     atorvastatin (LIPITOR) 40 MG tablet Take 40 mg by mouth At Bedtime        clopidogrel (PLAVIX) 75 MG tablet Take 75 mg by mouth At Bedtime        dronabinol (MARINOL) 5 MG  "capsule Take 1 capsule (5 mg) by mouth 2 times daily (before meals) 60 capsule 0     empagliflozin (JARDIANCE) 10 MG TABS tablet Take 10 mg by mouth At Bedtime        fenofibrate (TRIGLIDE/LOFIBRA) 160 MG tablet Take 160 mg by mouth At Bedtime        Ferrous Sulfate (IRON) 325 (65 Fe) MG tablet Take 1 tablet by mouth 3 times daily (with meals) 180 tablet 1     furosemide (LASIX) 20 MG tablet Take 20 mg by mouth every evening        glipiZIDE (GLUCOTROL) 10 MG tablet Take 10 mg by mouth At Bedtime        insulin glargine (LANTUS PEN) 100 UNIT/ML pen Inject 24 Units Subcutaneous At Bedtime        lisinopril (ZESTRIL) 30 MG tablet Take 30 mg by mouth every evening        LORazepam (ATIVAN) 0.5 MG tablet Take 1 tablet (0.5 mg) by mouth every 4 hours as needed (Anxiety, Nausea/Vomiting or Sleep) 30 tablet 5     ondansetron (ZOFRAN) 8 MG tablet Take 1 tablet (8 mg) by mouth every 8 hours as needed (Nausea/Vomiting) 10 tablet 5     oxyCODONE (ROXICODONE) 5 MG tablet Take 1-2 tablets (5-10 mg) by mouth every 4 hours as needed for moderate to severe pain (Patient not taking: Reported on 8/27/2020) 20 tablet 0     polyethylene glycol (MIRALAX) 17 g packet Take 17 g by mouth daily 14 packet 0     prochlorperazine (COMPAZINE) 10 MG tablet Take 0.5 tablets (5 mg) by mouth every 6 hours as needed (Nausea/Vomiting) 30 tablet 5     sildenafil (REVATIO) 20 MG tablet Take 20 mg by mouth as needed       zolpidem (AMBIEN) 10 MG tablet Take 1 tablet (10 mg) by mouth nightly as needed for sleep 30 tablet 0       Medications and history reviewed    Physical exam:  Vitals: /78   Temp 97.9  F (36.6  C) (Temporal)   Ht 1.854 m (6' 1\")   Wt 81.6 kg (180 lb)   BMI 23.75 kg/m    BMI= Body mass index is 23.75 kg/m .    Constitutional: thin, alert, non-distressed   Head: Normo-cephalic, atraumatic, no lesions, masses or tenderness   Cardiovascular: RRR, no new murmurs, +S1, +S2 heart sounds, no clicks, rubs or gallops   Respiratory: " CTAB, no rales, rhonchi or wheezing, equal chest rise, good respiratory effort   Gastrointestinal: Soft, non-tender, drainage tube with bilious output   : Deferred  Musculoskeletal: Moves all extremities, normal  strength, no deformities noted   Skin: No suspicious lesions or rashes   Psychiatric: Mentation appears normal, affect appropriate   Hematologic/Lymphatic/Immunologic: Normal cervical and supraclavicular lymph nodes   Patient able to get up on table without difficulty.    Labs show:  Results for orders placed or performed in visit on 09/23/20 (from the past 24 hour(s))   Comprehensive metabolic panel   Result Value Ref Range    Sodium 139 133 - 144 mmol/L    Potassium 3.8 3.4 - 5.3 mmol/L    Chloride 107 94 - 109 mmol/L    Carbon Dioxide 27 20 - 32 mmol/L    Anion Gap 5 3 - 14 mmol/L    Glucose 127 (H) 70 - 99 mg/dL    Urea Nitrogen 6 (L) 7 - 30 mg/dL    Creatinine 0.76 0.66 - 1.25 mg/dL    GFR Estimate >90 >60 mL/min/[1.73_m2]    GFR Estimate If Black >90 >60 mL/min/[1.73_m2]    Calcium 8.9 8.5 - 10.1 mg/dL    Bilirubin Total 1.8 (H) 0.2 - 1.3 mg/dL    Albumin 2.6 (L) 3.4 - 5.0 g/dL    Protein Total 7.1 6.8 - 8.8 g/dL    Alkaline Phosphatase 529 (H) 40 - 150 U/L    ALT 60 0 - 70 U/L    AST 76 (H) 0 - 45 U/L   CBC with platelets differential   Result Value Ref Range    WBC 5.9 4.0 - 11.0 10e9/L    RBC Count 4.05 (L) 4.4 - 5.9 10e12/L    Hemoglobin 11.2 (L) 13.3 - 17.7 g/dL    Hematocrit 33.8 (L) 40.0 - 53.0 %    MCV 84 78 - 100 fl    MCH 27.7 26.5 - 33.0 pg    MCHC 33.1 31.5 - 36.5 g/dL    RDW 18.3 (H) 10.0 - 15.0 %    Platelet Count 540 (H) 150 - 450 10e9/L    Diff Method Manual Differential     % Neutrophils 39.0 %    % Lymphocytes 47.0 %    % Monocytes 9.0 %    % Eosinophils 1.0 %    % Basophils 2.0 %    % Promyelocytes 1.0 %    % Other Cells 1.0 %    Absolute Neutrophil 2.3 1.6 - 8.3 10e9/L    Absolute Lymphocytes 2.8 0.8 - 5.3 10e9/L    Absolute Monocytes 0.5 0.0 - 1.3 10e9/L    Absolute  Eosinophils 0.1 0.0 - 0.7 10e9/L    Absolute Basophils 0.1 0.0 - 0.2 10e9/L    Absolute Promyeloctyes 0.1 (H) 0 10e9/L    Absolute Other Cells 0.1 (H) 0 10e9/L    RBC Morphology Consistent with reported results     Platelet Estimate Confirming automated cell count    Magnesium   Result Value Ref Range    Magnesium 1.9 1.6 - 2.3 mg/dL       Imaging shows:  reviewed    Assessment:     ICD-10-CM    1. Hilar cholangiocarcinoma (H)  C24.0 Case Request: ultrasound guided right internal venous access port placement with flouroscopy, possible left     Case Request: ultrasound guided right internal venous access port placement with flouroscopy, possible left     Plan: I recommend US RIJ VAP with flouroscopy. We discussed the procedure in detail. We also discussed the risks, benefits, alternatives and post-op care and restrictions. After our informed discussion we decided to proceed with the proposed surgery.  He has had several procedures with moderate sedation recently and has tolerated anesthesia without complication. He is cleared for anesthesia for this procedure..    Fercho Wayne DO

## 2020-09-23 NOTE — LETTER
9/23/2020         RE: Fuentes Estrada  [address] [place] [address]        Dear Colleague,    Thank you for referring your patient, Fuentes Estrada, to the [facility]. Please see a copy of my visit note below.    Patient seen in consultation for port placement    HPI:  Patient is a 65 year old male who recently underwent surgery for hilar cholangiocarcinoma. The plan at this time is for adjuvant chemotherapy, which he has already started. I am asked to evaluate for port placement. He has no trauma history to the clavicles. No head or neck surgery or radiation. He does not take any blood thinning medications.    Review Of Systems    Skin: negative  Ears/Nose/Throat: negative  Respiratory: No shortness of breath, dyspnea on exertion, cough, or hemoptysis  Cardiovascular: negative  Gastrointestinal: as above  Genitourinary: negative  Musculoskeletal: negative  Neurologic: negative  Hematologic/Lymphatic/Immunologic: negative  Endocrine: negative      Past Medical History:   Diagnosis Date     Cerebrovascular accident (CVA) (H) 12/2010     Cholangiocarcinoma (H)      Essential hypertension, benign     Hypertension, Benign     Nonspecific abnormal results of liver function study     Hx of elevated LFT's       Past Surgical History:   Procedure Laterality Date     ENDOSCOPIC RETROGRADE CHOLANGIOPANCREATOGRAM N/A 3/31/2020    Procedure: ENDOSCOPIC RETROGRADE CHOLANGIOPANCREATOGRAPHY, WITH with biliary sphincterotomy, biopsies and brushings, biliary stent placement;  Surgeon: Win Strauss MD;  [facility]     ENDOSCOPIC RETROGRADE CHOLANGIOPANCREATOGRAM N/A 4/6/2020    Procedure: ENDOSCOPIC RETROGRADE CHOLANGIOPANCREATOGRAPHY;  Surgeon: Win Strauss MD;  [facility]     ENDOSCOPIC RETROGRADE CHOLANGIOPANCREATOGRAM WITH SPYGLASS  4/16/2020    Procedure: ENDOSCOPIC RETROGRADE CHOLANGIOPANCREATOGRAPHY, WITH DIRECT DUCT VISUALIZATION, USING PANCREATICOBILIARY FIBEROPTIC PROBE;  Bile Duct Stent Exchange and Biopsy,balloon sweep of bile duct;  Surgeon: Win Strauss MD;  [facility]     ENDOSCOPIC ULTRASOUND UPPER GASTROINTESTINAL TRACT (GI) N/A 4/16/2020    Procedure: ENDOSCOPIC ULTRASOUND, ESOPHAGOSCOPY / UPPER GASTROINTESTINAL TRACT (GI)with Fine Needle biopsy;  Surgeon: Cody Baumann MD;  [facility]     ENDOSCOPIC ULTRASOUND UPPER GASTROINTESTINAL TRACT (GI) N/A 8/11/2020    Procedure: ENDOSCOPIC ULTRASOUND, ESOPHAGOSCOPY / UPPER GASTROINTESTINAL TRACT (GI);  Surgeon: Guru Bailey Rossi MD;  [facility]     ESOPHAGOSCOPY, GASTROSCOPY, DUODENOSCOPY (EGD), COMBINED N/A 4/6/2020    Procedure: ESOPHAGOGASTRODUODENOSCOPY (EGD);  Surgeon: Win Strauss MD;  [facility]     ESOPHAGOSCOPY, GASTROSCOPY, DUODENOSCOPY (EGD), COMBINED N/A 8/11/2020    Procedure: Esophagogastroduodenoscopy, With Fine Needle Aspiration Biopsy, With Endoscopic Ultrasound Guidance;  Surgeon: Guru Bailey Rossi MD;  [facility]     EXPLORE COMMON BILE DUCT N/A 5/12/2020    Procedure: extra-hepatic bile duct resection;  Surgeon: Oswaldo Hale MD;  Location: [facility]     HC KNEE SCOPE, DIAGNOSTIC  1995    Arthroscopy, Knee; left     HC VASECTOMY UNILAT/BILAT W POSTOP SEMEN      Vasectomy     HEPATECTOMY PARTIAL N/A 5/12/2020    Procedure: Exploratory Laparotomy, Open right hepatectomy, Radical Extra-Hepatic Bile Duct Resection, Portal Lymph Node Dissection, Intraoperative Ultrasound, Intra Air-Cholangiogram ,Bianca-En-Y Left Hepaticojejunostomy, Excision Skin Lesion;  Surgeon: Oswaldo Hale MD;  [facility]     IR ABSCESS TUBE CHANGE  6/12/2020     IR FOLLOW UP VISIT OUTPATIENT  7/24/2020     IR FOLLOW UP VISIT OUTPATIENT  8/3/2020     IR SINOGRAM INJECTION DIAGNOSTIC  7/14/2020     IR THORACENTESIS  5/16/2020     LYMPHADENECTOMY ABDOMINAL N/A 5/12/2020    Procedure: portal lymph node dissection, intraoperative liver ultrasound;   Surgeon: Oswaldo Hale MD;  Location:  OR       Family History   Problem Relation Age of Onset     Arthritis Mother         RA     Diabetes Father         diet controlled     Hypertension Father        Social History     Socioeconomic History     Marital status:      Spouse name: Robyn     Number of children: 2     Years of education: 14     Highest education level: Not on file   Occupational History     Occupation: self employed   Social Needs     Financial resource strain: Not on file     Food insecurity     Worry: Not on file     Inability: Not on file     Transportation needs     Medical: Not on file     Non-medical: Not on file   Tobacco Use     Smoking status: Never Smoker     Smokeless tobacco: Never Used   Substance and Sexual Activity     Alcohol use: Yes     Comment: occaisional      Drug use: No     Sexual activity: Yes     Partners: Female   Lifestyle     Physical activity     Days per week: Not on file     Minutes per session: Not on file     Stress: Not on file   Relationships     Social connections     Talks on phone: Not on file     Gets together: Not on file     Attends Synagogue service: Not on file     Active member of club or organization: Not on file     Attends meetings of clubs or organizations: Not on file     Relationship status: Not on file     Intimate partner violence     Fear of current or ex partner: Not on file     Emotionally abused: Not on file     Physically abused: Not on file     Forced sexual activity: Not on file   Other Topics Concern      Service No     Blood Transfusions No     Caffeine Concern No     Occupational Exposure No     Hobby Hazards No     Sleep Concern No     Stress Concern No     Weight Concern No     Special Diet No     Back Care No     Exercise Yes     Bike Helmet No     Seat Belt Yes     Self-Exams No     Parent/sibling w/ CABG, MI or angioplasty before 65F 55M? Not Asked   Social History Narrative     Not on file       Current Outpatient  "Medications   Medication Sig Dispense Refill     atorvastatin (LIPITOR) 40 MG tablet Take 40 mg by mouth At Bedtime        clopidogrel (PLAVIX) 75 MG tablet Take 75 mg by mouth At Bedtime        dronabinol (MARINOL) 5 MG capsule Take 1 capsule (5 mg) by mouth 2 times daily (before meals) 60 capsule 0     empagliflozin (JARDIANCE) 10 MG TABS tablet Take 10 mg by mouth At Bedtime        fenofibrate (TRIGLIDE/LOFIBRA) 160 MG tablet Take 160 mg by mouth At Bedtime        Ferrous Sulfate (IRON) 325 (65 Fe) MG tablet Take 1 tablet by mouth 3 times daily (with meals) 180 tablet 1     furosemide (LASIX) 20 MG tablet Take 20 mg by mouth every evening        glipiZIDE (GLUCOTROL) 10 MG tablet Take 10 mg by mouth At Bedtime        insulin glargine (LANTUS PEN) 100 UNIT/ML pen Inject 24 Units Subcutaneous At Bedtime        lisinopril (ZESTRIL) 30 MG tablet Take 30 mg by mouth every evening        LORazepam (ATIVAN) 0.5 MG tablet Take 1 tablet (0.5 mg) by mouth every 4 hours as needed (Anxiety, Nausea/Vomiting or Sleep) 30 tablet 5     ondansetron (ZOFRAN) 8 MG tablet Take 1 tablet (8 mg) by mouth every 8 hours as needed (Nausea/Vomiting) 10 tablet 5     oxyCODONE (ROXICODONE) 5 MG tablet Take 1-2 tablets (5-10 mg) by mouth every 4 hours as needed for moderate to severe pain (Patient not taking: Reported on 8/27/2020) 20 tablet 0     polyethylene glycol (MIRALAX) 17 g packet Take 17 g by mouth daily 14 packet 0     prochlorperazine (COMPAZINE) 10 MG tablet Take 0.5 tablets (5 mg) by mouth every 6 hours as needed (Nausea/Vomiting) 30 tablet 5     sildenafil (REVATIO) 20 MG tablet Take 20 mg by mouth as needed       zolpidem (AMBIEN) 10 MG tablet Take 1 tablet (10 mg) by mouth nightly as needed for sleep 30 tablet 0       Medications and history reviewed    Physical exam:  Vitals: /78   Temp 97.9  F (36.6  C) (Temporal)   Ht 1.854 m (6' 1\")   Wt 81.6 kg (180 lb)   BMI 23.75 kg/m    BMI= Body mass index is 23.75 " kg/m .    Constitutional: thin, alert, non-distressed   Head: Normo-cephalic, atraumatic, no lesions, masses or tenderness   Cardiovascular: RRR, no new murmurs, +S1, +S2 heart sounds, no clicks, rubs or gallops   Respiratory: CTAB, no rales, rhonchi or wheezing, equal chest rise, good respiratory effort   Gastrointestinal: Soft, non-tender, drainage tube with bilious output   : Deferred  Musculoskeletal: Moves all extremities, normal  strength, no deformities noted   Skin: No suspicious lesions or rashes   Psychiatric: Mentation appears normal, affect appropriate   Hematologic/Lymphatic/Immunologic: Normal cervical and supraclavicular lymph nodes   Patient able to get up on table without difficulty.    Labs show:  Results for orders placed or performed in visit on 09/23/20 (from the past 24 hour(s))   Comprehensive metabolic panel   Result Value Ref Range    Sodium 139 133 - 144 mmol/L    Potassium 3.8 3.4 - 5.3 mmol/L    Chloride 107 94 - 109 mmol/L    Carbon Dioxide 27 20 - 32 mmol/L    Anion Gap 5 3 - 14 mmol/L    Glucose 127 (H) 70 - 99 mg/dL    Urea Nitrogen 6 (L) 7 - 30 mg/dL    Creatinine 0.76 0.66 - 1.25 mg/dL    GFR Estimate >90 >60 mL/min/[1.73_m2]    GFR Estimate If Black >90 >60 mL/min/[1.73_m2]    Calcium 8.9 8.5 - 10.1 mg/dL    Bilirubin Total 1.8 (H) 0.2 - 1.3 mg/dL    Albumin 2.6 (L) 3.4 - 5.0 g/dL    Protein Total 7.1 6.8 - 8.8 g/dL    Alkaline Phosphatase 529 (H) 40 - 150 U/L    ALT 60 0 - 70 U/L    AST 76 (H) 0 - 45 U/L   CBC with platelets differential   Result Value Ref Range    WBC 5.9 4.0 - 11.0 10e9/L    RBC Count 4.05 (L) 4.4 - 5.9 10e12/L    Hemoglobin 11.2 (L) 13.3 - 17.7 g/dL    Hematocrit 33.8 (L) 40.0 - 53.0 %    MCV 84 78 - 100 fl    MCH 27.7 26.5 - 33.0 pg    MCHC 33.1 31.5 - 36.5 g/dL    RDW 18.3 (H) 10.0 - 15.0 %    Platelet Count 540 (H) 150 - 450 10e9/L    Diff Method Manual Differential     % Neutrophils 39.0 %    % Lymphocytes 47.0 %    % Monocytes 9.0 %    % Eosinophils  1.0 %    % Basophils 2.0 %    % Promyelocytes 1.0 %    % Other Cells 1.0 %    Absolute Neutrophil 2.3 1.6 - 8.3 10e9/L    Absolute Lymphocytes 2.8 0.8 - 5.3 10e9/L    Absolute Monocytes 0.5 0.0 - 1.3 10e9/L    Absolute Eosinophils 0.1 0.0 - 0.7 10e9/L    Absolute Basophils 0.1 0.0 - 0.2 10e9/L    Absolute Promyeloctyes 0.1 (H) 0 10e9/L    Absolute Other Cells 0.1 (H) 0 10e9/L    RBC Morphology Consistent with reported results     Platelet Estimate Confirming automated cell count    Magnesium   Result Value Ref Range    Magnesium 1.9 1.6 - 2.3 mg/dL       Imaging shows:  reviewed    Assessment:     ICD-10-CM    1. Hilar cholangiocarcinoma (H)  C24.0 Case Request: ultrasound guided right internal venous access port placement with flouroscopy, possible left     Case Request: ultrasound guided right internal venous access port placement with flouroscopy, possible left     Plan: I recommend US RIJ VAP with flouroscopy. We discussed the procedure in detail. We also discussed the risks, benefits, alternatives and post-op care and restrictions. After our informed discussion we decided to proceed with the proposed surgery.  He has had several procedures with moderate sedation recently and has tolerated anesthesia without complication. He is cleared for anesthesia for this procedure..    Fercho Wayne DO      Again, thank you for allowing me to participate in the care of your patient.        Sincerely,        Fercho Wayne DO

## 2020-09-24 NOTE — TELEPHONE ENCOUNTER
Type of surgery: ultrasound guided right internal venous access port placement  Location of surgery: [facility]   Date of surgery: 9/28/20  Surgeon: Dr. Wayne  Pre-Op Appt Date: na  Post-Op Appt Date: na   Packet sent out: Surgery packet was given to patient in clinic.   Pre-cert/Authorization completed: NA  Date: 9/24/2020    Mimi Diaz  Surgery Scheduler

## 2020-09-25 NOTE — PROGRESS NOTES
Patient returned to 2A post sinogram and sclerotherapy.  VSS.  Denies pain.  Right outer abdomen site C/D/I, no hematoma.  Po fluids at bedside.

## 2020-09-25 NOTE — PRE-PROCEDURE
GENERAL PRE-PROCEDURE:   Procedure:  IR SINOGRAM  Date/Time:  9/25/2020 1:52 PM    Verbal consent obtained?: Yes    Written consent obtained?: Yes    Risks and benefits: Risks, benefits and alternatives were discussed    DC Plan: Appropriate discharge home plan in place for patients who are going home after procedure   Consent given by:  Patient  Patient states understanding of procedure being performed: Yes    Patient's understanding of procedure matches consent: Yes    Procedure consent matches procedure scheduled: Yes    Expected level of sedation:  Moderate  Appropriately NPO:  Yes  ASA Class:  Class 2- mild systemic disease, no acute problems, no functional limitations  Mallampati  :  Grade 1- soft palate, uvula, tonsillar pillars, and posterior pharyngeal wall visible  Lungs:  Lungs clear with good breath sounds bilaterally  Heart:  Normal heart sounds and rate  History & Physical reviewed:  History and physical reviewed and no updates needed  Statement of review:  I have reviewed the lab findings, diagnostic data, medications, and the plan for sedation

## 2020-09-25 NOTE — PROGRESS NOTES
Patient arrived on 2A for sinogram/sclerotherapy.  IV placed, IV antibiotic infusing.  Has consent done from last week, just needs sedation assessment, otherwise prep complete.  Wife, Robyn at bedside.

## 2020-09-25 NOTE — IR NOTE
Interventional Radiology Intra-procedural Nursing Note    Patient Name: Fuentes Estrada  Medical Record Number: [id number]  Today's Date: September 25, 2020    Procedure: Sinogram with cavity sclerotherapy, possible drain exchange  Proceduralist: Osmar Laughlin PA-C    Sedation start time: 1410  Sedation end time: 1545  Sedation medications administered:   25 mcg of Fentanyl  0.5 mg of Versed  Total sedation time: 1 hour 35 minutes     Procedure start time: 1410  Procedure end time: 1545    Report given to: JASMIN Up 2A    Other Notes: Pt arrived to IR room 5 from . Consent reviewed. Pt denies any questions or concerns regarding procedure. Pt positioned lateral - right up and monitored per protocol. Pt tolerated procedure without any noted complications. Pt transferred back to .    4 mls of lidocaine, 98% alcohol, and doxycyline instilled and drained.     Milla Vicente

## 2020-09-25 NOTE — PROGRESS NOTES
Patient tolerated recovery stage well. VSS, right outer abdomen site clean/dry/intact, no hematoma, and denies pain. Patient tolerated PO fluids, declined food. Teaching and discharge instructions declined by patient and wife as patient has had this procedure several times in the past few weeks and has the same procedure again next Friday. Patient ambulated, voided, and PIV was removed. Patient discharged from the hospital via wheel chair to home with wife @ 1625.

## 2020-09-28 NOTE — ANESTHESIA PREPROCEDURE EVALUATION
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]    Procedure(s):  ultrasound guided right internal venous access port placement with flouroscopy, possible left    Past Medical History:   Diagnosis Date     Cerebrovascular accident (CVA) (H) 2010     Cholangiocarcinoma (H)      Essential hypertension, benign     Hypertension, Benign     Nonspecific abnormal results of liver function study     Hx of elevated LFT's     Past Surgical History:   Procedure Laterality Date     ENDOSCOPIC RETROGRADE CHOLANGIOPANCREATOGRAM N/A 3/31/2020    Procedure: ENDOSCOPIC RETROGRADE CHOLANGIOPANCREATOGRAPHY, WITH with biliary sphincterotomy, biopsies and brushings, biliary stent placement;  Surgeon: Win Strauss MD;  [facility]     ENDOSCOPIC RETROGRADE CHOLANGIOPANCREATOGRAM N/A 2020    Procedure: ENDOSCOPIC RETROGRADE CHOLANGIOPANCREATOGRAPHY;  Surgeon: Win Strauss MD;  [facility]     ENDOSCOPIC RETROGRADE CHOLANGIOPANCREATOGRAM WITH SPYGLASS  2020    Procedure: ENDOSCOPIC RETROGRADE CHOLANGIOPANCREATOGRAPHY, WITH DIRECT DUCT VISUALIZATION, USING PANCREATICOBILIARY FIBEROPTIC PROBE; Bile Duct Stent Exchange and Biopsy,balloon sweep of bile duct;  Surgeon: Win Strauss MD;  [facility]     ENDOSCOPIC ULTRASOUND UPPER GASTROINTESTINAL TRACT (GI) N/A 2020    Procedure: ENDOSCOPIC ULTRASOUND, ESOPHAGOSCOPY / UPPER GASTROINTESTINAL TRACT (GI)with Fine Needle biopsy;  Surgeon: Cody Baumann MD;  [facility]     ENDOSCOPIC ULTRASOUND UPPER GASTROINTESTINAL TRACT (GI) N/A 2020    Procedure: ENDOSCOPIC ULTRASOUND, ESOPHAGOSCOPY / UPPER GASTROINTESTINAL TRACT (GI);  Surgeon: Guru Bailey Rossi MD;  [facility]     ESOPHAGOSCOPY, GASTROSCOPY, DUODENOSCOPY (EGD), COMBINED N/A 2020    Procedure: ESOPHAGOGASTRODUODENOSCOPY  (EGD);  Surgeon: Win Strauss MD;  [facility]     ESOPHAGOSCOPY, GASTROSCOPY, DUODENOSCOPY (EGD), COMBINED N/A 8/11/2020    Procedure: Esophagogastroduodenoscopy, With Fine Needle Aspiration Biopsy, With Endoscopic Ultrasound Guidance;  Surgeon: Guru Bailey Rossi MD;  [facility]     EXPLORE COMMON BILE DUCT N/A 5/12/2020    Procedure: extra-hepatic bile duct resection;  Surgeon: Oswaldo Hale MD;  [facility]     HC KNEE SCOPE, DIAGNOSTIC  1995    Arthroscopy, Knee; left     HC VASECTOMY UNILAT/BILAT W POSTOP SEMEN      Vasectomy     HEPATECTOMY PARTIAL N/A 5/12/2020    Procedure: Exploratory Laparotomy, Open right hepatectomy, Radical Extra-Hepatic Bile Duct Resection, Portal Lymph Node Dissection, Intraoperative Ultrasound, Intra Air-Cholangiogram ,Bianca-En-Y Left Hepaticojejunostomy, Excision Skin Lesion;  Surgeon: Oswaldo Hale MD;  [facility]     IR ABSCESS TUBE CHANGE  6/12/2020     IR FOLLOW UP VISIT OUTPATIENT  7/24/2020     IR FOLLOW UP VISIT OUTPATIENT  8/3/2020     IR SINOGRAM INJECTION DIAGNOSTIC  7/14/2020     IR SINOGRAM INJECTION DIAGNOSTIC  9/14/2020     IR SINOGRAM INJECTION THERAPEUTIC  8/25/2020     IR SINOGRAM INJECTION THERAPEUTIC  9/25/2020     IR THORACENTESIS  5/16/2020     LYMPHADENECTOMY ABDOMINAL N/A 5/12/2020    Procedure: portal lymph node dissection, intraoperative liver ultrasound;  Surgeon: Oswaldo Hale MD;  [facility]       Anesthesia Evaluation     . Pt has had prior anesthetic. Type: General and MAC           ROS/MED HX    ENT/Pulmonary: Comment: Recent pleural effusions.       Neurologic:     (+)CVA date: 2010     Cardiovascular:     (+) hypertension----. : . . . :. .       METS/Exercise Tolerance:     Hematologic:  - neg hematologic  ROS       Musculoskeletal:  - neg musculoskeletal ROS       GI/Hepatic:     (+) Other GI/Hepatic partial hepatic resection       Renal/Genitourinary:  - ROS Renal section negative   "     Endo:  - neg endo ROS       Psychiatric:  - neg psychiatric ROS       Infectious Disease:  - neg infectious disease ROS       Malignancy:   (+) Malignancy History of GI  GI CA  Active status post Surgery and Chemo,         Other:    - neg other ROS                      Physical Exam  Normal systems: cardiovascular, pulmonary and dental    Airway   Mallampati: II  TM distance: >3 FB  Neck ROM: full    Dental     Cardiovascular   Rhythm and rate: regular and normal      Pulmonary             Lab Results   Component Value Date    WBC 5.9 09/23/2020    HGB 11.2 (L) 09/23/2020    HCT 33.8 (L) 09/23/2020     (H) 09/23/2020     09/23/2020    POTASSIUM 3.8 09/23/2020    CHLORIDE 107 09/23/2020    CO2 27 09/23/2020    BUN 6 (L) 09/23/2020    CR 0.76 09/23/2020     (H) 09/23/2020    ERIC 8.9 09/23/2020    PHOS 2.3 (L) 05/17/2020    MAG 1.9 09/23/2020    ALBUMIN 2.6 (L) 09/23/2020    PROTTOTAL 7.1 09/23/2020    ALT 60 09/23/2020    AST 76 (H) 09/23/2020    ALKPHOS 529 (H) 09/23/2020    BILITOTAL 1.8 (H) 09/23/2020    BILIDIRECT 0.4 06/12/2002    LIPASE 585 (H) 04/16/2020    AMYLASE 164 (H) 04/16/2020    INR 1.42 (H) 05/18/2020    TSH 3.60 01/21/2002    T4 0.7 01/21/2002       Preop Vitals  BP Readings from Last 3 Encounters:   09/25/20 110/57   09/23/20 138/78   09/23/20 138/78    Pulse Readings from Last 3 Encounters:   09/25/20 76   09/23/20 69   09/18/20 68      Resp Readings from Last 3 Encounters:   09/25/20 16   09/23/20 16   09/18/20 16    SpO2 Readings from Last 3 Encounters:   09/25/20 97%   09/23/20 99%   09/18/20 99%      Temp Readings from Last 1 Encounters:   09/25/20 98.4  F (36.9  C) (Oral)    Ht Readings from Last 1 Encounters:   09/25/20 1.854 m (6' 1\")      Wt Readings from Last 1 Encounters:   09/25/20 79.8 kg (176 lb)    Estimated body mass index is 23.22 kg/m  as calculated from the following:    Height as of 9/25/20: 1.854 m (6' 1\").    Weight as of 9/25/20: 79.8 kg (176 lb). "       Anesthesia Plan      History & Physical Review  History and physical reviewed and following examination; no interval change.    ASA Status:  3 .    NPO Status:  > 6 hours    Plan for MAC with Intravenous and Propofol induction. Maintenance will be TIVA.  Reason for MAC:  Deep or markedly invasive procedure (G8) and Procedure to face, neck, head or breast  PONV prophylaxis:  Ondansetron (or other 5HT-3)    The patient is not a current smoker      Postoperative Care  Postoperative pain management:  IV analgesics.      Consents  Anesthetic plan, risks, benefits and alternatives discussed with:  Patient.  Use of blood products discussed: No .   .                 VICKI Cooper CRNA

## 2020-09-28 NOTE — BRIEF OP NOTE
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

## 2020-09-28 NOTE — TELEPHONE ENCOUNTER
Per Dr. Purcell, pt may have Covid test every 2 weeks while he's coming to IR for frequent Sinograms. Pt must remain asymptomatic. Jos CASTELLANOS

## 2020-09-28 NOTE — DISCHARGE INSTRUCTIONS
[facility] Same-Day Surgery   Adult Discharge Orders & Instructions     For 24 hours after surgery    1. Get plenty of rest.  A responsible adult must stay with you for at least 24 hours after you leave the hospital.   2. Do not drive or use heavy equipment.  If you have weakness or tingling, don't drive or use heavy equipment until this feeling goes away.  3. Do not drink alcohol.  4. Avoid strenuous or risky activities.  Ask for help when climbing stairs.   5. You may feel lightheaded.  If so, sit for a few minutes before standing.  Have someone help you get up.   6. You may have a slight fever. Call the doctor if your fever is over 100 F (37.7 C) (taken under the tongue) or lasts longer than 24 hours.  7. You may have a dry mouth, a sore throat, muscle aches or trouble sleeping.  These should go away after 24 hours.  8. Do not make important or legal decisions.  We don t expect you to have any problems from the surgery or treatment you had today. Just in case, here s what to do if you have pain, upset stomach (nausea), bleeding or infection:  Pain:  Take medicines your doctor has prescribed or over-the-counter medicine they have suggested. Resting and using ice packs can help, too. For surgery on an arm or leg, raise it on a pillow to ease swelling. Call your doctor if these methods don t work.  Copyright Wagner Flores, Licensed under CC[employer] International  Upset stomach (nausea):  Take anti-nausea medicine approved by your doctor. Drink clear liquids like apple juice, ginger ale, broth or 7-Up. Be sure to drink enough fluids. Rest can help, too. Move to normal foods when you re ready.   Bleeding:  In the first 24 hours, you may see a little blood on your dressing, about the size of a quarter. You don t need to worry about this much blood, but if the blood spot keeps getting bigger:    Put pressure on the wound if you can, AND    Call your doctor.  Copyright [employer], Licensed under CC4.0  International  Fever/Infection: Please call your doctor if you have any of these signs:    Redness    Swelling    Wound feels warm    Pain gets worse    Bad-smelling fluid leaks from wound    Fever or chills  Call your doctor for any of the followin.  It has been over 8 to 10 hours since surgery and you are still not able to urinate (pass water).    2.  Headache for over 24 hours.    3.  Numbness, tingling or weakness in your legs the day after surgery (if you had spinal anesthesia).    Nurse advice line: [phone]

## 2020-09-28 NOTE — ANESTHESIA POSTPROCEDURE EVALUATION
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    Anesthesia Type:  MAC    Note:  Anesthesia Post Evaluation    Patient location during evaluation: Phase 2  Patient participation: Able to fully participate in evaluation  Level of consciousness: awake  Pain management: adequate  Airway patency: patent  Cardiovascular status: acceptable and hemodynamically stable  Respiratory status: acceptable, room air and nonlabored ventilation  Hydration status: stable  PONV: none     Anesthetic complications: None    Comments: Patient was happy with the anesthesia care received and no anesthesia related complications were noted.  I will follow up with the patient again if it is needed.        Last vitals:  Vitals:    09/28/20 0624   BP: 129/88   Resp: 16   Temp: 97.6  F (36.4  C)   SpO2: 99%         Electronically Signed By: VICKI Cooper CRNA  September 28, 2020  10:08 AM

## 2020-09-28 NOTE — ANESTHESIA CARE TRANSFER NOTE
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    Anesthesia Type:   MAC     Note:  Airway :Nasal Cannula  Patient transferred to:Phase II  Handoff Report: Identifed the Patient, Identified the Reponsible Provider, Reviewed the pertinent medical history, Discussed the surgical course, Reviewed Intra-OP anesthesia mangement and issues during anesthesia, Set expectations for post-procedure period and Allowed opportunity for questions and acknowledgement of understanding      Vitals: (Last set prior to Anesthesia Care Transfer)    CRNA VITALS  9/28/2020 0756 - 9/28/2020 0830      9/28/2020             Pulse:  70    SpO2:  97 %                Electronically Signed By: VICKI Cooper CRNA  September 28, 2020  8:30 AM

## 2020-09-29 NOTE — OR NURSING
"[facility] Same Day Surgery  Discharge Call Back  Fuentes Estrada  1955  MRN: [id number]  Home: [phone] (home)   PCP: oTlu Noland    We are calling to see how you're doing since your surgery/procedure with us?   Comments: discussion with wife Robyn, \"he seems to be doing very well.  He doesn't seem to be having any pain.  I looked at the incision and that looks really good.  He really does not seem to know it is there.\"  Clinical Questions  1. Have you had time to look at your discharge instructions? Do you have any questions in regards to the instructions?   Comment: \"no questions\"  2. Do you feel your pain is being controlled with the regimen the surgeon sent you home on? (ie: prescription medications, over the counter pain medications, ice packs)   Comments: \"none\"  3. Have you noticed any drainage on your dressing? Do you know what to do if you have bleeding as a result of your procedure?   Comments: \"no bleeding\"  4. Have you had any nausea/vomiting? Do you know how to treat this?   Comment: \"no\"  5. Have you had any signs/symptoms of infection? (ie: fever, swelling, heat, drainage or redness) Do you know what to do if you have?   Comment: \"no\"  6. Do you have a follow up appointment made with your surgeon? Do you have a number to contact them at if you need it?   Comment: reviewed contact number with Robyn for [facility], plan is for prot to be used at infusion appointment next week.   Retained Foreign Object (SAL, Hemovac, Penrose, Wound Packing, Vaginal Packing, Nasal Splints, Urethral Stents, Em Catheter)  1. Do you still have  in place?   2. If the item is still in place, can you review the plan for removal with me?       You may be randomly selected to fill out a [facility] survey. We would appreciate you taking the time to fill this out. It is important to us if you would answer all of the questions on the survey.              "

## 2020-10-02 NOTE — IP AVS SNAPSHOT
[facility] [address] [address]                                    After Visit Summary   10/2/2020    Fuentes Estrada    MRN: [id number]           After Visit Summary Signature Page    I have received my discharge instructions, and my questions have been answered. I have discussed any challenges I see with this plan with the nurse or doctor.    ..........................................................................................................................................  Patient/Patient Representative Signature      ..........................................................................................................................................  Patient Representative Print Name and Relationship to Patient    ..................................................               ................................................  Date                                   Time    ..........................................................................................................................................  Reviewed by Signature/Title    ...................................................              ..............................................  Date                                               Time          22EPIC Rev 08/18

## 2020-10-02 NOTE — DISCHARGE INSTRUCTIONS
"[facility]  Interventional Radiology   Drainage Tube Instructions      AFTER YOU GO HOME:    Have an adult stay with you for 24 hours.     Drink plenty of fluids and resume your regular diet.    For 24 hours:       Do not drive or operate machinery at home or at work    No alcoholic beverages    Do not make any important legal decisions    No heavy lifting greater than 10 lb until instructed by your physician     Call Your Physician if:    You develop nausea or vomiting.    Your develop hives or rash or unexplained itching    Additional Instructions: Please call for the following problems:    No fluid draining from the tube, check that the tube is not kinked or if stop cock is closed.    Flush with 5 ml Betadine solution until Monday 10/05, empty bag and monitor output daily subtracting amount inserted.  On Monday, 10/05, start capping trial.  Return appointment in 10 days.     Skin around tube is red, painful or has any drainage.    You have increased pain in your abdomen    Fever greater than 100.5 F and chills    You feel nauseated and  \"just not right\"      Change dressing every 48 hour or when it gets wet    Interventional Radiology Department    Physician:  Dr. Purcell                    Date:October 2, 2020  Telephone numbers: [phone]...Monday-Friday 8:00am-4:30pm                                  [phone]... After 4:30 Monday-Friday, Weekends and Holiday. Ask for the Interventional Radiologist on call. Someone is on call 24 hours a day.                                  "

## 2020-10-02 NOTE — PRE-PROCEDURE
GENERAL PRE-PROCEDURE:   Date/Time:  10/2/2020 9:42 AM    Written consent obtained?: Yes    Risks and benefits: Risks, benefits and alternatives were discussed    Consent given by:  Patient  Patient states understanding of procedure being performed: Yes    Patient's understanding of procedure matches consent: Yes    Procedure consent matches procedure scheduled: Yes    Expected level of sedation:  Moderate  Appropriately NPO:  Yes  ASA Class:  Class 2- mild systemic disease, no acute problems, no functional limitations  Mallampati  :  Grade 2- soft palate, base of uvula, tonsillar pillars, and portion of posterior pharyngeal wall visible  Lungs:  Lungs clear with good breath sounds bilaterally  Heart:  Normal heart sounds and rate  History & Physical reviewed:  History and physical reviewed and no updates needed  Statement of review:  I have reviewed the lab findings, diagnostic data, medications, and the plan for sedation

## 2020-10-02 NOTE — PROCEDURES
[facility]     Procedure: IR Procedure Note    Date/Time: 10/2/2020 12:28 PM  Performed by: Reta Purcell MD  Authorized by: Reta Purcell MD     UNIVERSAL PROTOCOL   Site Marked: NA  Prior Images Obtained and Reviewed:  Yes  Required items: Required blood products, implants, devices and special equipment available    Patient identity confirmed:  Verbally with patient, arm band, provided demographic data and hospital-assigned identification number  Patient was reevaluated immediately before administering moderate or deep sedation or anesthesia  Confirmation Checklist:  Patient's identity using two indicators, relevant allergies, procedure was appropriate and matched the consent or emergent situation and correct equipment/implants were available  Time out: Immediately prior to the procedure a time out was called    Universal Protocol: the Joint Commission Universal Protocol was followed    Preparation: Patient was prepped and draped in usual sterile fashion           ANESTHESIA    Anesthesia: Local infiltration  Local Anesthetic:  Lidocaine 1% without epinephrine      SEDATION    Patient Sedated: Yes    Sedation Type:  Moderate (conscious) sedation  Sedation:  Fentanyl and lorazepam  Vital signs: Vital signs monitored during sedation    See dictated procedure note for full details.  Findings: Small 5 mL cavity, no communication to biliary tree, tri(ple agent sclerotherapy done    Specimens: none    Complications: None    Condition: Stable    Plan: Resume drain cares and at home betadeine sclerotherapy  If drain continues to have no output over the next 3 days, then drain will be capped and patient will return in 10 days for CT followed by sinogram on same day.    PROCEDURE   Patient Tolerance:  Patient tolerated the procedure well with no immediate complications    Length of time physician/provider present for 1:1 monitoring during sedation: 15

## 2020-10-02 NOTE — PROGRESS NOTES
Patient tolerated recovery stage well. VSS, right site clean/dry/intact with tube to leg bag using gravity drain with no fluid returns at this time, no hematoma, and denies pain. Patient tolerated PO food and fluids. Teaching was done and discharge instructions were given. Patient ambulated, voided, and PIV was removed. Patient discharged from the hospital via wheel chair to home with his wife.  .

## 2020-10-02 NOTE — PROGRESS NOTES
Patient Name: Fuentes Estrada  Medical Record Number: [id number]  Today's Date: 10/2/2020    Procedure: Sinogram and sclerotherapy  Proceduralist: Dr. Purcell    Procedure Start: 1045  Procedure end: 1150  Sedation medications administered: 0.5 mg Versed, 25 mcg Fentanyl     Report given to: 2A RN    Other Notes: Pt arrived to IR room 1 from . Consent reviewed. Pt denies any questions or concerns regarding procedure. Pt positioned supine and monitored per protocol.  Patient transferred to room 6 to complete sclerotherapy Pt tolerated procedure without any noted complications. Pt transferred back to .

## 2020-10-02 NOTE — PROGRESS NOTES
0915  Prep is complete for procedure.  Fuentes is here for Sinogram of Perihepatic drain.  States that next to nothing is coming out of drainage tube into bag.  His wife, Robyn, is flushing the tube once a day in the evening with 5cc of Betadine solution.  Fuentes denies any pain at rest.  Port was accessed with good blood return & flushed with 10cc of NS & IV fluids were connected at this time. Awaiting consent.  CTRN

## 2020-10-06 NOTE — PROGRESS NOTES
"Fuentes Estrada is a 65 year old male who is being evaluated via a billable video visit.      The patient has been notified of following:     \"This video visit will be conducted via a call between you and your physician/provider. We have found that certain health care needs can be provided without the need for an in-person physical exam.  This service lets us provide the care you need with a video conversation.  If a prescription is necessary we can send it directly to your pharmacy.  If lab work is needed we can place an order for that and you can then stop by our lab to have the test done at a later time.    Video visits are billed at different rates depending on your insurance coverage.  Please reach out to your insurance provider with any questions.    If during the course of the call the physician/provider feels a video visit is not appropriate, you will not be charged for this service.\"    Patient has given verbal consent for Video visit? Yes  How would you like to obtain your AVS? MyChart  If you are dropped from the video visit, the video invite should be resent to: Text to cell phone: [phone]  Will anyone else be joining your video visit? No      Kimmy Means SABI    Video-Visit Details    Type of service:  Video Visit    Video Start Time: 4:16 PM  Video End Time: 4:46 PM    Originating Location (pt. Location): Home    Distant Location (provider location):  [facility]     Platform used for Video Visit: VICKI Hobbs CNP        Reason for Visit: seen in f/u of metastatic cholangiocarcinoma    Oncology HPI:   Fuentes Estrada is a 65 year old man with a prior hx of HTN, DM, CVA on plavix who presented with elevated LFTs in the spring of 2020. 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 On May 12, 2020 he underwent  A radical extrahepatic bile duct resection and R hepatectomy.   He had all of his disease resected with negative margins and one positive lymph " node.  He had a complicated postoperative course with a bile leak and abscess that had delayed starting his adjuvant chemotherapy.      On imaging in July,  he appeared to be developing increasing nodularity in the resection bed and regional lymph nodes that has led to an EUS with a fine-needle aspiration of the lymph nodes on 8/11/20,  demonstrating the presence of metastatic disease.      His abscess/biliary drain has been managed by IR. Sinograms identified a communication to the biliary tree. He failed a capping trial with development of a fever. He has been undergoing sinograms and cavitary sclerotherapy every 2 weeks since August 25, 2020.    On 8/27/20, he initiated treatment with palliative intent Cisplatin/Gemcitabine.    9/28/20: port placement    Interval history: Fuentes is joined by his wife, Robyn, on the video visit today. He has been tolerating the chemotherapy quite well. Had significant fatigue prior to starting treatment, but this has not worsened. Stamina is still low, but stable. Walks about 6 blocks without feeling the need to rest. Eating better with the use of the dronabinol. No nausea/vomiting. Bowel/bladder function are wnl. Biliary/abscess drain is not draining currently, plans to start capping tonight. No abdominal pain. On a rare occasion has a temp of , takes 2 Tylenol with improvement. Usually has chilling associated. Symptoms resolve within 30-60 minutes. Takes tylenol a couple times a week. No hearing changes or neuropathy. No mouth sores. No cough, sob, cp, palpitation. Glucoses are well managed in the  range. Has been able to taper off of a couple of his diabetes medicines. No bleeding/bruising.    Patient Active Problem List   Diagnosis     Essential hypertension, benign     Mixed hyperlipidemia     Hilar cholangiocarcinoma (H)     Past Medical History:   Diagnosis Date     Cerebrovascular accident (CVA) (H) 12/2010     Cholangiocarcinoma (H)      Essential hypertension,  "benign     Hypertension, Benign     Nonspecific abnormal results of liver function study     Hx of elevated LFT's         Current Outpatient Medications   Medication Sig Dispense Refill     atorvastatin (LIPITOR) 40 MG tablet Take 40 mg by mouth At Bedtime        clopidogrel (PLAVIX) 75 MG tablet Take 75 mg by mouth At Bedtime        dronabinol (MARINOL) 5 MG capsule Take 1 capsule (5 mg) by mouth 2 times daily (before meals) 60 capsule 0     fenofibrate (TRIGLIDE/LOFIBRA) 160 MG tablet Take 160 mg by mouth At Bedtime        Ferrous Sulfate (IRON) 325 (65 Fe) MG tablet Take 1 tablet by mouth 3 times daily (with meals) 180 tablet 1     furosemide (LASIX) 20 MG tablet Take 20 mg by mouth every evening        glipiZIDE (GLUCOTROL) 10 MG tablet Take 10 mg by mouth At Bedtime        lisinopril (ZESTRIL) 30 MG tablet Take 30 mg by mouth every evening        LORazepam (ATIVAN) 0.5 MG tablet Take 1 tablet (0.5 mg) by mouth every 4 hours as needed (Anxiety, Nausea/Vomiting or Sleep) 30 tablet 5     ondansetron (ZOFRAN) 8 MG tablet Take 1 tablet (8 mg) by mouth every 8 hours as needed (Nausea/Vomiting) 10 tablet 5     prochlorperazine (COMPAZINE) 10 MG tablet Take 0.5 tablets (5 mg) by mouth every 6 hours as needed (Nausea/Vomiting) 30 tablet 5     sildenafil (REVATIO) 20 MG tablet Take 20 mg by mouth as needed       zolpidem (AMBIEN) 10 MG tablet Take 1 tablet (10 mg) by mouth nightly as needed for sleep 30 tablet 0     oxyCODONE (ROXICODONE) 5 MG tablet Take 1-2 tablets (5-10 mg) by mouth every 4 hours as needed for moderate to severe pain (Patient not taking: Reported on 8/27/2020) 20 tablet 0          Allergies   Allergen Reactions     Metformin Other (See Comments)     \" I think my kidneys shut down\"         Video physical exam  General: Patient appears well in no acute distress.   Skin: No visualized rash or lesions on visualized skin  Eyes: EOMI, no erythema, sclera icterus or discharge noted  Resp: Appears to be " breathing comfortably without accessory muscle usage, speaking in full sentences, no cough  MSK: Appears to have normal range of motion based on visualized movements  Neurologic: No apparent tremors, facial movements symmetric  Psych: affect engaged, pleasant, alert and oriented    The rest of a comprehensive physical examination is deferred due to PHE (public health emergency) video restrictions     There were no vitals taken for this visit.  Wt Readings from Last 4 Encounters:   10/02/20 79.8 kg (176 lb)   09/28/20 79.8 kg (176 lb)   09/25/20 79.8 kg (176 lb)   09/23/20 81.6 kg (180 lb)         Labs: pending    Imaging: n/a    Impression/plan:   Recurrent cholangiocarcinoma with metastatic disease  -initiated on cisplatin/gemcitabine on 8/27/20  -tolerating treatment very well. Remains quite fatigued, with low stamina, but not seeing any worsening on treatment. Ok to proceed with cycle 3 tomorrow as planned pending review of labs  -plan for restaging on 10/23/20. Will f/u with Dr. Beard at that time    Bile leak/abscess  -has been undergoing sinogram with cavitary sclerotherapy every 2 weeks through IR  -most recent evaluation on 10/2/20, plan was to cap the drain if there was minimal drainage. Repeat CT Abd/pelvis with IV contrast on 10/13 and discuss further management with IR at that time    DM  -glucoses have been better controlled with recent health changes, usually . Not seeing hyperglycemia after the dexamethasone  -only on glipizide currently, previously on lantus and Januvia as well    Hx of CVA 10 years ago, has mild short term memory deficits  HTN  -on plavix  -on amlodipine, fenofibrate, lisinopril, atorvastatin    Recommended a flu shot tomorrow with his infusion.

## 2020-10-06 NOTE — LETTER
"    10/6/2020         RE: Fuentes Estrada  [address] [address] [facility] [address]        Dear Colleague,    Thank you for referring your patient, Fuentes Estrada, to the [facility]. Please see a copy of my visit note below.    Fuentes Estrada is a 65 year old male who is being evaluated via a billable video visit.      The patient has been notified of following:     \"This video visit will be conducted via a call between you and your physician/provider. We have found that certain health care needs can be provided without the need for an in-person physical exam.  This service lets us provide the care you need with a video conversation.  If a prescription is necessary we can send it directly to your pharmacy.  If lab work is needed we can place an order for that and you can then stop by our lab to have the test done at a later time.    Video visits are billed at different rates depending on your insurance coverage.  Please reach out to your insurance provider with any questions.    If during the course of the call the physician/provider feels a video visit is not appropriate, you will not be charged for this service.\"    Patient has given verbal consent for Video visit? Yes  How would you like to obtain your AVS? MyChart  If you are dropped from the video visit, the video invite should be resent to: Text to cell phone: [phone]  Will anyone else be joining your video visit? Olga LOUISE    Video-Visit Details    Type of service:  Video Visit    Video Start Time: 4:16 PM  Video End Time: 4:46 PM    Originating Location (pt. Location): Home    Distant Location (provider location):  [facility]     Platform used for Video Visit: VICKI Hobbs CNP        Reason for Visit: seen in f/u of metastatic cholangiocarcinoma    Oncology HPI:   Fuentes Estrada is a 65 year old man with a prior hx of HTN, DM, CVA on plavix who presented with elevated LFTs " in the spring of 2020. 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 On May 12, 2020 he underwent  A radical extrahepatic bile duct resection and R hepatectomy.   He had all of his disease resected with negative margins and one positive lymph node.  He had a complicated postoperative course with a bile leak and abscess that had delayed starting his adjuvant chemotherapy.      On imaging in July,  he appeared to be developing increasing nodularity in the resection bed and regional lymph nodes that has led to an EUS with a fine-needle aspiration of the lymph nodes on 8/11/20,  demonstrating the presence of metastatic disease.      His abscess/biliary drain has been managed by IR. Sinograms identified a communication to the biliary tree. He failed a capping trial with development of a fever. He has been undergoing sinograms and cavitary sclerotherapy every 2 weeks since August 25, 2020.    On 8/27/20, he initiated treatment with palliative intent Cisplatin/Gemcitabine.    9/28/20: port placement    Interval history: Fuentes is joined by his wife, Robyn, on the video visit today. He has been tolerating the chemotherapy quite well. Had significant fatigue prior to starting treatment, but this has not worsened. Stamina is still low, but stable. Walks about 6 blocks without feeling the need to rest. Eating better with the use of the dronabinol. No nausea/vomiting. Bowel/bladder function are wnl. Biliary/abscess drain is not draining currently, plans to start capping tonight. No abdominal pain. On a rare occasion has a temp of , takes 2 Tylenol with improvement. Usually has chilling associated. Symptoms resolve within 30-60 minutes. Takes tylenol a couple times a week. No hearing changes or neuropathy. No mouth sores. No cough, sob, cp, palpitation. Glucoses are well managed in the  range. Has been able to taper off of a couple of his diabetes medicines. No bleeding/bruising.    Patient Active Problem List  "  Diagnosis     Essential hypertension, benign     Mixed hyperlipidemia     Hilar cholangiocarcinoma (H)     Past Medical History:   Diagnosis Date     Cerebrovascular accident (CVA) (H) 12/2010     Cholangiocarcinoma (H)      Essential hypertension, benign     Hypertension, Benign     Nonspecific abnormal results of liver function study     Hx of elevated LFT's         Current Outpatient Medications   Medication Sig Dispense Refill     atorvastatin (LIPITOR) 40 MG tablet Take 40 mg by mouth At Bedtime        clopidogrel (PLAVIX) 75 MG tablet Take 75 mg by mouth At Bedtime        dronabinol (MARINOL) 5 MG capsule Take 1 capsule (5 mg) by mouth 2 times daily (before meals) 60 capsule 0     fenofibrate (TRIGLIDE/LOFIBRA) 160 MG tablet Take 160 mg by mouth At Bedtime        Ferrous Sulfate (IRON) 325 (65 Fe) MG tablet Take 1 tablet by mouth 3 times daily (with meals) 180 tablet 1     furosemide (LASIX) 20 MG tablet Take 20 mg by mouth every evening        glipiZIDE (GLUCOTROL) 10 MG tablet Take 10 mg by mouth At Bedtime        lisinopril (ZESTRIL) 30 MG tablet Take 30 mg by mouth every evening        LORazepam (ATIVAN) 0.5 MG tablet Take 1 tablet (0.5 mg) by mouth every 4 hours as needed (Anxiety, Nausea/Vomiting or Sleep) 30 tablet 5     ondansetron (ZOFRAN) 8 MG tablet Take 1 tablet (8 mg) by mouth every 8 hours as needed (Nausea/Vomiting) 10 tablet 5     prochlorperazine (COMPAZINE) 10 MG tablet Take 0.5 tablets (5 mg) by mouth every 6 hours as needed (Nausea/Vomiting) 30 tablet 5     sildenafil (REVATIO) 20 MG tablet Take 20 mg by mouth as needed       zolpidem (AMBIEN) 10 MG tablet Take 1 tablet (10 mg) by mouth nightly as needed for sleep 30 tablet 0     oxyCODONE (ROXICODONE) 5 MG tablet Take 1-2 tablets (5-10 mg) by mouth every 4 hours as needed for moderate to severe pain (Patient not taking: Reported on 8/27/2020) 20 tablet 0          Allergies   Allergen Reactions     Metformin Other (See Comments)     \" I " "think my kidneys shut down\"         Video physical exam  General: Patient appears well in no acute distress.   Skin: No visualized rash or lesions on visualized skin  Eyes: EOMI, no erythema, sclera icterus or discharge noted  Resp: Appears to be breathing comfortably without accessory muscle usage, speaking in full sentences, no cough  MSK: Appears to have normal range of motion based on visualized movements  Neurologic: No apparent tremors, facial movements symmetric  Psych: affect engaged, pleasant, alert and oriented    The rest of a comprehensive physical examination is deferred due to PHE (public health emergency) video restrictions     There were no vitals taken for this visit.  Wt Readings from Last 4 Encounters:   10/02/20 79.8 kg (176 lb)   09/28/20 79.8 kg (176 lb)   09/25/20 79.8 kg (176 lb)   09/23/20 81.6 kg (180 lb)         Labs: pending    Imaging: n/a    Impression/plan:   Recurrent cholangiocarcinoma with metastatic disease  -initiated on cisplatin/gemcitabine on 8/27/20  -tolerating treatment very well. Remains quite fatigued, with low stamina, but not seeing any worsening on treatment. Ok to proceed with cycle 3 tomorrow as planned pending review of labs  -plan for restaging on 10/23/20. Will f/u with Dr. Beard at that time    Bile leak/abscess  -has been undergoing sinogram with cavitary sclerotherapy every 2 weeks through IR  -most recent evaluation on 10/2/20, plan was to cap the drain if there was minimal drainage. Repeat CT Abd/pelvis with IV contrast on 10/13 and discuss further management with IR at that time    DM  -glucoses have been better controlled with recent health changes, usually . Not seeing hyperglycemia after the dexamethasone  -only on glipizide currently, previously on lantus and Januvia as well    Hx of CVA 10 years ago, has mild short term memory deficits  HTN  -on plavix  -on amlodipine, fenofibrate, lisinopril, atorvastatin    Recommended a flu shot tomorrow with " his infusion.      Again, thank you for allowing me to participate in the care of your patient.        Sincerely,        VICKI Burt CNP

## 2020-10-07 NOTE — PROGRESS NOTES
Infusion Nursing Note:  Fuentes Estrada presents today for C3D1 Cisplatin/Gemzar.    Patient seen by provider today: No   present during visit today: Not Applicable.    Note: Patient ate 2 doughnuts in car on way to appt. [id number].    Intravenous Access:  Labs drawn without difficulty.  Implanted Port.    Treatment Conditions:  Lab Results   Component Value Date    HGB 9.5 10/07/2020     Lab Results   Component Value Date    WBC 4.9 10/07/2020      Lab Results   Component Value Date    ANEU 2.9 10/07/2020     Lab Results   Component Value Date     10/07/2020      Lab Results   Component Value Date     10/07/2020                   Lab Results   Component Value Date    POTASSIUM 4.0 10/07/2020           Lab Results   Component Value Date    MAG 1.4 10/07/2020            Lab Results   Component Value Date    CR 0.78 10/07/2020                   Lab Results   Component Value Date    EIRC 8.4 10/07/2020                Lab Results   Component Value Date    BILITOTAL 1.6 10/07/2020           Lab Results   Component Value Date    ALBUMIN 2.4 10/07/2020                    Lab Results   Component Value Date    ALT 41 10/07/2020           Lab Results   Component Value Date    AST 68 10/07/2020       Results reviewed, labs MET treatment parameters, ok to proceed with treatment.      Post Infusion Assessment:  Patient tolerated infusion without incident.  Blood return noted pre and post infusion.  Access discontinued per protocol.       Discharge Plan:   Discharge instructions reviewed with: Patient and Family.  Patient and/or family verbalized understanding of discharge instructions and all questions answered.  Patient discharged in stable condition accompanied by: self and wife.  Departure Mode: Ambulatory.    Leatha Rodriguez RN

## 2020-10-09 NOTE — PROGRESS NOTES
"10/8/2020 - 8 pm (disregard \"Date of Service\" due to the inherent inadequacies of Epic)    I was paged twice that the patient's wife called re: fever/emesis.    3 calls to provided number went straight to voicemail. Was eventually able to connect through the [facility] . Wife had already uncapped drain,and ambulance was on site preparing pt to go to local urgent care/ED.    We are in agreement with these steps. I will discuss with Dr. Purcell, and our team will reach out tomorrow morning. Drain to be kept uncapped to gravity drainage until at least that time.    Alex Walker MD    "

## 2020-10-09 NOTE — PROGRESS NOTES
RN Care Coordination Note  Received call from patient's wife stating patient has been admitted to [facility] overnight to ICU. She states he spiked 102 temp last night. She called ambulance and IR at [facility]. Reports at local ED they did CT which shows active infection and ED MD felt he would need ICU bed. [facility] did not have any beds available, patient was admitted to [facility]. Wife gave verbal permission to access [employer] records via Care Everywhere. She is on way now to see patient.     RNCC will update care teams. Wife voiced understanding and states she has no further needs at this time.          Chey Arias RN, BSN, OCN   RN Care Coordinator   [facility]

## 2020-10-13 NOTE — IP AVS SNAPSHOT
MRN:[id number]                      After Visit Summary   10/13/2020    Fuentes Estrada    MRN: [id number]           Visit Information        Department      10/13/2020 10:51 AM [facility] [address] [place]          Review of your medicines      UNREVIEWED medicines. Ask your doctor about these medicines       Dose / Directions   atorvastatin 40 MG tablet  Commonly known as: LIPITOR      Dose: 40 mg  Take 40 mg by mouth At Bedtime  Refills: 0     clopidogrel 75 MG tablet  Commonly known as: PLAVIX      Dose: 75 mg  Take 75 mg by mouth At Bedtime  Refills: 0     dronabinol 5 MG capsule  Commonly known as: MARINOL  Used for: Hilar cholangiocarcinoma (H)      Dose: 5 mg  Take 1 capsule (5 mg) by mouth 2 times daily (before meals)  Quantity: 60 capsule  Refills: 0     fenofibrate 160 MG tablet  Commonly known as: TRIGLIDE/LOFIBRA      Dose: 160 mg  Take 160 mg by mouth At Bedtime  Refills: 0     furosemide 20 MG tablet  Commonly known as: LASIX      Dose: 20 mg  Take 20 mg by mouth every evening  Refills: 0     glipiZIDE 10 MG tablet  Commonly known as: GLUCOTROL      Dose: 10 mg  Take 10 mg by mouth At Bedtime  Refills: 0     iron 325 (65 Fe) MG tablet  Used for: Iron deficiency anemia due to chronic blood loss      Dose: 1 tablet  Take 1 tablet by mouth 3 times daily (with meals)  Quantity: 180 tablet  Refills: 1     lisinopril 30 MG tablet  Commonly known as: ZESTRIL      Dose: 30 mg  Take 30 mg by mouth every evening  Refills: 0     LORazepam 0.5 MG tablet  Commonly known as: ATIVAN  Used for: Hilar cholangiocarcinoma (H)      Dose: 0.5 mg  Take 1 tablet (0.5 mg) by mouth every 4 hours as needed (Anxiety, Nausea/Vomiting or Sleep)  Quantity: 30 tablet  Refills: 5     ondansetron 8 MG tablet  Commonly known as: ZOFRAN  Used for: Hilar cholangiocarcinoma (H)      Dose: 8 mg  Take 1 tablet (8 mg) by mouth every 8 hours as needed (Nausea/Vomiting)  Quantity: 10 tablet  Refills: 5      oxyCODONE 5 MG tablet  Commonly known as: ROXICODONE  Used for: Cholangiocarcinoma (H)      Dose: 5-10 mg  Take 1-2 tablets (5-10 mg) by mouth every 4 hours as needed for moderate to severe pain  Quantity: 20 tablet  Refills: 0     prochlorperazine 10 MG tablet  Commonly known as: COMPAZINE  Used for: Hilar cholangiocarcinoma (H)      Dose: 5 mg  Take 0.5 tablets (5 mg) by mouth every 6 hours as needed (Nausea/Vomiting)  Quantity: 30 tablet  Refills: 5     sildenafil 20 MG tablet  Commonly known as: REVATIO      Dose: 20 mg  Take 20 mg by mouth as needed  Refills: 0     zolpidem 10 MG tablet  Commonly known as: AMBIEN  Used for: Hilar cholangiocarcinoma (H)      Dose: 10 mg  Take 1 tablet (10 mg) by mouth nightly as needed for sleep  Quantity: 30 tablet  Refills: 0              Protect others around you: Learn how to safely use, store and throw away your medicines at www.disposemymeds.org.       Follow-ups after your visit       Your next 10 appointments already scheduled    Oct 19, 2020  9:30 AM  Procedure 4.5 hour with U2A ROOM 7  [facility] [address] [place] ([facility]) [address] [facility] [address]      Oct 20, 2020  1:20 PM  (Arrive by 1:05 PM)  Video Visit with Leslie Quiles CNP  [facility] ([facility]) [address] [place] [address] [phone]   [facility]  Note: this is not an onsite visit; there is no need to come to the facility.  Please have a list of all current medications available for appointment.      Oct 21, 2020  8:30 AM  Level 5 with NL INFUSION CHAIR 6  [facility] [place] ([facility]) [address] [place] [id number] [phone]      Oct 23, 2020  9:45 AM  LAB with NL LAB PMC  [facility] ([facility]) [address]  [facility] [address]  [phone]   Please do not eat 10-12 hours before your appointment if you are coming in fasting for labs on lipids, cholesterol, or glucose (sugar). Does not apply to pregnant women. Water, tea and black coffee (with nothing added) is okay. Do not drink other fluids, diet soda or gum. If you have concerns about taking your medications, please send a message by clicking on Secure Messaging, Message Your Care Team.     Oct 23, 2020 10:30 AM  (Arrive by 10:00 AM)  CT CHEST ABDOMEN PELVIS W/O & W CONTRAST with [address] ([facility]) [address] [facility] [id number] [phone]   How do I prepare for my exam? (Food and drink instructions)  To prepare: No Food and Drink Restrictions    How do I prepare for my exam? (Other instructions)  Please arrive 30 minutes early for your CT.  Once in the department you might be asked to drink water 15-20 minutes prior to your exam.  If indicated you may be asked to drink an oral contrast in advance of your CT.  If this is the case, the imaging team will let you know or be in contact with you prior to your appointment    Patients over 70 or patients with diabetes or kidney problems: If you haven t had a blood test (creatinine test) within the last 30 days, the Cardiologist/Radiologist may require you to get this test prior to your exam.    If you have diabetes:  Continue to take your metformin medication on the day of your exam    What should I wear: Please wear loose clothing, such as a sweat suit or jogging clothes. Avoid snaps, zippers and other metal. We may ask you to undress and put on a hospital gown.    How long does the exam take: Most scans take less than 20 minutes.    What should I bring: Please bring any scans or X-rays taken at other hospitals, if similar tests were done. Also bring a list of your medicines, including vitamins, minerals and over-the-counter drugs.  It is safest to leave personal items at home.    Do I need a : No  is needed.    What do I need to tell my doctor?  Be sure to tell your doctor:  * If you have any allergies.  * If there s any chance you are pregnant.  * If you are breastfeeding.    What should I do after the exam: No restrictions, You may resume normal activities.    What is this test: A CT (computed tomography) scan is a series of pictures that allows us to look inside your body. The scanner creates images of the body in cross sections, much like slices of bread. This helps us see any problems more clearly. You may receive contrast (X-ray dye) before or during your scan. You will be asked to drink the contrast.    Who should I call with questions: If you have any questions, please call the Imaging Department where you will have your exam. Directions, parking instructions, and other information is available on our website, [email or web address].     Oct 26, 2020  4:00 PM  (Arrive by 3:45 PM)  Video Visit with Aydin Beard MD  [facility] ([facility]) [address] [place] [address] [phone]   [facility]  Note: this is not an onsite visit; there is no need to come to the facility.  Please have a list of all current medications available for appointment.         Care Instructions       Further instructions from your care team       [facility]  Interventional Radiology   Drainage Tube Instructions      AFTER YOU GO HOME:    Have an adult stay with you for 24 hours.     Drink plenty of fluids and resume your regular diet.    For 24 hours:       Do not drive or operate machinery at home or at work    No alcoholic beverages    Do not make any important legal decisions    No heavy lifting greater than 10 lb until instructed by your physician     Call Your Physician if:    You develop nausea or vomiting.    Your  "develop hives or rash or unexplained itching    Additional Instructions: Please call for the following problems:    Keep drain connected to drainage bag for 24 hours then cap tube    Do not FLUSH tube    Skin around tube is red, painful or has any drainage.    You have increased pain in your abdomen    Fever greater than 100.5 F and chills    You feel nauseated and  \"just not right\"      Change dressing every 48 hour or when it gets wet    Interventional Radiology Department    Physician:  DR OLIVEIRA                   Date:  October 13, 2020  Telephone numbers: [phone]...Monday-Friday 8:00am-4:30pm                                  [phone]... After 4:30 Monday-Friday, Weekends and Holiday.   Ask for the Interventional Radiologist on call. Someone is on call 24 hours a day.                                    Additional Information About Your Visit       [employer] Information    [employer] gives you secure access to your electronic health record. If you see a primary care provider, you can also send messages to your care team and make appointments. If you have questions, please call your primary care clinic.  If you do not have a primary care provider, please call [phone] and they will assist you.       Care EveryWhere ID    This is your Care EveryWhere ID. This could be used by other organizations to access your [facility] medical records  [id number]       Your Vitals Were  Most recent update: 10/13/2020  2:23 PM    Blood Pressure   152/92            Pulse   71          Temperature   97.9  F (36.6  C) (Oral)          Respirations   18          Height   1.854 m (6' 1\")             Weight   83 kg (183 lb)    Pulse Oximetry   100%    BMI (Body Mass Index)   24.14 kg/m           Primary Care Provider    Tolu Noland      Equal Access to Services    [facility]: Hadii aura page Soarturo, waaxda luqadaha, qaybta kaalstephanie thornton . So [facility] " [phone].    ATENCIÓN: Si devante mendoza, tiene a kaufman disposición servicios gratuitos de asistencia lingüística. Letty pedersen [phone].    We comply with applicable federal and state civil rights laws, including the [place] Human Rights Act. We do not discriminate on the basis of race, color, creed, Amish, national origin, marital status, age, disability, sex, sexual orientation, or gender identity.       Thank you!    Thank you for choosing [facility] for your care. Our goal is always to provide you with excellent care. Hearing back from our patients is one way we can continue to improve our services. Please take a few minutes to complete the written survey that you may receive in the mail after you visit with us. Thank you!            Medication List      ASK your doctor about these medications          Morning Afternoon Evening Bedtime As Needed    atorvastatin 40 MG tablet  Also known as: LIPITOR  INSTRUCTIONS: Take 40 mg by mouth At Bedtime                     clopidogrel 75 MG tablet  Also known as: PLAVIX  INSTRUCTIONS: Take 75 mg by mouth At Bedtime                     dronabinol 5 MG capsule  Also known as: MARINOL  INSTRUCTIONS: Take 1 capsule (5 mg) by mouth 2 times daily (before meals)                     fenofibrate 160 MG tablet  Also known as: TRIGLIDE/LOFIBRA  INSTRUCTIONS: Take 160 mg by mouth At Bedtime                     furosemide 20 MG tablet  Also known as: LASIX  INSTRUCTIONS: Take 20 mg by mouth every evening                     glipiZIDE 10 MG tablet  Also known as: GLUCOTROL  INSTRUCTIONS: Take 10 mg by mouth At Bedtime                     iron 325 (65 Fe) MG tablet  INSTRUCTIONS: Take 1 tablet by mouth 3 times daily (with meals)                     lisinopril 30 MG tablet  Also known as: ZESTRIL  INSTRUCTIONS: Take 30 mg by mouth every evening                     LORazepam 0.5 MG tablet  Also known as: ATIVAN  INSTRUCTIONS: Take 1 tablet (0.5 mg) by mouth every 4 hours as needed  (Anxiety, Nausea/Vomiting or Sleep)                     ondansetron 8 MG tablet  Also known as: ZOFRAN  INSTRUCTIONS: Take 1 tablet (8 mg) by mouth every 8 hours as needed (Nausea/Vomiting)                     oxyCODONE 5 MG tablet  Also known as: ROXICODONE  INSTRUCTIONS: Take 1-2 tablets (5-10 mg) by mouth every 4 hours as needed for moderate to severe pain                     prochlorperazine 10 MG tablet  Also known as: COMPAZINE  INSTRUCTIONS: Take 0.5 tablets (5 mg) by mouth every 6 hours as needed (Nausea/Vomiting)                     sildenafil 20 MG tablet  Also known as: REVATIO  INSTRUCTIONS: Take 20 mg by mouth as needed                     zolpidem 10 MG tablet  Also known as: AMBIEN  INSTRUCTIONS: Take 1 tablet (10 mg) by mouth nightly as needed for sleep

## 2020-10-13 NOTE — IP AVS SNAPSHOT
[facility] [address] [address]                                    After Visit Summary   10/13/2020    Fuentes Estrada    MRN: [id number]           After Visit Summary Signature Page    I have received my discharge instructions, and my questions have been answered. I have discussed any challenges I see with this plan with the nurse or doctor.    ..........................................................................................................................................  Patient/Patient Representative Signature      ..........................................................................................................................................  Patient Representative Print Name and Relationship to Patient    ..................................................               ................................................  Date                                   Time    ..........................................................................................................................................  Reviewed by Signature/Title    ...................................................              ..............................................  Date                                               Time          22EPIC Rev 08/18

## 2020-10-13 NOTE — PROCEDURES
[facility]     Procedure: IR Procedure Note    Date/Time: 10/13/2020 6:15 PM  Performed by: Reta Purcell MD  Authorized by: Reta Purcell MD     UNIVERSAL PROTOCOL   Site Marked: NA  Prior Images Obtained and Reviewed:  Yes  Required items: Required blood products, implants, devices and special equipment available    Patient identity confirmed:  Verbally with patient, arm band, provided demographic data and hospital-assigned identification number  Patient was reevaluated immediately before administering moderate or deep sedation or anesthesia  Confirmation Checklist:  Patient's identity using two indicators, relevant allergies, procedure was appropriate and matched the consent or emergent situation and correct equipment/implants were available  Time out: Immediately prior to the procedure a time out was called    Universal Protocol: the Joint Commission Universal Protocol was followed    Preparation: Patient was prepped and draped in usual sterile fashion           ANESTHESIA    Anesthesia: Local infiltration  Local Anesthetic:  Lidocaine 1% without epinephrine      SEDATION    Patient Sedated: Yes    Sedation Type:  Moderate (conscious) sedation  Sedation:  Fentanyl  Vital signs: Vital signs monitored during sedation    See dictated procedure note for full details.  Findings: Well tolerated    Specimens: none    Complications: None    Condition: Stable    Plan: 1. If output remains minimal, capital drain tomorrow.   2. At 6 days, recommend returning to interventional radiology for preprocedure CT with IV contrast to assess for residual abscess cavity. If there is no drain output and no residual cavity or communication on sinogram, will plan to remove the drain.    This was discussed with Dr. Hale, who agrees with the plan.    PROCEDURE   Patient Tolerance:  Patient tolerated the procedure well with no immediate complications  Describe Procedure:  DRAIN SINOGRAM DEMONSTRATES SMALL RESIDUAL 3 ML CAVITY. NO COMMUNICATION TO THE BILIARY TREE WAS IDENTIFIED TODAY. SCLEROTHERAPY WAS PERFORMED.   Length of time physician/provider present for 1:1 monitoring during sedation: 15

## 2020-10-13 NOTE — DISCHARGE INSTRUCTIONS
"[facility]  Interventional Radiology   Drainage Tube Instructions      AFTER YOU GO HOME:    Have an adult stay with you for 24 hours.     Drink plenty of fluids and resume your regular diet.    For 24 hours:       Do not drive or operate machinery at home or at work    No alcoholic beverages    Do not make any important legal decisions    No heavy lifting greater than 10 lb until instructed by your physician     Call Your Physician if:    You develop nausea or vomiting.    Your develop hives or rash or unexplained itching    Additional Instructions: Please call for the following problems:    Keep drain connected to drainage bag for 24 hours then cap tube    Do not FLUSH tube    Skin around tube is red, painful or has any drainage.    You have increased pain in your abdomen    Fever greater than 100.5 F and chills    You feel nauseated and  \"just not right\"      Change dressing every 48 hour or when it gets wet    Interventional Radiology Department    Physician:  DR OLIVEIRA                   Date:  October 13, 2020  Telephone numbers: [phone]...Monday-Friday 8:00am-4:30pm                                  [phone]... After 4:30 Monday-Friday, Weekends and Holiday.   Ask for the Interventional Radiologist on call. Someone is on call 24 hours a day.                                  "

## 2020-10-13 NOTE — PROGRESS NOTES
Pt on 2A post Bili drain change with sclerotherapy; pt awake and alert, wife at bedside; Dr Purcell at bedside to speak with pt and wife. Plan is for pt to discharge with drain to bag, cap in 24 hours, no flushing; will come back next week to have tube discontinued. Will continue to monitor.

## 2020-10-13 NOTE — PROGRESS NOTES
Pt up walking, steady on his feet; voided; port de accessed. Bili tube remains to gravity drainage; no drainage in tubing; site is dry and intact. Pt denies pain. Tolerated PO, maryjo crackers and water. Discharge instructions reviewed with pt and wife, copy to pt; stated understanding. 1500--discharge to vehicle/family per wheelchair.

## 2020-10-13 NOTE — PRE-PROCEDURE
GENERAL PRE-PROCEDURE:   Procedure:  Sinogram with possible interventions  Date/Time:  10/13/2020 12:22 PM    Written consent obtained?: Yes    Risks and benefits: Risks, benefits and alternatives were discussed    Consent given by:  Patient  Patient states understanding of procedure being performed: Yes    Patient's understanding of procedure matches consent: Yes    Procedure consent matches procedure scheduled: Yes    Expected level of sedation:  Moderate  Appropriately NPO:  Yes  ASA Class:  Class 2- mild systemic disease, no acute problems, no functional limitations  Mallampati  :  Grade 2- soft palate, base of uvula, tonsillar pillars, and portion of posterior pharyngeal wall visible  Lungs:  Lungs clear with good breath sounds bilaterally  Heart:  Normal heart sounds and rate  History & Physical reviewed:  History and physical reviewed and no updates needed  Statement of review:  I have reviewed the lab findings, diagnostic data, medications, and the plan for sedation

## 2020-10-13 NOTE — PROGRESS NOTES
Patient Name: Fuentes Estrada  Medical Record Number: [id number]  Today's Date: 10/13/2020    Procedure: sinogram   Proceduralist: Jazzy     Procedure Start: 1235  Procedure end: 1345  Sedation medications administered: 2mg versed 100mcg fentanyl     Report given to: GLORIA RN    Pt arrived to IR room 2 from . Consent reviewed. Pt denies any questions or concerns regarding procedure. Pt positioned left side laying and monitored per protocol. Pt tolerated procedure without any noted complications. Pt transferred back to .

## 2020-10-13 NOTE — PROGRESS NOTES
Patient arrived on 2A for sinogram with possible tube exchange/removal.  Port accessed, IV antibiotic infusing.  Labs current from last week.  Consent current from last month, needs sedation assessment, otherwise prep complete.

## 2020-10-19 NOTE — PROGRESS NOTES
Interventional Radiology Intra-procedural Nursing Note    Patient Name: Fuentes Estrada  Medical Record Number: [id number]  Today's Date: October 19, 2020    Drain pulled on 2A by IR Fellow.  Uncomplicated removal.  Site cleansed and dressed per protocol.    Left PORT de-access.  Heparin dwelling per protocol.  Site cleansed and dressed per protocol.    Instruction on care of drain site provided by IR fellow,  Patient and family acknowledge understanding of plan of care.    Anita West RN on 10/19/2020 at 11:19 AM

## 2020-10-19 NOTE — PROCEDURES
[facility]     Procedure: RUQ drain removal    Date/Time: 10/19/2020 11:10 AM  Performed by: Florin Kemp MD  Authorized by: Florin Kemp MD   IR Fellow Physician: Florin Kemp    UNIVERSAL PROTOCOL   Site Marked: NA  Prior Images Obtained and Reviewed:  Yes  Required items: Required blood products, implants, devices and special equipment available    Patient identity confirmed:  Verbally with patient, arm band, provided demographic data and hospital-assigned identification number  Patient was reevaluated immediately before administering moderate or deep sedation or anesthesia  Confirmation Checklist:  Patient's identity using two indicators, relevant allergies, procedure was appropriate and matched the consent or emergent situation and correct equipment/implants were available  Time out: Immediately prior to the procedure a time out was called    Universal Protocol: the Joint Commission Universal Protocol was followed    Preparation: Patient was prepped and draped in usual sterile fashion    ESBL (mL):  0         ANESTHESIA    Anesthesia: Local infiltration  Local Anesthetic:  Lidocaine 1% without epinephrine      SEDATION    Patient Sedated: No    See dictated procedure note for full details.  Findings: RUQ drain removed at bed side    Specimens: none    Complications: None    Condition: Stable    Plan: F/U with primary team    PROCEDURE   Patient Tolerance:  Patient tolerated the procedure well with no immediate complications    Length of time physician/provider present for 1:1 monitoring during sedation: 10

## 2020-10-19 NOTE — PROGRESS NOTES
Patient arrived on 2A for drain removal.  Removal done at bedside with IR RN and IR fellow.  No prep or post procedure care given on 2A.  Patient discharged to home with wife @ 1115.

## 2020-10-20 NOTE — LETTER
October 20, 2020       TO: Fuentes Estrada  [address] [place] [address]       DearMr.Sean,    We are writing to inform you of your test results.    {p results letter list:[id number]}    No results found from the In Basket message.    ***

## 2020-10-20 NOTE — PROGRESS NOTES
October 20, 2020    Reason for Visit: seen in f/u of metastatic cholangiocarcinoma    Oncology HPI:   Fuentes Estrada is a 65 year old man with a prior hx of HTN, DM, CVA on plavix who presented with elevated LFTs in the spring of 2020. 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 On May 12, 2020 he underwent  A radical extrahepatic bile duct resection and R hepatectomy.   He had all of his disease resected with negative margins and one positive lymph node.  He had a complicated postoperative course with a bile leak and abscess that had delayed starting his adjuvant chemotherapy.      On imaging in July,  he appeared to be developing increasing nodularity in the resection bed and regional lymph nodes that has led to an EUS with a fine-needle aspiration of the lymph nodes on 8/11/20,  demonstrating the presence of metastatic disease.      His abscess/biliary drain has been managed by IR. Sinograms identified a communication to the biliary tree. He failed a capping trial with development of a fever. He has been undergoing sinograms and cavitary sclerotherapy every 2 weeks since August 25, 2020.    On 8/27/20, he initiated treatment with palliative intent Cisplatin/Gemcitabine.    9/28/20: port placement    Admission to [facility] 10/9/20-10/11/20 with septic shock from infectious enterocolitis, all cultures NGTD. Chemo delayed one week for recovery. I am seeing him in evaluation for resumption of Cis/[place]    Interval history:   -Drain out yesterday, site feeling good. No redness or irritation. He is thrilled to have the drain removed   -Felt chilled last night, temp was 99.0. Tylenol helped chilled feeling. No fevers since discharge  -Energy level has gone up the last couple weeks, feeling quite wel. As the day goes on he is more tired but tolerable. Sleeping okay.   -No abdominal pain or fullness. No pain anywhere  -Bowels moving  -Denies hearing change or neuropathy   -No mouth sores  -Denies cough sob, cp,  palpitations  -Blood sugars have been 100-120. 74 this morning. He did not feel shaky  -Denies bleeding/bruising concerns   -Urine yellow in color  -No dizziness or lightheadedness  -Eating/drinking okay--Marinol helps. Eats small frequent meals. Drinking lots of water and Gatorade.    10 point review of systems otherwise negative     Patient Active Problem List   Diagnosis     Essential hypertension, benign     Mixed hyperlipidemia     Hilar cholangiocarcinoma (H)     Past Medical History:   Diagnosis Date     Cerebrovascular accident (CVA) (H) 12/2010     Cholangiocarcinoma (H)      Essential hypertension, benign     Hypertension, Benign     Nonspecific abnormal results of liver function study     Hx of elevated LFT's     Current Outpatient Medications   Medication Sig Dispense Refill     atorvastatin (LIPITOR) 40 MG tablet Take 40 mg by mouth At Bedtime        clopidogrel (PLAVIX) 75 MG tablet Take 75 mg by mouth At Bedtime        dronabinol (MARINOL) 5 MG capsule Take 1 capsule (5 mg) by mouth 2 times daily (before meals) 60 capsule 0     fenofibrate (TRIGLIDE/LOFIBRA) 160 MG tablet Take 160 mg by mouth At Bedtime        Ferrous Sulfate (IRON) 325 (65 Fe) MG tablet Take 1 tablet by mouth 3 times daily (with meals) 180 tablet 1     furosemide (LASIX) 20 MG tablet Take 20 mg by mouth every evening        glipiZIDE (GLUCOTROL) 10 MG tablet Take 10 mg by mouth At Bedtime        lisinopril (ZESTRIL) 30 MG tablet Take 30 mg by mouth every evening        LORazepam (ATIVAN) 0.5 MG tablet Take 1 tablet (0.5 mg) by mouth every 4 hours as needed (Anxiety, Nausea/Vomiting or Sleep) 30 tablet 5     ondansetron (ZOFRAN) 8 MG tablet Take 1 tablet (8 mg) by mouth every 8 hours as needed (Nausea/Vomiting) 10 tablet 5     oxyCODONE (ROXICODONE) 5 MG tablet Take 1-2 tablets (5-10 mg) by mouth every 4 hours as needed for moderate to severe pain (Patient not taking: Reported on 8/27/2020) 20 tablet 0     prochlorperazine (COMPAZINE)  "10 MG tablet Take 0.5 tablets (5 mg) by mouth every 6 hours as needed (Nausea/Vomiting) 30 tablet 5     sildenafil (REVATIO) 20 MG tablet Take 20 mg by mouth as needed       zolpidem (AMBIEN) 10 MG tablet Take 1 tablet (10 mg) by mouth nightly as needed for sleep 30 tablet 0     Allergies   Allergen Reactions     Metformin Other (See Comments)     \" I think my kidneys shut down\"     Video physical exam  General: Patient appears well in no acute distress.   Skin: No visualized rash or lesions on visualized skin  Eyes: EOMI, no erythema, sclera icterus or discharge noted  Resp: Appears to be breathing comfortably without accessory muscle usage, speaking in full sentences, no cough  MSK: Appears to have normal range of motion based on visualized movements  Neurologic: No apparent tremors, facial movements symmetric  Psych: affect engaged, pleasant, alert and oriented    The rest of a comprehensive physical examination is deferred due to PHE (public health emergency) video restrictions     There were no vitals taken for this visit.  Wt Readings from Last 4 Encounters:   10/13/20 83 kg (183 lb)   10/07/20 83.1 kg (183 lb 4.8 oz)   10/02/20 79.8 kg (176 lb)   09/28/20 79.8 kg (176 lb)     Labs: tomorrow. Recent labs reviewed    Imaging:   Per [facility] note in Care Everywhere:   CT Abd/Pelvis from [facility] 10/9/2020:  1. Severe diffuse thickening of the wall of the right colon and transverse colonn although not beyond the distal 3rd of the transverse colon , and diffuse thickening of the small bowel wall, not present on 3/23/2020, consistent with infectious enterocololitis.  2. Small amount of ascites (4.6x6x2 cm), including fluid posterior to the right masoud hepatectomy site where there is a percutaneous pigtail catheter present, small gas bubbles present  3. Mild splenomegaly has developed without focal lesions  4. Solid stool load is prominent and consistent with constipation     EXAMINATION: CT ABDOMEN PELVIS " W/O CONTRAST  10/13/2020 10:57 AM     In this patient with a  history of cholangiocarcinoma, status post  hepatectomy and hepaticojejunostomy:  1. Perihepatic pigtail drainage catheter is in similar position with  stable adjacent fluid collection.   2. Mild to moderate ascites, increased from prior exam.  3. Decrease/resolution of several enlarged lymph nodes as described  above, though some of the previously identified sites of disease are  not well characterized without contrast.    EXAMINATION: CT ABDOMEN PELVIS W CONTRAST, 10/19/2020 10:43 AM  IMPRESSION:   1.  Perihepatic fluid collection containing a percutaneous drain is  unchanged from CT dated 10/13/2020.  2.  Decreased nodular peritoneal thickening in the right lower  quadrant.   3.  Moderate ascites.     Impression/plan:   Recurrent cholangiocarcinoma with metastatic disease  -initiated on cisplatin/gemcitabine on 8/27/20. Fuentes has tolerated treatment very well ex fatigue but not really any worse on treatment than basline.   -I had Dr. Beard review recent CT scans and he feels they're adequate to continue with same treatment tomorrow as long as no active infection (previously scheduled re-staging for 10/23 cancelled). I reviewed this with Fuentes and his wife. He is feeling really good this past week since discharge. No concerns for active infection per my assessment today. Would be due for cycle 3 D8 tomorrow but due to hospitalization will skip day 8 and start with cycle 4 day 1 tomorrow pending review of labs.Fuentes and his wife are agreeable with the plan and happy he has tolerated treatment so well thus far.  -Visit with Leeanne 11/10 prior to next cycle 11/11  -Plan for restaging in 2 months, f/u with Dr. Beard after    Recent admission with enterocolitis with sepsis presentation  Presented to OSH, CT scan showed colitis  (I am unable to find this image). Patient presented with fever and hypotension requiring transfer to [facility] for short ICU admission. Per  notes, all blood cultures were NGTD but I am unable to find record of these either. Stool cultures negative as well. CT scan done by IR 10/19 shows improvement in thickening in right lower quadrant.   -No abdominal pain, fever, or concerning symptoms on my video assessment today.   -Reviewed with wife to call us with >100.4 temperature.     Bile leak/abscess  -Drain removed by IR yesterday after successful capping trial. Follow up and management per IR.     DM  -Glucoses have been better controlled with recent health changes, usually in the 100's. Was 74 this morning before eating, asymptomatic. Had not been seeing hyperglycemia after the dexamethasone  -only on glipizide currently, continue for now but can cut back if morning blood sugars lower.     Hx of CVA 10 years ago, has mild short term memory deficits  HTN  -on plavix  -on amlodipine, fenofibrate, lisinopril, atorvastatin    Video-Visit Details    Type of service:  Video Visit    Video Start Time: 1330  Video End Time: 1355    Originating Location (pt. Location): Home    Distant Location (provider location):  [facility]     Platform used for Video Visit: Felipe Quiles, CNP

## 2020-10-20 NOTE — LETTER
10/20/2020      RE: Fuentes Estrada  [address] [facility] [address]       October 20, 2020    Reason for Visit: seen in f/u of metastatic cholangiocarcinoma    Oncology HPI:   Fuentes Estrada is a 65 year old man with a prior hx of HTN, DM, CVA on plavix who presented with elevated LFTs in the spring of 2020. 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 On May 12, 2020 he underwent  A radical extrahepatic bile duct resection and R hepatectomy.   He had all of his disease resected with negative margins and one positive lymph node.  He had a complicated postoperative course with a bile leak and abscess that had delayed starting his adjuvant chemotherapy.      On imaging in July,  he appeared to be developing increasing nodularity in the resection bed and regional lymph nodes that has led to an EUS with a fine-needle aspiration of the lymph nodes on 8/11/20,  demonstrating the presence of metastatic disease.      His abscess/biliary drain has been managed by IR. Sinograms identified a communication to the biliary tree. He failed a capping trial with development of a fever. He has been undergoing sinograms and cavitary sclerotherapy every 2 weeks since August 25, 2020.    On 8/27/20, he initiated treatment with palliative intent Cisplatin/Gemcitabine.    9/28/20: port placement    Admission to [facility] 10/9/20-10/11/20 with septic shock from infectious enterocolitis, all cultures NGTD. Chemo delayed one week for recovery. I am seeing him in evaluation for resumption of Cis/[place]    Interval history:   -Drain out yesterday, site feeling good. No redness or irritation. He is thrilled to have the drain removed   -Felt chilled last night, temp was 99.0. Tylenol helped chilled feeling. No fevers since discharge  -Energy level has gone up the last couple weeks, feeling quite wel. As the day goes on he is more tired but tolerable. Sleeping okay.   -No abdominal pain or fullness. No pain anywhere  -Bowels moving  -Denies hearing  change or neuropathy   -No mouth sores  -Denies cough sob, cp, palpitations  -Blood sugars have been 100-120. 74 this morning. He did not feel shaky  -Denies bleeding/bruising concerns   -Urine yellow in color  -No dizziness or lightheadedness  -Eating/drinking okay--Marinol helps. Eats small frequent meals. Drinking lots of water and Gatorade.    10 point review of systems otherwise negative     Patient Active Problem List   Diagnosis     Essential hypertension, benign     Mixed hyperlipidemia     Hilar cholangiocarcinoma (H)     Past Medical History:   Diagnosis Date     Cerebrovascular accident (CVA) (H) 12/2010     Cholangiocarcinoma (H)      Essential hypertension, benign     Hypertension, Benign     Nonspecific abnormal results of liver function study     Hx of elevated LFT's     Current Outpatient Medications   Medication Sig Dispense Refill     atorvastatin (LIPITOR) 40 MG tablet Take 40 mg by mouth At Bedtime        clopidogrel (PLAVIX) 75 MG tablet Take 75 mg by mouth At Bedtime        dronabinol (MARINOL) 5 MG capsule Take 1 capsule (5 mg) by mouth 2 times daily (before meals) 60 capsule 0     fenofibrate (TRIGLIDE/LOFIBRA) 160 MG tablet Take 160 mg by mouth At Bedtime        Ferrous Sulfate (IRON) 325 (65 Fe) MG tablet Take 1 tablet by mouth 3 times daily (with meals) 180 tablet 1     furosemide (LASIX) 20 MG tablet Take 20 mg by mouth every evening        glipiZIDE (GLUCOTROL) 10 MG tablet Take 10 mg by mouth At Bedtime        lisinopril (ZESTRIL) 30 MG tablet Take 30 mg by mouth every evening        LORazepam (ATIVAN) 0.5 MG tablet Take 1 tablet (0.5 mg) by mouth every 4 hours as needed (Anxiety, Nausea/Vomiting or Sleep) 30 tablet 5     ondansetron (ZOFRAN) 8 MG tablet Take 1 tablet (8 mg) by mouth every 8 hours as needed (Nausea/Vomiting) 10 tablet 5     oxyCODONE (ROXICODONE) 5 MG tablet Take 1-2 tablets (5-10 mg) by mouth every 4 hours as needed for moderate to severe pain (Patient not taking:  "Reported on 8/27/2020) 20 tablet 0     prochlorperazine (COMPAZINE) 10 MG tablet Take 0.5 tablets (5 mg) by mouth every 6 hours as needed (Nausea/Vomiting) 30 tablet 5     sildenafil (REVATIO) 20 MG tablet Take 20 mg by mouth as needed       zolpidem (AMBIEN) 10 MG tablet Take 1 tablet (10 mg) by mouth nightly as needed for sleep 30 tablet 0     Allergies   Allergen Reactions     Metformin Other (See Comments)     \" I think my kidneys shut down\"     Video physical exam  General: Patient appears well in no acute distress.   Skin: No visualized rash or lesions on visualized skin  Eyes: EOMI, no erythema, sclera icterus or discharge noted  Resp: Appears to be breathing comfortably without accessory muscle usage, speaking in full sentences, no cough  MSK: Appears to have normal range of motion based on visualized movements  Neurologic: No apparent tremors, facial movements symmetric  Psych: affect engaged, pleasant, alert and oriented    The rest of a comprehensive physical examination is deferred due to PHE (public health emergency) video restrictions     There were no vitals taken for this visit.  Wt Readings from Last 4 Encounters:   10/13/20 83 kg (183 lb)   10/07/20 83.1 kg (183 lb 4.8 oz)   10/02/20 79.8 kg (176 lb)   09/28/20 79.8 kg (176 lb)     Labs: tomorrow. Recent labs reviewed    Imaging:   Per [facility] note in Care Everywhere:   CT Abd/Pelvis from [facility] 10/9/2020:  1. Severe diffuse thickening of the wall of the right colon and transverse colonn although not beyond the distal 3rd of the transverse colon , and diffuse thickening of the small bowel wall, not present on 3/23/2020, consistent with infectious enterocololitis.  2. Small amount of ascites (4.6x6x2 cm), including fluid posterior to the right masoud hepatectomy site where there is a percutaneous pigtail catheter present, small gas bubbles present  3. Mild splenomegaly has developed without focal lesions  4. Solid stool load is prominent " and consistent with constipation     EXAMINATION: CT ABDOMEN PELVIS W/O CONTRAST  10/13/2020 10:57 AM     In this patient with a  history of cholangiocarcinoma, status post  hepatectomy and hepaticojejunostomy:  1. Perihepatic pigtail drainage catheter is in similar position with  stable adjacent fluid collection.   2. Mild to moderate ascites, increased from prior exam.  3. Decrease/resolution of several enlarged lymph nodes as described  above, though some of the previously identified sites of disease are  not well characterized without contrast.    EXAMINATION: CT ABDOMEN PELVIS W CONTRAST, 10/19/2020 10:43 AM  IMPRESSION:   1.  Perihepatic fluid collection containing a percutaneous drain is  unchanged from CT dated 10/13/2020.  2.  Decreased nodular peritoneal thickening in the right lower  quadrant.   3.  Moderate ascites.     Impression/plan:   Recurrent cholangiocarcinoma with metastatic disease  -initiated on cisplatin/gemcitabine on 8/27/20. Fuentes has tolerated treatment very well ex fatigue but not really any worse on treatment than basline.   -I had Dr. Beard review recent CT scans and he feels they're adequate to continue with same treatment tomorrow as long as no active infection (previously scheduled re-staging for 10/23 cancelled). I reviewed this with Fuentes and his wife. He is feeling really good this past week since discharge. No concerns for active infection per my assessment today. Would be due for cycle 3 D8 tomorrow but due to hospitalization will skip day 8 and start with cycle 4 day 1 tomorrow pending review of labs.Fuentes and his wife are agreeable with the plan and happy he has tolerated treatment so well thus far.  -Visit with Leeanne 11/10 prior to next cycle 11/11  -Plan for restaging in 2 months, f/u with Dr. Beard after    Recent admission with enterocolitis with sepsis presentation  Presented to OSH, CT scan showed colitis  (I am unable to find this image). Patient presented with fever and  "hypotension requiring transfer to [facility] for short ICU admission. Per notes, all blood cultures were NGTD but I am unable to find record of these either. Stool cultures negative as well. CT scan done by IR 10/19 shows improvement in thickening in right lower quadrant.   -No abdominal pain, fever, or concerning symptoms on my video assessment today.   -Reviewed with wife to call us with >100.4 temperature.     Bile leak/abscess  -Drain removed by IR yesterday after successful capping trial. Follow up and management per IR.     DM  -Glucoses have been better controlled with recent health changes, usually in the 100's. Was 74 this morning before eating, asymptomatic. Had not been seeing hyperglycemia after the dexamethasone  -only on glipizide currently, continue for now but can cut back if morning blood sugars lower.     Hx of CVA 10 years ago, has mild short term memory deficits  HTN  -on plavix  -on amlodipine, fenofibrate, lisinopril, atorvastatin    Video-Visit Details    Type of service:  Video Visit    Video Start Time: 1330  Video End Time: 1355    Originating Location (pt. Location): Home    Distant Location (provider location):  [facility]     Platform used for Video Visit: Felipe Quiles, CNP      Fuentes Estrada is a 65 year old male who is being evaluated via a billable video visit.      The patient has been notified of following:     \"This video visit will be conducted via a call between you and your physician/provider. We have found that certain health care needs can be provided without the need for an in-person physical exam.  This service lets us provide the care you need with a video conversation.  If a prescription is necessary we can send it directly to your pharmacy.  If lab work is needed we can place an order for that and you can then stop by our lab to have the test done at a later time.    Video visits are billed at different rates depending on your " "insurance coverage.  Please reach out to your insurance provider with any questions.    If during the course of the call the physician/provider feels a video visit is not appropriate, you will not be charged for this service.\"    Patient has given verbal consent for Video visit? Yes    How would you like to obtain your AVS? MyChart     If you are dropped from the video visit, the video invite should be resent to: Text to cell phone: [phone]     Will anyone else be joining your video visit? No         Vitals - Patient Reported  Weight (Patient Reported): 83 kg (183 lb)  Height (Patient Reported): 185.4 cm (6' 0.99\")  BMI (Based on Pt Reported Ht/Wt): 24.15  Pain Score: No Pain (0)    Refill on Lorazepam    Gio Quiles, CNP    "

## 2020-10-20 NOTE — PROGRESS NOTES
"Fuentes Estrada is a 65 year old male who is being evaluated via a billable video visit.      The patient has been notified of following:     \"This video visit will be conducted via a call between you and your physician/provider. We have found that certain health care needs can be provided without the need for an in-person physical exam.  This service lets us provide the care you need with a video conversation.  If a prescription is necessary we can send it directly to your pharmacy.  If lab work is needed we can place an order for that and you can then stop by our lab to have the test done at a later time.    Video visits are billed at different rates depending on your insurance coverage.  Please reach out to your insurance provider with any questions.    If during the course of the call the physician/provider feels a video visit is not appropriate, you will not be charged for this service.\"    Patient has given verbal consent for Video visit? Yes    How would you like to obtain your AVS? MyChart     If you are dropped from the video visit, the video invite should be resent to: Text to cell phone: [phone]     Will anyone else be joining your video visit? No         Vitals - Patient Reported  Weight (Patient Reported): 83 kg (183 lb)  Height (Patient Reported): 185.4 cm (6' 0.99\")  BMI (Based on Pt Reported Ht/Wt): 24.15  Pain Score: No Pain (0)    Refill on Lorazepam    Gio Mace LPN        "

## 2020-10-21 NOTE — PATIENT INSTRUCTIONS
Pt to return on 10/28/20 for C4 D8 Cisplatin/Gemzar . Copies of medication list and upcoming appointments given prior to discharge.

## 2020-10-21 NOTE — PROGRESS NOTES
Infusion Nursing Note:  Fuentes Estrada presents today for C4 D1 Cisplatin/Gemzar.    Patient seen by provider today: No   present during visit today: Not Applicable.    Note: Patient received Flu shot today without problems. Denies any new complaints today.    Intravenous Access:  Labs drawn without difficulty.  Implanted Port.    Treatment Conditions:  Lab Results   Component Value Date    HGB 9.0 10/21/2020     Lab Results   Component Value Date    WBC 3.8 10/21/2020      Lab Results   Component Value Date    ANEU 2.7 10/21/2020     Lab Results   Component Value Date     10/21/2020      Lab Results   Component Value Date     10/21/2020                   Lab Results   Component Value Date    POTASSIUM 3.5 10/21/2020           Lab Results   Component Value Date    MAG 1.5 10/21/2020            Lab Results   Component Value Date    CR 0.66 10/21/2020                   Lab Results   Component Value Date    ERIC 8.6 10/21/2020                Lab Results   Component Value Date    BILITOTAL 2.5 10/21/2020           Lab Results   Component Value Date    ALBUMIN 2.2 10/21/2020                    Lab Results   Component Value Date    ALT 50 10/21/2020           Lab Results   Component Value Date    AST 86 10/21/2020       Results reviewed, labs MET treatment parameters, ok to proceed with treatment.      Post Infusion Assessment:  Patient tolerated infusion without incident.  Patient observed for 15 minutes post infusion per protocol.  Blood return noted pre and post infusion.  Site patent and intact, free from redness, edema or discomfort.  No evidence of extravasations.  Access discontinued per protocol.       Discharge Plan:   Discharge instructions reviewed with: Patient.  Patient and/or family verbalized understanding of discharge instructions and all questions answered.  Patient discharged in stable condition accompanied by: self.  Departure Mode: Ambulatory.    Lisa Dong  RN

## 2020-10-23 NOTE — TELEPHONE ENCOUNTER
I called and spoke with wife Robyn.  Fuentes is doing great.  No complaints of pain or fever.  His drain removal site looks good.    DANIELA Dumont RN, BSN  Interventional Radiology Nurse Coordinator   Phone:  [phone]

## 2020-10-28 NOTE — PATIENT INSTRUCTIONS
Pt to return on 11/11/20 for C5 D1 Cisplatin/Gemzar. Copies of medication list and upcoming appointments given prior to discharge.

## 2020-10-28 NOTE — PROGRESS NOTES
Infusion Nursing Note:  Fuentes Estrada presents today for C4 D8 Cisplatin and Gemzar.    Patient seen by provider today: No   present during visit today: Not Applicable.    Note: Patient states he had a tough week but is getting better now, just complains of being so tired. During treatment, patient antsy, up and down and ambulating in halls. Denies any problems though, just wants to be done.    Intravenous Access:  Labs drawn with difficulty.  Implanted Port.    Treatment Conditions:  Lab Results   Component Value Date    HGB 8.7 10/28/2020     Lab Results   Component Value Date    WBC 3.4 10/28/2020      Lab Results   Component Value Date    ANEU 1.5 10/28/2020     Lab Results   Component Value Date     10/28/2020      Lab Results   Component Value Date     10/28/2020                   Lab Results   Component Value Date    POTASSIUM 3.7 10/28/2020           Lab Results   Component Value Date    MAG 1.5 10/28/2020            Lab Results   Component Value Date    CR 0.74 10/28/2020                   Lab Results   Component Value Date    ERIC 8.5 10/28/2020                Lab Results   Component Value Date    BILITOTAL 2.4 10/28/2020           Lab Results   Component Value Date    ALBUMIN 2.2 10/28/2020                    Lab Results   Component Value Date    ALT 58 10/28/2020           Lab Results   Component Value Date    AST 78 10/28/2020       Results reviewed, labs MET treatment parameters, ok to proceed with treatment.      Post Infusion Assessment:  Patient tolerated infusion without incident.  Patient observed for 15 minutes post infusion per protocol.  Blood return noted pre and post infusion.  Site patent and intact, free from redness, edema or discomfort.  No evidence of extravasations.  Access discontinued per protocol.       Discharge Plan:   Discharge instructions reviewed with: Patient.  Patient and/or family verbalized understanding of discharge instructions and all questions  answered.  Patient discharged in stable condition accompanied by: self.  Departure Mode: Ambulatory.    Lisa Dong RN

## 2020-11-10 NOTE — LETTER
"    11/10/2020         RE: Fuentes Estrada  [address]  [facility] [address]        Dear Colleague,    Thank you for referring your patient, Fuentes Estrada, to the [facility]. Please see a copy of my visit note below.    Fuentes Estrada is a 65 year old male who is being evaluated via a billable video visit.      The patient has been notified of following:     \"This video visit will be conducted via a call between you and your physician/provider. We have found that certain health care needs can be provided without the need for an in-person physical exam.  This service lets us provide the care you need with a video conversation.  If a prescription is necessary we can send it directly to your pharmacy.  If lab work is needed we can place an order for that and you can then stop by our lab to have the test done at a later time.    Video visits are billed at different rates depending on your insurance coverage.  Please reach out to your insurance provider with any questions.    If during the course of the call the physician/provider feels a video visit is not appropriate, you will not be charged for this service.\"    Patient has given verbal consent for Video visit? Yes  How would you like to obtain your AVS? MyChart  If you are dropped from the video visit, the video invite should be resent to: Text to cell phone: [phone]  Will anyone else be joining your video visit? No      Vitals - Patient Reported  Weight (Patient Reported): 80.3 kg (177 lb)  Pain Score: No Pain (0)      I have reviewed and updated patient's allergy and medication list.    Concerns: 11/09/2020 patient felt exhausted all day, loose stools in the AM - resolved by PM.  Patient stated he just felt sick all day.  Patient wonders if it could it be the Magnesium started on 11/05/2020 per recommendation of Dr Beard?  Refills: Not needed      Margarita Dunham CMA    Video-Visit Details    Type of service:  Video Visit    Video " "Start Time: 1430  Video End Time: 1410    Originating Location (pt. Location): Home    Distant Location (provider location):  [facility]     Platform used for Video Visit: Felipe      November 10, 2020    Reason for Visit: seen in f/u of metastatic cholangiocarcinoma    Oncology HPI:   Fuentes Estrada is a 65 year old man with a prior hx of HTN, DM, CVA on plavix who presented with elevated LFTs in the spring of 2020. 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 On May 12, 2020 he underwent  A radical extrahepatic bile duct resection and R hepatectomy.   He had all of his disease resected with negative margins and one positive lymph node.  He had a complicated postoperative course with a bile leak and abscess that had delayed starting his adjuvant chemotherapy.    On imaging in July,  he appeared to be developing increasing nodularity in the resection bed and regional lymph nodes that has led to an EUS with a fine-needle aspiration of the lymph nodes on 8/11/20,  demonstrating the presence of metastatic disease.      His abscess/biliary drain has been managed by IR. Sinograms identified a communication to the biliary tree. He failed a capping trial with development of a fever. He has been undergoing sinograms and cavitary sclerotherapy every 2 weeks since August 25, 2020.    On 8/27/20, he initiated treatment with palliative intent Cisplatin/Gemcitabine.    9/28/20: port placement    Admission to [facility] 10/9/20-10/11/20 with septic shock from infectious enterocolitis, all cultures NGTD. Chemo delayed one week for recovery. Cis/[place] was restarted 10/21    Interval history:   Yesterday he had an off day and felt rather fatigued.  Over the last two weeks he had one low-grade temp with \"chills\". It was in the 99 range and never went above 100, otherwise no fever. Tylenol quickly helped and he felt fine shortly after.  His bowels continue to fluctuate (between looser and then constipation) " though overall stable and moving daily.  Using Pepto-Bismol as needed.  there is no blood in his stool nor is the stool light-colored.  After chemotherapy he feels fine for 1 day but then the couple days following are more challenging.  He is not nauseous but feels as though his energy crashes. Blood sugars stable between .  He is eating and drinking well.  Still taking Marinol to help with appetite.  No abdominal pain or fullness.  Previous drain site is healing well. no neuropathy or hearing changes.  Denies cough shortness of breath chest pain palpitations.  Denies bleeding or bruising concerns.  No dizziness or lightheadedness.     10 point review of systems otherwise negative     Patient Active Problem List   Diagnosis     Essential hypertension, benign     Mixed hyperlipidemia     Hilar cholangiocarcinoma (H)     Past Medical History:   Diagnosis Date     Cerebrovascular accident (CVA) (H) 12/2010     Cholangiocarcinoma (H)      Essential hypertension, benign     Hypertension, Benign     Nonspecific abnormal results of liver function study     Hx of elevated LFT's     Current Outpatient Medications   Medication Sig Dispense Refill     atorvastatin (LIPITOR) 40 MG tablet Take 40 mg by mouth At Bedtime        clopidogrel (PLAVIX) 75 MG tablet Take 75 mg by mouth At Bedtime        dronabinol (MARINOL) 5 MG capsule Take 1 capsule (5 mg) by mouth 2 times daily (before meals) 60 capsule 0     fenofibrate (TRIGLIDE/LOFIBRA) 160 MG tablet Take 160 mg by mouth At Bedtime        Ferrous Sulfate (IRON) 325 (65 Fe) MG tablet Take 1 tablet by mouth 3 times daily (with meals) 180 tablet 1     furosemide (LASIX) 20 MG tablet Take 20 mg by mouth every evening        glipiZIDE (GLUCOTROL) 10 MG tablet Take 10 mg by mouth At Bedtime        lisinopril (ZESTRIL) 30 MG tablet Take 30 mg by mouth every evening        LORazepam (ATIVAN) 0.5 MG tablet Take 1 tablet (0.5 mg) by mouth every 4 hours as needed (Anxiety,  "Nausea/Vomiting or Sleep) 30 tablet 5     magnesium gluconate 30 MG tablet Take 1 tablet (30 mg) by mouth daily 90 tablet 3     ondansetron (ZOFRAN) 8 MG tablet Take 1 tablet (8 mg) by mouth every 8 hours as needed (Nausea/Vomiting) 10 tablet 5     oxyCODONE (ROXICODONE) 5 MG tablet Take 1-2 tablets (5-10 mg) by mouth every 4 hours as needed for moderate to severe pain (Patient not taking: Reported on 8/27/2020) 20 tablet 0     prochlorperazine (COMPAZINE) 10 MG tablet Take 0.5 tablets (5 mg) by mouth every 6 hours as needed (Nausea/Vomiting) 30 tablet 5     sildenafil (REVATIO) 20 MG tablet Take 20 mg by mouth as needed       zolpidem (AMBIEN) 10 MG tablet Take 1 tablet (10 mg) by mouth nightly as needed for sleep 30 tablet 0     Allergies   Allergen Reactions     Metformin Other (See Comments)     \" I think my kidneys shut down\"     Video physical exam  General: Patient appears well in no acute distress.   Skin: No visualized rash or lesions on visualized skin  Eyes: EOMI, no erythema, sclera icterus or discharge noted  Resp: Appears to be breathing comfortably without accessory muscle usage, speaking in full sentences, no cough  MSK: Appears to have normal range of motion based on visualized movements  Neurologic: No apparent tremors, facial movements symmetric  Psych: affect engaged, pleasant, alert and oriented    The rest of a comprehensive physical examination is deferred due to PHE (public health emergency) video restrictions     There were no vitals taken for this visit.  Wt Readings from Last 4 Encounters:   10/28/20 80.5 kg (177 lb 8 oz)   10/21/20 83.9 kg (184 lb 14.4 oz)   10/13/20 83 kg (183 lb)   10/07/20 83.1 kg (183 lb 4.8 oz)     Labs: tomorrow. Recent labs reviewed.  Note elevation in alk phos and transaminases.    Imaging: recent imaging reviewed    Impression/plan:   Recurrent cholangiocarcinoma with metastatic disease  -initiated on cisplatin/gemcitabine on 8/27/20. Fuentes has tolerated treatment " well ex fatigue, now noticing it is more cumulative.  He feels like he crashes the second day after chemo.  I offered to give him a short 3-day course of dexamethasone to avoid this which he is interested in.    Note progressive elevation in alk phos and bilirubin over the last couple months.  I did discuss this with Dr. Beard and given Fuentes is asymptomatic of cholangitis without fever or abdominal pain, we will continue to monitor with tomorrow's labs.  He does have potential for biliary fibrosis and obstruction with altered anatomy 2/2 previous surgeries and disease so the presence of elevation is not unexpected but labs tomorrow will help us decide if more urgent imaging/intervention is warranted.   -visit with Leeanne prior to next cycle  -He is due for restaging in 1 month with Dr. Beard    Hx enterocolitis with sepsis presentation  -No abdominal pain, fever, or concerning symptoms on my video assessment today. He does have these transient  temperatures periodically (once every couple weeks) that resolve. They understand to present to emergency room with elevated temperature, abdominal pain, etc.     Bile leak/abscess  -Drain removed by IR yesterday after successful capping trial. Follow up and management per IR.     DM  -Glucoses have been better controlled with recent health changes, usually in the 100's. Monitor on dex but given short course should be okay   -only on glipizide currently, continue    Hx of CVA 10 years ago, has mild short term memory deficits  HTN  -on plavix  -on amlodipine, fenofibrate, lisinopril, atorvastatin          Again, thank you for allowing me to participate in the care of your patient.        Sincerely,        Leslie Quiles, CNP

## 2020-11-10 NOTE — PROGRESS NOTES
"Fuentes Estrada is a 65 year old male who is being evaluated via a billable video visit.      The patient has been notified of following:     \"This video visit will be conducted via a call between you and your physician/provider. We have found that certain health care needs can be provided without the need for an in-person physical exam.  This service lets us provide the care you need with a video conversation.  If a prescription is necessary we can send it directly to your pharmacy.  If lab work is needed we can place an order for that and you can then stop by our lab to have the test done at a later time.    Video visits are billed at different rates depending on your insurance coverage.  Please reach out to your insurance provider with any questions.    If during the course of the call the physician/provider feels a video visit is not appropriate, you will not be charged for this service.\"    Patient has given verbal consent for Video visit? Yes  How would you like to obtain your AVS? MyChart  If you are dropped from the video visit, the video invite should be resent to: Text to cell phone: [phone]  Will anyone else be joining your video visit? No      Vitals - Patient Reported  Weight (Patient Reported): 80.3 kg (177 lb)  Pain Score: No Pain (0)      I have reviewed and updated patient's allergy and medication list.    Concerns: 11/09/2020 patient felt exhausted all day, loose stools in the AM - resolved by PM.  Patient stated he just felt sick all day.  Patient wonders if it could it be the Magnesium started on 11/05/2020 per recommendation of Dr Beard?  Refills: Not needed      Margarita Dunham CMA    Video-Visit Details    Type of service:  Video Visit    Video Start Time: 1430  Video End Time: 1410    Originating Location (pt. Location): Home    Distant Location (provider location):  [facility]     Platform used for Video Visit: [facility] [employer]      November 10, 2020    Reason for " "Visit: seen in f/u of metastatic cholangiocarcinoma    Oncology HPI:   Fuentes Estrada is a 65 year old man with a prior hx of HTN, DM, CVA on plavix who presented with elevated LFTs in the spring of 2020. 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 On May 12, 2020 he underwent  A radical extrahepatic bile duct resection and R hepatectomy.   He had all of his disease resected with negative margins and one positive lymph node.  He had a complicated postoperative course with a bile leak and abscess that had delayed starting his adjuvant chemotherapy.    On imaging in July,  he appeared to be developing increasing nodularity in the resection bed and regional lymph nodes that has led to an EUS with a fine-needle aspiration of the lymph nodes on 8/11/20,  demonstrating the presence of metastatic disease.      His abscess/biliary drain has been managed by IR. Sinograms identified a communication to the biliary tree. He failed a capping trial with development of a fever. He has been undergoing sinograms and cavitary sclerotherapy every 2 weeks since August 25, 2020.    On 8/27/20, he initiated treatment with palliative intent Cisplatin/Gemcitabine.    9/28/20: port placement    Admission to [facility] 10/9/20-10/11/20 with septic shock from infectious enterocolitis, all cultures NGTD. Chemo delayed one week for recovery. Cis/[place] was restarted 10/21    Interval history:   Yesterday he had an off day and felt rather fatigued.  Over the last two weeks he had one low-grade temp with \"chills\". It was in the 99 range and never went above 100, otherwise no fever. Tylenol quickly helped and he felt fine shortly after.  His bowels continue to fluctuate (between looser and then constipation) though overall stable and moving daily.  Using Pepto-Bismol as needed.  there is no blood in his stool nor is the stool light-colored.  After chemotherapy he feels fine for 1 day but then the couple days following are more challenging.  He is not " nauseous but feels as though his energy crashes. Blood sugars stable between .  He is eating and drinking well.  Still taking Marinol to help with appetite.  No abdominal pain or fullness.  Previous drain site is healing well. no neuropathy or hearing changes.  Denies cough shortness of breath chest pain palpitations.  Denies bleeding or bruising concerns.  No dizziness or lightheadedness.     10 point review of systems otherwise negative     Patient Active Problem List   Diagnosis     Essential hypertension, benign     Mixed hyperlipidemia     Hilar cholangiocarcinoma (H)     Past Medical History:   Diagnosis Date     Cerebrovascular accident (CVA) (H) 12/2010     Cholangiocarcinoma (H)      Essential hypertension, benign     Hypertension, Benign     Nonspecific abnormal results of liver function study     Hx of elevated LFT's     Current Outpatient Medications   Medication Sig Dispense Refill     atorvastatin (LIPITOR) 40 MG tablet Take 40 mg by mouth At Bedtime        clopidogrel (PLAVIX) 75 MG tablet Take 75 mg by mouth At Bedtime        dronabinol (MARINOL) 5 MG capsule Take 1 capsule (5 mg) by mouth 2 times daily (before meals) 60 capsule 0     fenofibrate (TRIGLIDE/LOFIBRA) 160 MG tablet Take 160 mg by mouth At Bedtime        Ferrous Sulfate (IRON) 325 (65 Fe) MG tablet Take 1 tablet by mouth 3 times daily (with meals) 180 tablet 1     furosemide (LASIX) 20 MG tablet Take 20 mg by mouth every evening        glipiZIDE (GLUCOTROL) 10 MG tablet Take 10 mg by mouth At Bedtime        lisinopril (ZESTRIL) 30 MG tablet Take 30 mg by mouth every evening        LORazepam (ATIVAN) 0.5 MG tablet Take 1 tablet (0.5 mg) by mouth every 4 hours as needed (Anxiety, Nausea/Vomiting or Sleep) 30 tablet 5     magnesium gluconate 30 MG tablet Take 1 tablet (30 mg) by mouth daily 90 tablet 3     ondansetron (ZOFRAN) 8 MG tablet Take 1 tablet (8 mg) by mouth every 8 hours as needed (Nausea/Vomiting) 10 tablet 5     oxyCODONE  "(ROXICODONE) 5 MG tablet Take 1-2 tablets (5-10 mg) by mouth every 4 hours as needed for moderate to severe pain (Patient not taking: Reported on 8/27/2020) 20 tablet 0     prochlorperazine (COMPAZINE) 10 MG tablet Take 0.5 tablets (5 mg) by mouth every 6 hours as needed (Nausea/Vomiting) 30 tablet 5     sildenafil (REVATIO) 20 MG tablet Take 20 mg by mouth as needed       zolpidem (AMBIEN) 10 MG tablet Take 1 tablet (10 mg) by mouth nightly as needed for sleep 30 tablet 0     Allergies   Allergen Reactions     Metformin Other (See Comments)     \" I think my kidneys shut down\"     Video physical exam  General: Patient appears well in no acute distress.   Skin: No visualized rash or lesions on visualized skin  Eyes: EOMI, no erythema, sclera icterus or discharge noted  Resp: Appears to be breathing comfortably without accessory muscle usage, speaking in full sentences, no cough  MSK: Appears to have normal range of motion based on visualized movements  Neurologic: No apparent tremors, facial movements symmetric  Psych: affect engaged, pleasant, alert and oriented    The rest of a comprehensive physical examination is deferred due to PHE (public health emergency) video restrictions     There were no vitals taken for this visit.  Wt Readings from Last 4 Encounters:   10/28/20 80.5 kg (177 lb 8 oz)   10/21/20 83.9 kg (184 lb 14.4 oz)   10/13/20 83 kg (183 lb)   10/07/20 83.1 kg (183 lb 4.8 oz)     Labs: tomorrow. Recent labs reviewed.  Note elevation in alk phos and transaminases.    Imaging: recent imaging reviewed    Impression/plan:   Recurrent cholangiocarcinoma with metastatic disease  -initiated on cisplatin/gemcitabine on 8/27/20. Fuentes has tolerated treatment well ex fatigue, now noticing it is more cumulative.  He feels like he crashes the second day after chemo.  I offered to give him a short 3-day course of dexamethasone to avoid this which he is interested in.    Note progressive elevation in alk phos and " bilirubin over the last couple months.  I did discuss this with Dr. Beard and given Fuentes is asymptomatic of cholangitis without fever or abdominal pain, we will continue to monitor with tomorrow's labs.  He does have potential for biliary fibrosis and obstruction with altered anatomy 2/2 previous surgeries and disease so the presence of elevation is not unexpected but labs tomorrow will help us decide if more urgent imaging/intervention is warranted.   -visit with Leeanne prior to next cycle  -He is due for restaging in 1 month with Dr. Beard    Hx enterocolitis with sepsis presentation  -No abdominal pain, fever, or concerning symptoms on my video assessment today. He does have these transient  temperatures periodically (once every couple weeks) that resolve. They understand to present to emergency room with elevated temperature, abdominal pain, etc.     Bile leak/abscess  -Drain removed by IR yesterday after successful capping trial. Follow up and management per IR.     DM  -Glucoses have been better controlled with recent health changes, usually in the 100's. Monitor on dex but given short course should be okay   -only on glipizide currently, continue    Hx of CVA 10 years ago, has mild short term memory deficits  HTN  -on plavix  -on amlodipine, fenofibrate, lisinopril, atorvastatin

## 2020-11-11 NOTE — PROGRESS NOTES
Infusion Nursing Note:  Fuentes Estrada presents today for C5D1 Cisplatin/Gemzar.    Patient seen by provider today: No   present during visit today: Not Applicable.    Note: N/A.    Intravenous Access:  Labs drawn without difficulty.  Implanted Port.    Treatment Conditions:  Lab Results   Component Value Date    HGB 9.2 11/11/2020     Lab Results   Component Value Date    WBC 5.9 11/11/2020      Lab Results   Component Value Date    ANEU 3.9 11/11/2020     Lab Results   Component Value Date     11/11/2020      Lab Results   Component Value Date     11/11/2020                   Lab Results   Component Value Date    POTASSIUM 3.6 11/11/2020           Lab Results   Component Value Date    MAG 1.4 11/11/2020            Lab Results   Component Value Date    CR 0.88 11/11/2020                   Lab Results   Component Value Date    ERIC 8.6 11/11/2020                Lab Results   Component Value Date    BILITOTAL 1.8 11/11/2020           Lab Results   Component Value Date    ALBUMIN 2.4 11/11/2020                    Lab Results   Component Value Date    ALT 36 11/11/2020           Lab Results   Component Value Date    AST 47 11/11/2020       Results reviewed, labs MET treatment parameters, ok to proceed with treatment.  Mag 1.4    Post Infusion Assessment:  Patient tolerated infusion without incident.  Blood return noted pre and post infusion.  No evidence of extravasations.  Access discontinued per protocol.       Discharge Plan:   Discharge instructions reviewed with: Patient.  Patient and/or family verbalized understanding of discharge instructions and all questions answered.  Patient discharged in stable condition accompanied by: self.  Departure Mode: Ambulatory.    Leatha Rodriguez RN

## 2020-11-17 NOTE — TELEPHONE ENCOUNTER
"[facility] Telephone Triage Note    Assessment: Spouse/Partner Robyn called in to triage reporting the following symptoms: Robyn reports that Fuentes began having low-grade fevers yesterday which she treated with Tylenol. Max temp of 100.8, additionally, he has been quite exhausted, states that he is barely able to get from couch to bathroom and back before he is \"wiped out\". He has been having headaches as well. No pain or other s/s. Has a history of sepsis..    Recommendations: Discussed with Leslie Quiles PA-C.  ER Visit recommended.  Pt plans on being seen at  [facility] in [place]. Report called in to Osmar.    Follow-Up: Instructed patient to seek care immediately for worsening symptoms, including: fever, chest pain, shortness of breath, dizziness. Patient voiced understanding of advice and/or instructions given. Writer also advised Robyn that if his temperature gets to 100.4 or greater, to not treat with Tylenol before contacting triage/care team to review.      "

## 2020-11-18 PROBLEM — K83.09 CHOLANGITIS (H): Status: ACTIVE | Noted: 2020-01-01

## 2020-11-18 NOTE — CONSULTS
Procedure Note    Attending: Abram Baca MD  Resident: N/A  Procedure: Diagnostic and therapeutic paracentesis  Indication: Diagnostic and pre MRCP  Pre-procedure diagnosis: CholangioCA  Post-procedure diagnosis: CholangioCA    The risks and benefits of the procedure were explained to patient who expressed understanding and opted to proceed.  Consent was obtained and placed in the chart.  A time out was performed.  An area of ascites was located and marked using ultrasound guidance in the LLQ lower quadrant; the area was prepped and draped in the usual sterile fashion.  ~10 ml of 1% lidocaine was instilled and ascites located.    The 5 Fr Intermolecular paracentesis catheter and needle were inserted under US guidance then the needle removed. 60 ml of fluid was collected however there was interruption in the fluid flow. The catheter was withdrawn.  Under US guidance the 8 Fr straight catheter was inserted.  The needle was withdrawn. The apparatus was connected to vacuum bottles and a total of 1060 ml of clear yellow fluid removed.   A specimen was sent for analysis. The catheter was withdrawn and the area dressed.    Patient tolerated the procedure well with no immediate complications.  The primary team was informed of the procedure.     Sania Baca MD  [facility]   Internal Medicine   [phone]  DOS:  November 18, 2020

## 2020-11-18 NOTE — DOWNTIME EVENT NOTE
The EMR was down for 2 hours on 11/18/2020.    Ana Little was responsible for completing the paper charting during this time period.     The following information was re-entered into the system by Ana Little RN: n/a    The following information will remain in the paper chart: n/a    Ana Little RN  11/18/2020

## 2020-11-18 NOTE — CONSULTS
GASTROENTEROLOGY CONSULTATION      Date of Admission:  11/18/2020          Chief Complaint:   We were asked by Dr. Rehman to evaluate this patient with fevers, elevated liver enzymes.            ASSESSMENT AND RECOMMENDATIONS:   Assessment:  Fuentes Estrada is a 65 year old male with a history of hilar cholangiocarcinoma diagnosed in 3/2020 who underwent a radical extrahepatic bile duct resection and R hepatectomy on 5/12/20, but had recurrences of disease on EUS-FNA of LN on 8/11/20 on active chemotherapy with gemcitabine/cisplatin for palliative intent who is admitted on 11/18 with fevers and concerns of cholangitis. Gi is consulted to evaluate elevated liver enzymes: , ALT 63, AST 68, TB 3.2.    # Elevated ALP, TB, ALT, AST  # Metastatic hilar cholangiocarcinoma S/P R hepatectomy, extended biliary resection    - patient had elevated ALP, TB, AST since 5/12/20 when he had radical extrahepatic bile duct resection and R hepatectomy   - no abdominal pain  - DD: liver enzymes elevation from choledocholithiasis, metastatic disease, active chemotherapy       Recommendations  - MRCP   - Monitor liver enzymes    Gastroenterology follow up recommendations: pending MRCP results       Patient care plan discussed with AN Arita, GI staff physician. Thank you for involving us in this patient's care. Please do not hesitate to contact the GI service with any questions or concerns.     Manny Lawson M.D  GI fellow  [id number]  Department of Gastroenterology   -------------------------------------------------------------------------------------------------------------------   History is obtained from the patient and the medical record.          History of Present Illness:   Fuentes Estrada is a 65 year old male with a history of hilar cholangiocarcinoma diagnosed in 3/2020 who underwent a radical extrahepatic bile duct resection and R hepatectomy on 5/12/20, but had recurrences of disease on EUS-FNA of LN on 8/11/20  is admitted on 11/18 with fevers and concerns of cholangitis.   . He went to outside ED where he was found to have a T max at home of 100.8 F. CT abdomen pelvis on 11/17 showed no CBD dilatation and a small collection of fluid where he had a drain before. Patient was then transferred to [facility].  On admission ,there was no fevers, patient was hemodynamically stable, , ALT 63, AST 68, TB 3.2. WBC 3.3, HB 8.3, . Patient was started on zosyn, had paracentesis of 1060 ml of clear fluids, The patient was interviewed, he describes no symptoms of abdominal pain, nausea, rigors or vomiting, able to tolerate diet and eat.  Of note, the patient have history of bile leak and liver abscess with IR placed drain that was managed by IR with multiple sinograms and sclerotherapy from 6/2020 till removal in 10/19/20. Patient also have history of septic shock at [facility] from infectious enterocolitis from 10/9-10/11/20.                Past Medical History:   Reviewed and edited as appropriate  Past Medical History:   Diagnosis Date     Cerebrovascular accident (CVA) (H) 12/2010     Cholangiocarcinoma (H)      Essential hypertension, benign     Hypertension, Benign     Nonspecific abnormal results of liver function study     Hx of elevated LFT's            Past Surgical History:   Reviewed and edited as appropriate   Past Surgical History:   Procedure Laterality Date     ENDOSCOPIC RETROGRADE CHOLANGIOPANCREATOGRAM N/A 3/31/2020    Procedure: ENDOSCOPIC RETROGRADE CHOLANGIOPANCREATOGRAPHY, WITH with biliary sphincterotomy, biopsies and brushings, biliary stent placement;  Surgeon: Win Strauss MD;  [facility]     ENDOSCOPIC RETROGRADE CHOLANGIOPANCREATOGRAM N/A 4/6/2020    Procedure: ENDOSCOPIC RETROGRADE CHOLANGIOPANCREATOGRAPHY;  Surgeon: Win Strauss MD;  [facility]     ENDOSCOPIC RETROGRADE CHOLANGIOPANCREATOGRAM WITH SPYGLASS  4/16/2020    Procedure: ENDOSCOPIC RETROGRADE  CHOLANGIOPANCREATOGRAPHY, WITH DIRECT DUCT VISUALIZATION, USING PANCREATICOBILIARY FIBEROPTIC PROBE; Bile Duct Stent Exchange and Biopsy,balloon sweep of bile duct;  Surgeon: Win Strauss MD;  [facility]     ENDOSCOPIC ULTRASOUND UPPER GASTROINTESTINAL TRACT (GI) N/A 4/16/2020    Procedure: ENDOSCOPIC ULTRASOUND, ESOPHAGOSCOPY / UPPER GASTROINTESTINAL TRACT (GI)with Fine Needle biopsy;  Surgeon: Cody Baumann MD;  [facility]     ENDOSCOPIC ULTRASOUND UPPER GASTROINTESTINAL TRACT (GI) N/A 8/11/2020    Procedure: ENDOSCOPIC ULTRASOUND, ESOPHAGOSCOPY / UPPER GASTROINTESTINAL TRACT (GI);  Surgeon: Guru Bailey Rossi MD;  [facility]     ESOPHAGOSCOPY, GASTROSCOPY, DUODENOSCOPY (EGD), COMBINED N/A 4/6/2020    Procedure: ESOPHAGOGASTRODUODENOSCOPY (EGD);  Surgeon: Win Strauss MD;  [facility]     ESOPHAGOSCOPY, GASTROSCOPY, DUODENOSCOPY (EGD), COMBINED N/A 8/11/2020    Procedure: Esophagogastroduodenoscopy, With Fine Needle Aspiration Biopsy, With Endoscopic Ultrasound Guidance;  Surgeon: Guru Bailey Rossi MD;  [facility]     EXPLORE COMMON BILE DUCT N/A 5/12/2020    Procedure: extra-hepatic bile duct resection;  Surgeon: Oswaldo Hale MD;  [facility]     HC KNEE SCOPE, DIAGNOSTIC  1995    Arthroscopy, Knee; left     HC VASECTOMY UNILAT/BILAT W POSTOP SEMEN      Vasectomy     HEPATECTOMY PARTIAL N/A 5/12/2020    Procedure: Exploratory Laparotomy, Open right hepatectomy, Radical Extra-Hepatic Bile Duct Resection, Portal Lymph Node Dissection, Intraoperative Ultrasound, Intra Air-Cholangiogram ,Bianca-En-Y Left Hepaticojejunostomy, Excision Skin Lesion;  Surgeon: Oswaldo Hale MD;  [facility]     INSERT PORT VASCULAR ACCESS Right 9/28/2020    Procedure: ultrasound guided right internal venous access port placement with flouroscopy;  Surgeon: Fercho Wayne, DO;  [facility]     IR ABSCESS TUBE CHANGE   6/12/2020     IR FOLLOW UP VISIT OUTPATIENT  7/24/2020     IR FOLLOW UP VISIT OUTPATIENT  8/3/2020     IR FOLLOW UP VISIT OUTPATIENT  10/19/2020     IR SINOGRAM INJECTION DIAGNOSTIC  7/14/2020     IR SINOGRAM INJECTION DIAGNOSTIC  9/14/2020     IR SINOGRAM INJECTION DIAGNOSTIC  10/13/2020     IR SINOGRAM INJECTION THERAPEUTIC  8/25/2020     IR SINOGRAM INJECTION THERAPEUTIC  9/25/2020     IR SINOGRAM INJECTION THERAPEUTIC  10/2/2020     IR SINOGRAM INJECTION THERAPEUTIC  9/18/2020     IR THORACENTESIS  5/16/2020     LYMPHADENECTOMY ABDOMINAL N/A 5/12/2020    Procedure: portal lymph node dissection, intraoperative liver ultrasound;  Surgeon: Oswaldo Hale MD;  [facility]            Previous Endoscopy:   No results found for this or any previous visit.         Social History:   Reviewed and edited as appropriate  Social History     Socioeconomic History     Marital status:      Spouse name: Robyn     Number of children: 2     Years of education: 14     Highest education level: Not on file   Occupational History     Occupation: self employed   Social Needs     Financial resource strain: Not on file     Food insecurity     Worry: Not on file     Inability: Not on file     Transportation needs     Medical: Not on file     Non-medical: Not on file   Tobacco Use     Smoking status: Never Smoker     Smokeless tobacco: Never Used   Substance and Sexual Activity     Alcohol use: Yes     Comment: occaisional      Drug use: No     Sexual activity: Yes     Partners: Female   Lifestyle     Physical activity     Days per week: Not on file     Minutes per session: Not on file     Stress: Not on file   Relationships     Social connections     Talks on phone: Not on file     Gets together: Not on file     Attends Religion service: Not on file     Active member of club or organization: Not on file     Attends meetings of clubs or organizations: Not on file     Relationship status: Not on file     Intimate partner  "violence     Fear of current or ex partner: Not on file     Emotionally abused: Not on file     Physically abused: Not on file     Forced sexual activity: Not on file   Other Topics Concern      Service No     Blood Transfusions No     Caffeine Concern No     Occupational Exposure No     Hobby Hazards No     Sleep Concern No     Stress Concern No     Weight Concern No     Special Diet No     Back Care No     Exercise Yes     Bike Helmet No     Seat Belt Yes     Self-Exams No     Parent/sibling w/ CABG, MI or angioplasty before 65F 55M? Not Asked   Social History Narrative     Not on file            Family History:   Reviewed and edited as appropriate  Family History   Problem Relation Age of Onset     Arthritis Mother         RA     Diabetes Father         diet controlled     Hypertension Father            Allergies:   Reviewed and edited as appropriate     Allergies   Allergen Reactions     Metformin Other (See Comments)     \" I think my kidneys shut down\"            Medications:     Current Facility-Administered Medications   Medication     atorvastatin (LIPITOR) tablet 40 mg     clopidogrel (PLAVIX) tablet 75 mg     glucose gel 15-30 g    Or     dextrose 50 % injection 25-50 mL    Or     glucagon injection 1 mg     dronabinol (MARINOL) capsule 5 mg     fenofibrate (TRIGLIDE/LOFIBRA) tablet 160 mg     furosemide (LASIX) tablet 20 mg     insulin aspart (NovoLOG) injection (RAPID ACTING)     lidocaine (LMX4) cream     lidocaine 1 % 0.1-1 mL     lisinopril (ZESTRIL) tablet 30 mg     LORazepam (ATIVAN) tablet 0.5 mg     melatonin tablet 1 mg     naloxone (NARCAN) injection 0.1-0.4 mg     ondansetron (ZOFRAN) tablet 8 mg     oxyCODONE (ROXICODONE) tablet 5-10 mg     piperacillin-tazobactam (ZOSYN) 3.375 g vial to attach to  mL bag     polyethylene glycol (MIRALAX) Packet 17 g     prochlorperazine (COMPAZINE) tablet 5 mg     senna-docusate (SENOKOT-S/PERICOLACE) 8.6-50 MG per tablet 1 tablet    Or     " "senna-docusate (SENOKOT-S/PERICOLACE) 8.6-50 MG per tablet 2 tablet     sodium chloride (PF) 0.9% PF flush 3 mL     sodium chloride (PF) 0.9% PF flush 3 mL     zolpidem (AMBIEN) tablet 10 mg             Review of Systems:     A complete review of systems was performed and is negative except as noted in the HPI           Physical Exam:   BP (!) 142/84 (BP Location: Left arm)   Pulse 62   Temp 98.7  F (37.1  C) (Oral)   Resp 18   Ht 1.854 m (6' 1\")   Wt 79.7 kg (175 lb 9.6 oz)   SpO2 97%   BMI 23.17 kg/m    Wt:   Wt Readings from Last 2 Encounters:   11/18/20 79.7 kg (175 lb 9.6 oz)   11/11/20 83.3 kg (183 lb 9.6 oz)      Constitutional: cooperative, pleasant, not dyspneic/diaphoretic, no acute distress  Eyes: Sclera anicteric/injected  Ears/nose/mouth/throat: Normal oropharynx without ulcers or exudate, mucus membranes moist, hearing intact  Neck: supple, thyroid normal size  CV: No edema  Respiratory: Unlabored breathing  Lymph: No axillary, submandibular, supraclavicular or inguinal lymphadenopathy  Abd: Nondistended, +bs, no hepatosplenomegaly, nontender, no peritoneal signs  Skin: warm, perfused, no jaundice  Neuro: AAO x 3, No asterixis  Psych: Normal affect  MSK: Normal gait         Data:   Labs and imaging below were independently reviewed and interpreted    BMP  Recent Labs   Lab 11/18/20  0649 11/11/20  0823    135   POTASSIUM 3.5 3.6   CHLORIDE 104 103   ERIC 8.1* 8.6   CO2 23 23   BUN 17 12   CR 0.75 0.88   GLC 97 151*     CBC  Recent Labs   Lab 11/18/20  0649 11/11/20  0823   WBC 3.3* 5.9   RBC 2.82* 3.15*   HGB 8.3* 9.2*   HCT 25.0* 28.0*   MCV 89 89   MCH 29.4 29.2   MCHC 33.2 32.9   RDW 17.4* 18.8*    323     INR  Recent Labs   Lab 11/18/20  0649   INR 1.40*     LFTs  Recent Labs   Lab 11/18/20  0649 11/11/20  0823   ALKPHOS 508* 591*   AST 68* 47*   ALT 63 36   BILITOTAL 3.2* 1.8*   PROTTOTAL 5.8* 6.9   ALBUMIN 1.9* 2.4*      PANCNo lab results found in last 7 days.    Imaging:  "

## 2020-11-18 NOTE — PROGRESS NOTES
[facility]     Hospitalist Service, Gold Night 11       Date of Admission:  11/18/2020    Assessment & Plan   Fuentes Estrada is a 65 year old male admitted on 11/18/2020. He has a history of cholangiocarcinoma s/p right hepatectomy and radical extrahepatic bile duct resection, DM, HTN, and CVA who presented to the [facility] ED with fevers.  Labs concerning for possible cholangitis, and CT revealed small fluid collection near prior drain.  Mr. Estrada was transferred here for MRCP and further GI evaluation.    #  Concern for cholangitis in the setting of a history of cholangiocarcinoma s/p radical extrahepatic bile duct resection and hepatectomy  At [facility], had mildly elevated bilirubin and elevated alk phosph.  Per triage hospitalist, discussed situation with Onc and GI, will proceed with MRCP and specialist consultation.  -  MRCP on 11/18  -  Consult GI Panc/Bili   -  Consult Onc  -  Continue zoysn  -  Trend LFTs, WBC    #  DM II  -  Holding home glipizide  -  Low sliding scale insulin  -  Fingersticks QID AC and HS    #  CVA  -  Continue home plavix, atorvastatin, fenofibrate    #  HTN  -  Continue home furosemide, lisinopril       Diet: Regular Diet Adult NPO after midnight in case there is further plans after MRCP like ERCP  DVT Prophylaxis: Pneumatic Compression Devices  Em Catheter: not present  Code Status: Full Code Full         Disposition Plan   Expected discharge: 2 - 3 days, recommended to prior living arrangement once antibiotic plan established, safe disposition plan/ TCU bed available and SIRS/Sepsis treated.  Entered: Jose Vazquez MD 11/18/2020, 5:52 PM     The patient's care was discussed with the Bedside Nurse and Patient.    Jose Vazquez MD  [facility]   Contact information available via [facility] Paging/Directory  Please see sign in/sign out for up to date coverage  information    ______________________________________________________________________      History of Present Illness   No concerns. He wishes he was at home. No rash. No dysuria.  No headache.  Appetite is ok.  No dizziness    Review of Systems    No fevers  No abd pain  No chest pain  No n/v/d    Past Medical History    I have reviewed this patient's medical history and updated it with pertinent information if needed.   Past Medical History:   Diagnosis Date     Cerebrovascular accident (CVA) (H) 12/2010     Cholangiocarcinoma (H)      Essential hypertension, benign     Hypertension, Benign     Nonspecific abnormal results of liver function study     Hx of elevated LFT's       Past Surgical History   I have reviewed this patient's surgical history and updated it with pertinent information if needed.  Past Surgical History:   Procedure Laterality Date     ENDOSCOPIC RETROGRADE CHOLANGIOPANCREATOGRAM N/A 3/31/2020    Procedure: ENDOSCOPIC RETROGRADE CHOLANGIOPANCREATOGRAPHY, WITH with biliary sphincterotomy, biopsies and brushings, biliary stent placement;  Surgeon: Win Strauss MD;  [facility]     ENDOSCOPIC RETROGRADE CHOLANGIOPANCREATOGRAM N/A 4/6/2020    Procedure: ENDOSCOPIC RETROGRADE CHOLANGIOPANCREATOGRAPHY;  Surgeon: Win Strauss MD;  [facility]     ENDOSCOPIC RETROGRADE CHOLANGIOPANCREATOGRAM WITH SPYGLASS  4/16/2020    Procedure: ENDOSCOPIC RETROGRADE CHOLANGIOPANCREATOGRAPHY, WITH DIRECT DUCT VISUALIZATION, USING PANCREATICOBILIARY FIBEROPTIC PROBE; Bile Duct Stent Exchange and Biopsy,balloon sweep of bile duct;  Surgeon: Win Strauss MD;  [facility]     ENDOSCOPIC ULTRASOUND UPPER GASTROINTESTINAL TRACT (GI) N/A 4/16/2020    Procedure: ENDOSCOPIC ULTRASOUND, ESOPHAGOSCOPY / UPPER GASTROINTESTINAL TRACT (GI)with Fine Needle biopsy;  Surgeon: Cody Baumann MD;  [facility]     ENDOSCOPIC ULTRASOUND UPPER GASTROINTESTINAL TRACT (GI) N/A 8/11/2020     Procedure: ENDOSCOPIC ULTRASOUND, ESOPHAGOSCOPY / UPPER GASTROINTESTINAL TRACT (GI);  Surgeon: Guru Bailey Rossi MD;  [facility]     ESOPHAGOSCOPY, GASTROSCOPY, DUODENOSCOPY (EGD), COMBINED N/A 4/6/2020    Procedure: ESOPHAGOGASTRODUODENOSCOPY (EGD);  Surgeon: Win Strauss MD;  [facility]     ESOPHAGOSCOPY, GASTROSCOPY, DUODENOSCOPY (EGD), COMBINED N/A 8/11/2020    Procedure: Esophagogastroduodenoscopy, With Fine Needle Aspiration Biopsy, With Endoscopic Ultrasound Guidance;  Surgeon: Guru Bailey Rossi MD;  [facility]     EXPLORE COMMON BILE DUCT N/A 5/12/2020    Procedure: extra-hepatic bile duct resection;  Surgeon: Oswaldo Hale MD;  [facility]     HC KNEE SCOPE, DIAGNOSTIC  1995    Arthroscopy, Knee; left     HC VASECTOMY UNILAT/BILAT W POSTOP SEMEN      Vasectomy     HEPATECTOMY PARTIAL N/A 5/12/2020    Procedure: Exploratory Laparotomy, Open right hepatectomy, Radical Extra-Hepatic Bile Duct Resection, Portal Lymph Node Dissection, Intraoperative Ultrasound, Intra Air-Cholangiogram ,Bianca-En-Y Left Hepaticojejunostomy, Excision Skin Lesion;  Surgeon: Oswaldo Hale MD;  [facility]     INSERT PORT VASCULAR ACCESS Right 9/28/2020    Procedure: ultrasound guided right internal venous access port placement with flouroscopy;  Surgeon: Fercho Wayne DO;  [facility]     IR ABSCESS TUBE CHANGE  6/12/2020     IR FOLLOW UP VISIT OUTPATIENT  7/24/2020     IR FOLLOW UP VISIT OUTPATIENT  8/3/2020     IR FOLLOW UP VISIT OUTPATIENT  10/19/2020     IR SINOGRAM INJECTION DIAGNOSTIC  7/14/2020     IR SINOGRAM INJECTION DIAGNOSTIC  9/14/2020     IR SINOGRAM INJECTION DIAGNOSTIC  10/13/2020     IR SINOGRAM INJECTION THERAPEUTIC  8/25/2020     IR SINOGRAM INJECTION THERAPEUTIC  9/25/2020     IR SINOGRAM INJECTION THERAPEUTIC  10/2/2020     IR SINOGRAM INJECTION THERAPEUTIC  9/18/2020     IR THORACENTESIS  5/16/2020     LYMPHADENECTOMY  ABDOMINAL N/A 5/12/2020    Procedure: portal lymph node dissection, intraoperative liver ultrasound;  Surgeon: Oswaldo Hale MD;  [facility]       Social History   I have reviewed this patient's social history and updated it with pertinent information if needed.  Social History     Tobacco Use     Smoking status: Never Smoker     Smokeless tobacco: Never Used   Substance Use Topics     Alcohol use: Yes     Comment: occaisional      Drug use: No       Family History   I have reviewed this patient's family history and updated it with pertinent information if needed.  Family History   Problem Relation Age of Onset     Arthritis Mother         RA     Diabetes Father         diet controlled     Hypertension Father        Prior to Admission Medications   Prior to Admission Medications   Prescriptions Last Dose Informant Patient Reported? Taking?   Ferrous Sulfate (IRON) 325 (65 Fe) MG tablet  Spouse/Significant Other No No   Sig: Take 1 tablet by mouth 3 times daily (with meals)   LORazepam (ATIVAN) 0.5 MG tablet  Spouse/Significant Other No No   Sig: Take 1 tablet (0.5 mg) by mouth every 4 hours as needed (Anxiety, Nausea/Vomiting or Sleep)   atorvastatin (LIPITOR) 40 MG tablet  Spouse/Significant Other Yes No   Sig: Take 40 mg by mouth At Bedtime    clopidogrel (PLAVIX) 75 MG tablet  Spouse/Significant Other Yes No   Sig: Take 75 mg by mouth At Bedtime    dexamethasone (DECADRON) 4 MG tablet   No No   Sig: Take 1 tablet (4 mg) by mouth daily (with breakfast) Daily for the 3 days after chemotherapy in the morning with food   dronabinol (MARINOL) 5 MG capsule   No No   Sig: Take 1 capsule (5 mg) by mouth 2 times daily (before meals)   fenofibrate (TRIGLIDE/LOFIBRA) 160 MG tablet  Spouse/Significant Other Yes No   Sig: Take 160 mg by mouth At Bedtime    furosemide (LASIX) 20 MG tablet  Spouse/Significant Other Yes No   Sig: Take 20 mg by mouth every evening    glipiZIDE (GLUCOTROL) 10 MG tablet   "Spouse/Significant Other Yes No   Sig: Take 10 mg by mouth At Bedtime    lisinopril (ZESTRIL) 30 MG tablet  Spouse/Significant Other Yes No   Sig: Take 30 mg by mouth every evening    magnesium gluconate 30 MG tablet   No No   Sig: Take 1 tablet (30 mg) by mouth daily   ondansetron (ZOFRAN) 8 MG tablet  Spouse/Significant Other No No   Sig: Take 1 tablet (8 mg) by mouth every 8 hours as needed (Nausea/Vomiting)   oxyCODONE (ROXICODONE) 5 MG tablet  Spouse/Significant Other No No   Sig: Take 1-2 tablets (5-10 mg) by mouth every 4 hours as needed for moderate to severe pain   prochlorperazine (COMPAZINE) 10 MG tablet  Spouse/Significant Other No No   Sig: Take 0.5 tablets (5 mg) by mouth every 6 hours as needed (Nausea/Vomiting)   sildenafil (REVATIO) 20 MG tablet  Spouse/Significant Other Yes No   Sig: Take 20 mg by mouth as needed   zolpidem (AMBIEN) 10 MG tablet  Spouse/Significant Other No No   Sig: Take 1 tablet (10 mg) by mouth nightly as needed for sleep      Facility-Administered Medications: None     Allergies   Allergies   Allergen Reactions     Metformin Other (See Comments)     \" I think my kidneys shut down\"       Physical Exam   Vital Signs: Temp: 98.7  F (37.1  C) Temp src: Oral BP: (!) 142/84 Pulse: 62   Resp: 18 SpO2: 97 % O2 Device: None (Room air)    Weight: 175 lbs 9.6 oz    General Appearance: Lying in bed in NAD  Eyes: EOMI  HEENT: MMM  Respiratory: CTAB, breathing comfortably on room air  Cardiovascular: RRR, no m/r/g, peripheral pulses intact  GI: NABS, soft, NT, ND  Skin: No rashes  Musculoskeletal: No peripheral edema  Neurologic: Alert, appropriately interactive  Psychiatric: Full affect    Data   Data reviewed today: I reviewed all medications, new labs and imaging results over the last 24 hours.       "

## 2020-11-18 NOTE — PLAN OF CARE
Denies pain or nausea, tolerating small amounts of regular diet, voiding spont. Paracentesis done today with 1000ml drained, site dry/intact. Up ad yonas, anxious at times,Ativan given with good relief, awaiting MRCP today.

## 2020-11-18 NOTE — PLAN OF CARE
A&Ox4. Arrived on 7C around 0130. Denies pain throughout shift. COVID swab sent to lab, pending result. PIV infusing TKO in between IV antibiotics. On Q 4 hours blood sugar check, no sliding scale insulin Needed. Up to the bathroom with SBA. Patient stated he was tired, but willing to do the skin assessment in the AM. Admissions questions done. NPO for pending MRCP. Continue with plan of care.

## 2020-11-18 NOTE — UTILIZATION REVIEW
Admission Status; Secondary Review Determination    Under the authority of the Utilization Management Committee, the utilization review process indicated a secondary review on the above patient. The review outcome is based on review of the medical records, discussions with staff, and applying clinical experience noted on the date of the review.    (x) Inpatient Status Appropriate - This patient's medical care is consistent with medical management for inpatient care and reasonable inpatient medical practice.    RATIONALE FOR DETERMINATION    Fuentes Estrada is a 65 year old male with a history of cholangiocarcinoma s/p right hepatectomy and radical extrahepatic bile duct resection, DM, HTN, and CVA who was admitted on 11/18/2020 with fever and labs concerning for possible cholangitis. A CT revealed a small fluid collection near prior drain.  The plan is for IV antibiotics, MRCP and further GI evaluation.    At the time of admission with the information available to the attending physician more than 2 nights Hospital complex care was anticipated, based on patient risk of adverse outcome if treated as outpatient and complex care required. Inpatient admission is appropriate based on the Medicare guidelines.    The information on this document is developed by the utilization review team in order for the business office to ensure compliance. This only denotes the appropriateness of proper admission status and does not reflect the quality of care rendered.    The definitions of Inpatient Status and Observation Status used in making the determination above are those provided in the CMS Coverage Manual, Chapter 1 and Chapter 6, section 70.4.    Sincerely,    Kelsie Rowley MD   Utilization Review  Physician Advisor  [facility].

## 2020-11-18 NOTE — PROGRESS NOTES
[facility]  Transfer Triage Note    Date of call: 11/17/20  Time of call: 8:58 PM    Is pandemic COVID-19 a concern? NO    Reason for transfer: Further diagnostic work up, management, and consultation for specialized care   Diagnosis: H/O cholangiocarcinoma, fever, concern for abscess/ascending cholangitis.    Outside Records: Available  Additional records requested to be faxed to [phone].    Stability of Patient: Patient is vitally stable, with no critical labs, and will likely remain stable throughout the transfer process  ICU: No    Expected Time of Arrival for Transfer: 0-8 hours    Arrival Location:  [address] [address] Phone: [phone]    Recommendations for Management and Stabilization: Given    Additional Comments 65 year old male with HTN, DM, CVA on Plavix, recently diagnosed with hilar cholangiocarcinoma in May 2020.  S/P radical extrahepatic bile duct resection and R hepatectomy 5/12/20 , with IR placement of drain for bile leak with peritoneal abscess  (enterococcus faecium).. On cisplatin/gemcitabine chemotherapy.   Drain removal on 10/19.  Presented today to [place] ED with intermittent fevers with T max to 100.8.  Stable vitals with /83  HR 71  RR 18  T 98.1  O2 sat 99%. Bili 3.2  (1.2 on 11/11) WBC 3.1, Hgb 8.8, AST 78, ALT 68,  (591 on 11/11), Cr 0.74, Na 128, K 3.7, glucose 132, and lactate 1.1.  CT abdomen showed no bile duct dilation and a small collection of fluid where the drain was.  Talked with Dr. Stroud from oncology who was concerned for cholangitis.  Discussed with Dr. Arellano from panc/bili who recommended MRCP first (instead of ERCP) to assess for biliary obstruction.  Unclear if the fluid collection represents a recurrent abscess.  Planned transfer to [facility] for MRCP, continued ABs, GI consultation.  Med/surg bed.    Hugo Pierce, MS4  Agree with above documentation  Shon Mcfarland,  MD  SOC Triage.

## 2020-11-18 NOTE — CONSULTS
Oncology  Consult Note   Date of Service: 11/18/2020    Patient: Fuentes Estrada  MRN: [id number]  Admission Date: 11/18/2020  Hospital Day # 0  Cancer Diagnosis: Metastatic hilar cholangiocarcinoma, Stage IIIc  Primary Outpatient Oncologist: Dr. Beard   Current Treatment Plan: Palliative Gemcitabine / Cisplatin (most recent Cycle 5=11/11/20)     Reason for Consult: Hx of hilar cholangiocarcinoma, possible cholangitis on active chemotherapy     History of Present Illness:    Fuentes Estrada is a 65 year old male with history of cholangiocarcinoma s/p right hepatectomy and radical extrahepatic bile duct resection (5/12/20) with recurrent disease on EUS-FNA (8/11/20) on palliative [place]/Cis, DM, HTN, and CVA (on plavix) who was transferred here from OSH for fevers and work up concerning for cholangitis. GI consulted for possible MRCP and further evaluation.     He was seen virtually by an oncology DARA on 11/9 due to new symptoms of fatigue, low grade fevers ~99 and diarrhea. At that time, there was progressive elevation in alk phos and T bili but no fevers or abd pain. Diarrhea and fatigue thought to be 2/2 chemotherapy.     On 11/17, he presented to local [place] ED ([facility]) with intermittent fevers with T max to 100.8. Vitals stable with /83,  HR 71, RR 18,  T 98.1,  O2 sat 99%. Bili 3.2  (1.2 on 11/11), WBC 3.1, Hgb 8.8, AST 78, ALT 68,  (591 on 11/11), Cr 0.74, Na 128, K 3.7, glucose 132, and lactate 1.1.  CT abdomen showed no bile duct dilation and a small collection of fluid where the drain was concerning for cholangitis; images uploaded in PACS. It was unclear if the fluid collection represented a recurrent abscess.     Transferred to [facility] 11/17 for MRCP scheduled 11/18. Started on Zosyn QID. S/p therapeutic and diagnostic paracentesis 11/18 with a total of 1060 ml of clear yellow fluid removed. Fluid sent for aerobic/anaerobic culture, cell count & differential, and cytology.    Today,  "patient reports he's feeling \"just fine.\" He reports some mild fatigue, but he denies any abdominal pain, nausea, vomiting, or change in stooling. He has not had a fever since admission. No chills or rigors. No abdominal distension or shortness of breath. He had 1L of ascitic fluid removed this AM via paracentesis, but tells me he does not notice a symptomatic difference post-procedurally. No other concerning symptoms. He is somewhat anxiously awaiting his planned MRCP; he tells me he wants that to happen sooner than later so he can go home. He also has many questions about long-term treatment plans and general principles of chemotherapy, which I answered to his expressed satisfaction.     Oncologic History:  Follows with Dr Beard. He presented with elevated LFTs in the spring of 2020 and was found to have a hilar cholangiocarcinoma (vs. intrahepatic per Dr. Beard's notes). S/p radical extrahepatic bile duct resection with negative margins and right hepatectomy 5/12/20 with Dr Hale at [facility]. Postoperative course complicated by a bile leak and peritoneal abscess (enterococcus faecium) which delayed adjuvant chemotherapy.      On CT CAP 7/2/20 he appeared to be developing increasing nodularity in the resection bed and regional lymph nodes. EUS with a fine-needle aspiration of the lymph nodes on 8/11/20 positive for metastatic disease.       His abscess/biliary drain has been managed by IR. Sinograms (7/14/20) identified a communication to the biliary tree requiring cavitary sclerotherapy every 2 weeks August - October 2020. Drain removed 10/19/20. CT A/P at that time demonstrated decreased nodular peritoneal thickening in the RLQ.     On 8/27/20, he initiated treatment with palliative intent Cisplatin/Gemcitabine. Port placed.     Cycle 1: 8/27/20     Cycle 2: 9/16/20    Cycle 3: 10/7/20    Admitted to [facility] 10/9-10/11 with septic shock 2/2 infectious enterocolitis, cx negative.    Cycle 4: 10/21/20     Cycle 5: " 11/11/20     Admitted to [facility] 11/18 with fever and elevated LFTs concerning for cholangitis. D8 not given.    Review of Systems:  A comprehensive ROS was performed and found to be negative or non-contributory with the exception of that noted in the HPI above.    Past Medical History:  Past Medical History:   Diagnosis Date     Cerebrovascular accident (CVA) (H) 12/2010     Cholangiocarcinoma (H)      Essential hypertension, benign     Hypertension, Benign     Nonspecific abnormal results of liver function study     Hx of elevated LFT's       Past Surgical History:  Past Surgical History:   Procedure Laterality Date     ENDOSCOPIC RETROGRADE CHOLANGIOPANCREATOGRAM N/A 3/31/2020    Procedure: ENDOSCOPIC RETROGRADE CHOLANGIOPANCREATOGRAPHY, WITH with biliary sphincterotomy, biopsies and brushings, biliary stent placement;  Surgeon: Win Strauss MD;  Location: [facility] OR     ENDOSCOPIC RETROGRADE CHOLANGIOPANCREATOGRAM N/A 4/6/2020    Procedure: ENDOSCOPIC RETROGRADE CHOLANGIOPANCREATOGRAPHY;  Surgeon: Win Strauss MD;  Location: [facility] OR     ENDOSCOPIC RETROGRADE CHOLANGIOPANCREATOGRAM WITH SPYGLASS  4/16/2020    Procedure: ENDOSCOPIC RETROGRADE CHOLANGIOPANCREATOGRAPHY, WITH DIRECT DUCT VISUALIZATION, USING PANCREATICOBILIARY FIBEROPTIC PROBE; Bile Duct Stent Exchange and Biopsy,balloon sweep of bile duct;  Surgeon: Win Strauss MD;  Location: [facility] OR     ENDOSCOPIC ULTRASOUND UPPER GASTROINTESTINAL TRACT (GI) N/A 4/16/2020    Procedure: ENDOSCOPIC ULTRASOUND, ESOPHAGOSCOPY / UPPER GASTROINTESTINAL TRACT (GI)with Fine Needle biopsy;  Surgeon: Cody Baumann MD;  Location: [facility] OR     ENDOSCOPIC ULTRASOUND UPPER GASTROINTESTINAL TRACT (GI) N/A 8/11/2020    Procedure: ENDOSCOPIC ULTRASOUND, ESOPHAGOSCOPY / UPPER GASTROINTESTINAL TRACT (GI);  Surgeon: Guru Bailey Rossi MD;  Location: [facility] GI     ESOPHAGOSCOPY, GASTROSCOPY, DUODENOSCOPY (EGD), COMBINED N/A 4/6/2020    Procedure:  ESOPHAGOGASTRODUODENOSCOPY (EGD);  Surgeon: Win Strauss MD;  Location: [facility] OR     ESOPHAGOSCOPY, GASTROSCOPY, DUODENOSCOPY (EGD), COMBINED N/A 8/11/2020    Procedure: Esophagogastroduodenoscopy, With Fine Needle Aspiration Biopsy, With Endoscopic Ultrasound Guidance;  Surgeon: Guru Bailey Rossi MD;  Location: [facility] GI     EXPLORE COMMON BILE DUCT N/A 5/12/2020    Procedure: extra-hepatic bile duct resection;  Surgeon: Oswaldo Hale MD;  Location: [facility] OR     HC KNEE SCOPE, DIAGNOSTIC  1995    Arthroscopy, Knee; left     HC VASECTOMY UNILAT/BILAT W POSTOP SEMEN      Vasectomy     HEPATECTOMY PARTIAL N/A 5/12/2020    Procedure: Exploratory Laparotomy, Open right hepatectomy, Radical Extra-Hepatic Bile Duct Resection, Portal Lymph Node Dissection, Intraoperative Ultrasound, Intra Air-Cholangiogram ,Bianca-En-Y Left Hepaticojejunostomy, Excision Skin Lesion;  Surgeon: Oswaldo Hale MD;  Location: [facility] OR     INSERT PORT VASCULAR ACCESS Right 9/28/2020    Procedure: ultrasound guided right internal venous access port placement with flouroscopy;  Surgeon: Fercho Wayne DO;  Location: [facility] OR     IR ABSCESS TUBE CHANGE  6/12/2020     IR FOLLOW UP VISIT OUTPATIENT  7/24/2020     IR FOLLOW UP VISIT OUTPATIENT  8/3/2020     IR FOLLOW UP VISIT OUTPATIENT  10/19/2020     IR SINOGRAM INJECTION DIAGNOSTIC  7/14/2020     IR SINOGRAM INJECTION DIAGNOSTIC  9/14/2020     IR SINOGRAM INJECTION DIAGNOSTIC  10/13/2020     IR SINOGRAM INJECTION THERAPEUTIC  8/25/2020     IR SINOGRAM INJECTION THERAPEUTIC  9/25/2020     IR SINOGRAM INJECTION THERAPEUTIC  10/2/2020     IR SINOGRAM INJECTION THERAPEUTIC  9/18/2020     IR THORACENTESIS  5/16/2020     LYMPHADENECTOMY ABDOMINAL N/A 5/12/2020    Procedure: portal lymph node dissection, intraoperative liver ultrasound;  Surgeon: Oswaldo Hale MD;  Location: [facility] OR       Social History:  Social History     Socioeconomic History     Marital status:       Spouse name: Robyn     Number of children: 2     Years of education: 14     Highest education level: Not on file   Occupational History     Occupation: self employed   Social Needs     Financial resource strain: Not on file     Food insecurity     Worry: Not on file     Inability: Not on file     Transportation needs     Medical: Not on file     Non-medical: Not on file   Tobacco Use     Smoking status: Never Smoker     Smokeless tobacco: Never Used   Substance and Sexual Activity     Alcohol use: Yes     Comment: occaisional      Drug use: No     Sexual activity: Yes     Partners: Female   Lifestyle     Physical activity     Days per week: Not on file     Minutes per session: Not on file     Stress: Not on file   Relationships     Social connections     Talks on phone: Not on file     Gets together: Not on file     Attends Shinto service: Not on file     Active member of club or organization: Not on file     Attends meetings of clubs or organizations: Not on file     Relationship status: Not on file     Intimate partner violence     Fear of current or ex partner: Not on file     Emotionally abused: Not on file     Physically abused: Not on file     Forced sexual activity: Not on file   Other Topics Concern      Service No     Blood Transfusions No     Caffeine Concern No     Occupational Exposure No     Hobby Hazards No     Sleep Concern No     Stress Concern No     Weight Concern No     Special Diet No     Back Care No     Exercise Yes     Bike Helmet No     Seat Belt Yes     Self-Exams No     Parent/sibling w/ CABG, MI or angioplasty before 65F 55M? Not Asked   Social History Narrative     Not on file        Family History  Family History   Problem Relation Age of Onset     Arthritis Mother         RA     Diabetes Father         diet controlled     Hypertension Father        Outpatient Medications:  No current facility-administered medications on file prior to encounter.        atorvastatin  "(LIPITOR) 40 MG tablet, Take 40 mg by mouth At Bedtime        clopidogrel (PLAVIX) 75 MG tablet, Take 75 mg by mouth At Bedtime        dexamethasone (DECADRON) 4 MG tablet, Take 1 tablet (4 mg) by mouth daily (with breakfast) Daily for the 3 days after chemotherapy in the morning with food       dronabinol (MARINOL) 5 MG capsule, Take 1 capsule (5 mg) by mouth 2 times daily (before meals)       fenofibrate (TRIGLIDE/LOFIBRA) 160 MG tablet, Take 160 mg by mouth At Bedtime        Ferrous Sulfate (IRON) 325 (65 Fe) MG tablet, Take 1 tablet by mouth 3 times daily (with meals)       furosemide (LASIX) 20 MG tablet, Take 20 mg by mouth every evening        glipiZIDE (GLUCOTROL) 10 MG tablet, Take 10 mg by mouth At Bedtime        lisinopril (ZESTRIL) 30 MG tablet, Take 30 mg by mouth every evening        LORazepam (ATIVAN) 0.5 MG tablet, Take 1 tablet (0.5 mg) by mouth every 4 hours as needed (Anxiety, Nausea/Vomiting or Sleep)       magnesium gluconate 30 MG tablet, Take 1 tablet (30 mg) by mouth daily       ondansetron (ZOFRAN) 8 MG tablet, Take 1 tablet (8 mg) by mouth every 8 hours as needed (Nausea/Vomiting)       oxyCODONE (ROXICODONE) 5 MG tablet, Take 1-2 tablets (5-10 mg) by mouth every 4 hours as needed for moderate to severe pain       prochlorperazine (COMPAZINE) 10 MG tablet, Take 0.5 tablets (5 mg) by mouth every 6 hours as needed (Nausea/Vomiting)       sildenafil (REVATIO) 20 MG tablet, Take 20 mg by mouth as needed       zolpidem (AMBIEN) 10 MG tablet, Take 1 tablet (10 mg) by mouth nightly as needed for sleep         Physical Exam:    Blood pressure (!) 142/84, pulse 62, temperature 98.7  F (37.1  C), temperature source Oral, resp. rate 18, height 1.854 m (6' 1\"), weight 79.7 kg (175 lb 9.6 oz), SpO2 97 %.    General: alert and cooperative, lying in bed, no acute distress  Eyes: sclera mildly icteric, EOMI  ENT: MMM  CV: RRR  Resp: CTAB, normal respiratory effort on ambient air  GI: soft, non-tender, " non-distended, no rebound tenderness or guarding, well-healed surgical scar to RUQ   MSK: warm and well-perfused, normal tone  Skin: no rashes on limited exam, no jaundice  Neuro: alert and interactive, moves all extremities equally, normal speech  Psych: pleasant, normal mood and affect    Labs & Studies: I personally reviewed the following studies:  ROUTINE LABS (Last four results):  CMP  Recent Labs   Lab 11/18/20  0649      POTASSIUM 3.5   CHLORIDE 104   CO2 23   ANIONGAP 7   GLC 97   BUN 17   CR 0.75   GFRESTIMATED >90   GFRESTBLACK >90   ERIC 8.1*   PROTTOTAL 5.8*   ALBUMIN 1.9*   BILITOTAL 3.2*   ALKPHOS 508*   AST 68*   ALT 63     CBC  Recent Labs   Lab 11/18/20  0649   WBC 3.3*   RBC 2.82*   HGB 8.3*   HCT 25.0*   MCV 89   MCH 29.4   MCHC 33.2   RDW 17.4*        INR  Recent Labs   Lab 11/18/20  0649   INR 1.40*       Assessment & Plan:   Fuentes Estrada is a 65 year old male with history of cholangiocarcinoma s/p right hepatectomy and radical extrahepatic bile duct resection (5/12/20) with recurrent disease on EUS-FNA (8/11/20) on palliative [place]/Cis, DM, HTN, and CVA (on Plavix) who was transferred here from OSH for fevers and work up concerning for cholangitis. GI consulted for possible MRCP and further evaluation.      # Metastatic cholangiocarcinoma   Please see oncologic hx above. Most recent C5D1 palliative [place]/Cis given 11/11/20. Day 8 would have been due today, 11/18.  - If ANC <1.0, consider Neupogen support given current possible cholangitis.  - Hold all chemo until acute issues resolve. Next onc follow up scheduled 12/1/20 with Leeanne Lake NP prior to planned Cycle 6; will likely need to be delayed depending on inpatient course.  - Of note, I do not see that any molecular diagnostics were ever done on this patient to look for actionable therapeutic targets. Will defer this for now given acute infectious issues, but may warrant further consideration.    # Possible cholangitis   Concern  raised in the context of elevated LFTs (Tbili 3.2, AST 69, ALT 63, ) and fevers (Tmax 100.8) PTA. GI consulted; MRCP planned for 11/18.   - Follow daily LFTs.  - Agree with Zosyn QID (11/17-x) per primary team.   - Follow ascites fluid cultures/cytology. Initial cell count/diff reassuring against SBP.    Remainder of cares per primary team.    Recommendations:   - Agree with IV Zosyn and MRCP. Appreciate GI involvement.  - Follow-up ascites fluid cultures and cytology.   - Would trend daily LFTs while inpatient.  - Could consider Neupogen support if ANC <1000 in the context of active infection; not currently indicated.  - No current inpatient chemotherapy plans. Will assist with rescheduling of outpatient infusion appointments as appropriate based on patient's inpatient course.    We will continue to follow this patient. Please do not hesitate to page with any questions or concerns.    Patient was seen and plan of care was discussed with attending physician, Dr. Velasquez.    Anita Cortez PA-C   Hematology/Oncology   Pager: [phone]

## 2020-11-18 NOTE — H&P
[facility]     History and Physical - Hospitalist Service, Gold Night 2       Date of Admission:  11/18/2020    Assessment & Plan   Fuentes Estrada is a 65 year old male admitted on 11/18/2020. He has a history of cholangiocarcinoma s/p right hepatectomy and radical extrahepatic bile duct resection, DM, HTN, and CVA who presented to the [facility] ED with fevers.  Labs concerning for possible cholangitis, and CT revealed small fluid collection near prior drain.  Mr. Estrada was transferred here for MRCP and further GI evaluation.    #  Concern for cholangitis in the setting of a history of cholangiocarcinoma s/p radical extrahepatic bile duct resection and hepatectomy  At [facility], had mildly elevated bilirubin and elevated alk phosph.  Per triage hospitalist, discussed situation with Onc and GI, will proceed with MRCP and specialist consultation.  -  MRCP on 11/18  -  Consult GI Panc/Bili in the AM  -  Consult Onc in the AM, will need discussion of possible retiming of chemotherapy given admission for infection concerns  -  Zosyn  -  Trend LFTs, WBC    #  DM II  -  Holding home glipizide  -  Low sliding scale insulin  -  Fingersticks QID AC and HS    #  CVA  -  Continue home plavix, atorvastatin, fenofibrate    #  HTN  -  Continue home furosemide, lisinopril         Diet:   NPO pending MRCP  DVT Prophylaxis: Pneumatic Compression Devices  Em Catheter: not present  Code Status:   Full         Disposition Plan   Expected discharge: 2 - 3 days, recommended to prior living arrangement once antibiotic plan established, safe disposition plan/ TCU bed available and SIRS/Sepsis treated.  Entered: Liana Rehman MD 11/18/2020, 2:13 AM     The patient's care was discussed with the Bedside Nurse and Patient.    Liana Rehman MD  [facility]   Contact information available via [facility] Paging/Directory  Please see sign in/sign  out for up to date coverage information    ______________________________________________________________________    Chief Complaint   Fever    History is obtained from the patient    History of Present Illness   Fuentes Estrada is a 65 year old male admitted on 11/18/2020. He has a history of cholangiocarcinoma s/p right hepatectomy and radical extrahepatic bile duct resection, DM, HTN, and CVA who presented to the [facility] ED with fevers. He has had fevers for a day or two over 100.  No other symptoms, including no dyspnea, cough, rhinorrhea, nausea, vomiting, changes in bowel movements, changes in urination.  He is concerned the fevers could be related to a possible biliary infection.    Review of Systems    The 10 point Review of Systems is negative other than noted in the HPI or here.     Past Medical History    I have reviewed this patient's medical history and updated it with pertinent information if needed.   Past Medical History:   Diagnosis Date     Cerebrovascular accident (CVA) (H) 12/2010     Cholangiocarcinoma (H)      Essential hypertension, benign     Hypertension, Benign     Nonspecific abnormal results of liver function study     Hx of elevated LFT's       Past Surgical History   I have reviewed this patient's surgical history and updated it with pertinent information if needed.  Past Surgical History:   Procedure Laterality Date     ENDOSCOPIC RETROGRADE CHOLANGIOPANCREATOGRAM N/A 3/31/2020    Procedure: ENDOSCOPIC RETROGRADE CHOLANGIOPANCREATOGRAPHY, WITH with biliary sphincterotomy, biopsies and brushings, biliary stent placement;  Surgeon: Win Strauss MD;  [facility]     ENDOSCOPIC RETROGRADE CHOLANGIOPANCREATOGRAM N/A 4/6/2020    Procedure: ENDOSCOPIC RETROGRADE CHOLANGIOPANCREATOGRAPHY;  Surgeon: Win Strauss MD;  [facility]     ENDOSCOPIC RETROGRADE CHOLANGIOPANCREATOGRAM WITH SPYGLASS  4/16/2020    Procedure: ENDOSCOPIC RETROGRADE CHOLANGIOPANCREATOGRAPHY, WITH  DIRECT DUCT VISUALIZATION, USING PANCREATICOBILIARY FIBEROPTIC PROBE; Bile Duct Stent Exchange and Biopsy,balloon sweep of bile duct;  Surgeon: Win Strauss MD;  [facility]     ENDOSCOPIC ULTRASOUND UPPER GASTROINTESTINAL TRACT (GI) N/A 4/16/2020    Procedure: ENDOSCOPIC ULTRASOUND, ESOPHAGOSCOPY / UPPER GASTROINTESTINAL TRACT (GI)with Fine Needle biopsy;  Surgeon: Cody Baumann MD;  [facility]     ENDOSCOPIC ULTRASOUND UPPER GASTROINTESTINAL TRACT (GI) N/A 8/11/2020    Procedure: ENDOSCOPIC ULTRASOUND, ESOPHAGOSCOPY / UPPER GASTROINTESTINAL TRACT (GI);  Surgeon: Guru Bailey Rossi MD;  [facility]     ESOPHAGOSCOPY, GASTROSCOPY, DUODENOSCOPY (EGD), COMBINED N/A 4/6/2020    Procedure: ESOPHAGOGASTRODUODENOSCOPY (EGD);  Surgeon: Win Strauss MD;  [facility]     ESOPHAGOSCOPY, GASTROSCOPY, DUODENOSCOPY (EGD), COMBINED N/A 8/11/2020    Procedure: Esophagogastroduodenoscopy, With Fine Needle Aspiration Biopsy, With Endoscopic Ultrasound Guidance;  Surgeon: Guru Bailey Rossi MD;  [facility]     EXPLORE COMMON BILE DUCT N/A 5/12/2020    Procedure: extra-hepatic bile duct resection;  Surgeon: Oswaldo Hale MD;  [facility]     HC KNEE SCOPE, DIAGNOSTIC  1995    Arthroscopy, Knee; left     HC VASECTOMY UNILAT/BILAT W POSTOP SEMEN      Vasectomy     HEPATECTOMY PARTIAL N/A 5/12/2020    Procedure: Exploratory Laparotomy, Open right hepatectomy, Radical Extra-Hepatic Bile Duct Resection, Portal Lymph Node Dissection, Intraoperative Ultrasound, Intra Air-Cholangiogram ,Bianca-En-Y Left Hepaticojejunostomy, Excision Skin Lesion;  Surgeon: Oswaldo Hale MD;  [facility]     INSERT PORT VASCULAR ACCESS Right 9/28/2020    Procedure: ultrasound guided right internal venous access port placement with flouroscopy;  Surgeon: Fercho Wayne DO;  Location: [facility]     IR ABSCESS TUBE CHANGE  6/12/2020     IR FOLLOW UP VISIT  OUTPATIENT  7/24/2020     IR FOLLOW UP VISIT OUTPATIENT  8/3/2020     IR FOLLOW UP VISIT OUTPATIENT  10/19/2020     IR SINOGRAM INJECTION DIAGNOSTIC  7/14/2020     IR SINOGRAM INJECTION DIAGNOSTIC  9/14/2020     IR SINOGRAM INJECTION DIAGNOSTIC  10/13/2020     IR SINOGRAM INJECTION THERAPEUTIC  8/25/2020     IR SINOGRAM INJECTION THERAPEUTIC  9/25/2020     IR SINOGRAM INJECTION THERAPEUTIC  10/2/2020     IR SINOGRAM INJECTION THERAPEUTIC  9/18/2020     IR THORACENTESIS  5/16/2020     LYMPHADENECTOMY ABDOMINAL N/A 5/12/2020    Procedure: portal lymph node dissection, intraoperative liver ultrasound;  Surgeon: Oswaldo Hale MD;  [facility]       Social History   I have reviewed this patient's social history and updated it with pertinent information if needed.  Social History     Tobacco Use     Smoking status: Never Smoker     Smokeless tobacco: Never Used   Substance Use Topics     Alcohol use: Yes     Comment: occaisional      Drug use: No       Family History   I have reviewed this patient's family history and updated it with pertinent information if needed.  Family History   Problem Relation Age of Onset     Arthritis Mother         RA     Diabetes Father         diet controlled     Hypertension Father        Prior to Admission Medications   Prior to Admission Medications   Prescriptions Last Dose Informant Patient Reported? Taking?   Ferrous Sulfate (IRON) 325 (65 Fe) MG tablet  Spouse/Significant Other No No   Sig: Take 1 tablet by mouth 3 times daily (with meals)   LORazepam (ATIVAN) 0.5 MG tablet  Spouse/Significant Other No No   Sig: Take 1 tablet (0.5 mg) by mouth every 4 hours as needed (Anxiety, Nausea/Vomiting or Sleep)   atorvastatin (LIPITOR) 40 MG tablet  Spouse/Significant Other Yes No   Sig: Take 40 mg by mouth At Bedtime    clopidogrel (PLAVIX) 75 MG tablet  Spouse/Significant Other Yes No   Sig: Take 75 mg by mouth At Bedtime    dexamethasone (DECADRON) 4 MG tablet   No No   Sig: Take 1  "tablet (4 mg) by mouth daily (with breakfast) Daily for the 3 days after chemotherapy in the morning with food   dronabinol (MARINOL) 5 MG capsule   No No   Sig: Take 1 capsule (5 mg) by mouth 2 times daily (before meals)   fenofibrate (TRIGLIDE/LOFIBRA) 160 MG tablet  Spouse/Significant Other Yes No   Sig: Take 160 mg by mouth At Bedtime    furosemide (LASIX) 20 MG tablet  Spouse/Significant Other Yes No   Sig: Take 20 mg by mouth every evening    glipiZIDE (GLUCOTROL) 10 MG tablet  Spouse/Significant Other Yes No   Sig: Take 10 mg by mouth At Bedtime    lisinopril (ZESTRIL) 30 MG tablet  Spouse/Significant Other Yes No   Sig: Take 30 mg by mouth every evening    magnesium gluconate 30 MG tablet   No No   Sig: Take 1 tablet (30 mg) by mouth daily   ondansetron (ZOFRAN) 8 MG tablet  Spouse/Significant Other No No   Sig: Take 1 tablet (8 mg) by mouth every 8 hours as needed (Nausea/Vomiting)   oxyCODONE (ROXICODONE) 5 MG tablet  Spouse/Significant Other No No   Sig: Take 1-2 tablets (5-10 mg) by mouth every 4 hours as needed for moderate to severe pain   prochlorperazine (COMPAZINE) 10 MG tablet  Spouse/Significant Other No No   Sig: Take 0.5 tablets (5 mg) by mouth every 6 hours as needed (Nausea/Vomiting)   sildenafil (REVATIO) 20 MG tablet  Spouse/Significant Other Yes No   Sig: Take 20 mg by mouth as needed   zolpidem (AMBIEN) 10 MG tablet  Spouse/Significant Other No No   Sig: Take 1 tablet (10 mg) by mouth nightly as needed for sleep      Facility-Administered Medications: None     Allergies   Allergies   Allergen Reactions     Metformin Other (See Comments)     \" I think my kidneys shut down\"       Physical Exam   Vital Signs: Temp: 98.4  F (36.9  C) Temp src: Oral BP: (!) 157/82 Pulse: 70   Resp: 16 SpO2: 97 % O2 Device: None (Room air)    Weight: 175 lbs 9.6 oz    General Appearance: Lying in bed in NAD  Eyes: EOMI  HEENT: MMM  Respiratory: CTAB, breathing comfortably on room air  Cardiovascular: RRR, no " m/r/g, peripheral pulses intact  GI: NABS, soft, NT, ND  Skin: No rashes  Musculoskeletal: No peripheral edema  Neurologic: Alert, appropriately interactive  Psychiatric: Full affect    Data   Data reviewed today: I reviewed all medications, new labs and imaging results over the last 24 hours.

## 2020-11-19 NOTE — PROGRESS NOTES
Hematology / Oncology  Daily Progress Note   Date of Service: 11/19/2020  Patient: Fuentes Estrada  MRN: [id number]  Admission Date: 11/18/2020  Hospital Day # 1  Cancer Diagnosis: Metastatic hilar cholangiocarcinoma  Primary Outpatient Oncologist: Dr. Beard  Current Treatment Plan: cisplatin + gemcitabine (O7M1=1411/11/20)    Assessment & Plan:   Fuentes Estrada is a 65 year old male with history of cholangiocarcinoma s/p right hepatectomy and radical extrahepatic bile duct resection (5/12/20) with recurrent disease on EUS-FNA (8/11/20) on palliative Boaz/Cis, DM, HTN, and CVA (on Plavix) who was transferred here from OSH for fevers and work up concerning for cholangitis. GI consulted and performed an MRCP, which was reassuring and without evidence of intrahepatic ductal dilatation.    # Metastatic cholangiocarcinoma   Please see detailed oncologic history in the initial consult note dated 11/18. Followed by Dr. Beard. Most recent C5D1 palliative Boaz/Cis given 11/11/20. Day 8 would have been due 11/18 but was withheld in the setting of fever/concern for cholangitis.   - Hold all chemo until acute issues resolve. Next onc follow up scheduled 11/24/2020 with Leeanne Lake NP (labs at [facility] that AM); day 8 cis/gem tentatively requested for the following day (11/25).   - Of note, I do not see that any molecular diagnostics were ever done on this patient to look for actionable therapeutic targets (i.e. FGFR). Will defer this to the outpatient oncology team to confirm/order as appropriate.    # Possible cholangitis   Concern raised in the context of elevated LFTs (Tbili 3.2, AST 69, ALT 63, ) and fevers (Tmax 100.8) PTA. GI consulted; MRCP performed 11/18 without evidence of intrahepatic ductal dilatation. ERCP deferred.  - Antibiotics per GI/primary teams.  - Follow ascites fluid cultures/cytology. Initial cell count/diff reassuring against SBP.    Remainder of cares per primary team.    Recommendations:  "  - Appreciate GI involvement; antibiotics per their recommendations.  - Follow-up ascites fluid cultures and cytology, still pending on day of discharge..  - Outpatient follow-up (DARA visit + labs) arranged for 11/24; will tentatively request rescheduling of Day 8 chemo on 11/25.    We will continue to follow this patient. Please do not hesitate to page with any questions or concerns.    Discussed with attending physician, Dr. Bradshaw.    Anita Cortez PA-C   Hematology/Oncology   Pager: [phone]      ___________________________________________________________________    Subjective & Interval History:    No acute events noted overnight. Patient is NPO for possible ERCP pending MRCP results (not yet read). He is frustrated with being in the hospital and keeps reiterating that he \"wants to get out of here.\" Denies pain, fevers, chills, nausea, vomiting, diarrhea, shortness of breath, chest pain. No new complaints or concerns.     Physical Exam:    Blood pressure 123/74, pulse 68, temperature 98  F (36.7  C), temperature source Oral, resp. rate 16, height 1.854 m (6' 1\"), weight 77.8 kg (171 lb 9.6 oz), SpO2 98 %.    General: alert and cooperative, lying in bed, no acute distress  Eyes: PERRL  ENT: MMM  CV: RRR  Resp: CTAB, normal respiratory effort on ambient air  GI: soft, non-tender, non-distended, no rebound tenderness or guarding, well-healed surgical scar to RUQ   MSK: warm and well-perfused, normal tone  Skin: no rashes on limited exam, no jaundice  Neuro: alert and interactive, moves all extremities equally, normal speech  Psych: pleasant, normal mood and affect    Labs & Studies: I personally reviewed the following studies:  ROUTINE LABS (Last four results):  CMP  Recent Labs   Lab 11/19/20  0644 11/18/20  0649    133   POTASSIUM 3.3* 3.5   CHLORIDE 104 104   CO2 23 23   ANIONGAP 8 7   * 97   BUN 15 17   CR 0.80 0.75   GFRESTIMATED >90 >90   GFRESTBLACK >90 >90   ERIC 8.3* 8.1*   PROTTOTAL 5.9* " 5.8*   ALBUMIN 1.8* 1.9*   BILITOTAL 1.9* 3.2*   ALKPHOS 611* 508*   AST 70* 68*   ALT 60 63     CBC  Recent Labs   Lab 11/19/20  0644 11/18/20  0649   WBC 3.7* 3.3*   RBC 2.82* 2.82*   HGB 8.1* 8.3*   HCT 24.7* 25.0*   MCV 88 89   MCH 28.7 29.4   MCHC 32.8 33.2   RDW 17.3* 17.4*   * 171     INR  Recent Labs   Lab 11/18/20  0649   INR 1.40*       Medications list for reference:  Current Facility-Administered Medications   Medication     atorvastatin (LIPITOR) tablet 40 mg     clopidogrel (PLAVIX) tablet 75 mg     glucose gel 15-30 g    Or     dextrose 50 % injection 25-50 mL    Or     glucagon injection 1 mg     dronabinol (MARINOL) capsule 5 mg     fenofibrate (TRIGLIDE/LOFIBRA) tablet 160 mg     furosemide (LASIX) tablet 20 mg     insulin aspart (NovoLOG) injection (RAPID ACTING)     lidocaine (LMX4) cream     lidocaine 1 % 0.1-1 mL     lisinopril (ZESTRIL) tablet 30 mg     LORazepam (ATIVAN) tablet 0.5 mg     melatonin tablet 1 mg     naloxone (NARCAN) injection 0.1-0.4 mg     ondansetron (ZOFRAN) tablet 8 mg     oxyCODONE (ROXICODONE) tablet 5-10 mg     piperacillin-tazobactam (ZOSYN) 3.375 g vial to attach to  mL bag     polyethylene glycol (MIRALAX) Packet 17 g     prochlorperazine (COMPAZINE) tablet 5 mg     senna-docusate (SENOKOT-S/PERICOLACE) 8.6-50 MG per tablet 1 tablet    Or     senna-docusate (SENOKOT-S/PERICOLACE) 8.6-50 MG per tablet 2 tablet     sodium chloride (PF) 0.9% PF flush 3 mL     sodium chloride (PF) 0.9% PF flush 3 mL     zolpidem (AMBIEN) tablet 10 mg     Current Outpatient Medications   Medication     ciprofloxacin (CIPRO) 500 MG tablet     metroNIDAZOLE (FLAGYL) 500 MG tablet     atorvastatin (LIPITOR) 40 MG tablet     clopidogrel (PLAVIX) 75 MG tablet     dexamethasone (DECADRON) 4 MG tablet     dronabinol (MARINOL) 5 MG capsule     fenofibrate (TRIGLIDE/LOFIBRA) 160 MG tablet     Ferrous Sulfate (IRON) 325 (65 Fe) MG tablet     furosemide (LASIX) 20 MG tablet      glipiZIDE (GLUCOTROL) 10 MG tablet     lisinopril (ZESTRIL) 30 MG tablet     LORazepam (ATIVAN) 0.5 MG tablet     magnesium gluconate 30 MG tablet     ondansetron (ZOFRAN) 8 MG tablet     oxyCODONE (ROXICODONE) 5 MG tablet     prochlorperazine (COMPAZINE) 10 MG tablet     sildenafil (REVATIO) 20 MG tablet     zolpidem (AMBIEN) 10 MG tablet

## 2020-11-19 NOTE — PLAN OF CARE
Admitted 11/18. AVSS on RA, afebrile. Denies pain and nausea, on a consistent carb diet. Up ad yonas. Adequate UOP. Passing gas, no BM this shift. Paracentesis dressing CDI. AVS discussed and signed by pt with no further questions. Pt left in wheelchair and will stop at discharge pharmacy on the way out. Is going home with wife.

## 2020-11-19 NOTE — PLAN OF CARE
Assumed care of patient from [id number]. AVSS on RA. Patient denies pain, nausea. Tolerating regular diet, will be NPO at midnight for possible ERCP. MRI completed tonight. Paracentesis site dressing c/d/I. Patient up independent. Voiding spontaneously. Continue with POC. [place].

## 2020-11-19 NOTE — PLAN OF CARE
[id number]:  VSS. Afebrile. O2 saturations >92% on RA. A&O x4; patient reporting frustration related to MRI timing. Denied pain, nausea, dizziness, lightheadedness. L PIV: infusing NS @ TKO. Given Zosyn per POC. Paracentesis site dressing c/d/i. MRI obtained at the end of shift; patient given PRN Ativan prior to transferring to MRI given hx of claustrophobia. Up independent. Voiding spontaneously.

## 2020-11-19 NOTE — DISCHARGE SUMMARY
[facility]   Hospitalist Discharge Summary      Date of Admission:  11/18/2020  Date of Discharge:  11/19/2020  1:15 PM  Discharging Provider: Jose Vazquez MD  Discharge Team: Hospitalist Service, Gold     Discharge Diagnoses   Cholangitis  Cholangiocarcinoma    Follow-ups Needed After Discharge   Follow-up Appointments     [facility]ult [facility] Follow-up and recommended labs and tests      Follow up with oncology as planned on 12/1/20    Appointments on [place] and/or [facility] (with [facility] or [facility]   provider or service). Call [phone] if you haven't heard regarding   these appointments within 7 days of discharge.             Unresulted Labs Ordered in the Past 30 Days of this Admission     Date and Time Order Name Status Description    11/18/2020 1355 Cytology Non Gyn In process     11/18/2020 0816 Anaerobic bacterial culture Preliminary     11/18/2020 0816 fluid culture - Aerobic Bacterial Preliminary         Discharge Disposition   Discharged to home  Condition at discharge: Stable    Hospital Course   Fuentes Estrada is a 65 year old male admitted on 11/18/2020. He has a history of cholangiocarcinoma s/p right hepatectomy and radical extrahepatic bile duct resection, DM, HTN, and CVA who presented to the [place] ED with fevers.  Labs concerning for possible cholangitis, and CT revealed small fluid collection near prior drain.  Mr. Estrada was transferred here for MRCP and further GI evaluation.     # Possible cholangitis  In the setting of a history of cholangiocarcinoma s/p radical extrahepatic bile duct resection and hepatectomy.  At [place], had mildly elevated bilirubin and elevated alk phosph and fever of 100.8.  No pain on admission. Paracentesis of 1L had no signs of SBP.  MRCP on 11/18 did not show signs of stones or strictures.  Did not have fevers on discharge. Initially started on pip/tazo and then changed to metronidazole and cipro on discharge  for total of 7 day course as recommended by GI team    # Metastatic cholangiocarcinoma   Hold all chemo until acute issues resolve.     Consultations This Hospital Stay   GI PANCREATICOBILIARY ADULT IP CONSULT  ONCOLOGY ADULT IP CONSULT  INTERNAL MEDICINE PROCEDURE TEAM ADULT IP CONSULT [facility] - PARACENTESIS    Code Status   Full Code    Time Spent on this Encounter   I, Jose Vazquez MD, personally saw the patient today and spent greater than 30 minutes discharging this patient.       Jose Vazquez MD  formerly [facility] [address] [place]  [address] [address]  Phone: [phone]  ______________________________________________________________________    Physical Exam   Vital Signs: Temp: 98  F (36.7  C) Temp src: Oral BP: 123/74 Pulse: 68   Resp: 16 SpO2: 98 % O2 Device: None (Room air)    Weight: 171 lbs 9.6 oz  General Appearance: Lying in bed in NAD  HEENT: MMM  Respiratory: CTAB, breathing comfortably on room air  Cardiovascular: RRR, no m/r/g, peripheral pulses intact  GI: NABS, soft, NT, ND  Skin: No rashes  Musculoskeletal: No peripheral edema  Neurologic: Alert, appropriately interactive  Psychiatric: Full affect       Primary Care Physician   Tolu Noland    Discharge Orders      Reason for your hospital stay    Possible cholangitis  MRCP without stones or strictures     Adult [facility] Follow-up and recommended labs and tests    Follow up with oncology as planned on 12/1/20    Appointments on [place] and/or [facility] (with [facility] or [facility] provider or service). Call [phone] if you haven't heard regarding these appointments within 7 days of discharge.     Activity    Your activity upon discharge: activity as tolerated     Full Code     Diet    Follow this diet upon discharge: Orders Placed This Encounter      Consistent Carbohydrate Diet 9937-5603 Calories: Moderate Consistent CHO (4-6 CHO units/meal)       Significant Results and Procedures   Most Recent 3  CBC's:  Recent Labs   Lab Test 11/19/20  0644 11/18/20  0649 11/11/20  0823   WBC 3.7* 3.3* 5.9   HGB 8.1* 8.3* 9.2*   MCV 88 89 89   * 171 323     Most Recent 3 BMP's:  Recent Labs   Lab Test 11/19/20  0644 11/18/20  0649 11/11/20  0823    133 135   POTASSIUM 3.3* 3.5 3.6   CHLORIDE 104 104 103   CO2 23 23 23   BUN 15 17 12   CR 0.80 0.75 0.88   ANIONGAP 8 7 9   ERIC 8.3* 8.1* 8.6   * 97 151*     Most Recent 2 LFT's:  Recent Labs   Lab Test 11/19/20  0644 11/18/20  0649   AST 70* 68*   ALT 60 63   ALKPHOS 611* 508*   BILITOTAL 1.9* 3.2*   ,   Results for orders placed or performed during the hospital encounter of 11/18/20   MR Abdomen MRCP w/o & w Contrast    Narrative    MRI abdomen and MRCP    INDICATION: Recent diagnosis of cholangiocarcinoma.    COMPARISON: Outside CT 11/17/2020    Contrast: 7.5 mL intravenous Gadavist    FINDINGS: Changes of prior right hepatectomy an radical extrahepatic  bile duct resection. Pneumobilia. Spleen appears normal. Bilateral  kidneys appear normal, as do the adrenal glands. 1 bases show no  obvious nodule. No enlarged lymph nodes. Fluid adjacent to the  hepatectomy site. Spleen is somewhat elongated. Aorta is normal in  size. No pleural or pericardial effusion. No intrahepatic masses  currently. Portal vein appears patent. Right kidney is somewhat high  in location just underneath the right hemidiaphragm condyle likely  related to hepatectomy. Incidental note of aberrant retroesophageal  right subclavian artery. There is some motion artifact in the region  of the bile ducts somewhat hampering assessment for possible  bleeding/dilation of cholangitis. The pancreatic duct appears nicely  sharp an nondilated. No obvious intrahepatic biliary dilation on this  MRI.      Impression    IMPRESSION: Prior right partial hepatectomy and extrahepatic bile duct  resection. No evidence of intrahepatic biliary dilation. Pneumobilia.  Pancreatic duct appears normal. Incidental  aberrant retroesophageal  right subclavian artery. Small amount of fluid adjacent to hepatectomy  site. Spleen mildly elongated.    JAMAL MAHMOOD MD       Discharge Medications   Discharge Medication List as of 11/19/2020 11:53 AM      START taking these medications    Details   ciprofloxacin (CIPRO) 500 MG tablet Take 1 tablet (500 mg) by mouth 2 times daily for 5 days, Disp-10 tablet, R-0, E-Prescribe      metroNIDAZOLE (FLAGYL) 500 MG tablet Take 1 tablet (500 mg) by mouth 3 times daily for 5 days, Disp-15 tablet, R-0, E-Prescribe         CONTINUE these medications which have NOT CHANGED    Details   atorvastatin (LIPITOR) 40 MG tablet Take 40 mg by mouth At Bedtime , Historical      clopidogrel (PLAVIX) 75 MG tablet Take 75 mg by mouth At Bedtime , Historical      dexamethasone (DECADRON) 4 MG tablet Take 1 tablet (4 mg) by mouth daily (with breakfast) Daily for the 3 days after chemotherapy in the morning with food, Disp-3 tablet, R-1, E-Prescribe      dronabinol (MARINOL) 5 MG capsule Take 1 capsule (5 mg) by mouth 2 times daily (before meals), Disp-60 capsule, R-0, [facility]      fenofibrate (TRIGLIDE/LOFIBRA) 160 MG tablet Take 160 mg by mouth At Bedtime , Historical      Ferrous Sulfate (IRON) 325 (65 Fe) MG tablet Take 1 tablet by mouth 3 times daily (with meals), Disp-180 tablet, R-1, E-Prescribe      furosemide (LASIX) 20 MG tablet Take 20 mg by mouth every evening , Historical      glipiZIDE (GLUCOTROL) 10 MG tablet Take 10 mg by mouth At Bedtime , Historical      lisinopril (ZESTRIL) 30 MG tablet Take 30 mg by mouth every evening , Historical      LORazepam (ATIVAN) 0.5 MG tablet Take 1 tablet (0.5 mg) by mouth every 4 hours as needed (Anxiety, Nausea/Vomiting or Sleep), Disp-30 tablet, R-5, Local Print      magnesium gluconate 30 MG tablet Take 1 tablet (30 mg) by mouth daily, Disp-90 tablet, R-3, E-Prescribe      ondansetron (ZOFRAN) 8 MG tablet Take 1  "tablet (8 mg) by mouth every 8 hours as needed (Nausea/Vomiting), Disp-10 tablet, R-5, E-Prescribe      oxyCODONE (ROXICODONE) 5 MG tablet Take 1-2 tablets (5-10 mg) by mouth every 4 hours as needed for moderate to severe pain, Disp-20 tablet, R-0, E-Prescribe      prochlorperazine (COMPAZINE) 10 MG tablet Take 0.5 tablets (5 mg) by mouth every 6 hours as needed (Nausea/Vomiting), Disp-30 tablet, R-5, E-Prescribe      sildenafil (REVATIO) 20 MG tablet Take 20 mg by mouth as needed, Historical      zolpidem (AMBIEN) 10 MG tablet Take 1 tablet (10 mg) by mouth nightly as needed for sleep, Disp-30 tablet, R-0, E-Prescribe           Allergies   Allergies   Allergen Reactions     Metformin Other (See Comments)     \" I think my kidneys shut down\"     "

## 2020-11-19 NOTE — PLAN OF CARE
Neuro: A&Ox4.   Cardiac: Afebrile, VSS.   Respiratory: RA   GI/: Voiding spontaneously. BG monitored q4hrs: 228 and 205- ssi admin per ordered parameters. No BM this shift.   Diet/appetite: NPO for possible procedure today. Denies nausea   Activity: Up independently    Pain: . Denies   Skin: No new deficits noted.  Lines: piv sl'd btwn IV abx admin    Rested btwn cares. Able to make needs known. Continue POC.

## 2020-11-24 NOTE — PROGRESS NOTES
"Fuentes Estrada is a 65 year old male who is being evaluated via a billable video visit.      The patient has been notified of following:     \"This video visit will be conducted via a call between you and your physician/provider. We have found that certain health care needs can be provided without the need for an in-person physical exam.  This service lets us provide the care you need with a video conversation.  If a prescription is necessary we can send it directly to your pharmacy.  If lab work is needed we can place an order for that and you can then stop by our lab to have the test done at a later time.    Video visits are billed at different rates depending on your insurance coverage.  Please reach out to your insurance provider with any questions.    If during the course of the call the physician/provider feels a video visit is not appropriate, you will not be charged for this service.\"    Patient has given verbal consent for Video visit? Yes  How would you like to obtain your AVS? MyChart  If you are dropped from the video visit, the video invite should be resent to: Text to cell phone: [phone]  Will anyone else be joining your video visit? No      Manda Lopez CMA on 11/24/2020 at 2:23 PM      Video-Visit Details    Type of service:  Video Visit    Video Start Time: 14:43  Video End Time: 14:46   Transitioned to a telephone visit due to technical difficulties  Telephone start time: 14:50  Telephone end time: 15: 25    Originating Location (pt. Location): Home    Distant Location (provider location):  [facility]     Platform used for Video Visit: TweetUp        Reason for Visit: seen in f/u of metastatic cholangiocarcinoma    Oncology HPI: Fuentes Estrada is a 65 year old man with a prior hx of HTN, DM, CVA on plavix who presented with elevated LFTs in the spring of 2020. 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 On May 12, 2020 he underwent  A radical extrahepatic bile " "duct resection and R hepatectomy.   He had all of his disease resected with negative margins and one positive lymph node.  He had a complicated postoperative course with a bile leak and abscess that had delayed starting his adjuvant chemotherapy.       On imaging in July 2020,  he appeared to be developing increasing nodularity in the resection bed and regional lymph nodes that has led to an EUS with a fine-needle aspiration of the lymph nodes on 8/11/20,  demonstrating the presence of metastatic disease.     On 8/27/20, he initiated treatment with palliative intent Cisplatin/Gemcitabine.      His abscess/biliary drain was managed by IR. Sinograms identified a communication to the biliary tree. He failed a capping trial with development of a fever. He underwent sinograms and cavitary sclerotherapy every 2 weeks starting August 25, 2020.  Drain was removed on 11/9/2020 9/28/20: port placement     Admission to [facility] 10/9/20-10/11/20 with septic shock from infectious enterocolitis, all cultures NGTD. Chemo delayed one week for recovery.  Cis/Morrison restarted 10/21.    Admission to [facility] 11/18/20-11/19/20 with cholangitis - fever 100.8, bilirubin 3.2, elevated alk phos. MRCP showed no stones or strictures. Paracentesis removed 1L of fluid, no signs of SBP. He was dismissed on a 7-day course of Flagyl and ciprofloxacin.  C5D8 was delayed due to hospitalization.    Interval history: Fuentes reports feeling \"great\" since being dismissed from the hospital. He has one dose of Flagyl left this evening. Denies fevers, chills, abdominal pain.  BM once daily, stool is soft and brown. Fatigue comes and goes.  He has spent some days cleaning out his garage.  On days when he is fatigued, he is fatigued all day.  He did a 3-day course of dexamethasone after his last infusions which helped with the \"crash\" he usually feels. He would like to continue this. Denies dizziness, lightheadedness, hematochezia, or hematuria. His " "appetite has been stable; he is drinking 1-2 protein shakes daily and \"constantly grazing.\"      Current Outpatient Medications   Medication Sig Dispense Refill     atorvastatin (LIPITOR) 40 MG tablet Take 40 mg by mouth At Bedtime        clopidogrel (PLAVIX) 75 MG tablet Take 75 mg by mouth At Bedtime        dexamethasone (DECADRON) 4 MG tablet Take 1 tablet (4 mg) by mouth daily (with breakfast) Daily for the 3 days after chemotherapy in the morning with food 3 tablet 1     dronabinol (MARINOL) 5 MG capsule Take 1 capsule (5 mg) by mouth 2 times daily (before meals) 60 capsule 0     fenofibrate (TRIGLIDE/LOFIBRA) 160 MG tablet Take 160 mg by mouth At Bedtime        Ferrous Sulfate (IRON) 325 (65 Fe) MG tablet Take 1 tablet by mouth 3 times daily (with meals) 180 tablet 1     furosemide (LASIX) 20 MG tablet Take 20 mg by mouth every evening        glipiZIDE (GLUCOTROL) 10 MG tablet Take 10 mg by mouth At Bedtime        lisinopril (ZESTRIL) 30 MG tablet Take 30 mg by mouth every evening        LORazepam (ATIVAN) 0.5 MG tablet Take 1 tablet (0.5 mg) by mouth every 4 hours as needed (Anxiety, Nausea/Vomiting or Sleep) 30 tablet 5     magnesium gluconate 30 MG tablet Take 1 tablet (30 mg) by mouth daily 90 tablet 3     metroNIDAZOLE (FLAGYL) 500 MG tablet Take 1 tablet (500 mg) by mouth 3 times daily for 5 days 15 tablet 0     ondansetron (ZOFRAN) 8 MG tablet Take 1 tablet (8 mg) by mouth every 8 hours as needed (Nausea/Vomiting) 10 tablet 5     prochlorperazine (COMPAZINE) 10 MG tablet Take 0.5 tablets (5 mg) by mouth every 6 hours as needed (Nausea/Vomiting) 30 tablet 5     sildenafil (REVATIO) 20 MG tablet Take 20 mg by mouth as needed       zolpidem (AMBIEN) 10 MG tablet Take 1 tablet (10 mg) by mouth nightly as needed for sleep 30 tablet 0     ciprofloxacin (CIPRO) 500 MG tablet Take 1 tablet (500 mg) by mouth 2 times daily for 5 days (Patient not taking: Reported on 11/24/2020) 10 tablet 0     oxyCODONE " "(ROXICODONE) 5 MG tablet Take 1-2 tablets (5-10 mg) by mouth every 4 hours as needed for moderate to severe pain 20 tablet 0          Allergies   Allergen Reactions     Metformin Other (See Comments)     \" I think my kidneys shut down\"         Exam: Video physical exam  General: Patient appears well in no acute distress. Normal body habitus.  Skin: No visualized rash or lesions on visualized skin  Eyes: EOMI, no erythema, sclera icterus or discharge noted  Resp: Appears to be breathing comfortably without accessory muscle usage, speaking in full sentences, no cough  MSK: Appears to have normal range of motion based on visualized movements  Neurologic: No apparent tremors, facial movements symmetric  Psych: affect engaged, alert and oriented    The rest of a comprehensive physical examination is deferred due to PHE (public health emergency) video restrictions     There were no vitals taken for this visit.  Wt Readings from Last 4 Encounters:   11/19/20 77.8 kg (171 lb 9.6 oz)   11/11/20 83.3 kg (183 lb 9.6 oz)   10/28/20 80.5 kg (177 lb 8 oz)   10/21/20 83.9 kg (184 lb 14.4 oz)       Labs:   Infusion Therapy Visit on 11/24/2020   Component Date Value Ref Range Status     Sodium 11/24/2020 134  133 - 144 mmol/L Final     Potassium 11/24/2020 3.8  3.4 - 5.3 mmol/L Final     Chloride 11/24/2020 103  94 - 109 mmol/L Final     Carbon Dioxide 11/24/2020 25  20 - 32 mmol/L Final     Anion Gap 11/24/2020 6  3 - 14 mmol/L Final     Glucose 11/24/2020 105* 70 - 99 mg/dL Final     Urea Nitrogen 11/24/2020 16  7 - 30 mg/dL Final     Creatinine 11/24/2020 0.82  0.66 - 1.25 mg/dL Final     GFR Estimate 11/24/2020 >90  >60 mL/min/[1.73_m2] Final    Comment: Non  GFR Calc  Starting 12/18/2018, serum creatinine based estimated GFR (eGFR) will be   calculated using the Chronic Kidney Disease Epidemiology Collaboration   (CKD-EPI) equation.       GFR Estimate If Black 11/24/2020 >90  >60 mL/min/[1.73_m2] Final    " Comment:  GFR Calc  Starting 12/18/2018, serum creatinine based estimated GFR (eGFR) will be   calculated using the Chronic Kidney Disease Epidemiology Collaboration   (CKD-EPI) equation.       Calcium 11/24/2020 8.2* 8.5 - 10.1 mg/dL Final     Bilirubin Total 11/24/2020 1.7* 0.2 - 1.3 mg/dL Final     Albumin 11/24/2020 2.1* 3.4 - 5.0 g/dL Final     Protein Total 11/24/2020 6.5* 6.8 - 8.8 g/dL Final     Alkaline Phosphatase 11/24/2020 637* 40 - 150 U/L Final     ALT 11/24/2020 46  0 - 70 U/L Final     AST 11/24/2020 70* 0 - 45 U/L Final     WBC 11/24/2020 4.2  4.0 - 11.0 10e9/L Final     RBC Count 11/24/2020 2.77* 4.4 - 5.9 10e12/L Final     Hemoglobin 11/24/2020 8.0* 13.3 - 17.7 g/dL Final     Hematocrit 11/24/2020 24.4* 40.0 - 53.0 % Final     MCV 11/24/2020 88  78 - 100 fl Final     MCH 11/24/2020 28.9  26.5 - 33.0 pg Final     MCHC 11/24/2020 32.8  31.5 - 36.5 g/dL Final     RDW 11/24/2020 16.7* 10.0 - 15.0 % Final     Platelet Count 11/24/2020 180  150 - 450 10e9/L Final     Diff Method 11/24/2020 Automated Method   Final     % Neutrophils 11/24/2020 63.4  % Final     % Lymphocytes 11/24/2020 20.5  % Final     % Monocytes 11/24/2020 13.7  % Final     % Eosinophils 11/24/2020 1.0  % Final     % Basophils 11/24/2020 0.2  % Final     % Immature Granulocytes 11/24/2020 1.2  % Final     Nucleated RBCs 11/24/2020 0  0 /100 Final     Absolute Neutrophil 11/24/2020 2.6  1.6 - 8.3 10e9/L Final     Absolute Lymphocytes 11/24/2020 0.9  0.8 - 5.3 10e9/L Final     Absolute Monocytes 11/24/2020 0.6  0.0 - 1.3 10e9/L Final     Absolute Basophils 11/24/2020 0.0  0.0 - 0.2 10e9/L Final     Abs Immature Granulocytes 11/24/2020 0.1  0 - 0.4 10e9/L Final     Absolute Nucleated RBC 11/24/2020 0.0   Final          Imaging: MR abdomen MRCP 11/24  IMPRESSION: Prior right partial hepatectomy and extrahepatic bile duct  resection. No evidence of intrahepatic biliary dilation. Pneumobilia.  Pancreatic duct appears normal.  Incidental aberrant retroesophageal/  right subclavian artery. Small amount of fluid adjacent to hepatectomy  site. Spleen mildly elongated.       Impression/plan:   Recurrent cholangiocarcinoma with metastatic disease  -initiated on cisplatin/gemcitabine on 8/27/20. Fuentes has tolerated treatment well with the exception of fatigue  Okay to do 3-day course of dexamethasone following infusion.    Recent hospitalization for cholangitis   - Bilirubin and alk phos have returned to his stably elevated baseline.  Ongoing risk for biliary fibrosis and obstruction with altered anatomy 2/2 previous surgeries and disease so the presence of elevation is not unexpected.  Continue to monitor labs.     Hx enterocolitis with sepsis presentation  -No abdominal pain, fever, or concerning symptoms today. They understand to present to emergency room with elevated temperature, abdominal pain, etc.      Bile leak/abscess  -Drain removed 11/9/20. Follow up and management per IR.     Anemia  - No significant symptoms other than fatigue. No signs of bleeding.  He will have next week off and will hopefully show some hemoglobin recovery.    Nutritional deficiencies  -Continue supplementing diet with protein drinks  -Recommend starting a multivitamin  -Continue on iron and magnesium supplements.    DM  -Glucoses have been better controlled with recent health changes, usually in the 100's. Monitor on dex but given short course should be okay   -only on glipizide currently, continue     Hx of CVA 10 years ago, has mild short term memory deficits  HTN  -on plavix  -on amlodipine, fenofibrate, lisinopril, atorvastatin       Madhavi Garcia DNP/FNP Student    Transition Care Management Services  No data recorded  No data recorded  No data recorded  Interactive contact date: 11/19/2020  Face-to-face visit date: 11/24/2020  Medical complexity decision making: High complexity (96852)    The patient was seen in conjunction with Madhavi Garcia, JANIE student,  who  served as a scribe for today's visit. I have reviewed the note and agree with the above findings and plan.

## 2020-11-24 NOTE — LETTER
"11/24/2020     RE: Fuentes Estrada  [address] [facility] [address]    Dear Colleague,    Thank you for referring your patient, Fuentes Estrada, to the [facility]. Please see a copy of my visit note below.    Fuentes Estrada is a 65 year old male who is being evaluated via a billable video visit.      The patient has been notified of following:   \"This video visit will be conducted via a call between you and your physician/provider. We have found that certain health care needs can be provided without the need for an in-person physical exam.  This service lets us provide the care you need with a video conversation.  If a prescription is necessary we can send it directly to your pharmacy.  If lab work is needed we can place an order for that and you can then stop by our lab to have the test done at a later time.    Video visits are billed at different rates depending on your insurance coverage.  Please reach out to your insurance provider with any questions.    If during the course of the call the physician/provider feels a video visit is not appropriate, you will not be charged for this service.\"    Patient has given verbal consent for Video visit? Yes  How would you like to obtain your AVS? MyChart  If you are dropped from the video visit, the video invite should be resent to: Text to cell phone: [phone]  Will anyone else be joining your video visit? No    Manda Lopez CMA on 11/24/2020 at 2:23 PM      Video-Visit Details  Type of service:  Video Visit  Video Start Time: 14:43  Video End Time: 14:46   Transitioned to a telephone visit due to technical difficulties  Telephone start time: 14:50  Telephone end time: 15: 25  Originating Location (pt. Location): Home  Distant Location (provider location):  [facility]   Platform used for Video Visit: Altruja      Reason for Visit: seen in f/u of metastatic cholangiocarcinoma    Oncology HPI: Fuentes Estrada is a 65 " "year old man with a prior hx of HTN, DM, CVA on plavix who presented with elevated LFTs in the spring of 2020. 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 On May 12, 2020 he underwent  A radical extrahepatic bile duct resection and R hepatectomy.   He had all of his disease resected with negative margins and one positive lymph node.  He had a complicated postoperative course with a bile leak and abscess that had delayed starting his adjuvant chemotherapy.       On imaging in July 2020,  he appeared to be developing increasing nodularity in the resection bed and regional lymph nodes that has led to an EUS with a fine-needle aspiration of the lymph nodes on 8/11/20,  demonstrating the presence of metastatic disease.     On 8/27/20, he initiated treatment with palliative intent Cisplatin/Gemcitabine.      His abscess/biliary drain was managed by IR. Sinograms identified a communication to the biliary tree. He failed a capping trial with development of a fever. He underwent sinograms and cavitary sclerotherapy every 2 weeks starting August 25, 2020.  Drain was removed on 11/9/2020 9/28/20: port placement     Admission to [facility] 10/9/20-10/11/20 with septic shock from infectious enterocolitis, all cultures NGTD. Chemo delayed one week for recovery.  Cis/[place] restarted 10/21.    Admission to [facility] 11/18/20-11/19/20 with cholangitis - fever 100.8, bilirubin 3.2, elevated alk phos. MRCP showed no stones or strictures. Paracentesis removed 1L of fluid, no signs of SBP. He was dismissed on a 7-day course of Flagyl and ciprofloxacin.  C5D8 was delayed due to hospitalization.    Interval history: Fuentes reports feeling \"great\" since being dismissed from the hospital. He has one dose of Flagyl left this evening. Denies fevers, chills, abdominal pain.  BM once daily, stool is soft and brown. Fatigue comes and goes.  He has spent some days cleaning out his garage.  On days when he is fatigued, he is fatigued all " "day.  He did a 3-day course of dexamethasone after his last infusions which helped with the \"crash\" he usually feels. He would like to continue this. Denies dizziness, lightheadedness, hematochezia, or hematuria. His appetite has been stable; he is drinking 1-2 protein shakes daily and \"constantly grazing.\"      Current Outpatient Medications   Medication Sig Dispense Refill     atorvastatin (LIPITOR) 40 MG tablet Take 40 mg by mouth At Bedtime        clopidogrel (PLAVIX) 75 MG tablet Take 75 mg by mouth At Bedtime        dexamethasone (DECADRON) 4 MG tablet Take 1 tablet (4 mg) by mouth daily (with breakfast) Daily for the 3 days after chemotherapy in the morning with food 3 tablet 1     dronabinol (MARINOL) 5 MG capsule Take 1 capsule (5 mg) by mouth 2 times daily (before meals) 60 capsule 0     fenofibrate (TRIGLIDE/LOFIBRA) 160 MG tablet Take 160 mg by mouth At Bedtime        Ferrous Sulfate (IRON) 325 (65 Fe) MG tablet Take 1 tablet by mouth 3 times daily (with meals) 180 tablet 1     furosemide (LASIX) 20 MG tablet Take 20 mg by mouth every evening        glipiZIDE (GLUCOTROL) 10 MG tablet Take 10 mg by mouth At Bedtime        lisinopril (ZESTRIL) 30 MG tablet Take 30 mg by mouth every evening        LORazepam (ATIVAN) 0.5 MG tablet Take 1 tablet (0.5 mg) by mouth every 4 hours as needed (Anxiety, Nausea/Vomiting or Sleep) 30 tablet 5     magnesium gluconate 30 MG tablet Take 1 tablet (30 mg) by mouth daily 90 tablet 3     metroNIDAZOLE (FLAGYL) 500 MG tablet Take 1 tablet (500 mg) by mouth 3 times daily for 5 days 15 tablet 0     ondansetron (ZOFRAN) 8 MG tablet Take 1 tablet (8 mg) by mouth every 8 hours as needed (Nausea/Vomiting) 10 tablet 5     prochlorperazine (COMPAZINE) 10 MG tablet Take 0.5 tablets (5 mg) by mouth every 6 hours as needed (Nausea/Vomiting) 30 tablet 5     sildenafil (REVATIO) 20 MG tablet Take 20 mg by mouth as needed       zolpidem (AMBIEN) 10 MG tablet Take 1 tablet (10 mg) by mouth " "nightly as needed for sleep 30 tablet 0     ciprofloxacin (CIPRO) 500 MG tablet Take 1 tablet (500 mg) by mouth 2 times daily for 5 days (Patient not taking: Reported on 11/24/2020) 10 tablet 0     oxyCODONE (ROXICODONE) 5 MG tablet Take 1-2 tablets (5-10 mg) by mouth every 4 hours as needed for moderate to severe pain 20 tablet 0          Allergies   Allergen Reactions     Metformin Other (See Comments)     \" I think my kidneys shut down\"     Exam: Video physical exam  General: Patient appears well in no acute distress. Normal body habitus.  Skin: No visualized rash or lesions on visualized skin  Eyes: EOMI, no erythema, sclera icterus or discharge noted  Resp: Appears to be breathing comfortably without accessory muscle usage, speaking in full sentences, no cough  MSK: Appears to have normal range of motion based on visualized movements  Neurologic: No apparent tremors, facial movements symmetric  Psych: affect engaged, alert and oriented    The rest of a comprehensive physical examination is deferred due to PHE (public health emergency) video restrictions     There were no vitals taken for this visit.  Wt Readings from Last 4 Encounters:   11/19/20 77.8 kg (171 lb 9.6 oz)   11/11/20 83.3 kg (183 lb 9.6 oz)   10/28/20 80.5 kg (177 lb 8 oz)   10/21/20 83.9 kg (184 lb 14.4 oz)       Labs:   Infusion Therapy Visit on 11/24/2020   Component Date Value Ref Range Status     Sodium 11/24/2020 134  133 - 144 mmol/L Final     Potassium 11/24/2020 3.8  3.4 - 5.3 mmol/L Final     Chloride 11/24/2020 103  94 - 109 mmol/L Final     Carbon Dioxide 11/24/2020 25  20 - 32 mmol/L Final     Anion Gap 11/24/2020 6  3 - 14 mmol/L Final     Glucose 11/24/2020 105* 70 - 99 mg/dL Final     Urea Nitrogen 11/24/2020 16  7 - 30 mg/dL Final     Creatinine 11/24/2020 0.82  0.66 - 1.25 mg/dL Final     GFR Estimate 11/24/2020 >90  >60 mL/min/[1.73_m2] Final    Comment: Non  GFR Calc  Starting 12/18/2018, serum creatinine based " estimated GFR (eGFR) will be   calculated using the Chronic Kidney Disease Epidemiology Collaboration   (CKD-EPI) equation.       GFR Estimate If Black 11/24/2020 >90  >60 mL/min/[1.73_m2] Final    Comment:  GFR Calc  Starting 12/18/2018, serum creatinine based estimated GFR (eGFR) will be   calculated using the Chronic Kidney Disease Epidemiology Collaboration   (CKD-EPI) equation.       Calcium 11/24/2020 8.2* 8.5 - 10.1 mg/dL Final     Bilirubin Total 11/24/2020 1.7* 0.2 - 1.3 mg/dL Final     Albumin 11/24/2020 2.1* 3.4 - 5.0 g/dL Final     Protein Total 11/24/2020 6.5* 6.8 - 8.8 g/dL Final     Alkaline Phosphatase 11/24/2020 637* 40 - 150 U/L Final     ALT 11/24/2020 46  0 - 70 U/L Final     AST 11/24/2020 70* 0 - 45 U/L Final     WBC 11/24/2020 4.2  4.0 - 11.0 10e9/L Final     RBC Count 11/24/2020 2.77* 4.4 - 5.9 10e12/L Final     Hemoglobin 11/24/2020 8.0* 13.3 - 17.7 g/dL Final     Hematocrit 11/24/2020 24.4* 40.0 - 53.0 % Final     MCV 11/24/2020 88  78 - 100 fl Final     MCH 11/24/2020 28.9  26.5 - 33.0 pg Final     MCHC 11/24/2020 32.8  31.5 - 36.5 g/dL Final     RDW 11/24/2020 16.7* 10.0 - 15.0 % Final     Platelet Count 11/24/2020 180  150 - 450 10e9/L Final     Diff Method 11/24/2020 Automated Method   Final     % Neutrophils 11/24/2020 63.4  % Final     % Lymphocytes 11/24/2020 20.5  % Final     % Monocytes 11/24/2020 13.7  % Final     % Eosinophils 11/24/2020 1.0  % Final     % Basophils 11/24/2020 0.2  % Final     % Immature Granulocytes 11/24/2020 1.2  % Final     Nucleated RBCs 11/24/2020 0  0 /100 Final     Absolute Neutrophil 11/24/2020 2.6  1.6 - 8.3 10e9/L Final     Absolute Lymphocytes 11/24/2020 0.9  0.8 - 5.3 10e9/L Final     Absolute Monocytes 11/24/2020 0.6  0.0 - 1.3 10e9/L Final     Absolute Basophils 11/24/2020 0.0  0.0 - 0.2 10e9/L Final     Abs Immature Granulocytes 11/24/2020 0.1  0 - 0.4 10e9/L Final     Absolute Nucleated RBC 11/24/2020 0.0   Final        Imaging:  MR abdomen MRCP 11/24  IMPRESSION: Prior right partial hepatectomy and extrahepatic bile duct  resection. No evidence of intrahepatic biliary dilation. Pneumobilia.  Pancreatic duct appears normal. Incidental aberrant retroesophageal/  right subclavian artery. Small amount of fluid adjacent to hepatectomy  site. Spleen mildly elongated.       Impression/plan:   Recurrent cholangiocarcinoma with metastatic disease  -initiated on cisplatin/gemcitabine on 8/27/20. Fuentes has tolerated treatment well with the exception of fatigue  Okay to do 3-day course of dexamethasone following infusion.    Recent hospitalization for cholangitis   - Bilirubin and alk phos have returned to his stably elevated baseline.  Ongoing risk for biliary fibrosis and obstruction with altered anatomy 2/2 previous surgeries and disease so the presence of elevation is not unexpected.  Continue to monitor labs.     Hx enterocolitis with sepsis presentation  -No abdominal pain, fever, or concerning symptoms today. They understand to present to emergency room with elevated temperature, abdominal pain, etc.      Bile leak/abscess  -Drain removed 11/9/20. Follow up and management per IR.     Anemia  - No significant symptoms other than fatigue. No signs of bleeding.  He will have next week off and will hopefully show some hemoglobin recovery.    Nutritional deficiencies  -Continue supplementing diet with protein drinks  -Recommend starting a multivitamin  -Continue on iron and magnesium supplements.    DM  -Glucoses have been better controlled with recent health changes, usually in the 100's. Monitor on dex but given short course should be okay   -only on glipizide currently, continue     Hx of CVA 10 years ago, has mild short term memory deficits  HTN  -on plavix  -on amlodipine, fenofibrate, lisinopril, atorvastatin     Madhavi Garcia DNP/FNP Student          Transition Care Management Services  No data recorded  No data recorded  No data  recorded  Interactive contact date: 11/19/2020  Face-to-face visit date: 11/24/2020  Medical complexity decision making: High complexity (46122)    Attestation signed by Leeanne Lake APRN CNP at 11/25/2020  4:47 PM:  The patient was seen in conjunction with Madhavi Garcia, NP student who served as a scribe for today's visit. I have reviewed the note and agree with the above findings and plan. TW    Again, thank you for allowing me to participate in the care of your patient.      Sincerely,      VICKI Burt CNP

## 2020-11-24 NOTE — PROGRESS NOTES
Infusion Nursing Note:  Fuentes Estrada presents today for port labs.    Patient seen by provider today: No   present during visit today: Not Applicable.    Note: Pt recently hospitalized with an infection.  Pt denies fevers/ chills or other s/sx of infection.    Intravenous Access:  Labs drawn without difficulty.  Implanted Port.    Treatment Conditions:  Lab Results   Component Value Date    HGB 8.0 11/24/2020     Lab Results   Component Value Date    WBC 4.2 11/24/2020      Lab Results   Component Value Date    ANEU 2.6 11/24/2020     Lab Results   Component Value Date     11/24/2020      Lab Results   Component Value Date     11/24/2020                   Lab Results   Component Value Date    POTASSIUM 3.8 11/24/2020           Lab Results   Component Value Date    MAG 1.4 11/11/2020            Lab Results   Component Value Date    CR 0.82 11/24/2020                   Lab Results   Component Value Date    ERIC 8.2 11/24/2020                Lab Results   Component Value Date    BILITOTAL 1.7 11/24/2020           Lab Results   Component Value Date    ALBUMIN 2.1 11/24/2020                    Lab Results   Component Value Date    ALT 46 11/24/2020           Lab Results   Component Value Date    AST 70 11/24/2020           Post Infusion Assessment:  Patient tolerated lab draw without incident.   Blood return noted pre and post infusion.  Site patent and intact, free from redness, edema or discomfort.  No evidence of extravasations.  Access discontinued per protocol.       Discharge Plan:   Discharge instructions reviewed with: Patient.  Patient discharged in stable condition accompanied by: self.  Departure Mode: Ambulatory.    Lacy Palacios RN

## 2020-11-25 NOTE — PROGRESS NOTES
"Infusion Nursing Note:  Fuentes Estrada presents today for Day 8 Cycle 5 Cisplatin and Gemzar.    Patient seen by provider today: No   present during visit today: Not Applicable.    Note: Patient presents to infusion with increased fatigue compared to the last couple days. Patient states his fatigue \"comes and goes\" and that he hasn't been sleeping very well the last couple nights. Patient states the amount of fatigue he feels today is not abnormal for him. Patient denies s/s of lower hemglobin such as headache, shortness of breath, chest pain, dizziness, lightheadedness, appearing pale, or feeling faint. Otherwise, patient denies pain and states no acute complaints or concerns not addressed during 11/24 appointment with Leeanne Lake CNP. Specifically, patient denies s/s of infection such as fever, sore throat, cough, or changes in taste/smell. Magnesium 1.5 from yesterday's labs. 2g IV Magnesium  with NS/KCL fluids per MAR.    TORB. 11/25/2020. 1058. Leeanne MITTAL. Gianni Mckeon RN. Give an additional 2g of Magnesium with treatment today.     Post IVF's and chemo today, patient states his fatigue has much improved since this morning and states he feels \"pretty good\".    Intravenous Access:  Implanted Port.    Treatment Conditions:  Lab Results   Component Value Date    HGB 8.0 11/24/2020     Lab Results   Component Value Date    WBC 4.2 11/24/2020      Lab Results   Component Value Date    ANEU 2.6 11/24/2020     Lab Results   Component Value Date     11/24/2020      Lab Results   Component Value Date     11/24/2020                   Lab Results   Component Value Date    POTASSIUM 3.8 11/24/2020           Lab Results   Component Value Date    MAG 1.4 11/11/2020            Lab Results   Component Value Date    CR 0.82 11/24/2020                   Lab Results   Component Value Date    ERIC 8.2 11/24/2020                Lab Results   Component Value Date    BILITOTAL 1.7 11/24/2020         "   Lab Results   Component Value Date    ALBUMIN 2.1 11/24/2020                    Lab Results   Component Value Date    ALT 46 11/24/2020           Lab Results   Component Value Date    AST 70 11/24/2020       Results reviewed, labs MET treatment parameters, ok to proceed with treatment.      Post Infusion Assessment:  Patient tolerated infusion without incident.  Patient voided pre-during-post Cisplatin without issues  Blood return noted pre and post infusion.  Site patent and intact, free from redness, edema or discomfort.  No evidence of extravasations.  Access discontinued per protocol.       Discharge Plan:   Patient declined prescription refills.  Discharge instructions reviewed with: Patient.  Patient and/or family verbalized understanding of discharge instructions and all questions answered.  Copy of AVS reviewed with patient and/or family.  Patient will return 12/2 for next appointment at [facility]  Patient discharged in stable condition accompanied by: self.  Departure Mode: Ambulatory.  Face to Face time: 0 minutes .    Gianni Mckeon RN

## 2020-11-25 NOTE — PATIENT INSTRUCTIONS
[facility] Triage and after hours / weekends / holidays:  [phone]    Please call the triage or after hours line if you experience a temperature greater than or equal to 100.5, shaking chills, have uncontrolled nausea, vomiting and/or diarrhea, dizziness, shortness of breath, chest pain, bleeding, unexplained bruising, or if you have any other new/concerning symptoms, questions or concerns.      If you are having any concerning symptoms or wish to speak to a provider before your next infusion visit, please call your care coordinator or triage to notify them so we can adequately serve you.     If you need a refill on a narcotic prescription or other medication, please call before your infusion appointment.                 November 2020 Sunday Monday Tuesday Wednesday Thursday Friday Saturday   1     2     3     4     5     6     7       8     9     10    VIDEO VISIT RETURN   1:05 PM   (50 min.)   Leslie Quiles CNP   [facility] 11    LEVEL 5   8:00 AM   (300 min.)   NL INFUSION CHAIR 3   [facility] Infusion [facility] 12     13     14       15     16     17     18    Admission   1:38 AM   Liana Rehman MD   [facility] [address] [place]   (Discharge: 11/19/2020)    LEVEL 5   8:00 AM   (300 min.)   NL INFUSION CHAIR 1   [facility]    POCUS                   1:00 PM   (5 min.)   [id number]   [facility]    MR ABDOMEN MRCP WWO   7:00 PM   (60 min.)   [id number]   [facility] 19     20     21       22     23     24    LEVEL 1   9:00 AM   (60 min.)   NL INFUSION CHAIR 1   [facility] Infusion [facility]    VIDEO VISIT RETURN   2:25 PM   (50 min.)   Leeanne Lake APRN CNP   [facility] 25    [facility] ONC INFUSION 360   8:30 AM   (360 min.)   UC ONCOLOGY INFUSION   [facility] 26     27     28       29     30                                           December 2020 Sunday Monday Tuesday Wednesday Thursday Friday Saturday             1    VIDEO VISIT RETURN  12:45 PM   (50 min.)   Leeanne Lake APRN CNP   [facility] 2    LEVEL 5   8:00 AM   (300 min.)   NL INFUSION CHAIR 2   [facility] 3     4     5       6     7     8    VIDEO VISIT RETURN   3:15 PM   (50 min.)   Leeanne Lake APRN CNP   [facility] 9    LEVEL 5   9:00 AM   (300 min.)   NL INFUSION CHAIR 9   [facility] 10     11     12       13     14     15     16     17     18    LEVEL 1  11:00 AM   (60 min.)   NL INFUSION CHAIR 7   [facility]    CT CHEST/ABDOMEN/PELVIS W  11:30 AM   (30 min.)   PHCT1   M [facility] 19       20     21    VIDEO VISIT RETURN   5:15 PM   (30 min.)   Aydin Beard MD   [facility] 22     23    LEVEL 5   8:00 AM   (300 min.)   NL INFUSION CHAIR 10   [facility] 24     25     26       27     28     29     30    LEVEL 5   8:00 AM   (300 min.)   NL INFUSION CHAIR 10   [facility] 31                               Lab Results:  No results found for this or any previous visit (from the past 12 hour(s)).

## 2020-12-08 NOTE — LETTER
"    12/8/2020         RE: Fuentes Estrada  [address] [facility] [address]        Dear Colleague,    Thank you for referring your patient, Fuentes Estrada, to the [facility]. Please see a copy of my visit note below.    Fuentes Estrada is a 65 year old male who is being evaluated via a billable video visit.      The patient has been notified of following:     \"This video visit will be conducted via a call between you and your physician/provider. We have found that certain health care needs can be provided without the need for an in-person physical exam.  This service lets us provide the care you need with a video conversation.  If a prescription is necessary we can send it directly to your pharmacy.  If lab work is needed we can place an order for that and you can then stop by our lab to have the test done at a later time.    Video visits are billed at different rates depending on your insurance coverage.  Please reach out to your insurance provider with any questions.    If during the course of the call the physician/provider feels a video visit is not appropriate, you will not be charged for this service.\"    Patient has given verbal consent for Video visit? Yes  How would you like to obtain your AVS? MyChart  If you are dropped from the video visit, the video invite should be resent to: Text to cell phone: [phone]  Will anyone else be joining your video visit? No      Kimmy Means MA    Video-Visit Details    Type of service:  Video Visit    Video Start Time: 342pm  Video End Time: 404pm    Originating Location (pt. Location): Home    Distant Location (provider location):  [facility]     Platform used for Video Visit: VICKI Hobbs CNP          Reason for Visit: seen in f/u of metastatic cholangiocarcinoma    Oncology HPI:   Fuentes Estrada is a 65 year old man with a prior hx of HTN, DM, CVA on plavix who presented with elevated LFTs in " the spring of 2020. 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 On May 12, 2020 he underwent  A radical extrahepatic bile duct resection and R hepatectomy.   He had all of his disease resected with negative margins and one positive lymph node.  He had a complicated postoperative course with a bile leak and abscess that had delayed starting his adjuvant chemotherapy.       On imaging in July 2020,  he appeared to be developing increasing nodularity in the resection bed and regional lymph nodes that has led to an EUS with a fine-needle aspiration of the lymph nodes on 8/11/20,  demonstrating the presence of metastatic disease.      On 8/27/20, he initiated treatment with palliative intent Cisplatin/Gemcitabine.      His abscess/biliary drain was managed by IR. Sinograms identified a communication to the biliary tree. He failed a capping trial with development of a fever. He underwent sinograms and cavitary sclerotherapy every 2 weeks starting August 25, 2020.  Drain was removed on 11/9/2020 9/28/20: port placement     Admission to [facility] 10/9/20-10/11/20 with septic shock from infectious enterocolitis, all cultures NGTD. Chemo delayed one week for recovery.  Cis/[place] restarted 10/21.     Admission to [facility] 11/18/20-11/19/20 with cholangitis - fever 100.8, bilirubin 3.2, elevated alk phos. MRCP showed no stones or strictures. Paracentesis removed 1L of fluid, no signs of SBP. He was dismissed on a 7-day course of Flagyl and ciprofloxacin.  C5D8 was delayed due to hospitalization.    Interval history: Feeling well. No fevers/chills. Tolerated the last infusion well. No abdominal pain, bloating. Bowels are regular. No pale stools. Urine is light in color. No headaches, vision changes. No N/V. Appetite and energy are sometimes low. Feels worse after stopping the dexamethasone. No neuropathy. No sob, cp, palpitation.     Current Outpatient Medications   Medication Sig Dispense Refill     atorvastatin  "(LIPITOR) 40 MG tablet Take 40 mg by mouth At Bedtime        clopidogrel (PLAVIX) 75 MG tablet Take 75 mg by mouth At Bedtime        dexamethasone (DECADRON) 4 MG tablet Take 1 tablet (4 mg) by mouth daily (with breakfast) Daily for the 3 days after chemotherapy in the morning with food 3 tablet 1     dronabinol (MARINOL) 5 MG capsule Take 1 capsule (5 mg) by mouth 2 times daily (before meals) 60 capsule 0     fenofibrate (TRIGLIDE/LOFIBRA) 160 MG tablet Take 160 mg by mouth At Bedtime        Ferrous Sulfate (IRON) 325 (65 Fe) MG tablet Take 1 tablet by mouth 3 times daily (with meals) 180 tablet 1     furosemide (LASIX) 20 MG tablet Take 20 mg by mouth every evening        glipiZIDE (GLUCOTROL) 10 MG tablet Take 10 mg by mouth At Bedtime        lisinopril (ZESTRIL) 30 MG tablet Take 30 mg by mouth every evening        LORazepam (ATIVAN) 0.5 MG tablet Take 1 tablet (0.5 mg) by mouth every 4 hours as needed (Anxiety, Nausea/Vomiting or Sleep) 30 tablet 5     magnesium gluconate 30 MG tablet Take 1 tablet (30 mg) by mouth daily 90 tablet 3     ondansetron (ZOFRAN) 8 MG tablet Take 1 tablet (8 mg) by mouth every 8 hours as needed (Nausea/Vomiting) 10 tablet 5     oxyCODONE (ROXICODONE) 5 MG tablet Take 1-2 tablets (5-10 mg) by mouth every 4 hours as needed for moderate to severe pain 20 tablet 0     prochlorperazine (COMPAZINE) 10 MG tablet Take 0.5 tablets (5 mg) by mouth every 6 hours as needed (Nausea/Vomiting) 30 tablet 5     sildenafil (REVATIO) 20 MG tablet Take 20 mg by mouth as needed       zolpidem (AMBIEN) 10 MG tablet Take 1 tablet (10 mg) by mouth nightly as needed for sleep 30 tablet 0          Allergies   Allergen Reactions     Metformin Other (See Comments)     \" I think my kidneys shut down\"       Video physical exam  General: Patient appears well in no acute distress.   Skin: No visualized rash or lesions on visualized skin  Eyes: EOMI, no erythema, sclera icterus or discharge noted  Resp: Appears to " be breathing comfortably without accessory muscle usage, speaking in full sentences, no cough  MSK: Appears to have normal range of motion based on visualized movements  Neurologic: No apparent tremors, facial movements symmetric  Psych: affect appropriate, alert and oriented    The rest of a comprehensive physical examination is deferred due to PHE (public health emergency) video restrictions       There were no vitals taken for this visit.  Wt Readings from Last 4 Encounters:   11/25/20 81.2 kg (179 lb 1.6 oz)   11/19/20 77.8 kg (171 lb 9.6 oz)   11/11/20 83.3 kg (183 lb 9.6 oz)   10/28/20 80.5 kg (177 lb 8 oz)         Labs:     pending    Imaging:  n/a    Impression/plan:   Recurrent cholangiocarcinoma with metastatic disease  -initiated on cisplatin/gemcitabine on 8/27/20.   -tolerating fairly well. Has some fatigue and anorexia post treatment that is improved with dexamethasone. Wants to extend the duration of the dex, but I would like to avoid that given risk of infections.  Will shift the start of the 3 days of dex to start on day 2.   -will request day 8 on 12/16, has repeat CT imaging and f/u with Dr. Beard the following week    Recent hospitalization for cholangitis   - labs had improved last week, pending tomorrow. No clinical s/s of infection  -monitor closely. Reviewed risk of recurring cholangitis, signs/symptoms     Bile leak/abscess  -Drain removed 11/9/20. F/u imaging on 12/18     Anemia, likely s/t chronic disease and chemotherapy, no acute bleeding  - may need a transfusion.  Would likely help with his energy level and cold intolerance. If hgb <8, plan to transfuse 1 U prbc. Blood consent obtained     FEN:  -continue protein shakes, small frequent meals.     Hypomag: s/t cisplatin. Continue mg gluconate daily     DM   -only on glipizide currently, continue, monitor glucoses while on dex     Hx of CVA 10 years ago, has mild short term memory deficits  HTN  -on plavix  -on amlodipine, fenofibrate,  lisinopril, atorvastatin        Again, thank you for allowing me to participate in the care of your patient.        Sincerely,        VICKI Burt CNP

## 2020-12-08 NOTE — PROGRESS NOTES
"Fuentes Estrada is a 65 year old male who is being evaluated via a billable video visit.      The patient has been notified of following:     \"This video visit will be conducted via a call between you and your physician/provider. We have found that certain health care needs can be provided without the need for an in-person physical exam.  This service lets us provide the care you need with a video conversation.  If a prescription is necessary we can send it directly to your pharmacy.  If lab work is needed we can place an order for that and you can then stop by our lab to have the test done at a later time.    Video visits are billed at different rates depending on your insurance coverage.  Please reach out to your insurance provider with any questions.    If during the course of the call the physician/provider feels a video visit is not appropriate, you will not be charged for this service.\"    Patient has given verbal consent for Video visit? Yes  How would you like to obtain your AVS? MyChart  If you are dropped from the video visit, the video invite should be resent to: Text to cell phone: [phone]  Will anyone else be joining your video visit? No      Kimmy Means MA    Video-Visit Details    Type of service:  Video Visit    Video Start Time: 342pm  Video End Time: 404pm    Originating Location (pt. Location): Home    Distant Location (provider location):  [facility]     Platform used for Video Visit: VICKI Hobbs CNP        "

## 2020-12-08 NOTE — PROGRESS NOTES
Reason for Visit: seen in f/u of metastatic cholangiocarcinoma    Oncology HPI:   Fuentes Estrada is a 65 year old man with a prior hx of HTN, DM, CVA on plavix who presented with elevated LFTs in the spring of 2020. 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 On May 12, 2020 he underwent  A radical extrahepatic bile duct resection and R hepatectomy.   He had all of his disease resected with negative margins and one positive lymph node.  He had a complicated postoperative course with a bile leak and abscess that had delayed starting his adjuvant chemotherapy.       On imaging in July 2020,  he appeared to be developing increasing nodularity in the resection bed and regional lymph nodes that has led to an EUS with a fine-needle aspiration of the lymph nodes on 8/11/20,  demonstrating the presence of metastatic disease.      On 8/27/20, he initiated treatment with palliative intent Cisplatin/Gemcitabine.      His abscess/biliary drain was managed by IR. Sinograms identified a communication to the biliary tree. He failed a capping trial with development of a fever. He underwent sinograms and cavitary sclerotherapy every 2 weeks starting August 25, 2020.  Drain was removed on 11/9/2020 9/28/20: port placement     Admission to [facility] 10/9/20-10/11/20 with septic shock from infectious enterocolitis, all cultures NGTD. Chemo delayed one week for recovery.  Cis/[place] restarted 10/21.     Admission to [facility] 11/18/20-11/19/20 with cholangitis - fever 100.8, bilirubin 3.2, elevated alk phos. MRCP showed no stones or strictures. Paracentesis removed 1L of fluid, no signs of SBP. He was dismissed on a 7-day course of Flagyl and ciprofloxacin.  C5D8 was delayed due to hospitalization.    Interval history: Feeling well. No fevers/chills. Tolerated the last infusion well. No abdominal pain, bloating. Bowels are regular. No pale stools. Urine is light in color. No headaches, vision changes. No N/V. Appetite and  "energy are sometimes low. Feels worse after stopping the dexamethasone. No neuropathy. No sob, cp, palpitation.     Current Outpatient Medications   Medication Sig Dispense Refill     atorvastatin (LIPITOR) 40 MG tablet Take 40 mg by mouth At Bedtime        clopidogrel (PLAVIX) 75 MG tablet Take 75 mg by mouth At Bedtime        dexamethasone (DECADRON) 4 MG tablet Take 1 tablet (4 mg) by mouth daily (with breakfast) Daily for the 3 days after chemotherapy in the morning with food 3 tablet 1     dronabinol (MARINOL) 5 MG capsule Take 1 capsule (5 mg) by mouth 2 times daily (before meals) 60 capsule 0     fenofibrate (TRIGLIDE/LOFIBRA) 160 MG tablet Take 160 mg by mouth At Bedtime        Ferrous Sulfate (IRON) 325 (65 Fe) MG tablet Take 1 tablet by mouth 3 times daily (with meals) 180 tablet 1     furosemide (LASIX) 20 MG tablet Take 20 mg by mouth every evening        glipiZIDE (GLUCOTROL) 10 MG tablet Take 10 mg by mouth At Bedtime        lisinopril (ZESTRIL) 30 MG tablet Take 30 mg by mouth every evening        LORazepam (ATIVAN) 0.5 MG tablet Take 1 tablet (0.5 mg) by mouth every 4 hours as needed (Anxiety, Nausea/Vomiting or Sleep) 30 tablet 5     magnesium gluconate 30 MG tablet Take 1 tablet (30 mg) by mouth daily 90 tablet 3     ondansetron (ZOFRAN) 8 MG tablet Take 1 tablet (8 mg) by mouth every 8 hours as needed (Nausea/Vomiting) 10 tablet 5     oxyCODONE (ROXICODONE) 5 MG tablet Take 1-2 tablets (5-10 mg) by mouth every 4 hours as needed for moderate to severe pain 20 tablet 0     prochlorperazine (COMPAZINE) 10 MG tablet Take 0.5 tablets (5 mg) by mouth every 6 hours as needed (Nausea/Vomiting) 30 tablet 5     sildenafil (REVATIO) 20 MG tablet Take 20 mg by mouth as needed       zolpidem (AMBIEN) 10 MG tablet Take 1 tablet (10 mg) by mouth nightly as needed for sleep 30 tablet 0          Allergies   Allergen Reactions     Metformin Other (See Comments)     \" I think my kidneys shut down\"       Video " physical exam  General: Patient appears well in no acute distress.   Skin: No visualized rash or lesions on visualized skin  Eyes: EOMI, no erythema, sclera icterus or discharge noted  Resp: Appears to be breathing comfortably without accessory muscle usage, speaking in full sentences, no cough  MSK: Appears to have normal range of motion based on visualized movements  Neurologic: No apparent tremors, facial movements symmetric  Psych: affect appropriate, alert and oriented    The rest of a comprehensive physical examination is deferred due to PHE (public health emergency) video restrictions       There were no vitals taken for this visit.  Wt Readings from Last 4 Encounters:   11/25/20 81.2 kg (179 lb 1.6 oz)   11/19/20 77.8 kg (171 lb 9.6 oz)   11/11/20 83.3 kg (183 lb 9.6 oz)   10/28/20 80.5 kg (177 lb 8 oz)         Labs:     pending    Imaging:  n/a    Impression/plan:   Recurrent cholangiocarcinoma with metastatic disease  -initiated on cisplatin/gemcitabine on 8/27/20.   -tolerating fairly well. Has some fatigue and anorexia post treatment that is improved with dexamethasone. Wants to extend the duration of the dex, but I would like to avoid that given risk of infections.  Will shift the start of the 3 days of dex to start on day 2.   -will request day 8 on 12/16, has repeat CT imaging and f/u with Dr. Beard the following week    Recent hospitalization for cholangitis   - labs had improved last week, pending tomorrow. No clinical s/s of infection  -monitor closely. Reviewed risk of recurring cholangitis, signs/symptoms     Bile leak/abscess  -Drain removed 11/9/20. F/u imaging on 12/18     Anemia, likely s/t chronic disease and chemotherapy, no acute bleeding  - may need a transfusion.  Would likely help with his energy level and cold intolerance. If hgb <8, plan to transfuse 1 U prbc. Blood consent obtained     FEN:  -continue protein shakes, small frequent meals.     Hypomag: s/t cisplatin. Continue mg  gluconate daily     DM   -only on glipizide currently, continue, monitor glucoses while on dex     Hx of CVA 10 years ago, has mild short term memory deficits  HTN  -on plavix  -on amlodipine, fenofibrate, lisinopril, atorvastatin

## 2020-12-09 NOTE — PATIENT INSTRUCTIONS
Please call clinic or go to Emergency Room if any signs and/or new or worsening symptoms as listed on post blood transfusion handout. Please pickup Dexamethasone at your local pharmacy after refilled by MD. Patient is out of refills.

## 2020-12-09 NOTE — PROGRESS NOTES
Infusion Nursing Note:  Fuentes Estrada presents today for C6D1 Cisplatin/Gemzar/1 Unit PRBC's/Mg replacement.    Patient seen by provider today: No, video visit with Leeanne Lake yesterday.   present during visit today: Not Applicable.    Note: Fuentes's wife called to report he had a low grade fever and weakness during the night, temp was 99.4. Afebrile and feeling less weak this morning although he still has considerable fatigue. Pale in color and jaundiced in face/sclera. Is very tired today. Denies pain, SOA and dizziness. B/P initially 90/52, HR 72. Oral temp 97.5   Conor, Care Coordinator RN from [facility] called to notify us that Leeanne Lake NP placed conditional blood orders for patient. Transfuse 1 Unit if Hgb less than 8.0 g/dL today.   This was explained to wife over the phone and patient in person.     Intravenous Access:  Lab draw site Right chest wall, Needle type noncoring, Gauge 20,3/4in.  Labs drawn without difficulty.  Implanted Port.  22 Gauge Peripheral IV placed as well.    Treatment Conditions:  Lab Results   Component Value Date    HGB 7.9 12/09/2020     Lab Results   Component Value Date    WBC 6.0 12/09/2020      Lab Results   Component Value Date    ANEU 4.2 12/09/2020     Lab Results   Component Value Date     12/09/2020      Lab Results   Component Value Date     12/09/2020                   Lab Results   Component Value Date    POTASSIUM 3.6 12/09/2020           Lab Results   Component Value Date    MAG 1.5 12/09/2020            Lab Results   Component Value Date    CR 0.84 12/09/2020                   Lab Results   Component Value Date    ERIC 8.2 12/09/2020                Lab Results   Component Value Date    BILITOTAL 4.8 12/09/2020           Lab Results   Component Value Date    ALBUMIN 1.9 12/09/2020                    Lab Results   Component Value Date    ALT 81 12/09/2020           Lab Results   Component Value Date     12/09/2020       Results reviewed,  labs MET treatment parameters, ok to proceed with treatment. Hgb 7.9 g/dL today.   Blood transfusion consent signed 12/8/20.  Fax of signed blood consent form received from [facility].  Mg and K replacement orders utilized.   IV Magnesium and oral Potassium given per electrolyte replacement order set.       Post Infusion Assessment:  Patient tolerated infusion without incident. VSS. Patient was up to bathroom to void with SBA at end of treatment and tolerated well.  Lungs had fine crackles in RLL, otherwise clear. No SOA.  Patient observed for 15 minutes post transfusion per protocol.  Blood return noted pre and post infusion.  Site patent and intact, free from redness, edema or discomfort.  No evidence of extravasations.  PIV Access and Port access discontinued per protocol.       Discharge Plan:   Copy of AVS reviewed with patient and spouse. Today's lab results given to spouse. Patient will return 12/16 for next appointment.  Patient discharged in stable condition accompanied by: wife.  Departure Mode: Wheelchair.    Catia Bray RN

## 2020-12-09 NOTE — PROGRESS NOTES
"RN Care Coordination Note  Incoming Call: Received call from patient's wife stating she would like to speak with VICKI Mae CNP regarding \"what happened last night.\"       Outgoing Call:   Placed call back to patient's wife. She states patient was feeling well yesterday. Then last night at 2am patient got out of bed and had to steady himself on wall, sat down on stool. When he tried to get up by himself rolled onto the floor, very weak, difficult to get back into bed. About an hour later he felt warm took temp 99.4, she gave him 2 Tylenol. About an hour later, he had severe night sweats, changed shirt x 2. In the morning when he awoke for the day, he was feeling much better and was cool to the touch. He attended chemo today. Received 1 unit of blood. She also questions his elevated bilirubin.     RNCC reviewed hydration status with wife. Discussed the weakness during night could be related to anemia and/or dehydration. Patient is feeling better after IV fluids and PRBC today. Recommended continuing to montior temperatures and night sweats.     Reviewed concerns with VICKI Mae CNP. She has reviewed patient's labs. Concerned about elevated bilirubin. Questions if cholangitis could be returning. Recommends starting Cipro 500mg BID and Flagyl 500mg TID x 7 days. Go to ED for temp over 100.4. Message also out to Dr Beard for plan for continued reoccurence of Cholangitis with MRCP showing on obstruction.     RNCC placed return call to patients wife. Reviewed recommendations from Leeanne. Answered all questions to her stated satisfication. She voiced understanding of all recommendations.       Chey Arias RN, BSN, OCN   RN Care Coordinator   [facility]          "

## 2020-12-10 NOTE — PROGRESS NOTES
RN CARE COORDINATION NOTE      Outgoing:  Spoke to Robyn, pt's wife, to check on how pt is doing and discuss a plan.   She reports patient is doing better today. He is afebrile, eating and drinking, slept well last night with only one episode of night sweats. He has started the prescribed antibiotics.   Discussed moving Fuentes's Ct scan to tomorrow, labs early Monday and a virtual appointment with Leeanne on Monday afternoon.   Advised if Fuetnes were to develop fevers, worsening sweats, pain he should be seen at the local ER. They know to have the ER doctor contact [facility] for recommendations.   They verbalized understanding and agreement with the plan.     Tiera Lopez MSN, RN, OCN  RN Care Coordinator  [facility]  [phone]

## 2020-12-10 NOTE — PROGRESS NOTES
RN CARE COORDINATION NOTE      Incoming:    voicemail message from Robyn asking if they could have the labs drawn tomorrow when they're in [place] for the CT scan.     Outgoing:  Returned Robyn's call and left a vmm letting her know we would like the labs drawn on Monday so we can see the LFT results at that time. This is also help in determining if Fuentes can have chemo next week. Asked for a return call for any additional questions or concerns.       Tiera Lopez MSN, RN, OCN  RN Care Coordinator  [facility]  [phone]

## 2020-12-11 NOTE — PROGRESS NOTES
Infusion Nursing Note:  Fuentes Estrada presents today for Port access for CT scan.    Patient seen by provider today: No   present during visit today: Not Applicable.    Note: port accessed without complications. Pt escorted to radiology. After procedure port need removed per protocol and pt escorted to vehicle via W/C.    Intravenous Access:  Implanted Port.    Treatment Conditions:  Not Applicable.      Post Infusion Assessment:  Blood return noted pre and post infusion.  No evidence of extravasations.       Discharge Plan:   Discharge instructions reviewed with: Patient.    Soniya Torres RN

## 2020-12-14 NOTE — PROGRESS NOTES
"Fuentes Estrada is a 65 year old male who is being evaluated via a billable video visit.      The patient has been notified of following:     \"This video visit will be conducted via a call between you and your physician/provider. We have found that certain health care needs can be provided without the need for an in-person physical exam.  This service lets us provide the care you need with a video conversation.  If a prescription is necessary we can send it directly to your pharmacy.  If lab work is needed we can place an order for that and you can then stop by our lab to have the test done at a later time.    Video visits are billed at different rates depending on your insurance coverage.  Please reach out to your insurance provider with any questions.    If during the course of the call the physician/provider feels a video visit is not appropriate, you will not be charged for this service.\"    Patient has given verbal consent for Video visit? Yes  How would you like to obtain your AVS? MyChart  If you are dropped from the video visit, the video invite should be resent to: Text to cell phone: [phone]  Will anyone else be joining your video visit? Yes: Soniya . How would they like to receive their invitation? Text to cell phone: [phone]      /68   Pulse 62   Temp 97.9  F (36.6  C)   Resp 16   SpO2 100%     Lin Quinn CMA December 14, 2020  4:07 PM     Video-Visit Details    Type of service:  Video Visit    Video Start Time: 1639  Video End Time: 1659    Originating Location (pt. Location): Home    Distant Location (provider location):  [facility]     Platform used for Video Visit: VICKI Hobbs CNP        "

## 2020-12-14 NOTE — PROGRESS NOTES
RN Care Coordination Note  Outgoing Call:   Placed call to patient and his wife to check on patient. Wife states he has been doing well. He has remained afebrile. Has been feeling better since starting antibiotics. Reports he was tired on Saturday (12/12) but is otherwise doing well. Reviewed with wife recent CT showed abscess may be reoccurring. They should plan to keep appointment was scheduled with VICKI Mae CNP. If patient develops fever, should go directly to ED. Wife voiced understanding and had no further questions or concerns at this time.       Chey Arias RN, BSN, OCN   RN Care Coordinator   [facility]

## 2020-12-14 NOTE — PROGRESS NOTES
Reason for Visit: seen in f/u of metastatic cholangiocarcionoma    Oncology HPI:   Fuentes Estrada is a 65 year old man with a prior hx of HTN, DM, CVA on plavix who presented with elevated LFTs in the spring of 2020. 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 On May 12, 2020 he underwent  A radical extrahepatic bile duct resection and R hepatectomy.   He had all of his disease resected with negative margins and one positive lymph node.  He had a complicated postoperative course with a bile leak and abscess that had delayed starting his adjuvant chemotherapy.       On imaging in July 2020,  he appeared to be developing increasing nodularity in the resection bed and regional lymph nodes that has led to an EUS with a fine-needle aspiration of the lymph nodes on 8/11/20,  demonstrating the presence of metastatic disease.      On 8/27/20, he initiated treatment with palliative intent Cisplatin/Gemcitabine.      His abscess/biliary drain was managed by IR. Sinograms identified a communication to the biliary tree. He failed a capping trial with development of a fever. He underwent sinograms and cavitary sclerotherapy every 2 weeks starting August 25, 2020.  Drain was removed on 11/9/2020 9/28/20: port placement     Admission to [facility] 10/9/20-10/11/20 with septic shock from infectious enterocolitis, all cultures NGTD. Chemo delayed one week for recovery.  Cis/[place] restarted 10/21.     Admission to [facility] 11/18/20-11/19/20 with cholangitis - fever 100.8, bilirubin 3.2, elevated alk phos. MRCP showed no stones or strictures. Paracentesis removed 1L of fluid, no signs of SBP. He was dismissed on a 7-day course of Flagyl and ciprofloxacin.  C5D8 was delayed due to hospitalization.    12/9/20: had sweats and chills. Noted to have LFT abnormality with T bili 4.8, ALK phos 915, ALT 81, . Started on Cipro/Flagyl and set up for a CT scan    Interval history: Fuentes is joined on the video call by his wife and  daughter. Felt better since starting the Cipro/Flagyl. Having less chills.Not having sweats. Temp is around 97.7. No nausea,vomiting, abdominal pain, bloating. Had a couple of pale stools last week. Stools are back to normal now. Urine is normal. No cough, sob, cp, palpitation. No headaches, vision change. No mouth sores. No neuropathy.     Current Outpatient Medications   Medication Sig Dispense Refill     atorvastatin (LIPITOR) 40 MG tablet Take 40 mg by mouth At Bedtime        ciprofloxacin (CIPRO) 500 MG tablet Take 1 tablet (500 mg) by mouth 2 times daily for 7 days 14 tablet 0     clopidogrel (PLAVIX) 75 MG tablet Take 75 mg by mouth At Bedtime        dexamethasone (DECADRON) 4 MG tablet Take 1 tablet (4 mg) by mouth daily (with breakfast) Daily for the 3 days after chemotherapy in the morning with food 3 tablet 1     dronabinol (MARINOL) 5 MG capsule Take 1 capsule (5 mg) by mouth 2 times daily (before meals) 60 capsule 0     fenofibrate (TRIGLIDE/LOFIBRA) 160 MG tablet Take 160 mg by mouth At Bedtime        Ferrous Sulfate (IRON) 325 (65 Fe) MG tablet Take 1 tablet by mouth 3 times daily (with meals) 180 tablet 1     furosemide (LASIX) 20 MG tablet Take 20 mg by mouth every evening        glipiZIDE (GLUCOTROL) 10 MG tablet Take 10 mg by mouth At Bedtime        lisinopril (ZESTRIL) 30 MG tablet Take 30 mg by mouth every evening        LORazepam (ATIVAN) 0.5 MG tablet Take 1 tablet (0.5 mg) by mouth every 4 hours as needed (Anxiety, Nausea/Vomiting or Sleep) 30 tablet 5     magnesium gluconate 30 MG tablet Take 1 tablet (30 mg) by mouth daily 90 tablet 3     metroNIDAZOLE (FLAGYL) 500 MG tablet Take 1 tablet (500 mg) by mouth 3 times daily for 7 days 21 tablet 0     ondansetron (ZOFRAN) 8 MG tablet Take 1 tablet (8 mg) by mouth every 8 hours as needed (Nausea/Vomiting) 10 tablet 5     oxyCODONE (ROXICODONE) 5 MG tablet Take 1-2 tablets (5-10 mg) by mouth every 4 hours as needed for moderate to severe pain 20  "tablet 0     prochlorperazine (COMPAZINE) 10 MG tablet Take 0.5 tablets (5 mg) by mouth every 6 hours as needed (Nausea/Vomiting) 30 tablet 5     sildenafil (REVATIO) 20 MG tablet Take 20 mg by mouth as needed       zolpidem (AMBIEN) 10 MG tablet Take 1 tablet (10 mg) by mouth nightly as needed for sleep 30 tablet 0          Allergies   Allergen Reactions     Metformin Other (See Comments)     \" I think my kidneys shut down\"         Video physical exam  General: Patient appears fatigued  Skin: No visualized rash or lesions on visualized skin  Resp: Appears to be breathing comfortably without accessory muscle usage, speaking in full sentences, no cough  MSK: Appears to have normal range of motion based on visualized movements  Neurologic: No apparent tremors, facial movements symmetric  Psych: affect flat, alert and oriented    The rest of a comprehensive physical examination is deferred due to PHE (public health emergency) video restrictions     There were no vitals taken for this visit.  Wt Readings from Last 4 Encounters:   12/09/20 81.5 kg (179 lb 11.2 oz)   11/25/20 81.2 kg (179 lb 1.6 oz)   11/19/20 77.8 kg (171 lb 9.6 oz)   11/11/20 83.3 kg (183 lb 9.6 oz)         Labs: Results for SARAH PAINTING (MRN [id number]) as of 12/14/2020 16:36   Ref. Range 12/14/2020 10:04   Sodium Latest Ref Range: 133 - 144 mmol/L 133   Potassium Latest Ref Range: 3.4 - 5.3 mmol/L 3.6   Chloride Latest Ref Range: 94 - 109 mmol/L 101   Carbon Dioxide Latest Ref Range: 20 - 32 mmol/L 28   Urea Nitrogen Latest Ref Range: 7 - 30 mg/dL 26   Creatinine Latest Ref Range: 0.66 - 1.25 mg/dL 0.70   GFR Estimate Latest Ref Range: >60 mL/min/1.73_m2 >90   GFR Estimate If Black Latest Ref Range: >60 mL/min/1.73_m2 >90   Calcium Latest Ref Range: 8.5 - 10.1 mg/dL 8.2 (L)   Anion Gap Latest Ref Range: 3 - 14 mmol/L 4   Magnesium Latest Ref Range: 1.6 - 2.3 mg/dL 1.4 (L)   Albumin Latest Ref Range: 3.4 - 5.0 g/dL 1.9 (L)   Protein Total Latest Ref " Range: 6.8 - 8.8 g/dL 5.8 (L)   Bilirubin Total Latest Ref Range: 0.2 - 1.3 mg/dL 3.0 (H)   Alkaline Phosphatase Latest Ref Range: 40 - 150 U/L 733 (H)   ALT Latest Ref Range: 0 - 70 U/L 136 (H)   AST Latest Ref Range: 0 - 45 U/L 169 (H)   Glucose Latest Ref Range: 70 - 99 mg/dL 228 (H)   WBC Latest Ref Range: 4.0 - 11.0 10e9/L 2.7 (L)   Hemoglobin Latest Ref Range: 13.3 - 17.7 g/dL 8.6 (L)   Hematocrit Latest Ref Range: 40.0 - 53.0 % 25.9 (L)   Platelet Count Latest Ref Range: 150 - 450 10e9/L 230   RBC Count Latest Ref Range: 4.4 - 5.9 10e12/L 2.99 (L)   MCV Latest Ref Range: 78 - 100 fl 87   MCH Latest Ref Range: 26.5 - 33.0 pg 28.8   MCHC Latest Ref Range: 31.5 - 36.5 g/dL 33.2   RDW Latest Ref Range: 10.0 - 15.0 % 16.9 (H)   Diff Method Unknown Automated Method   % Neutrophils Latest Units: % 74.0   % Lymphocytes Latest Units: % 23.8   % Monocytes Latest Units: % 1.1   % Eosinophils Latest Units: % 0.0   % Basophils Latest Units: % 0.0   % Immature Granulocytes Latest Units: % 1.1   Nucleated RBCs Latest Ref Range: 0 /100 0   Absolute Neutrophil Latest Ref Range: 1.6 - 8.3 10e9/L 2.0   Absolute Lymphocytes Latest Ref Range: 0.8 - 5.3 10e9/L 0.6 (L)   Absolute Monocytes Latest Ref Range: 0.0 - 1.3 10e9/L 0.0   Absolute Basophils Latest Ref Range: 0.0 - 0.2 10e9/L 0.0   Abs Immature Granulocytes Latest Ref Range: 0 - 0.4 10e9/L 0.0   Absolute Nucleated RBC Unknown 0.0       Imaging: CT CHEST/ABDOMEN/PELVIS WITH CONTRAST 12/11/2020 2:41 PM     CLINICAL HISTORY: Metastatic cholangiocarcinoma, restaging.  Cholangiocarcinoma (H). History of hypertension, elevated liver  function tests, partial hepatectomy, bile duct resection,  lymphadenectomy, PowerPort.     TECHNIQUE: CT scan of the chest, abdomen, and pelvis was performed  following injection of IV contrast. Multiplanar reformats were  obtained. Dose reduction techniques were used.      CONTRAST: 88mL, [id number]     COMPARISON: MRI abdomen dated 11/18/2020: CT  abdomen/pelvis dated  10/19/2020, CT chest, abdomen and pelvis dated 8/19/2020.     FINDINGS:   LUNGS AND PLEURA: There are a few scattered nodules in bilateral lungs  measure up to 0.4 cm, unchanged since prior CT dated 8/19/2020. These  are benign processes. There is mild probable atelectasis or scarring  along the right lateral inferior upper lobe at the minor fissure  (image 157 series 5). This was not seen on the prior study and  measures up to 0.5 cm. There is atelectasis versus scarring in the  right lung base and to a lesser extent in the left lung base. This  appears very slightly more prominent than on the most recent prior  study dated 8/19/2020.  There is also small right pleural fluid  collection which has slightly increased in size since the prior study.     MEDIASTINUM/AXILLAE: Heart is normal in size. Coronary artery and  aortic calcifications are again noted. No evidence for aortic  dissection. No central pulmonary filling defect to suggest central  pulmonary embolus. No mediastinal, hilar, or axillary lymphadenopathy  is identified. Visualized portions the thyroid are grossly  unremarkable. Right chest wall [id number] with internal jugular  central venous catheter are in appropriate positions and are new since  the prior CT chest dated 8/19/2020.     HEPATOBILIARY: Postoperative/posttreatment changes in the superior  right lobe of the liver from partial resection are again noted. There  is a mildly complex fluid collection along the hepatic margin  posteriorly and superiorly. There are some new extraluminal gas in  this region as well as air. This could represent small abscess. No new  intrahepatic lesions are identified. Surgical clips are seen in the  hepatic hilar region. No definite intrahepatic biliary ductal  dilatation is identified. No pancreatic ductal dilatation is seen.  Small amount gas is seen in what is expected to be the common bile  duct in the head of pancreas. No  choledocholithiasis is identified.  Gallbladder is not seen and is likely surgically absent.     PANCREAS: Small amount gas is seen in the common bile duct in the head  of the pancreas. The pancreas otherwise enhances normally.     SPLEEN: Normal.     ADRENAL GLANDS: Normal.     KIDNEYS/BLADDER: Kidneys enhance normally. No hydronephrosis,  nephrolithiasis, hydroureter or ureteral calculus is identified.  Urinary bladder is grossly unremarkable.     BOWEL: A few scattered diverticula in the colon. There are  questionable mild inflammatory changes in the adipose tissues adjacent  to the descending colon. This could represent edema from the ascites.  Colonic inflammatory process is considered less likely. Colon is  otherwise grossly normal in caliber and appearance. Structure which  could represent normal appendix extends posteriorly from the cecum. No  definite evidence of acute appendicitis is identified. The proximal  small bowel has a thickened enhancing wall in the right upper abdomen.  This is likely due to normal enhancement of the proximal small bowel  folds. Postop changes status post partial small bowel resection are  seen in the right side of the abdomen. The small bowel is otherwise  unremarkable. No evidence for bowel obstruction is seen. The stomach  is distended by a moderate to large amount ingested material/fluid and  air. Stomach is otherwise unremarkable.     PELVIC ORGANS: Prostate gland contains a few dystrophic  calcifications, but is otherwise grossly unremarkable.     ADDITIONAL FINDINGS: There is a large amount of ascites. There is some  stranding in densities in the peritoneal adipose tissues of the upper  abdomen which could represent edema. Mesenteric metastases are  difficult to entirely exclude. No adenopathy is identified. There is  mild nonaneurysmal atherosclerosis. Tiny, fat-containing, ventral  abdominal wall hernia has an opening measuring approximately 2.3 cm  (image 184 series  3).     MUSCULOSKELETAL: No aggressive osseous lesions or acute osseous  fractures are identified.                                                                      IMPRESSION:  1.  Findings concerning for abscess are now seen at the operative site  along the posterior aspect liver with new gas seen in this fluid  collection. No other free air is seen in the peritoneal cavity. Gas in  the common bile duct in the head of the pancreas is likely due to  prior sphincterotomy. Recommend clinical correlation.  2.  Large amount ascites and associated mesenteric edema. It is  difficult to exclude nodular peritoneal metastases although the  thickening of the soft tissue density in the peritoneal adipose  tissues is likely due to edema and less likely due to metastases.  3.  No evidence for recurrent malignancy in the liver.  4.  Gas in the common bile duct  5.  Tiny stable nodules in the lungs since the prior study dated  8/19/2020. These are highly likely benign.  6.  Worsening right lower lung lobe atelectasis/scarring and small  right pleural fluid collection.  7.  Extensive coronary artery calcifications and/or stents are again  noted.  8.  New right chest wall [id number] with right internal jugular  central venous catheter, in appropriate positions.     KENNY GIMENEZ MD    Impression/plan:   Recurrent abscess/ bile leak, recent hx of cholangitis, but no clear biliary obstruction noted  -s/p drain removal on 11/9/20. His CT today raises concern for recurrent abscess.   -reviewed his images with Dr. Beard and GI tumor conference today. Would like Dr. Purcell's input on managing the abscess, including aspiration or replacement of a drainage tube. Currently, having improvement in his sweats/chills since starting Cipro/Flagyl. Will continue on this for the time being as we make plans. If febrile, having sweats/chills, abdominal pain, should call us.    Recurrent cholangiocarcinoma with metastatic disease  -initiated  on cisplatin/gemcitabine on 8/27/20.   -tolerating fairly well.   -reviewed CT imaging with Dr. Beard. It's not clear that he has progression. Has mesenteric edema and ascites that could be related to inflammation. Will hold day 8 treatment this week and f/u with Dr. Beard on 12/21 as planned to discuss the timing of resuming chemotherapy     Anemia, likely s/t chronic disease and chemotherapy, no acute bleeding  - given 1 U of PRBC last week for hgb 7.9.  Hgb stable. Monitor     FEN:  -continue protein shakes, small frequent meals.      Hypomag: s/t cisplatin. Continue mg gluconate daily     DM   -only on glipizide currently. Monitor monitor glucoses while on dex     Hx of CVA 10 years ago, has mild short term memory deficits  HTN  -on plavix  -on amlodipine, fenofibrate, lisinopril, atorvastatin

## 2020-12-14 NOTE — PROGRESS NOTES
Infusion Nursing Note:  Fuentes Estrada presents today for port labs.    Patient seen by provider today: No   present during visit today: Not Applicable.    Note: N/A.    Intravenous Access:  Labs drawn without difficulty.  Implanted Port.    Treatment Conditions:  Lab Results   Component Value Date    HGB 8.6 12/14/2020     Lab Results   Component Value Date    WBC 2.7 12/14/2020      Lab Results   Component Value Date    ANEU 2.0 12/14/2020     Lab Results   Component Value Date     12/14/2020      Lab Results   Component Value Date     12/14/2020                   Lab Results   Component Value Date    POTASSIUM 3.6 12/14/2020           Lab Results   Component Value Date    MAG 1.4 12/14/2020            Lab Results   Component Value Date    CR 0.70 12/14/2020                   Lab Results   Component Value Date    ERIC 8.2 12/14/2020                Lab Results   Component Value Date    BILITOTAL 3.0 12/14/2020           Lab Results   Component Value Date    ALBUMIN 1.9 12/14/2020                    Lab Results   Component Value Date     12/14/2020           Lab Results   Component Value Date     12/14/2020       Patient has phone visit this afternoon for tx on 12/16. .      Post Infusion Assessment:  Blood return noted pre and post infusion.  Access discontinued per protocol.       Discharge Plan:   Discharge instructions reviewed with: Patient.  Patient discharged in stable condition accompanied by: self.  Departure Mode: Wheelchair.    Leatha Rodriguez RN

## 2020-12-14 NOTE — LETTER
12/14/2020         RE: Fuentes Estrada  [address] [place]  [facility] [address]        Dear Colleague,    Thank you for referring your patient, Fuentes Estrada, to [facility]. Please see a copy of my visit note below.    Reason for Visit: seen in f/u of metastatic cholangiocarcionoma    Oncology HPI:   Fuentes Estrada is a 65 year old man with a prior hx of HTN, DM, CVA on plavix who presented with elevated LFTs in the spring of 2020. 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 On May 12, 2020 he underwent  A radical extrahepatic bile duct resection and R hepatectomy.   He had all of his disease resected with negative margins and one positive lymph node.  He had a complicated postoperative course with a bile leak and abscess that had delayed starting his adjuvant chemotherapy.       On imaging in July 2020,  he appeared to be developing increasing nodularity in the resection bed and regional lymph nodes that has led to an EUS with a fine-needle aspiration of the lymph nodes on 8/11/20,  demonstrating the presence of metastatic disease.      On 8/27/20, he initiated treatment with palliative intent Cisplatin/Gemcitabine.      His abscess/biliary drain was managed by IR. Sinograms identified a communication to the biliary tree. He failed a capping trial with development of a fever. He underwent sinograms and cavitary sclerotherapy every 2 weeks starting August 25, 2020.  Drain was removed on 11/9/2020 9/28/20: port placement     Admission to [facility] 10/9/20-10/11/20 with septic shock from infectious enterocolitis, all cultures NGTD. Chemo delayed one week for recovery.  Cis/Mouthcard restarted 10/21.     Admission to [facility] 11/18/20-11/19/20 with cholangitis - fever 100.8, bilirubin 3.2, elevated alk phos. MRCP showed no stones or strictures. Paracentesis removed 1L of fluid, no signs of SBP. He was dismissed on a 7-day course of Flagyl and ciprofloxacin.  C5D8 was delayed due to  hospitalization.    12/9/20: had sweats and chills. Noted to have LFT abnormality with T bili 4.8, ALK phos 915, ALT 81, . Started on Cipro/Flagyl and set up for a CT scan    Interval history: Fuentes is joined on the video call by his wife and daughter. Felt better since starting the Cipro/Flagyl. Having less chills.Not having sweats. Temp is around 97.7. No nausea,vomiting, abdominal pain, bloating. Had a couple of pale stools last week. Stools are back to normal now. Urine is normal. No cough, sob, cp, palpitation. No headaches, vision change. No mouth sores. No neuropathy.     Current Outpatient Medications   Medication Sig Dispense Refill     atorvastatin (LIPITOR) 40 MG tablet Take 40 mg by mouth At Bedtime        ciprofloxacin (CIPRO) 500 MG tablet Take 1 tablet (500 mg) by mouth 2 times daily for 7 days 14 tablet 0     clopidogrel (PLAVIX) 75 MG tablet Take 75 mg by mouth At Bedtime        dexamethasone (DECADRON) 4 MG tablet Take 1 tablet (4 mg) by mouth daily (with breakfast) Daily for the 3 days after chemotherapy in the morning with food 3 tablet 1     dronabinol (MARINOL) 5 MG capsule Take 1 capsule (5 mg) by mouth 2 times daily (before meals) 60 capsule 0     fenofibrate (TRIGLIDE/LOFIBRA) 160 MG tablet Take 160 mg by mouth At Bedtime        Ferrous Sulfate (IRON) 325 (65 Fe) MG tablet Take 1 tablet by mouth 3 times daily (with meals) 180 tablet 1     furosemide (LASIX) 20 MG tablet Take 20 mg by mouth every evening        glipiZIDE (GLUCOTROL) 10 MG tablet Take 10 mg by mouth At Bedtime        lisinopril (ZESTRIL) 30 MG tablet Take 30 mg by mouth every evening        LORazepam (ATIVAN) 0.5 MG tablet Take 1 tablet (0.5 mg) by mouth every 4 hours as needed (Anxiety, Nausea/Vomiting or Sleep) 30 tablet 5     magnesium gluconate 30 MG tablet Take 1 tablet (30 mg) by mouth daily 90 tablet 3     metroNIDAZOLE (FLAGYL) 500 MG tablet Take 1 tablet (500 mg) by mouth 3 times daily for 7 days 21 tablet 0      "ondansetron (ZOFRAN) 8 MG tablet Take 1 tablet (8 mg) by mouth every 8 hours as needed (Nausea/Vomiting) 10 tablet 5     oxyCODONE (ROXICODONE) 5 MG tablet Take 1-2 tablets (5-10 mg) by mouth every 4 hours as needed for moderate to severe pain 20 tablet 0     prochlorperazine (COMPAZINE) 10 MG tablet Take 0.5 tablets (5 mg) by mouth every 6 hours as needed (Nausea/Vomiting) 30 tablet 5     sildenafil (REVATIO) 20 MG tablet Take 20 mg by mouth as needed       zolpidem (AMBIEN) 10 MG tablet Take 1 tablet (10 mg) by mouth nightly as needed for sleep 30 tablet 0          Allergies   Allergen Reactions     Metformin Other (See Comments)     \" I think my kidneys shut down\"         Video physical exam  General: Patient appears fatigued  Skin: No visualized rash or lesions on visualized skin  Resp: Appears to be breathing comfortably without accessory muscle usage, speaking in full sentences, no cough  MSK: Appears to have normal range of motion based on visualized movements  Neurologic: No apparent tremors, facial movements symmetric  Psych: affect flat, alert and oriented    The rest of a comprehensive physical examination is deferred due to PHE (public health emergency) video restrictions     There were no vitals taken for this visit.  Wt Readings from Last 4 Encounters:   12/09/20 81.5 kg (179 lb 11.2 oz)   11/25/20 81.2 kg (179 lb 1.6 oz)   11/19/20 77.8 kg (171 lb 9.6 oz)   11/11/20 83.3 kg (183 lb 9.6 oz)         Labs: Results for SARAH PAINTING (MRN [id number]) as of 12/14/2020 16:36   Ref. Range 12/14/2020 10:04   Sodium Latest Ref Range: 133 - 144 mmol/L 133   Potassium Latest Ref Range: 3.4 - 5.3 mmol/L 3.6   Chloride Latest Ref Range: 94 - 109 mmol/L 101   Carbon Dioxide Latest Ref Range: 20 - 32 mmol/L 28   Urea Nitrogen Latest Ref Range: 7 - 30 mg/dL 26   Creatinine Latest Ref Range: 0.66 - 1.25 mg/dL 0.70   GFR Estimate Latest Ref Range: >60 mL/min/1.73_m2 >90   GFR Estimate If Black Latest Ref Range: >60 " mL/min/1.73_m2 >90   Calcium Latest Ref Range: 8.5 - 10.1 mg/dL 8.2 (L)   Anion Gap Latest Ref Range: 3 - 14 mmol/L 4   Magnesium Latest Ref Range: 1.6 - 2.3 mg/dL 1.4 (L)   Albumin Latest Ref Range: 3.4 - 5.0 g/dL 1.9 (L)   Protein Total Latest Ref Range: 6.8 - 8.8 g/dL 5.8 (L)   Bilirubin Total Latest Ref Range: 0.2 - 1.3 mg/dL 3.0 (H)   Alkaline Phosphatase Latest Ref Range: 40 - 150 U/L 733 (H)   ALT Latest Ref Range: 0 - 70 U/L 136 (H)   AST Latest Ref Range: 0 - 45 U/L 169 (H)   Glucose Latest Ref Range: 70 - 99 mg/dL 228 (H)   WBC Latest Ref Range: 4.0 - 11.0 10e9/L 2.7 (L)   Hemoglobin Latest Ref Range: 13.3 - 17.7 g/dL 8.6 (L)   Hematocrit Latest Ref Range: 40.0 - 53.0 % 25.9 (L)   Platelet Count Latest Ref Range: 150 - 450 10e9/L 230   RBC Count Latest Ref Range: 4.4 - 5.9 10e12/L 2.99 (L)   MCV Latest Ref Range: 78 - 100 fl 87   MCH Latest Ref Range: 26.5 - 33.0 pg 28.8   MCHC Latest Ref Range: 31.5 - 36.5 g/dL 33.2   RDW Latest Ref Range: 10.0 - 15.0 % 16.9 (H)   Diff Method Unknown Automated Method   % Neutrophils Latest Units: % 74.0   % Lymphocytes Latest Units: % 23.8   % Monocytes Latest Units: % 1.1   % Eosinophils Latest Units: % 0.0   % Basophils Latest Units: % 0.0   % Immature Granulocytes Latest Units: % 1.1   Nucleated RBCs Latest Ref Range: 0 /100 0   Absolute Neutrophil Latest Ref Range: 1.6 - 8.3 10e9/L 2.0   Absolute Lymphocytes Latest Ref Range: 0.8 - 5.3 10e9/L 0.6 (L)   Absolute Monocytes Latest Ref Range: 0.0 - 1.3 10e9/L 0.0   Absolute Basophils Latest Ref Range: 0.0 - 0.2 10e9/L 0.0   Abs Immature Granulocytes Latest Ref Range: 0 - 0.4 10e9/L 0.0   Absolute Nucleated RBC Unknown 0.0       Imaging: CT CHEST/ABDOMEN/PELVIS WITH CONTRAST 12/11/2020 2:41 PM     CLINICAL HISTORY: Metastatic cholangiocarcinoma, restaging.  Cholangiocarcinoma (H). History of hypertension, elevated liver  function tests, partial hepatectomy, bile duct resection,  lymphadenectomy, PowerPort.     TECHNIQUE:  CT scan of the chest, abdomen, and pelvis was performed  following injection of IV contrast. Multiplanar reformats were  obtained. Dose reduction techniques were used.      CONTRAST: 88mL, [id number]     COMPARISON: MRI abdomen dated 11/18/2020: CT abdomen/pelvis dated  10/19/2020, CT chest, abdomen and pelvis dated 8/19/2020.     FINDINGS:   LUNGS AND PLEURA: There are a few scattered nodules in bilateral lungs  measure up to 0.4 cm, unchanged since prior CT dated 8/19/2020. These  are benign processes. There is mild probable atelectasis or scarring  along the right lateral inferior upper lobe at the minor fissure  (image 157 series 5). This was not seen on the prior study and  measures up to 0.5 cm. There is atelectasis versus scarring in the  right lung base and to a lesser extent in the left lung base. This  appears very slightly more prominent than on the most recent prior  study dated 8/19/2020.  There is also small right pleural fluid  collection which has slightly increased in size since the prior study.     MEDIASTINUM/AXILLAE: Heart is normal in size. Coronary artery and  aortic calcifications are again noted. No evidence for aortic  dissection. No central pulmonary filling defect to suggest central  pulmonary embolus. No mediastinal, hilar, or axillary lymphadenopathy  is identified. Visualized portions the thyroid are grossly  unremarkable. Right chest wall Snltus-h-Tili with internal jugular  central venous catheter are in appropriate positions and are new since  the prior CT chest dated 8/19/2020.     HEPATOBILIARY: Postoperative/posttreatment changes in the superior  right lobe of the liver from partial resection are again noted. There  is a mildly complex fluid collection along the hepatic margin  posteriorly and superiorly. There are some new extraluminal gas in  this region as well as air. This could represent small abscess. No new  intrahepatic lesions are identified. Surgical clips are seen in  the  hepatic hilar region. No definite intrahepatic biliary ductal  dilatation is identified. No pancreatic ductal dilatation is seen.  Small amount gas is seen in what is expected to be the common bile  duct in the head of pancreas. No choledocholithiasis is identified.  Gallbladder is not seen and is likely surgically absent.     PANCREAS: Small amount gas is seen in the common bile duct in the head  of the pancreas. The pancreas otherwise enhances normally.     SPLEEN: Normal.     ADRENAL GLANDS: Normal.     KIDNEYS/BLADDER: Kidneys enhance normally. No hydronephrosis,  nephrolithiasis, hydroureter or ureteral calculus is identified.  Urinary bladder is grossly unremarkable.     BOWEL: A few scattered diverticula in the colon. There are  questionable mild inflammatory changes in the adipose tissues adjacent  to the descending colon. This could represent edema from the ascites.  Colonic inflammatory process is considered less likely. Colon is  otherwise grossly normal in caliber and appearance. Structure which  could represent normal appendix extends posteriorly from the cecum. No  definite evidence of acute appendicitis is identified. The proximal  small bowel has a thickened enhancing wall in the right upper abdomen.  This is likely due to normal enhancement of the proximal small bowel  folds. Postop changes status post partial small bowel resection are  seen in the right side of the abdomen. The small bowel is otherwise  unremarkable. No evidence for bowel obstruction is seen. The stomach  is distended by a moderate to large amount ingested material/fluid and  air. Stomach is otherwise unremarkable.     PELVIC ORGANS: Prostate gland contains a few dystrophic  calcifications, but is otherwise grossly unremarkable.     ADDITIONAL FINDINGS: There is a large amount of ascites. There is some  stranding in densities in the peritoneal adipose tissues of the upper  abdomen which could represent edema. Mesenteric  metastases are  difficult to entirely exclude. No adenopathy is identified. There is  mild nonaneurysmal atherosclerosis. Tiny, fat-containing, ventral  abdominal wall hernia has an opening measuring approximately 2.3 cm  (image 184 series 3).     MUSCULOSKELETAL: No aggressive osseous lesions or acute osseous  fractures are identified.                                                                      IMPRESSION:  1.  Findings concerning for abscess are now seen at the operative site  along the posterior aspect liver with new gas seen in this fluid  collection. No other free air is seen in the peritoneal cavity. Gas in  the common bile duct in the head of the pancreas is likely due to  prior sphincterotomy. Recommend clinical correlation.  2.  Large amount ascites and associated mesenteric edema. It is  difficult to exclude nodular peritoneal metastases although the  thickening of the soft tissue density in the peritoneal adipose  tissues is likely due to edema and less likely due to metastases.  3.  No evidence for recurrent malignancy in the liver.  4.  Gas in the common bile duct  5.  Tiny stable nodules in the lungs since the prior study dated  8/19/2020. These are highly likely benign.  6.  Worsening right lower lung lobe atelectasis/scarring and small  right pleural fluid collection.  7.  Extensive coronary artery calcifications and/or stents are again  noted.  8.  New right chest wall Caclma-h-Vidz with right internal jugular  central venous catheter, in appropriate positions.     KENNY GIMENEZ MD    Impression/plan:   Recurrent abscess/ bile leak, recent hx of cholangitis, but no clear biliary obstruction noted  -s/p drain removal on 11/9/20. His CT today raises concern for recurrent abscess.   -reviewed his images with Dr. Beard and GI tumor conference today. Would like Dr. Purcell's input on managing the abscess, including aspiration or replacement of a drainage tube. Currently, having improvement in  "his sweats/chills since starting Cipro/Flagyl. Will continue on this for the time being as we make plans. If febrile, having sweats/chills, abdominal pain, should call us.    Recurrent cholangiocarcinoma with metastatic disease  -initiated on cisplatin/gemcitabine on 8/27/20.   -tolerating fairly well.   -reviewed CT imaging with Dr. Beard. It's not clear that he has progression. Has mesenteric edema and ascites that could be related to inflammation. Will hold day 8 treatment this week and f/u with Dr. Beard on 12/21 as planned to discuss the timing of resuming chemotherapy     Anemia, likely s/t chronic disease and chemotherapy, no acute bleeding  - given 1 U of PRBC last week for hgb 7.9.  Hgb stable. Monitor     FEN:  -continue protein shakes, small frequent meals.      Hypomag: s/t cisplatin. Continue mg gluconate daily     DM   -only on glipizide currently. Monitor monitor glucoses while on dex     Hx of CVA 10 years ago, has mild short term memory deficits  HTN  -on plavix  -on amlodipine, fenofibrate, lisinopril, atorvastatin       Fuentes Estrada is a 65 year old male who is being evaluated via a billable video visit.      The patient has been notified of following:     \"This video visit will be conducted via a call between you and your physician/provider. We have found that certain health care needs can be provided without the need for an in-person physical exam.  This service lets us provide the care you need with a video conversation.  If a prescription is necessary we can send it directly to your pharmacy.  If lab work is needed we can place an order for that and you can then stop by our lab to have the test done at a later time.    Video visits are billed at different rates depending on your insurance coverage.  Please reach out to your insurance provider with any questions.    If during the course of the call the physician/provider feels a video visit is not appropriate, you will not be charged for " "this service.\"    Patient has given verbal consent for Video visit? Yes  How would you like to obtain your AVS? MyChart  If you are dropped from the video visit, the video invite should be resent to: Text to cell phone: [phone]  Will anyone else be joining your video visit? Yes: Soniya . How would they like to receive their invitation? Text to cell phone: [phone]      /68   Pulse 62   Temp 97.9  F (36.6  C)   Resp 16   SpO2 100%     Lin Quinn CMA December 14, 2020  4:07 PM     Video-Visit Details    Type of service:  Video Visit    Video Start Time: 1639  Video End Time: 1659    Originating Location (pt. Location): Home    Distant Location (provider location):  [facility]     Platform used for Video Visit: VICKI Hobbs CNP            "

## 2020-12-17 NOTE — TELEPHONE ENCOUNTER
Talked with Robyn today about discussions with Dr. Purcell, Dr. Hale and Dr. Strauss about the abscess.    Aspiration is not likely to prevent recurrence of the abscess. A biliary drain is possible, but likely to be permanent. The team is reviewing where any endoscopic procedure could be done to manage the abscess/leak.    He is feeling ok. Not having chills. Temp was 99.4 while sitting under blankets by a heater.  Advised to continue to monitor. Continue Flagyl/Cipro for now.

## 2020-12-21 NOTE — PROGRESS NOTES
"  Fuentes Estrada is a 65 year old male who is being evaluated via a billable video visit.      The patient has been notified of following:     \"This video visit will be conducted via a call between you and your physician/provider. We have found that certain health care needs can be provided without the need for an in-person physical exam.  This service lets us provide the care you need with a video conversation.  If a prescription is necessary we can send it directly to your pharmacy.  If lab work is needed we can place an order for that and you can then stop by our lab to have the test done at a later time.    Video visits are billed at different rates depending on your insurance coverage.  Please reach out to your insurance provider with any questions.     If during the course of the call the physician/provider feels a video visit is not appropriate, you will not be charged for this service.\"    Patient has given verbal consent for Video visit? Yes  How would you like to obtain your AVS? MyChart  If you are dropped from the video visit, the video invite should be resent to: Text to cell phone: [phone]  Will anyone else be joining your video visit? No      Vitals - Patient Reported  Weight (Patient Reported): 81.2 kg (179 lb)  Height (Patient Reported): 185.4 cm (6' 1\")  BMI (Based on Pt Reported Ht/Wt): 23.62  Pain Score: No Pain (0)        I have reviewed and updated patient's allergy and medication list.    Concerns: NONE  Refills: NONE      Margarita Dunham CMA      Video-Visit Details    Type of service:  Video Visit    Video Start Time: 1730  Video End Time: 1800    Originating Location (pt. Location): Home    Distant Location (provider location):  [facility]     Platform used for Video Visit: Chalo Arnold MD          Reason for Visit: seen in f/u of metastatic cholangiocarcinoma    Oncology HPI:   Fuentes Estrada is a 65 year old man with a prior hx of HTN, " DM, CVA on plavix who presented with elevated LFTs in the spring of 2020. 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 On May 12, 2020 he underwent  A radical extrahepatic bile duct resection and R hepatectomy.   He had all of his disease resected with negative margins and one positive lymph node.  He had a complicated postoperative course with a bile leak and abscess that had delayed starting his adjuvant chemotherapy.       On imaging in July 2020,  he appeared to be developing increasing nodularity in the resection bed and regional lymph nodes that has led to an EUS with a fine-needle aspiration of the lymph nodes on 8/11/20,  demonstrating the presence of metastatic disease.      On 8/27/20, he initiated treatment with palliative intent Cisplatin/Gemcitabine.      His abscess/biliary drain was managed by IR. Sinograms identified a communication to the biliary tree. He failed a capping trial with development of a fever. He underwent sinograms and cavitary sclerotherapy every 2 weeks starting August 25, 2020.  Drain was removed on 11/9/2020 9/28/20: port placement     Admission to [facility] 10/9/20-10/11/20 with septic shock from infectious enterocolitis, all cultures NGTD. Chemo delayed one week for recovery.  Cis/[place] restarted 10/21.     Admission to [facility] 11/18/20-11/19/20 with cholangitis - fever 100.8, bilirubin 3.2, elevated alk phos. MRCP showed no stones or strictures. Paracentesis removed 1L of fluid, no signs of SBP. He was dismissed on a 7-day course of Flagyl and ciprofloxacin.  C5D8 was delayed due to hospitalization.    He was seen on 12/14/2020 with an interim CT scan which showed findings concerning for abscess at the operative site along the posterior aspect of liver with gas seen in this fluid collection.  Patient was also having chills, chemotherapy was held and he was started on ciprofloxacin and Flagyl.  There was further discussion about management of this recurrent abscess  with Dr.Dr. Purcell, Dr. Hale and Dr. Strauss about the abscess.  It was felt that aspiration is not likely to prevent recurrence of the abscess. A biliary drain is possible, but likely to be permanent. The team is reviewing where any endoscopic procedure would be beneficial.    Interval history: Feeling well. No fevers/chills.  He feels cold most of the time.  Last chemotherapy was on 12/9/2020.  Chemotherapy is currently on hold because of abscess in the yousuf hepatic operative site. No abdominal pain, bloating. Bowels are regular. No pale stools. Urine is light in color. No headaches, vision changes. No N/V. Appetite is poor and energy are sometimes low. No neuropathy. No sob, cp, palpitation.   He completed 7 days of ciprofloxacin and it has been renewed again for 14 more days he is currently on day 13 of antibiotics since it was most recently started.  He has 8 more days of this course left.    Current Outpatient Medications   Medication Sig Dispense Refill     atorvastatin (LIPITOR) 40 MG tablet Take 40 mg by mouth At Bedtime        ciprofloxacin (CIPRO) 500 MG tablet Take 1 tablet (500 mg) by mouth 2 times daily for 14 days 28 tablet 0     clopidogrel (PLAVIX) 75 MG tablet Take 75 mg by mouth At Bedtime        dexamethasone (DECADRON) 4 MG tablet Take 1 tablet (4 mg) by mouth daily (with breakfast) Daily for the 3 days after chemotherapy in the morning with food 3 tablet 1     dronabinol (MARINOL) 5 MG capsule Take 1 capsule (5 mg) by mouth 2 times daily (before meals) 60 capsule 0     fenofibrate (TRIGLIDE/LOFIBRA) 160 MG tablet Take 160 mg by mouth At Bedtime        Ferrous Sulfate (IRON) 325 (65 Fe) MG tablet Take 1 tablet by mouth 3 times daily (with meals) 180 tablet 1     furosemide (LASIX) 20 MG tablet Take 20 mg by mouth every evening        glipiZIDE (GLUCOTROL) 10 MG tablet Take 10 mg by mouth At Bedtime        lisinopril (ZESTRIL) 30 MG tablet Take 30 mg by mouth every evening        LORazepam  "(ATIVAN) 0.5 MG tablet Take 1 tablet (0.5 mg) by mouth every 4 hours as needed (Anxiety, Nausea/Vomiting or Sleep) (Patient not taking: Reported on 12/14/2020) 30 tablet 5     magnesium gluconate 30 MG tablet Take 1 tablet (30 mg) by mouth daily 90 tablet 3     metroNIDAZOLE (FLAGYL) 500 MG tablet Take 1 tablet (500 mg) by mouth 3 times daily for 14 days 42 tablet 0     ondansetron (ZOFRAN) 8 MG tablet Take 1 tablet (8 mg) by mouth every 8 hours as needed (Nausea/Vomiting) 10 tablet 5     oxyCODONE (ROXICODONE) 5 MG tablet Take 1-2 tablets (5-10 mg) by mouth every 4 hours as needed for moderate to severe pain (Patient not taking: Reported on 12/14/2020) 20 tablet 0     prochlorperazine (COMPAZINE) 10 MG tablet Take 0.5 tablets (5 mg) by mouth every 6 hours as needed (Nausea/Vomiting) (Patient not taking: Reported on 12/14/2020) 30 tablet 5     sildenafil (REVATIO) 20 MG tablet Take 20 mg by mouth as needed       zolpidem (AMBIEN) 10 MG tablet Take 1 tablet (10 mg) by mouth nightly as needed for sleep 30 tablet 0          Allergies   Allergen Reactions     Metformin Other (See Comments)     \" I think my kidneys shut down\"       Video physical exam  General: Patient appears well in no acute distress.   Skin: No visualized rash or lesions on visualized skin  Eyes: EOMI, no erythema, sclera icterus or discharge noted  Resp: Appears to be breathing comfortably without accessory muscle usage, speaking in full sentences, no cough  MSK: Appears to have normal range of motion based on visualized movements  Neurologic: No apparent tremors, facial movements symmetric  Psych: affect appropriate, alert and oriented    The rest of a comprehensive physical examination is deferred due to PHE (public health emergency) video restrictions       There were no vitals taken for this visit.  Wt Readings from Last 4 Encounters:   12/09/20 81.5 kg (179 lb 11.2 oz)   11/25/20 81.2 kg (179 lb 1.6 oz)   11/19/20 77.8 kg (171 lb 9.6 oz) "   11/11/20 83.3 kg (183 lb 9.6 oz)         Labs:   Lab Results   Component Value Date    WBC 2.7 12/14/2020     Lab Results   Component Value Date    RBC 2.99 12/14/2020     Lab Results   Component Value Date    HGB 8.6 12/14/2020     Lab Results   Component Value Date    HCT 25.9 12/14/2020     No components found for: MCT  Lab Results   Component Value Date    MCV 87 12/14/2020     Lab Results   Component Value Date    MCH 28.8 12/14/2020     Lab Results   Component Value Date    MCHC 33.2 12/14/2020     Lab Results   Component Value Date    RDW 16.9 12/14/2020     Lab Results   Component Value Date     12/14/2020         Imaging: CT scan of chest abdomen pelvis on 12/11/2020.  IMPRESSION:  1.  Findings concerning for abscess are now seen at the operative site  along the posterior aspect liver with new gas seen in this fluid  collection. No other free air is seen in the peritoneal cavity. Gas in  the common bile duct in the head of the pancreas is likely due to  prior sphincterotomy. Recommend clinical correlation.  2.  Large amount ascites and associated mesenteric edema. It is  difficult to exclude nodular peritoneal metastases although the  thickening of the soft tissue density in the peritoneal adipose  tissues is likely due to edema and less likely due to metastases.  3.  No evidence for recurrent malignancy in the liver.  4.  Gas in the common bile duct  5.  Tiny stable nodules in the lungs since the prior study dated  8/19/2020. These are highly likely benign.  6.  Worsening right lower lung lobe atelectasis/scarring and small  right pleural fluid collection.  7.  Extensive coronary artery calcifications and/or stents are again  noted.  8.  New right chest wall [id number] with right internal jugular  central venous catheter, in appropriate positions.    Impression/plan:   Recurrent cholangiocarcinoma with metastatic disease  -initiated on cisplatin/gemcitabine on 8/27/20.   -tolerated fairly well.    -Chemotherapy has been on hold since 12/9/2020 due to abscess at the operative site.  He is currently on extended course of ciprofloxacin and Flagyl.   -We discussed the CT scan findings from 12/11/2020, which showed no clear evidence of progression.  Given concern for abscess we will continue to hold chemotherapy until he finishes extra course of antibiotics, and will follow up with CT scan in 10 days.    Bile leak/recurrent abscess in operative site.   -Plan of care has been discussed with Dr. Purcell, Dr. Hale and Dr. Strauss about the abscess. Aspiration is not likely to prevent recurrence of the abscess. A biliary drain is possible, but likely to be permanent. The team is reviewing where any endoscopic procedure could be done to manage the abscess/leak.  If he has persistent abscess, we will discuss again with interventional radiology and surgical team regarding plan of care.  -Continue and complete extended course of ciprofloxacin and Flagyl.  He is currently on day 13 and has 8 more days left.  -monitor closely. Reviewed risk of recurring cholangitis/abscess, signs/symptoms.  -We will follow-up in 10 days with repeat CT scan to assess response to antibiotic treatment.       Anemia, likely s/t chronic disease and chemotherapy, no acute bleeding  - Received 1 unit of blood transfusion with improvement of hemoglobin from 7.9-8.6.    FEN:  -continue protein shakes, small frequent meals.     Hypomag: s/t cisplatin. Continue mg gluconate daily     DM   -only on glipizide currently, continue, monitor glucoses while on dex     Hx of CVA 10 years ago, has mild short term memory deficits  HTN  -on plavix  -on amlodipine, fenofibrate, lisinopril, atorvastatin    Plan:  -Continue and complete extended course of ciprofloxacin and Flagyl in 8 more days.  -Hold chemotherapy.  -Follow-up with [facility] in 10 days with repeat CT scan of abdomen and labs to assess response of abscess or infection to extend her course of  antibiotic therapy.    Plan of care discussed with Ted Galeana MD   [facility] Onc Fellow.     Attending note: I saw the patient in conjunction with the fellow and agree with the above.

## 2020-12-21 NOTE — LETTER
"    12/21/2020         RE: Fuentes Estrada  [address] [place]  [facility] [address]        Dear Colleague,    Thank you for referring your patient, Fuentes Estrada, to the [facility]. Please see a copy of my visit note below.      Fuentes Estrada is a 65 year old male who is being evaluated via a billable video visit.      The patient has been notified of following:     \"This video visit will be conducted via a call between you and your physician/provider. We have found that certain health care needs can be provided without the need for an in-person physical exam.  This service lets us provide the care you need with a video conversation.  If a prescription is necessary we can send it directly to your pharmacy.  If lab work is needed we can place an order for that and you can then stop by our lab to have the test done at a later time.    Video visits are billed at different rates depending on your insurance coverage.  Please reach out to your insurance provider with any questions.     If during the course of the call the physician/provider feels a video visit is not appropriate, you will not be charged for this service.\"    Patient has given verbal consent for Video visit? Yes  How would you like to obtain your AVS? MyChart  If you are dropped from the video visit, the video invite should be resent to: Text to cell phone: [phone]  Will anyone else be joining your video visit? No      Vitals - Patient Reported  Weight (Patient Reported): 81.2 kg (179 lb)  Height (Patient Reported): 185.4 cm (6' 1\")  BMI (Based on Pt Reported Ht/Wt): 23.62  Pain Score: No Pain (0)        I have reviewed and updated patient's allergy and medication list.    Concerns: NONE  Refills: NONE      Margarita Dunham CMA      Video-Visit Details    Type of service:  Video Visit    Video Start Time: 1730  Video End Time: 1800    Originating Location (pt. Location): Home    Distant Location (provider location):  [facility]" [facility]     Platform used for Video Visit: Chalo Arnold MD          Reason for Visit: seen in f/u of metastatic cholangiocarcinoma    Oncology HPI:   Fuentes Estrada is a 65 year old man with a prior hx of HTN, DM, CVA on plavix who presented with elevated LFTs in the spring of 2020. 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 On May 12, 2020 he underwent  A radical extrahepatic bile duct resection and R hepatectomy.   He had all of his disease resected with negative margins and one positive lymph node.  He had a complicated postoperative course with a bile leak and abscess that had delayed starting his adjuvant chemotherapy.       On imaging in July 2020,  he appeared to be developing increasing nodularity in the resection bed and regional lymph nodes that has led to an EUS with a fine-needle aspiration of the lymph nodes on 8/11/20,  demonstrating the presence of metastatic disease.      On 8/27/20, he initiated treatment with palliative intent Cisplatin/Gemcitabine.      His abscess/biliary drain was managed by IR. Sinograms identified a communication to the biliary tree. He failed a capping trial with development of a fever. He underwent sinograms and cavitary sclerotherapy every 2 weeks starting August 25, 2020.  Drain was removed on 11/9/2020 9/28/20: port placement     Admission to [facility] 10/9/20-10/11/20 with septic shock from infectious enterocolitis, all cultures NGTD. Chemo delayed one week for recovery.  Cis/[facility] restarted 10/21.     Admission to [facility] 11/18/20-11/19/20 with cholangitis - fever 100.8, bilirubin 3.2, elevated alk phos. MRCP showed no stones or strictures. Paracentesis removed 1L of fluid, no signs of SBP. He was dismissed on a 7-day course of Flagyl and ciprofloxacin.  C5D8 was delayed due to hospitalization.    He was seen on 12/14/2020 with an interim CT scan which showed findings concerning for abscess at the operative site  along the posterior aspect of liver with gas seen in this fluid collection.  Patient was also having chills, chemotherapy was held and he was started on ciprofloxacin and Flagyl.  There was further discussion about management of this recurrent abscess with Dr.Dr. Purcell, Dr. Hale and Dr. Strauss about the abscess.  It was felt that aspiration is not likely to prevent recurrence of the abscess. A biliary drain is possible, but likely to be permanent. The team is reviewing where any endoscopic procedure would be beneficial.    Interval history: Feeling well. No fevers/chills.  He feels cold most of the time.  Last chemotherapy was on 12/9/2020.  Chemotherapy is currently on hold because of abscess in the yousuf hepatic operative site. No abdominal pain, bloating. Bowels are regular. No pale stools. Urine is light in color. No headaches, vision changes. No N/V. Appetite is poor and energy are sometimes low. No neuropathy. No sob, cp, palpitation.   He completed 7 days of ciprofloxacin and it has been renewed again for 14 more days he is currently on day 13 of antibiotics since it was most recently started.  He has 8 more days of this course left.    Current Outpatient Medications   Medication Sig Dispense Refill     atorvastatin (LIPITOR) 40 MG tablet Take 40 mg by mouth At Bedtime        ciprofloxacin (CIPRO) 500 MG tablet Take 1 tablet (500 mg) by mouth 2 times daily for 14 days 28 tablet 0     clopidogrel (PLAVIX) 75 MG tablet Take 75 mg by mouth At Bedtime        dexamethasone (DECADRON) 4 MG tablet Take 1 tablet (4 mg) by mouth daily (with breakfast) Daily for the 3 days after chemotherapy in the morning with food 3 tablet 1     dronabinol (MARINOL) 5 MG capsule Take 1 capsule (5 mg) by mouth 2 times daily (before meals) 60 capsule 0     fenofibrate (TRIGLIDE/LOFIBRA) 160 MG tablet Take 160 mg by mouth At Bedtime        Ferrous Sulfate (IRON) 325 (65 Fe) MG tablet Take 1 tablet by mouth 3 times daily (with  "meals) 180 tablet 1     furosemide (LASIX) 20 MG tablet Take 20 mg by mouth every evening        glipiZIDE (GLUCOTROL) 10 MG tablet Take 10 mg by mouth At Bedtime        lisinopril (ZESTRIL) 30 MG tablet Take 30 mg by mouth every evening        LORazepam (ATIVAN) 0.5 MG tablet Take 1 tablet (0.5 mg) by mouth every 4 hours as needed (Anxiety, Nausea/Vomiting or Sleep) (Patient not taking: Reported on 12/14/2020) 30 tablet 5     magnesium gluconate 30 MG tablet Take 1 tablet (30 mg) by mouth daily 90 tablet 3     metroNIDAZOLE (FLAGYL) 500 MG tablet Take 1 tablet (500 mg) by mouth 3 times daily for 14 days 42 tablet 0     ondansetron (ZOFRAN) 8 MG tablet Take 1 tablet (8 mg) by mouth every 8 hours as needed (Nausea/Vomiting) 10 tablet 5     oxyCODONE (ROXICODONE) 5 MG tablet Take 1-2 tablets (5-10 mg) by mouth every 4 hours as needed for moderate to severe pain (Patient not taking: Reported on 12/14/2020) 20 tablet 0     prochlorperazine (COMPAZINE) 10 MG tablet Take 0.5 tablets (5 mg) by mouth every 6 hours as needed (Nausea/Vomiting) (Patient not taking: Reported on 12/14/2020) 30 tablet 5     sildenafil (REVATIO) 20 MG tablet Take 20 mg by mouth as needed       zolpidem (AMBIEN) 10 MG tablet Take 1 tablet (10 mg) by mouth nightly as needed for sleep 30 tablet 0          Allergies   Allergen Reactions     Metformin Other (See Comments)     \" I think my kidneys shut down\"       Video physical exam  General: Patient appears well in no acute distress.   Skin: No visualized rash or lesions on visualized skin  Eyes: EOMI, no erythema, sclera icterus or discharge noted  Resp: Appears to be breathing comfortably without accessory muscle usage, speaking in full sentences, no cough  MSK: Appears to have normal range of motion based on visualized movements  Neurologic: No apparent tremors, facial movements symmetric  Psych: affect appropriate, alert and oriented    The rest of a comprehensive physical examination is deferred " due to PHE (public health emergency) video restrictions       There were no vitals taken for this visit.  Wt Readings from Last 4 Encounters:   12/09/20 81.5 kg (179 lb 11.2 oz)   11/25/20 81.2 kg (179 lb 1.6 oz)   11/19/20 77.8 kg (171 lb 9.6 oz)   11/11/20 83.3 kg (183 lb 9.6 oz)         Labs:   Lab Results   Component Value Date    WBC 2.7 12/14/2020     Lab Results   Component Value Date    RBC 2.99 12/14/2020     Lab Results   Component Value Date    HGB 8.6 12/14/2020     Lab Results   Component Value Date    HCT 25.9 12/14/2020     No components found for: MCT  Lab Results   Component Value Date    MCV 87 12/14/2020     Lab Results   Component Value Date    MCH 28.8 12/14/2020     Lab Results   Component Value Date    MCHC 33.2 12/14/2020     Lab Results   Component Value Date    RDW 16.9 12/14/2020     Lab Results   Component Value Date     12/14/2020         Imaging: CT scan of chest abdomen pelvis on 12/11/2020.  IMPRESSION:  1.  Findings concerning for abscess are now seen at the operative site  along the posterior aspect liver with new gas seen in this fluid  collection. No other free air is seen in the peritoneal cavity. Gas in  the common bile duct in the head of the pancreas is likely due to  prior sphincterotomy. Recommend clinical correlation.  2.  Large amount ascites and associated mesenteric edema. It is  difficult to exclude nodular peritoneal metastases although the  thickening of the soft tissue density in the peritoneal adipose  tissues is likely due to edema and less likely due to metastases.  3.  No evidence for recurrent malignancy in the liver.  4.  Gas in the common bile duct  5.  Tiny stable nodules in the lungs since the prior study dated  8/19/2020. These are highly likely benign.  6.  Worsening right lower lung lobe atelectasis/scarring and small  right pleural fluid collection.  7.  Extensive coronary artery calcifications and/or stents are again  noted.  8.  New right chest  wall [id number] with right internal jugular  central venous catheter, in appropriate positions.    Impression/plan:   Recurrent cholangiocarcinoma with metastatic disease  -initiated on cisplatin/gemcitabine on 8/27/20.   -tolerated fairly well.   -Chemotherapy has been on hold since 12/9/2020 due to abscess at the operative site.  He is currently on extended course of ciprofloxacin and Flagyl.   -We discussed the CT scan findings from 12/11/2020, which showed no clear evidence of progression.  Given concern for abscess we will continue to hold chemotherapy until he finishes extra course of antibiotics, and will follow up with CT scan in 10 days.    Bile leak/recurrent abscess in operative site.   -Plan of care has been discussed with Dr. Purcell, Dr. Hale and Dr. Strauss about the abscess. Aspiration is not likely to prevent recurrence of the abscess. A biliary drain is possible, but likely to be permanent. The team is reviewing where any endoscopic procedure could be done to manage the abscess/leak.  If he has persistent abscess, we will discuss again with interventional radiology and surgical team regarding plan of care.  -Continue and complete extended course of ciprofloxacin and Flagyl.  He is currently on day 13 and has 8 more days left.  -monitor closely. Reviewed risk of recurring cholangitis/abscess, signs/symptoms.  -We will follow-up in 10 days with repeat CT scan to assess response to antibiotic treatment.       Anemia, likely s/t chronic disease and chemotherapy, no acute bleeding  - Received 1 unit of blood transfusion with improvement of hemoglobin from 7.9-8.6.    FEN:  -continue protein shakes, small frequent meals.     Hypomag: s/t cisplatin. Continue mg gluconate daily     DM   -only on glipizide currently, continue, monitor glucoses while on dex     Hx of CVA 10 years ago, has mild short term memory deficits  HTN  -on plavix  -on amlodipine, fenofibrate, lisinopril,  atorvastatin    Plan:  -Continue and complete extended course of ciprofloxacin and Flagyl in 8 more days.  -Hold chemotherapy.  -Follow-up with [facility] in 10 days with repeat CT scan of abdomen and labs to assess response of abscess or infection to extend her course of antibiotic therapy.    Plan of care discussed with Dr. Beard,  Ted Arnold MD   [facility] Onc Fellow.     Attending note: I saw the patient in conjunction with the fellow and agree with the above.        Again, thank you for allowing me to participate in the care of your patient.        Sincerely,        Aydin Beard MD

## 2020-12-30 PROBLEM — K65.1 INTRA-ABDOMINAL ABSCESS (H): Status: ACTIVE | Noted: 2020-01-01

## 2021-01-01 ENCOUNTER — APPOINTMENT (OUTPATIENT)
Dept: PHYSICAL THERAPY | Facility: CLINIC | Age: 66
DRG: 441 | End: 2021-01-01
Attending: HOSPITALIST
Payer: MEDICARE

## 2021-01-01 ENCOUNTER — TELEPHONE (OUTPATIENT)
Dept: INTERVENTIONAL RADIOLOGY/VASCULAR | Facility: CLINIC | Age: 66
End: 2021-01-01

## 2021-01-01 ENCOUNTER — HOSPITAL ENCOUNTER (OUTPATIENT)
Facility: CLINIC | Age: 66
End: 2021-01-01
Attending: INTERNAL MEDICINE | Admitting: INTERNAL MEDICINE
Payer: MEDICARE

## 2021-01-01 ENCOUNTER — HOSPITAL ENCOUNTER (OUTPATIENT)
Facility: CLINIC | Age: 66
Setting detail: OBSERVATION
Discharge: HOME | End: 2021-06-20
Attending: EMERGENCY MEDICINE | Admitting: INTERNAL MEDICINE
Payer: MEDICARE

## 2021-01-01 ENCOUNTER — MYC MEDICAL ADVICE (OUTPATIENT)
Dept: ONCOLOGY | Facility: CLINIC | Age: 66
End: 2021-01-01

## 2021-01-01 ENCOUNTER — VIRTUAL VISIT (OUTPATIENT)
Dept: INFECTIOUS DISEASES | Facility: CLINIC | Age: 66
End: 2021-01-01
Attending: INTERNAL MEDICINE
Payer: COMMERCIAL

## 2021-01-01 ENCOUNTER — APPOINTMENT (OUTPATIENT)
Dept: GENERAL RADIOLOGY | Facility: CLINIC | Age: 66
DRG: 441 | End: 2021-01-01
Attending: INTERNAL MEDICINE
Payer: MEDICARE

## 2021-01-01 ENCOUNTER — NURSE TRIAGE (OUTPATIENT)
Dept: NURSING | Facility: CLINIC | Age: 66
End: 2021-01-01

## 2021-01-01 ENCOUNTER — PATIENT OUTREACH (OUTPATIENT)
Dept: GASTROENTEROLOGY | Facility: CLINIC | Age: 66
End: 2021-01-01

## 2021-01-01 ENCOUNTER — APPOINTMENT (OUTPATIENT)
Dept: MEDSURG UNIT | Facility: CLINIC | Age: 66
End: 2021-01-01
Attending: RADIOLOGY
Payer: MEDICARE

## 2021-01-01 ENCOUNTER — PATIENT OUTREACH (OUTPATIENT)
Dept: ONCOLOGY | Facility: CLINIC | Age: 66
End: 2021-01-01

## 2021-01-01 ENCOUNTER — HOSPITAL ENCOUNTER (INPATIENT)
Facility: CLINIC | Age: 66
LOS: 2 days | Discharge: HOME HEALTH | DRG: 441 | End: 2021-02-03
Attending: EMERGENCY MEDICINE | Admitting: INTERNAL MEDICINE
Payer: MEDICARE

## 2021-01-01 ENCOUNTER — VIRTUAL VISIT (OUTPATIENT)
Dept: ONCOLOGY | Facility: CLINIC | Age: 66
End: 2021-01-01
Attending: NURSE PRACTITIONER
Payer: COMMERCIAL

## 2021-01-01 ENCOUNTER — TELEPHONE (OUTPATIENT)
Dept: RADIOLOGY | Facility: CLINIC | Age: 66
End: 2021-01-01

## 2021-01-01 ENCOUNTER — HOSPITAL ENCOUNTER (OUTPATIENT)
Facility: CLINIC | Age: 66
Discharge: HOME | DRG: 444 | End: 2021-03-25
Attending: RADIOLOGY | Admitting: RADIOLOGY
Payer: MEDICARE

## 2021-01-01 ENCOUNTER — DOCUMENTATION ONLY (OUTPATIENT)
Dept: OTHER | Facility: CLINIC | Age: 66
End: 2021-01-01

## 2021-01-01 ENCOUNTER — APPOINTMENT (OUTPATIENT)
Dept: GENERAL RADIOLOGY | Facility: CLINIC | Age: 66
DRG: 444 | End: 2021-01-01
Attending: NURSE PRACTITIONER
Payer: MEDICARE

## 2021-01-01 ENCOUNTER — APPOINTMENT (OUTPATIENT)
Dept: MEDSURG UNIT | Facility: CLINIC | Age: 66
DRG: 444 | End: 2021-01-01
Attending: RADIOLOGY
Payer: MEDICARE

## 2021-01-01 ENCOUNTER — TELEPHONE (OUTPATIENT)
Dept: ONCOLOGY | Facility: CLINIC | Age: 66
End: 2021-01-01

## 2021-01-01 ENCOUNTER — APPOINTMENT (OUTPATIENT)
Dept: CT IMAGING | Facility: CLINIC | Age: 66
DRG: 444 | End: 2021-01-01
Attending: PHYSICIAN ASSISTANT
Payer: MEDICARE

## 2021-01-01 ENCOUNTER — APPOINTMENT (OUTPATIENT)
Dept: PHYSICAL THERAPY | Facility: CLINIC | Age: 66
DRG: 444 | End: 2021-01-01
Payer: MEDICARE

## 2021-01-01 ENCOUNTER — HOSPITAL ENCOUNTER (OUTPATIENT)
Dept: CT IMAGING | Facility: CLINIC | Age: 66
End: 2021-04-22
Attending: RADIOLOGY
Payer: MEDICARE

## 2021-01-01 ENCOUNTER — PREP FOR PROCEDURE (OUTPATIENT)
Dept: GASTROENTEROLOGY | Facility: CLINIC | Age: 66
End: 2021-01-01

## 2021-01-01 ENCOUNTER — APPOINTMENT (OUTPATIENT)
Dept: ULTRASOUND IMAGING | Facility: CLINIC | Age: 66
DRG: 444 | End: 2021-01-01
Attending: INTERNAL MEDICINE
Payer: MEDICARE

## 2021-01-01 ENCOUNTER — VIRTUAL VISIT (OUTPATIENT)
Dept: ONCOLOGY | Facility: CLINIC | Age: 66
End: 2021-01-01
Attending: INTERNAL MEDICINE
Payer: COMMERCIAL

## 2021-01-01 ENCOUNTER — HEALTH MAINTENANCE LETTER (OUTPATIENT)
Age: 66
End: 2021-01-01

## 2021-01-01 ENCOUNTER — APPOINTMENT (OUTPATIENT)
Dept: OCCUPATIONAL THERAPY | Facility: CLINIC | Age: 66
DRG: 444 | End: 2021-01-01
Attending: PHYSICIAN ASSISTANT
Payer: MEDICARE

## 2021-01-01 ENCOUNTER — HOSPITAL ENCOUNTER (OUTPATIENT)
Dept: ULTRASOUND IMAGING | Facility: CLINIC | Age: 66
Discharge: HOME | End: 2021-04-20
Attending: NURSE PRACTITIONER | Admitting: NURSE PRACTITIONER
Payer: MEDICARE

## 2021-01-01 ENCOUNTER — PATIENT OUTREACH (OUTPATIENT)
Dept: CARE COORDINATION | Facility: CLINIC | Age: 66
End: 2021-01-01

## 2021-01-01 ENCOUNTER — VIRTUAL VISIT (OUTPATIENT)
Dept: SURGERY | Facility: CLINIC | Age: 66
End: 2021-01-01
Payer: COMMERCIAL

## 2021-01-01 ENCOUNTER — HOSPITAL ENCOUNTER (OUTPATIENT)
Dept: CT IMAGING | Facility: CLINIC | Age: 66
End: 2021-02-26
Attending: NURSE PRACTITIONER
Payer: MEDICARE

## 2021-01-01 ENCOUNTER — ANESTHESIA EVENT (OUTPATIENT)
Dept: SURGERY | Facility: CLINIC | Age: 66
DRG: 441 | End: 2021-01-01
Payer: MEDICARE

## 2021-01-01 ENCOUNTER — APPOINTMENT (OUTPATIENT)
Dept: OCCUPATIONAL THERAPY | Facility: CLINIC | Age: 66
DRG: 441 | End: 2021-01-01
Attending: NURSE PRACTITIONER
Payer: MEDICARE

## 2021-01-01 ENCOUNTER — HOSPITAL ENCOUNTER (OUTPATIENT)
Dept: ULTRASOUND IMAGING | Facility: CLINIC | Age: 66
Discharge: HOME | End: 2021-03-11
Attending: INTERNAL MEDICINE | Admitting: INTERNAL MEDICINE
Payer: MEDICARE

## 2021-01-01 ENCOUNTER — APPOINTMENT (OUTPATIENT)
Dept: MRI IMAGING | Facility: CLINIC | Age: 66
DRG: 444 | End: 2021-01-01
Attending: INTERNAL MEDICINE
Payer: MEDICARE

## 2021-01-01 ENCOUNTER — INFUSION THERAPY VISIT (OUTPATIENT)
Dept: INFUSION THERAPY | Facility: CLINIC | Age: 66
End: 2021-01-01
Payer: MEDICARE

## 2021-01-01 ENCOUNTER — APPOINTMENT (OUTPATIENT)
Dept: ULTRASOUND IMAGING | Facility: CLINIC | Age: 66
DRG: 444 | End: 2021-01-01
Payer: MEDICARE

## 2021-01-01 ENCOUNTER — APPOINTMENT (OUTPATIENT)
Dept: OCCUPATIONAL THERAPY | Facility: CLINIC | Age: 66
DRG: 441 | End: 2021-01-01
Attending: HOSPITALIST
Payer: MEDICARE

## 2021-01-01 ENCOUNTER — HOSPITAL ENCOUNTER (OUTPATIENT)
Facility: CLINIC | Age: 66
Discharge: HOME | End: 2021-03-01
Attending: RADIOLOGY | Admitting: RADIOLOGY
Payer: MEDICARE

## 2021-01-01 ENCOUNTER — APPOINTMENT (OUTPATIENT)
Dept: MEDSURG UNIT | Facility: CLINIC | Age: 66
End: 2021-01-01
Attending: INTERNAL MEDICINE
Payer: MEDICARE

## 2021-01-01 ENCOUNTER — ANESTHESIA (OUTPATIENT)
Dept: SURGERY | Facility: CLINIC | Age: 66
DRG: 441 | End: 2021-01-01
Payer: MEDICARE

## 2021-01-01 ENCOUNTER — HOSPITAL ENCOUNTER (OUTPATIENT)
Facility: CLINIC | Age: 66
Discharge: HOME | End: 2021-06-23
Attending: INTERNAL MEDICINE | Admitting: PHYSICIAN ASSISTANT
Payer: MEDICARE

## 2021-01-01 ENCOUNTER — HOSPITAL ENCOUNTER (OUTPATIENT)
Facility: CLINIC | Age: 66
Discharge: HOME | End: 2021-05-27
Attending: RADIOLOGY | Admitting: RADIOLOGY
Payer: MEDICARE

## 2021-01-01 ENCOUNTER — ANCILLARY PROCEDURE (OUTPATIENT)
Dept: ULTRASOUND IMAGING | Facility: CLINIC | Age: 66
End: 2021-01-01
Attending: PEDIATRICS
Payer: COMMERCIAL

## 2021-01-01 ENCOUNTER — HOSPITAL ENCOUNTER (OUTPATIENT)
Dept: CT IMAGING | Facility: CLINIC | Age: 66
End: 2021-01-04
Attending: INTERNAL MEDICINE
Payer: MEDICARE

## 2021-01-01 ENCOUNTER — APPOINTMENT (OUTPATIENT)
Dept: INTERVENTIONAL RADIOLOGY/VASCULAR | Facility: CLINIC | Age: 66
DRG: 444 | End: 2021-01-01
Attending: RADIOLOGY
Payer: MEDICARE

## 2021-01-01 ENCOUNTER — ANCILLARY PROCEDURE (OUTPATIENT)
Dept: ULTRASOUND IMAGING | Facility: CLINIC | Age: 66
End: 2021-01-01
Attending: STUDENT IN AN ORGANIZED HEALTH CARE EDUCATION/TRAINING PROGRAM
Payer: COMMERCIAL

## 2021-01-01 ENCOUNTER — OFFICE VISIT (OUTPATIENT)
Dept: SURGERY | Facility: CLINIC | Age: 66
End: 2021-01-01
Attending: SURGERY
Payer: COMMERCIAL

## 2021-01-01 ENCOUNTER — TRANSFERRED RECORDS (OUTPATIENT)
Dept: HEALTH INFORMATION MANAGEMENT | Facility: CLINIC | Age: 66
End: 2021-01-01

## 2021-01-01 ENCOUNTER — INFUSION THERAPY VISIT (OUTPATIENT)
Dept: INFUSION THERAPY | Facility: CLINIC | Age: 66
End: 2021-01-01
Attending: INTERNAL MEDICINE
Payer: MEDICARE

## 2021-01-01 ENCOUNTER — HOSPITAL ENCOUNTER (OUTPATIENT)
Facility: CLINIC | Age: 66
Discharge: HOME | End: 2021-04-22
Attending: RADIOLOGY | Admitting: RADIOLOGY
Payer: MEDICARE

## 2021-01-01 ENCOUNTER — APPOINTMENT (OUTPATIENT)
Dept: MEDSURG UNIT | Facility: CLINIC | Age: 66
End: 2021-01-01
Attending: NURSE PRACTITIONER
Payer: MEDICARE

## 2021-01-01 ENCOUNTER — DOCUMENTATION ONLY (OUTPATIENT)
Dept: CARE COORDINATION | Facility: CLINIC | Age: 66
End: 2021-01-01

## 2021-01-01 ENCOUNTER — TELEPHONE (OUTPATIENT)
Dept: SURGERY | Facility: CLINIC | Age: 66
End: 2021-01-01

## 2021-01-01 ENCOUNTER — APPOINTMENT (OUTPATIENT)
Dept: GENERAL RADIOLOGY | Facility: CLINIC | Age: 66
DRG: 441 | End: 2021-01-01
Attending: STUDENT IN AN ORGANIZED HEALTH CARE EDUCATION/TRAINING PROGRAM
Payer: MEDICARE

## 2021-01-01 ENCOUNTER — HOSPITAL ENCOUNTER (OUTPATIENT)
Facility: CLINIC | Age: 66
Discharge: HOME | End: 2021-02-15
Attending: RADIOLOGY | Admitting: RADIOLOGY
Payer: MEDICARE

## 2021-01-01 ENCOUNTER — APPOINTMENT (OUTPATIENT)
Dept: CT IMAGING | Facility: CLINIC | Age: 66
DRG: 441 | End: 2021-01-01
Attending: STUDENT IN AN ORGANIZED HEALTH CARE EDUCATION/TRAINING PROGRAM
Payer: MEDICARE

## 2021-01-01 ENCOUNTER — TELEPHONE (OUTPATIENT)
Dept: INTERVENTIONAL RADIOLOGY/VASCULAR | Facility: CLINIC | Age: 66
End: 2021-01-01
Payer: COMMERCIAL

## 2021-01-01 ENCOUNTER — APPOINTMENT (OUTPATIENT)
Dept: PHYSICAL THERAPY | Facility: CLINIC | Age: 66
DRG: 441 | End: 2021-01-01
Attending: STUDENT IN AN ORGANIZED HEALTH CARE EDUCATION/TRAINING PROGRAM
Payer: MEDICARE

## 2021-01-01 ENCOUNTER — APPOINTMENT (OUTPATIENT)
Dept: INTERVENTIONAL RADIOLOGY/VASCULAR | Facility: CLINIC | Age: 66
End: 2021-01-01
Attending: RADIOLOGY
Payer: MEDICARE

## 2021-01-01 ENCOUNTER — HOSPITAL ENCOUNTER (OUTPATIENT)
Dept: PHYSICAL THERAPY | Facility: CLINIC | Age: 66
Setting detail: THERAPIES SERIES
End: 2021-02-09
Attending: NURSE PRACTITIONER
Payer: MEDICARE

## 2021-01-01 ENCOUNTER — HOSPITAL ENCOUNTER (OUTPATIENT)
Dept: PHYSICAL THERAPY | Facility: CLINIC | Age: 66
Setting detail: THERAPIES SERIES
End: 2021-03-16
Attending: NURSE PRACTITIONER
Payer: MEDICARE

## 2021-01-01 ENCOUNTER — TELEPHONE (OUTPATIENT)
Dept: INFECTIOUS DISEASES | Facility: CLINIC | Age: 66
End: 2021-01-01

## 2021-01-01 ENCOUNTER — APPOINTMENT (OUTPATIENT)
Dept: OCCUPATIONAL THERAPY | Facility: CLINIC | Age: 66
DRG: 441 | End: 2021-01-01
Attending: STUDENT IN AN ORGANIZED HEALTH CARE EDUCATION/TRAINING PROGRAM
Payer: MEDICARE

## 2021-01-01 ENCOUNTER — APPOINTMENT (OUTPATIENT)
Dept: PHYSICAL THERAPY | Facility: CLINIC | Age: 66
DRG: 441 | End: 2021-01-01
Attending: NURSE PRACTITIONER
Payer: MEDICARE

## 2021-01-01 ENCOUNTER — HOSPITAL ENCOUNTER (INPATIENT)
Facility: CLINIC | Age: 66
LOS: 8 days | Discharge: HOME HEALTH | DRG: 441 | End: 2021-06-12
Attending: HOSPITALIST | Admitting: INTERNAL MEDICINE
Payer: MEDICARE

## 2021-01-01 ENCOUNTER — HOSPITAL ENCOUNTER (OUTPATIENT)
Dept: CT IMAGING | Facility: CLINIC | Age: 66
DRG: 444 | End: 2021-03-25
Attending: RADIOLOGY
Payer: MEDICARE

## 2021-01-01 ENCOUNTER — APPOINTMENT (OUTPATIENT)
Dept: PHYSICAL THERAPY | Facility: CLINIC | Age: 66
DRG: 444 | End: 2021-01-01
Attending: PHYSICIAN ASSISTANT
Payer: MEDICARE

## 2021-01-01 ENCOUNTER — APPOINTMENT (OUTPATIENT)
Dept: NUCLEAR MEDICINE | Facility: CLINIC | Age: 66
DRG: 444 | End: 2021-01-01
Attending: PHYSICIAN ASSISTANT
Payer: MEDICARE

## 2021-01-01 ENCOUNTER — HOSPITAL ENCOUNTER (OUTPATIENT)
Dept: CT IMAGING | Facility: CLINIC | Age: 66
End: 2021-01-19
Attending: NURSE PRACTITIONER
Payer: MEDICARE

## 2021-01-01 ENCOUNTER — TELEPHONE (OUTPATIENT)
Dept: NURSING | Facility: CLINIC | Age: 66
End: 2021-01-01

## 2021-01-01 ENCOUNTER — HOSPITAL ENCOUNTER (OUTPATIENT)
Dept: PHYSICAL THERAPY | Facility: CLINIC | Age: 66
Setting detail: THERAPIES SERIES
End: 2021-01-20
Attending: NURSE PRACTITIONER
Payer: MEDICARE

## 2021-01-01 ENCOUNTER — HOSPITAL ENCOUNTER (INPATIENT)
Facility: CLINIC | Age: 66
LOS: 10 days | Discharge: HOME HEALTH | DRG: 444 | End: 2021-04-06
Attending: INTERNAL MEDICINE | Admitting: STUDENT IN AN ORGANIZED HEALTH CARE EDUCATION/TRAINING PROGRAM
Payer: MEDICARE

## 2021-01-01 ENCOUNTER — APPOINTMENT (OUTPATIENT)
Dept: CARDIOLOGY | Facility: CLINIC | Age: 66
DRG: 444 | End: 2021-01-01
Attending: STUDENT IN AN ORGANIZED HEALTH CARE EDUCATION/TRAINING PROGRAM
Payer: MEDICARE

## 2021-01-01 ENCOUNTER — HOSPITAL ENCOUNTER (OUTPATIENT)
Dept: EDUCATION SERVICES | Facility: CLINIC | Age: 66
End: 2021-06-23
Attending: INTERNAL MEDICINE | Admitting: INTERNAL MEDICINE
Payer: MEDICARE

## 2021-01-01 ENCOUNTER — APPOINTMENT (OUTPATIENT)
Dept: OCCUPATIONAL THERAPY | Facility: CLINIC | Age: 66
DRG: 444 | End: 2021-01-01
Payer: MEDICARE

## 2021-01-01 ENCOUNTER — PRE VISIT (OUTPATIENT)
Dept: INFECTIOUS DISEASES | Facility: CLINIC | Age: 66
End: 2021-01-01

## 2021-01-01 ENCOUNTER — INFUSION THERAPY VISIT (OUTPATIENT)
Dept: INFUSION THERAPY | Facility: CLINIC | Age: 66
End: 2021-01-01
Attending: NURSE PRACTITIONER
Payer: MEDICARE

## 2021-01-01 ENCOUNTER — APPOINTMENT (OUTPATIENT)
Dept: CT IMAGING | Facility: CLINIC | Age: 66
DRG: 444 | End: 2021-01-01
Attending: STUDENT IN AN ORGANIZED HEALTH CARE EDUCATION/TRAINING PROGRAM
Payer: MEDICARE

## 2021-01-01 ENCOUNTER — VIRTUAL VISIT (OUTPATIENT)
Dept: ONCOLOGY | Facility: CLINIC | Age: 66
End: 2021-01-01
Attending: NURSE PRACTITIONER
Payer: MEDICARE

## 2021-01-01 ENCOUNTER — APPOINTMENT (OUTPATIENT)
Dept: INTERVENTIONAL RADIOLOGY/VASCULAR | Facility: CLINIC | Age: 66
End: 2021-01-01
Attending: INTERNAL MEDICINE
Payer: MEDICARE

## 2021-01-01 ENCOUNTER — HOSPITAL ENCOUNTER (OUTPATIENT)
Dept: PHYSICAL THERAPY | Facility: CLINIC | Age: 66
Setting detail: THERAPIES SERIES
End: 2021-01-12
Attending: NURSE PRACTITIONER
Payer: MEDICARE

## 2021-01-01 ENCOUNTER — APPOINTMENT (OUTPATIENT)
Dept: MRI IMAGING | Facility: CLINIC | Age: 66
DRG: 444 | End: 2021-01-01
Attending: STUDENT IN AN ORGANIZED HEALTH CARE EDUCATION/TRAINING PROGRAM
Payer: MEDICARE

## 2021-01-01 ENCOUNTER — APPOINTMENT (OUTPATIENT)
Dept: ULTRASOUND IMAGING | Facility: CLINIC | Age: 66
DRG: 444 | End: 2021-01-01
Attending: STUDENT IN AN ORGANIZED HEALTH CARE EDUCATION/TRAINING PROGRAM
Payer: MEDICARE

## 2021-01-01 VITALS
TEMPERATURE: 98.1 F | HEIGHT: 73 IN | WEIGHT: 177 LBS | DIASTOLIC BLOOD PRESSURE: 71 MMHG | BODY MASS INDEX: 23.46 KG/M2 | SYSTOLIC BLOOD PRESSURE: 113 MMHG | OXYGEN SATURATION: 94 % | RESPIRATION RATE: 14 BRPM | HEART RATE: 83 BPM

## 2021-01-01 VITALS
BODY MASS INDEX: 23.46 KG/M2 | HEIGHT: 73 IN | SYSTOLIC BLOOD PRESSURE: 142 MMHG | WEIGHT: 177 LBS | HEART RATE: 73 BPM | TEMPERATURE: 97.8 F | OXYGEN SATURATION: 96 % | RESPIRATION RATE: 16 BRPM | DIASTOLIC BLOOD PRESSURE: 75 MMHG

## 2021-01-01 VITALS
DIASTOLIC BLOOD PRESSURE: 78 MMHG | BODY MASS INDEX: 23.35 KG/M2 | HEART RATE: 93 BPM | SYSTOLIC BLOOD PRESSURE: 114 MMHG | WEIGHT: 177 LBS | TEMPERATURE: 97.9 F | OXYGEN SATURATION: 99 %

## 2021-01-01 VITALS
SYSTOLIC BLOOD PRESSURE: 108 MMHG | TEMPERATURE: 98.1 F | OXYGEN SATURATION: 98 % | BODY MASS INDEX: 23.35 KG/M2 | WEIGHT: 177 LBS | HEART RATE: 87 BPM | DIASTOLIC BLOOD PRESSURE: 74 MMHG | RESPIRATION RATE: 18 BRPM

## 2021-01-01 VITALS
RESPIRATION RATE: 16 BRPM | TEMPERATURE: 98.3 F | RESPIRATION RATE: 16 BRPM | SYSTOLIC BLOOD PRESSURE: 113 MMHG | DIASTOLIC BLOOD PRESSURE: 78 MMHG | SYSTOLIC BLOOD PRESSURE: 122 MMHG | OXYGEN SATURATION: 95 % | TEMPERATURE: 97.8 F | DIASTOLIC BLOOD PRESSURE: 79 MMHG | HEART RATE: 63 BPM | WEIGHT: 185 LBS | OXYGEN SATURATION: 97 % | WEIGHT: 177 LBS | BODY MASS INDEX: 24.52 KG/M2 | HEIGHT: 73 IN | HEART RATE: 74 BPM | BODY MASS INDEX: 23.46 KG/M2 | HEIGHT: 73 IN

## 2021-01-01 VITALS
RESPIRATION RATE: 18 BRPM | BODY MASS INDEX: 24.52 KG/M2 | OXYGEN SATURATION: 98 % | TEMPERATURE: 97.5 F | HEIGHT: 73 IN | WEIGHT: 185 LBS | SYSTOLIC BLOOD PRESSURE: 109 MMHG | DIASTOLIC BLOOD PRESSURE: 69 MMHG | HEART RATE: 68 BPM

## 2021-01-01 VITALS — BODY MASS INDEX: 24.41 KG/M2 | WEIGHT: 185 LBS

## 2021-01-01 VITALS
DIASTOLIC BLOOD PRESSURE: 66 MMHG | OXYGEN SATURATION: 97 % | HEART RATE: 74 BPM | SYSTOLIC BLOOD PRESSURE: 139 MMHG | HEIGHT: 73 IN | RESPIRATION RATE: 18 BRPM | TEMPERATURE: 97.8 F | WEIGHT: 186.29 LBS | BODY MASS INDEX: 24.69 KG/M2

## 2021-01-01 VITALS
HEART RATE: 74 BPM | DIASTOLIC BLOOD PRESSURE: 74 MMHG | OXYGEN SATURATION: 96 % | SYSTOLIC BLOOD PRESSURE: 116 MMHG | RESPIRATION RATE: 16 BRPM

## 2021-01-01 VITALS
BODY MASS INDEX: 21.87 KG/M2 | TEMPERATURE: 97.6 F | HEIGHT: 73 IN | SYSTOLIC BLOOD PRESSURE: 109 MMHG | WEIGHT: 165 LBS | OXYGEN SATURATION: 99 % | HEART RATE: 67 BPM | DIASTOLIC BLOOD PRESSURE: 62 MMHG | RESPIRATION RATE: 15 BRPM

## 2021-01-01 VITALS
HEIGHT: 73 IN | OXYGEN SATURATION: 99 % | SYSTOLIC BLOOD PRESSURE: 127 MMHG | RESPIRATION RATE: 14 BRPM | TEMPERATURE: 97.9 F | BODY MASS INDEX: 21.34 KG/M2 | DIASTOLIC BLOOD PRESSURE: 80 MMHG | WEIGHT: 161 LBS | HEART RATE: 62 BPM

## 2021-01-01 VITALS
HEART RATE: 73 BPM | SYSTOLIC BLOOD PRESSURE: 115 MMHG | RESPIRATION RATE: 16 BRPM | DIASTOLIC BLOOD PRESSURE: 70 MMHG | OXYGEN SATURATION: 96 %

## 2021-01-01 VITALS
DIASTOLIC BLOOD PRESSURE: 53 MMHG | RESPIRATION RATE: 16 BRPM | SYSTOLIC BLOOD PRESSURE: 99 MMHG | OXYGEN SATURATION: 93 % | BODY MASS INDEX: 24.29 KG/M2 | TEMPERATURE: 97.7 F | HEART RATE: 71 BPM | HEIGHT: 73 IN

## 2021-01-01 VITALS
HEART RATE: 66 BPM | RESPIRATION RATE: 16 BRPM | OXYGEN SATURATION: 99 % | TEMPERATURE: 95.7 F | BODY MASS INDEX: 24.4 KG/M2 | HEIGHT: 73 IN | WEIGHT: 184.08 LBS | DIASTOLIC BLOOD PRESSURE: 66 MMHG | SYSTOLIC BLOOD PRESSURE: 123 MMHG

## 2021-01-01 VITALS
BODY MASS INDEX: 26.52 KG/M2 | HEART RATE: 70 BPM | WEIGHT: 201 LBS | DIASTOLIC BLOOD PRESSURE: 74 MMHG | SYSTOLIC BLOOD PRESSURE: 119 MMHG

## 2021-01-01 DIAGNOSIS — K65.1 INTRA-ABDOMINAL ABSCESS (H): ICD-10-CM

## 2021-01-01 DIAGNOSIS — K65.1 INTRA-ABDOMINAL ABSCESS (H): Primary | ICD-10-CM

## 2021-01-01 DIAGNOSIS — C24.0 HILAR CHOLANGIOCARCINOMA (H): ICD-10-CM

## 2021-01-01 DIAGNOSIS — Z20.822 COVID-19 RULED OUT BY LABORATORY TESTING: ICD-10-CM

## 2021-01-01 DIAGNOSIS — C22.1 CHOLANGIOCARCINOMA (H): ICD-10-CM

## 2021-01-01 DIAGNOSIS — K66.8 BILOMA: ICD-10-CM

## 2021-01-01 DIAGNOSIS — Z11.59 ENCOUNTER FOR SCREENING FOR OTHER VIRAL DISEASES: ICD-10-CM

## 2021-01-01 DIAGNOSIS — R18.8 OTHER ASCITES: ICD-10-CM

## 2021-01-01 DIAGNOSIS — E11.9 TYPE 2 DIABETES MELLITUS WITHOUT COMPLICATION, WITHOUT LONG-TERM CURRENT USE OF INSULIN (H): ICD-10-CM

## 2021-01-01 DIAGNOSIS — K83.9 BILE LEAK: ICD-10-CM

## 2021-01-01 DIAGNOSIS — C24.0 HILAR CHOLANGIOCARCINOMA (H): Primary | ICD-10-CM

## 2021-01-01 DIAGNOSIS — R18.8 OTHER ASCITES: Primary | ICD-10-CM

## 2021-01-01 DIAGNOSIS — E86.0 DEHYDRATION: ICD-10-CM

## 2021-01-01 DIAGNOSIS — Z20.822 ENCOUNTER FOR LABORATORY TESTING FOR COVID-19 VIRUS: ICD-10-CM

## 2021-01-01 DIAGNOSIS — K83.09 CHOLANGITIS (H): ICD-10-CM

## 2021-01-01 DIAGNOSIS — Z11.59 ENCOUNTER FOR SCREENING FOR OTHER VIRAL DISEASES: Primary | ICD-10-CM

## 2021-01-01 DIAGNOSIS — Z71.89 OTHER SPECIFIED COUNSELING: ICD-10-CM

## 2021-01-01 DIAGNOSIS — C22.1 CHOLANGIOCARCINOMA (H): Primary | ICD-10-CM

## 2021-01-01 DIAGNOSIS — K83.9 BILE LEAK: Primary | ICD-10-CM

## 2021-01-01 DIAGNOSIS — E46 MALNUTRITION, UNSPECIFIED TYPE (H): ICD-10-CM

## 2021-01-01 DIAGNOSIS — C24.9 CHOLANGIOCARCINOMA DETERMINED BY BIOPSY OF BILIARY TRACT (H): ICD-10-CM

## 2021-01-01 DIAGNOSIS — K75.0 HEPATIC ABSCESS: Primary | ICD-10-CM

## 2021-01-01 DIAGNOSIS — N17.9 ACUTE RENAL FAILURE, UNSPECIFIED ACUTE RENAL FAILURE TYPE (H): ICD-10-CM

## 2021-01-01 DIAGNOSIS — Z20.822 CONTACT WITH AND (SUSPECTED) EXPOSURE TO COVID-19: ICD-10-CM

## 2021-01-01 DIAGNOSIS — I10 BENIGN ESSENTIAL HYPERTENSION: ICD-10-CM

## 2021-01-01 DIAGNOSIS — K66.8 BILOMA: Primary | ICD-10-CM

## 2021-01-01 DIAGNOSIS — R53.0 NEOPLASTIC MALIGNANT RELATED FATIGUE: ICD-10-CM

## 2021-01-01 DIAGNOSIS — K75.0 LIVER ABSCESS: Primary | ICD-10-CM

## 2021-01-01 DIAGNOSIS — R18.0 MALIGNANT ASCITES (H): Primary | ICD-10-CM

## 2021-01-01 DIAGNOSIS — E11.9 DIABETES MELLITUS, TYPE 2 (H): ICD-10-CM

## 2021-01-01 DIAGNOSIS — Z20.822 ENCOUNTER FOR LABORATORY TESTING FOR COVID-19 VIRUS: Primary | ICD-10-CM

## 2021-01-01 DIAGNOSIS — R78.81 BACTEREMIA DUE TO OTHER BACTERIA: Primary | ICD-10-CM

## 2021-01-01 DIAGNOSIS — E63.9 NUTRITIONAL DEFICIENCY: ICD-10-CM

## 2021-01-01 DIAGNOSIS — R17 CHOLESTATIC JAUNDICE: ICD-10-CM

## 2021-01-01 DIAGNOSIS — B96.89 BACTEREMIA DUE TO OTHER BACTERIA: Primary | ICD-10-CM

## 2021-01-01 DIAGNOSIS — R78.81 BACTEREMIA: Primary | ICD-10-CM

## 2021-01-01 DIAGNOSIS — C77.9 MALIGNANT NEOPLASM METASTATIC TO LYMPH NODES, UNSPECIFIED LYMPH NODE REGION (H): ICD-10-CM

## 2021-01-01 LAB
ABO + RH BLD: NORMAL
ABO + RH BLD: NORMAL
ALBUMIN FLD-MCNC: 0.2 G/DL
ALBUMIN FLD-MCNC: 0.6 G/DL
ALBUMIN SERPL-MCNC: 1 G/DL (ref 3.4–5)
ALBUMIN SERPL-MCNC: 1.2 G/DL (ref 3.4–5)
ALBUMIN SERPL-MCNC: 1.3 G/DL (ref 3.4–5)
ALBUMIN SERPL-MCNC: 1.4 G/DL (ref 3.4–5)
ALBUMIN SERPL-MCNC: 1.5 G/DL (ref 3.4–5)
ALBUMIN SERPL-MCNC: 1.5 G/DL (ref 3.4–5)
ALBUMIN SERPL-MCNC: 1.6 G/DL (ref 3.4–5)
ALBUMIN SERPL-MCNC: 1.7 G/DL (ref 3.4–5)
ALBUMIN SERPL-MCNC: 1.8 G/DL (ref 3.4–5)
ALBUMIN SERPL-MCNC: 1.9 G/DL (ref 3.4–5)
ALBUMIN SERPL-MCNC: 1.9 G/DL (ref 3.4–5)
ALBUMIN UR-MCNC: 10 MG/DL
ALBUMIN UR-MCNC: NEGATIVE MG/DL
ALP SERPL-CCNC: 1116 U/L (ref 40–150)
ALP SERPL-CCNC: 505 U/L (ref 40–150)
ALP SERPL-CCNC: 544 U/L (ref 40–150)
ALP SERPL-CCNC: 549 U/L (ref 40–150)
ALP SERPL-CCNC: 592 U/L (ref 40–150)
ALP SERPL-CCNC: 602 U/L (ref 40–150)
ALP SERPL-CCNC: 633 U/L (ref 40–150)
ALP SERPL-CCNC: 649 U/L (ref 40–150)
ALP SERPL-CCNC: 650 U/L (ref 40–150)
ALP SERPL-CCNC: 655 U/L (ref 40–150)
ALP SERPL-CCNC: 660 U/L (ref 40–150)
ALP SERPL-CCNC: 672 U/L (ref 40–150)
ALP SERPL-CCNC: 682 U/L (ref 40–150)
ALP SERPL-CCNC: 696 U/L (ref 40–150)
ALP SERPL-CCNC: 703 U/L (ref 40–150)
ALP SERPL-CCNC: 704 U/L (ref 40–150)
ALP SERPL-CCNC: 710 U/L (ref 40–150)
ALP SERPL-CCNC: 722 U/L (ref 40–150)
ALP SERPL-CCNC: 777 U/L (ref 40–150)
ALP SERPL-CCNC: 789 U/L (ref 40–150)
ALP SERPL-CCNC: 807 U/L (ref 40–150)
ALP SERPL-CCNC: 825 U/L (ref 40–150)
ALP SERPL-CCNC: 827 U/L (ref 40–150)
ALP SERPL-CCNC: 855 U/L (ref 40–150)
ALP SERPL-CCNC: 857 U/L (ref 40–150)
ALP SERPL-CCNC: 920 U/L (ref 40–150)
ALP SERPL-CCNC: 928 U/L (ref 40–150)
ALP SERPL-CCNC: 969 U/L (ref 40–150)
ALP SERPL-CCNC: 999 U/L (ref 40–150)
ALT SERPL W P-5'-P-CCNC: 30 U/L (ref 0–70)
ALT SERPL W P-5'-P-CCNC: 32 U/L (ref 0–70)
ALT SERPL W P-5'-P-CCNC: 33 U/L (ref 0–70)
ALT SERPL W P-5'-P-CCNC: 33 U/L (ref 0–70)
ALT SERPL W P-5'-P-CCNC: 34 U/L (ref 0–70)
ALT SERPL W P-5'-P-CCNC: 35 U/L (ref 0–70)
ALT SERPL W P-5'-P-CCNC: 36 U/L (ref 0–70)
ALT SERPL W P-5'-P-CCNC: 36 U/L (ref 0–70)
ALT SERPL W P-5'-P-CCNC: 38 U/L (ref 0–70)
ALT SERPL W P-5'-P-CCNC: 40 U/L (ref 0–70)
ALT SERPL W P-5'-P-CCNC: 42 U/L (ref 0–70)
ALT SERPL W P-5'-P-CCNC: 43 U/L (ref 0–70)
ALT SERPL W P-5'-P-CCNC: 43 U/L (ref 0–70)
ALT SERPL W P-5'-P-CCNC: 45 U/L (ref 0–70)
ALT SERPL W P-5'-P-CCNC: 45 U/L (ref 0–70)
ALT SERPL W P-5'-P-CCNC: 48 U/L (ref 0–70)
ALT SERPL W P-5'-P-CCNC: 49 U/L (ref 0–70)
ALT SERPL W P-5'-P-CCNC: 50 U/L (ref 0–70)
ALT SERPL W P-5'-P-CCNC: 51 U/L (ref 0–70)
ALT SERPL W P-5'-P-CCNC: 56 U/L (ref 0–70)
ALT SERPL W P-5'-P-CCNC: 57 U/L (ref 0–70)
ALT SERPL W P-5'-P-CCNC: 59 U/L (ref 0–70)
ALT SERPL W P-5'-P-CCNC: 76 U/L (ref 0–70)
AMMONIA PLAS-SCNC: 15 UMOL/L (ref 10–50)
AMORPH CRY #/AREA URNS HPF: ABNORMAL /HPF
AMYLASE SERPL-CCNC: 75 U/L (ref 30–110)
ANION GAP SERPL CALCULATED.3IONS-SCNC: 13 MMOL/L (ref 3–14)
ANION GAP SERPL CALCULATED.3IONS-SCNC: 2 MMOL/L (ref 3–14)
ANION GAP SERPL CALCULATED.3IONS-SCNC: 2 MMOL/L (ref 3–14)
ANION GAP SERPL CALCULATED.3IONS-SCNC: 3 MMOL/L (ref 3–14)
ANION GAP SERPL CALCULATED.3IONS-SCNC: 4 MMOL/L (ref 3–14)
ANION GAP SERPL CALCULATED.3IONS-SCNC: 5 MMOL/L (ref 3–14)
ANION GAP SERPL CALCULATED.3IONS-SCNC: 6 MMOL/L (ref 3–14)
ANION GAP SERPL CALCULATED.3IONS-SCNC: 7 MMOL/L (ref 3–14)
ANISOCYTOSIS BLD QL SMEAR: ABNORMAL
ANISOCYTOSIS BLD QL SMEAR: SLIGHT
APPEARANCE FLD: CLEAR
APPEARANCE FLD: NORMAL
APPEARANCE FLD: NORMAL
APPEARANCE UR: ABNORMAL
APPEARANCE UR: CLEAR
APTT PPP: 37 SEC (ref 22–37)
AST SERPL W P-5'-P-CCNC: 101 U/L (ref 0–45)
AST SERPL W P-5'-P-CCNC: 103 U/L (ref 0–45)
AST SERPL W P-5'-P-CCNC: 106 U/L (ref 0–45)
AST SERPL W P-5'-P-CCNC: 107 U/L (ref 0–45)
AST SERPL W P-5'-P-CCNC: 107 U/L (ref 0–45)
AST SERPL W P-5'-P-CCNC: 108 U/L (ref 0–45)
AST SERPL W P-5'-P-CCNC: 109 U/L (ref 0–45)
AST SERPL W P-5'-P-CCNC: 113 U/L (ref 0–45)
AST SERPL W P-5'-P-CCNC: 118 U/L (ref 0–45)
AST SERPL W P-5'-P-CCNC: 123 U/L (ref 0–45)
AST SERPL W P-5'-P-CCNC: 130 U/L (ref 0–45)
AST SERPL W P-5'-P-CCNC: 130 U/L (ref 0–45)
AST SERPL W P-5'-P-CCNC: 135 U/L (ref 0–45)
AST SERPL W P-5'-P-CCNC: 139 U/L (ref 0–45)
AST SERPL W P-5'-P-CCNC: 150 U/L (ref 0–45)
AST SERPL W P-5'-P-CCNC: 155 U/L (ref 0–45)
AST SERPL W P-5'-P-CCNC: 156 U/L (ref 0–45)
AST SERPL W P-5'-P-CCNC: 164 U/L (ref 0–45)
AST SERPL W P-5'-P-CCNC: 193 U/L (ref 0–45)
AST SERPL W P-5'-P-CCNC: 67 U/L (ref 0–45)
AST SERPL W P-5'-P-CCNC: 74 U/L (ref 0–45)
AST SERPL W P-5'-P-CCNC: 75 U/L (ref 0–45)
AST SERPL W P-5'-P-CCNC: 87 U/L (ref 0–45)
AST SERPL W P-5'-P-CCNC: 89 U/L (ref 0–45)
AST SERPL W P-5'-P-CCNC: 90 U/L (ref 0–45)
AST SERPL W P-5'-P-CCNC: 91 U/L (ref 0–45)
AST SERPL W P-5'-P-CCNC: 91 U/L (ref 0–45)
AST SERPL W P-5'-P-CCNC: 95 U/L (ref 0–45)
AST SERPL W P-5'-P-CCNC: 99 U/L (ref 0–45)
BACTERIA #/AREA URNS HPF: ABNORMAL /HPF
BACTERIA SPEC CULT: ABNORMAL
BACTERIA SPEC CULT: NO GROWTH
BACTERIA SPEC CULT: NORMAL
BASE DEFICIT BLDV-SCNC: 7.5 MMOL/L
BASOPHILS # BLD AUTO: 0 10E9/L (ref 0–0.2)
BASOPHILS NFR BLD AUTO: 0 %
BASOPHILS NFR BLD AUTO: 0 %
BASOPHILS NFR BLD AUTO: 0.1 %
BASOPHILS NFR BLD AUTO: 0.2 %
BASOPHILS NFR BLD AUTO: 0.2 %
BASOPHILS NFR BLD AUTO: 0.3 %
BASOPHILS NFR BLD AUTO: 0.4 %
BASOPHILS NFR BLD AUTO: 0.5 %
BASOPHILS NFR BLD AUTO: 0.6 %
BASOPHILS NFR FLD MANUAL: 1 %
BILIRUB DIRECT SERPL-MCNC: 2.3 MG/DL (ref 0–0.2)
BILIRUB DIRECT SERPL-MCNC: 2.4 MG/DL (ref 0–0.2)
BILIRUB DIRECT SERPL-MCNC: 2.6 MG/DL (ref 0–0.2)
BILIRUB DIRECT SERPL-MCNC: 4.8 MG/DL (ref 0–0.2)
BILIRUB SERPL-MCNC: 1.9 MG/DL (ref 0.2–1.3)
BILIRUB SERPL-MCNC: 2.1 MG/DL (ref 0.2–1.3)
BILIRUB SERPL-MCNC: 2.3 MG/DL (ref 0.2–1.3)
BILIRUB SERPL-MCNC: 2.4 MG/DL (ref 0.2–1.3)
BILIRUB SERPL-MCNC: 2.4 MG/DL (ref 0.2–1.3)
BILIRUB SERPL-MCNC: 2.6 MG/DL (ref 0.2–1.3)
BILIRUB SERPL-MCNC: 2.7 MG/DL (ref 0.2–1.3)
BILIRUB SERPL-MCNC: 2.8 MG/DL (ref 0.2–1.3)
BILIRUB SERPL-MCNC: 2.9 MG/DL (ref 0.2–1.3)
BILIRUB SERPL-MCNC: 3 MG/DL (ref 0.2–1.3)
BILIRUB SERPL-MCNC: 3 MG/DL (ref 0.2–1.3)
BILIRUB SERPL-MCNC: 3.1 MG/DL (ref 0.2–1.3)
BILIRUB SERPL-MCNC: 3.1 MG/DL (ref 0.2–1.3)
BILIRUB SERPL-MCNC: 4 MG/DL (ref 0.2–1.3)
BILIRUB SERPL-MCNC: 4.2 MG/DL (ref 0.2–1.3)
BILIRUB SERPL-MCNC: 4.3 MG/DL (ref 0.2–1.3)
BILIRUB SERPL-MCNC: 4.3 MG/DL (ref 0.2–1.3)
BILIRUB SERPL-MCNC: 4.4 MG/DL (ref 0.2–1.3)
BILIRUB SERPL-MCNC: 4.9 MG/DL (ref 0.2–1.3)
BILIRUB SERPL-MCNC: 5.7 MG/DL (ref 0.2–1.3)
BILIRUB SERPL-MCNC: 5.8 MG/DL (ref 0.2–1.3)
BILIRUB SERPL-MCNC: 5.8 MG/DL (ref 0.2–1.3)
BILIRUB SERPL-MCNC: 6.5 MG/DL (ref 0.2–1.3)
BILIRUB SERPL-MCNC: 6.6 MG/DL (ref 0.2–1.3)
BILIRUB SERPL-MCNC: 6.8 MG/DL (ref 0.2–1.3)
BILIRUB SERPL-MCNC: 6.8 MG/DL (ref 0.2–1.3)
BILIRUB SERPL-MCNC: 7.3 MG/DL (ref 0.2–1.3)
BILIRUB SERPL-MCNC: 7.4 MG/DL (ref 0.2–1.3)
BILIRUB SERPL-MCNC: 7.9 MG/DL (ref 0.2–1.3)
BILIRUB UR QL STRIP: ABNORMAL
BILIRUB UR QL STRIP: ABNORMAL
BLD GP AB SCN SERPL QL: NORMAL
BLD PROD TYP BPU: NORMAL
BLD UNIT ID BPU: 0
BLD UNIT ID BPU: 0
BLOOD BANK CMNT PATIENT-IMP: NORMAL
BLOOD PRODUCT CODE: NORMAL
BLOOD PRODUCT CODE: NORMAL
BPU ID: NORMAL
BPU ID: NORMAL
BUN SERPL-MCNC: 10 MG/DL (ref 7–30)
BUN SERPL-MCNC: 11 MG/DL (ref 7–30)
BUN SERPL-MCNC: 11 MG/DL (ref 7–30)
BUN SERPL-MCNC: 12 MG/DL (ref 7–30)
BUN SERPL-MCNC: 13 MG/DL (ref 7–30)
BUN SERPL-MCNC: 13 MG/DL (ref 7–30)
BUN SERPL-MCNC: 14 MG/DL (ref 7–30)
BUN SERPL-MCNC: 15 MG/DL (ref 7–30)
BUN SERPL-MCNC: 15 MG/DL (ref 7–30)
BUN SERPL-MCNC: 16 MG/DL (ref 7–30)
BUN SERPL-MCNC: 17 MG/DL (ref 7–30)
BUN SERPL-MCNC: 18 MG/DL (ref 7–30)
BUN SERPL-MCNC: 19 MG/DL (ref 7–30)
BUN SERPL-MCNC: 19 MG/DL (ref 7–30)
BUN SERPL-MCNC: 21 MG/DL (ref 7–30)
BUN SERPL-MCNC: 22 MG/DL (ref 7–30)
BUN SERPL-MCNC: 22 MG/DL (ref 7–30)
BUN SERPL-MCNC: 23 MG/DL (ref 7–30)
BUN SERPL-MCNC: 34 MG/DL (ref 7–30)
BUN SERPL-MCNC: 8 MG/DL (ref 7–30)
BUN SERPL-MCNC: 8 MG/DL (ref 7–30)
BUN SERPL-MCNC: 9 MG/DL (ref 7–30)
BUN SERPL-MCNC: 9 MG/DL (ref 7–30)
BURR CELLS BLD QL SMEAR: SLIGHT
C DIFF TOX B STL QL: NEGATIVE
CALCIUM SERPL-MCNC: 7.3 MG/DL (ref 8.5–10.1)
CALCIUM SERPL-MCNC: 7.4 MG/DL (ref 8.5–10.1)
CALCIUM SERPL-MCNC: 7.6 MG/DL (ref 8.5–10.1)
CALCIUM SERPL-MCNC: 7.8 MG/DL (ref 8.5–10.1)
CALCIUM SERPL-MCNC: 7.9 MG/DL (ref 8.5–10.1)
CALCIUM SERPL-MCNC: 8 MG/DL (ref 8.5–10.1)
CALCIUM SERPL-MCNC: 8.1 MG/DL (ref 8.5–10.1)
CALCIUM SERPL-MCNC: 8.2 MG/DL (ref 8.5–10.1)
CALCIUM SERPL-MCNC: 8.4 MG/DL (ref 8.5–10.1)
CALCIUM SERPL-MCNC: 8.5 MG/DL (ref 8.5–10.1)
CALCIUM SERPL-MCNC: 8.6 MG/DL (ref 8.5–10.1)
CALCIUM SERPL-MCNC: 8.7 MG/DL (ref 8.5–10.1)
CALCIUM SERPL-MCNC: 8.8 MG/DL (ref 8.5–10.1)
CALCIUM SERPL-MCNC: 9 MG/DL (ref 8.5–10.1)
CALCIUM SERPL-MCNC: 9.2 MG/DL (ref 8.5–10.1)
CHLORIDE SERPL-SCNC: 100 MMOL/L (ref 94–109)
CHLORIDE SERPL-SCNC: 100 MMOL/L (ref 94–109)
CHLORIDE SERPL-SCNC: 101 MMOL/L (ref 94–109)
CHLORIDE SERPL-SCNC: 102 MMOL/L (ref 94–109)
CHLORIDE SERPL-SCNC: 103 MMOL/L (ref 94–109)
CHLORIDE SERPL-SCNC: 104 MMOL/L (ref 94–109)
CHLORIDE SERPL-SCNC: 104 MMOL/L (ref 94–109)
CHLORIDE SERPL-SCNC: 105 MMOL/L (ref 94–109)
CHLORIDE SERPL-SCNC: 106 MMOL/L (ref 94–109)
CHLORIDE SERPL-SCNC: 107 MMOL/L (ref 94–109)
CHLORIDE SERPL-SCNC: 108 MMOL/L (ref 94–109)
CHLORIDE SERPL-SCNC: 109 MMOL/L (ref 94–109)
CHLORIDE SERPL-SCNC: 110 MMOL/L (ref 94–109)
CHLORIDE SERPL-SCNC: 111 MMOL/L (ref 94–109)
CHLORIDE SERPL-SCNC: 114 MMOL/L (ref 94–109)
CHLORIDE SERPL-SCNC: 115 MMOL/L (ref 94–109)
CHLORIDE SERPL-SCNC: 115 MMOL/L (ref 94–109)
CHLORIDE SERPL-SCNC: 116 MMOL/L (ref 94–109)
CHLORIDE SERPL-SCNC: 96 MMOL/L (ref 94–109)
CHLORIDE SERPL-SCNC: 97 MMOL/L (ref 94–109)
CHLORIDE SERPL-SCNC: 98 MMOL/L (ref 94–109)
CO2 SERPL-SCNC: 15 MMOL/L (ref 20–32)
CO2 SERPL-SCNC: 19 MMOL/L (ref 20–32)
CO2 SERPL-SCNC: 19 MMOL/L (ref 20–32)
CO2 SERPL-SCNC: 20 MMOL/L (ref 20–32)
CO2 SERPL-SCNC: 20 MMOL/L (ref 20–32)
CO2 SERPL-SCNC: 21 MMOL/L (ref 20–32)
CO2 SERPL-SCNC: 22 MMOL/L (ref 20–32)
CO2 SERPL-SCNC: 22 MMOL/L (ref 20–32)
CO2 SERPL-SCNC: 24 MMOL/L (ref 20–32)
CO2 SERPL-SCNC: 25 MMOL/L (ref 20–32)
CO2 SERPL-SCNC: 25 MMOL/L (ref 20–32)
CO2 SERPL-SCNC: 26 MMOL/L (ref 20–32)
CO2 SERPL-SCNC: 27 MMOL/L (ref 20–32)
CO2 SERPL-SCNC: 28 MMOL/L (ref 20–32)
CO2 SERPL-SCNC: 29 MMOL/L (ref 20–32)
CO2 SERPL-SCNC: 30 MMOL/L (ref 20–32)
COLOR FLD: YELLOW
COLOR UR AUTO: ABNORMAL
COLOR UR AUTO: ABNORMAL
COPATH REPORT: NORMAL
COPATH REPORT: NORMAL
CORTIS SERPL-MCNC: 15.1 UG/DL (ref 4–22)
CREAT SERPL-MCNC: 0.72 MG/DL (ref 0.66–1.25)
CREAT SERPL-MCNC: 0.73 MG/DL (ref 0.66–1.25)
CREAT SERPL-MCNC: 0.73 MG/DL (ref 0.66–1.25)
CREAT SERPL-MCNC: 0.74 MG/DL (ref 0.66–1.25)
CREAT SERPL-MCNC: 0.75 MG/DL (ref 0.66–1.25)
CREAT SERPL-MCNC: 0.76 MG/DL (ref 0.66–1.25)
CREAT SERPL-MCNC: 0.76 MG/DL (ref 0.66–1.25)
CREAT SERPL-MCNC: 0.77 MG/DL (ref 0.66–1.25)
CREAT SERPL-MCNC: 0.81 MG/DL (ref 0.66–1.25)
CREAT SERPL-MCNC: 0.81 MG/DL (ref 0.66–1.25)
CREAT SERPL-MCNC: 0.83 MG/DL (ref 0.66–1.25)
CREAT SERPL-MCNC: 0.84 MG/DL (ref 0.66–1.25)
CREAT SERPL-MCNC: 0.84 MG/DL (ref 0.66–1.25)
CREAT SERPL-MCNC: 0.85 MG/DL (ref 0.66–1.25)
CREAT SERPL-MCNC: 0.86 MG/DL (ref 0.66–1.25)
CREAT SERPL-MCNC: 0.87 MG/DL (ref 0.66–1.25)
CREAT SERPL-MCNC: 0.88 MG/DL (ref 0.66–1.25)
CREAT SERPL-MCNC: 0.88 MG/DL (ref 0.66–1.25)
CREAT SERPL-MCNC: 0.91 MG/DL (ref 0.66–1.25)
CREAT SERPL-MCNC: 0.93 MG/DL (ref 0.66–1.25)
CREAT SERPL-MCNC: 0.93 MG/DL (ref 0.66–1.25)
CREAT SERPL-MCNC: 0.98 MG/DL (ref 0.66–1.25)
CREAT SERPL-MCNC: 1 MG/DL (ref 0.66–1.25)
CREAT SERPL-MCNC: 1 MG/DL (ref 0.66–1.25)
CREAT SERPL-MCNC: 1.02 MG/DL (ref 0.66–1.25)
CREAT SERPL-MCNC: 1.03 MG/DL (ref 0.66–1.25)
CREAT SERPL-MCNC: 1.09 MG/DL (ref 0.66–1.25)
CREAT SERPL-MCNC: 1.13 MG/DL (ref 0.66–1.25)
CREAT SERPL-MCNC: 1.71 MG/DL (ref 0.66–1.25)
CREAT SERPL-MCNC: 1.93 MG/DL (ref 0.66–1.25)
CRP SERPL-MCNC: 100 MG/L (ref 0–8)
CRP SERPL-MCNC: 14 MG/L (ref 0–8)
CRP SERPL-MCNC: 53 MG/L (ref 0–8)
CRP SERPL-MCNC: 55 MG/L (ref 0–8)
CRP SERPL-MCNC: 57 MG/L (ref 0–8)
CRP SERPL-MCNC: 87 MG/L (ref 0–8)
DEPRECATED CALCIDIOL+CALCIFEROL SERPL-MC: 10 UG/L (ref 20–75)
DEPRECATED CALCIDIOL+CALCIFEROL SERPL-MC: 19 UG/L (ref 20–75)
DIFFERENTIAL METHOD BLD: ABNORMAL
EOSINOPHIL # BLD AUTO: 0 10E9/L (ref 0–0.7)
EOSINOPHIL # BLD AUTO: 0.1 10E9/L (ref 0–0.7)
EOSINOPHIL NFR BLD AUTO: 0 %
EOSINOPHIL NFR BLD AUTO: 0.4 %
EOSINOPHIL NFR BLD AUTO: 0.5 %
EOSINOPHIL NFR BLD AUTO: 0.5 %
EOSINOPHIL NFR BLD AUTO: 0.7 %
EOSINOPHIL NFR BLD AUTO: 0.9 %
EOSINOPHIL NFR BLD AUTO: 1 %
EOSINOPHIL NFR BLD AUTO: 1.1 %
EOSINOPHIL NFR BLD AUTO: 1.1 %
EOSINOPHIL NFR BLD AUTO: 1.3 %
EOSINOPHIL NFR BLD AUTO: 1.5 %
EOSINOPHIL NFR BLD AUTO: 4.2 %
ERCP: NORMAL
ERYTHROCYTE [DISTWIDTH] IN BLOOD BY AUTOMATED COUNT: 16.6 % (ref 10–15)
ERYTHROCYTE [DISTWIDTH] IN BLOOD BY AUTOMATED COUNT: 16.8 % (ref 10–15)
ERYTHROCYTE [DISTWIDTH] IN BLOOD BY AUTOMATED COUNT: 16.8 % (ref 10–15)
ERYTHROCYTE [DISTWIDTH] IN BLOOD BY AUTOMATED COUNT: 17 % (ref 10–15)
ERYTHROCYTE [DISTWIDTH] IN BLOOD BY AUTOMATED COUNT: 17.1 % (ref 10–15)
ERYTHROCYTE [DISTWIDTH] IN BLOOD BY AUTOMATED COUNT: 17.2 % (ref 10–15)
ERYTHROCYTE [DISTWIDTH] IN BLOOD BY AUTOMATED COUNT: 17.3 % (ref 10–15)
ERYTHROCYTE [DISTWIDTH] IN BLOOD BY AUTOMATED COUNT: 17.3 % (ref 10–15)
ERYTHROCYTE [DISTWIDTH] IN BLOOD BY AUTOMATED COUNT: 17.5 % (ref 10–15)
ERYTHROCYTE [DISTWIDTH] IN BLOOD BY AUTOMATED COUNT: 17.6 % (ref 10–15)
ERYTHROCYTE [DISTWIDTH] IN BLOOD BY AUTOMATED COUNT: 17.7 % (ref 10–15)
ERYTHROCYTE [DISTWIDTH] IN BLOOD BY AUTOMATED COUNT: 17.7 % (ref 10–15)
ERYTHROCYTE [DISTWIDTH] IN BLOOD BY AUTOMATED COUNT: 18.2 % (ref 10–15)
ERYTHROCYTE [DISTWIDTH] IN BLOOD BY AUTOMATED COUNT: 18.3 % (ref 10–15)
ERYTHROCYTE [DISTWIDTH] IN BLOOD BY AUTOMATED COUNT: 18.4 % (ref 10–15)
ERYTHROCYTE [DISTWIDTH] IN BLOOD BY AUTOMATED COUNT: 18.6 % (ref 10–15)
ERYTHROCYTE [DISTWIDTH] IN BLOOD BY AUTOMATED COUNT: 18.6 % (ref 10–15)
ERYTHROCYTE [DISTWIDTH] IN BLOOD BY AUTOMATED COUNT: 18.9 % (ref 10–15)
ERYTHROCYTE [DISTWIDTH] IN BLOOD BY AUTOMATED COUNT: 19.2 % (ref 10–15)
ERYTHROCYTE [DISTWIDTH] IN BLOOD BY AUTOMATED COUNT: 19.4 % (ref 10–15)
ERYTHROCYTE [DISTWIDTH] IN BLOOD BY AUTOMATED COUNT: 19.8 % (ref 10–15)
ERYTHROCYTE [DISTWIDTH] IN BLOOD BY AUTOMATED COUNT: 19.9 % (ref 10–15)
ERYTHROCYTE [DISTWIDTH] IN BLOOD BY AUTOMATED COUNT: 19.9 % (ref 10–15)
ERYTHROCYTE [DISTWIDTH] IN BLOOD BY AUTOMATED COUNT: 20 % (ref 10–15)
ERYTHROCYTE [DISTWIDTH] IN BLOOD BY AUTOMATED COUNT: 20.3 % (ref 10–15)
ERYTHROCYTE [DISTWIDTH] IN BLOOD BY AUTOMATED COUNT: 20.9 % (ref 10–15)
FIBRINOGEN PPP-MCNC: 214 MG/DL (ref 200–420)
FLUAV RNA RESP QL NAA+PROBE: NEGATIVE
FLUBV RNA RESP QL NAA+PROBE: NEGATIVE
FOLATE SERPL-MCNC: 13.7 NG/ML
FUNGUS SPEC CULT: NORMAL
GFR SERPL CREATININE-BSD FRML MDRD: 35 ML/MIN/{1.73_M2}
GFR SERPL CREATININE-BSD FRML MDRD: 40 ML/MIN/{1.73_M2}
GFR SERPL CREATININE-BSD FRML MDRD: 66 ML/MIN/{1.73_M2}
GFR SERPL CREATININE-BSD FRML MDRD: 69 ML/MIN/{1.73_M2}
GFR SERPL CREATININE-BSD FRML MDRD: 74 ML/MIN/{1.73_M2}
GFR SERPL CREATININE-BSD FRML MDRD: 75 ML/MIN/{1.73_M2}
GFR SERPL CREATININE-BSD FRML MDRD: 77 ML/MIN/{1.73_M2}
GFR SERPL CREATININE-BSD FRML MDRD: 77 ML/MIN/{1.73_M2}
GFR SERPL CREATININE-BSD FRML MDRD: 79 ML/MIN/{1.73_M2}
GFR SERPL CREATININE-BSD FRML MDRD: 84 ML/MIN/{1.73_M2}
GFR SERPL CREATININE-BSD FRML MDRD: 84 ML/MIN/{1.73_M2}
GFR SERPL CREATININE-BSD FRML MDRD: 86 ML/MIN/{1.73_M2}
GFR SERPL CREATININE-BSD FRML MDRD: 88 ML/MIN/{1.73_M2}
GFR SERPL CREATININE-BSD FRML MDRD: 88 ML/MIN/{1.73_M2}
GFR SERPL CREATININE-BSD FRML MDRD: 89 ML/MIN/{1.73_M2}
GFR SERPL CREATININE-BSD FRML MDRD: 90 ML/MIN/{1.73_M2}
GFR SERPL CREATININE-BSD FRML MDRD: 90 ML/MIN/{1.73_M2}
GFR SERPL CREATININE-BSD FRML MDRD: >90 ML/MIN/{1.73_M2}
GLUCOSE BLDC GLUCOMTR-MCNC: 100 MG/DL (ref 70–99)
GLUCOSE BLDC GLUCOMTR-MCNC: 101 MG/DL (ref 70–99)
GLUCOSE BLDC GLUCOMTR-MCNC: 106 MG/DL (ref 70–99)
GLUCOSE BLDC GLUCOMTR-MCNC: 108 MG/DL (ref 70–99)
GLUCOSE BLDC GLUCOMTR-MCNC: 108 MG/DL (ref 70–99)
GLUCOSE BLDC GLUCOMTR-MCNC: 109 MG/DL (ref 70–99)
GLUCOSE BLDC GLUCOMTR-MCNC: 110 MG/DL (ref 70–99)
GLUCOSE BLDC GLUCOMTR-MCNC: 110 MG/DL (ref 70–99)
GLUCOSE BLDC GLUCOMTR-MCNC: 111 MG/DL (ref 70–99)
GLUCOSE BLDC GLUCOMTR-MCNC: 112 MG/DL (ref 70–99)
GLUCOSE BLDC GLUCOMTR-MCNC: 113 MG/DL (ref 70–99)
GLUCOSE BLDC GLUCOMTR-MCNC: 116 MG/DL (ref 70–99)
GLUCOSE BLDC GLUCOMTR-MCNC: 118 MG/DL (ref 70–99)
GLUCOSE BLDC GLUCOMTR-MCNC: 118 MG/DL (ref 70–99)
GLUCOSE BLDC GLUCOMTR-MCNC: 130 MG/DL (ref 70–99)
GLUCOSE BLDC GLUCOMTR-MCNC: 134 MG/DL (ref 70–99)
GLUCOSE BLDC GLUCOMTR-MCNC: 134 MG/DL (ref 70–99)
GLUCOSE BLDC GLUCOMTR-MCNC: 135 MG/DL (ref 70–99)
GLUCOSE BLDC GLUCOMTR-MCNC: 136 MG/DL (ref 70–99)
GLUCOSE BLDC GLUCOMTR-MCNC: 137 MG/DL (ref 70–99)
GLUCOSE BLDC GLUCOMTR-MCNC: 137 MG/DL (ref 70–99)
GLUCOSE BLDC GLUCOMTR-MCNC: 143 MG/DL (ref 70–99)
GLUCOSE BLDC GLUCOMTR-MCNC: 162 MG/DL (ref 70–99)
GLUCOSE BLDC GLUCOMTR-MCNC: 165 MG/DL (ref 70–99)
GLUCOSE BLDC GLUCOMTR-MCNC: 167 MG/DL (ref 70–99)
GLUCOSE BLDC GLUCOMTR-MCNC: 174 MG/DL (ref 70–99)
GLUCOSE BLDC GLUCOMTR-MCNC: 218 MG/DL (ref 70–99)
GLUCOSE BLDC GLUCOMTR-MCNC: 74 MG/DL (ref 70–99)
GLUCOSE BLDC GLUCOMTR-MCNC: 76 MG/DL (ref 70–99)
GLUCOSE BLDC GLUCOMTR-MCNC: 81 MG/DL (ref 70–99)
GLUCOSE BLDC GLUCOMTR-MCNC: 86 MG/DL (ref 70–99)
GLUCOSE BLDC GLUCOMTR-MCNC: 92 MG/DL (ref 70–99)
GLUCOSE BLDC GLUCOMTR-MCNC: 93 MG/DL (ref 70–99)
GLUCOSE BLDC GLUCOMTR-MCNC: 95 MG/DL (ref 70–99)
GLUCOSE SERPL-MCNC: 100 MG/DL (ref 70–99)
GLUCOSE SERPL-MCNC: 102 MG/DL (ref 70–99)
GLUCOSE SERPL-MCNC: 105 MG/DL (ref 70–99)
GLUCOSE SERPL-MCNC: 106 MG/DL (ref 70–99)
GLUCOSE SERPL-MCNC: 107 MG/DL (ref 70–99)
GLUCOSE SERPL-MCNC: 110 MG/DL (ref 70–99)
GLUCOSE SERPL-MCNC: 111 MG/DL (ref 70–99)
GLUCOSE SERPL-MCNC: 113 MG/DL (ref 70–99)
GLUCOSE SERPL-MCNC: 115 MG/DL (ref 70–99)
GLUCOSE SERPL-MCNC: 116 MG/DL (ref 70–99)
GLUCOSE SERPL-MCNC: 117 MG/DL (ref 70–99)
GLUCOSE SERPL-MCNC: 122 MG/DL (ref 70–99)
GLUCOSE SERPL-MCNC: 122 MG/DL (ref 70–99)
GLUCOSE SERPL-MCNC: 124 MG/DL (ref 70–99)
GLUCOSE SERPL-MCNC: 124 MG/DL (ref 70–99)
GLUCOSE SERPL-MCNC: 126 MG/DL (ref 70–99)
GLUCOSE SERPL-MCNC: 128 MG/DL (ref 70–99)
GLUCOSE SERPL-MCNC: 130 MG/DL (ref 70–99)
GLUCOSE SERPL-MCNC: 132 MG/DL (ref 70–99)
GLUCOSE SERPL-MCNC: 133 MG/DL (ref 70–99)
GLUCOSE SERPL-MCNC: 142 MG/DL (ref 70–99)
GLUCOSE SERPL-MCNC: 148 MG/DL (ref 70–99)
GLUCOSE SERPL-MCNC: 150 MG/DL (ref 70–99)
GLUCOSE SERPL-MCNC: 177 MG/DL (ref 70–99)
GLUCOSE SERPL-MCNC: 75 MG/DL (ref 70–99)
GLUCOSE SERPL-MCNC: 78 MG/DL (ref 70–99)
GLUCOSE SERPL-MCNC: 84 MG/DL (ref 70–99)
GLUCOSE SERPL-MCNC: 88 MG/DL (ref 70–99)
GLUCOSE SERPL-MCNC: 89 MG/DL (ref 70–99)
GLUCOSE SERPL-MCNC: 90 MG/DL (ref 70–99)
GLUCOSE SERPL-MCNC: 93 MG/DL (ref 70–99)
GLUCOSE SERPL-MCNC: 94 MG/DL (ref 70–99)
GLUCOSE SERPL-MCNC: 95 MG/DL (ref 70–99)
GLUCOSE SERPL-MCNC: 97 MG/DL (ref 70–99)
GLUCOSE SERPL-MCNC: 97 MG/DL (ref 70–99)
GLUCOSE UR STRIP-MCNC: NEGATIVE MG/DL
GLUCOSE UR STRIP-MCNC: NEGATIVE MG/DL
GRAM STN SPEC: ABNORMAL
GRAM STN SPEC: ABNORMAL
GRAM STN SPEC: NORMAL
HBA1C MFR BLD: 5.6 % (ref 0–5.6)
HCO3 BLDV-SCNC: 16 MMOL/L (ref 21–28)
HCT VFR BLD AUTO: 19.8 % (ref 40–53)
HCT VFR BLD AUTO: 20.6 % (ref 40–53)
HCT VFR BLD AUTO: 23.6 % (ref 40–53)
HCT VFR BLD AUTO: 23.6 % (ref 40–53)
HCT VFR BLD AUTO: 23.8 % (ref 40–53)
HCT VFR BLD AUTO: 24.4 % (ref 40–53)
HCT VFR BLD AUTO: 25 % (ref 40–53)
HCT VFR BLD AUTO: 25.1 % (ref 40–53)
HCT VFR BLD AUTO: 25.1 % (ref 40–53)
HCT VFR BLD AUTO: 25.5 % (ref 40–53)
HCT VFR BLD AUTO: 25.5 % (ref 40–53)
HCT VFR BLD AUTO: 25.7 % (ref 40–53)
HCT VFR BLD AUTO: 25.7 % (ref 40–53)
HCT VFR BLD AUTO: 25.8 % (ref 40–53)
HCT VFR BLD AUTO: 25.9 % (ref 40–53)
HCT VFR BLD AUTO: 26.1 % (ref 40–53)
HCT VFR BLD AUTO: 26.3 % (ref 40–53)
HCT VFR BLD AUTO: 26.5 % (ref 40–53)
HCT VFR BLD AUTO: 26.7 % (ref 40–53)
HCT VFR BLD AUTO: 27.1 % (ref 40–53)
HCT VFR BLD AUTO: 27.2 % (ref 40–53)
HCT VFR BLD AUTO: 27.4 % (ref 40–53)
HCT VFR BLD AUTO: 27.5 % (ref 40–53)
HCT VFR BLD AUTO: 27.7 % (ref 40–53)
HCT VFR BLD AUTO: 27.9 % (ref 40–53)
HCT VFR BLD AUTO: 28 % (ref 40–53)
HCT VFR BLD AUTO: 28.4 % (ref 40–53)
HCT VFR BLD AUTO: 28.4 % (ref 40–53)
HCT VFR BLD AUTO: 28.6 % (ref 40–53)
HCT VFR BLD AUTO: 28.6 % (ref 40–53)
HCT VFR BLD AUTO: 28.9 % (ref 40–53)
HCT VFR BLD AUTO: 29.6 % (ref 40–53)
HCT VFR BLD AUTO: 29.7 % (ref 40–53)
HCT VFR BLD AUTO: 31 % (ref 40–53)
HCT VFR BLD AUTO: 32.3 % (ref 40–53)
HGB BLD-MCNC: 10.1 G/DL (ref 13.3–17.7)
HGB BLD-MCNC: 10.4 G/DL (ref 13.3–17.7)
HGB BLD-MCNC: 11.4 G/DL (ref 13.3–17.7)
HGB BLD-MCNC: 6.7 G/DL (ref 13.3–17.7)
HGB BLD-MCNC: 6.9 G/DL (ref 13.3–17.7)
HGB BLD-MCNC: 7.9 G/DL (ref 13.3–17.7)
HGB BLD-MCNC: 8 G/DL (ref 13.3–17.7)
HGB BLD-MCNC: 8.1 G/DL (ref 13.3–17.7)
HGB BLD-MCNC: 8.3 G/DL (ref 13.3–17.7)
HGB BLD-MCNC: 8.4 G/DL (ref 13.3–17.7)
HGB BLD-MCNC: 8.4 G/DL (ref 13.3–17.7)
HGB BLD-MCNC: 8.5 G/DL (ref 13.3–17.7)
HGB BLD-MCNC: 8.5 G/DL (ref 13.3–17.7)
HGB BLD-MCNC: 8.6 G/DL (ref 13.3–17.7)
HGB BLD-MCNC: 8.6 G/DL (ref 13.3–17.7)
HGB BLD-MCNC: 8.7 G/DL (ref 13.3–17.7)
HGB BLD-MCNC: 8.8 G/DL (ref 13.3–17.7)
HGB BLD-MCNC: 8.9 G/DL (ref 13.3–17.7)
HGB BLD-MCNC: 9 G/DL (ref 13.3–17.7)
HGB BLD-MCNC: 9.2 G/DL (ref 13.3–17.7)
HGB BLD-MCNC: 9.4 G/DL (ref 13.3–17.7)
HGB BLD-MCNC: 9.5 G/DL (ref 13.3–17.7)
HGB BLD-MCNC: 9.7 G/DL (ref 13.3–17.7)
HGB BLD-MCNC: 9.8 G/DL (ref 13.3–17.7)
HGB UR QL STRIP: NEGATIVE
HGB UR QL STRIP: NEGATIVE
IMM GRANULOCYTES # BLD: 0 10E9/L (ref 0–0.4)
IMM GRANULOCYTES # BLD: 0.1 10E9/L (ref 0–0.4)
IMM GRANULOCYTES # BLD: 0.2 10E9/L (ref 0–0.4)
IMM GRANULOCYTES NFR BLD: 0.2 %
IMM GRANULOCYTES NFR BLD: 0.2 %
IMM GRANULOCYTES NFR BLD: 0.3 %
IMM GRANULOCYTES NFR BLD: 0.4 %
IMM GRANULOCYTES NFR BLD: 0.5 %
IMM GRANULOCYTES NFR BLD: 0.5 %
IMM GRANULOCYTES NFR BLD: 0.6 %
IMM GRANULOCYTES NFR BLD: 0.6 %
IMM GRANULOCYTES NFR BLD: 1.1 %
IMM GRANULOCYTES NFR BLD: 1.4 %
INR PPP: 1.48 (ref 0.86–1.14)
INR PPP: 1.54 (ref 0.86–1.14)
INR PPP: 1.56 (ref 0.86–1.14)
INR PPP: 1.58 (ref 0.86–1.14)
INR PPP: 1.72 (ref 0.86–1.14)
INR PPP: 1.74 (ref 0.86–1.14)
INR PPP: 1.79 (ref 0.86–1.14)
INR PPP: 1.83 (ref 0.86–1.14)
INR PPP: 1.95 (ref 0.86–1.14)
INR PPP: 1.95 (ref 0.86–1.14)
INR PPP: 4.56 (ref 0.86–1.14)
INTERPRETATION ECG - MUSE: NORMAL
KETONES UR STRIP-MCNC: NEGATIVE MG/DL
KETONES UR STRIP-MCNC: NEGATIVE MG/DL
LABORATORY COMMENT REPORT: NORMAL
LACTATE BLD-SCNC: 1.1 MMOL/L (ref 0.7–2)
LACTATE BLD-SCNC: 1.3 MMOL/L (ref 0.7–2)
LACTATE BLD-SCNC: 1.3 MMOL/L (ref 0.7–2)
LACTATE BLD-SCNC: 1.8 MMOL/L (ref 0.7–2)
LACTATE BLD-SCNC: 2.1 MMOL/L (ref 0.7–2)
LACTATE BLD-SCNC: 4.1 MMOL/L (ref 0.7–2)
LACTATE BLD-SCNC: 6.3 MMOL/L (ref 0.7–2)
LDH FLD L TO P-CCNC: 63 U/L
LEUKOCYTE ESTERASE UR QL STRIP: NEGATIVE
LEUKOCYTE ESTERASE UR QL STRIP: NEGATIVE
LIPASE SERPL-CCNC: 102 U/L (ref 73–393)
LIPASE SERPL-CCNC: 111 U/L (ref 73–393)
LIPASE SERPL-CCNC: 123 U/L (ref 73–393)
LIPASE SERPL-CCNC: 196 U/L (ref 73–393)
LYMPHOCYTES # BLD AUTO: 0.3 10E9/L (ref 0.8–5.3)
LYMPHOCYTES # BLD AUTO: 0.6 10E9/L (ref 0.8–5.3)
LYMPHOCYTES # BLD AUTO: 1 10E9/L (ref 0.8–5.3)
LYMPHOCYTES # BLD AUTO: 1 10E9/L (ref 0.8–5.3)
LYMPHOCYTES # BLD AUTO: 1.1 10E9/L (ref 0.8–5.3)
LYMPHOCYTES # BLD AUTO: 1.2 10E9/L (ref 0.8–5.3)
LYMPHOCYTES # BLD AUTO: 1.5 10E9/L (ref 0.8–5.3)
LYMPHOCYTES # BLD AUTO: 1.5 10E9/L (ref 0.8–5.3)
LYMPHOCYTES # BLD AUTO: 1.6 10E9/L (ref 0.8–5.3)
LYMPHOCYTES # BLD AUTO: 1.6 10E9/L (ref 0.8–5.3)
LYMPHOCYTES # BLD AUTO: 1.7 10E9/L (ref 0.8–5.3)
LYMPHOCYTES # BLD AUTO: 1.9 10E9/L (ref 0.8–5.3)
LYMPHOCYTES NFR BLD AUTO: 13.8 %
LYMPHOCYTES NFR BLD AUTO: 15 %
LYMPHOCYTES NFR BLD AUTO: 21.3 %
LYMPHOCYTES NFR BLD AUTO: 21.9 %
LYMPHOCYTES NFR BLD AUTO: 22.4 %
LYMPHOCYTES NFR BLD AUTO: 23.5 %
LYMPHOCYTES NFR BLD AUTO: 24.6 %
LYMPHOCYTES NFR BLD AUTO: 26.3 %
LYMPHOCYTES NFR BLD AUTO: 26.7 %
LYMPHOCYTES NFR BLD AUTO: 29.5 %
LYMPHOCYTES NFR BLD AUTO: 32.3 %
LYMPHOCYTES NFR BLD AUTO: 33 %
LYMPHOCYTES NFR BLD AUTO: 37.9 %
LYMPHOCYTES NFR BLD AUTO: 39.5 %
LYMPHOCYTES NFR BLD AUTO: 42.1 %
LYMPHOCYTES NFR BLD AUTO: 5.3 %
LYMPHOCYTES NFR FLD MANUAL: 23 %
LYMPHOCYTES NFR FLD MANUAL: 27 %
LYMPHOCYTES NFR FLD MANUAL: 30 %
LYMPHOCYTES NFR FLD MANUAL: 72 %
LYMPHOCYTES NFR FLD MANUAL: 80 %
LYMPHOCYTES NFR FLD MANUAL: 93 %
Lab: NORMAL
MAGNESIUM SERPL-MCNC: 1.3 MG/DL (ref 1.6–2.3)
MAGNESIUM SERPL-MCNC: 1.3 MG/DL (ref 1.6–2.3)
MAGNESIUM SERPL-MCNC: 1.4 MG/DL (ref 1.6–2.3)
MAGNESIUM SERPL-MCNC: 1.4 MG/DL (ref 1.6–2.3)
MAGNESIUM SERPL-MCNC: 1.5 MG/DL (ref 1.6–2.3)
MAGNESIUM SERPL-MCNC: 1.5 MG/DL (ref 1.6–2.3)
MAGNESIUM SERPL-MCNC: 1.6 MG/DL (ref 1.6–2.3)
MAGNESIUM SERPL-MCNC: 1.7 MG/DL (ref 1.6–2.3)
MAGNESIUM SERPL-MCNC: 1.8 MG/DL (ref 1.6–2.3)
MAGNESIUM SERPL-MCNC: 2 MG/DL (ref 1.6–2.3)
MAGNESIUM SERPL-MCNC: 2.1 MG/DL (ref 1.6–2.3)
MCH RBC QN AUTO: 29.2 PG (ref 26.5–33)
MCH RBC QN AUTO: 29.4 PG (ref 26.5–33)
MCH RBC QN AUTO: 29.5 PG (ref 26.5–33)
MCH RBC QN AUTO: 29.8 PG (ref 26.5–33)
MCH RBC QN AUTO: 29.9 PG (ref 26.5–33)
MCH RBC QN AUTO: 29.9 PG (ref 26.5–33)
MCH RBC QN AUTO: 30 PG (ref 26.5–33)
MCH RBC QN AUTO: 30.3 PG (ref 26.5–33)
MCH RBC QN AUTO: 30.4 PG (ref 26.5–33)
MCH RBC QN AUTO: 30.7 PG (ref 26.5–33)
MCH RBC QN AUTO: 30.7 PG (ref 26.5–33)
MCH RBC QN AUTO: 30.8 PG (ref 26.5–33)
MCH RBC QN AUTO: 30.9 PG (ref 26.5–33)
MCH RBC QN AUTO: 31 PG (ref 26.5–33)
MCH RBC QN AUTO: 31 PG (ref 26.5–33)
MCH RBC QN AUTO: 31.1 PG (ref 26.5–33)
MCH RBC QN AUTO: 31.1 PG (ref 26.5–33)
MCH RBC QN AUTO: 31.2 PG (ref 26.5–33)
MCH RBC QN AUTO: 31.2 PG (ref 26.5–33)
MCH RBC QN AUTO: 31.3 PG (ref 26.5–33)
MCH RBC QN AUTO: 31.4 PG (ref 26.5–33)
MCH RBC QN AUTO: 31.6 PG (ref 26.5–33)
MCH RBC QN AUTO: 32.2 PG (ref 26.5–33)
MCH RBC QN AUTO: 32.2 PG (ref 26.5–33)
MCH RBC QN AUTO: 32.3 PG (ref 26.5–33)
MCHC RBC AUTO-ENTMCNC: 32.1 G/DL (ref 31.5–36.5)
MCHC RBC AUTO-ENTMCNC: 32.5 G/DL (ref 31.5–36.5)
MCHC RBC AUTO-ENTMCNC: 32.7 G/DL (ref 31.5–36.5)
MCHC RBC AUTO-ENTMCNC: 32.9 G/DL (ref 31.5–36.5)
MCHC RBC AUTO-ENTMCNC: 32.9 G/DL (ref 31.5–36.5)
MCHC RBC AUTO-ENTMCNC: 33.1 G/DL (ref 31.5–36.5)
MCHC RBC AUTO-ENTMCNC: 33.1 G/DL (ref 31.5–36.5)
MCHC RBC AUTO-ENTMCNC: 33.2 G/DL (ref 31.5–36.5)
MCHC RBC AUTO-ENTMCNC: 33.3 G/DL (ref 31.5–36.5)
MCHC RBC AUTO-ENTMCNC: 33.3 G/DL (ref 31.5–36.5)
MCHC RBC AUTO-ENTMCNC: 33.5 G/DL (ref 31.5–36.5)
MCHC RBC AUTO-ENTMCNC: 33.6 G/DL (ref 31.5–36.5)
MCHC RBC AUTO-ENTMCNC: 33.7 G/DL (ref 31.5–36.5)
MCHC RBC AUTO-ENTMCNC: 33.7 G/DL (ref 31.5–36.5)
MCHC RBC AUTO-ENTMCNC: 33.8 G/DL (ref 31.5–36.5)
MCHC RBC AUTO-ENTMCNC: 33.8 G/DL (ref 31.5–36.5)
MCHC RBC AUTO-ENTMCNC: 33.9 G/DL (ref 31.5–36.5)
MCHC RBC AUTO-ENTMCNC: 34 G/DL (ref 31.5–36.5)
MCHC RBC AUTO-ENTMCNC: 34.2 G/DL (ref 31.5–36.5)
MCHC RBC AUTO-ENTMCNC: 34.3 G/DL (ref 31.5–36.5)
MCHC RBC AUTO-ENTMCNC: 34.9 G/DL (ref 31.5–36.5)
MCHC RBC AUTO-ENTMCNC: 35.3 G/DL (ref 31.5–36.5)
MCV RBC AUTO: 86 FL (ref 78–100)
MCV RBC AUTO: 87 FL (ref 78–100)
MCV RBC AUTO: 88 FL (ref 78–100)
MCV RBC AUTO: 88 FL (ref 78–100)
MCV RBC AUTO: 89 FL (ref 78–100)
MCV RBC AUTO: 90 FL (ref 78–100)
MCV RBC AUTO: 91 FL (ref 78–100)
MCV RBC AUTO: 91 FL (ref 78–100)
MCV RBC AUTO: 92 FL (ref 78–100)
MCV RBC AUTO: 93 FL (ref 78–100)
MCV RBC AUTO: 94 FL (ref 78–100)
MCV RBC AUTO: 95 FL (ref 78–100)
MCV RBC AUTO: 96 FL (ref 78–100)
MICROCYTES BLD QL SMEAR: PRESENT
MONOCYTES # BLD AUTO: 0.1 10E9/L (ref 0–1.3)
MONOCYTES # BLD AUTO: 0.2 10E9/L (ref 0–1.3)
MONOCYTES # BLD AUTO: 0.3 10E9/L (ref 0–1.3)
MONOCYTES # BLD AUTO: 0.4 10E9/L (ref 0–1.3)
MONOCYTES # BLD AUTO: 0.5 10E9/L (ref 0–1.3)
MONOCYTES # BLD AUTO: 0.5 10E9/L (ref 0–1.3)
MONOCYTES # BLD AUTO: 0.6 10E9/L (ref 0–1.3)
MONOCYTES # BLD AUTO: 0.7 10E9/L (ref 0–1.3)
MONOCYTES NFR BLD AUTO: 10.7 %
MONOCYTES NFR BLD AUTO: 11 %
MONOCYTES NFR BLD AUTO: 3.5 %
MONOCYTES NFR BLD AUTO: 3.7 %
MONOCYTES NFR BLD AUTO: 6 %
MONOCYTES NFR BLD AUTO: 6.1 %
MONOCYTES NFR BLD AUTO: 6.5 %
MONOCYTES NFR BLD AUTO: 6.7 %
MONOCYTES NFR BLD AUTO: 7.2 %
MONOCYTES NFR BLD AUTO: 7.6 %
MONOCYTES NFR BLD AUTO: 8 %
MONOCYTES NFR BLD AUTO: 8.3 %
MONOCYTES NFR BLD AUTO: 8.6 %
MONOCYTES NFR BLD AUTO: 8.7 %
MONOCYTES NFR BLD AUTO: 9.2 %
MONOCYTES NFR BLD AUTO: 9.8 %
MONOS+MACROS NFR FLD MANUAL: 18 %
MONOS+MACROS NFR FLD MANUAL: 27 %
MONOS+MACROS NFR FLD MANUAL: 57 %
MONOS+MACROS NFR FLD MANUAL: 6 %
MRSA DNA SPEC QL NAA+PROBE: NEGATIVE
MUCOUS THREADS #/AREA URNS LPF: PRESENT /LPF
NEUTROPHILS # BLD AUTO: 1.4 10E9/L (ref 1.6–8.3)
NEUTROPHILS # BLD AUTO: 1.7 10E9/L (ref 1.6–8.3)
NEUTROPHILS # BLD AUTO: 1.9 10E9/L (ref 1.6–8.3)
NEUTROPHILS # BLD AUTO: 1.9 10E9/L (ref 1.6–8.3)
NEUTROPHILS # BLD AUTO: 2.1 10E9/L (ref 1.6–8.3)
NEUTROPHILS # BLD AUTO: 2.2 10E9/L (ref 1.6–8.3)
NEUTROPHILS # BLD AUTO: 2.4 10E9/L (ref 1.6–8.3)
NEUTROPHILS # BLD AUTO: 3.2 10E9/L (ref 1.6–8.3)
NEUTROPHILS # BLD AUTO: 3.6 10E9/L (ref 1.6–8.3)
NEUTROPHILS # BLD AUTO: 3.6 10E9/L (ref 1.6–8.3)
NEUTROPHILS # BLD AUTO: 3.9 10E9/L (ref 1.6–8.3)
NEUTROPHILS # BLD AUTO: 4.2 10E9/L (ref 1.6–8.3)
NEUTROPHILS # BLD AUTO: 4.2 10E9/L (ref 1.6–8.3)
NEUTROPHILS # BLD AUTO: 4.3 10E9/L (ref 1.6–8.3)
NEUTROPHILS # BLD AUTO: 5.7 10E9/L (ref 1.6–8.3)
NEUTROPHILS # BLD AUTO: 9.6 10E9/L (ref 1.6–8.3)
NEUTROPHILS NFR BLD AUTO: 48.3 %
NEUTROPHILS NFR BLD AUTO: 50.7 %
NEUTROPHILS NFR BLD AUTO: 51.5 %
NEUTROPHILS NFR BLD AUTO: 54.8 %
NEUTROPHILS NFR BLD AUTO: 57.9 %
NEUTROPHILS NFR BLD AUTO: 58.1 %
NEUTROPHILS NFR BLD AUTO: 60.2 %
NEUTROPHILS NFR BLD AUTO: 65.6 %
NEUTROPHILS NFR BLD AUTO: 66 %
NEUTROPHILS NFR BLD AUTO: 66.6 %
NEUTROPHILS NFR BLD AUTO: 68.2 %
NEUTROPHILS NFR BLD AUTO: 68.6 %
NEUTROPHILS NFR BLD AUTO: 68.6 %
NEUTROPHILS NFR BLD AUTO: 77.4 %
NEUTROPHILS NFR BLD AUTO: 81.6 %
NEUTROPHILS NFR BLD AUTO: 89.7 %
NEUTS BAND NFR FLD MANUAL: 1 %
NEUTS BAND NFR FLD MANUAL: 13 %
NEUTS BAND NFR FLD MANUAL: 2 %
NEUTS BAND NFR FLD MANUAL: 36 %
NEUTS BAND NFR FLD MANUAL: 7 %
NITRATE UR QL: NEGATIVE
NITRATE UR QL: NEGATIVE
NRBC # BLD AUTO: 0 10*3/UL
NRBC BLD AUTO-RTO: 0 /100
NUM BPU REQUESTED: 2
O2/TOTAL GAS SETTING VFR VENT: 21 %
OTHER CELLS FLD MANUAL: 41 %
OTHER CELLS FLD MANUAL: 66 %
PCO2 BLDV: 27 MM HG (ref 40–50)
PH BLDV: 7.4 PH (ref 7.32–7.43)
PH UR STRIP: 5.5 PH (ref 5–7)
PH UR STRIP: 5.5 PH (ref 5–7)
PHOSPHATE SERPL-MCNC: 1.5 MG/DL (ref 2.5–4.5)
PHOSPHATE SERPL-MCNC: 1.6 MG/DL (ref 2.5–4.5)
PHOSPHATE SERPL-MCNC: 1.8 MG/DL (ref 2.5–4.5)
PHOSPHATE SERPL-MCNC: 1.9 MG/DL (ref 2.5–4.5)
PHOSPHATE SERPL-MCNC: 2.2 MG/DL (ref 2.5–4.5)
PHOSPHATE SERPL-MCNC: 2.2 MG/DL (ref 2.5–4.5)
PHOSPHATE SERPL-MCNC: 2.3 MG/DL (ref 2.5–4.5)
PHOSPHATE SERPL-MCNC: 2.3 MG/DL (ref 2.5–4.5)
PHOSPHATE SERPL-MCNC: 2.4 MG/DL (ref 2.5–4.5)
PHOSPHATE SERPL-MCNC: 2.6 MG/DL (ref 2.5–4.5)
PHOSPHATE SERPL-MCNC: 2.7 MG/DL (ref 2.5–4.5)
PHOSPHATE SERPL-MCNC: 2.9 MG/DL (ref 2.5–4.5)
PHOSPHATE SERPL-MCNC: 3 MG/DL (ref 2.5–4.5)
PLATELET # BLD AUTO: 100 10E9/L (ref 150–450)
PLATELET # BLD AUTO: 106 10E9/L (ref 150–450)
PLATELET # BLD AUTO: 109 10E9/L (ref 150–450)
PLATELET # BLD AUTO: 111 10E9/L (ref 150–450)
PLATELET # BLD AUTO: 113 10E9/L (ref 150–450)
PLATELET # BLD AUTO: 116 10E9/L (ref 150–450)
PLATELET # BLD AUTO: 116 10E9/L (ref 150–450)
PLATELET # BLD AUTO: 120 10E9/L (ref 150–450)
PLATELET # BLD AUTO: 122 10E9/L (ref 150–450)
PLATELET # BLD AUTO: 129 10E9/L (ref 150–450)
PLATELET # BLD AUTO: 132 10E9/L (ref 150–450)
PLATELET # BLD AUTO: 132 10E9/L (ref 150–450)
PLATELET # BLD AUTO: 140 10E9/L (ref 150–450)
PLATELET # BLD AUTO: 144 10E9/L (ref 150–450)
PLATELET # BLD AUTO: 156 10E9/L (ref 150–450)
PLATELET # BLD AUTO: 159 10E9/L (ref 150–450)
PLATELET # BLD AUTO: 160 10E9/L (ref 150–450)
PLATELET # BLD AUTO: 162 10E9/L (ref 150–450)
PLATELET # BLD AUTO: 166 10E9/L (ref 150–450)
PLATELET # BLD AUTO: 166 10E9/L (ref 150–450)
PLATELET # BLD AUTO: 170 10E9/L (ref 150–450)
PLATELET # BLD AUTO: 172 10E9/L (ref 150–450)
PLATELET # BLD AUTO: 172 10E9/L (ref 150–450)
PLATELET # BLD AUTO: 182 10E9/L (ref 150–450)
PLATELET # BLD AUTO: 183 10E9/L (ref 150–450)
PLATELET # BLD AUTO: 189 10E9/L (ref 150–450)
PLATELET # BLD AUTO: 199 10E9/L (ref 150–450)
PLATELET # BLD AUTO: 205 10E9/L (ref 150–450)
PLATELET # BLD AUTO: 210 10E9/L (ref 150–450)
PLATELET # BLD AUTO: 222 10E9/L (ref 150–450)
PLATELET # BLD AUTO: 224 10E9/L (ref 150–450)
PLATELET # BLD AUTO: 232 10E9/L (ref 150–450)
PLATELET # BLD AUTO: 233 10E9/L (ref 150–450)
PLATELET # BLD AUTO: 238 10E9/L (ref 150–450)
PLATELET # BLD AUTO: 271 10E9/L (ref 150–450)
PLATELET # BLD AUTO: 278 10E9/L (ref 150–450)
PLATELET # BLD AUTO: 98 10E9/L (ref 150–450)
PLATELET # BLD EST: ABNORMAL 10*3/UL
PO2 BLDV: 45 MM HG (ref 25–47)
POIKILOCYTOSIS BLD QL SMEAR: SLIGHT
POLYCHROMASIA BLD QL SMEAR: SLIGHT
POTASSIUM SERPL-SCNC: 3.2 MMOL/L (ref 3.4–5.3)
POTASSIUM SERPL-SCNC: 3.3 MMOL/L (ref 3.4–5.3)
POTASSIUM SERPL-SCNC: 3.4 MMOL/L (ref 3.4–5.3)
POTASSIUM SERPL-SCNC: 3.5 MMOL/L (ref 3.4–5.3)
POTASSIUM SERPL-SCNC: 3.6 MMOL/L (ref 3.4–5.3)
POTASSIUM SERPL-SCNC: 3.7 MMOL/L (ref 3.4–5.3)
POTASSIUM SERPL-SCNC: 3.8 MMOL/L (ref 3.4–5.3)
POTASSIUM SERPL-SCNC: 3.8 MMOL/L (ref 3.4–5.3)
POTASSIUM SERPL-SCNC: 3.9 MMOL/L (ref 3.4–5.3)
POTASSIUM SERPL-SCNC: 4 MMOL/L (ref 3.4–5.3)
POTASSIUM SERPL-SCNC: 4 MMOL/L (ref 3.4–5.3)
POTASSIUM SERPL-SCNC: 4.1 MMOL/L (ref 3.4–5.3)
POTASSIUM SERPL-SCNC: 4.2 MMOL/L (ref 3.4–5.3)
POTASSIUM SERPL-SCNC: 4.2 MMOL/L (ref 3.4–5.3)
PROCALCITONIN SERPL-MCNC: 0.17 NG/ML
PROCALCITONIN SERPL-MCNC: 0.74 NG/ML
PROCALCITONIN SERPL-MCNC: 1.05 NG/ML
PROCALCITONIN SERPL-MCNC: 2.25 NG/ML
PROT FLD-MCNC: 1.2 G/DL
PROT FLD-MCNC: 1.4 G/DL
PROT SERPL-MCNC: 5 G/DL (ref 6.8–8.8)
PROT SERPL-MCNC: 5.3 G/DL (ref 6.8–8.8)
PROT SERPL-MCNC: 5.4 G/DL (ref 6.8–8.8)
PROT SERPL-MCNC: 5.5 G/DL (ref 6.8–8.8)
PROT SERPL-MCNC: 5.6 G/DL (ref 6.8–8.8)
PROT SERPL-MCNC: 5.7 G/DL (ref 6.8–8.8)
PROT SERPL-MCNC: 5.8 G/DL (ref 6.8–8.8)
PROT SERPL-MCNC: 5.9 G/DL (ref 6.8–8.8)
PROT SERPL-MCNC: 5.9 G/DL (ref 6.8–8.8)
PROT SERPL-MCNC: 6 G/DL (ref 6.8–8.8)
PROT SERPL-MCNC: 6 G/DL (ref 6.8–8.8)
PROT SERPL-MCNC: 6.3 G/DL (ref 6.8–8.8)
PROT SERPL-MCNC: 6.3 G/DL (ref 6.8–8.8)
PROT SERPL-MCNC: 6.4 G/DL (ref 6.8–8.8)
PROT SERPL-MCNC: 6.5 G/DL (ref 6.8–8.8)
PROT SERPL-MCNC: 6.6 G/DL (ref 6.8–8.8)
PROT SERPL-MCNC: 6.7 G/DL (ref 6.8–8.8)
PROT SERPL-MCNC: 6.8 G/DL (ref 6.8–8.8)
PROT SERPL-MCNC: 7.3 G/DL (ref 6.8–8.8)
PROT SERPL-MCNC: 7.5 G/DL (ref 6.8–8.8)
PROT SERPL-MCNC: 7.8 G/DL (ref 6.8–8.8)
RADIOLOGIST FLAGS: ABNORMAL
RADIOLOGIST FLAGS: ABNORMAL
RBC # BLD AUTO: 2.14 10E12/L (ref 4.4–5.9)
RBC # BLD AUTO: 2.23 10E12/L (ref 4.4–5.9)
RBC # BLD AUTO: 2.59 10E12/L (ref 4.4–5.9)
RBC # BLD AUTO: 2.6 10E12/L (ref 4.4–5.9)
RBC # BLD AUTO: 2.61 10E12/L (ref 4.4–5.9)
RBC # BLD AUTO: 2.62 10E12/L (ref 4.4–5.9)
RBC # BLD AUTO: 2.69 10E12/L (ref 4.4–5.9)
RBC # BLD AUTO: 2.7 10E12/L (ref 4.4–5.9)
RBC # BLD AUTO: 2.74 10E12/L (ref 4.4–5.9)
RBC # BLD AUTO: 2.81 10E12/L (ref 4.4–5.9)
RBC # BLD AUTO: 2.83 10E12/L (ref 4.4–5.9)
RBC # BLD AUTO: 2.84 10E12/L (ref 4.4–5.9)
RBC # BLD AUTO: 2.84 10E12/L (ref 4.4–5.9)
RBC # BLD AUTO: 2.85 10E12/L (ref 4.4–5.9)
RBC # BLD AUTO: 2.87 10E12/L (ref 4.4–5.9)
RBC # BLD AUTO: 2.89 10E12/L (ref 4.4–5.9)
RBC # BLD AUTO: 2.89 10E12/L (ref 4.4–5.9)
RBC # BLD AUTO: 2.9 10E12/L (ref 4.4–5.9)
RBC # BLD AUTO: 2.92 10E12/L (ref 4.4–5.9)
RBC # BLD AUTO: 2.92 10E12/L (ref 4.4–5.9)
RBC # BLD AUTO: 2.94 10E12/L (ref 4.4–5.9)
RBC # BLD AUTO: 2.96 10E12/L (ref 4.4–5.9)
RBC # BLD AUTO: 2.97 10E12/L (ref 4.4–5.9)
RBC # BLD AUTO: 3.01 10E12/L (ref 4.4–5.9)
RBC # BLD AUTO: 3.02 10E12/L (ref 4.4–5.9)
RBC # BLD AUTO: 3.05 10E12/L (ref 4.4–5.9)
RBC # BLD AUTO: 3.06 10E12/L (ref 4.4–5.9)
RBC # BLD AUTO: 3.1 10E12/L (ref 4.4–5.9)
RBC # BLD AUTO: 3.14 10E12/L (ref 4.4–5.9)
RBC # BLD AUTO: 3.15 10E12/L (ref 4.4–5.9)
RBC # BLD AUTO: 3.15 10E12/L (ref 4.4–5.9)
RBC # BLD AUTO: 3.19 10E12/L (ref 4.4–5.9)
RBC # BLD AUTO: 3.2 10E12/L (ref 4.4–5.9)
RBC # BLD AUTO: 3.38 10E12/L (ref 4.4–5.9)
RBC # BLD AUTO: 3.7 10E12/L (ref 4.4–5.9)
RBC #/AREA URNS AUTO: <1 /HPF (ref 0–2)
RBC #/AREA URNS AUTO: <1 /HPF (ref 0–2)
RBC MORPH BLD: ABNORMAL
RSV RNA SPEC QL NAA+PROBE: NEGATIVE
SARS-COV-2 RNA RESP QL NAA+PROBE: NEGATIVE
SARS-COV-2 RNA RESP QL NAA+PROBE: NORMAL
SODIUM SERPL-SCNC: 129 MMOL/L (ref 133–144)
SODIUM SERPL-SCNC: 131 MMOL/L (ref 133–144)
SODIUM SERPL-SCNC: 132 MMOL/L (ref 133–144)
SODIUM SERPL-SCNC: 133 MMOL/L (ref 133–144)
SODIUM SERPL-SCNC: 133 MMOL/L (ref 133–144)
SODIUM SERPL-SCNC: 134 MMOL/L (ref 133–144)
SODIUM SERPL-SCNC: 134 MMOL/L (ref 133–144)
SODIUM SERPL-SCNC: 135 MMOL/L (ref 133–144)
SODIUM SERPL-SCNC: 135 MMOL/L (ref 133–144)
SODIUM SERPL-SCNC: 136 MMOL/L (ref 133–144)
SODIUM SERPL-SCNC: 137 MMOL/L (ref 133–144)
SODIUM SERPL-SCNC: 138 MMOL/L (ref 133–144)
SODIUM SERPL-SCNC: 139 MMOL/L (ref 133–144)
SODIUM SERPL-SCNC: 139 MMOL/L (ref 133–144)
SODIUM SERPL-SCNC: 140 MMOL/L (ref 133–144)
SODIUM SERPL-SCNC: 140 MMOL/L (ref 133–144)
SODIUM SERPL-SCNC: 141 MMOL/L (ref 133–144)
SOURCE: ABNORMAL
SOURCE: ABNORMAL
SP GR UR STRIP: 1.03 (ref 1–1.03)
SP GR UR STRIP: 1.03 (ref 1–1.03)
SPECIMEN EXP DATE BLD: NORMAL
SPECIMEN SOURCE FLD: NORMAL
SPECIMEN SOURCE: ABNORMAL
SPECIMEN SOURCE: NORMAL
SQUAMOUS #/AREA URNS AUTO: 0 /HPF (ref 0–1)
SQUAMOUS #/AREA URNS AUTO: <1 /HPF (ref 0–1)
T4 FREE SERPL-MCNC: 1.74 NG/DL (ref 0.76–1.46)
TRANS CELLS #/AREA URNS HPF: <1 /HPF (ref 0–1)
TRANS CELLS #/AREA URNS HPF: <1 /HPF (ref 0–1)
TRANSFUSION STATUS PATIENT QL: NORMAL
TROPONIN I SERPL-MCNC: <0.015 UG/L (ref 0–0.04)
TSH SERPL DL<=0.005 MIU/L-ACNC: 5.16 MU/L (ref 0.4–4)
UROBILINOGEN UR STRIP-MCNC: NORMAL MG/DL (ref 0–2)
UROBILINOGEN UR STRIP-MCNC: NORMAL MG/DL (ref 0–2)
VANCOMYCIN SERPL-MCNC: 24.8 MG/L
VIT B1 SERPL-MCNC: 13 NMOL/L (ref 4–15)
VIT B12 SERPL-MCNC: 1732 PG/ML (ref 193–986)
VIT B12 SERPL-MCNC: 2269 PG/ML (ref 193–986)
WBC # BLD AUTO: 10.6 10E9/L (ref 4–11)
WBC # BLD AUTO: 10.7 10E9/L (ref 4–11)
WBC # BLD AUTO: 2.3 10E9/L (ref 4–11)
WBC # BLD AUTO: 2.9 10E9/L (ref 4–11)
WBC # BLD AUTO: 3.1 10E9/L (ref 4–11)
WBC # BLD AUTO: 3.2 10E9/L (ref 4–11)
WBC # BLD AUTO: 3.3 10E9/L (ref 4–11)
WBC # BLD AUTO: 3.4 10E9/L (ref 4–11)
WBC # BLD AUTO: 3.5 10E9/L (ref 4–11)
WBC # BLD AUTO: 3.6 10E9/L (ref 4–11)
WBC # BLD AUTO: 3.6 10E9/L (ref 4–11)
WBC # BLD AUTO: 3.9 10E9/L (ref 4–11)
WBC # BLD AUTO: 3.9 10E9/L (ref 4–11)
WBC # BLD AUTO: 4 10E9/L (ref 4–11)
WBC # BLD AUTO: 4.2 10E9/L (ref 4–11)
WBC # BLD AUTO: 4.3 10E9/L (ref 4–11)
WBC # BLD AUTO: 4.4 10E9/L (ref 4–11)
WBC # BLD AUTO: 4.5 10E9/L (ref 4–11)
WBC # BLD AUTO: 4.7 10E9/L (ref 4–11)
WBC # BLD AUTO: 4.7 10E9/L (ref 4–11)
WBC # BLD AUTO: 5.1 10E9/L (ref 4–11)
WBC # BLD AUTO: 5.1 10E9/L (ref 4–11)
WBC # BLD AUTO: 5.2 10E9/L (ref 4–11)
WBC # BLD AUTO: 5.2 10E9/L (ref 4–11)
WBC # BLD AUTO: 5.6 10E9/L (ref 4–11)
WBC # BLD AUTO: 5.7 10E9/L (ref 4–11)
WBC # BLD AUTO: 6 10E9/L (ref 4–11)
WBC # BLD AUTO: 6.3 10E9/L (ref 4–11)
WBC # BLD AUTO: 6.3 10E9/L (ref 4–11)
WBC # BLD AUTO: 6.5 10E9/L (ref 4–11)
WBC # BLD AUTO: 7 10E9/L (ref 4–11)
WBC # BLD AUTO: 7.4 10E9/L (ref 4–11)
WBC # BLD AUTO: 7.8 10E9/L (ref 4–11)
WBC # FLD AUTO: 109 /UL
WBC # FLD AUTO: 165 /UL
WBC # FLD AUTO: 249 /UL
WBC # FLD AUTO: 279 /UL
WBC # FLD AUTO: 42 /UL
WBC # FLD AUTO: 80 /UL
WBC #/AREA URNS AUTO: 3 /HPF (ref 0–5)
WBC #/AREA URNS AUTO: <1 /HPF (ref 0–5)

## 2021-01-01 PROCEDURE — 80053 COMPREHEN METABOLIC PANEL: CPT | Performed by: EMERGENCY MEDICINE

## 2021-01-01 PROCEDURE — 83615 LACTATE (LD) (LDH) ENZYME: CPT | Performed by: INTERNAL MEDICINE

## 2021-01-01 PROCEDURE — U0005 INFEC AGEN DETEC AMPLI PROBE: HCPCS | Performed by: RADIOLOGY

## 2021-01-01 PROCEDURE — 70553 MRI BRAIN STEM W/O & W/DYE: CPT | Mod: 26 | Performed by: RADIOLOGY

## 2021-01-01 PROCEDURE — 999N000065 XR ABDOMEN PORT 1 VIEWS

## 2021-01-01 PROCEDURE — 250N000011 HC RX IP 250 OP 636: Performed by: STUDENT IN AN ORGANIZED HEALTH CARE EDUCATION/TRAINING PROGRAM

## 2021-01-01 PROCEDURE — 86140 C-REACTIVE PROTEIN: CPT | Performed by: STUDENT IN AN ORGANIZED HEALTH CARE EDUCATION/TRAINING PROGRAM

## 2021-01-01 PROCEDURE — 36592 COLLECT BLOOD FROM PICC: CPT | Performed by: NURSE PRACTITIONER

## 2021-01-01 PROCEDURE — 87070 CULTURE OTHR SPECIMN AEROBIC: CPT | Performed by: STUDENT IN AN ORGANIZED HEALTH CARE EDUCATION/TRAINING PROGRAM

## 2021-01-01 PROCEDURE — 87205 SMEAR GRAM STAIN: CPT | Performed by: STUDENT IN AN ORGANIZED HEALTH CARE EDUCATION/TRAINING PROGRAM

## 2021-01-01 PROCEDURE — 49418 INSERT TUN IP CATH PERC: CPT | Performed by: PHYSICIAN ASSISTANT

## 2021-01-01 PROCEDURE — U0005 INFEC AGEN DETEC AMPLI PROBE: HCPCS | Performed by: INTERNAL MEDICINE

## 2021-01-01 PROCEDURE — 71045 X-RAY EXAM CHEST 1 VIEW: CPT | Mod: 26 | Performed by: RADIOLOGY

## 2021-01-01 PROCEDURE — 84100 ASSAY OF PHOSPHORUS: CPT | Performed by: STUDENT IN AN ORGANIZED HEALTH CARE EDUCATION/TRAINING PROGRAM

## 2021-01-01 PROCEDURE — 93010 ELECTROCARDIOGRAM REPORT: CPT | Performed by: INTERNAL MEDICINE

## 2021-01-01 PROCEDURE — 49423 EXCHANGE DRAINAGE CATHETER: CPT | Mod: GC | Performed by: RADIOLOGY

## 2021-01-01 PROCEDURE — 999N000133 HC STATISTIC PP CARE STAGE 2

## 2021-01-01 PROCEDURE — 97530 THERAPEUTIC ACTIVITIES: CPT | Mod: GO

## 2021-01-01 PROCEDURE — 85027 COMPLETE CBC AUTOMATED: CPT | Performed by: STUDENT IN AN ORGANIZED HEALTH CARE EDUCATION/TRAINING PROGRAM

## 2021-01-01 PROCEDURE — 99239 HOSP IP/OBS DSCHRG MGMT >30: CPT | Performed by: INTERNAL MEDICINE

## 2021-01-01 PROCEDURE — 75984 XRAY CONTROL CATHETER CHANGE: CPT | Mod: 26 | Performed by: RADIOLOGY

## 2021-01-01 PROCEDURE — 999N001017 HC STATISTIC GLUCOSE BY METER IP

## 2021-01-01 PROCEDURE — 99285 EMERGENCY DEPT VISIT HI MDM: CPT | Performed by: INTERNAL MEDICINE

## 2021-01-01 PROCEDURE — 84425 ASSAY OF VITAMIN B-1: CPT | Performed by: NURSE PRACTITIONER

## 2021-01-01 PROCEDURE — 84100 ASSAY OF PHOSPHORUS: CPT | Performed by: INTERNAL MEDICINE

## 2021-01-01 PROCEDURE — 89051 BODY FLUID CELL COUNT: CPT | Performed by: STUDENT IN AN ORGANIZED HEALTH CARE EDUCATION/TRAINING PROGRAM

## 2021-01-01 PROCEDURE — 85027 COMPLETE CBC AUTOMATED: CPT | Performed by: PHYSICIAN ASSISTANT

## 2021-01-01 PROCEDURE — 255N000002 HC RX 255 OP 636: Mod: XU | Performed by: RADIOLOGY

## 2021-01-01 PROCEDURE — 85027 COMPLETE CBC AUTOMATED: CPT | Performed by: INTERNAL MEDICINE

## 2021-01-01 PROCEDURE — 99233 SBSQ HOSP IP/OBS HIGH 50: CPT | Mod: 25 | Performed by: STUDENT IN AN ORGANIZED HEALTH CARE EDUCATION/TRAINING PROGRAM

## 2021-01-01 PROCEDURE — 250N000011 HC RX IP 250 OP 636: Performed by: INTERNAL MEDICINE

## 2021-01-01 PROCEDURE — 999N000127 HC STATISTIC PERIPHERAL IV START W US GUIDANCE

## 2021-01-01 PROCEDURE — 250N000013 HC RX MED GY IP 250 OP 250 PS 637: Performed by: PHYSICIAN ASSISTANT

## 2021-01-01 PROCEDURE — 74177 CT ABD & PELVIS W/CONTRAST: CPT

## 2021-01-01 PROCEDURE — 255N000002 HC RX 255 OP 636: Performed by: INTERNAL MEDICINE

## 2021-01-01 PROCEDURE — 93321 DOPPLER ECHO F-UP/LMTD STD: CPT

## 2021-01-01 PROCEDURE — 0W9G3ZX DRAINAGE OF PERITONEAL CAVITY, PERCUTANEOUS APPROACH, DIAGNOSTIC: ICD-10-PCS | Performed by: PEDIATRICS

## 2021-01-01 PROCEDURE — 250N000013 HC RX MED GY IP 250 OP 250 PS 637: Performed by: STUDENT IN AN ORGANIZED HEALTH CARE EDUCATION/TRAINING PROGRAM

## 2021-01-01 PROCEDURE — 76080 X-RAY EXAM OF FISTULA: CPT | Mod: 26 | Performed by: RADIOLOGY

## 2021-01-01 PROCEDURE — 87102 FUNGUS ISOLATION CULTURE: CPT | Performed by: STUDENT IN AN ORGANIZED HEALTH CARE EDUCATION/TRAINING PROGRAM

## 2021-01-01 PROCEDURE — U0005 INFEC AGEN DETEC AMPLI PROBE: HCPCS | Performed by: NURSE PRACTITIONER

## 2021-01-01 PROCEDURE — 99233 SBSQ HOSP IP/OBS HIGH 50: CPT | Performed by: INTERNAL MEDICINE

## 2021-01-01 PROCEDURE — 97116 GAIT TRAINING THERAPY: CPT | Mod: GP

## 2021-01-01 PROCEDURE — P9047 ALBUMIN (HUMAN), 25%, 50ML: HCPCS | Performed by: RADIOLOGY

## 2021-01-01 PROCEDURE — 97530 THERAPEUTIC ACTIVITIES: CPT | Mod: GP | Performed by: PHYSICAL THERAPIST

## 2021-01-01 PROCEDURE — 258N000003 HC RX IP 258 OP 636: Performed by: PHYSICIAN ASSISTANT

## 2021-01-01 PROCEDURE — 80048 BASIC METABOLIC PNL TOTAL CA: CPT | Performed by: INTERNAL MEDICINE

## 2021-01-01 PROCEDURE — 83605 ASSAY OF LACTIC ACID: CPT | Performed by: EMERGENCY MEDICINE

## 2021-01-01 PROCEDURE — 36592 COLLECT BLOOD FROM PICC: CPT | Performed by: STUDENT IN AN ORGANIZED HEALTH CARE EDUCATION/TRAINING PROGRAM

## 2021-01-01 PROCEDURE — 87205 SMEAR GRAM STAIN: CPT | Performed by: INTERNAL MEDICINE

## 2021-01-01 PROCEDURE — 80202 ASSAY OF VANCOMYCIN: CPT | Performed by: STUDENT IN AN ORGANIZED HEALTH CARE EDUCATION/TRAINING PROGRAM

## 2021-01-01 PROCEDURE — 70496 CT ANGIOGRAPHY HEAD: CPT | Mod: 26 | Performed by: RADIOLOGY

## 2021-01-01 PROCEDURE — 96374 THER/PROPH/DIAG INJ IV PUSH: CPT

## 2021-01-01 PROCEDURE — 83735 ASSAY OF MAGNESIUM: CPT | Performed by: INTERNAL MEDICINE

## 2021-01-01 PROCEDURE — 96375 TX/PRO/DX INJ NEW DRUG ADDON: CPT | Performed by: EMERGENCY MEDICINE

## 2021-01-01 PROCEDURE — 99214 OFFICE O/P EST MOD 30 MIN: CPT | Mod: 95 | Performed by: SPECIALIST

## 2021-01-01 PROCEDURE — 99239 HOSP IP/OBS DSCHRG MGMT >30: CPT | Performed by: PEDIATRICS

## 2021-01-01 PROCEDURE — 999N000132 HC STATISTIC PP CARE STAGE 1

## 2021-01-01 PROCEDURE — 999N001018 HC STATISTIC H-CELL BLOCK W/STAIN: Performed by: INTERNAL MEDICINE

## 2021-01-01 PROCEDURE — 85610 PROTHROMBIN TIME: CPT | Performed by: INTERNAL MEDICINE

## 2021-01-01 PROCEDURE — 71045 X-RAY EXAM CHEST 1 VIEW: CPT

## 2021-01-01 PROCEDURE — 250N000011 HC RX IP 250 OP 636: Performed by: PHYSICIAN ASSISTANT

## 2021-01-01 PROCEDURE — 36415 COLL VENOUS BLD VENIPUNCTURE: CPT | Performed by: INTERNAL MEDICINE

## 2021-01-01 PROCEDURE — 272N000192 HC ACCESSORY CR2

## 2021-01-01 PROCEDURE — 83605 ASSAY OF LACTIC ACID: CPT | Performed by: INTERNAL MEDICINE

## 2021-01-01 PROCEDURE — 49083 ABD PARACENTESIS W/IMAGING: CPT | Performed by: PEDIATRICS

## 2021-01-01 PROCEDURE — 80076 HEPATIC FUNCTION PANEL: CPT | Performed by: INTERNAL MEDICINE

## 2021-01-01 PROCEDURE — 87040 BLOOD CULTURE FOR BACTERIA: CPT | Performed by: NURSE PRACTITIONER

## 2021-01-01 PROCEDURE — C1729 CATH, DRAINAGE: HCPCS

## 2021-01-01 PROCEDURE — 80053 COMPREHEN METABOLIC PANEL: CPT | Performed by: NURSE PRACTITIONER

## 2021-01-01 PROCEDURE — 99221 1ST HOSP IP/OBS SF/LOW 40: CPT | Mod: GC | Performed by: STUDENT IN AN ORGANIZED HEALTH CARE EDUCATION/TRAINING PROGRAM

## 2021-01-01 PROCEDURE — C9803 HOPD COVID-19 SPEC COLLECT: HCPCS | Performed by: INTERNAL MEDICINE

## 2021-01-01 PROCEDURE — 36591 DRAW BLOOD OFF VENOUS DEVICE: CPT

## 2021-01-01 PROCEDURE — 74018 RADEX ABDOMEN 1 VIEW: CPT | Mod: 26 | Performed by: RADIOLOGY

## 2021-01-01 PROCEDURE — 85025 COMPLETE CBC W/AUTO DIFF WBC: CPT | Performed by: EMERGENCY MEDICINE

## 2021-01-01 PROCEDURE — 88305 TISSUE EXAM BY PATHOLOGIST: CPT | Mod: TC | Performed by: INTERNAL MEDICINE

## 2021-01-01 PROCEDURE — 87636 SARSCOV2 & INF A&B AMP PRB: CPT | Performed by: INTERNAL MEDICINE

## 2021-01-01 PROCEDURE — 49424 ASSESS CYST CONTRAST INJECT: CPT | Mod: GC | Performed by: RADIOLOGY

## 2021-01-01 PROCEDURE — C1769 GUIDE WIRE: HCPCS | Performed by: INTERNAL MEDICINE

## 2021-01-01 PROCEDURE — 250N000011 HC RX IP 250 OP 636: Performed by: NURSE ANESTHETIST, CERTIFIED REGISTERED

## 2021-01-01 PROCEDURE — 999N001018 HC STATISTIC H-CELL BLOCK W/STAIN: Performed by: STUDENT IN AN ORGANIZED HEALTH CARE EDUCATION/TRAINING PROGRAM

## 2021-01-01 PROCEDURE — 99239 HOSP IP/OBS DSCHRG MGMT >30: CPT | Performed by: STUDENT IN AN ORGANIZED HEALTH CARE EDUCATION/TRAINING PROGRAM

## 2021-01-01 PROCEDURE — 84132 ASSAY OF SERUM POTASSIUM: CPT | Performed by: STUDENT IN AN ORGANIZED HEALTH CARE EDUCATION/TRAINING PROGRAM

## 2021-01-01 PROCEDURE — 36415 COLL VENOUS BLD VENIPUNCTURE: CPT | Performed by: NURSE PRACTITIONER

## 2021-01-01 PROCEDURE — 96376 TX/PRO/DX INJ SAME DRUG ADON: CPT

## 2021-01-01 PROCEDURE — P9047 ALBUMIN (HUMAN), 25%, 50ML: HCPCS | Performed by: EMERGENCY MEDICINE

## 2021-01-01 PROCEDURE — 83735 ASSAY OF MAGNESIUM: CPT | Performed by: EMERGENCY MEDICINE

## 2021-01-01 PROCEDURE — 84295 ASSAY OF SERUM SODIUM: CPT | Performed by: NURSE PRACTITIONER

## 2021-01-01 PROCEDURE — 80053 COMPREHEN METABOLIC PANEL: CPT | Performed by: INTERNAL MEDICINE

## 2021-01-01 PROCEDURE — 250N000013 HC RX MED GY IP 250 OP 250 PS 637: Performed by: INTERNAL MEDICINE

## 2021-01-01 PROCEDURE — 87493 C DIFF AMPLIFIED PROBE: CPT | Performed by: STUDENT IN AN ORGANIZED HEALTH CARE EDUCATION/TRAINING PROGRAM

## 2021-01-01 PROCEDURE — 99232 SBSQ HOSP IP/OBS MODERATE 35: CPT | Performed by: STUDENT IN AN ORGANIZED HEALTH CARE EDUCATION/TRAINING PROGRAM

## 2021-01-01 PROCEDURE — 80053 COMPREHEN METABOLIC PANEL: CPT | Performed by: PHYSICIAN ASSISTANT

## 2021-01-01 PROCEDURE — 82042 OTHER SOURCE ALBUMIN QUAN EA: CPT | Performed by: INTERNAL MEDICINE

## 2021-01-01 PROCEDURE — 82803 BLOOD GASES ANY COMBINATION: CPT | Performed by: PHYSICIAN ASSISTANT

## 2021-01-01 PROCEDURE — 88112 CYTOPATH CELL ENHANCE TECH: CPT | Mod: 26 | Performed by: PATHOLOGY

## 2021-01-01 PROCEDURE — U0003 INFECTIOUS AGENT DETECTION BY NUCLEIC ACID (DNA OR RNA); SEVERE ACUTE RESPIRATORY SYNDROME CORONAVIRUS 2 (SARS-COV-2) (CORONAVIRUS DISEASE [COVID-19]), AMPLIFIED PROBE TECHNIQUE, MAKING USE OF HIGH THROUGHPUT TECHNOLOGIES AS DESCRIBED BY CMS-2020-01-R: HCPCS | Performed by: NURSE PRACTITIONER

## 2021-01-01 PROCEDURE — 999N001193 HC VIDEO/TELEPHONE VISIT; NO CHARGE

## 2021-01-01 PROCEDURE — 83690 ASSAY OF LIPASE: CPT | Performed by: INTERNAL MEDICINE

## 2021-01-01 PROCEDURE — 49083 ABD PARACENTESIS W/IMAGING: CPT

## 2021-01-01 PROCEDURE — 85610 PROTHROMBIN TIME: CPT | Performed by: STUDENT IN AN ORGANIZED HEALTH CARE EDUCATION/TRAINING PROGRAM

## 2021-01-01 PROCEDURE — 250N000011 HC RX IP 250 OP 636: Performed by: RADIOLOGY

## 2021-01-01 PROCEDURE — P9047 ALBUMIN (HUMAN), 25%, 50ML: HCPCS | Performed by: HOSPITALIST

## 2021-01-01 PROCEDURE — C1769 GUIDE WIRE: HCPCS

## 2021-01-01 PROCEDURE — 97750 PHYSICAL PERFORMANCE TEST: CPT | Mod: GP | Performed by: PHYSICAL THERAPIST

## 2021-01-01 PROCEDURE — 80053 COMPREHEN METABOLIC PANEL: CPT | Performed by: STUDENT IN AN ORGANIZED HEALTH CARE EDUCATION/TRAINING PROGRAM

## 2021-01-01 PROCEDURE — 85384 FIBRINOGEN ACTIVITY: CPT | Performed by: STUDENT IN AN ORGANIZED HEALTH CARE EDUCATION/TRAINING PROGRAM

## 2021-01-01 PROCEDURE — 250N000013 HC RX MED GY IP 250 OP 250 PS 637: Performed by: NURSE PRACTITIONER

## 2021-01-01 PROCEDURE — U0003 INFECTIOUS AGENT DETECTION BY NUCLEIC ACID (DNA OR RNA); SEVERE ACUTE RESPIRATORY SYNDROME CORONAVIRUS 2 (SARS-COV-2) (CORONAVIRUS DISEASE [COVID-19]), AMPLIFIED PROBE TECHNIQUE, MAKING USE OF HIGH THROUGHPUT TECHNOLOGIES AS DESCRIBED BY CMS-2020-01-R: HCPCS | Performed by: INTERNAL MEDICINE

## 2021-01-01 PROCEDURE — 250N000009 HC RX 250: Performed by: PHYSICIAN ASSISTANT

## 2021-01-01 PROCEDURE — 99152 MOD SED SAME PHYS/QHP 5/>YRS: CPT

## 2021-01-01 PROCEDURE — 99222 1ST HOSP IP/OBS MODERATE 55: CPT | Performed by: STUDENT IN AN ORGANIZED HEALTH CARE EDUCATION/TRAINING PROGRAM

## 2021-01-01 PROCEDURE — 99204 OFFICE O/P NEW MOD 45 MIN: CPT | Mod: 95 | Performed by: INTERNAL MEDICINE

## 2021-01-01 PROCEDURE — 96361 HYDRATE IV INFUSION ADD-ON: CPT | Performed by: EMERGENCY MEDICINE

## 2021-01-01 PROCEDURE — 250N000009 HC RX 250: Performed by: INTERNAL MEDICINE

## 2021-01-01 PROCEDURE — 87077 CULTURE AEROBIC IDENTIFY: CPT | Performed by: STUDENT IN AN ORGANIZED HEALTH CARE EDUCATION/TRAINING PROGRAM

## 2021-01-01 PROCEDURE — 88305 TISSUE EXAM BY PATHOLOGIST: CPT | Mod: TC | Performed by: STUDENT IN AN ORGANIZED HEALTH CARE EDUCATION/TRAINING PROGRAM

## 2021-01-01 PROCEDURE — 84157 ASSAY OF PROTEIN OTHER: CPT | Performed by: STUDENT IN AN ORGANIZED HEALTH CARE EDUCATION/TRAINING PROGRAM

## 2021-01-01 PROCEDURE — 99285 EMERGENCY DEPT VISIT HI MDM: CPT | Performed by: EMERGENCY MEDICINE

## 2021-01-01 PROCEDURE — 250N000011 HC RX IP 250 OP 636: Performed by: NURSE PRACTITIONER

## 2021-01-01 PROCEDURE — 97110 THERAPEUTIC EXERCISES: CPT | Mod: GO

## 2021-01-01 PROCEDURE — 258N000003 HC RX IP 258 OP 636: Performed by: STUDENT IN AN ORGANIZED HEALTH CARE EDUCATION/TRAINING PROGRAM

## 2021-01-01 PROCEDURE — 87070 CULTURE OTHR SPECIMN AEROBIC: CPT | Performed by: INTERNAL MEDICINE

## 2021-01-01 PROCEDURE — 86140 C-REACTIVE PROTEIN: CPT | Performed by: INTERNAL MEDICINE

## 2021-01-01 PROCEDURE — 85610 PROTHROMBIN TIME: CPT | Performed by: NURSE PRACTITIONER

## 2021-01-01 PROCEDURE — 258N000003 HC RX IP 258 OP 636: Performed by: INTERNAL MEDICINE

## 2021-01-01 PROCEDURE — 82248 BILIRUBIN DIRECT: CPT | Performed by: STUDENT IN AN ORGANIZED HEALTH CARE EDUCATION/TRAINING PROGRAM

## 2021-01-01 PROCEDURE — 97116 GAIT TRAINING THERAPY: CPT | Mod: GP | Performed by: PHYSICAL THERAPIST

## 2021-01-01 PROCEDURE — 74183 MRI ABD W/O CNTR FLWD CNTR: CPT | Mod: 26 | Performed by: RADIOLOGY

## 2021-01-01 PROCEDURE — 71260 CT THORAX DX C+: CPT

## 2021-01-01 PROCEDURE — 99215 OFFICE O/P EST HI 40 MIN: CPT | Mod: 95 | Performed by: NURSE PRACTITIONER

## 2021-01-01 PROCEDURE — 97165 OT EVAL LOW COMPLEX 30 MIN: CPT | Mod: GO | Performed by: OCCUPATIONAL THERAPIST

## 2021-01-01 PROCEDURE — 99152 MOD SED SAME PHYS/QHP 5/>YRS: CPT | Mod: GC | Performed by: RADIOLOGY

## 2021-01-01 PROCEDURE — 99222 1ST HOSP IP/OBS MODERATE 55: CPT | Mod: GC | Performed by: INTERNAL MEDICINE

## 2021-01-01 PROCEDURE — 36592 COLLECT BLOOD FROM PICC: CPT | Performed by: INTERNAL MEDICINE

## 2021-01-01 PROCEDURE — 99223 1ST HOSP IP/OBS HIGH 75: CPT | Performed by: INTERNAL MEDICINE

## 2021-01-01 PROCEDURE — 93005 ELECTROCARDIOGRAM TRACING: CPT

## 2021-01-01 PROCEDURE — 84157 ASSAY OF PROTEIN OTHER: CPT | Performed by: INTERNAL MEDICINE

## 2021-01-01 PROCEDURE — 83605 ASSAY OF LACTIC ACID: CPT | Performed by: HOSPITALIST

## 2021-01-01 PROCEDURE — 82947 ASSAY GLUCOSE BLOOD QUANT: CPT | Performed by: INTERNAL MEDICINE

## 2021-01-01 PROCEDURE — 36415 COLL VENOUS BLD VENIPUNCTURE: CPT | Performed by: PHYSICIAN ASSISTANT

## 2021-01-01 PROCEDURE — 96376 TX/PRO/DX INJ SAME DRUG ADON: CPT | Performed by: EMERGENCY MEDICINE

## 2021-01-01 PROCEDURE — 120N000002 HC R&B MED SURG/OB UMMC

## 2021-01-01 PROCEDURE — 86923 COMPATIBILITY TEST ELECTRIC: CPT | Performed by: HOSPITALIST

## 2021-01-01 PROCEDURE — 71260 CT THORAX DX C+: CPT | Mod: 26 | Performed by: RADIOLOGY

## 2021-01-01 PROCEDURE — 99285 EMERGENCY DEPT VISIT HI MDM: CPT | Mod: 25 | Performed by: EMERGENCY MEDICINE

## 2021-01-01 PROCEDURE — C1726 CATH, BAL DIL, NON-VASCULAR: HCPCS | Performed by: INTERNAL MEDICINE

## 2021-01-01 PROCEDURE — 93321 DOPPLER ECHO F-UP/LMTD STD: CPT | Mod: 26 | Performed by: STUDENT IN AN ORGANIZED HEALTH CARE EDUCATION/TRAINING PROGRAM

## 2021-01-01 PROCEDURE — G0378 HOSPITAL OBSERVATION PER HR: HCPCS

## 2021-01-01 PROCEDURE — 97161 PT EVAL LOW COMPLEX 20 MIN: CPT | Mod: GP

## 2021-01-01 PROCEDURE — 81001 URINALYSIS AUTO W/SCOPE: CPT | Performed by: PHYSICIAN ASSISTANT

## 2021-01-01 PROCEDURE — 83690 ASSAY OF LIPASE: CPT | Performed by: EMERGENCY MEDICINE

## 2021-01-01 PROCEDURE — 82947 ASSAY GLUCOSE BLOOD QUANT: CPT | Mod: GZ | Performed by: RADIOLOGY

## 2021-01-01 PROCEDURE — 99232 SBSQ HOSP IP/OBS MODERATE 35: CPT | Performed by: INTERNAL MEDICINE

## 2021-01-01 PROCEDURE — 36592 COLLECT BLOOD FROM PICC: CPT | Performed by: PHYSICIAN ASSISTANT

## 2021-01-01 PROCEDURE — 250N000025 HC SEVOFLURANE, PER MIN: Performed by: INTERNAL MEDICINE

## 2021-01-01 PROCEDURE — 255N000002 HC RX 255 OP 636: Performed by: STUDENT IN AN ORGANIZED HEALTH CARE EDUCATION/TRAINING PROGRAM

## 2021-01-01 PROCEDURE — 97110 THERAPEUTIC EXERCISES: CPT | Mod: GO | Performed by: OCCUPATIONAL THERAPIST

## 2021-01-01 PROCEDURE — 85025 COMPLETE CBC W/AUTO DIFF WBC: CPT | Performed by: NURSE PRACTITIONER

## 2021-01-01 PROCEDURE — 70450 CT HEAD/BRAIN W/O DYE: CPT | Mod: 26 | Performed by: RADIOLOGY

## 2021-01-01 PROCEDURE — 74177 CT ABD & PELVIS W/CONTRAST: CPT | Mod: 26 | Performed by: RADIOLOGY

## 2021-01-01 PROCEDURE — 88112 CYTOPATH CELL ENHANCE TECH: CPT | Mod: TC | Performed by: STUDENT IN AN ORGANIZED HEALTH CARE EDUCATION/TRAINING PROGRAM

## 2021-01-01 PROCEDURE — 83735 ASSAY OF MAGNESIUM: CPT | Performed by: STUDENT IN AN ORGANIZED HEALTH CARE EDUCATION/TRAINING PROGRAM

## 2021-01-01 PROCEDURE — 250N000011 HC RX IP 250 OP 636: Performed by: HOSPITALIST

## 2021-01-01 PROCEDURE — 250N000009 HC RX 250: Performed by: RADIOLOGY

## 2021-01-01 PROCEDURE — 85025 COMPLETE CBC W/AUTO DIFF WBC: CPT | Performed by: INTERNAL MEDICINE

## 2021-01-01 PROCEDURE — 99233 SBSQ HOSP IP/OBS HIGH 50: CPT | Mod: 24 | Performed by: INTERNAL MEDICINE

## 2021-01-01 PROCEDURE — 99207 PR CDG-MDM COMPONENT: MEETS LOW - DOWN CODED: CPT | Performed by: STUDENT IN AN ORGANIZED HEALTH CARE EDUCATION/TRAINING PROGRAM

## 2021-01-01 PROCEDURE — 49423 EXCHANGE DRAINAGE CATHETER: CPT | Performed by: RADIOLOGY

## 2021-01-01 PROCEDURE — 84145 PROCALCITONIN (PCT): CPT | Performed by: NURSE PRACTITIONER

## 2021-01-01 PROCEDURE — 99232 SBSQ HOSP IP/OBS MODERATE 35: CPT | Mod: GC | Performed by: INTERNAL MEDICINE

## 2021-01-01 PROCEDURE — P9016 RBC LEUKOCYTES REDUCED: HCPCS | Performed by: HOSPITALIST

## 2021-01-01 PROCEDURE — C9803 HOPD COVID-19 SPEC COLLECT: HCPCS | Performed by: EMERGENCY MEDICINE

## 2021-01-01 PROCEDURE — 82306 VITAMIN D 25 HYDROXY: CPT | Performed by: NURSE PRACTITIONER

## 2021-01-01 PROCEDURE — 99215 OFFICE O/P EST HI 40 MIN: CPT | Mod: 95 | Performed by: INTERNAL MEDICINE

## 2021-01-01 PROCEDURE — U0003 INFECTIOUS AGENT DETECTION BY NUCLEIC ACID (DNA OR RNA); SEVERE ACUTE RESPIRATORY SYNDROME CORONAVIRUS 2 (SARS-COV-2) (CORONAVIRUS DISEASE [COVID-19]), AMPLIFIED PROBE TECHNIQUE, MAKING USE OF HIGH THROUGHPUT TECHNOLOGIES AS DESCRIBED BY CMS-2020-01-R: HCPCS | Performed by: STUDENT IN AN ORGANIZED HEALTH CARE EDUCATION/TRAINING PROGRAM

## 2021-01-01 PROCEDURE — 87070 CULTURE OTHR SPECIMN AEROBIC: CPT | Performed by: EMERGENCY MEDICINE

## 2021-01-01 PROCEDURE — 97530 THERAPEUTIC ACTIVITIES: CPT | Mod: GP

## 2021-01-01 PROCEDURE — 87077 CULTURE AEROBIC IDENTIFY: CPT | Performed by: NURSE PRACTITIONER

## 2021-01-01 PROCEDURE — 86140 C-REACTIVE PROTEIN: CPT | Performed by: PHYSICIAN ASSISTANT

## 2021-01-01 PROCEDURE — 99233 SBSQ HOSP IP/OBS HIGH 50: CPT | Performed by: STUDENT IN AN ORGANIZED HEALTH CARE EDUCATION/TRAINING PROGRAM

## 2021-01-01 PROCEDURE — 999N000128 HC STATISTIC PERIPHERAL IV START W/O US GUIDANCE

## 2021-01-01 PROCEDURE — 360N000083 HC SURGERY LEVEL 3 W/ FLUORO, PER MIN: Performed by: INTERNAL MEDICINE

## 2021-01-01 PROCEDURE — 250N000009 HC RX 250: Performed by: STUDENT IN AN ORGANIZED HEALTH CARE EDUCATION/TRAINING PROGRAM

## 2021-01-01 PROCEDURE — 343N000001 HC RX 343: Performed by: STUDENT IN AN ORGANIZED HEALTH CARE EDUCATION/TRAINING PROGRAM

## 2021-01-01 PROCEDURE — 20501 NJX SINUS TRACT DIAGNOSTIC: CPT

## 2021-01-01 PROCEDURE — 96360 HYDRATION IV INFUSION INIT: CPT | Performed by: EMERGENCY MEDICINE

## 2021-01-01 PROCEDURE — 87641 MR-STAPH DNA AMP PROBE: CPT | Performed by: PHYSICIAN ASSISTANT

## 2021-01-01 PROCEDURE — 99232 SBSQ HOSP IP/OBS MODERATE 35: CPT | Performed by: PHYSICIAN ASSISTANT

## 2021-01-01 PROCEDURE — 97162 PT EVAL MOD COMPLEX 30 MIN: CPT | Mod: GP

## 2021-01-01 PROCEDURE — 258N000003 HC RX IP 258 OP 636: Performed by: EMERGENCY MEDICINE

## 2021-01-01 PROCEDURE — 99153 MOD SED SAME PHYS/QHP EA: CPT

## 2021-01-01 PROCEDURE — 83735 ASSAY OF MAGNESIUM: CPT | Performed by: NURSE PRACTITIONER

## 2021-01-01 PROCEDURE — 272N000500 HC NEEDLE CR2

## 2021-01-01 PROCEDURE — 87040 BLOOD CULTURE FOR BACTERIA: CPT | Performed by: STUDENT IN AN ORGANIZED HEALTH CARE EDUCATION/TRAINING PROGRAM

## 2021-01-01 PROCEDURE — 87186 SC STD MICRODIL/AGAR DIL: CPT | Performed by: STUDENT IN AN ORGANIZED HEALTH CARE EDUCATION/TRAINING PROGRAM

## 2021-01-01 PROCEDURE — 87040 BLOOD CULTURE FOR BACTERIA: CPT | Performed by: INTERNAL MEDICINE

## 2021-01-01 PROCEDURE — 49423 EXCHANGE DRAINAGE CATHETER: CPT

## 2021-01-01 PROCEDURE — 82746 ASSAY OF FOLIC ACID SERUM: CPT | Performed by: INTERNAL MEDICINE

## 2021-01-01 PROCEDURE — 88305 TISSUE EXAM BY PATHOLOGIST: CPT | Mod: 26 | Performed by: PATHOLOGY

## 2021-01-01 PROCEDURE — 96365 THER/PROPH/DIAG IV INF INIT: CPT | Performed by: EMERGENCY MEDICINE

## 2021-01-01 PROCEDURE — 82042 OTHER SOURCE ALBUMIN QUAN EA: CPT | Performed by: STUDENT IN AN ORGANIZED HEALTH CARE EDUCATION/TRAINING PROGRAM

## 2021-01-01 PROCEDURE — 99285 EMERGENCY DEPT VISIT HI MDM: CPT | Mod: 25 | Performed by: INTERNAL MEDICINE

## 2021-01-01 PROCEDURE — 99152 MOD SED SAME PHYS/QHP 5/>YRS: CPT | Performed by: PHYSICIAN ASSISTANT

## 2021-01-01 PROCEDURE — 70553 MRI BRAIN STEM W/O & W/DYE: CPT

## 2021-01-01 PROCEDURE — 99214 OFFICE O/P EST MOD 30 MIN: CPT | Mod: 95 | Performed by: NURSE PRACTITIONER

## 2021-01-01 PROCEDURE — 84100 ASSAY OF PHOSPHORUS: CPT | Performed by: PHYSICIAN ASSISTANT

## 2021-01-01 PROCEDURE — 87075 CULTR BACTERIA EXCEPT BLOOD: CPT | Performed by: NURSE PRACTITIONER

## 2021-01-01 PROCEDURE — 255N000002 HC RX 255 OP 636: Performed by: RADIOLOGY

## 2021-01-01 PROCEDURE — 36415 COLL VENOUS BLD VENIPUNCTURE: CPT | Performed by: HOSPITALIST

## 2021-01-01 PROCEDURE — 99232 SBSQ HOSP IP/OBS MODERATE 35: CPT | Mod: 24 | Performed by: INTERNAL MEDICINE

## 2021-01-01 PROCEDURE — 97165 OT EVAL LOW COMPLEX 30 MIN: CPT | Mod: GO

## 2021-01-01 PROCEDURE — 87205 SMEAR GRAM STAIN: CPT | Performed by: EMERGENCY MEDICINE

## 2021-01-01 PROCEDURE — 87070 CULTURE OTHR SPECIMN AEROBIC: CPT | Performed by: NURSE PRACTITIONER

## 2021-01-01 PROCEDURE — 85025 COMPLETE CBC W/AUTO DIFF WBC: CPT | Performed by: HOSPITALIST

## 2021-01-01 PROCEDURE — 120N000011 HC R&B TRANSPLANT UMMC

## 2021-01-01 PROCEDURE — 78226 HEPATOBILIARY SYSTEM IMAGING: CPT | Mod: 26 | Performed by: RADIOLOGY

## 2021-01-01 PROCEDURE — 49418 INSERT TUN IP CATH PERC: CPT

## 2021-01-01 PROCEDURE — 258N000003 HC RX IP 258 OP 636: Performed by: NURSE PRACTITIONER

## 2021-01-01 PROCEDURE — 86900 BLOOD TYPING SEROLOGIC ABO: CPT | Performed by: HOSPITALIST

## 2021-01-01 PROCEDURE — U0005 INFEC AGEN DETEC AMPLI PROBE: HCPCS | Performed by: EMERGENCY MEDICINE

## 2021-01-01 PROCEDURE — 272N000706 US PARACENTESIS

## 2021-01-01 PROCEDURE — U0003 INFECTIOUS AGENT DETECTION BY NUCLEIC ACID (DNA OR RNA); SEVERE ACUTE RESPIRATORY SYNDROME CORONAVIRUS 2 (SARS-COV-2) (CORONAVIRUS DISEASE [COVID-19]), AMPLIFIED PROBE TECHNIQUE, MAKING USE OF HIGH THROUGHPUT TECHNOLOGIES AS DESCRIBED BY CMS-2020-01-R: HCPCS | Performed by: RADIOLOGY

## 2021-01-01 PROCEDURE — 258N000003 HC RX IP 258 OP 636: Performed by: HOSPITALIST

## 2021-01-01 PROCEDURE — 89051 BODY FLUID CELL COUNT: CPT | Performed by: INTERNAL MEDICINE

## 2021-01-01 PROCEDURE — 84145 PROCALCITONIN (PCT): CPT | Performed by: PHYSICIAN ASSISTANT

## 2021-01-01 PROCEDURE — 49083 ABD PARACENTESIS W/IMAGING: CPT | Performed by: STUDENT IN AN ORGANIZED HEALTH CARE EDUCATION/TRAINING PROGRAM

## 2021-01-01 PROCEDURE — 0W9G3ZZ DRAINAGE OF PERITONEAL CAVITY, PERCUTANEOUS APPROACH: ICD-10-PCS | Performed by: STUDENT IN AN ORGANIZED HEALTH CARE EDUCATION/TRAINING PROGRAM

## 2021-01-01 PROCEDURE — 85610 PROTHROMBIN TIME: CPT | Performed by: PHYSICIAN ASSISTANT

## 2021-01-01 PROCEDURE — 99221 1ST HOSP IP/OBS SF/LOW 40: CPT | Mod: GC | Performed by: SURGERY

## 2021-01-01 PROCEDURE — 82533 TOTAL CORTISOL: CPT | Performed by: STUDENT IN AN ORGANIZED HEALTH CARE EDUCATION/TRAINING PROGRAM

## 2021-01-01 PROCEDURE — 96360 HYDRATION IV INFUSION INIT: CPT | Performed by: INTERNAL MEDICINE

## 2021-01-01 PROCEDURE — 999N001014 HC STATISTIC CYTO WRIGHT STAIN TC: Performed by: INTERNAL MEDICINE

## 2021-01-01 PROCEDURE — 82140 ASSAY OF AMMONIA: CPT | Performed by: PHYSICIAN ASSISTANT

## 2021-01-01 PROCEDURE — 250N000009 HC RX 250: Performed by: NURSE ANESTHETIST, CERTIFIED REGISTERED

## 2021-01-01 PROCEDURE — 49083 ABD PARACENTESIS W/IMAGING: CPT | Performed by: RADIOLOGY

## 2021-01-01 PROCEDURE — 74181 MRI ABDOMEN W/O CONTRAST: CPT | Mod: 26 | Performed by: RADIOLOGY

## 2021-01-01 PROCEDURE — 84484 ASSAY OF TROPONIN QUANT: CPT | Performed by: PHYSICIAN ASSISTANT

## 2021-01-01 PROCEDURE — 99233 SBSQ HOSP IP/OBS HIGH 50: CPT | Performed by: NURSE PRACTITIONER

## 2021-01-01 PROCEDURE — 82150 ASSAY OF AMYLASE: CPT | Performed by: INTERNAL MEDICINE

## 2021-01-01 PROCEDURE — 99223 1ST HOSP IP/OBS HIGH 75: CPT | Mod: GC | Performed by: INTERNAL MEDICINE

## 2021-01-01 PROCEDURE — 70450 CT HEAD/BRAIN W/O DYE: CPT

## 2021-01-01 PROCEDURE — 85018 HEMOGLOBIN: CPT | Performed by: STUDENT IN AN ORGANIZED HEALTH CARE EDUCATION/TRAINING PROGRAM

## 2021-01-01 PROCEDURE — 272N000504 HC NEEDLE CR4

## 2021-01-01 PROCEDURE — U0005 INFEC AGEN DETEC AMPLI PROBE: HCPCS | Performed by: STUDENT IN AN ORGANIZED HEALTH CARE EDUCATION/TRAINING PROGRAM

## 2021-01-01 PROCEDURE — 250N000011 HC RX IP 250 OP 636

## 2021-01-01 PROCEDURE — 999N000122 CT MHEALTH OVERREAD

## 2021-01-01 PROCEDURE — 85027 COMPLETE CBC AUTOMATED: CPT | Performed by: NURSE PRACTITIONER

## 2021-01-01 PROCEDURE — 84145 PROCALCITONIN (PCT): CPT | Performed by: INTERNAL MEDICINE

## 2021-01-01 PROCEDURE — 97535 SELF CARE MNGMENT TRAINING: CPT | Mod: GO | Performed by: OCCUPATIONAL THERAPIST

## 2021-01-01 PROCEDURE — P9047 ALBUMIN (HUMAN), 25%, 50ML: HCPCS | Performed by: PEDIATRICS

## 2021-01-01 PROCEDURE — 99207 CT MHEALTH OVERREAD: CPT | Performed by: RADIOLOGY

## 2021-01-01 PROCEDURE — 85049 AUTOMATED PLATELET COUNT: CPT | Performed by: RADIOLOGY

## 2021-01-01 PROCEDURE — 49424 ASSESS CYST CONTRAST INJECT: CPT | Performed by: RADIOLOGY

## 2021-01-01 PROCEDURE — G0463 HOSPITAL OUTPT CLINIC VISIT: HCPCS

## 2021-01-01 PROCEDURE — 272N000001 HC OR GENERAL SUPPLY STERILE: Performed by: INTERNAL MEDICINE

## 2021-01-01 PROCEDURE — 99214 OFFICE O/P EST MOD 30 MIN: CPT | Mod: 95 | Performed by: INTERNAL MEDICINE

## 2021-01-01 PROCEDURE — 120N000003 HC R&B IMCU UMMC

## 2021-01-01 PROCEDURE — 87040 BLOOD CULTURE FOR BACTERIA: CPT | Performed by: PHYSICIAN ASSISTANT

## 2021-01-01 PROCEDURE — 82607 VITAMIN B-12: CPT | Performed by: INTERNAL MEDICINE

## 2021-01-01 PROCEDURE — 93308 TTE F-UP OR LMTD: CPT | Mod: 26 | Performed by: STUDENT IN AN ORGANIZED HEALTH CARE EDUCATION/TRAINING PROGRAM

## 2021-01-01 PROCEDURE — 74176 CT ABD & PELVIS W/O CONTRAST: CPT | Mod: 26 | Performed by: RADIOLOGY

## 2021-01-01 PROCEDURE — 97162 PT EVAL MOD COMPLEX 30 MIN: CPT | Mod: GP | Performed by: PHYSICAL THERAPIST

## 2021-01-01 PROCEDURE — 83036 HEMOGLOBIN GLYCOSYLATED A1C: CPT | Performed by: EMERGENCY MEDICINE

## 2021-01-01 PROCEDURE — 99417 PROLNG OP E/M EACH 15 MIN: CPT | Mod: 95 | Performed by: INTERNAL MEDICINE

## 2021-01-01 PROCEDURE — 88112 CYTOPATH CELL ENHANCE TECH: CPT | Mod: TC | Performed by: INTERNAL MEDICINE

## 2021-01-01 PROCEDURE — 200N000002 HC R&B ICU UMMC

## 2021-01-01 PROCEDURE — 83605 ASSAY OF LACTIC ACID: CPT | Performed by: PHYSICIAN ASSISTANT

## 2021-01-01 PROCEDURE — 49406 IMAGE CATH FLUID PERI/RETRO: CPT

## 2021-01-01 PROCEDURE — 99207 PR CDG-CUT & PASTE-POTENTIAL IMPACT ON LEVEL: CPT | Performed by: STUDENT IN AN ORGANIZED HEALTH CARE EDUCATION/TRAINING PROGRAM

## 2021-01-01 PROCEDURE — 70549 MR ANGIOGRAPH NECK W/O&W/DYE: CPT | Mod: 26 | Performed by: RADIOLOGY

## 2021-01-01 PROCEDURE — 87102 FUNGUS ISOLATION CULTURE: CPT | Performed by: NURSE PRACTITIONER

## 2021-01-01 PROCEDURE — 86850 RBC ANTIBODY SCREEN: CPT | Performed by: HOSPITALIST

## 2021-01-01 PROCEDURE — 97110 THERAPEUTIC EXERCISES: CPT | Mod: GP | Performed by: PHYSICAL THERAPIST

## 2021-01-01 PROCEDURE — 87015 SPECIMEN INFECT AGNT CONCNTJ: CPT | Performed by: STUDENT IN AN ORGANIZED HEALTH CARE EDUCATION/TRAINING PROGRAM

## 2021-01-01 PROCEDURE — 83690 ASSAY OF LIPASE: CPT | Performed by: PHYSICIAN ASSISTANT

## 2021-01-01 PROCEDURE — A9585 GADOBUTROL INJECTION: HCPCS | Performed by: RADIOLOGY

## 2021-01-01 PROCEDURE — 70498 CT ANGIOGRAPHY NECK: CPT

## 2021-01-01 PROCEDURE — 99218 PR INITIAL OBSERVATION CARE,LEVEL I: CPT | Mod: 25 | Performed by: INTERNAL MEDICINE

## 2021-01-01 PROCEDURE — 99207 PR APP CREDIT; MD BILLING SHARED VISIT: CPT | Performed by: NURSE PRACTITIONER

## 2021-01-01 PROCEDURE — 250N000011 HC RX IP 250 OP 636: Performed by: EMERGENCY MEDICINE

## 2021-01-01 PROCEDURE — 49405 IMAGE CATH FLUID COLXN VISC: CPT | Performed by: RADIOLOGY

## 2021-01-01 PROCEDURE — 87075 CULTR BACTERIA EXCEPT BLOOD: CPT | Performed by: STUDENT IN AN ORGANIZED HEALTH CARE EDUCATION/TRAINING PROGRAM

## 2021-01-01 PROCEDURE — 85730 THROMBOPLASTIN TIME PARTIAL: CPT | Performed by: STUDENT IN AN ORGANIZED HEALTH CARE EDUCATION/TRAINING PROGRAM

## 2021-01-01 PROCEDURE — 87040 BLOOD CULTURE FOR BACTERIA: CPT | Performed by: EMERGENCY MEDICINE

## 2021-01-01 PROCEDURE — 87640 STAPH A DNA AMP PROBE: CPT | Performed by: PHYSICIAN ASSISTANT

## 2021-01-01 PROCEDURE — 99152 MOD SED SAME PHYS/QHP 5/>YRS: CPT | Performed by: RADIOLOGY

## 2021-01-01 PROCEDURE — 82565 ASSAY OF CREATININE: CPT | Performed by: NURSE PRACTITIONER

## 2021-01-01 PROCEDURE — 36415 COLL VENOUS BLD VENIPUNCTURE: CPT | Performed by: STUDENT IN AN ORGANIZED HEALTH CARE EDUCATION/TRAINING PROGRAM

## 2021-01-01 PROCEDURE — U0003 INFECTIOUS AGENT DETECTION BY NUCLEIC ACID (DNA OR RNA); SEVERE ACUTE RESPIRATORY SYNDROME CORONAVIRUS 2 (SARS-COV-2) (CORONAVIRUS DISEASE [COVID-19]), AMPLIFIED PROBE TECHNIQUE, MAKING USE OF HIGH THROUGHPUT TECHNOLOGIES AS DESCRIBED BY CMS-2020-01-R: HCPCS | Performed by: EMERGENCY MEDICINE

## 2021-01-01 PROCEDURE — 49083 ABD PARACENTESIS W/IMAGING: CPT | Mod: GC | Performed by: STUDENT IN AN ORGANIZED HEALTH CARE EDUCATION/TRAINING PROGRAM

## 2021-01-01 PROCEDURE — 76705 ECHO EXAM OF ABDOMEN: CPT

## 2021-01-01 PROCEDURE — A9537 TC99M MEBROFENIN: HCPCS | Performed by: STUDENT IN AN ORGANIZED HEALTH CARE EDUCATION/TRAINING PROGRAM

## 2021-01-01 PROCEDURE — 250N000011 HC RX IP 250 OP 636: Performed by: PEDIATRICS

## 2021-01-01 PROCEDURE — 272N000506 HC NEEDLE CR6

## 2021-01-01 PROCEDURE — P9047 ALBUMIN (HUMAN), 25%, 50ML: HCPCS | Performed by: PHYSICIAN ASSISTANT

## 2021-01-01 PROCEDURE — 85025 COMPLETE CBC W/AUTO DIFF WBC: CPT | Performed by: STUDENT IN AN ORGANIZED HEALTH CARE EDUCATION/TRAINING PROGRAM

## 2021-01-01 PROCEDURE — 99215 OFFICE O/P EST HI 40 MIN: CPT | Performed by: SURGERY

## 2021-01-01 PROCEDURE — 999N001014 HC STATISTIC CYTO WRIGHT STAIN TC: Performed by: STUDENT IN AN ORGANIZED HEALTH CARE EDUCATION/TRAINING PROGRAM

## 2021-01-01 PROCEDURE — 99223 1ST HOSP IP/OBS HIGH 75: CPT | Performed by: STUDENT IN AN ORGANIZED HEALTH CARE EDUCATION/TRAINING PROGRAM

## 2021-01-01 PROCEDURE — 99207 PR CDG-HISTORY COMP: MEETS EXP. PROBLEM FOCUSED - DOWN CODED LACK OF HPI: CPT | Performed by: INTERNAL MEDICINE

## 2021-01-01 PROCEDURE — 99222 1ST HOSP IP/OBS MODERATE 55: CPT | Mod: 24 | Performed by: INTERNAL MEDICINE

## 2021-01-01 PROCEDURE — 74018 RADEX ABDOMEN 1 VIEW: CPT

## 2021-01-01 PROCEDURE — 74181 MRI ABDOMEN W/O CONTRAST: CPT

## 2021-01-01 PROCEDURE — 78830 RP LOCLZJ TUM SPECT W/CT 1: CPT

## 2021-01-01 PROCEDURE — 99233 SBSQ HOSP IP/OBS HIGH 50: CPT | Mod: GC | Performed by: SURGERY

## 2021-01-01 PROCEDURE — A9585 GADOBUTROL INJECTION: HCPCS | Performed by: STUDENT IN AN ORGANIZED HEALTH CARE EDUCATION/TRAINING PROGRAM

## 2021-01-01 PROCEDURE — 93971 EXTREMITY STUDY: CPT | Mod: 26 | Performed by: RADIOLOGY

## 2021-01-01 PROCEDURE — 99223 1ST HOSP IP/OBS HIGH 75: CPT | Mod: AI | Performed by: INTERNAL MEDICINE

## 2021-01-01 PROCEDURE — 83735 ASSAY OF MAGNESIUM: CPT | Performed by: PHYSICIAN ASSISTANT

## 2021-01-01 PROCEDURE — 85610 PROTHROMBIN TIME: CPT | Performed by: EMERGENCY MEDICINE

## 2021-01-01 PROCEDURE — 272N000566 HC SHEATH CR3

## 2021-01-01 PROCEDURE — 82607 VITAMIN B-12: CPT | Performed by: EMERGENCY MEDICINE

## 2021-01-01 PROCEDURE — 84443 ASSAY THYROID STIM HORMONE: CPT | Performed by: EMERGENCY MEDICINE

## 2021-01-01 PROCEDURE — 0WJP8ZZ INSPECTION OF GASTROINTESTINAL TRACT, VIA NATURAL OR ARTIFICIAL OPENING ENDOSCOPIC APPROACH: ICD-10-PCS | Performed by: INTERNAL MEDICINE

## 2021-01-01 PROCEDURE — 49083 ABD PARACENTESIS W/IMAGING: CPT | Performed by: EMERGENCY MEDICINE

## 2021-01-01 PROCEDURE — 97535 SELF CARE MNGMENT TRAINING: CPT | Mod: GO

## 2021-01-01 PROCEDURE — 71045 X-RAY EXAM CHEST 1 VIEW: CPT | Mod: 26 | Performed by: STUDENT IN AN ORGANIZED HEALTH CARE EDUCATION/TRAINING PROGRAM

## 2021-01-01 PROCEDURE — 36592 COLLECT BLOOD FROM PICC: CPT | Performed by: HOSPITALIST

## 2021-01-01 PROCEDURE — 99232 SBSQ HOSP IP/OBS MODERATE 35: CPT | Mod: 25 | Performed by: INTERNAL MEDICINE

## 2021-01-01 PROCEDURE — 82962 GLUCOSE BLOOD TEST: CPT

## 2021-01-01 PROCEDURE — 93880 EXTRACRANIAL BILAT STUDY: CPT | Mod: 26 | Performed by: RADIOLOGY

## 2021-01-01 PROCEDURE — 93325 DOPPLER ECHO COLOR FLOW MAPG: CPT | Mod: 26 | Performed by: STUDENT IN AN ORGANIZED HEALTH CARE EDUCATION/TRAINING PROGRAM

## 2021-01-01 PROCEDURE — 99207 MR BRAIN FOR STROKE COMPLETE W/O & W CONTRAST: CPT | Mod: 26 | Performed by: RADIOLOGY

## 2021-01-01 PROCEDURE — 272N000602 HC WOUND GLUE CR1

## 2021-01-01 PROCEDURE — 250N000024 HC ISOFLURANE, PER MIN: Performed by: INTERNAL MEDICINE

## 2021-01-01 PROCEDURE — 93880 EXTRACRANIAL BILAT STUDY: CPT

## 2021-01-01 PROCEDURE — 49083 ABD PARACENTESIS W/IMAGING: CPT | Mod: GC | Performed by: PEDIATRICS

## 2021-01-01 PROCEDURE — 86140 C-REACTIVE PROTEIN: CPT | Performed by: EMERGENCY MEDICINE

## 2021-01-01 PROCEDURE — 89051 BODY FLUID CELL COUNT: CPT | Performed by: EMERGENCY MEDICINE

## 2021-01-01 PROCEDURE — 86901 BLOOD TYPING SEROLOGIC RH(D): CPT | Performed by: HOSPITALIST

## 2021-01-01 PROCEDURE — 74183 MRI ABD W/O CNTR FLWD CNTR: CPT | Mod: 52

## 2021-01-01 PROCEDURE — 999N000179 XR SURGERY CARM FLUORO LESS THAN 5 MIN W STILLS: Mod: TC

## 2021-01-01 PROCEDURE — 84100 ASSAY OF PHOSPHORUS: CPT | Performed by: EMERGENCY MEDICINE

## 2021-01-01 PROCEDURE — 82306 VITAMIN D 25 HYDROXY: CPT | Performed by: EMERGENCY MEDICINE

## 2021-01-01 PROCEDURE — 99207 PR CDG-HISTORY COMPONENT: MEETS DETAILED - DOWN CODED LACK OF ROS: CPT | Performed by: INTERNAL MEDICINE

## 2021-01-01 PROCEDURE — 999N000141 HC STATISTIC PRE-PROCEDURE NURSING ASSESSMENT: Performed by: INTERNAL MEDICINE

## 2021-01-01 PROCEDURE — 370N000017 HC ANESTHESIA TECHNICAL FEE, PER MIN: Performed by: INTERNAL MEDICINE

## 2021-01-01 PROCEDURE — 99223 1ST HOSP IP/OBS HIGH 75: CPT | Mod: GC | Performed by: PSYCHIATRY & NEUROLOGY

## 2021-01-01 PROCEDURE — 93971 EXTREMITY STUDY: CPT | Mod: LT

## 2021-01-01 PROCEDURE — 76705 ECHO EXAM OF ABDOMEN: CPT | Mod: 26 | Performed by: RADIOLOGY

## 2021-01-01 PROCEDURE — 99222 1ST HOSP IP/OBS MODERATE 55: CPT | Mod: 25 | Performed by: INTERNAL MEDICINE

## 2021-01-01 PROCEDURE — 81001 URINALYSIS AUTO W/SCOPE: CPT | Performed by: NURSE PRACTITIONER

## 2021-01-01 PROCEDURE — 99223 1ST HOSP IP/OBS HIGH 75: CPT | Mod: AI | Performed by: STUDENT IN AN ORGANIZED HEALTH CARE EDUCATION/TRAINING PROGRAM

## 2021-01-01 PROCEDURE — 74183 MRI ABD W/O CNTR FLWD CNTR: CPT

## 2021-01-01 PROCEDURE — 70498 CT ANGIOGRAPHY NECK: CPT | Mod: 26 | Performed by: RADIOLOGY

## 2021-01-01 PROCEDURE — 83605 ASSAY OF LACTIC ACID: CPT | Performed by: NURSE PRACTITIONER

## 2021-01-01 PROCEDURE — 97530 THERAPEUTIC ACTIVITIES: CPT | Mod: GO | Performed by: OCCUPATIONAL THERAPIST

## 2021-01-01 PROCEDURE — 96361 HYDRATE IV INFUSION ADD-ON: CPT | Performed by: INTERNAL MEDICINE

## 2021-01-01 PROCEDURE — 258N000003 HC RX IP 258 OP 636: Performed by: NURSE ANESTHETIST, CERTIFIED REGISTERED

## 2021-01-01 PROCEDURE — 710N000010 HC RECOVERY PHASE 1, LEVEL 2, PER MIN: Performed by: INTERNAL MEDICINE

## 2021-01-01 PROCEDURE — 84439 ASSAY OF FREE THYROXINE: CPT | Performed by: EMERGENCY MEDICINE

## 2021-01-01 RX ORDER — FLUMAZENIL 0.1 MG/ML
0.2 INJECTION, SOLUTION INTRAVENOUS
Status: DISCONTINUED | OUTPATIENT
Start: 2021-01-01 | End: 2021-01-01 | Stop reason: HOSPADM

## 2021-01-01 RX ORDER — MIRTAZAPINE 7.5 MG/1
7.5 TABLET, FILM COATED ORAL AT BEDTIME
Status: DISCONTINUED | OUTPATIENT
Start: 2021-01-01 | End: 2021-01-01

## 2021-01-01 RX ORDER — FENTANYL CITRATE 50 UG/ML
25-50 INJECTION, SOLUTION INTRAMUSCULAR; INTRAVENOUS
Status: DISCONTINUED | OUTPATIENT
Start: 2021-01-01 | End: 2021-01-01 | Stop reason: HOSPADM

## 2021-01-01 RX ORDER — LIDOCAINE 40 MG/G
CREAM TOPICAL
Status: DISCONTINUED | OUTPATIENT
Start: 2021-01-01 | End: 2021-01-01 | Stop reason: HOSPADM

## 2021-01-01 RX ORDER — NICOTINE POLACRILEX 4 MG
15-30 LOZENGE BUCCAL
Status: DISCONTINUED | OUTPATIENT
Start: 2021-01-01 | End: 2021-01-01 | Stop reason: HOSPADM

## 2021-01-01 RX ORDER — MAGNESIUM SULFATE HEPTAHYDRATE 40 MG/ML
4 INJECTION, SOLUTION INTRAVENOUS ONCE
Status: COMPLETED | OUTPATIENT
Start: 2021-01-01 | End: 2021-01-01

## 2021-01-01 RX ORDER — NALOXONE HYDROCHLORIDE 0.4 MG/ML
0.2 INJECTION, SOLUTION INTRAMUSCULAR; INTRAVENOUS; SUBCUTANEOUS
Status: DISCONTINUED | OUTPATIENT
Start: 2021-01-01 | End: 2021-01-01 | Stop reason: HOSPADM

## 2021-01-01 RX ORDER — ACETAMINOPHEN 325 MG/1
650 TABLET ORAL EVERY 4 HOURS PRN
Start: 2021-01-01

## 2021-01-01 RX ORDER — IOPAMIDOL 755 MG/ML
500 INJECTION, SOLUTION INTRAVASCULAR ONCE
Status: COMPLETED | OUTPATIENT
Start: 2021-01-01 | End: 2021-01-01

## 2021-01-01 RX ORDER — SPIRONOLACTONE 25 MG/1
25 TABLET ORAL 2 TIMES DAILY
Status: ON HOLD | COMMUNITY
End: 2021-01-01

## 2021-01-01 RX ORDER — METRONIDAZOLE 500 MG/1
TABLET ORAL
Status: ON HOLD | COMMUNITY
Start: 2021-01-01 | End: 2021-01-01

## 2021-01-01 RX ORDER — DRONABINOL 5 MG/1
CAPSULE ORAL
Qty: 90 CAPSULE | Refills: 0 | Status: SHIPPED | OUTPATIENT
Start: 2021-01-01 | End: 2021-01-01

## 2021-01-01 RX ORDER — DRONABINOL 5 MG/1
CAPSULE ORAL
Qty: 90 CAPSULE | Refills: 1 | Status: SHIPPED | OUTPATIENT
Start: 2021-01-01 | End: 2021-01-01

## 2021-01-01 RX ORDER — AMOXICILLIN 250 MG
2 CAPSULE ORAL 2 TIMES DAILY
Status: DISCONTINUED | OUTPATIENT
Start: 2021-01-01 | End: 2021-01-01 | Stop reason: HOSPADM

## 2021-01-01 RX ORDER — LIDOCAINE HYDROCHLORIDE 20 MG/ML
INJECTION, SOLUTION INFILTRATION; PERINEURAL PRN
Status: DISCONTINUED | OUTPATIENT
Start: 2021-01-01 | End: 2021-01-01

## 2021-01-01 RX ORDER — LIDOCAINE 40 MG/G
CREAM TOPICAL
Status: CANCELLED | OUTPATIENT
Start: 2021-01-01

## 2021-01-01 RX ORDER — NALOXONE HYDROCHLORIDE 0.4 MG/ML
0.4 INJECTION, SOLUTION INTRAMUSCULAR; INTRAVENOUS; SUBCUTANEOUS
Status: DISCONTINUED | OUTPATIENT
Start: 2021-01-01 | End: 2021-01-01

## 2021-01-01 RX ORDER — ONDANSETRON 4 MG/1
4 TABLET, ORALLY DISINTEGRATING ORAL EVERY 6 HOURS PRN
Status: DISCONTINUED | OUTPATIENT
Start: 2021-01-01 | End: 2021-01-01 | Stop reason: HOSPADM

## 2021-01-01 RX ORDER — HEPARIN SODIUM (PORCINE) LOCK FLUSH IV SOLN 100 UNIT/ML 100 UNIT/ML
5 SOLUTION INTRAVENOUS
Status: CANCELLED
Start: 2021-01-01

## 2021-01-01 RX ORDER — HEPARIN SODIUM,PORCINE 10 UNIT/ML
5-10 VIAL (ML) INTRAVENOUS
Status: DISCONTINUED | OUTPATIENT
Start: 2021-01-01 | End: 2021-01-01 | Stop reason: HOSPADM

## 2021-01-01 RX ORDER — PHYTONADIONE 5 MG/1
10 TABLET ORAL ONCE
Status: DISCONTINUED | OUTPATIENT
Start: 2021-01-01 | End: 2021-01-01

## 2021-01-01 RX ORDER — DRONABINOL 5 MG/1
CAPSULE ORAL
Qty: 90 CAPSULE | Refills: 1 | Status: ON HOLD | OUTPATIENT
Start: 2021-01-01 | End: 2021-01-01

## 2021-01-01 RX ORDER — METRONIDAZOLE 500 MG/1
500 TABLET ORAL 3 TIMES DAILY
Qty: 42 TABLET | Refills: 2 | Status: SHIPPED | OUTPATIENT
Start: 2021-01-01 | End: 2021-01-01 | Stop reason: ALTCHOICE

## 2021-01-01 RX ORDER — IOPAMIDOL 755 MG/ML
108 INJECTION, SOLUTION INTRAVASCULAR ONCE
Status: COMPLETED | OUTPATIENT
Start: 2021-01-01 | End: 2021-01-01

## 2021-01-01 RX ORDER — DEXTROSE MONOHYDRATE 100 MG/ML
INJECTION, SOLUTION INTRAVENOUS CONTINUOUS PRN
Status: DISCONTINUED | OUTPATIENT
Start: 2021-01-01 | End: 2021-01-01 | Stop reason: HOSPADM

## 2021-01-01 RX ORDER — CEFAZOLIN SODIUM 2 G/100ML
2 INJECTION, SOLUTION INTRAVENOUS
Status: COMPLETED | OUTPATIENT
Start: 2021-01-01 | End: 2021-01-01

## 2021-01-01 RX ORDER — HEPARIN SODIUM (PORCINE) LOCK FLUSH IV SOLN 100 UNIT/ML 100 UNIT/ML
500 SOLUTION INTRAVENOUS ONCE
Status: COMPLETED | OUTPATIENT
Start: 2021-01-01 | End: 2021-01-01

## 2021-01-01 RX ORDER — HEPARIN SODIUM (PORCINE) LOCK FLUSH IV SOLN 100 UNIT/ML 100 UNIT/ML
5 SOLUTION INTRAVENOUS
Status: DISCONTINUED | OUTPATIENT
Start: 2021-01-01 | End: 2021-01-01 | Stop reason: HOSPADM

## 2021-01-01 RX ORDER — OLANZAPINE 2.5 MG/1
2.5 TABLET, FILM COATED ORAL AT BEDTIME
Status: DISCONTINUED | OUTPATIENT
Start: 2021-01-01 | End: 2021-01-01 | Stop reason: HOSPADM

## 2021-01-01 RX ORDER — IOPAMIDOL 510 MG/ML
50 INJECTION, SOLUTION INTRAVASCULAR ONCE
Status: CANCELLED | OUTPATIENT
Start: 2021-01-01 | End: 2021-01-01

## 2021-01-01 RX ORDER — NALOXONE HYDROCHLORIDE 0.4 MG/ML
0.4 INJECTION, SOLUTION INTRAMUSCULAR; INTRAVENOUS; SUBCUTANEOUS
Status: DISCONTINUED | OUTPATIENT
Start: 2021-01-01 | End: 2021-01-01 | Stop reason: HOSPADM

## 2021-01-01 RX ORDER — FENTANYL CITRATE 50 UG/ML
25-50 INJECTION, SOLUTION INTRAMUSCULAR; INTRAVENOUS EVERY 5 MIN PRN
Status: DISCONTINUED | OUTPATIENT
Start: 2021-01-01 | End: 2021-01-01 | Stop reason: HOSPADM

## 2021-01-01 RX ORDER — ALBUMIN (HUMAN) 12.5 G/50ML
12.5-5 SOLUTION INTRAVENOUS CONTINUOUS PRN
Status: DISCONTINUED | OUTPATIENT
Start: 2021-01-01 | End: 2021-01-01 | Stop reason: HOSPADM

## 2021-01-01 RX ORDER — POTASSIUM CHLORIDE 750 MG/1
40 TABLET, EXTENDED RELEASE ORAL ONCE
Status: COMPLETED | OUTPATIENT
Start: 2021-01-01 | End: 2021-01-01

## 2021-01-01 RX ORDER — FENOFIBRATE 160 MG/1
160 TABLET ORAL AT BEDTIME
Status: DISCONTINUED | OUTPATIENT
Start: 2021-01-01 | End: 2021-01-01 | Stop reason: HOSPADM

## 2021-01-01 RX ORDER — ONDANSETRON 2 MG/ML
4 INJECTION INTRAMUSCULAR; INTRAVENOUS EVERY 6 HOURS
Status: DISCONTINUED | OUTPATIENT
Start: 2021-01-01 | End: 2021-01-01

## 2021-01-01 RX ORDER — NALOXONE HYDROCHLORIDE 0.4 MG/ML
0.2 INJECTION, SOLUTION INTRAMUSCULAR; INTRAVENOUS; SUBCUTANEOUS
Status: DISCONTINUED | OUTPATIENT
Start: 2021-01-01 | End: 2021-01-01

## 2021-01-01 RX ORDER — ONDANSETRON 2 MG/ML
4 INJECTION INTRAMUSCULAR; INTRAVENOUS EVERY 6 HOURS PRN
Status: DISCONTINUED | OUTPATIENT
Start: 2021-01-01 | End: 2021-01-01 | Stop reason: HOSPADM

## 2021-01-01 RX ORDER — CIPROFLOXACIN 500 MG/1
500 TABLET, FILM COATED ORAL 2 TIMES DAILY
Qty: 28 TABLET | Refills: 0 | Status: SHIPPED | OUTPATIENT
Start: 2021-01-01 | End: 2021-01-01

## 2021-01-01 RX ORDER — DRONABINOL 5 MG/1
10 CAPSULE ORAL 2 TIMES DAILY
Status: DISCONTINUED | OUTPATIENT
Start: 2021-01-01 | End: 2021-01-01 | Stop reason: HOSPADM

## 2021-01-01 RX ORDER — LANOLIN ALCOHOL/MO/W.PET/CERES
100 CREAM (GRAM) TOPICAL DAILY
Status: COMPLETED | OUTPATIENT
Start: 2021-01-01 | End: 2021-01-01

## 2021-01-01 RX ORDER — LORAZEPAM 0.5 MG/1
0.5 TABLET ORAL EVERY 4 HOURS PRN
Qty: 30 TABLET | Refills: 5 | Status: ON HOLD | OUTPATIENT
Start: 2021-01-01 | End: 2021-01-01

## 2021-01-01 RX ORDER — CLOPIDOGREL BISULFATE 75 MG/1
75 TABLET ORAL AT BEDTIME
Status: DISCONTINUED | OUTPATIENT
Start: 2021-01-01 | End: 2021-01-01 | Stop reason: HOSPADM

## 2021-01-01 RX ORDER — AMPICILLIN AND SULBACTAM 2; 1 G/1; G/1
3 INJECTION, POWDER, FOR SOLUTION INTRAMUSCULAR; INTRAVENOUS
Status: COMPLETED | OUTPATIENT
Start: 2021-01-01 | End: 2021-01-01

## 2021-01-01 RX ORDER — FUROSEMIDE 20 MG
20 TABLET ORAL DAILY
Status: ON HOLD | COMMUNITY
Start: 2021-01-01 | End: 2021-01-01

## 2021-01-01 RX ORDER — HEPARIN SODIUM,PORCINE 10 UNIT/ML
5-10 VIAL (ML) INTRAVENOUS EVERY 24 HOURS
Status: DISCONTINUED | OUTPATIENT
Start: 2021-01-01 | End: 2021-01-01 | Stop reason: HOSPADM

## 2021-01-01 RX ORDER — PROCHLORPERAZINE MALEATE 5 MG
5 TABLET ORAL EVERY 6 HOURS PRN
Status: DISCONTINUED | OUTPATIENT
Start: 2021-01-01 | End: 2021-01-01 | Stop reason: HOSPADM

## 2021-01-01 RX ORDER — TADALAFIL 10 MG/1
10 TABLET ORAL DAILY PRN
Status: ON HOLD | COMMUNITY
End: 2021-01-01

## 2021-01-01 RX ORDER — ZOLPIDEM TARTRATE 10 MG/1
10 TABLET ORAL
Qty: 30 TABLET | Refills: 0 | Status: ON HOLD | OUTPATIENT
Start: 2021-01-01 | End: 2021-01-01

## 2021-01-01 RX ORDER — POLYETHYLENE GLYCOL 3350 17 G/17G
17 POWDER, FOR SOLUTION ORAL DAILY
Status: DISCONTINUED | OUTPATIENT
Start: 2021-01-01 | End: 2021-01-01 | Stop reason: HOSPADM

## 2021-01-01 RX ORDER — DEXTROSE MONOHYDRATE 25 G/50ML
25-50 INJECTION, SOLUTION INTRAVENOUS
Status: DISCONTINUED | OUTPATIENT
Start: 2021-01-01 | End: 2021-01-01 | Stop reason: HOSPADM

## 2021-01-01 RX ORDER — SODIUM CHLORIDE 9 MG/ML
INJECTION, SOLUTION INTRAVENOUS CONTINUOUS
Status: DISCONTINUED | OUTPATIENT
Start: 2021-01-01 | End: 2021-01-01

## 2021-01-01 RX ORDER — AMOXICILLIN 250 MG
2 CAPSULE ORAL 2 TIMES DAILY PRN
Status: DISCONTINUED | OUTPATIENT
Start: 2021-01-01 | End: 2021-01-01 | Stop reason: HOSPADM

## 2021-01-01 RX ORDER — METRONIDAZOLE 250 MG/1
500 TABLET ORAL 3 TIMES DAILY
Status: DISCONTINUED | OUTPATIENT
Start: 2021-01-01 | End: 2021-01-01 | Stop reason: HOSPADM

## 2021-01-01 RX ORDER — ALBUMIN (HUMAN) 12.5 G/50ML
50 SOLUTION INTRAVENOUS ONCE
Status: COMPLETED | OUTPATIENT
Start: 2021-01-01 | End: 2021-01-01

## 2021-01-01 RX ORDER — MAGNESIUM SULFATE HEPTAHYDRATE 40 MG/ML
2 INJECTION, SOLUTION INTRAVENOUS ONCE
Status: COMPLETED | OUTPATIENT
Start: 2021-01-01 | End: 2021-01-01

## 2021-01-01 RX ORDER — SODIUM CHLORIDE, SODIUM LACTATE, POTASSIUM CHLORIDE, CALCIUM CHLORIDE 600; 310; 30; 20 MG/100ML; MG/100ML; MG/100ML; MG/100ML
INJECTION, SOLUTION INTRAVENOUS CONTINUOUS
Status: DISCONTINUED | OUTPATIENT
Start: 2021-01-01 | End: 2021-01-01

## 2021-01-01 RX ORDER — HEPARIN SODIUM,PORCINE 10 UNIT/ML
3 VIAL (ML) INTRAVENOUS
Status: DISCONTINUED | OUTPATIENT
Start: 2021-01-01 | End: 2021-01-01 | Stop reason: HOSPADM

## 2021-01-01 RX ORDER — BISACODYL 10 MG
10 SUPPOSITORY, RECTAL RECTAL DAILY PRN
Status: DISCONTINUED | OUTPATIENT
Start: 2021-01-01 | End: 2021-01-01 | Stop reason: HOSPADM

## 2021-01-01 RX ORDER — ACETAMINOPHEN 325 MG/1
650 TABLET ORAL EVERY 4 HOURS PRN
Status: DISCONTINUED | OUTPATIENT
Start: 2021-01-01 | End: 2021-01-01 | Stop reason: HOSPADM

## 2021-01-01 RX ORDER — KIT FOR THE PREPARATION OF TECHNETIUM TC 99M MEBROFENIN 45 MG/10ML
5-7 INJECTION, POWDER, LYOPHILIZED, FOR SOLUTION INTRAVENOUS ONCE
Status: COMPLETED | OUTPATIENT
Start: 2021-01-01 | End: 2021-01-01

## 2021-01-01 RX ORDER — ONDANSETRON 4 MG/1
4 TABLET, ORALLY DISINTEGRATING ORAL EVERY 30 MIN PRN
Status: DISCONTINUED | OUTPATIENT
Start: 2021-01-01 | End: 2021-01-01 | Stop reason: HOSPADM

## 2021-01-01 RX ORDER — GADOBUTROL 604.72 MG/ML
0.1 INJECTION INTRAVENOUS ONCE
Status: COMPLETED | OUTPATIENT
Start: 2021-01-01 | End: 2021-01-01

## 2021-01-01 RX ORDER — IOPAMIDOL 755 MG/ML
123 INJECTION, SOLUTION INTRAVASCULAR ONCE
Status: COMPLETED | OUTPATIENT
Start: 2021-01-01 | End: 2021-01-01

## 2021-01-01 RX ORDER — PROPOFOL 10 MG/ML
INJECTION, EMULSION INTRAVENOUS PRN
Status: DISCONTINUED | OUTPATIENT
Start: 2021-01-01 | End: 2021-01-01

## 2021-01-01 RX ORDER — LORAZEPAM 0.5 MG/1
0.5 TABLET ORAL EVERY 4 HOURS PRN
Status: DISCONTINUED | OUTPATIENT
Start: 2021-01-01 | End: 2021-01-01 | Stop reason: HOSPADM

## 2021-01-01 RX ORDER — INDOMETHACIN 50 MG/1
100 SUPPOSITORY RECTAL
Status: DISCONTINUED | OUTPATIENT
Start: 2021-01-01 | End: 2021-01-01 | Stop reason: HOSPADM

## 2021-01-01 RX ORDER — SODIUM CHLORIDE, SODIUM LACTATE, POTASSIUM CHLORIDE, CALCIUM CHLORIDE 600; 310; 30; 20 MG/100ML; MG/100ML; MG/100ML; MG/100ML
INJECTION, SOLUTION INTRAVENOUS CONTINUOUS
Status: ACTIVE | OUTPATIENT
Start: 2021-01-01 | End: 2021-01-01

## 2021-01-01 RX ORDER — AMPICILLIN TRIHYDRATE 500 MG
500 CAPSULE ORAL 4 TIMES DAILY
Qty: 120 CAPSULE | Refills: 0 | Status: SHIPPED | OUTPATIENT
Start: 2021-01-01 | End: 2021-01-01 | Stop reason: ALTCHOICE

## 2021-01-01 RX ORDER — PENICILLIN V POTASSIUM 500 MG/1
TABLET, FILM COATED ORAL
Status: ON HOLD | COMMUNITY
Start: 2021-01-01 | End: 2021-01-01

## 2021-01-01 RX ORDER — AMOXICILLIN 250 MG
1 CAPSULE ORAL 2 TIMES DAILY
Status: DISCONTINUED | OUTPATIENT
Start: 2021-01-01 | End: 2021-01-01 | Stop reason: HOSPADM

## 2021-01-01 RX ORDER — ONDANSETRON 8 MG/1
8 TABLET, ORALLY DISINTEGRATING ORAL EVERY 8 HOURS PRN
Qty: 30 TABLET | Refills: 0 | Status: SHIPPED | OUTPATIENT
Start: 2021-01-01

## 2021-01-01 RX ORDER — SULFAMETHOXAZOLE/TRIMETHOPRIM 800-160 MG
1 TABLET ORAL 2 TIMES DAILY
Qty: 60 TABLET | Refills: 0 | Status: SHIPPED | OUTPATIENT
Start: 2021-01-01

## 2021-01-01 RX ORDER — IODIXANOL 320 MG/ML
50 INJECTION, SOLUTION INTRAVASCULAR ONCE
Status: COMPLETED | OUTPATIENT
Start: 2021-01-01 | End: 2021-01-01

## 2021-01-01 RX ORDER — LISINOPRIL 30 MG/1
30 TABLET ORAL DAILY
Status: ON HOLD | COMMUNITY
Start: 2020-01-01 | End: 2021-01-01

## 2021-01-01 RX ORDER — INDOMETHACIN 50 MG/1
100 SUPPOSITORY RECTAL
Status: CANCELLED | OUTPATIENT
Start: 2021-01-01

## 2021-01-01 RX ORDER — SULFAMETHOXAZOLE/TRIMETHOPRIM 800-160 MG
1 TABLET ORAL 2 TIMES DAILY
Qty: 30 TABLET | Refills: 0 | Status: SHIPPED | OUTPATIENT
Start: 2021-01-01 | End: 2021-01-01

## 2021-01-01 RX ORDER — DRONABINOL 5 MG/1
CAPSULE ORAL
Qty: 80 CAPSULE | Refills: 0 | Status: ON HOLD | OUTPATIENT
Start: 2021-01-01 | End: 2021-01-01

## 2021-01-01 RX ORDER — PIPERACILLIN SODIUM, TAZOBACTAM SODIUM 4; .5 G/20ML; G/20ML
4.5 INJECTION, POWDER, LYOPHILIZED, FOR SOLUTION INTRAVENOUS EVERY 6 HOURS
Status: DISCONTINUED | OUTPATIENT
Start: 2021-01-01 | End: 2021-01-01

## 2021-01-01 RX ORDER — HEPARIN SODIUM (PORCINE) LOCK FLUSH IV SOLN 100 UNIT/ML 100 UNIT/ML
5 SOLUTION INTRAVENOUS
Status: COMPLETED | OUTPATIENT
Start: 2021-01-01 | End: 2021-01-01

## 2021-01-01 RX ORDER — LACTOBACILLUS RHAMNOSUS GG 10B CELL
1 CAPSULE ORAL DAILY
Status: ON HOLD | COMMUNITY
End: 2021-01-01

## 2021-01-01 RX ORDER — ONDANSETRON 2 MG/ML
8 INJECTION INTRAMUSCULAR; INTRAVENOUS EVERY 8 HOURS PRN
Status: DISCONTINUED | OUTPATIENT
Start: 2021-01-01 | End: 2021-01-01 | Stop reason: HOSPADM

## 2021-01-01 RX ORDER — METRONIDAZOLE 500 MG/1
500 TABLET ORAL 3 TIMES DAILY
Qty: 42 TABLET | Refills: 0 | Status: SHIPPED | OUTPATIENT
Start: 2021-01-01 | End: 2021-01-01

## 2021-01-01 RX ORDER — IOPAMIDOL 510 MG/ML
100 INJECTION, SOLUTION INTRAVASCULAR ONCE
Status: COMPLETED | OUTPATIENT
Start: 2021-01-01 | End: 2021-01-01

## 2021-01-01 RX ORDER — LORAZEPAM 2 MG/ML
1 INJECTION INTRAMUSCULAR ONCE
Status: COMPLETED | OUTPATIENT
Start: 2021-01-01 | End: 2021-01-01

## 2021-01-01 RX ORDER — ONDANSETRON 2 MG/ML
4 INJECTION INTRAMUSCULAR; INTRAVENOUS EVERY 30 MIN PRN
Status: DISCONTINUED | OUTPATIENT
Start: 2021-01-01 | End: 2021-01-01 | Stop reason: HOSPADM

## 2021-01-01 RX ORDER — CIPROFLOXACIN 500 MG/1
500 TABLET, FILM COATED ORAL 2 TIMES DAILY
Qty: 28 TABLET | Refills: 2 | Status: SHIPPED | OUTPATIENT
Start: 2021-01-01 | End: 2021-01-01

## 2021-01-01 RX ORDER — DRONABINOL 5 MG/1
CAPSULE ORAL
Qty: 10 CAPSULE | Refills: 0 | Status: SHIPPED | OUTPATIENT
Start: 2021-01-01 | End: 2021-01-01

## 2021-01-01 RX ORDER — LIDOCAINE HYDROCHLORIDE 10 MG/ML
4 INJECTION, SOLUTION EPIDURAL; INFILTRATION; INTRACAUDAL; PERINEURAL ONCE
Status: COMPLETED | OUTPATIENT
Start: 2021-01-01 | End: 2021-01-01

## 2021-01-01 RX ORDER — IOPAMIDOL 510 MG/ML
INJECTION, SOLUTION INTRAVASCULAR PRN
Status: DISCONTINUED | OUTPATIENT
Start: 2021-01-01 | End: 2021-01-01 | Stop reason: HOSPADM

## 2021-01-01 RX ORDER — PROCHLORPERAZINE 25 MG
12.5 SUPPOSITORY, RECTAL RECTAL EVERY 12 HOURS PRN
Status: DISCONTINUED | OUTPATIENT
Start: 2021-01-01 | End: 2021-01-01 | Stop reason: HOSPADM

## 2021-01-01 RX ORDER — UREA 10 %
500 LOTION (ML) TOPICAL DAILY
Status: DISCONTINUED | OUTPATIENT
Start: 2021-01-01 | End: 2021-01-01 | Stop reason: HOSPADM

## 2021-01-01 RX ORDER — LIDOCAINE 40 MG/G
CREAM TOPICAL
Status: DISCONTINUED | OUTPATIENT
Start: 2021-01-01 | End: 2021-01-01

## 2021-01-01 RX ORDER — LACTOBACILLUS RHAMNOSUS GG 10B CELL
1 CAPSULE ORAL DAILY
Status: DISCONTINUED | OUTPATIENT
Start: 2021-01-01 | End: 2021-01-01 | Stop reason: HOSPADM

## 2021-01-01 RX ORDER — ALBUMIN (HUMAN) 12.5 G/50ML
12.5 SOLUTION INTRAVENOUS ONCE
Status: COMPLETED | OUTPATIENT
Start: 2021-01-01 | End: 2021-01-01

## 2021-01-01 RX ORDER — MORPHINE SULFATE 10 MG/5ML
5 SOLUTION ORAL EVERY 4 HOURS PRN
Status: DISCONTINUED | OUTPATIENT
Start: 2021-01-01 | End: 2021-01-01 | Stop reason: HOSPADM

## 2021-01-01 RX ORDER — KIT FOR THE PREPARATION OF TECHNETIUM TC 99M MEBROFENIN 45 MG/10ML
4.8-7.2 INJECTION, POWDER, LYOPHILIZED, FOR SOLUTION INTRAVENOUS ONCE
Status: DISCONTINUED | OUTPATIENT
Start: 2021-01-01 | End: 2021-01-01

## 2021-01-01 RX ORDER — SODIUM CHLORIDE 9 MG/ML
INJECTION, SOLUTION INTRAVENOUS CONTINUOUS
Status: DISCONTINUED | OUTPATIENT
Start: 2021-01-01 | End: 2021-01-01 | Stop reason: HOSPADM

## 2021-01-01 RX ORDER — MULTIPLE VITAMINS W/ MINERALS TAB 9MG-400MCG
1 TAB ORAL DAILY
Status: DISCONTINUED | OUTPATIENT
Start: 2021-01-01 | End: 2021-01-01 | Stop reason: HOSPADM

## 2021-01-01 RX ORDER — ONDANSETRON 2 MG/ML
INJECTION INTRAMUSCULAR; INTRAVENOUS PRN
Status: DISCONTINUED | OUTPATIENT
Start: 2021-01-01 | End: 2021-01-01

## 2021-01-01 RX ORDER — LIDOCAINE HYDROCHLORIDE 10 MG/ML
3 INJECTION, SOLUTION EPIDURAL; INFILTRATION; INTRACAUDAL; PERINEURAL ONCE
Status: COMPLETED | OUTPATIENT
Start: 2021-01-01 | End: 2021-01-01

## 2021-01-01 RX ORDER — ALBUMIN (HUMAN) 12.5 G/50ML
SOLUTION INTRAVENOUS
Status: DISCONTINUED
Start: 2021-01-01 | End: 2021-01-01 | Stop reason: HOSPADM

## 2021-01-01 RX ORDER — ALBUMIN (HUMAN) 12.5 G/50ML
25 SOLUTION INTRAVENOUS ONCE
Status: COMPLETED | OUTPATIENT
Start: 2021-01-01 | End: 2021-01-01

## 2021-01-01 RX ORDER — LIDOCAINE HYDROCHLORIDE 10 MG/ML
1-30 INJECTION, SOLUTION EPIDURAL; INFILTRATION; INTRACAUDAL; PERINEURAL
Status: DISCONTINUED | OUTPATIENT
Start: 2021-01-01 | End: 2021-01-01 | Stop reason: HOSPADM

## 2021-01-01 RX ORDER — LIDOCAINE HYDROCHLORIDE 20 MG/ML
JELLY TOPICAL ONCE
Status: COMPLETED | OUTPATIENT
Start: 2021-01-01 | End: 2021-01-01

## 2021-01-01 RX ORDER — ONDANSETRON 8 MG/1
8 TABLET, FILM COATED ORAL EVERY 8 HOURS PRN
Qty: 30 TABLET | Refills: 5 | Status: ON HOLD | OUTPATIENT
Start: 2021-01-01 | End: 2021-01-01

## 2021-01-01 RX ORDER — FERROUS SULFATE 325(65) MG
325 TABLET ORAL
Status: DISCONTINUED | OUTPATIENT
Start: 2021-01-01 | End: 2021-01-01 | Stop reason: HOSPADM

## 2021-01-01 RX ORDER — UREA 10 %
500 LOTION (ML) TOPICAL 2 TIMES DAILY
Status: DISCONTINUED | OUTPATIENT
Start: 2021-01-01 | End: 2021-01-01 | Stop reason: HOSPADM

## 2021-01-01 RX ORDER — AMOXICILLIN 875 MG
875 TABLET ORAL 2 TIMES DAILY
Qty: 60 TABLET | Refills: 0 | Status: ON HOLD | OUTPATIENT
Start: 2021-01-01 | End: 2021-01-01

## 2021-01-01 RX ORDER — ZOLPIDEM TARTRATE 10 MG/1
10 TABLET ORAL
Qty: 30 TABLET | Refills: 0 | Status: SHIPPED | OUTPATIENT
Start: 2021-01-01 | End: 2021-01-01

## 2021-01-01 RX ORDER — ONDANSETRON 2 MG/ML
4 INJECTION INTRAMUSCULAR; INTRAVENOUS ONCE
Status: COMPLETED | OUTPATIENT
Start: 2021-01-01 | End: 2021-01-01

## 2021-01-01 RX ORDER — LORAZEPAM 0.5 MG/1
0.5 TABLET ORAL EVERY 4 HOURS PRN
Qty: 30 TABLET | Refills: 0 | Status: SHIPPED | OUTPATIENT
Start: 2021-01-01

## 2021-01-01 RX ORDER — LISINOPRIL 5 MG/1
5 TABLET ORAL EVERY EVENING
Status: ON HOLD | COMMUNITY
Start: 2021-01-01 | End: 2021-01-01

## 2021-01-01 RX ORDER — DRONABINOL 5 MG/1
5 CAPSULE ORAL 2 TIMES DAILY
Status: DISCONTINUED | OUTPATIENT
Start: 2021-01-01 | End: 2021-01-01

## 2021-01-01 RX ORDER — PENICILLIN V POTASSIUM 500 MG/1
TABLET, FILM COATED ORAL
Qty: 120 TABLET | Refills: 0 | OUTPATIENT
Start: 2021-01-01

## 2021-01-01 RX ORDER — METRONIDAZOLE 500 MG/1
500 TABLET ORAL 3 TIMES DAILY
Qty: 42 TABLET | Refills: 0 | Status: ON HOLD | OUTPATIENT
Start: 2021-01-01 | End: 2021-01-01

## 2021-01-01 RX ORDER — FENTANYL CITRATE 50 UG/ML
INJECTION, SOLUTION INTRAMUSCULAR; INTRAVENOUS PRN
Status: DISCONTINUED | OUTPATIENT
Start: 2021-01-01 | End: 2021-01-01

## 2021-01-01 RX ORDER — IOPAMIDOL 755 MG/ML
104 INJECTION, SOLUTION INTRAVASCULAR ONCE
Status: COMPLETED | OUTPATIENT
Start: 2021-01-01 | End: 2021-01-01

## 2021-01-01 RX ORDER — ONDANSETRON 2 MG/ML
4 INJECTION INTRAMUSCULAR; INTRAVENOUS EVERY 30 MIN PRN
Status: DISCONTINUED | OUTPATIENT
Start: 2021-01-01 | End: 2021-01-01

## 2021-01-01 RX ORDER — ACETAMINOPHEN 325 MG/1
650 TABLET ORAL ONCE
Status: COMPLETED | OUTPATIENT
Start: 2021-01-01 | End: 2021-01-01

## 2021-01-01 RX ORDER — PIPERACILLIN SODIUM, TAZOBACTAM SODIUM 4; .5 G/20ML; G/20ML
4.5 INJECTION, POWDER, LYOPHILIZED, FOR SOLUTION INTRAVENOUS EVERY 6 HOURS
Status: DISCONTINUED | OUTPATIENT
Start: 2021-01-01 | End: 2021-01-01 | Stop reason: HOSPADM

## 2021-01-01 RX ORDER — AMOXICILLIN 875 MG
875 TABLET ORAL 2 TIMES DAILY
Qty: 60 TABLET | Refills: 0 | Status: SHIPPED | OUTPATIENT
Start: 2021-01-01 | End: 2021-01-01

## 2021-01-01 RX ORDER — OXYCODONE HYDROCHLORIDE 5 MG/1
5 TABLET ORAL EVERY 6 HOURS PRN
Status: DISCONTINUED | OUTPATIENT
Start: 2021-01-01 | End: 2021-01-01 | Stop reason: HOSPADM

## 2021-01-01 RX ORDER — MIRTAZAPINE 7.5 MG/1
TABLET, FILM COATED ORAL
Status: ON HOLD | COMMUNITY
Start: 2021-01-01 | End: 2021-01-01

## 2021-01-01 RX ORDER — SODIUM CHLORIDE, SODIUM LACTATE, POTASSIUM CHLORIDE, CALCIUM CHLORIDE 600; 310; 30; 20 MG/100ML; MG/100ML; MG/100ML; MG/100ML
INJECTION, SOLUTION INTRAVENOUS CONTINUOUS PRN
Status: DISCONTINUED | OUTPATIENT
Start: 2021-01-01 | End: 2021-01-01

## 2021-01-01 RX ORDER — ONDANSETRON 8 MG/1
8 TABLET, FILM COATED ORAL EVERY 8 HOURS PRN
Status: DISCONTINUED | OUTPATIENT
Start: 2021-01-01 | End: 2021-01-01 | Stop reason: HOSPADM

## 2021-01-01 RX ORDER — CIPROFLOXACIN 500 MG/1
500 TABLET, FILM COATED ORAL 2 TIMES DAILY
Qty: 28 TABLET | Refills: 0 | Status: ON HOLD | OUTPATIENT
Start: 2021-01-01 | End: 2021-01-01

## 2021-01-01 RX ORDER — GADOBUTROL 604.72 MG/ML
7 INJECTION INTRAVENOUS ONCE
Status: COMPLETED | OUTPATIENT
Start: 2021-01-01 | End: 2021-01-01

## 2021-01-01 RX ORDER — IOPAMIDOL 755 MG/ML
105 INJECTION, SOLUTION INTRAVASCULAR ONCE
Status: COMPLETED | OUTPATIENT
Start: 2021-01-01 | End: 2021-01-01

## 2021-01-01 RX ORDER — ATORVASTATIN CALCIUM 40 MG/1
40 TABLET, FILM COATED ORAL AT BEDTIME
Status: DISCONTINUED | OUTPATIENT
Start: 2021-01-01 | End: 2021-01-01 | Stop reason: HOSPADM

## 2021-01-01 RX ORDER — LISINOPRIL 5 MG/1
5 TABLET ORAL EVERY EVENING
Status: DISCONTINUED | OUTPATIENT
Start: 2021-01-01 | End: 2021-01-01 | Stop reason: HOSPADM

## 2021-01-01 RX ORDER — CIPROFLOXACIN 500 MG/1
500 TABLET, FILM COATED ORAL EVERY 12 HOURS SCHEDULED
Status: DISCONTINUED | OUTPATIENT
Start: 2021-01-01 | End: 2021-01-01 | Stop reason: HOSPADM

## 2021-01-01 RX ORDER — HYDROMORPHONE HYDROCHLORIDE 1 MG/ML
.3-.5 INJECTION, SOLUTION INTRAMUSCULAR; INTRAVENOUS; SUBCUTANEOUS EVERY 5 MIN PRN
Status: DISCONTINUED | OUTPATIENT
Start: 2021-01-01 | End: 2021-01-01 | Stop reason: HOSPADM

## 2021-01-01 RX ORDER — DRONABINOL 2.5 MG/1
10 CAPSULE ORAL EVERY MORNING
Status: DISCONTINUED | OUTPATIENT
Start: 2021-01-01 | End: 2021-01-01

## 2021-01-01 RX ORDER — DEXTROSE MONOHYDRATE 50 MG/ML
INJECTION, SOLUTION INTRAVENOUS CONTINUOUS
Status: DISCONTINUED | OUTPATIENT
Start: 2021-01-01 | End: 2021-01-01 | Stop reason: HOSPADM

## 2021-01-01 RX ORDER — IOPAMIDOL 510 MG/ML
50 INJECTION, SOLUTION INTRAVASCULAR ONCE
Status: COMPLETED | OUTPATIENT
Start: 2021-01-01 | End: 2021-01-01

## 2021-01-01 RX ORDER — FUROSEMIDE 20 MG
20 TABLET ORAL
COMMUNITY

## 2021-01-01 RX ORDER — MEROPENEM 1 G/1
1 INJECTION, POWDER, FOR SOLUTION INTRAVENOUS EVERY 8 HOURS
Status: DISCONTINUED | OUTPATIENT
Start: 2021-01-01 | End: 2021-01-01 | Stop reason: HOSPADM

## 2021-01-01 RX ORDER — DRONABINOL 2.5 MG/1
5 CAPSULE ORAL
Status: DISCONTINUED | OUTPATIENT
Start: 2021-01-01 | End: 2021-01-01

## 2021-01-01 RX ORDER — ONDANSETRON 8 MG/1
8 TABLET, FILM COATED ORAL EVERY 8 HOURS PRN
Status: DISCONTINUED | OUTPATIENT
Start: 2021-01-01 | End: 2021-01-01

## 2021-01-01 RX ORDER — DEXTROSE, SODIUM CHLORIDE, SODIUM LACTATE, POTASSIUM CHLORIDE, AND CALCIUM CHLORIDE 5; .6; .31; .03; .02 G/100ML; G/100ML; G/100ML; G/100ML; G/100ML
INJECTION, SOLUTION INTRAVENOUS CONTINUOUS
Status: DISCONTINUED | OUTPATIENT
Start: 2021-01-01 | End: 2021-01-01

## 2021-01-01 RX ORDER — ONDANSETRON 4 MG/1
8 TABLET, ORALLY DISINTEGRATING ORAL EVERY 8 HOURS PRN
Status: DISCONTINUED | OUTPATIENT
Start: 2021-01-01 | End: 2021-01-01 | Stop reason: HOSPADM

## 2021-01-01 RX ORDER — SULFAMETHOXAZOLE/TRIMETHOPRIM 800-160 MG
1 TABLET ORAL 2 TIMES DAILY
Status: DISCONTINUED | OUTPATIENT
Start: 2021-01-01 | End: 2021-01-01 | Stop reason: HOSPADM

## 2021-01-01 RX ORDER — FUROSEMIDE 10 MG/ML
20 INJECTION INTRAMUSCULAR; INTRAVENOUS ONCE
Status: COMPLETED | OUTPATIENT
Start: 2021-01-01 | End: 2021-01-01

## 2021-01-01 RX ORDER — AMOXICILLIN 250 MG
1 CAPSULE ORAL 2 TIMES DAILY PRN
Status: DISCONTINUED | OUTPATIENT
Start: 2021-01-01 | End: 2021-01-01 | Stop reason: HOSPADM

## 2021-01-01 RX ORDER — CIPROFLOXACIN 500 MG/1
TABLET, FILM COATED ORAL
Status: ON HOLD | COMMUNITY
Start: 2021-01-01 | End: 2021-01-01

## 2021-01-01 RX ORDER — OLANZAPINE 2.5 MG/1
2.5 TABLET, FILM COATED ORAL AT BEDTIME
Qty: 30 TABLET | Refills: 0 | Status: SHIPPED | OUTPATIENT
Start: 2021-01-01

## 2021-01-01 RX ORDER — LABETALOL HYDROCHLORIDE 5 MG/ML
10 INJECTION, SOLUTION INTRAVENOUS
Status: DISCONTINUED | OUTPATIENT
Start: 2021-01-01 | End: 2021-01-01 | Stop reason: HOSPADM

## 2021-01-01 RX ORDER — SODIUM CHLORIDE, SODIUM LACTATE, POTASSIUM CHLORIDE, CALCIUM CHLORIDE 600; 310; 30; 20 MG/100ML; MG/100ML; MG/100ML; MG/100ML
INJECTION, SOLUTION INTRAVENOUS CONTINUOUS
Status: DISCONTINUED | OUTPATIENT
Start: 2021-01-01 | End: 2021-01-01 | Stop reason: HOSPADM

## 2021-01-01 RX ORDER — POLYETHYLENE GLYCOL 3350 17 G/17G
17 POWDER, FOR SOLUTION ORAL DAILY PRN
Status: DISCONTINUED | OUTPATIENT
Start: 2021-01-01 | End: 2021-01-01 | Stop reason: HOSPADM

## 2021-01-01 RX ORDER — PENICILLIN V POTASSIUM 500 MG/1
500 TABLET, FILM COATED ORAL 4 TIMES DAILY
Qty: 120 TABLET | Refills: 0
Start: 2021-01-01 | End: 2021-01-01

## 2021-01-01 RX ORDER — ALBUMIN (HUMAN) 12.5 G/50ML
37.5 SOLUTION INTRAVENOUS ONCE
Status: COMPLETED | OUTPATIENT
Start: 2021-01-01 | End: 2021-01-01

## 2021-01-01 RX ORDER — LANOLIN ALCOHOL/MO/W.PET/CERES
3 CREAM (GRAM) TOPICAL
Status: DISCONTINUED | OUTPATIENT
Start: 2021-01-01 | End: 2021-01-01 | Stop reason: HOSPADM

## 2021-01-01 RX ORDER — FENTANYL CITRATE 50 UG/ML
25 INJECTION, SOLUTION INTRAMUSCULAR; INTRAVENOUS ONCE
Status: COMPLETED | OUTPATIENT
Start: 2021-01-01 | End: 2021-01-01

## 2021-01-01 RX ADMIN — PIPERACILLIN AND TAZOBACTAM 4.5 G: 4; .5 INJECTION, POWDER, FOR SOLUTION INTRAVENOUS at 12:10

## 2021-01-01 RX ADMIN — MAGNESIUM SULFATE IN WATER 4 G: 40 INJECTION, SOLUTION INTRAVENOUS at 09:30

## 2021-01-01 RX ADMIN — AMPICILLIN SODIUM AND SULBACTAM SODIUM 3 G: 2; 1 INJECTION, POWDER, FOR SOLUTION INTRAMUSCULAR; INTRAVENOUS at 09:24

## 2021-01-01 RX ADMIN — VANCOMYCIN HYDROCHLORIDE 1500 MG: 10 INJECTION, POWDER, LYOPHILIZED, FOR SOLUTION INTRAVENOUS at 08:41

## 2021-01-01 RX ADMIN — DOCUSATE SODIUM 50 MG AND SENNOSIDES 8.6 MG 2 TABLET: 8.6; 5 TABLET, FILM COATED ORAL at 08:20

## 2021-01-01 RX ADMIN — MEROPENEM 1 G: 1 INJECTION, POWDER, FOR SOLUTION INTRAVENOUS at 23:11

## 2021-01-01 RX ADMIN — METRONIDAZOLE 500 MG: 250 TABLET ORAL at 13:00

## 2021-01-01 RX ADMIN — Medication 250 MCG: at 09:29

## 2021-01-01 RX ADMIN — POTASSIUM & SODIUM PHOSPHATES POWDER PACK 280-160-250 MG 1 PACKET: 280-160-250 PACK at 20:03

## 2021-01-01 RX ADMIN — LIDOCAINE HYDROCHLORIDE 100 MG: 20 INJECTION, SOLUTION INFILTRATION; PERINEURAL at 14:24

## 2021-01-01 RX ADMIN — PIPERACILLIN AND TAZOBACTAM 4.5 G: 4; .5 INJECTION, POWDER, FOR SOLUTION INTRAVENOUS at 22:38

## 2021-01-01 RX ADMIN — ATORVASTATIN CALCIUM 40 MG: 40 TABLET, FILM COATED ORAL at 23:16

## 2021-01-01 RX ADMIN — PIPERACILLIN AND TAZOBACTAM 4.5 G: 4; .5 INJECTION, POWDER, FOR SOLUTION INTRAVENOUS at 05:01

## 2021-01-01 RX ADMIN — Medication 500 MG: at 22:21

## 2021-01-01 RX ADMIN — DOCUSATE SODIUM 50 MG AND SENNOSIDES 8.6 MG 2 TABLET: 8.6; 5 TABLET, FILM COATED ORAL at 08:21

## 2021-01-01 RX ADMIN — Medication 500 MG: at 23:11

## 2021-01-01 RX ADMIN — IODIXANOL 5 ML: 320 INJECTION, SOLUTION INTRAVASCULAR at 11:57

## 2021-01-01 RX ADMIN — FERROUS SULFATE TAB 325 MG (65 MG ELEMENTAL FE) 325 MG: 325 (65 FE) TAB at 09:01

## 2021-01-01 RX ADMIN — PIPERACILLIN AND TAZOBACTAM 4.5 G: 4; .5 INJECTION, POWDER, FOR SOLUTION INTRAVENOUS at 10:15

## 2021-01-01 RX ADMIN — ALBUMIN HUMAN 25 G: 0.25 SOLUTION INTRAVENOUS at 13:05

## 2021-01-01 RX ADMIN — MEROPENEM 1 G: 1 INJECTION, POWDER, FOR SOLUTION INTRAVENOUS at 15:00

## 2021-01-01 RX ADMIN — IOPAMIDOL 90 ML: 755 INJECTION, SOLUTION INTRAVENOUS at 13:29

## 2021-01-01 RX ADMIN — VANCOMYCIN HYDROCHLORIDE 1500 MG: 10 INJECTION, POWDER, LYOPHILIZED, FOR SOLUTION INTRAVENOUS at 09:05

## 2021-01-01 RX ADMIN — ATORVASTATIN CALCIUM 40 MG: 40 TABLET, FILM COATED ORAL at 22:21

## 2021-01-01 RX ADMIN — CIPROFLOXACIN HYDROCHLORIDE 500 MG: 500 TABLET, FILM COATED ORAL at 19:16

## 2021-01-01 RX ADMIN — LISINOPRIL 5 MG: 5 TABLET ORAL at 20:10

## 2021-01-01 RX ADMIN — FERROUS SULFATE TAB 325 MG (65 MG ELEMENTAL FE) 325 MG: 325 (65 FE) TAB at 08:21

## 2021-01-01 RX ADMIN — PIPERACILLIN AND TAZOBACTAM 4.5 G: 4; .5 INJECTION, POWDER, FOR SOLUTION INTRAVENOUS at 04:21

## 2021-01-01 RX ADMIN — THIAMINE HCL TAB 100 MG 100 MG: 100 TAB at 19:01

## 2021-01-01 RX ADMIN — Medication 5 ML: at 09:16

## 2021-01-01 RX ADMIN — ATORVASTATIN CALCIUM 40 MG: 40 TABLET, FILM COATED ORAL at 23:22

## 2021-01-01 RX ADMIN — CIPROFLOXACIN HYDROCHLORIDE 500 MG: 500 TABLET, FILM COATED ORAL at 08:31

## 2021-01-01 RX ADMIN — DRONABINOL 10 MG: 2.5 CAPSULE ORAL at 08:02

## 2021-01-01 RX ADMIN — SODIUM CHLORIDE 1000 ML: 9 INJECTION, SOLUTION INTRAVENOUS at 20:58

## 2021-01-01 RX ADMIN — MEROPENEM 1 G: 1 INJECTION, POWDER, FOR SOLUTION INTRAVENOUS at 23:22

## 2021-01-01 RX ADMIN — PIPERACILLIN AND TAZOBACTAM 4.5 G: 4; .5 INJECTION, POWDER, FOR SOLUTION INTRAVENOUS at 17:16

## 2021-01-01 RX ADMIN — MIDAZOLAM 0.5 MG: 1 INJECTION INTRAMUSCULAR; INTRAVENOUS at 11:47

## 2021-01-01 RX ADMIN — SODIUM CHLORIDE, POTASSIUM CHLORIDE, SODIUM LACTATE AND CALCIUM CHLORIDE 1000 ML: 600; 310; 30; 20 INJECTION, SOLUTION INTRAVENOUS at 17:58

## 2021-01-01 RX ADMIN — DRONABINOL 10 MG: 2.5 CAPSULE ORAL at 08:19

## 2021-01-01 RX ADMIN — SODIUM CHLORIDE, POTASSIUM CHLORIDE, SODIUM LACTATE AND CALCIUM CHLORIDE: 600; 310; 30; 20 INJECTION, SOLUTION INTRAVENOUS at 00:12

## 2021-01-01 RX ADMIN — SODIUM CHLORIDE: 9 INJECTION, SOLUTION INTRAVENOUS at 11:18

## 2021-01-01 RX ADMIN — Medication 500 UNITS: at 14:17

## 2021-01-01 RX ADMIN — PIPERACILLIN AND TAZOBACTAM 4.5 G: 4; .5 INJECTION, POWDER, FOR SOLUTION INTRAVENOUS at 05:43

## 2021-01-01 RX ADMIN — THIAMINE HCL TAB 100 MG 100 MG: 100 TAB at 09:26

## 2021-01-01 RX ADMIN — MULTIPLE VITAMINS W/ MINERALS TAB 1 TABLET: TAB at 08:21

## 2021-01-01 RX ADMIN — Medication 500 MG: at 08:19

## 2021-01-01 RX ADMIN — LIDOCAINE HYDROCHLORIDE 4 ML: 10 INJECTION, SOLUTION EPIDURAL; INFILTRATION; INTRACAUDAL; PERINEURAL at 12:00

## 2021-01-01 RX ADMIN — METRONIDAZOLE 500 MG: 250 TABLET ORAL at 13:20

## 2021-01-01 RX ADMIN — Medication 5 ML: at 13:26

## 2021-01-01 RX ADMIN — DRONABINOL 10 MG: 5 CAPSULE ORAL at 18:24

## 2021-01-01 RX ADMIN — MAGNESIUM SULFATE IN WATER 4 G: 40 INJECTION, SOLUTION INTRAVENOUS at 00:59

## 2021-01-01 RX ADMIN — Medication 1 CAPSULE: at 09:16

## 2021-01-01 RX ADMIN — SODIUM PHOSPHATE, MONOBASIC, MONOHYDRATE 15 MMOL: 276; 142 INJECTION, SOLUTION INTRAVENOUS at 07:21

## 2021-01-01 RX ADMIN — LISINOPRIL 5 MG: 5 TABLET ORAL at 20:03

## 2021-01-01 RX ADMIN — Medication 500 MG: at 08:41

## 2021-01-01 RX ADMIN — SODIUM CHLORIDE: 9 INJECTION, SOLUTION INTRAVENOUS at 10:26

## 2021-01-01 RX ADMIN — PIPERACILLIN AND TAZOBACTAM 4.5 G: 4; .5 INJECTION, POWDER, FOR SOLUTION INTRAVENOUS at 10:47

## 2021-01-01 RX ADMIN — ACETAMINOPHEN 650 MG: 325 TABLET ORAL at 18:15

## 2021-01-01 RX ADMIN — MIDAZOLAM 0.5 MG: 1 INJECTION INTRAMUSCULAR; INTRAVENOUS at 14:10

## 2021-01-01 RX ADMIN — SODIUM CHLORIDE 1000 ML: 9 INJECTION, SOLUTION INTRAVENOUS at 20:36

## 2021-01-01 RX ADMIN — FERROUS SULFATE TAB 325 MG (65 MG ELEMENTAL FE) 325 MG: 325 (65 FE) TAB at 18:15

## 2021-01-01 RX ADMIN — FERROUS SULFATE TAB 325 MG (65 MG ELEMENTAL FE) 325 MG: 325 (65 FE) TAB at 08:16

## 2021-01-01 RX ADMIN — PIPERACILLIN AND TAZOBACTAM 4.5 G: 4; .5 INJECTION, POWDER, FOR SOLUTION INTRAVENOUS at 17:39

## 2021-01-01 RX ADMIN — PIPERACILLIN AND TAZOBACTAM 4.5 G: 4; .5 INJECTION, POWDER, FOR SOLUTION INTRAVENOUS at 05:57

## 2021-01-01 RX ADMIN — FENTANYL CITRATE 50 MCG: 50 INJECTION, SOLUTION INTRAMUSCULAR; INTRAVENOUS at 10:38

## 2021-01-01 RX ADMIN — AMPICILLIN SODIUM AND SULBACTAM SODIUM 3 G: 2; 1 INJECTION, POWDER, FOR SOLUTION INTRAMUSCULAR; INTRAVENOUS at 10:55

## 2021-01-01 RX ADMIN — VANCOMYCIN HYDROCHLORIDE 2000 MG: 10 INJECTION, POWDER, LYOPHILIZED, FOR SOLUTION INTRAVENOUS at 08:15

## 2021-01-01 RX ADMIN — ROCURONIUM BROMIDE 45 MG: 10 INJECTION INTRAVENOUS at 14:24

## 2021-01-01 RX ADMIN — Medication 250 MCG: at 08:41

## 2021-01-01 RX ADMIN — SODIUM PHOSPHATE, MONOBASIC, MONOHYDRATE 15 MMOL: 276; 142 INJECTION, SOLUTION INTRAVENOUS at 22:43

## 2021-01-01 RX ADMIN — CIPROFLOXACIN HYDROCHLORIDE 500 MG: 500 TABLET, FILM COATED ORAL at 08:14

## 2021-01-01 RX ADMIN — SODIUM CHLORIDE, SODIUM LACTATE, POTASSIUM CHLORIDE, CALCIUM CHLORIDE AND DEXTROSE MONOHYDRATE: 5; 600; 310; 30; 20 INJECTION, SOLUTION INTRAVENOUS at 21:18

## 2021-01-01 RX ADMIN — MULTIPLE VITAMINS W/ MINERALS TAB 1 TABLET: TAB at 08:02

## 2021-01-01 RX ADMIN — SODIUM CHLORIDE, POTASSIUM CHLORIDE, SODIUM LACTATE AND CALCIUM CHLORIDE: 600; 310; 30; 20 INJECTION, SOLUTION INTRAVENOUS at 05:07

## 2021-01-01 RX ADMIN — Medication 5 ML: at 12:56

## 2021-01-01 RX ADMIN — SODIUM CHLORIDE, POTASSIUM CHLORIDE, SODIUM LACTATE AND CALCIUM CHLORIDE: 600; 310; 30; 20 INJECTION, SOLUTION INTRAVENOUS at 03:31

## 2021-01-01 RX ADMIN — OLANZAPINE 2.5 MG: 2.5 TABLET, FILM COATED ORAL at 21:23

## 2021-01-01 RX ADMIN — FERROUS SULFATE TAB 325 MG (65 MG ELEMENTAL FE) 325 MG: 325 (65 FE) TAB at 18:25

## 2021-01-01 RX ADMIN — Medication 5 ML: at 13:03

## 2021-01-01 RX ADMIN — AMPICILLIN SODIUM AND SULBACTAM SODIUM 3 G: 2; 1 INJECTION, POWDER, FOR SOLUTION INTRAMUSCULAR; INTRAVENOUS at 11:18

## 2021-01-01 RX ADMIN — DRONABINOL 10 MG: 2.5 CAPSULE ORAL at 09:02

## 2021-01-01 RX ADMIN — FENTANYL CITRATE 50 MCG: 50 INJECTION, SOLUTION INTRAMUSCULAR; INTRAVENOUS at 15:06

## 2021-01-01 RX ADMIN — Medication 5 ML: at 16:15

## 2021-01-01 RX ADMIN — MULTIPLE VITAMINS W/ MINERALS TAB 1 TABLET: TAB at 09:05

## 2021-01-01 RX ADMIN — THIAMINE HYDROCHLORIDE 500 MG: 100 INJECTION, SOLUTION INTRAMUSCULAR; INTRAVENOUS at 16:10

## 2021-01-01 RX ADMIN — FENTANYL CITRATE 25 MCG: 50 INJECTION, SOLUTION INTRAMUSCULAR; INTRAVENOUS at 14:10

## 2021-01-01 RX ADMIN — PIPERACILLIN AND TAZOBACTAM 4.5 G: 4; .5 INJECTION, POWDER, FOR SOLUTION INTRAVENOUS at 04:46

## 2021-01-01 RX ADMIN — PIPERACILLIN AND TAZOBACTAM 4.5 G: 4; .5 INJECTION, POWDER, FOR SOLUTION INTRAVENOUS at 11:06

## 2021-01-01 RX ADMIN — POTASSIUM CHLORIDE 40 MEQ: 750 TABLET, EXTENDED RELEASE ORAL at 09:22

## 2021-01-01 RX ADMIN — Medication 1 CAPSULE: at 08:53

## 2021-01-01 RX ADMIN — PIPERACILLIN AND TAZOBACTAM 4.5 G: 4; .5 INJECTION, POWDER, FOR SOLUTION INTRAVENOUS at 11:31

## 2021-01-01 RX ADMIN — SODIUM CHLORIDE 500 ML: 9 INJECTION, SOLUTION INTRAVENOUS at 12:52

## 2021-01-01 RX ADMIN — IOPAMIDOL 10 ML: 510 INJECTION, SOLUTION INTRAVASCULAR at 11:03

## 2021-01-01 RX ADMIN — ONDANSETRON 4 MG: 2 INJECTION INTRAMUSCULAR; INTRAVENOUS at 11:36

## 2021-01-01 RX ADMIN — POTASSIUM & SODIUM PHOSPHATES POWDER PACK 280-160-250 MG 1 PACKET: 280-160-250 PACK at 15:00

## 2021-01-01 RX ADMIN — MEROPENEM 1 G: 1 INJECTION, POWDER, FOR SOLUTION INTRAVENOUS at 23:15

## 2021-01-01 RX ADMIN — PHYTONADIONE 10 MG: 10 INJECTION, EMULSION INTRAMUSCULAR; INTRAVENOUS; SUBCUTANEOUS at 11:19

## 2021-01-01 RX ADMIN — MIRTAZAPINE 7.5 MG: 7.5 TABLET, FILM COATED ORAL at 23:16

## 2021-01-01 RX ADMIN — ALBUMIN HUMAN 50 G: 0.25 SOLUTION INTRAVENOUS at 05:50

## 2021-01-01 RX ADMIN — Medication 1 CAPSULE: at 09:26

## 2021-01-01 RX ADMIN — SODIUM CHLORIDE: 9 INJECTION, SOLUTION INTRAVENOUS at 17:11

## 2021-01-01 RX ADMIN — MIDAZOLAM 1 MG: 1 INJECTION INTRAMUSCULAR; INTRAVENOUS at 10:38

## 2021-01-01 RX ADMIN — ONDANSETRON 4 MG: 2 INJECTION INTRAMUSCULAR; INTRAVENOUS at 15:30

## 2021-01-01 RX ADMIN — Medication 1 TABLET: at 08:41

## 2021-01-01 RX ADMIN — MULTIPLE VITAMINS W/ MINERALS TAB 1 TABLET: TAB at 09:31

## 2021-01-01 RX ADMIN — THIAMINE HYDROCHLORIDE 500 MG: 100 INJECTION, SOLUTION INTRAMUSCULAR; INTRAVENOUS at 05:43

## 2021-01-01 RX ADMIN — POTASSIUM & SODIUM PHOSPHATES POWDER PACK 280-160-250 MG 1 PACKET: 280-160-250 PACK at 08:53

## 2021-01-01 RX ADMIN — SODIUM CHLORIDE: 9 INJECTION, SOLUTION INTRAVENOUS at 16:18

## 2021-01-01 RX ADMIN — FERROUS SULFATE TAB 325 MG (65 MG ELEMENTAL FE) 325 MG: 325 (65 FE) TAB at 11:05

## 2021-01-01 RX ADMIN — DRONABINOL 10 MG: 5 CAPSULE ORAL at 18:32

## 2021-01-01 RX ADMIN — DRONABINOL 5 MG: 5 CAPSULE ORAL at 09:16

## 2021-01-01 RX ADMIN — METRONIDAZOLE 500 MG: 250 TABLET ORAL at 08:31

## 2021-01-01 RX ADMIN — LIDOCAINE HYDROCHLORIDE: 20 JELLY TOPICAL at 10:33

## 2021-01-01 RX ADMIN — Medication 5 ML: at 17:00

## 2021-01-01 RX ADMIN — LISINOPRIL 5 MG: 5 TABLET ORAL at 20:13

## 2021-01-01 RX ADMIN — MEROPENEM 1 G: 1 INJECTION, POWDER, FOR SOLUTION INTRAVENOUS at 08:53

## 2021-01-01 RX ADMIN — DRONABINOL 5 MG: 5 CAPSULE ORAL at 08:53

## 2021-01-01 RX ADMIN — MIRTAZAPINE 7.5 MG: 7.5 TABLET, FILM COATED ORAL at 21:50

## 2021-01-01 RX ADMIN — Medication 500 MG: at 09:29

## 2021-01-01 RX ADMIN — LIDOCAINE HYDROCHLORIDE 3 ML: 10 INJECTION, SOLUTION EPIDURAL; INFILTRATION; INTRACAUDAL; PERINEURAL at 11:07

## 2021-01-01 RX ADMIN — Medication 500 MG: at 08:01

## 2021-01-01 RX ADMIN — PIPERACILLIN AND TAZOBACTAM 4.5 G: 4; .5 INJECTION, POWDER, FOR SOLUTION INTRAVENOUS at 17:01

## 2021-01-01 RX ADMIN — PIPERACILLIN AND TAZOBACTAM 4.5 G: 4; .5 INJECTION, POWDER, FOR SOLUTION INTRAVENOUS at 17:00

## 2021-01-01 RX ADMIN — Medication 500 MG: at 08:21

## 2021-01-01 RX ADMIN — PIPERACILLIN AND TAZOBACTAM 4.5 G: 4; .5 INJECTION, POWDER, FOR SOLUTION INTRAVENOUS at 22:45

## 2021-01-01 RX ADMIN — FENOFIBRATE 160 MG: 160 TABLET, FILM COATED ORAL at 23:15

## 2021-01-01 RX ADMIN — DRONABINOL 5 MG: 2.5 CAPSULE ORAL at 18:25

## 2021-01-01 RX ADMIN — FERROUS SULFATE TAB 325 MG (65 MG ELEMENTAL FE) 325 MG: 325 (65 FE) TAB at 17:39

## 2021-01-01 RX ADMIN — SODIUM CHLORIDE, POTASSIUM CHLORIDE, SODIUM LACTATE AND CALCIUM CHLORIDE 500 ML: 600; 310; 30; 20 INJECTION, SOLUTION INTRAVENOUS at 19:35

## 2021-01-01 RX ADMIN — FENOFIBRATE 160 MG: 160 TABLET, FILM COATED ORAL at 21:50

## 2021-01-01 RX ADMIN — Medication 250 MCG: at 08:53

## 2021-01-01 RX ADMIN — FENTANYL CITRATE 50 MCG: 50 INJECTION, SOLUTION INTRAMUSCULAR; INTRAVENOUS at 10:57

## 2021-01-01 RX ADMIN — MEBROFENIN 6 MILLICURIE: 45 INJECTION, POWDER, LYOPHILIZED, FOR SOLUTION INTRAVENOUS at 14:39

## 2021-01-01 RX ADMIN — POTASSIUM & SODIUM PHOSPHATES POWDER PACK 280-160-250 MG 1 PACKET: 280-160-250 PACK at 09:16

## 2021-01-01 RX ADMIN — PIPERACILLIN AND TAZOBACTAM 4.5 G: 4; .5 INJECTION, POWDER, FOR SOLUTION INTRAVENOUS at 03:59

## 2021-01-01 RX ADMIN — ATORVASTATIN CALCIUM 40 MG: 40 TABLET, FILM COATED ORAL at 21:50

## 2021-01-01 RX ADMIN — MELATONIN TAB 3 MG 3 MG: 3 TAB at 00:10

## 2021-01-01 RX ADMIN — DRONABINOL 5 MG: 5 CAPSULE ORAL at 23:11

## 2021-01-01 RX ADMIN — MEROPENEM 1 G: 1 INJECTION, POWDER, FOR SOLUTION INTRAVENOUS at 09:18

## 2021-01-01 RX ADMIN — Medication 5 ML: at 06:42

## 2021-01-01 RX ADMIN — SUGAMMADEX 200 MG: 100 INJECTION, SOLUTION INTRAVENOUS at 15:11

## 2021-01-01 RX ADMIN — MULTIPLE VITAMINS W/ MINERALS TAB 1 TABLET: TAB at 08:36

## 2021-01-01 RX ADMIN — PIPERACILLIN AND TAZOBACTAM 4.5 G: 4; .5 INJECTION, POWDER, FOR SOLUTION INTRAVENOUS at 05:17

## 2021-01-01 RX ADMIN — DOCUSATE SODIUM 50 MG AND SENNOSIDES 8.6 MG 1 TABLET: 8.6; 5 TABLET, FILM COATED ORAL at 08:38

## 2021-01-01 RX ADMIN — Medication 5 ML: at 08:32

## 2021-01-01 RX ADMIN — SODIUM CHLORIDE 60 ML: 9 INJECTION, SOLUTION INTRAVENOUS at 13:29

## 2021-01-01 RX ADMIN — MULTIPLE VITAMINS W/ MINERALS TAB 1 TABLET: TAB at 08:16

## 2021-01-01 RX ADMIN — DRONABINOL 10 MG: 5 CAPSULE ORAL at 08:41

## 2021-01-01 RX ADMIN — SODIUM CHLORIDE, POTASSIUM CHLORIDE, SODIUM LACTATE AND CALCIUM CHLORIDE 500 ML: 600; 310; 30; 20 INJECTION, SOLUTION INTRAVENOUS at 04:27

## 2021-01-01 RX ADMIN — POTASSIUM & SODIUM PHOSPHATES POWDER PACK 280-160-250 MG 1 PACKET: 280-160-250 PACK at 14:25

## 2021-01-01 RX ADMIN — SODIUM CHLORIDE: 9 INJECTION, SOLUTION INTRAVENOUS at 06:48

## 2021-01-01 RX ADMIN — MIDAZOLAM 1 MG: 1 INJECTION INTRAMUSCULAR; INTRAVENOUS at 10:52

## 2021-01-01 RX ADMIN — POTASSIUM & SODIUM PHOSPHATES POWDER PACK 280-160-250 MG 2 PACKET: 280-160-250 PACK at 21:21

## 2021-01-01 RX ADMIN — MEROPENEM 1 G: 1 INJECTION, POWDER, FOR SOLUTION INTRAVENOUS at 08:37

## 2021-01-01 RX ADMIN — VANCOMYCIN HYDROCHLORIDE 1500 MG: 10 INJECTION, POWDER, LYOPHILIZED, FOR SOLUTION INTRAVENOUS at 20:51

## 2021-01-01 RX ADMIN — ENOXAPARIN SODIUM 40 MG: 40 INJECTION, SOLUTION INTRAVENOUS; SUBCUTANEOUS at 15:10

## 2021-01-01 RX ADMIN — SODIUM CHLORIDE: 9 INJECTION, SOLUTION INTRAVENOUS at 13:12

## 2021-01-01 RX ADMIN — ONDANSETRON 4 MG: 2 INJECTION INTRAMUSCULAR; INTRAVENOUS at 03:58

## 2021-01-01 RX ADMIN — FERROUS SULFATE TAB 325 MG (65 MG ELEMENTAL FE) 325 MG: 325 (65 FE) TAB at 09:31

## 2021-01-01 RX ADMIN — POTASSIUM & SODIUM PHOSPHATES POWDER PACK 280-160-250 MG 1 PACKET: 280-160-250 PACK at 14:32

## 2021-01-01 RX ADMIN — LIDOCAINE HYDROCHLORIDE 10 ML: 10 INJECTION, SOLUTION EPIDURAL; INFILTRATION; INTRACAUDAL; PERINEURAL at 11:47

## 2021-01-01 RX ADMIN — IOPAMIDOL 108 ML: 755 INJECTION, SOLUTION INTRAVENOUS at 09:20

## 2021-01-01 RX ADMIN — Medication 250 MCG: at 08:38

## 2021-01-01 RX ADMIN — METRONIDAZOLE 500 MG: 250 TABLET ORAL at 08:14

## 2021-01-01 RX ADMIN — SODIUM CHLORIDE, PRESERVATIVE FREE 5 ML: 5 INJECTION INTRAVENOUS at 05:18

## 2021-01-01 RX ADMIN — Medication 1 CAPSULE: at 08:52

## 2021-01-01 RX ADMIN — MIDAZOLAM 0.5 MG: 1 INJECTION INTRAMUSCULAR; INTRAVENOUS at 14:15

## 2021-01-01 RX ADMIN — ALBUMIN HUMAN 37.5 G: 0.25 SOLUTION INTRAVENOUS at 14:45

## 2021-01-01 RX ADMIN — FUROSEMIDE 20 MG: 10 INJECTION, SOLUTION INTRAMUSCULAR; INTRAVENOUS at 16:37

## 2021-01-01 RX ADMIN — Medication 500 MG: at 09:01

## 2021-01-01 RX ADMIN — THIAMINE HCL TAB 100 MG 100 MG: 100 TAB at 09:17

## 2021-01-01 RX ADMIN — PIPERACILLIN AND TAZOBACTAM 4.5 G: 4; .5 INJECTION, POWDER, FOR SOLUTION INTRAVENOUS at 23:18

## 2021-01-01 RX ADMIN — FERROUS SULFATE TAB 325 MG (65 MG ELEMENTAL FE) 325 MG: 325 (65 FE) TAB at 17:43

## 2021-01-01 RX ADMIN — SODIUM CHLORIDE: 9 INJECTION, SOLUTION INTRAVENOUS at 20:34

## 2021-01-01 RX ADMIN — LISINOPRIL 5 MG: 5 TABLET ORAL at 20:20

## 2021-01-01 RX ADMIN — SODIUM CHLORIDE, POTASSIUM CHLORIDE, SODIUM LACTATE AND CALCIUM CHLORIDE: 600; 310; 30; 20 INJECTION, SOLUTION INTRAVENOUS at 11:06

## 2021-01-01 RX ADMIN — FENTANYL CITRATE 50 MCG: 50 INJECTION, SOLUTION INTRAMUSCULAR; INTRAVENOUS at 12:30

## 2021-01-01 RX ADMIN — ATORVASTATIN CALCIUM 40 MG: 40 TABLET, FILM COATED ORAL at 22:10

## 2021-01-01 RX ADMIN — FERROUS SULFATE TAB 325 MG (65 MG ELEMENTAL FE) 325 MG: 325 (65 FE) TAB at 12:07

## 2021-01-01 RX ADMIN — SODIUM CHLORIDE: 9 INJECTION, SOLUTION INTRAVENOUS at 05:40

## 2021-01-01 RX ADMIN — PIPERACILLIN AND TAZOBACTAM 4.5 G: 4; .5 INJECTION, POWDER, FOR SOLUTION INTRAVENOUS at 12:52

## 2021-01-01 RX ADMIN — Medication 500 MG: at 20:10

## 2021-01-01 RX ADMIN — FENOFIBRATE 160 MG: 160 TABLET, FILM COATED ORAL at 22:40

## 2021-01-01 RX ADMIN — FENOFIBRATE 160 MG: 160 TABLET, FILM COATED ORAL at 22:21

## 2021-01-01 RX ADMIN — Medication 1 TABLET: at 08:38

## 2021-01-01 RX ADMIN — Medication 1 CAPSULE: at 08:38

## 2021-01-01 RX ADMIN — PIPERACILLIN AND TAZOBACTAM 4.5 G: 4; .5 INJECTION, POWDER, FOR SOLUTION INTRAVENOUS at 16:36

## 2021-01-01 RX ADMIN — Medication 1 CAPSULE: at 08:41

## 2021-01-01 RX ADMIN — Medication 500 MG: at 09:16

## 2021-01-01 RX ADMIN — SODIUM CHLORIDE, POTASSIUM CHLORIDE, SODIUM LACTATE AND CALCIUM CHLORIDE: 600; 310; 30; 20 INJECTION, SOLUTION INTRAVENOUS at 20:39

## 2021-01-01 RX ADMIN — SODIUM CHLORIDE: 9 INJECTION, SOLUTION INTRAVENOUS at 12:45

## 2021-01-01 RX ADMIN — MIRTAZAPINE 7.5 MG: 7.5 TABLET, FILM COATED ORAL at 23:22

## 2021-01-01 RX ADMIN — LORAZEPAM 1 MG: 2 INJECTION INTRAMUSCULAR; INTRAVENOUS at 17:13

## 2021-01-01 RX ADMIN — IOPAMIDOL 86 ML: 755 INJECTION, SOLUTION INTRAVENOUS at 12:53

## 2021-01-01 RX ADMIN — SODIUM CHLORIDE: 9 INJECTION, SOLUTION INTRAVENOUS at 02:03

## 2021-01-01 RX ADMIN — VANCOMYCIN HYDROCHLORIDE 1500 MG: 10 INJECTION, POWDER, LYOPHILIZED, FOR SOLUTION INTRAVENOUS at 21:29

## 2021-01-01 RX ADMIN — SODIUM CHLORIDE 60 ML: 9 INJECTION, SOLUTION INTRAVENOUS at 13:06

## 2021-01-01 RX ADMIN — POTASSIUM & SODIUM PHOSPHATES POWDER PACK 280-160-250 MG 2 PACKET: 280-160-250 PACK at 10:18

## 2021-01-01 RX ADMIN — VANCOMYCIN HYDROCHLORIDE 2000 MG: 10 INJECTION, POWDER, LYOPHILIZED, FOR SOLUTION INTRAVENOUS at 09:30

## 2021-01-01 RX ADMIN — Medication 500 MG: at 09:26

## 2021-01-01 RX ADMIN — THIAMINE HCL TAB 100 MG 100 MG: 100 TAB at 08:38

## 2021-01-01 RX ADMIN — SODIUM CHLORIDE, PRESERVATIVE FREE 500 UNITS: 5 INJECTION INTRAVENOUS at 12:50

## 2021-01-01 RX ADMIN — PIPERACILLIN AND TAZOBACTAM 4.5 G: 4; .5 INJECTION, POWDER, FOR SOLUTION INTRAVENOUS at 11:01

## 2021-01-01 RX ADMIN — CEFAZOLIN SODIUM 2 G: 2 INJECTION, SOLUTION INTRAVENOUS at 13:44

## 2021-01-01 RX ADMIN — IOPAMIDOL 105 ML: 755 INJECTION, SOLUTION INTRAVENOUS at 20:38

## 2021-01-01 RX ADMIN — ONDANSETRON 4 MG: 2 INJECTION INTRAMUSCULAR; INTRAVENOUS at 21:18

## 2021-01-01 RX ADMIN — DRONABINOL 5 MG: 2.5 CAPSULE ORAL at 17:01

## 2021-01-01 RX ADMIN — DRONABINOL 5 MG: 2.5 CAPSULE ORAL at 17:16

## 2021-01-01 RX ADMIN — CEFAZOLIN SODIUM 2 G: 2 INJECTION, SOLUTION INTRAVENOUS at 12:33

## 2021-01-01 RX ADMIN — MEROPENEM 1 G: 1 INJECTION, POWDER, FOR SOLUTION INTRAVENOUS at 16:07

## 2021-01-01 RX ADMIN — MEROPENEM 1 G: 1 INJECTION, POWDER, FOR SOLUTION INTRAVENOUS at 14:25

## 2021-01-01 RX ADMIN — THIAMINE HYDROCHLORIDE 500 MG: 100 INJECTION, SOLUTION INTRAMUSCULAR; INTRAVENOUS at 22:43

## 2021-01-01 RX ADMIN — Medication 5 ML: at 13:22

## 2021-01-01 RX ADMIN — FENTANYL CITRATE 25 MCG: 50 INJECTION, SOLUTION INTRAMUSCULAR; INTRAVENOUS at 14:16

## 2021-01-01 RX ADMIN — PIPERACILLIN AND TAZOBACTAM 4.5 G: 4; .5 INJECTION, POWDER, FOR SOLUTION INTRAVENOUS at 11:37

## 2021-01-01 RX ADMIN — SODIUM CHLORIDE 60 ML: 9 INJECTION, SOLUTION INTRAVENOUS at 12:53

## 2021-01-01 RX ADMIN — MEROPENEM 1 G: 1 INJECTION, POWDER, FOR SOLUTION INTRAVENOUS at 15:10

## 2021-01-01 RX ADMIN — ALBUMIN HUMAN 12.5 G: 0.25 SOLUTION INTRAVENOUS at 14:53

## 2021-01-01 RX ADMIN — ONDANSETRON 4 MG: 2 INJECTION INTRAMUSCULAR; INTRAVENOUS at 09:28

## 2021-01-01 RX ADMIN — MEROPENEM 1 G: 1 INJECTION, POWDER, FOR SOLUTION INTRAVENOUS at 09:01

## 2021-01-01 RX ADMIN — IOPAMIDOL 104 ML: 755 INJECTION, SOLUTION INTRAVENOUS at 13:39

## 2021-01-01 RX ADMIN — MIRTAZAPINE 7.5 MG: 7.5 TABLET, FILM COATED ORAL at 23:37

## 2021-01-01 RX ADMIN — LIDOCAINE HYDROCHLORIDE: 20 JELLY TOPICAL at 16:05

## 2021-01-01 RX ADMIN — FENOFIBRATE 160 MG: 160 TABLET, FILM COATED ORAL at 23:37

## 2021-01-01 RX ADMIN — FERROUS SULFATE TAB 325 MG (65 MG ELEMENTAL FE) 325 MG: 325 (65 FE) TAB at 11:37

## 2021-01-01 RX ADMIN — SODIUM CHLORIDE, POTASSIUM CHLORIDE, SODIUM LACTATE AND CALCIUM CHLORIDE: 600; 310; 30; 20 INJECTION, SOLUTION INTRAVENOUS at 14:13

## 2021-01-01 RX ADMIN — ATORVASTATIN CALCIUM 40 MG: 40 TABLET, FILM COATED ORAL at 22:40

## 2021-01-01 RX ADMIN — Medication 500 MG: at 08:51

## 2021-01-01 RX ADMIN — DRONABINOL 10 MG: 2.5 CAPSULE ORAL at 08:21

## 2021-01-01 RX ADMIN — THIAMINE HYDROCHLORIDE 500 MG: 100 INJECTION, SOLUTION INTRAMUSCULAR; INTRAVENOUS at 23:18

## 2021-01-01 RX ADMIN — DEXTROSE MONOHYDRATE: 50 INJECTION, SOLUTION INTRAVENOUS at 09:55

## 2021-01-01 RX ADMIN — ALBUMIN HUMAN 12.5 G: 0.25 SOLUTION INTRAVENOUS at 14:45

## 2021-01-01 RX ADMIN — Medication 5 ML: at 14:58

## 2021-01-01 RX ADMIN — SODIUM CHLORIDE: 9 INJECTION, SOLUTION INTRAVENOUS at 10:54

## 2021-01-01 RX ADMIN — MIRTAZAPINE 7.5 MG: 7.5 TABLET, FILM COATED ORAL at 22:10

## 2021-01-01 RX ADMIN — HEPARIN, PORCINE (PF) 10 UNIT/ML INTRAVENOUS SYRINGE 5 ML: at 22:41

## 2021-01-01 RX ADMIN — Medication 250 MCG: at 09:26

## 2021-01-01 RX ADMIN — Medication 500 UNITS: at 14:24

## 2021-01-01 RX ADMIN — PIPERACILLIN AND TAZOBACTAM 4.5 G: 4; .5 INJECTION, POWDER, FOR SOLUTION INTRAVENOUS at 03:57

## 2021-01-01 RX ADMIN — OLANZAPINE 2.5 MG: 2.5 TABLET, FILM COATED ORAL at 21:32

## 2021-01-01 RX ADMIN — PIPERACILLIN AND TAZOBACTAM 4.5 G: 4; .5 INJECTION, POWDER, FOR SOLUTION INTRAVENOUS at 17:45

## 2021-01-01 RX ADMIN — Medication 500 MG: at 08:38

## 2021-01-01 RX ADMIN — Medication 5 ML: at 16:12

## 2021-01-01 RX ADMIN — FERROUS SULFATE TAB 325 MG (65 MG ELEMENTAL FE) 325 MG: 325 (65 FE) TAB at 12:26

## 2021-01-01 RX ADMIN — PIPERACILLIN AND TAZOBACTAM 4.5 G: 4; .5 INJECTION, POWDER, FOR SOLUTION INTRAVENOUS at 21:45

## 2021-01-01 RX ADMIN — MEROPENEM 1 G: 1 INJECTION, POWDER, FOR SOLUTION INTRAVENOUS at 09:00

## 2021-01-01 RX ADMIN — FERROUS SULFATE TAB 325 MG (65 MG ELEMENTAL FE) 325 MG: 325 (65 FE) TAB at 12:00

## 2021-01-01 RX ADMIN — Medication 5 ML: at 13:44

## 2021-01-01 RX ADMIN — SODIUM CHLORIDE: 9 INJECTION, SOLUTION INTRAVENOUS at 12:33

## 2021-01-01 RX ADMIN — MEROPENEM 1 G: 1 INJECTION, POWDER, FOR SOLUTION INTRAVENOUS at 15:36

## 2021-01-01 RX ADMIN — PIPERACILLIN AND TAZOBACTAM 4.5 G: 4; .5 INJECTION, POWDER, FOR SOLUTION INTRAVENOUS at 05:28

## 2021-01-01 RX ADMIN — Medication 500 MG: at 20:03

## 2021-01-01 RX ADMIN — SODIUM CHLORIDE, PRESERVATIVE FREE 5 ML: 5 INJECTION INTRAVENOUS at 11:52

## 2021-01-01 RX ADMIN — Medication 500 MG: at 08:52

## 2021-01-01 RX ADMIN — FENTANYL CITRATE 25 MCG: 50 INJECTION, SOLUTION INTRAMUSCULAR; INTRAVENOUS at 11:46

## 2021-01-01 RX ADMIN — DRONABINOL 10 MG: 5 CAPSULE ORAL at 08:38

## 2021-01-01 RX ADMIN — MULTIPLE VITAMINS W/ MINERALS TAB 1 TABLET: TAB at 08:19

## 2021-01-01 RX ADMIN — MEROPENEM 1 G: 1 INJECTION, POWDER, FOR SOLUTION INTRAVENOUS at 17:37

## 2021-01-01 RX ADMIN — FERROUS SULFATE TAB 325 MG (65 MG ELEMENTAL FE) 325 MG: 325 (65 FE) TAB at 08:36

## 2021-01-01 RX ADMIN — SULFAMETHOXAZOLE AND TRIMETHOPRIM 1 TABLET: 800; 160 TABLET ORAL at 08:57

## 2021-01-01 RX ADMIN — MIDAZOLAM 2 MG: 1 INJECTION INTRAMUSCULAR; INTRAVENOUS at 14:10

## 2021-01-01 RX ADMIN — MULTIPLE VITAMINS W/ MINERALS TAB 1 TABLET: TAB at 08:51

## 2021-01-01 RX ADMIN — PIPERACILLIN AND TAZOBACTAM 4.5 G: 4; .5 INJECTION, POWDER, FOR SOLUTION INTRAVENOUS at 15:37

## 2021-01-01 RX ADMIN — DOCUSATE SODIUM 50 MG AND SENNOSIDES 8.6 MG 2 TABLET: 8.6; 5 TABLET, FILM COATED ORAL at 08:02

## 2021-01-01 RX ADMIN — Medication 1 TABLET: at 19:01

## 2021-01-01 RX ADMIN — Medication 500 MG: at 20:13

## 2021-01-01 RX ADMIN — PIPERACILLIN AND TAZOBACTAM 4.5 G: 4; .5 INJECTION, POWDER, FOR SOLUTION INTRAVENOUS at 16:39

## 2021-01-01 RX ADMIN — ALBUMIN HUMAN 12.5 G: 0.25 SOLUTION INTRAVENOUS at 12:03

## 2021-01-01 RX ADMIN — DRONABINOL 10 MG: 5 CAPSULE ORAL at 17:37

## 2021-01-01 RX ADMIN — FERROUS SULFATE TAB 325 MG (65 MG ELEMENTAL FE) 325 MG: 325 (65 FE) TAB at 08:01

## 2021-01-01 RX ADMIN — PIPERACILLIN AND TAZOBACTAM 4.5 G: 4; .5 INJECTION, POWDER, FOR SOLUTION INTRAVENOUS at 23:38

## 2021-01-01 RX ADMIN — POTASSIUM & SODIUM PHOSPHATES POWDER PACK 280-160-250 MG 1 PACKET: 280-160-250 PACK at 09:46

## 2021-01-01 RX ADMIN — MEROPENEM 1 G: 1 INJECTION, POWDER, FOR SOLUTION INTRAVENOUS at 15:47

## 2021-01-01 RX ADMIN — FENTANYL CITRATE 25 MCG: 50 INJECTION, SOLUTION INTRAMUSCULAR; INTRAVENOUS at 12:28

## 2021-01-01 RX ADMIN — MULTIPLE VITAMINS W/ MINERALS TAB 1 TABLET: TAB at 09:25

## 2021-01-01 RX ADMIN — DRONABINOL 10 MG: 5 CAPSULE ORAL at 08:53

## 2021-01-01 RX ADMIN — DRONABINOL 5 MG: 5 CAPSULE ORAL at 20:21

## 2021-01-01 RX ADMIN — METRONIDAZOLE 500 MG: 250 TABLET ORAL at 19:16

## 2021-01-01 RX ADMIN — THIAMINE HCL TAB 100 MG 100 MG: 100 TAB at 08:53

## 2021-01-01 RX ADMIN — VANCOMYCIN HYDROCHLORIDE 1500 MG: 10 INJECTION, POWDER, LYOPHILIZED, FOR SOLUTION INTRAVENOUS at 08:34

## 2021-01-01 RX ADMIN — DRONABINOL 5 MG: 5 CAPSULE ORAL at 20:03

## 2021-01-01 RX ADMIN — SODIUM CHLORIDE: 9 INJECTION, SOLUTION INTRAVENOUS at 08:50

## 2021-01-01 RX ADMIN — Medication 1 TABLET: at 09:26

## 2021-01-01 RX ADMIN — IOPAMIDOL 85 ML: 755 INJECTION, SOLUTION INTRAVENOUS at 13:07

## 2021-01-01 RX ADMIN — DRONABINOL 5 MG: 5 CAPSULE ORAL at 20:10

## 2021-01-01 RX ADMIN — FERROUS SULFATE TAB 325 MG (65 MG ELEMENTAL FE) 325 MG: 325 (65 FE) TAB at 09:05

## 2021-01-01 RX ADMIN — DOCUSATE SODIUM 50 MG AND SENNOSIDES 8.6 MG 2 TABLET: 8.6; 5 TABLET, FILM COATED ORAL at 08:51

## 2021-01-01 RX ADMIN — FERROUS SULFATE TAB 325 MG (65 MG ELEMENTAL FE) 325 MG: 325 (65 FE) TAB at 17:02

## 2021-01-01 RX ADMIN — POTASSIUM & SODIUM PHOSPHATES POWDER PACK 280-160-250 MG 1 PACKET: 280-160-250 PACK at 20:10

## 2021-01-01 RX ADMIN — FERROUS SULFATE TAB 325 MG (65 MG ELEMENTAL FE) 325 MG: 325 (65 FE) TAB at 20:38

## 2021-01-01 RX ADMIN — LIDOCAINE HYDROCHLORIDE 12 ML: 10 INJECTION, SOLUTION EPIDURAL; INFILTRATION; INTRACAUDAL; PERINEURAL at 14:27

## 2021-01-01 RX ADMIN — GENTAMICIN SULFATE: 40 INJECTION, SOLUTION INTRAMUSCULAR; INTRAVENOUS at 21:28

## 2021-01-01 RX ADMIN — Medication 1 TABLET: at 09:16

## 2021-01-01 RX ADMIN — AMOXICILLIN AND CLAVULANATE POTASSIUM 1 TABLET: 875; 125 TABLET, FILM COATED ORAL at 08:57

## 2021-01-01 RX ADMIN — MULTIPLE VITAMINS W/ MINERALS TAB 1 TABLET: TAB at 09:02

## 2021-01-01 RX ADMIN — POTASSIUM CHLORIDE 40 MEQ: 750 TABLET, EXTENDED RELEASE ORAL at 10:18

## 2021-01-01 RX ADMIN — MEROPENEM 1 G: 1 INJECTION, POWDER, FOR SOLUTION INTRAVENOUS at 00:39

## 2021-01-01 RX ADMIN — THIAMINE HYDROCHLORIDE 500 MG: 100 INJECTION, SOLUTION INTRAMUSCULAR; INTRAVENOUS at 16:30

## 2021-01-01 RX ADMIN — SODIUM PHOSPHATE, MONOBASIC, MONOHYDRATE 9 MMOL: 276; 142 INJECTION, SOLUTION INTRAVENOUS at 11:05

## 2021-01-01 RX ADMIN — FENOFIBRATE 160 MG: 160 TABLET, FILM COATED ORAL at 22:10

## 2021-01-01 RX ADMIN — Medication 500 MG: at 09:27

## 2021-01-01 RX ADMIN — MEROPENEM 1 G: 1 INJECTION, POWDER, FOR SOLUTION INTRAVENOUS at 08:49

## 2021-01-01 RX ADMIN — SODIUM CHLORIDE: 9 INJECTION, SOLUTION INTRAVENOUS at 09:24

## 2021-01-01 RX ADMIN — ALBUMIN HUMAN 50 G: 0.25 SOLUTION INTRAVENOUS at 23:07

## 2021-01-01 RX ADMIN — FERROUS SULFATE TAB 325 MG (65 MG ELEMENTAL FE) 325 MG: 325 (65 FE) TAB at 08:19

## 2021-01-01 RX ADMIN — SODIUM CHLORIDE: 9 INJECTION, SOLUTION INTRAVENOUS at 16:36

## 2021-01-01 RX ADMIN — MEROPENEM 1 G: 1 INJECTION, POWDER, FOR SOLUTION INTRAVENOUS at 08:41

## 2021-01-01 RX ADMIN — LIDOCAINE HYDROCHLORIDE 3 ML: 10 INJECTION, SOLUTION EPIDURAL; INFILTRATION; INTRACAUDAL; PERINEURAL at 11:53

## 2021-01-01 RX ADMIN — VANCOMYCIN HYDROCHLORIDE 1500 MG: 10 INJECTION, POWDER, LYOPHILIZED, FOR SOLUTION INTRAVENOUS at 20:21

## 2021-01-01 RX ADMIN — POTASSIUM & SODIUM PHOSPHATES POWDER PACK 280-160-250 MG 2 PACKET: 280-160-250 PACK at 17:43

## 2021-01-01 RX ADMIN — POLYETHYLENE GLYCOL 3350 17 G: 17 POWDER, FOR SOLUTION ORAL at 19:16

## 2021-01-01 RX ADMIN — OLANZAPINE 2.5 MG: 2.5 TABLET, FILM COATED ORAL at 20:13

## 2021-01-01 RX ADMIN — FENTANYL CITRATE 50 MCG: 50 INJECTION, SOLUTION INTRAMUSCULAR; INTRAVENOUS at 14:24

## 2021-01-01 RX ADMIN — THIAMINE HYDROCHLORIDE 500 MG: 100 INJECTION, SOLUTION INTRAMUSCULAR; INTRAVENOUS at 09:47

## 2021-01-01 RX ADMIN — SODIUM CHLORIDE: 9 INJECTION, SOLUTION INTRAVENOUS at 00:09

## 2021-01-01 RX ADMIN — ONDANSETRON 4 MG: 2 INJECTION INTRAMUSCULAR; INTRAVENOUS at 11:22

## 2021-01-01 RX ADMIN — PIPERACILLIN AND TAZOBACTAM 4.5 G: 4; .5 INJECTION, POWDER, FOR SOLUTION INTRAVENOUS at 23:37

## 2021-01-01 RX ADMIN — FERROUS SULFATE TAB 325 MG (65 MG ELEMENTAL FE) 325 MG: 325 (65 FE) TAB at 17:16

## 2021-01-01 RX ADMIN — MEROPENEM 1 G: 1 INJECTION, POWDER, FOR SOLUTION INTRAVENOUS at 00:51

## 2021-01-01 RX ADMIN — LORAZEPAM 1 MG: 2 INJECTION INTRAMUSCULAR; INTRAVENOUS at 11:19

## 2021-01-01 RX ADMIN — DRONABINOL 10 MG: 2.5 CAPSULE ORAL at 08:52

## 2021-01-01 RX ADMIN — SODIUM CHLORIDE: 9 INJECTION, SOLUTION INTRAVENOUS at 11:08

## 2021-01-01 RX ADMIN — Medication 5 ML: at 23:43

## 2021-01-01 RX ADMIN — POTASSIUM & SODIUM PHOSPHATES POWDER PACK 280-160-250 MG 1 PACKET: 280-160-250 PACK at 20:21

## 2021-01-01 RX ADMIN — GADOBUTROL 8 ML: 604.72 INJECTION INTRAVENOUS at 12:37

## 2021-01-01 RX ADMIN — FERROUS SULFATE TAB 325 MG (65 MG ELEMENTAL FE) 325 MG: 325 (65 FE) TAB at 08:52

## 2021-01-01 RX ADMIN — THIAMINE HYDROCHLORIDE 500 MG: 100 INJECTION, SOLUTION INTRAMUSCULAR; INTRAVENOUS at 16:17

## 2021-01-01 RX ADMIN — LIDOCAINE HYDROCHLORIDE 10 ML: 10 INJECTION, SOLUTION EPIDURAL; INFILTRATION; INTRACAUDAL; PERINEURAL at 14:10

## 2021-01-01 RX ADMIN — PIPERACILLIN AND TAZOBACTAM 4.5 G: 4; .5 INJECTION, POWDER, FOR SOLUTION INTRAVENOUS at 00:43

## 2021-01-01 RX ADMIN — Medication 250 MCG: at 09:15

## 2021-01-01 RX ADMIN — MIDAZOLAM 1 MG: 1 INJECTION INTRAMUSCULAR; INTRAVENOUS at 12:30

## 2021-01-01 RX ADMIN — Medication 250 MCG: at 09:16

## 2021-01-01 RX ADMIN — Medication 500 MG: at 11:05

## 2021-01-01 RX ADMIN — IOPAMIDOL 10 ML: 510 INJECTION, SOLUTION INTRAVASCULAR at 13:50

## 2021-01-01 RX ADMIN — Medication 500 MG: at 20:21

## 2021-01-01 RX ADMIN — MEROPENEM 1 G: 1 INJECTION, POWDER, FOR SOLUTION INTRAVENOUS at 23:42

## 2021-01-01 RX ADMIN — Medication 5 ML: at 14:26

## 2021-01-01 RX ADMIN — ONDANSETRON 8 MG: 2 INJECTION INTRAMUSCULAR; INTRAVENOUS at 05:13

## 2021-01-01 RX ADMIN — MEROPENEM 1 G: 1 INJECTION, POWDER, FOR SOLUTION INTRAVENOUS at 09:15

## 2021-01-01 RX ADMIN — SODIUM CHLORIDE, PRESERVATIVE FREE 5 ML: 5 INJECTION INTRAVENOUS at 13:24

## 2021-01-01 RX ADMIN — DOCUSATE SODIUM 50 MG AND SENNOSIDES 8.6 MG 2 TABLET: 8.6; 5 TABLET, FILM COATED ORAL at 21:21

## 2021-01-01 RX ADMIN — DRONABINOL 5 MG: 2.5 CAPSULE ORAL at 16:39

## 2021-01-01 RX ADMIN — SODIUM CHLORIDE, PRESERVATIVE FREE 5 ML: 5 INJECTION INTRAVENOUS at 04:35

## 2021-01-01 RX ADMIN — SODIUM CHLORIDE: 9 INJECTION, SOLUTION INTRAVENOUS at 19:11

## 2021-01-01 RX ADMIN — FERROUS SULFATE TAB 325 MG (65 MG ELEMENTAL FE) 325 MG: 325 (65 FE) TAB at 12:10

## 2021-01-01 RX ADMIN — PIPERACILLIN AND TAZOBACTAM 4.5 G: 4; .5 INJECTION, POWDER, FOR SOLUTION INTRAVENOUS at 22:24

## 2021-01-01 RX ADMIN — CIPROFLOXACIN HYDROCHLORIDE 500 MG: 500 TABLET, FILM COATED ORAL at 00:09

## 2021-01-01 RX ADMIN — Medication 500 MG: at 09:08

## 2021-01-01 RX ADMIN — MIRTAZAPINE 7.5 MG: 7.5 TABLET, FILM COATED ORAL at 22:21

## 2021-01-01 RX ADMIN — SODIUM CHLORIDE 1000 ML: 9 INJECTION, SOLUTION INTRAVENOUS at 00:15

## 2021-01-01 RX ADMIN — SODIUM CHLORIDE: 9 INJECTION, SOLUTION INTRAVENOUS at 17:09

## 2021-01-01 RX ADMIN — LISINOPRIL 5 MG: 5 TABLET ORAL at 23:11

## 2021-01-01 RX ADMIN — FENOFIBRATE 160 MG: 160 TABLET, FILM COATED ORAL at 23:22

## 2021-01-01 RX ADMIN — Medication 1 TABLET: at 08:52

## 2021-01-01 RX ADMIN — Medication 500 MG: at 09:49

## 2021-01-01 RX ADMIN — DRONABINOL 10 MG: 5 CAPSULE ORAL at 17:29

## 2021-01-01 RX ADMIN — FERROUS SULFATE TAB 325 MG (65 MG ELEMENTAL FE) 325 MG: 325 (65 FE) TAB at 12:52

## 2021-01-01 RX ADMIN — FERROUS SULFATE TAB 325 MG (65 MG ELEMENTAL FE) 325 MG: 325 (65 FE) TAB at 11:31

## 2021-01-01 RX ADMIN — FERROUS SULFATE TAB 325 MG (65 MG ELEMENTAL FE) 325 MG: 325 (65 FE) TAB at 09:25

## 2021-01-01 RX ADMIN — ONDANSETRON 4 MG: 2 INJECTION INTRAMUSCULAR; INTRAVENOUS at 15:11

## 2021-01-01 RX ADMIN — METRONIDAZOLE 500 MG: 250 TABLET ORAL at 00:10

## 2021-01-01 RX ADMIN — LISINOPRIL 5 MG: 5 TABLET ORAL at 22:21

## 2021-01-01 RX ADMIN — Medication 1 TABLET: at 08:53

## 2021-01-01 RX ADMIN — FERROUS SULFATE TAB 325 MG (65 MG ELEMENTAL FE) 325 MG: 325 (65 FE) TAB at 12:27

## 2021-01-01 RX ADMIN — PROPOFOL 130 MG: 10 INJECTION, EMULSION INTRAVENOUS at 14:24

## 2021-01-01 RX ADMIN — Medication 500 MG: at 09:05

## 2021-01-01 RX ADMIN — DRONABINOL 5 MG: 5 CAPSULE ORAL at 09:26

## 2021-01-01 RX ADMIN — Medication 5 ML: at 22:41

## 2021-01-01 RX ADMIN — GADOBUTROL 7.3 ML: 604.72 INJECTION INTRAVENOUS at 17:30

## 2021-01-01 RX ADMIN — SODIUM CHLORIDE, POTASSIUM CHLORIDE, SODIUM LACTATE AND CALCIUM CHLORIDE 1000 ML: 600; 310; 30; 20 INJECTION, SOLUTION INTRAVENOUS at 19:04

## 2021-01-01 RX ADMIN — SODIUM CHLORIDE, SODIUM LACTATE, POTASSIUM CHLORIDE, CALCIUM CHLORIDE AND DEXTROSE MONOHYDRATE: 5; 600; 310; 30; 20 INJECTION, SOLUTION INTRAVENOUS at 07:43

## 2021-01-01 RX ADMIN — PIPERACILLIN AND TAZOBACTAM 4.5 G: 4; .5 INJECTION, POWDER, FOR SOLUTION INTRAVENOUS at 23:03

## 2021-01-01 RX ADMIN — FENTANYL CITRATE 100 MCG: 50 INJECTION, SOLUTION INTRAMUSCULAR; INTRAVENOUS at 14:10

## 2021-01-01 RX ADMIN — POTASSIUM CHLORIDE 40 MEQ: 750 TABLET, EXTENDED RELEASE ORAL at 06:45

## 2021-01-01 RX ADMIN — AMPICILLIN SODIUM AND SULBACTAM SODIUM 3 G: 2; 1 INJECTION, POWDER, FOR SOLUTION INTRAMUSCULAR; INTRAVENOUS at 10:26

## 2021-01-01 RX ADMIN — POTASSIUM & SODIUM PHOSPHATES POWDER PACK 280-160-250 MG 1 PACKET: 280-160-250 PACK at 14:17

## 2021-01-01 RX ADMIN — DRONABINOL 5 MG: 5 CAPSULE ORAL at 09:19

## 2021-01-01 RX ADMIN — Medication 5 ML: at 06:24

## 2021-01-01 RX ADMIN — MAGNESIUM SULFATE IN WATER 2 G: 40 INJECTION, SOLUTION INTRAVENOUS at 13:12

## 2021-01-01 RX ADMIN — MULTIPLE VITAMINS W/ MINERALS TAB 1 TABLET: TAB at 08:38

## 2021-01-01 RX ADMIN — GADOBUTROL 7 ML: 604.72 INJECTION INTRAVENOUS at 11:55

## 2021-01-01 RX ADMIN — POLYETHYLENE GLYCOL 3350 17 G: 17 POWDER, FOR SOLUTION ORAL at 08:31

## 2021-01-01 RX ADMIN — ATORVASTATIN CALCIUM 40 MG: 40 TABLET, FILM COATED ORAL at 23:11

## 2021-01-01 RX ADMIN — ONDANSETRON 4 MG: 2 INJECTION INTRAMUSCULAR; INTRAVENOUS at 18:46

## 2021-01-01 RX ADMIN — ONDANSETRON 4 MG: 2 INJECTION INTRAMUSCULAR; INTRAVENOUS at 12:28

## 2021-01-01 RX ADMIN — IOPAMIDOL 123 ML: 755 INJECTION, SOLUTION INTRAVENOUS at 08:45

## 2021-01-01 RX ADMIN — SODIUM CHLORIDE: 9 INJECTION, SOLUTION INTRAVENOUS at 09:13

## 2021-01-01 RX ADMIN — CLOPIDOGREL BISULFATE 75 MG: 75 TABLET ORAL at 22:45

## 2021-01-01 RX ADMIN — THIAMINE HYDROCHLORIDE 500 MG: 100 INJECTION, SOLUTION INTRAMUSCULAR; INTRAVENOUS at 23:00

## 2021-01-01 RX ADMIN — THIAMINE HYDROCHLORIDE 500 MG: 100 INJECTION, SOLUTION INTRAMUSCULAR; INTRAVENOUS at 06:07

## 2021-01-01 RX ADMIN — Medication 500 UNITS: at 15:18

## 2021-01-01 ASSESSMENT — ENCOUNTER SYMPTOMS
DIFFICULTY URINATING: 0
PALPITATIONS: 0
ABDOMINAL PAIN: 1
NECK STIFFNESS: 0
ADENOPATHY: 0
NUMBNESS: 0
COLOR CHANGE: 0
VOMITING: 1
FEVER: 0
HEADACHES: 0
WEAKNESS: 1
APPETITE CHANGE: 1
CONFUSION: 0
NAUSEA: 1
CONFUSION: 0
SHORTNESS OF BREATH: 0
DYSURIA: 0
EYE REDNESS: 0
ABDOMINAL PAIN: 0
LIGHT-HEADEDNESS: 0
TROUBLE SWALLOWING: 0
FEVER: 0
FEVER: 0
NAUSEA: 0
DIARRHEA: 0
ARTHRALGIAS: 0
DIFFICULTY URINATING: 0
WHEEZING: 0
WEAKNESS: 1
HEADACHES: 0
ABDOMINAL DISTENTION: 1
BACK PAIN: 0
ABDOMINAL PAIN: 0
COUGH: 0
CHILLS: 1
SHORTNESS OF BREATH: 0
WEAKNESS: 1
VOMITING: 0

## 2021-01-01 ASSESSMENT — ACTIVITIES OF DAILY LIVING (ADL)
ADLS_ACUITY_SCORE: 16
ADLS_ACUITY_SCORE: 16
ADLS_ACUITY_SCORE: 14
ADLS_ACUITY_SCORE: 15
ADLS_ACUITY_SCORE: 15
ADLS_ACUITY_SCORE: 14
ADLS_ACUITY_SCORE: 14
ADLS_ACUITY_SCORE: 15
NUMBER_OF_TIMES_PATIENT_HAS_FALLEN_WITHIN_LAST_SIX_MONTHS: 3
DRESSING/BATHING: BATHING DIFFICULTY, REQUIRES EQUIPMENT;DRESSING DIFFICULTY, REQUIRES EQUIPMENT
ADLS_ACUITY_SCORE: 16
WHICH_OF_THE_ABOVE_FUNCTIONAL_RISKS_HAD_A_RECENT_ONSET_OR_CHANGE?: AMBULATION
ADLS_ACUITY_SCORE: 16
ADLS_ACUITY_SCORE: 14
ADLS_ACUITY_SCORE: 16
ADLS_ACUITY_SCORE: 14
ADLS_ACUITY_SCORE: 14
ADLS_ACUITY_SCORE: 15
ADLS_ACUITY_SCORE: 15
DIFFICULTY_COMMUNICATING: NO
ADLS_ACUITY_SCORE: 14
ADLS_ACUITY_SCORE: 15
ADLS_ACUITY_SCORE: 16
ADLS_ACUITY_SCORE: 15
ADLS_ACUITY_SCORE: 16
ADLS_ACUITY_SCORE: 16
WALKING_OR_CLIMBING_STAIRS_DIFFICULTY: YES
ADLS_ACUITY_SCORE: 14
ADLS_ACUITY_SCORE: 15
ADLS_ACUITY_SCORE: 14
DIFFICULTY_EATING/SWALLOWING: YES
ADLS_ACUITY_SCORE: 14
ADLS_ACUITY_SCORE: 15
ADLS_ACUITY_SCORE: 15
TOILETING_ISSUES: NO
ADLS_ACUITY_SCORE: 16
ADLS_ACUITY_SCORE: 13
DRESSING/BATHING_MANAGEMENT: SHOWER CHAIR
ADLS_ACUITY_SCORE: 14
ADLS_ACUITY_SCORE: 16
ADLS_ACUITY_SCORE: 15
ADLS_ACUITY_SCORE: 14
ADLS_ACUITY_SCORE: 16
DIFFICULTY_COMMUNICATING: NO
ADLS_ACUITY_SCORE: 15
ADLS_ACUITY_SCORE: 14
ADLS_ACUITY_SCORE: 15
ADLS_ACUITY_SCORE: 15
ADLS_ACUITY_SCORE: 16
ADLS_ACUITY_SCORE: 14
ADLS_ACUITY_SCORE: 15
DIFFICULTY_EATING/SWALLOWING: NO
CONCENTRATING,_REMEMBERING_OR_MAKING_DECISIONS_DIFFICULTY: YES
ADLS_ACUITY_SCORE: 16
DRESSING/BATHING_DIFFICULTY: YES
ADLS_ACUITY_SCORE: 15
ADLS_ACUITY_SCORE: 14
ADLS_ACUITY_SCORE: 15
ADLS_ACUITY_SCORE: 16
ADLS_ACUITY_SCORE: 15
NUMBER_OF_TIMES_PATIENT_HAS_FALLEN_WITHIN_LAST_SIX_MONTHS: 1
ADLS_ACUITY_SCORE: 16
ADLS_ACUITY_SCORE: 14
ADLS_ACUITY_SCORE: 16
EATING/SWALLOWING: EATING
DOING_ERRANDS_INDEPENDENTLY_DIFFICULTY: YES
ADLS_ACUITY_SCORE: 15
PREVIOUS_RESPONSIBILITIES: YARDWORK
ADLS_ACUITY_SCORE: 15
WEAR_GLASSES_OR_BLIND: NO
ADLS_ACUITY_SCORE: 14
ADLS_ACUITY_SCORE: 16
ADLS_ACUITY_SCORE: 15
ADLS_ACUITY_SCORE: 16
ADLS_ACUITY_SCORE: 15
ADLS_ACUITY_SCORE: 14
ADLS_ACUITY_SCORE: 15
ADLS_ACUITY_SCORE: 14
WALKING_OR_CLIMBING_STAIRS: AMBULATION DIFFICULTY, REQUIRES EQUIPMENT
ADLS_ACUITY_SCORE: 14
CONCENTRATING,_REMEMBERING_OR_MAKING_DECISIONS_DIFFICULTY: NO
WALKING_OR_CLIMBING_STAIRS: AMBULATION DIFFICULTY, REQUIRES EQUIPMENT;AMBULATION DIFFICULTY, ASSISTANCE 1 PERSON
ADLS_ACUITY_SCORE: 15
ADLS_ACUITY_SCORE: 16
ADLS_ACUITY_SCORE: 15
ADLS_ACUITY_SCORE: 14
WALKING_OR_CLIMBING_STAIRS_DIFFICULTY: YES
ADLS_ACUITY_SCORE: 15
ADLS_ACUITY_SCORE: 14
ADLS_ACUITY_SCORE: 15
ADLS_ACUITY_SCORE: 15
VISION_MANAGEMENT: READERS
ADLS_ACUITY_SCORE: 16
ADLS_ACUITY_SCORE: 16
ADLS_ACUITY_SCORE: 14
HEARING_DIFFICULTY_OR_DEAF: NO
ADLS_ACUITY_SCORE: 15
PATIENT_/_FAMILY_COMMUNICATION_STYLE: SPOKEN LANGUAGE (ENGLISH OR BILINGUAL)
ADLS_ACUITY_SCORE: 16
FALL_HISTORY_WITHIN_LAST_SIX_MONTHS: YES
ADLS_ACUITY_SCORE: 15
ADLS_ACUITY_SCORE: 16
ADLS_ACUITY_SCORE: 15
IADL_COMMENTS: WIFE ASSISTS
ADLS_ACUITY_SCORE: 16
DRESSING/BATHING_DIFFICULTY: NO
ADLS_ACUITY_SCORE: 15
ADLS_ACUITY_SCORE: 16
ADLS_ACUITY_SCORE: 15
EQUIPMENT_CURRENTLY_USED_AT_HOME: SHOWER CHAIR;WALKER, STANDARD
DOING_ERRANDS_INDEPENDENTLY_DIFFICULTY: OTHER (SEE COMMENTS)
ADLS_ACUITY_SCORE: 14
ADLS_ACUITY_SCORE: 16
ADLS_ACUITY_SCORE: 16
FALL_HISTORY_WITHIN_LAST_SIX_MONTHS: YES
ADLS_ACUITY_SCORE: 16
ADLS_ACUITY_SCORE: 16
ADLS_ACUITY_SCORE: 14
ADLS_ACUITY_SCORE: 16
ADLS_ACUITY_SCORE: 14
ADLS_ACUITY_SCORE: 15
ADLS_ACUITY_SCORE: 14
TOILETING_ISSUES: NO
ADLS_ACUITY_SCORE: 16
EQUIPMENT_CURRENTLY_USED_AT_HOME: WALKER, STANDARD

## 2021-01-01 ASSESSMENT — MIFFLIN-ST. JEOR
SCORE: 1662.9
SCORE: 1604.97
SCORE: 1658.88
SCORE: 1631.75
SCORE: 1605.43
SCORE: 1554.17
SCORE: 1604.52
SCORE: 1597.88
SCORE: 1578.88
SCORE: 1659.88
SCORE: 1572.32
SCORE: 1584.88
SCORE: 1640.82
SCORE: 1554.04
SCORE: 1668.88
SCORE: 1631.75
SCORE: 1663.03
SCORE: 1631.75

## 2021-01-01 ASSESSMENT — PAIN SCALES - GENERAL
PAINLEVEL: NO PAIN (0)

## 2021-01-04 NOTE — PROGRESS NOTES
Appropriate assistive devices provided during their visit. yes (Yes, No, N/A) wheelchair (list device)    Exam table and/or cart  placed in the lowest position. yes (Yes, No, N/A)    Brakes on tables/carts/wheelchairs used at all times. yes (Yes, No, N/A)    Non slip footwear applied. na (Yes, No, NA)    Patient was accompanied by staff throughout visit. na (Yes, No, N/A)    Equipment safety straps used. na (Yes, No, N/A)    Assist with toileting. na (Yes, No, N/A)

## 2021-01-04 NOTE — PROGRESS NOTES
Infusion Nursing Note:  Fuentes Estrada presents today for port access for labs and CT scan.    Patient seen by provider today: No   present during visit today: Not Applicable.    Note: port accessed, good blood return.  Labs collected, Pt to CT and returned to have port de-accessed, Flushed with saline and heparin.    Intravenous Access:  Implanted Port.    Treatment Conditions:  Not Applicable.      Post Infusion Assessment:  Blood return noted pre and post infusion.  Site patent and intact, free from redness, edema or discomfort.  No evidence of extravasations.  Access discontinued per protocol.       Discharge Plan:   Discharge instructions reviewed with: Patient.  Patient and/or family verbalized understanding of discharge instructions and all questions answered.  Patient discharged in stable condition accompanied by: self.  Departure Mode: Wheelchair.    Soniya Torres RN

## 2021-01-05 NOTE — LETTER
January 5, 2021       TO: Fuentes Estrada  [address] [place] [address]       DearMr.Sean,    We are writing to inform you of your test results.    {[facility] results letter list:[id number]}    No results found from the In Basket message.    ***

## 2021-01-05 NOTE — LETTER
1/5/2021      RE: Fuentes Estrada  [address] [place] [address]       Fuentes Estrada is a 65 year old male who is being evaluated via a billable video visit.      How would you like to obtain your AVS? MyChart  If the video visit is dropped, the invitation should be resent by: Text to cell phone: [phone]  Will anyone else be joining your video visit? No    Vitals:  No vitals were obtained today due to virtual visit.    Video-Visit Details    Type of service:  Video Visit    Video End Time:11:59 AM    Video Start Time: 11:18 AM    Originating Location (pt. Location): Home    Distant Location (provider location):  [facility]     Platform used for Video Visit: Felipe Luciano MA    *Pt needs refill on zolpidem*    January 5, 2021     Reason for Visit: seen in f/u of metastatic cholangiocarcinoma    Oncology HPI:   Fuentes Estrada is a 65 year old man with a prior hx of HTN, DM, CVA on plavix who presented with elevated LFTs in the spring of 2020. 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 On May 12, 2020 he underwent  A radical extrahepatic bile duct resection and R hepatectomy.   He had all of his disease resected with negative margins and one positive lymph node.  He had a complicated postoperative course with a bile leak and abscess that had delayed starting his adjuvant chemotherapy.       On imaging in July 2020,  he appeared to be developing increasing nodularity in the resection bed and regional lymph nodes that has led to an EUS with a fine-needle aspiration of the lymph nodes on 8/11/20,  demonstrating the presence of metastatic disease.      On 8/27/20, he initiated treatment with palliative intent Cisplatin/Gemcitabine.      His abscess/biliary drain was managed by IR. Sinograms identified a communication to the biliary tree. He failed a capping trial with development of a fever. He underwent sinograms and cavitary sclerotherapy every 2 weeks starting August  25, 2020.  Drain was removed on 11/9/2020 9/28/20: port placement     Admission to [facility] 10/9/20-10/11/20 with septic shock from infectious enterocolitis, all cultures NGTD. Chemo delayed one week for recovery.  Cis/[place] restarted 10/21.     Admission to [facility] 11/18/20-11/19/20 with cholangitis - fever 100.8, bilirubin 3.2, elevated alk phos. MRCP showed no stones or strictures. Paracentesis removed 1L of fluid, no signs of SBP. He was dismissed on a 7-day course of Flagyl and ciprofloxacin.  C5D8 was delayed due to hospitalization.     He was seen on 12/14/2020 with an interim CT scan which showed findings concerning for abscess at the operative site along the posterior aspect of liver with gas seen in this fluid collection.  Patient was also having chills, chemotherapy was held and he was started on ciprofloxacin and Flagyl.  There was further discussion about management of this recurrent abscess with Dr. Purcell, Dr. Hale and Dr. Strauss about the abscess.  It was felt that aspiration is not likely to prevent recurrence of the abscess. A biliary drain is possible, but likely to be permanent. It has been concluded an endoscopic procedure is not possible. At 12/21/20 visit with Dr. Beard, antibiotic course was extended and he finished 12/31/20. Disease on 12/11/20 was stable.     Interval history:   -Finished antibiotics 12/31, no low grade fevers since. No chills   -Overall Fuentes is feeling weak and has decreased stamina. Some days are better than others. Has to be more careful when moving around according to Robyn. No falls. Just feels like he has less energy since his short hospitalization in December.  -Appetite is poor but he is making himself eat and continues to do Boost shakes 1 per day  -No nausea or abdominal pain   -Bowels are moving  -No breathing concerns, like cough, sob or chest pain  -Robyn has noticed a soft area in his in abdomen that is protruding but soft, non tender. Wonders  "about a hernia    10 point review of systems otherwise negative     Current Outpatient Medications   Medication Sig Dispense Refill     atorvastatin (LIPITOR) 40 MG tablet Take 40 mg by mouth At Bedtime        clopidogrel (PLAVIX) 75 MG tablet Take 75 mg by mouth At Bedtime        dexamethasone (DECADRON) 4 MG tablet Take 1 tablet (4 mg) by mouth daily (with breakfast) Daily for the 3 days after chemotherapy in the morning with food 3 tablet 1     dronabinol (MARINOL) 5 MG capsule Take 1 capsule (5 mg) by mouth 2 times daily (before meals) 60 capsule 0     fenofibrate (TRIGLIDE/LOFIBRA) 160 MG tablet Take 160 mg by mouth At Bedtime        Ferrous Sulfate (IRON) 325 (65 Fe) MG tablet Take 1 tablet by mouth 3 times daily (with meals) 180 tablet 1     furosemide (LASIX) 20 MG tablet Take 20 mg by mouth every evening        glipiZIDE (GLUCOTROL) 10 MG tablet Take 10 mg by mouth At Bedtime        lisinopril (ZESTRIL) 30 MG tablet Take 30 mg by mouth every evening        LORazepam (ATIVAN) 0.5 MG tablet Take 1 tablet (0.5 mg) by mouth every 4 hours as needed (Anxiety, Nausea/Vomiting or Sleep) 30 tablet 5     magnesium gluconate 30 MG tablet Take 1 tablet (30 mg) by mouth daily 90 tablet 3     prochlorperazine (COMPAZINE) 10 MG tablet Take 0.5 tablets (5 mg) by mouth every 6 hours as needed (Nausea/Vomiting) 30 tablet 5     sildenafil (REVATIO) 20 MG tablet Take 20 mg by mouth as needed       zolpidem (AMBIEN) 10 MG tablet Take 1 tablet (10 mg) by mouth nightly as needed for sleep 30 tablet 0     ondansetron (ZOFRAN) 8 MG tablet Take 1 tablet (8 mg) by mouth every 8 hours as needed (Nausea/Vomiting) (Patient not taking: Reported on 1/5/2021) 10 tablet 5     oxyCODONE (ROXICODONE) 5 MG tablet Take 1-2 tablets (5-10 mg) by mouth every 4 hours as needed for moderate to severe pain (Patient not taking: Reported on 1/5/2021) 20 tablet 0     Allergies   Allergen Reactions     Metformin Other (See Comments)     \" I think my " "kidneys shut down\"     Exam:    There were no vitals taken for this visit.  Wt Readings from Last 4 Encounters:   01/04/21 80.3 kg (177 lb)   12/09/20 81.5 kg (179 lb 11.2 oz)   11/25/20 81.2 kg (179 lb 1.6 oz)   11/19/20 77.8 kg (171 lb 9.6 oz)     Video physical exam  General: Patient appears well in no acute distress.   Skin: No visualized rash or lesions on visualized skin  Eyes: EOMI, no erythema, sclera icterus or discharge noted  Resp: Appears to be breathing comfortably without accessory muscle usage, speaking in full sentences, no cough  MSK: Appears to have normal range of motion based on visualized movements  Neurologic: No apparent tremors, facial movements symmetric  Psych: affect good, alert and oriented    The rest of a comprehensive physical examination is deferred due to PHE (public health emergency) video restrictions    Labs:    1/4/2021 12:15   Sodium 133   Potassium 4.0   Chloride 102   Carbon Dioxide 28   Urea Nitrogen 11   Creatinine 0.86   GFR Estimate 89   GFR Estimate If Black >90   Calcium 8.0 (L)   Anion Gap 3   Magnesium 1.4 (L)   Albumin 1.8 (L)   Protein Total 6.4 (L)   Bilirubin Total 2.6 (H)   Alkaline Phosphatase 710 (H)   ALT 36    (H)   Glucose 148 (H)   WBC 6.5   Hemoglobin 8.8 (L)   Hematocrit 26.1 (L)   Platelet Count 271   RBC Count 3.01 (L)   MCV 87   MCH 29.2   MCHC 33.7   RDW 18.9 (H)   Diff Method Automated Method   % Neutrophils 65.6   % Lymphocytes 22.4   % Monocytes 9.8   % Eosinophils 0.5   % Basophils 0.6   % Immature Granulocytes 1.1   Nucleated RBCs 0   Absolute Neutrophil 4.3   Absolute Lymphocytes 1.5   Absolute Monocytes 0.6   Absolute Basophils 0.0   Abs Immature Granulocytes 0.1   Absolute Nucleated RBC 0.0       Imaging:     CT ABDOMEN PELVIS W CONTRAST 1/4/2021 1:10 PM     CLINICAL HISTORY: Cholangiocarcinoma (H). Follow-up possible abscess.     TECHNIQUE: CT scan of the abdomen and pelvis was performed following  injection of IV contrast. Multiplanar " reformats were obtained. Dose  reduction techniques were used.  CONTRAST: 86mL [id number]     COMPARISON: CT 12/11/2020     FINDINGS:   LOWER CHEST: Basilar atelectasis. Tiny right and trace left pleural  effusion.     HEPATOBILIARY: Postoperative changes posterior right hepatic lobe  consistent with partial right hepatic lobe resection. Low-attenuation  collection in the operative bed measures 35 x 21 mm. No residual gas  collection and no enhancing rind to confirm abscess. Therefore, this  may be postoperative in nature representing evolving seroma. Similar  cirrhotic configuration of the liver with portal hypertension.  Moderate to large volume of abdominal ascites, increased from prior  exam.     PANCREAS: Similar bubble of gas in the distal common duct at the  pancreatic head.     SPLEEN: Normal.     ADRENAL GLANDS: Normal.     KIDNEYS/BLADDER: Normal.     BOWEL: Small sliding hiatal hernia. Diverticulosis.     PELVIC ORGANS: Normal.     ADDITIONAL FINDINGS: Stable ventral hernia.     MUSCULOSKELETAL: Normal.                                                                      IMPRESSION:   1.  Interval decrease in size of low-attenuation collection along the  posterior right hepatic lobe surgical bed. Resolution of gas seen  previously. No convincing evidence for abscess. May represent  postoperative seroma. No evidence for recurrent hepatic malignancy.  2.  Cirrhotic configuration of the liver with portal hypertension.  Moderate to large volume of abdominal ascites, increased from prior  exam.  3.  Stable tiny right and trace left pleural effusion with concordant  basilar atelectasis.        Impression/plan:   Recurrent cholangiocarcinoma with metastatic disease  -initiated on cisplatin/gemcitabine on 8/27/20, tolerated fairly well with fatigue   -Chemotherapy has been on hold since 12/9/2020 due to abscess at the operative site. Continue to hold today and further extend antibiotic course, see below.   -CT  scan findings from 12/11/2020, which showed no clear evidence of progression     Bile leak/recurrent abscess in operative site.   -Plan of care has been discussed with Dr. Purcell, Dr. Hale and Dr. Strauss about the abscess. Aspiration is not likely to prevent recurrence of the abscess. A biliary drain is possible, but likely to be permanent. After further review, it does not seem an endoscopic procedure is doable.  Fluid collection on CT from 1/4/21 with interval decrease of fluid collection but no resolution, though not suspicious for abscess per radiology, fluid accumulation is still present. Discussed with Dr. Beard and it was felt the best thing is to do another 14 days of cipro and flagyl to try and further sterilize the area prior to consideration of initiating chemotherapy. If additional couple weeks of antibiotics does not resolve fluid collection to the teams satisfaction, then drain placement should be considered. I updated interventional radiology and surgical team on the outcome of today's visit. Additionally, I have messaged Dr. Purcell about tentatively getting this scheduled in the days following his CT in a couple weeks.  -Start another 2 weeks ciprofloxacin and Flagyl.    -Monitor closely. Reviewed risk of recurring cholangitis/abscess, signs/symptoms.  -We will follow-up in 14 days with repeat CT scan to assess response to further antibiotic treatment and hopefully better define plan    Ascites: worse on recent CT scan. last para was in November during admission. denies feeling distended or bloated today. Discussed we could arrange for this should he become symptomatic.      Anemia, likely s/t chronic disease and chemotherapy, no acute bleeding  - 12/9/20: Received 1 unit of blood transfusion with improvement of hemoglobin from 7.9-8.6. Stable at 8.8 today.      FEN:  -continue protein shakes, small frequent meals. Try increasing marinol 10 mg BID, max dose per up to date     Hypomag: s/t  cisplatin. Continue mg gluconate daily     DM   -only on glipizide currently, continue. Sugars have been okay though he is on a break from chemo.     Hx of CVA 10 years ago, has mild short term memory deficits  HTN  -on plavix  -on amlodipine, fenofibrate, lisinopril, atorvastatin    Plan:  --PT referral asap  -Start ciprofloxacin and Flagyl again for additional 14 days   -Hold chemotherapy, tentatively reschedule for 1/27/21.  --Repeat CT AP 1/19/21 with labs  -Follow-up with Leeanne 1/21/21 after repeat CT scan of abdomen and labs to assess continued response of abscess     Leslie Quiles, CNP on 1/5/2021 at 5:48 PM

## 2021-01-05 NOTE — PROGRESS NOTES
Fuentes Estrada is a 65 year old male who is being evaluated via a billable video visit.      How would you like to obtain your AVS? MyChart  If the video visit is dropped, the invitation should be resent by: Text to cell phone: [phone]  Will anyone else be joining your video visit? No    Vitals:  No vitals were obtained today due to virtual visit.    Video-Visit Details    Type of service:  Video Visit    Video End Time:11:59 AM    Video Start Time: 11:18 AM    Originating Location (pt. Location): Home    Distant Location (provider location):  [facility]     Platform used for Video Visit: Felipe Luciano MA    *Pt needs refill on zolpidem*    January 5, 2021     Reason for Visit: seen in f/u of metastatic cholangiocarcinoma    Oncology HPI:   Fuentes Estrada is a 65 year old man with a prior hx of HTN, DM, CVA on plavix who presented with elevated LFTs in the spring of 2020. 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 On May 12, 2020 he underwent  A radical extrahepatic bile duct resection and R hepatectomy.   He had all of his disease resected with negative margins and one positive lymph node.  He had a complicated postoperative course with a bile leak and abscess that had delayed starting his adjuvant chemotherapy.       On imaging in July 2020,  he appeared to be developing increasing nodularity in the resection bed and regional lymph nodes that has led to an EUS with a fine-needle aspiration of the lymph nodes on 8/11/20,  demonstrating the presence of metastatic disease.      On 8/27/20, he initiated treatment with palliative intent Cisplatin/Gemcitabine.      His abscess/biliary drain was managed by IR. Sinograms identified a communication to the biliary tree. He failed a capping trial with development of a fever. He underwent sinograms and cavitary sclerotherapy every 2 weeks starting August 25, 2020.  Drain was removed on 11/9/2020 9/28/20: port  placement     Admission to [facility] 10/9/20-10/11/20 with septic shock from infectious enterocolitis, all cultures NGTD. Chemo delayed one week for recovery.  Cis/[place] restarted 10/21.     Admission to [facility] 11/18/20-11/19/20 with cholangitis - fever 100.8, bilirubin 3.2, elevated alk phos. MRCP showed no stones or strictures. Paracentesis removed 1L of fluid, no signs of SBP. He was dismissed on a 7-day course of Flagyl and ciprofloxacin.  C5D8 was delayed due to hospitalization.     He was seen on 12/14/2020 with an interim CT scan which showed findings concerning for abscess at the operative site along the posterior aspect of liver with gas seen in this fluid collection.  Patient was also having chills, chemotherapy was held and he was started on ciprofloxacin and Flagyl.  There was further discussion about management of this recurrent abscess with Dr. Purcell, Dr. Hale and Dr. Strauss about the abscess.  It was felt that aspiration is not likely to prevent recurrence of the abscess. A biliary drain is possible, but likely to be permanent. It has been concluded an endoscopic procedure is not possible. At 12/21/20 visit with Dr. Beard, antibiotic course was extended and he finished 12/31/20. Disease on 12/11/20 was stable.     Interval history:   -Finished antibiotics 12/31, no low grade fevers since. No chills   -Overall Fuentes is feeling weak and has decreased stamina. Some days are better than others. Has to be more careful when moving around according to Robyn. No falls. Just feels like he has less energy since his short hospitalization in December.  -Appetite is poor but he is making himself eat and continues to do Boost shakes 1 per day  -No nausea or abdominal pain   -Bowels are moving  -No breathing concerns, like cough, sob or chest pain  -Robyn has noticed a soft area in his in abdomen that is protruding but soft, non tender. Wonders about a hernia    10 point review of systems otherwise  "negative     Current Outpatient Medications   Medication Sig Dispense Refill     atorvastatin (LIPITOR) 40 MG tablet Take 40 mg by mouth At Bedtime        clopidogrel (PLAVIX) 75 MG tablet Take 75 mg by mouth At Bedtime        dexamethasone (DECADRON) 4 MG tablet Take 1 tablet (4 mg) by mouth daily (with breakfast) Daily for the 3 days after chemotherapy in the morning with food 3 tablet 1     dronabinol (MARINOL) 5 MG capsule Take 1 capsule (5 mg) by mouth 2 times daily (before meals) 60 capsule 0     fenofibrate (TRIGLIDE/LOFIBRA) 160 MG tablet Take 160 mg by mouth At Bedtime        Ferrous Sulfate (IRON) 325 (65 Fe) MG tablet Take 1 tablet by mouth 3 times daily (with meals) 180 tablet 1     furosemide (LASIX) 20 MG tablet Take 20 mg by mouth every evening        glipiZIDE (GLUCOTROL) 10 MG tablet Take 10 mg by mouth At Bedtime        lisinopril (ZESTRIL) 30 MG tablet Take 30 mg by mouth every evening        LORazepam (ATIVAN) 0.5 MG tablet Take 1 tablet (0.5 mg) by mouth every 4 hours as needed (Anxiety, Nausea/Vomiting or Sleep) 30 tablet 5     magnesium gluconate 30 MG tablet Take 1 tablet (30 mg) by mouth daily 90 tablet 3     prochlorperazine (COMPAZINE) 10 MG tablet Take 0.5 tablets (5 mg) by mouth every 6 hours as needed (Nausea/Vomiting) 30 tablet 5     sildenafil (REVATIO) 20 MG tablet Take 20 mg by mouth as needed       zolpidem (AMBIEN) 10 MG tablet Take 1 tablet (10 mg) by mouth nightly as needed for sleep 30 tablet 0     ondansetron (ZOFRAN) 8 MG tablet Take 1 tablet (8 mg) by mouth every 8 hours as needed (Nausea/Vomiting) (Patient not taking: Reported on 1/5/2021) 10 tablet 5     oxyCODONE (ROXICODONE) 5 MG tablet Take 1-2 tablets (5-10 mg) by mouth every 4 hours as needed for moderate to severe pain (Patient not taking: Reported on 1/5/2021) 20 tablet 0     Allergies   Allergen Reactions     Metformin Other (See Comments)     \" I think my kidneys shut down\"     Exam:    There were no vitals taken " for this visit.  Wt Readings from Last 4 Encounters:   01/04/21 80.3 kg (177 lb)   12/09/20 81.5 kg (179 lb 11.2 oz)   11/25/20 81.2 kg (179 lb 1.6 oz)   11/19/20 77.8 kg (171 lb 9.6 oz)     Video physical exam  General: Patient appears well in no acute distress.   Skin: No visualized rash or lesions on visualized skin  Eyes: EOMI, no erythema, sclera icterus or discharge noted  Resp: Appears to be breathing comfortably without accessory muscle usage, speaking in full sentences, no cough  MSK: Appears to have normal range of motion based on visualized movements  Neurologic: No apparent tremors, facial movements symmetric  Psych: affect good, alert and oriented    The rest of a comprehensive physical examination is deferred due to PHE (public health emergency) video restrictions    Labs:    1/4/2021 12:15   Sodium 133   Potassium 4.0   Chloride 102   Carbon Dioxide 28   Urea Nitrogen 11   Creatinine 0.86   GFR Estimate 89   GFR Estimate If Black >90   Calcium 8.0 (L)   Anion Gap 3   Magnesium 1.4 (L)   Albumin 1.8 (L)   Protein Total 6.4 (L)   Bilirubin Total 2.6 (H)   Alkaline Phosphatase 710 (H)   ALT 36    (H)   Glucose 148 (H)   WBC 6.5   Hemoglobin 8.8 (L)   Hematocrit 26.1 (L)   Platelet Count 271   RBC Count 3.01 (L)   MCV 87   MCH 29.2   MCHC 33.7   RDW 18.9 (H)   Diff Method Automated Method   % Neutrophils 65.6   % Lymphocytes 22.4   % Monocytes 9.8   % Eosinophils 0.5   % Basophils 0.6   % Immature Granulocytes 1.1   Nucleated RBCs 0   Absolute Neutrophil 4.3   Absolute Lymphocytes 1.5   Absolute Monocytes 0.6   Absolute Basophils 0.0   Abs Immature Granulocytes 0.1   Absolute Nucleated RBC 0.0       Imaging:     CT ABDOMEN PELVIS W CONTRAST 1/4/2021 1:10 PM     CLINICAL HISTORY: Cholangiocarcinoma (H). Follow-up possible abscess.     TECHNIQUE: CT scan of the abdomen and pelvis was performed following  injection of IV contrast. Multiplanar reformats were obtained. Dose  reduction techniques were  used.  CONTRAST: 86mL [id number]-370     COMPARISON: CT 12/11/2020     FINDINGS:   LOWER CHEST: Basilar atelectasis. Tiny right and trace left pleural  effusion.     HEPATOBILIARY: Postoperative changes posterior right hepatic lobe  consistent with partial right hepatic lobe resection. Low-attenuation  collection in the operative bed measures 35 x 21 mm. No residual gas  collection and no enhancing rind to confirm abscess. Therefore, this  may be postoperative in nature representing evolving seroma. Similar  cirrhotic configuration of the liver with portal hypertension.  Moderate to large volume of abdominal ascites, increased from prior  exam.     PANCREAS: Similar bubble of gas in the distal common duct at the  pancreatic head.     SPLEEN: Normal.     ADRENAL GLANDS: Normal.     KIDNEYS/BLADDER: Normal.     BOWEL: Small sliding hiatal hernia. Diverticulosis.     PELVIC ORGANS: Normal.     ADDITIONAL FINDINGS: Stable ventral hernia.     MUSCULOSKELETAL: Normal.                                                                      IMPRESSION:   1.  Interval decrease in size of low-attenuation collection along the  posterior right hepatic lobe surgical bed. Resolution of gas seen  previously. No convincing evidence for abscess. May represent  postoperative seroma. No evidence for recurrent hepatic malignancy.  2.  Cirrhotic configuration of the liver with portal hypertension.  Moderate to large volume of abdominal ascites, increased from prior  exam.  3.  Stable tiny right and trace left pleural effusion with concordant  basilar atelectasis.        Impression/plan:   Recurrent cholangiocarcinoma with metastatic disease  -initiated on cisplatin/gemcitabine on 8/27/20, tolerated fairly well with fatigue   -Chemotherapy has been on hold since 12/9/2020 due to abscess at the operative site. Continue to hold today and further extend antibiotic course, see below.   -CT scan findings from 12/11/2020, which showed no clear  evidence of progression     Bile leak/recurrent abscess in operative site.   -Plan of care has been discussed with Dr. Purcell, Dr. Hale and Dr. Strauss about the abscess. Aspiration is not likely to prevent recurrence of the abscess. A biliary drain is possible, but likely to be permanent. After further review, it does not seem an endoscopic procedure is doable.  Fluid collection on CT from 1/4/21 with interval decrease of fluid collection but no resolution, though not suspicious for abscess per radiology, fluid accumulation is still present. Discussed with Dr. Beard and it was felt the best thing is to do another 14 days of cipro and flagyl to try and further sterilize the area prior to consideration of initiating chemotherapy. If additional couple weeks of antibiotics does not resolve fluid collection to the teams satisfaction, then drain placement should be considered. I updated interventional radiology and surgical team on the outcome of today's visit. Additionally, I have messaged Dr. Purcell about tentatively getting this scheduled in the days following his CT in a couple weeks.  -Start another 2 weeks ciprofloxacin and Flagyl.    -Monitor closely. Reviewed risk of recurring cholangitis/abscess, signs/symptoms.  -We will follow-up in 14 days with repeat CT scan to assess response to further antibiotic treatment and hopefully better define plan    Ascites: worse on recent CT scan. last para was in November during admission. denies feeling distended or bloated today. Discussed we could arrange for this should he become symptomatic.      Anemia, likely s/t chronic disease and chemotherapy, no acute bleeding  - 12/9/20: Received 1 unit of blood transfusion with improvement of hemoglobin from 7.9-8.6. Stable at 8.8 today.      FEN:  -continue protein shakes, small frequent meals. Try increasing marinol 10 mg BID, max dose per up to date     Hypomag: s/t cisplatin. Continue mg gluconate daily     DM   -only on  glipizide currently, continue. Sugars have been okay though he is on a break from chemo.     Hx of CVA 10 years ago, has mild short term memory deficits  HTN  -on plavix  -on amlodipine, fenofibrate, lisinopril, atorvastatin    Plan:  --PT referral asap  -Start ciprofloxacin and Flagyl again for additional 14 days   -Hold chemotherapy, tentatively reschedule for 1/27/21.  --Repeat CT AP 1/19/21 with labs  -Follow-up with Leeanne 1/21/21 after repeat CT scan of abdomen and labs to assess continued response of abscess     Leslie Quiles, CNP on 1/5/2021 at 5:48 PM

## 2021-01-05 NOTE — PROGRESS NOTES
Fuentes Estrada is a 65 year old male who is being evaluated via a billable video visit.      How would you like to obtain your AVS? MyChart  If the video visit is dropped, the invitation should be resent by: Text to cell phone: [phone]  Will anyone else be joining your video visit? No  {If patient encounters technical issues they should call [phone] :[id number]}  Vitals:  No vitals were obtained today due to virtual visit.    Video-Visit Details    Type of service:  Video Visit    Video End Time:11:59 AM    Video Start Time: 11:18 AM    Originating Location (pt. Location): Home    Distant Location (provider location):  [facility]     Platform used for Video Visit: Felipe Luciano MA    *Pt needs refill on zolpidem*    January 5, 2021     Reason for Visit: seen in f/u of metastatic cholangiocarcinoma    Oncology HPI:   Fuentes Estrada is a 65 year old man with a prior hx of HTN, DM, CVA on plavix who presented with elevated LFTs in the spring of 2020. 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 On May 12, 2020 he underwent  A radical extrahepatic bile duct resection and R hepatectomy.   He had all of his disease resected with negative margins and one positive lymph node.  He had a complicated postoperative course with a bile leak and abscess that had delayed starting his adjuvant chemotherapy.       On imaging in July 2020,  he appeared to be developing increasing nodularity in the resection bed and regional lymph nodes that has led to an EUS with a fine-needle aspiration of the lymph nodes on 8/11/20,  demonstrating the presence of metastatic disease.      On 8/27/20, he initiated treatment with palliative intent Cisplatin/Gemcitabine.      His abscess/biliary drain was managed by IR. Sinograms identified a communication to the biliary tree. He failed a capping trial with development of a fever. He underwent sinograms and cavitary sclerotherapy every 2  weeks starting August 25, 2020.  Drain was removed on 11/9/2020 9/28/20: port placement     Admission to [facility] 10/9/20-10/11/20 with septic shock from infectious enterocolitis, all cultures NGTD. Chemo delayed one week for recovery.  Cis/[place] restarted 10/21.     Admission to [facility] 11/18/20-11/19/20 with cholangitis - fever 100.8, bilirubin 3.2, elevated alk phos. MRCP showed no stones or strictures. Paracentesis removed 1L of fluid, no signs of SBP. He was dismissed on a 7-day course of Flagyl and ciprofloxacin.  C5D8 was delayed due to hospitalization.     He was seen on 12/14/2020 with an interim CT scan which showed findings concerning for abscess at the operative site along the posterior aspect of liver with gas seen in this fluid collection.  Patient was also having chills, chemotherapy was held and he was started on ciprofloxacin and Flagyl.  There was further discussion about management of this recurrent abscess with Dr. Purcell, Dr. Hale and Dr. Strauss about the abscess.  It was felt that aspiration is not likely to prevent recurrence of the abscess. A biliary drain is possible, but likely to be permanent. The team is reviewing where any endoscopic procedure would be beneficial.    Interval history:   -Finished 12/31, no low grade fevers. Occasionally was having them before.   -Overall Fuentes is feeling weak and has decreased stamina. Some days are better than others. Has to be more careful when moving around.   -Appetite is poor but he is making himself eat and continues to do Boost shakes 1 per day  -No nausea or abdominal pain  -Bowels are moving  -No breathing concerns, like cough, sob or chest pain  -Area in abdomen that is protruding in umbilicus    10 point review of systems otherwise negative     Current Outpatient Medications   Medication Sig Dispense Refill     atorvastatin (LIPITOR) 40 MG tablet Take 40 mg by mouth At Bedtime        clopidogrel (PLAVIX) 75 MG tablet Take 75 mg  "by mouth At Bedtime        dexamethasone (DECADRON) 4 MG tablet Take 1 tablet (4 mg) by mouth daily (with breakfast) Daily for the 3 days after chemotherapy in the morning with food 3 tablet 1     dronabinol (MARINOL) 5 MG capsule Take 1 capsule (5 mg) by mouth 2 times daily (before meals) 60 capsule 0     fenofibrate (TRIGLIDE/LOFIBRA) 160 MG tablet Take 160 mg by mouth At Bedtime        Ferrous Sulfate (IRON) 325 (65 Fe) MG tablet Take 1 tablet by mouth 3 times daily (with meals) 180 tablet 1     furosemide (LASIX) 20 MG tablet Take 20 mg by mouth every evening        glipiZIDE (GLUCOTROL) 10 MG tablet Take 10 mg by mouth At Bedtime        lisinopril (ZESTRIL) 30 MG tablet Take 30 mg by mouth every evening        LORazepam (ATIVAN) 0.5 MG tablet Take 1 tablet (0.5 mg) by mouth every 4 hours as needed (Anxiety, Nausea/Vomiting or Sleep) 30 tablet 5     magnesium gluconate 30 MG tablet Take 1 tablet (30 mg) by mouth daily 90 tablet 3     prochlorperazine (COMPAZINE) 10 MG tablet Take 0.5 tablets (5 mg) by mouth every 6 hours as needed (Nausea/Vomiting) 30 tablet 5     sildenafil (REVATIO) 20 MG tablet Take 20 mg by mouth as needed       zolpidem (AMBIEN) 10 MG tablet Take 1 tablet (10 mg) by mouth nightly as needed for sleep 30 tablet 0     ondansetron (ZOFRAN) 8 MG tablet Take 1 tablet (8 mg) by mouth every 8 hours as needed (Nausea/Vomiting) (Patient not taking: Reported on 1/5/2021) 10 tablet 5     oxyCODONE (ROXICODONE) 5 MG tablet Take 1-2 tablets (5-10 mg) by mouth every 4 hours as needed for moderate to severe pain (Patient not taking: Reported on 1/5/2021) 20 tablet 0     Allergies   Allergen Reactions     Metformin Other (See Comments)     \" I think my kidneys shut down\"       Exam:    There were no vitals taken for this visit.  Wt Readings from Last 4 Encounters:   01/04/21 80.3 kg (177 lb)   12/09/20 81.5 kg (179 lb 11.2 oz)   11/25/20 81.2 kg (179 lb 1.6 oz)   11/19/20 77.8 kg (171 lb 9.6 oz)     Video " physical exam  General: Patient appears well in no acute distress.   Skin: No visualized rash or lesions on visualized skin  Eyes: EOMI, no erythema, sclera icterus or discharge noted  Resp: Appears to be breathing comfortably without accessory muscle usage, speaking in full sentences, no cough  MSK: Appears to have normal range of motion based on visualized movements  Neurologic: No apparent tremors, facial movements symmetric  Psych: affect good, alert and oriented    The rest of a comprehensive physical examination is deferred due to PHE (public health emergency) video restrictions      Labs:    1/4/2021 12:15   Sodium 133   Potassium 4.0   Chloride 102   Carbon Dioxide 28   Urea Nitrogen 11   Creatinine 0.86   GFR Estimate 89   GFR Estimate If Black >90   Calcium 8.0 (L)   Anion Gap 3   Magnesium 1.4 (L)   Albumin 1.8 (L)   Protein Total 6.4 (L)   Bilirubin Total 2.6 (H)   Alkaline Phosphatase 710 (H)   ALT 36    (H)   Glucose 148 (H)   WBC 6.5   Hemoglobin 8.8 (L)   Hematocrit 26.1 (L)   Platelet Count 271   RBC Count 3.01 (L)   MCV 87   MCH 29.2   MCHC 33.7   RDW 18.9 (H)   Diff Method Automated Method   % Neutrophils 65.6   % Lymphocytes 22.4   % Monocytes 9.8   % Eosinophils 0.5   % Basophils 0.6   % Immature Granulocytes 1.1   Nucleated RBCs 0   Absolute Neutrophil 4.3   Absolute Lymphocytes 1.5   Absolute Monocytes 0.6   Absolute Basophils 0.0   Abs Immature Granulocytes 0.1   Absolute Nucleated RBC 0.0       Imaging: ***    Impression/plan:   Recurrent cholangiocarcinoma with metastatic disease  -initiated on cisplatin/gemcitabine on 8/27/20.   -tolerated fairly well.   -Chemotherapy has been on hold since 12/9/2020 due to abscess at the operative site.  He is currently on extended course of ciprofloxacin and Flagyl.   -We discussed the CT scan findings from 12/11/2020, which showed no clear evidence of progression.  Given concern for abscess we will continue to hold chemotherapy until he finishes  extra course of antibiotics, and will follow up with CT scan in 10 days.     Bile leak/recurrent abscess in operative site.   -Plan of care has been discussed with Dr. Purcell, Dr. Hale and Dr. Strauss about the abscess. Aspiration is not likely to prevent recurrence of the abscess. A biliary drain is possible, but likely to be permanent. The team is reviewing where any endoscopic procedure could be done to manage the abscess/leak.  If he has persistent abscess, we will discuss again with interventional radiology and surgical team regarding plan of care.  -Continue and complete extended course of ciprofloxacin and Flagyl.  He is currently on day 13 and has 8 more days left.  -monitor closely. Reviewed risk of recurring cholangitis/abscess, signs/symptoms.  -We will follow-up in 10 days with repeat CT scan to assess response to antibiotic treatment.     Anemia, likely s/t chronic disease and chemotherapy, no acute bleeding  - Received 1 unit of blood transfusion with improvement of hemoglobin from 7.9-8.6.     FEN:  -continue protein shakes, small frequent meals.      Hypomag: s/t cisplatin. Continue mg gluconate daily     DM   -only on glipizide currently, continue, monitor glucoses while on dex     Hx of CVA 10 years ago, has mild short term memory deficits  HTN  -on plavix  -on amlodipine, fenofibrate, lisinopril, atorvastatin     Plan:  --PT referral  -Continue and complete extended course of ciprofloxacin and Flagyl in 8 more days.  -Hold chemotherapy.  -Follow-up with DARA in 10 days with repeat CT scan of abdomen and labs to assess response of abscess or infection to extend her course of antibiotic therapy.

## 2021-01-07 PROBLEM — C24.0: Status: ACTIVE | Noted: 2020-03-27

## 2021-01-12 NOTE — TELEPHONE ENCOUNTER
Spouse Robyn called in to triage stating [facility] did not have dronabinol in stock, she called around and was told [facility] has #10 pills they can dispense today and can get the rest in tomorrow. They are paying out of pocket so ok with this. Requesting new script to [facility] today.    2 separate scripts teed up, first for #10 tabs dronabinol 5 mg to dispense 1/12, and 2nd for #80 tabs to dispense 1/13 when [facility]'s stock comes in, Robyn spoke with Jennifer at pharmacy. Routed to [facility]. Writer did speak to pharmacist Gwyn and confirmed in stock.

## 2021-01-12 NOTE — DISCHARGE INSTRUCTIONS
DME  Recommend 2 [facility] for home and community ambulation. Spoke with RN. Verbal order Read back obtained.

## 2021-01-13 NOTE — PROGRESS NOTES
[facility]        OUTPATIENT PHYSICAL THERAPY FUNCTIONAL EVALUATION  PLAN OF TREATMENT FOR OUTPATIENT REHABILITATION  (COMPLETE FOR INITIAL CLAIMS ONLY)  Patient's Last Name, First Name, M.I.  YOB: 1955  EstradaFuentes     Provider's Name   [facility]   Medical Record No.  [id number]     Start of Care Date:  01/12/21   Onset Date:  05/12/20(DOS: radical extrahepatic bile duct resection R hepatectomy)   Type:     _X__PT   ____OT  ____SLP Medical Diagnosis:  hilar cholangiocarcinoma, neoplastic malignant related fatigue      PT Diagnosis:  Decreased strength and endurance, impaired balance, and fatigue consistent with cancer sequlae and cancer treatment  Visits from SOC:  1                              __________________________________________________________________________________  Plan of Treatment/Functional Goals:  ADL retraining, balance training, gait training, strengthening, manual therapy, stretching           GOALS  Walking   Pt will ambulate 200 feet MOD I with least AD with no increase in fatigue in order to increase mobility within home   04/12/21    Toileting  Pt will perform sit to stand from toilet height chair with no assist and no UE use with good stability and no symptoms of lightheadedness or undue fatigue to increase independence with ADLs.   04/12/21    endurance/fatigue   Pt able to tolerate washing and dressing including shower with only minimal fatigue and only 45 minutes of rest or less in order to continue with day so pt is able to attend appointments with decreased difficulty.  04/12/21                                                           Therapy Frequency:  1 time/week   Predicted Duration of Therapy Intervention:  90 days     Radha Santillan, PT                                    I CERTIFY THE NEED FOR THESE SERVICES  FURNISHED UNDER        THIS PLAN OF TREATMENT AND WHILE UNDER MY CARE     (Physician co-signature of this document indicates review and certification of the therapy plan).                Certification Date From:  01/12/21   Certification Date To:  04/12/21    Referring Provider:  VICKI Mae CNP    Initial Assessment  See Epic Evaluation- Start of Care Date: 01/12/21

## 2021-01-13 NOTE — PROGRESS NOTES
01/12/21 1300   Quick Adds   Quick Adds Certification   Type of Visit Initial OP PT Evaluation       Present No   General Information   Start of Care Date 01/12/21   Referring Physician VICKI Mae, CNP   Orders Evaluate and Treat as Indicated   Order Date 11/25/20   Medical Diagnosis Hilar cholangiocarcinoma, neoplastic malignant related fatigue    Onset of illness/injury or Date of Surgery 05/12/20  (DOS: radical extrahepatic bile duct resection R hepatectomy)   Precautions/Limitations immunosuppressed   Surgical/Medical history reviewed Yes   Pertinent history of current problem (include personal factors and/or comorbidities that impact the POC) Pt is a 65 year old man with hilar cholangiocarcinoma who underwent a radical extrahepatic bile duct resection and R hepatectomy on 5/12/2020. Pt has had complicated postoperative course with bile leak and abscess delaying his chemotherapy treatment. Pt presented with metastatic disease on 8/11/20, septic shock on 10/9 and cholangitis on 11/18/20. Pt currently completing antibiotic treatment of ciproflaxcin and flagyl after abscess found on 12/14/2020. PMH remarkable for HTN, DM, CVA on Plavix. See EMR for full history. Pt's wife reports since previous two hospitalization function has decreased significantly requiring close guarding at home. Pt reports difficulty with bathing, dressing, toileting, and walking in home. Pt and wife report previous enjoyment of walking, snowblowing, fishing, and mowing the lawn.   Prior level of functional mobility Transfers;Ambulation;ADL   Transfers independent    Ambulation independent    ADL independent   Prior level of function comment Pt reports being independent with all functional mobility prior to surgery.   Diagnostic Tests CT Scan   CT Results Results   CT results IMPRESSION: 1.  Interval decrease in size of low-attenuation collection along the  posterior right hepatic lobe surgical bed. Resolution  "of gas seen  previously. No convincing evidence for abscess. May represent postoperative seroma. No evidence for recurrent hepatic malignancy.  2.  Cirrhotic configuration of the liver with portal hypertension. Moderate to large volume of abdominal ascites, increased from prior exam. 3.  Stable tiny right and trace left pleural effusion with concordant  basilar atelectasis.   Previous/Current Treatment Medication(s);Other   Improvement after medication Other  (Cipro & flagyl both have weakness muscle soreness as advers )   Other treatment Pt may be experiencing adverse drug reaction from cipro and flagyl including increased muscle weakness, low back pain and stiffness    Current Community Support Family/friend caregiver  (wife Bri)   Patient role/Employment history Retired   Living environment House/[facility]   Home/Community Accessibility Comments 2 steps to enter no stairs within home, tubshower with shower chair.    ADL Devices Shower/Tub Chair   Patient/Family Goals Statement PT reports goal to \"get me up and moving\" and to increase independence with ADLs within home.   Fall Risk Screen   Fall screen completed by PT   Have you fallen 2 or more times in the past year? Yes   Have you fallen and had an injury in the past year? Yes   Is patient a fall risk? Yes   Fall screen comments one fall due to medication, reports bruised but otherwise denies any injuries. Second fall likely due to lightheadness and denies any injuries    Abuse Screen (yes response referral indicated)   Feels Unsafe at Home or Work/School no   Feels Threatened by Someone no   Does Anyone Try to Keep You From Having Contact with Others or Doing Things Outside Your Home? no   Physical Signs of Abuse Present no   System Outcome Measures   Outcome Measures Cancer Rehab   Pain   Patient currently in pain No   Pain location low back    Pain rating   (did not give pain rating )   Pain description Discomfort;Ache   Pain description comment Pt reports " increase in pain in low back with sitting for long durations   Vitals Signs   Heart Rate 88   SpO2 98   Blood Pressure 104/69   Cognitive Status Examination   Orientation orientation to person, place and time   Level of Consciousness lethargic/somnolent   Follows Commands and Answers Questions 75% of the time   Personal Safety and Judgment intact   Memory intact   Cognitive Comment Pt endorses increased fatigue   Integumentary   Integumentary No deficits were identified   Posture   Posture Protracted shoulders;Kyphosis   Range of Motion (ROM)   ROM Quick Adds no deficits were identified   Strength   Strength Comments Functional strength is poor. standing > 1 min and functional walking were very fatiguing,.    Musculoskeletal Special Tests   Musculoskeletal Special Tests 30 second sit to stand: 4 repetitions   (Increased fatigue and required rest break upon completion )   Transfer Skills   Transfer Transfer Skill: Bed to Chair/Chair to Bed   Transfer Skill: Bed/Chair   Level of [place]: Bed/Chair contact guard   Physical/Nonphysical Assist: Bed/Chair supervision   Weighbearing Restrictions: Bed/Chair full weight-bearing   Gait   Gait Comments decreased gait speed and CGA assist for ambulation with/without front wheeled walker. Step length and pelvic girdle control increased with front wheel walker    Gait Skills   Level of [place]: Gait contact guard   Physical Assist/Nonphysical Assist: Gait supervision   Weight-Bearing Restrictions: Gait full weight-bearing   Assistive Device for Transfer: Gait rolling walker   Gait Distance 25 feet   Balance   Balance Comments see below   Balance Special Tests   Balance Special Tests Modified CTSIB Conditions   Balance Special Tests Modified CTSIB Conditions   Condition 1, seconds 30 Seconds   Condition 2, seconds 18 Seconds   Condition 4, seconds 30 Seconds   Condition 5, seconds 30 Seconds  (maybe more assist)   Modified CTSIB Comments Pt demonstrated most difficulty  with condition two and five indicating pt is visually dependent for balance tasks. Pt reported increased fatigue after each condition.    Sensory Examination   Sensory Perception no deficits were identified   Sensory Perception Comments will continue to assess   Coordination   Coordination Comments grossly WFL will continue to assess   Muscle Tone   Muscle Tone no deficits were identified   Planned Therapy Interventions   Planned Therapy Interventions ADL retraining;balance training;gait training;strengthening;manual therapy;stretching   Clinical Impression   Criteria for Skilled Therapeutic Interventions Met yes, treatment indicated   PT Diagnosis Decreased strength and endurance, impaired balance, and fatigue consistent with cancer sequlae and cancer treatment    Influenced by the following impairments Decreased lower extremity strength and endurance, impaired balance, and fatigue    Functional limitations due to impairments Decreased strength and balance limiting safety and endurance with ADLs and IADLs    Clinical Presentation Evolving/Changing   Clinical Presentation Rationale Assessment and clinical judgement    Clinical Decision Making (Complexity) Moderate complexity   Therapy Frequency 1 time/week   Predicted Duration of Therapy Intervention (days/wks) 90 days    Risk & Benefits of therapy have been explained Yes   Patient, Family & other staff in agreement with plan of care Yes   Clinical Impression Comments PPW Decreased strength and endurance, impaired balance, and fatigue consistent with cancer sequlae and cancer treatment. Pt will benefit from skilled PT for instruction in HEP for strengthening, endurance and balance training as well as manual techniques as indicated to decrease pain and increase function    GOALS   PT Eval Goals 1;2;3   Goal 1   Goal Identifier Walking    Goal Description Pt will ambulate 200 feet MOD I with least AD with no increase in fatigue in order to increase mobility within  home    Target Date 04/12/21   Goal 2   Goal Identifier Toileting   Goal Description Pt will perform sit to stand from toilet height chair with no assist and no UE use with good stability and no symptoms of lightheadedness or undue fatigue to increase independence with ADLs.    Target Date 04/12/21   Goal 3   Goal Identifier endurance/fatigue    Goal Description Pt able to tolerate washing and dressing including shower with only minimal fatigue and only 45 minutes of rest or less in order to continue with day so pt is able to attend appointments with decreased difficulty.   Target Date 04/12/21   Total Evaluation Time   PT Evelin, Moderate Complexity Minutes (28724) 50   Therapy Certification   Certification date from 01/12/21   Certification date to 04/12/21   Medical Diagnosis hilar cholangiocarcinoma, neoplastic malignant related fatigue

## 2021-01-18 NOTE — PROGRESS NOTES
RN Care Coordination Note  Incoming Call:   Received voicemail from patient's wife stating she would like to discuss Early[employer] message and upcoming CT.       Outgoing Call:   Placed return call to patient's wife. We reviewed Early[employer] message. Patient and wife are eager to have all members of patient's care team review CT tomorrow, as soon as images available. She states they are eager to know what the plan will be moving forward. She also reports they are concerned as patient has been off chemo for over a month. Writer reassured all members of his team have been involved in decision making regarding ongoing abx vs drain.     Wife voiced appreciation of call and had no further questions/concerns at this time.      Chey Arias RN, BSN, OCN   RN Care Coordinator   [facility]

## 2021-01-19 NOTE — PROGRESS NOTES
Pt here for power port access w/labs prior to CT scan.  Pt ret'd post CT for de-access. Pt tolerated procedure well and was discharged in stable condition.

## 2021-01-21 NOTE — LETTER
1/21/2021         RE: Fuentes Estrada  [address] [place] [address]        Dear Colleague,    Thank you for referring your patient, Fuentes Estrada, to the [facility]. Please see a copy of my visit note below.    Fuentes is a 65 year old who is being evaluated via a billable video visit.      How would you like to obtain your AVS? MyChart  If the video visit is dropped, the invitation should be resent by: Text to cell phone: [phone]  Will anyone else be joining your video visit? No      Vitals - Patient Reported  Systolic (Patient Reported): 107  Diastolic (Patient Reported): 70  Weight (Patient Reported): 80.3 kg (177 lb)  Pain Score: No Pain (0)    Kimmy LOUISE    Video Start Time: 1126  Video-Visit Details    Type of service:  Video Visit    Video End Time:1155    Originating Location (pt. Location): Home    Distant Location (provider location):  [facility]     Platform used for Video Visit: Doximity     Reason for Visit: seen in f/u of metastatic cholangiocarcinoam    Oncology HPI:   Fuentes Estrada is a 65 year old man with a prior hx of HTN, DM, CVA on plavix who presented with elevated LFTs in the spring of 2020. 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 On May 12, 2020 he underwent  A radical extrahepatic bile duct resection and R hepatectomy.   He had all of his disease resected with negative margins and one positive lymph node.  He had a complicated postoperative course with a bile leak and abscess that had delayed starting his adjuvant chemotherapy.       On imaging in July 2020,  he appeared to be developing increasing nodularity in the resection bed and regional lymph nodes that has led to an EUS with a fine-needle aspiration of the lymph nodes on 8/11/20,  demonstrating the presence of metastatic disease.      On 8/27/20, he initiated treatment with palliative intent Cisplatin/Gemcitabine.      His abscess/biliary  drain was managed by IR. Sinograms identified a communication to the biliary tree. He failed a capping trial with development of a fever. He underwent sinograms and cavitary sclerotherapy every 2 weeks starting August 25, 2020.  Drain was removed on 11/9/2020 9/28/20: port placement     Admission to [facility] 10/9/20-10/11/20 with septic shock from infectious enterocolitis, all cultures NGTD. Chemo delayed one week for recovery.  Cis/[place] restarted 10/21.     Admission to [facility] 11/18/20-11/19/20 with cholangitis - fever 100.8, bilirubin 3.2, elevated alk phos. MRCP showed no stones or strictures. Paracentesis removed 1L of fluid, no signs of SBP. He was dismissed on a 7-day course of Flagyl and ciprofloxacin.  C5D8 was delayed due to hospitalization.     He was seen on 12/14/2020 with an interim CT scan which showed findings concerning for abscess at the operative site along the posterior aspect of liver with gas seen in this fluid collection.  Patient was also having chills, chemotherapy was held and he was started on ciprofloxacin and Flagyl.  There was further discussion about management of this recurrent abscess with Dr. Purcell, Dr. Hale and Dr. Strauss about the abscess.  It was felt that aspiration is not likely to prevent recurrence of the abscess. A biliary drain is possible, but likely to be permanent. It has been concluded an endoscopic procedure is not possible. At 12/21/20 visit with Dr. Beard, antibiotic course was extended and he finished 12/31/20. Disease on 12/11/20 was stable.     Interval history:   Fuentes is joined on the video visit by his wife. He remains weak and tired. Wife notes little change over the past few weeks. Is using a walker, working with home PT.  Has had episodes of hypoglycemia. Was as low as 40, EMS was called and he was given glucose with improvement. Since then, still having some glucoses in the 50-60 range. Today, glucose as 74. Has also noted lower BPs in the  low 100's over 60s. Feels bloated at times. No fevers/chills, abdominal pain. Appetite is limited by the bloated feeling. Weight is stable. Bowels are regular. Urination wnl. No cough, sob, cp, palpitation.   -he's getting tired of feeling sick and weak. Wants to have a plan for the fluid leak.    Current Outpatient Medications   Medication Sig Dispense Refill     atorvastatin (LIPITOR) 40 MG tablet Take 40 mg by mouth At Bedtime        ciprofloxacin (CIPRO) 500 MG tablet Take 1 tablet (500 mg) by mouth 2 times daily 28 tablet 0     clopidogrel (PLAVIX) 75 MG tablet Take 75 mg by mouth At Bedtime        dexamethasone (DECADRON) 4 MG tablet Take 1 tablet (4 mg) by mouth daily (with breakfast) Daily for the 3 days after chemotherapy in the morning with food 3 tablet 1     dronabinol (MARINOL) 5 MG capsule Take 2 capsules in am and 1 capsule in pm (before meals) 10 capsule 0     fenofibrate (TRIGLIDE/LOFIBRA) 160 MG tablet Take 160 mg by mouth At Bedtime        Ferrous Sulfate (IRON) 325 (65 Fe) MG tablet Take 1 tablet by mouth 3 times daily (with meals) 180 tablet 1     furosemide (LASIX) 20 MG tablet Take 20 mg by mouth every evening        glipiZIDE (GLUCOTROL) 10 MG tablet Take 10 mg by mouth At Bedtime        lisinopril (ZESTRIL) 30 MG tablet Take 30 mg by mouth every evening        LORazepam (ATIVAN) 0.5 MG tablet Take 1 tablet (0.5 mg) by mouth every 4 hours as needed (Anxiety, Nausea/Vomiting or Sleep) 30 tablet 5     magnesium gluconate 30 MG tablet Take 1 tablet (30 mg) by mouth daily 90 tablet 3     metroNIDAZOLE (FLAGYL) 500 MG tablet Take 1 tablet (500 mg) by mouth 3 times daily 42 tablet 0     prochlorperazine (COMPAZINE) 10 MG tablet Take 0.5 tablets (5 mg) by mouth every 6 hours as needed (Nausea/Vomiting) 30 tablet 5     sildenafil (REVATIO) 20 MG tablet Take 20 mg by mouth as needed       zolpidem (AMBIEN) 10 MG tablet Take 1 tablet (10 mg) by mouth nightly as needed for sleep 30 tablet 0      "dronabinol (MARINOL) 5 MG capsule Take 2 capsules in am and 1 capsule in pm (before meals) 80 capsule 0     ondansetron (ZOFRAN) 8 MG tablet Take 1 tablet (8 mg) by mouth every 8 hours as needed (Nausea/Vomiting) (Patient not taking: Reported on 1/5/2021) 10 tablet 5     oxyCODONE (ROXICODONE) 5 MG tablet Take 1-2 tablets (5-10 mg) by mouth every 4 hours as needed for moderate to severe pain (Patient not taking: Reported on 1/5/2021) 20 tablet 0          Allergies   Allergen Reactions     Metformin Other (See Comments)     \" I think my kidneys shut down\"         Video physical exam  General: Patient appears in no acute distress.   Skin: No visualized rash or lesions on visualized skin  Eyes: EOMI, no erythema, sclera icterus or discharge noted  Resp: Appears to be breathing comfortably without accessory muscle usage, speaking in full sentences, no cough  MSK: Appears to have normal range of motion based on visualized movements  Neurologic: No apparent tremors, facial movements symmetric  Psych: affect pleasant, alert and oriented    The rest of a comprehensive physical examination is deferred due to PHE (public health emergency) video restrictions     There were no vitals taken for this visit.  Wt Readings from Last 4 Encounters:   01/04/21 80.3 kg (177 lb)   12/09/20 81.5 kg (179 lb 11.2 oz)   11/25/20 81.2 kg (179 lb 1.6 oz)   11/19/20 77.8 kg (171 lb 9.6 oz)         Labs: Results for SARAH PAINTING (MRN [id number]) as of 1/21/2021 11:28   Ref. Range 1/19/2021 12:38   Sodium Latest Ref Range: 133 - 144 mmol/L 132 (L)   Potassium Latest Ref Range: 3.4 - 5.3 mmol/L 3.9   Chloride Latest Ref Range: 94 - 109 mmol/L 100   Carbon Dioxide Latest Ref Range: 20 - 32 mmol/L 27   Urea Nitrogen Latest Ref Range: 7 - 30 mg/dL 13   Creatinine Latest Ref Range: 0.66 - 1.25 mg/dL 1.03   GFR Estimate Latest Ref Range: >60 mL/min/1.73_m2 74   GFR Estimate If Black Latest Ref Range: >60 mL/min/1.73_m2 86   Calcium Latest Ref Range: " 8.5 - 10.1 mg/dL 8.1 (L)   Anion Gap Latest Ref Range: 3 - 14 mmol/L 5   Albumin Latest Ref Range: 3.4 - 5.0 g/dL 1.7 (L)   Protein Total Latest Ref Range: 6.8 - 8.8 g/dL 6.6 (L)   Bilirubin Total Latest Ref Range: 0.2 - 1.3 mg/dL 2.3 (H)   Alkaline Phosphatase Latest Ref Range: 40 - 150 U/L 857 (H)   ALT Latest Ref Range: 0 - 70 U/L 32   AST Latest Ref Range: 0 - 45 U/L 103 (H)   Glucose Latest Ref Range: 70 - 99 mg/dL 132 (H)   WBC Latest Ref Range: 4.0 - 11.0 10e9/L 6.0   Hemoglobin Latest Ref Range: 13.3 - 17.7 g/dL 9.0 (L)   Hematocrit Latest Ref Range: 40.0 - 53.0 % 26.5 (L)   Platelet Count Latest Ref Range: 150 - 450 10e9/L 278   RBC Count Latest Ref Range: 4.4 - 5.9 10e12/L 3.02 (L)   MCV Latest Ref Range: 78 - 100 fl 88   MCH Latest Ref Range: 26.5 - 33.0 pg 29.8   MCHC Latest Ref Range: 31.5 - 36.5 g/dL 34.0   RDW Latest Ref Range: 10.0 - 15.0 % 19.8 (H)   Diff Method Unknown Automated Method   % Neutrophils Latest Units: % 60.2   % Lymphocytes Latest Units: % 26.3   % Monocytes Latest Units: % 11.0   % Eosinophils Latest Units: % 1.5   % Basophils Latest Units: % 0.5   % Immature Granulocytes Latest Units: % 0.5   Nucleated RBCs Latest Ref Range: 0 /100 0   Absolute Neutrophil Latest Ref Range: 1.6 - 8.3 10e9/L 3.6   Absolute Lymphocytes Latest Ref Range: 0.8 - 5.3 10e9/L 1.6   Absolute Monocytes Latest Ref Range: 0.0 - 1.3 10e9/L 0.7   Absolute Basophils Latest Ref Range: 0.0 - 0.2 10e9/L 0.0   Abs Immature Granulocytes Latest Ref Range: 0 - 0.4 10e9/L 0.0   Absolute Nucleated RBC Unknown 0.0       Imaging: CT ABDOMEN PELVIS W CONTRAST 1/19/2021 1:15 PM     CLINICAL HISTORY: Cholangiocarcinoma (H). History of bile leak.     TECHNIQUE: CT scan of the abdomen and pelvis was performed following  injection of IV contrast. Multiplanar reformats were obtained. Dose  reduction techniques were used.  CONTRAST: 85 ml Isovue- 370 mL     COMPARISON: Multiple, most recent 1/4/2021     FINDINGS:   LOWER CHEST: Small  bilateral pleural effusions and concordant basilar  compressive atelectasis.     HEPATOBILIARY: Stable post operative changes posterior right hepatic  lobe consistent with partial right hepatic lobe resection.  Low-attenuation collection posterior right hepatic lobe operative bed  now measures 4.6 x 2.5 cm, previously 3.5 x 2.1 cm. Stable cirrhotic  configuration of the liver with portal hypertension. Similar large  volume of abdominal ascites.     PANCREAS: Similar tiny gas bubble at the distal common duct at the  pancreatic head.     SPLEEN: Normal.     ADRENAL GLANDS: Normal.     KIDNEYS/BLADDER: Normal.     BOWEL: Normal.     PELVIC ORGANS: Stable prostate gland enlargement.     ADDITIONAL FINDINGS: Normal.     MUSCULOSKELETAL: Degenerative bony changes.                                                                      IMPRESSION:   1.  Stable postoperative changes posterior right hepatic lobe  consistent with partial right hepatic lobe resection. Low-attenuation  collection posterior right hepatic lobe operative bed now measures 4.6  x 2.5 cm, previously 3.5 x 2.1 cm.  2.  Stable cirrhotic configuration of the liver with portal  hypertension. Stable large volume abdominal ascites.  3.  Stable tiny gas bubble distal common bile duct at pancreatic head.  4.  Small bilateral pleural effusions with concordant basilar  compressive atelectasis, increased from prior exam.     ERNESTO HARDING MD    Impression/plan:   Recurrent abscess/ bile leak, recent hx of cholangitis, but no clear biliary obstruction noted  -s/p drain removal on 11/9/20.   -reviewed images. There has been extensive discussion between team members including Dr. Hale, Dr. Purcell, Dr. Swan,  and myself regarding a plan. Given the difficulty he had with the prior drain, would like to avoid placemen of another drain if possible. Dr. Swan is hoping to find an endoscopic solution to manage the leak to avoid placement of a  drain. -I reviewed with Fuentes and his wife. He is tired of feeling ill and tired and hoping there will be a procedure soon. Discussed that if endoscopic stent placement isn't possible, will likely need to consider replacement of the percutaneous drain.   Will request a clinic consultation with Dr. Hale and Dr. Swan to review options so he can make a more informed descision  -continue cipro/flagyl in the meantime    Recurrent cholangiocarcinoma with metastatic disease  -initiated on cisplatin/gemcitabine on 8/27/20. Off of treatment since 12/9/20 due to the abscess/fluid collection  -no evidence of cancer progression at this time. Will continue to hold chemotherapy whilst he works with the GI and surgical teams on management of the leak/abscess     Anemia,  s/t chronic disease and chemotherapy, no acute bleeding  -improving off of chemotherapy. monitor     FEN:  -continue protein shakes, small frequent meals.      Hypomag: s/t cisplatin. Continue mg gluconate daily  - Mg not checked with labs this week.      DM   -only on glipizide currently.  Having hypoglycemia likely in part related to decreased po intake but also possible interaction with glipizide and cipro causing hypoglycemia  -discussed decreasing glypizide from 10 mg to 5 mg if able to cut the pill. If not, will hold the glipizide and discuss with his PCP     Hx of CVA 10 years ago, has mild short term memory deficits  -on plavix, atorvastatin    HTN  -on  Lisinopril 30 mg with BPs running lower, ok to decrease to 15 mg and monitor closely.   -encouraged him to follow with his PCP for further adjustment    Deconditioning  -continue to work with PT  -remains weak/tired, but not significantly changed in the past few weeks    60 minutes spent on the date of the encounter doing chart review, review of test results, interpretation of tests, patient visit, documentation and discussion with other provider(s)         Again, thank you for allowing me to  participate in the care of your patient.        Sincerely,        VICKI Burt CNP

## 2021-01-21 NOTE — PROGRESS NOTES
Fuentes is a 65 year old who is being evaluated via a billable video visit.      How would you like to obtain your AVS? MyChart  If the video visit is dropped, the invitation should be resent by: Text to cell phone: [phone]  Will anyone else be joining your video visit? No      Vitals - Patient Reported  Systolic (Patient Reported): 107  Diastolic (Patient Reported): 70  Weight (Patient Reported): 80.3 kg (177 lb)  Pain Score: No Pain (0)    Kimmymeagan LOUISE    Video Start Time: 1126  Video-Visit Details    Type of service:  Video Visit    Video End Time:1155    Originating Location (pt. Location): Home    Distant Location (provider location):  [facility]     Platform used for Video Visit: DoximGoodpatch     Reason for Visit: seen in f/u of metastatic cholangiocarcinoam    Oncology HPI:   Fuentes Estrada is a 65 year old man with a prior hx of HTN, DM, CVA on plavix who presented with elevated LFTs in the spring of 2020. 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 On May 12, 2020 he underwent  A radical extrahepatic bile duct resection and R hepatectomy.   He had all of his disease resected with negative margins and one positive lymph node.  He had a complicated postoperative course with a bile leak and abscess that had delayed starting his adjuvant chemotherapy.       On imaging in July 2020,  he appeared to be developing increasing nodularity in the resection bed and regional lymph nodes that has led to an EUS with a fine-needle aspiration of the lymph nodes on 8/11/20,  demonstrating the presence of metastatic disease.      On 8/27/20, he initiated treatment with palliative intent Cisplatin/Gemcitabine.      His abscess/biliary drain was managed by IR. Sinograms identified a communication to the biliary tree. He failed a capping trial with development of a fever. He underwent sinograms and cavitary sclerotherapy every 2 weeks starting August 25, 2020.  Drain was removed on  11/9/2020 9/28/20: port placement     Admission to [facility] 10/9/20-10/11/20 with septic shock from infectious enterocolitis, all cultures NGTD. Chemo delayed one week for recovery.  Cis/[place] restarted 10/21.     Admission to [facility] 11/18/20-11/19/20 with cholangitis - fever 100.8, bilirubin 3.2, elevated alk phos. MRCP showed no stones or strictures. Paracentesis removed 1L of fluid, no signs of SBP. He was dismissed on a 7-day course of Flagyl and ciprofloxacin.  C5D8 was delayed due to hospitalization.     He was seen on 12/14/2020 with an interim CT scan which showed findings concerning for abscess at the operative site along the posterior aspect of liver with gas seen in this fluid collection.  Patient was also having chills, chemotherapy was held and he was started on ciprofloxacin and Flagyl.  There was further discussion about management of this recurrent abscess with Dr. Purcell, Dr. Hale and Dr. Strauss about the abscess.  It was felt that aspiration is not likely to prevent recurrence of the abscess. A biliary drain is possible, but likely to be permanent. It has been concluded an endoscopic procedure is not possible. At 12/21/20 visit with Dr. Beard, antibiotic course was extended and he finished 12/31/20. Disease on 12/11/20 was stable.     Interval history:   Fuentes is joined on the video visit by his wife. He remains weak and tired. Wife notes little change over the past few weeks. Is using a walker, working with home PT.  Has had episodes of hypoglycemia. Was as low as 40, EMS was called and he was given glucose with improvement. Since then, still having some glucoses in the 50-60 range. Today, glucose as 74. Has also noted lower BPs in the low 100's over 60s. Feels bloated at times. No fevers/chills, abdominal pain. Appetite is limited by the bloated feeling. Weight is stable. Bowels are regular. Urination wnl. No cough, sob, cp, palpitation.   -he's getting tired of feeling sick and  weak. Wants to have a plan for the fluid leak.    Current Outpatient Medications   Medication Sig Dispense Refill     atorvastatin (LIPITOR) 40 MG tablet Take 40 mg by mouth At Bedtime        ciprofloxacin (CIPRO) 500 MG tablet Take 1 tablet (500 mg) by mouth 2 times daily 28 tablet 0     clopidogrel (PLAVIX) 75 MG tablet Take 75 mg by mouth At Bedtime        dexamethasone (DECADRON) 4 MG tablet Take 1 tablet (4 mg) by mouth daily (with breakfast) Daily for the 3 days after chemotherapy in the morning with food 3 tablet 1     dronabinol (MARINOL) 5 MG capsule Take 2 capsules in am and 1 capsule in pm (before meals) 10 capsule 0     fenofibrate (TRIGLIDE/LOFIBRA) 160 MG tablet Take 160 mg by mouth At Bedtime        Ferrous Sulfate (IRON) 325 (65 Fe) MG tablet Take 1 tablet by mouth 3 times daily (with meals) 180 tablet 1     furosemide (LASIX) 20 MG tablet Take 20 mg by mouth every evening        glipiZIDE (GLUCOTROL) 10 MG tablet Take 10 mg by mouth At Bedtime        lisinopril (ZESTRIL) 30 MG tablet Take 30 mg by mouth every evening        LORazepam (ATIVAN) 0.5 MG tablet Take 1 tablet (0.5 mg) by mouth every 4 hours as needed (Anxiety, Nausea/Vomiting or Sleep) 30 tablet 5     magnesium gluconate 30 MG tablet Take 1 tablet (30 mg) by mouth daily 90 tablet 3     metroNIDAZOLE (FLAGYL) 500 MG tablet Take 1 tablet (500 mg) by mouth 3 times daily 42 tablet 0     prochlorperazine (COMPAZINE) 10 MG tablet Take 0.5 tablets (5 mg) by mouth every 6 hours as needed (Nausea/Vomiting) 30 tablet 5     sildenafil (REVATIO) 20 MG tablet Take 20 mg by mouth as needed       zolpidem (AMBIEN) 10 MG tablet Take 1 tablet (10 mg) by mouth nightly as needed for sleep 30 tablet 0     dronabinol (MARINOL) 5 MG capsule Take 2 capsules in am and 1 capsule in pm (before meals) 80 capsule 0     ondansetron (ZOFRAN) 8 MG tablet Take 1 tablet (8 mg) by mouth every 8 hours as needed (Nausea/Vomiting) (Patient not taking: Reported on 1/5/2021)  "10 tablet 5     oxyCODONE (ROXICODONE) 5 MG tablet Take 1-2 tablets (5-10 mg) by mouth every 4 hours as needed for moderate to severe pain (Patient not taking: Reported on 1/5/2021) 20 tablet 0          Allergies   Allergen Reactions     Metformin Other (See Comments)     \" I think my kidneys shut down\"         Video physical exam  General: Patient appears in no acute distress.   Skin: No visualized rash or lesions on visualized skin  Eyes: EOMI, no erythema, sclera icterus or discharge noted  Resp: Appears to be breathing comfortably without accessory muscle usage, speaking in full sentences, no cough  MSK: Appears to have normal range of motion based on visualized movements  Neurologic: No apparent tremors, facial movements symmetric  Psych: affect pleasant, alert and oriented    The rest of a comprehensive physical examination is deferred due to PHE (public health emergency) video restrictions     There were no vitals taken for this visit.  Wt Readings from Last 4 Encounters:   01/04/21 80.3 kg (177 lb)   12/09/20 81.5 kg (179 lb 11.2 oz)   11/25/20 81.2 kg (179 lb 1.6 oz)   11/19/20 77.8 kg (171 lb 9.6 oz)         Labs: Results for SARAH PAINTING (MRN [id number]) as of 1/21/2021 11:28   Ref. Range 1/19/2021 12:38   Sodium Latest Ref Range: 133 - 144 mmol/L 132 (L)   Potassium Latest Ref Range: 3.4 - 5.3 mmol/L 3.9   Chloride Latest Ref Range: 94 - 109 mmol/L 100   Carbon Dioxide Latest Ref Range: 20 - 32 mmol/L 27   Urea Nitrogen Latest Ref Range: 7 - 30 mg/dL 13   Creatinine Latest Ref Range: 0.66 - 1.25 mg/dL 1.03   GFR Estimate Latest Ref Range: >60 mL/min/1.73_m2 74   GFR Estimate If Black Latest Ref Range: >60 mL/min/1.73_m2 86   Calcium Latest Ref Range: 8.5 - 10.1 mg/dL 8.1 (L)   Anion Gap Latest Ref Range: 3 - 14 mmol/L 5   Albumin Latest Ref Range: 3.4 - 5.0 g/dL 1.7 (L)   Protein Total Latest Ref Range: 6.8 - 8.8 g/dL 6.6 (L)   Bilirubin Total Latest Ref Range: 0.2 - 1.3 mg/dL 2.3 (H)   Alkaline " Phosphatase Latest Ref Range: 40 - 150 U/L 857 (H)   ALT Latest Ref Range: 0 - 70 U/L 32   AST Latest Ref Range: 0 - 45 U/L 103 (H)   Glucose Latest Ref Range: 70 - 99 mg/dL 132 (H)   WBC Latest Ref Range: 4.0 - 11.0 10e9/L 6.0   Hemoglobin Latest Ref Range: 13.3 - 17.7 g/dL 9.0 (L)   Hematocrit Latest Ref Range: 40.0 - 53.0 % 26.5 (L)   Platelet Count Latest Ref Range: 150 - 450 10e9/L 278   RBC Count Latest Ref Range: 4.4 - 5.9 10e12/L 3.02 (L)   MCV Latest Ref Range: 78 - 100 fl 88   MCH Latest Ref Range: 26.5 - 33.0 pg 29.8   MCHC Latest Ref Range: 31.5 - 36.5 g/dL 34.0   RDW Latest Ref Range: 10.0 - 15.0 % 19.8 (H)   Diff Method Unknown Automated Method   % Neutrophils Latest Units: % 60.2   % Lymphocytes Latest Units: % 26.3   % Monocytes Latest Units: % 11.0   % Eosinophils Latest Units: % 1.5   % Basophils Latest Units: % 0.5   % Immature Granulocytes Latest Units: % 0.5   Nucleated RBCs Latest Ref Range: 0 /100 0   Absolute Neutrophil Latest Ref Range: 1.6 - 8.3 10e9/L 3.6   Absolute Lymphocytes Latest Ref Range: 0.8 - 5.3 10e9/L 1.6   Absolute Monocytes Latest Ref Range: 0.0 - 1.3 10e9/L 0.7   Absolute Basophils Latest Ref Range: 0.0 - 0.2 10e9/L 0.0   Abs Immature Granulocytes Latest Ref Range: 0 - 0.4 10e9/L 0.0   Absolute Nucleated RBC Unknown 0.0       Imaging: CT ABDOMEN PELVIS W CONTRAST 1/19/2021 1:15 PM     CLINICAL HISTORY: Cholangiocarcinoma (H). History of bile leak.     TECHNIQUE: CT scan of the abdomen and pelvis was performed following  injection of IV contrast. Multiplanar reformats were obtained. Dose  reduction techniques were used.  CONTRAST: 85 ml Isovue- 370 mL     COMPARISON: Multiple, most recent 1/4/2021     FINDINGS:   LOWER CHEST: Small bilateral pleural effusions and concordant basilar  compressive atelectasis.     HEPATOBILIARY: Stable post operative changes posterior right hepatic  lobe consistent with partial right hepatic lobe resection.  Low-attenuation collection posterior  right hepatic lobe operative bed  now measures 4.6 x 2.5 cm, previously 3.5 x 2.1 cm. Stable cirrhotic  configuration of the liver with portal hypertension. Similar large  volume of abdominal ascites.     PANCREAS: Similar tiny gas bubble at the distal common duct at the  pancreatic head.     SPLEEN: Normal.     ADRENAL GLANDS: Normal.     KIDNEYS/BLADDER: Normal.     BOWEL: Normal.     PELVIC ORGANS: Stable prostate gland enlargement.     ADDITIONAL FINDINGS: Normal.     MUSCULOSKELETAL: Degenerative bony changes.                                                                      IMPRESSION:   1.  Stable postoperative changes posterior right hepatic lobe  consistent with partial right hepatic lobe resection. Low-attenuation  collection posterior right hepatic lobe operative bed now measures 4.6  x 2.5 cm, previously 3.5 x 2.1 cm.  2.  Stable cirrhotic configuration of the liver with portal  hypertension. Stable large volume abdominal ascites.  3.  Stable tiny gas bubble distal common bile duct at pancreatic head.  4.  Small bilateral pleural effusions with concordant basilar  compressive atelectasis, increased from prior exam.     ERNESTO HARDING MD    Impression/plan:   Recurrent abscess/ bile leak, recent hx of cholangitis, but no clear biliary obstruction noted  -s/p drain removal on 11/9/20.   -reviewed images. There has been extensive discussion between team members including Dr. Hale, Dr. Purcell, Dr. Swan,  and myself regarding a plan. Given the difficulty he had with the prior drain, would like to avoid placemen of another drain if possible. Dr. Swan is hoping to find an endoscopic solution to manage the leak to avoid placement of a drain. -I reviewed with Fuentes and his wife. He is tired of feeling ill and tired and hoping there will be a procedure soon. Discussed that if endoscopic stent placement isn't possible, will likely need to consider replacement of the percutaneous drain.    Will request a clinic consultation with Dr. Hale and Dr. Swan to review options so he can make a more informed descision  -continue cipro/flagyl in the meantime    Recurrent cholangiocarcinoma with metastatic disease  -initiated on cisplatin/gemcitabine on 8/27/20. Off of treatment since 12/9/20 due to the abscess/fluid collection  -no evidence of cancer progression at this time. Will continue to hold chemotherapy whilst he works with the GI and surgical teams on management of the leak/abscess     Anemia,  s/t chronic disease and chemotherapy, no acute bleeding  -improving off of chemotherapy. monitor     FEN:  -continue protein shakes, small frequent meals.      Hypomag: s/t cisplatin. Continue mg gluconate daily  - Mg not checked with labs this week.      DM   -only on glipizide currently.  Having hypoglycemia likely in part related to decreased po intake but also possible interaction with glipizide and cipro causing hypoglycemia  -discussed decreasing glypizide from 10 mg to 5 mg if able to cut the pill. If not, will hold the glipizide and discuss with his PCP     Hx of CVA 10 years ago, has mild short term memory deficits  -on plavix, atorvastatin    HTN  -on  Lisinopril 30 mg with BPs running lower, ok to decrease to 15 mg and monitor closely.   -encouraged him to follow with his PCP for further adjustment    Deconditioning  -continue to work with PT  -remains weak/tired, but not significantly changed in the past few weeks    60 minutes spent on the date of the encounter doing chart review, review of test results, interpretation of tests, patient visit, documentation and discussion with other provider(s)

## 2021-01-22 PROBLEM — C22.1 CHOLANGIOCARCINOMA (H): Status: ACTIVE | Noted: 2021-01-01

## 2021-01-27 NOTE — PROGRESS NOTES
ESTABLISHED PATIENT      REASON FOR EVALUATION:  Recurring abdominal abscess, concerning for possible bile leak.      HISTORY OF PRESENT ILLNESS:  Mr. Estrada is a 65-year-old gentleman who had a right hepatectomy with a radical extrahepatic bile duct resection for hilar cholangiocarcinoma in 05/2020.  Surgery was quite complicated because of the extent of his disease and his postoperative course was complicated by a bile leak.  This ultimately turned out to be quite protracted requiring long-term external abdominal drainage.  In the meantime, this was further complicated by the fact that he developed essentially rapid recurrence of his cancer with peritoneal metastases.  A few months ago, the abdominal drain was able to be removed.  However, at the end of 2020, Mr. Estrada developed fevers and was found to have small fluid collection adjacent to the cut surface of his remnant liver.  He was started on antibiotics and his fevers defervesced.  He has had 3 CT scans over the course of the last 2 months, which have shown essentially stable amount of fluid, which is somewhat indeterminate in nature but felt to be consistent with an abscess based on his previous symptoms.  He is currently on antibiotics for that.  His chemotherapy has been held.      Mr. Estrada presents with his wife today in a wheelchair just because of overall weakness due to his malignancy.  He does have ascites, which has led to a small hernia in his mid abdomen which protrudes with fluid.  He has known peritoneal metastases from his cholangiocarcinoma.  He has had some difficulty with hypoglycemia, hypotension, as well as fatigue and dizziness, and so overall, he is feeling quite weak.      I had a long discussion with Fuentes and his wife today regarding the fluid collection adjacent to the liver and the difficulty with treating it.  We are under the assumption that this is a bile leak, but it is a very unusual situation because he had an extended  drain capping trial several months ago prior to removal of the drain and really had no output or concerns.  In any case, given the overall situation as well as the overall status of his cancer, I discussed with Fuentes that my inclination would be to avoid external drainage unless it becomes an absolute necessity.  Currently, he is afebrile and really asymptomatic from this process.  I do believe that if we could come up with a better alternative such as internal drainage with ERCP or other techniques that would certainly be preferable to an externalized drain, which would likely end up being permanent given his overall condition.  The patient is actually scheduled with Dr. Chiki Swan who will make an effort to internally drain this collection via ERCP next week.      I indicated to Mr. Estrada and his wife that really there is no surgical intervention that can be done for this and I recommended against any such efforts.  I am hopeful that we can come to a solution either of ongoing palliative care as this is asymptomatic versus internalized drainage.  As I said, I would save any external drains as a last resort.  Mr. Estrada was in full agreement with that plan.  I discussed that I would be happy to follow along with his care after Dr. Swan takes a look next week and we can plan future efforts depending on results of that procedure.  Mr. Estrada and his wife were quite satisfied with that plan.  I did show them at least his last 3 CT scans dating back to the beginning of 12/2020.        I spent 45 minutes with Mr. Estrada and his wife today and additional 15 minutes was spent in pre-visit preparation review of his numerous imaging studies as well as conversations with his other care providers including Dr. Purcell and Dr. Swan.

## 2021-01-27 NOTE — LETTER
1/27/2021         RE: Fuentes Estrada  [address]  [place] [address]        Dear Colleague,    Thank you for referring your patient, Fuentes Estrada, to the [facility]. Please see a copy of my visit note below.    ESTABLISHED PATIENT      REASON FOR EVALUATION:  Recurring abdominal abscess, concerning for possible bile leak.      HISTORY OF PRESENT ILLNESS:  Mr. Estrada is a 65-year-old gentleman who had a right hepatectomy with a radical extrahepatic bile duct resection for hilar cholangiocarcinoma in 05/2020.  Surgery was quite complicated because of the extent of his disease and his postoperative course was complicated by a bile leak.  This ultimately turned out to be quite protracted requiring long-term external abdominal drainage.  In the meantime, this was further complicated by the fact that he developed essentially rapid recurrence of his cancer with peritoneal metastases.  A few months ago, the abdominal drain was able to be removed.  However, at the end of 2020, Mr. Estrada developed fevers and was found to have small fluid collection adjacent to the cut surface of his remnant liver.  He was started on antibiotics and his fevers defervesced.  He has had 3 CT scans over the course of the last 2 months, which have shown essentially stable amount of fluid, which is somewhat indeterminate in nature but felt to be consistent with an abscess based on his previous symptoms.  He is currently on antibiotics for that.  His chemotherapy has been held.      Mr. Estrada presents with his wife today in a wheelchair just because of overall weakness due to his malignancy.  He does have ascites, which has led to a small hernia in his mid abdomen which protrudes with fluid.  He has known peritoneal metastases from his cholangiocarcinoma.  He has had some difficulty with hypoglycemia, hypotension, as well as fatigue and dizziness, and so overall, he is feeling quite weak.      I had a long  discussion with Fuentes and his wife today regarding the fluid collection adjacent to the liver and the difficulty with treating it.  We are under the assumption that this is a bile leak, but it is a very unusual situation because he had an extended drain capping trial several months ago prior to removal of the drain and really had no output or concerns.  In any case, given the overall situation as well as the overall status of his cancer, I discussed with Fuentes that my inclination would be to avoid external drainage unless it becomes an absolute necessity.  Currently, he is afebrile and really asymptomatic from this process.  I do believe that if we could come up with a better alternative such as internal drainage with ERCP or other techniques that would certainly be preferable to an externalized drain, which would likely end up being permanent given his overall condition.  The patient is actually scheduled with Dr. Chiki Swan who will make an effort to internally drain this collection via ERCP next week.      I indicated to Mr. Estrada and his wife that really there is no surgical intervention that can be done for this and I recommended against any such efforts.  I am hopeful that we can come to a solution either of ongoing palliative care as this is asymptomatic versus internalized drainage.  As I said, I would save any external drains as a last resort.  Mr. Estrada was in full agreement with that plan.  I discussed that I would be happy to follow along with his care after Dr. Swan takes a look next week and we can plan future efforts depending on results of that procedure.  Mr. Estrada and his wife were quite satisfied with that plan.  I did show them at least his last 3 CT scans dating back to the beginning of 12/2020.        I spent 45 minutes with Mr. Estrada and his wife today and additional 15 minutes was spent in pre-visit preparation review of his numerous imaging studies as well as conversations  with his other care providers including Dr. Purcell and Dr. Swan.           Again, thank you for allowing me to participate in the care of your patient.        Sincerely,        Oswaldo Hale MD

## 2021-01-27 NOTE — NURSING NOTE
"Oncology Rooming Note    January 27, 2021 9:00 AM   Fuentes Estrada is a 65 year old male who presents for:    Chief Complaint   Patient presents with     Oncology Clinic Visit     Return; Hilar Cholangiocarcinoma     Initial Vitals: /78   Pulse 93   Temp 97.9  F (36.6  C) (Oral)   Wt 80.3 kg (177 lb)   SpO2 99%   BMI 23.35 kg/m   Estimated body mass index is 23.35 kg/m  as calculated from the following:    Height as of 11/25/20: 1.854 m (6' 1\").    Weight as of this encounter: 80.3 kg (177 lb). Body surface area is 2.03 meters squared.  No Pain (0) Comment: Data Unavailable   No LMP for male patient.  Allergies reviewed: Yes  Medications reviewed: Yes    Medications: Medication refills not needed today.  Pharmacy name entered into [facility]:    [facility] - [place], [place] - [address] [facility] [address] [place] [address] - [address] [facility] [place], [place] - [address] Tanya Cee, [facility]              "

## 2021-01-28 NOTE — TELEPHONE ENCOUNTER
Per Dr Hale's request, reached out to patient/wife to answer questions prior to procedure.     Is having preop today, covid test Saturday in [place]. Wife is concerned about hypoglycemia, recent low at 40. Notes if no snack in the middle of the night, patients blood sugar low (50-60) in the morning. Also having low blood pressure. Advised patient cannot be admitted the night before.     Reviewed NPO guidelines, encouraged use of apple juice and other clears as needed. Encouraged patient to stay locally 24 hours after procedure. Is holding plavix prior to procedure on 2/2, Questions answered.     ML

## 2021-02-01 PROBLEM — E86.0 DEHYDRATION: Status: ACTIVE | Noted: 2021-01-01

## 2021-02-01 PROBLEM — E46 MALNUTRITION, UNSPECIFIED TYPE (H): Status: ACTIVE | Noted: 2021-01-01

## 2021-02-01 NOTE — ED TRIAGE NOTES
Pt arrives to triage with wife. Pt is to have scheduled ERCP tomorrow. Nausea and vomiting over the weekend. Urgent care over the weekend for dehydration. Weak in the legs all weekend. Pt uses walker at home. Wife answering all questions in triage, pt didn't know why he was coming to the ER. Vitals stable.

## 2021-02-01 NOTE — ED PROVIDER NOTES
[place] EMERGENCY DEPARTMENT ([facility])  2/01/21  History   No chief complaint on file.    The history is provided by the patient, the spouse and medical records.     Fuentes Estrada is a 65 year old male with a past medical history significant for metastatic cholangiocarcinoma s/p right hepatectomy and radical extrahepatic bile duct resection (5/2020) located by bile leak and rapid recurrence with peritoneal metastases, type 2 diabetes mellitus, hypertension and CVA (on Plavix).  Per review of patient's chart, patient was recently found to have a recurring abdominal abscess that was concerning for possible bile leak.  Patient has been on Flagyl and Cipro for approximately 2 months now, but the fluid collection has remained stable.  Patient was seen in the surgery clinic on 1/27/2021 and plan was made for patient to undergo an ERCP with internal drainage tomorrow.    Patient presents to the Emergency Department today for evaluation of generalized weakness, nausea and vomiting.  The patient's wife reports that the patient was doing well up until 1/29/2021.  The wife reports that the patient began having difficulty keeping anything p.o. down since that time.  The patient was seen in an urgent care clinic on 1/30/2021 and he received IV fluids, Compazine and Zofran.  The wife reports that this helped with his nausea and vomiting.  However, the patient ate 2 bites of his cereal this morning for breakfast and vomited it back up.  The patient was able to take Zofran after this.  The patient reports that he has not had any nausea since this morning, but he has not been eating or drinking anything today.  The wife also notes that the patient has not taken any of his medications over the last 2 days as she was concerned about the patient taking these medications on an empty stomach.  The patient has not had any fevers and the patient denies any pain.  The wife reports that the patient is not currently on  chemotherapy treatment as they have been dealing with this fluid collection.  The wife reports that over the last few months the patient has been getting more and more generally weak.  The wife believes that this may be due to his blood glucose levels.  The wife reports that over the past week the patient has not taken his glipizide to see if this would help with his blood glucose and generalized weakness.  The wife contacted the nurse triage line today and they were recommended to come into the Emergency Department for further evaluation and management.    I have reviewed the Medications, Allergies, Past Medical and Surgical History, and Social History in the [employer] system.  PAST MEDICAL HISTORY:   Past Medical History:   Diagnosis Date     Cerebrovascular accident (CVA) (H) 12/2010     Cholangiocarcinoma (H)      Diabetes (H)      Essential hypertension, benign     Hypertension, Benign     Nonspecific abnormal results of liver function study     Hx of elevated LFT's       PAST SURGICAL HISTORY:   Past Surgical History:   Procedure Laterality Date     ENDOSCOPIC RETROGRADE CHOLANGIOPANCREATOGRAM N/A 3/31/2020    Procedure: ENDOSCOPIC RETROGRADE CHOLANGIOPANCREATOGRAPHY, WITH with biliary sphincterotomy, biopsies and brushings, biliary stent placement;  Surgeon: Win Strauss MD;  [facility]     ENDOSCOPIC RETROGRADE CHOLANGIOPANCREATOGRAM N/A 4/6/2020    Procedure: ENDOSCOPIC RETROGRADE CHOLANGIOPANCREATOGRAPHY;  Surgeon: Win Strauss MD;  [facility]     ENDOSCOPIC RETROGRADE CHOLANGIOPANCREATOGRAM WITH SPYGLASS  4/16/2020    Procedure: ENDOSCOPIC RETROGRADE CHOLANGIOPANCREATOGRAPHY, WITH DIRECT DUCT VISUALIZATION, USING PANCREATICOBILIARY FIBEROPTIC PROBE; Bile Duct Stent Exchange and Biopsy,balloon sweep of bile duct;  Surgeon: Win Strauss MD;  [facility]     ENDOSCOPIC ULTRASOUND UPPER GASTROINTESTINAL TRACT (GI) N/A 4/16/2020    Procedure: ENDOSCOPIC ULTRASOUND,  ESOPHAGOSCOPY / UPPER GASTROINTESTINAL TRACT (GI)with Fine Needle biopsy;  Surgeon: Cody Baumann MD;  [facility]     ENDOSCOPIC ULTRASOUND UPPER GASTROINTESTINAL TRACT (GI) N/A 8/11/2020    Procedure: ENDOSCOPIC ULTRASOUND, ESOPHAGOSCOPY / UPPER GASTROINTESTINAL TRACT (GI);  Surgeon: Guru Bailey Rossi MD;  [facility]     ESOPHAGOSCOPY, GASTROSCOPY, DUODENOSCOPY (EGD), COMBINED N/A 4/6/2020    Procedure: ESOPHAGOGASTRODUODENOSCOPY (EGD);  Surgeon: Win Strauss MD;  [facility]     ESOPHAGOSCOPY, GASTROSCOPY, DUODENOSCOPY (EGD), COMBINED N/A 8/11/2020    Procedure: Esophagogastroduodenoscopy, With Fine Needle Aspiration Biopsy, With Endoscopic Ultrasound Guidance;  Surgeon: Guru Bailey Rossi MD;  [facility]     EXPLORE COMMON BILE DUCT N/A 5/12/2020    Procedure: extra-hepatic bile duct resection;  Surgeon: Oswaldo Hale MD;  [facility]     HC KNEE SCOPE, DIAGNOSTIC  1995    Arthroscopy, Knee; left     HC VASECTOMY UNILAT/BILAT W POSTOP SEMEN      Vasectomy     HEPATECTOMY PARTIAL N/A 5/12/2020    Procedure: Exploratory Laparotomy, Open right hepatectomy, Radical Extra-Hepatic Bile Duct Resection, Portal Lymph Node Dissection, Intraoperative Ultrasound, Intra Air-Cholangiogram ,Bianca-En-Y Left Hepaticojejunostomy, Excision Skin Lesion;  Surgeon: Oswaldo Hale MD;  [facility]     INSERT PORT VASCULAR ACCESS Right 9/28/2020    Procedure: ultrasound guided right internal venous access port placement with flouroscopy;  Surgeon: Fercho Wayne DO;  [facility]     IR ABSCESS TUBE CHANGE  6/12/2020     IR FOLLOW UP VISIT OUTPATIENT  7/24/2020     IR FOLLOW UP VISIT OUTPATIENT  8/3/2020     IR FOLLOW UP VISIT OUTPATIENT  10/19/2020     IR SINOGRAM INJECTION DIAGNOSTIC  7/14/2020     IR SINOGRAM INJECTION DIAGNOSTIC  9/14/2020     IR SINOGRAM INJECTION DIAGNOSTIC  10/13/2020     IR SINOGRAM INJECTION THERAPEUTIC   8/25/2020     IR SINOGRAM INJECTION THERAPEUTIC  9/25/2020     IR SINOGRAM INJECTION THERAPEUTIC  10/2/2020     IR SINOGRAM INJECTION THERAPEUTIC  9/18/2020     IR THORACENTESIS  5/16/2020     LYMPHADENECTOMY ABDOMINAL N/A 5/12/2020    Procedure: portal lymph node dissection, intraoperative liver ultrasound;  Surgeon: Oswaldo Hale MD;  [facility]       Past medical history, past surgical history, medications, and allergies were reviewed with the patient. Additional pertinent items: None    FAMILY HISTORY:   Family History   Problem Relation Age of Onset     Arthritis Mother         RA     Diabetes Father         diet controlled     Hypertension Father        SOCIAL HISTORY:   Social History     Tobacco Use     Smoking status: Never Smoker     Smokeless tobacco: Never Used   Substance Use Topics     Alcohol use: Yes     Comment: occaisional      Social history was reviewed with the patient. Additional pertinent items: None      Patient's Medications   New Prescriptions    No medications on file   Previous Medications    ATORVASTATIN (LIPITOR) 40 MG TABLET    Take 40 mg by mouth At Bedtime     CIPROFLOXACIN (CIPRO) 500 MG TABLET    Take 1 tablet (500 mg) by mouth 2 times daily    CLOPIDOGREL (PLAVIX) 75 MG TABLET    Take 75 mg by mouth At Bedtime     DEXAMETHASONE (DECADRON) 4 MG TABLET    Take 1 tablet (4 mg) by mouth daily (with breakfast) Daily for the 3 days after chemotherapy in the morning with food    DRONABINOL (MARINOL) 5 MG CAPSULE    Take 2 capsules in am and 1 capsule in pm (before meals)    DRONABINOL (MARINOL) 5 MG CAPSULE    Take 2 capsules in am and 1 capsule in pm (before meals)    FENOFIBRATE (TRIGLIDE/LOFIBRA) 160 MG TABLET    Take 160 mg by mouth At Bedtime     FERROUS SULFATE (IRON) 325 (65 FE) MG TABLET    Take 1 tablet by mouth 3 times daily (with meals)    FUROSEMIDE (LASIX) 20 MG TABLET    Take 20 mg by mouth every evening     GLIPIZIDE (GLUCOTROL) 10 MG TABLET    Take 10 mg by  "mouth At Bedtime     LISINOPRIL (ZESTRIL) 30 MG TABLET    Take 30 mg by mouth every evening     LORAZEPAM (ATIVAN) 0.5 MG TABLET    Take 1 tablet (0.5 mg) by mouth every 4 hours as needed (Anxiety, Nausea/Vomiting or Sleep)    MAGNESIUM GLUCONATE 30 MG TABLET    Take 1 tablet (30 mg) by mouth daily    METRONIDAZOLE (FLAGYL) 500 MG TABLET    Take 1 tablet (500 mg) by mouth 3 times daily    ONDANSETRON (ZOFRAN) 8 MG TABLET    Take 1 tablet (8 mg) by mouth every 8 hours as needed (Nausea/Vomiting)    OXYCODONE (ROXICODONE) 5 MG TABLET    Take 1-2 tablets (5-10 mg) by mouth every 4 hours as needed for moderate to severe pain    PROCHLORPERAZINE (COMPAZINE) 10 MG TABLET    Take 0.5 tablets (5 mg) by mouth every 6 hours as needed (Nausea/Vomiting)    SILDENAFIL (REVATIO) 20 MG TABLET    Take 20 mg by mouth as needed    ZOLPIDEM (AMBIEN) 10 MG TABLET    Take 1 tablet (10 mg) by mouth nightly as needed for sleep   Modified Medications    No medications on file   Discontinued Medications    No medications on file          Allergies   Allergen Reactions     Metformin Other (See Comments)     \" I think my kidneys shut down\"        Review of Systems   Constitutional: Negative for fever.   Cardiovascular: Negative for chest pain.   Gastrointestinal: Positive for nausea and vomiting. Negative for abdominal pain.   Neurological: Positive for weakness (generalized).   All other systems reviewed and are negative.      Physical Exam     ED Triage Vitals [02/01/21 1612]   Enc Vitals Group      /86      Pulse 88      Resp 16      Temp 98.2  F (36.8  C)      Temp src Oral      SpO2 98 %      Weight 81.2 kg (179 lb)      Height 1.854 m (6' 1\")     Physical Exam  Vitals signs and nursing note reviewed.   Constitutional:       General: He is not in acute distress.     Appearance: He is ill-appearing. He is not toxic-appearing.   HENT:      Head: Normocephalic and atraumatic.      Right Ear: External ear normal.      Left Ear: " External ear normal.      Nose: No rhinorrhea.      Mouth/Throat:      Pharynx: Oropharynx is clear.   Eyes:      General: No scleral icterus.     Conjunctiva/sclera: Conjunctivae normal.      Pupils: Pupils are equal, round, and reactive to light.   Neck:      Musculoskeletal: Neck supple. No neck rigidity.   Cardiovascular:      Rate and Rhythm: Normal rate and regular rhythm.      Pulses: Normal pulses.   Pulmonary:      Effort: Pulmonary effort is normal. No respiratory distress.   Abdominal:      General: Abdomen is flat. There is no distension.      Tenderness: There is no abdominal tenderness. There is no guarding.      Comments: Surgical scar to right upper quadrant   Musculoskeletal:      Right lower leg: No edema.      Left lower leg: No edema.   Skin:     General: Skin is warm and dry.      Capillary Refill: Capillary refill takes less than 2 seconds.      Coloration: Skin is pale. Skin is not jaundiced.      Findings: No rash.   Neurological:      General: No focal deficit present.      Mental Status: He is alert and oriented to person, place, and time.      Motor: Weakness present.   Psychiatric:         Mood and Affect: Affect is flat.         Speech: Speech normal.         Behavior: Behavior is cooperative.         Cognition and Memory: Cognition normal.         ED Course     ED Course as of Feb 01 1913   Mon Feb 01, 2021   1707 WBC: 4.3   1707 Temp: 98.2  F (36.8  C)   1730 Glucose(!): 150   1730 Significant for Tbili 2.4, Albumin low at 1.9, elevated alk phos and AST   Comprehensive metabolic panel(!)   1731 9.0 13 days ago   Hemoglobin(!): 9.5   1800 Contacted by Dr. Swan, ERCP planned for tomorrow.  Will proceed with inpatient medicine hospitalization due to degree of weakness with limited PO intake and recurrent vomiting.  Low suspicion for acute infectious etiology at this time.  IV hydration initiated, PRN antiemetics.        1807 INR(!): 1.72   1807 Lactic Acid: 1.3   1807 Lipase: 111    1820 Recent covid testing yesterday: negative      1828 Contacted by inpatient triage physician, graciously accepts patient in admission to general medicine status.            Results for orders placed or performed during the hospital encounter of 02/01/21 (from the past 24 hour(s))   CBC with platelets differential   Result Value Ref Range    WBC 4.3 4.0 - 11.0 10e9/L    RBC Count 3.19 (L) 4.4 - 5.9 10e12/L    Hemoglobin 9.5 (L) 13.3 - 17.7 g/dL    Hematocrit 29.6 (L) 40.0 - 53.0 %    MCV 93 78 - 100 fl    MCH 29.8 26.5 - 33.0 pg    MCHC 32.1 31.5 - 36.5 g/dL    RDW 19.9 (H) 10.0 - 15.0 %    Platelet Count 233 150 - 450 10e9/L    Diff Method Automated Method     % Neutrophils 51.5 %    % Lymphocytes 37.9 %    % Monocytes 9.2 %    % Eosinophils 0.7 %    % Basophils 0.5 %    % Immature Granulocytes 0.2 %    Nucleated RBCs 0 0 /100    Absolute Neutrophil 2.2 1.6 - 8.3 10e9/L    Absolute Lymphocytes 1.6 0.8 - 5.3 10e9/L    Absolute Monocytes 0.4 0.0 - 1.3 10e9/L    Absolute Eosinophils 0.0 0.0 - 0.7 10e9/L    Absolute Basophils 0.0 0.0 - 0.2 10e9/L    Abs Immature Granulocytes 0.0 0 - 0.4 10e9/L    Absolute Nucleated RBC 0.0    Comprehensive metabolic panel   Result Value Ref Range    Sodium 135 133 - 144 mmol/L    Potassium 3.6 3.4 - 5.3 mmol/L    Chloride 104 94 - 109 mmol/L    Carbon Dioxide 26 20 - 32 mmol/L    Anion Gap 4 3 - 14 mmol/L    Glucose 150 (H) 70 - 99 mg/dL    Urea Nitrogen 10 7 - 30 mg/dL    Creatinine 0.91 0.66 - 1.25 mg/dL    GFR Estimate 86 >60 mL/min/[1.73_m2]    GFR Estimate If Black >90 >60 mL/min/[1.73_m2]    Calcium 8.5 8.5 - 10.1 mg/dL    Bilirubin Total 2.4 (H) 0.2 - 1.3 mg/dL    Albumin 1.9 (L) 3.4 - 5.0 g/dL    Protein Total 6.8 6.8 - 8.8 g/dL    Alkaline Phosphatase 633 (H) 40 - 150 U/L    ALT 34 0 - 70 U/L    AST 90 (H) 0 - 45 U/L   INR   Result Value Ref Range    INR 1.72 (H) 0.86 - 1.14   Lipase   Result Value Ref Range    Lipase 111 73 - 393 U/L   Lactic acid whole blood   Result  Value Ref Range    Lactic Acid 1.3 0.7 - 2.0 mmol/L     Medications   lactated ringers BOLUS 1,000 mL (1,000 mLs Intravenous New Bag 2/1/21 4503)     Followed by   lactated ringers BOLUS 1,000 mL (has no administration in time range)   ondansetron (ZOFRAN) injection 4 mg (has no administration in time range)             Assessments & Plan (with Medical Decision Making)   This is a 65-year-old male with past medical history significant for cholangiocarcinoma status post resection and chemotherapy, presenting for evaluation of recurrent nausea and nonbilious/nonbloody emesis with decreased p.o. intake and progressive weakness.  Differential diagnosis includes but is not limited to nutrition, dehydration, electrolyte derangement, much less likely acute hepatobiliary infectious etiology.  He is on chronic antibiotics for cholangitis.  History and physical exam are inconsistent with severe sepsis or severe jaundice/encephalopathy at this time.  Diagnostic evaluation will include screening acute abdominal/metabolic lab panel.  Emergency department management will include intravenous crystalloid hydration and antiemetics/analgesia as needed.  We will contact his GI team to further discuss management.  Disposition pending results of diagnostic evaluation and response to therapeutic interventions, anticipate admission.  I have reviewed the nursing notes.      Final diagnoses:   Malnutrition, unspecified type (H)   Dehydration   Cholangiocarcinoma (H)     I, Stanley Melendez, am serving as a trained medical scribe to document services personally performed by Shon Griffin MD, based on the provider's statements to me.     IShon MD, was physically present and have reviewed and verified the accuracy of this note documented by Stanley Melendez.    Shon Griffin MD  2/1/2021   [facility] EMERGENCY DEPARTMENT     Shon Griffin MD  02/01/21 1918

## 2021-02-01 NOTE — ED NOTES
Bed: ED22  Expected date:   Expected time:   Means of arrival:   Comments:  Fuentes Estrada coming from home. Scheduled endoscopy tomorrow, coming to ER today due to weakness, dehydration and vomiting. Dr. De La Cruz sending pt. ETA 1500

## 2021-02-01 NOTE — TELEPHONE ENCOUNTER
"Spouse called in to triage reporting increased vomiting, weakness, dehydration x3 days. Stated pt began vomiting Friday night, he has not been eating much so was vomiting mostly small amounts of foamy looking liquid \"not even bile\". 1/29 he went to get his Covid test and was feeling weak so spouse took him to local  at [facility] in [place], where he received 2L IVF, zofran and compazine.     Sunday he had an increased appetite and was able to eat a few bites. Woke up this morning and vomited again before leaving the bed. Took zofran and dronabinol but feeling weak, dry, spouse state he is needing assistance walking which is new. She wonders if she should take him back to local UC/ED or if surgery tomorrow will be cancelled. Denied pain, sob, chest pain, fevers, chills, pt currently on Cipro and Flagyl, BS was 235 this morning.    Informed spouse if she feels comfortable transferring him to [facility], he should come to the ED and GI team can assess for admission or if surgery should be cancelled. Advised clear fluids only. She stated she will do this, it will take her 2-3 hours to get him here, advised at any time if he is exhibiting sob, worsening fatigue or weakness, loc she should go to nearest ED or call 911 she verbalized understanding. Routed to care teams.  "

## 2021-02-02 NOTE — ANESTHESIA PREPROCEDURE EVALUATION
Anesthesia Pre-Procedure Evaluation    Patient: Fuentes Estrada   MRN: [id number] : 1955        Preoperative Diagnosis: Cholangiocarcinoma (H) [C22.1]   Procedure : Procedure(s):  ENDOSCOPIC RETROGRADE CHOLANGIOPANCREATOGRAPHY     Past Medical History:   Diagnosis Date     Cerebrovascular accident (CVA) (H) 2010     Cholangiocarcinoma (H)      Diabetes (H)      Essential hypertension, benign     Hypertension, Benign     Nonspecific abnormal results of liver function study     Hx of elevated LFT's      Past Surgical History:   Procedure Laterality Date     ENDOSCOPIC RETROGRADE CHOLANGIOPANCREATOGRAM N/A 3/31/2020    Procedure: ENDOSCOPIC RETROGRADE CHOLANGIOPANCREATOGRAPHY, WITH with biliary sphincterotomy, biopsies and brushings, biliary stent placement;  Surgeon: Win Strauss MD;  [facility]     ENDOSCOPIC RETROGRADE CHOLANGIOPANCREATOGRAM N/A 2020    Procedure: ENDOSCOPIC RETROGRADE CHOLANGIOPANCREATOGRAPHY;  Surgeon: Win Strauss MD;  [facility]     ENDOSCOPIC RETROGRADE CHOLANGIOPANCREATOGRAM WITH SPYGLASS  2020    Procedure: ENDOSCOPIC RETROGRADE CHOLANGIOPANCREATOGRAPHY, WITH DIRECT DUCT VISUALIZATION, USING PANCREATICOBILIARY FIBEROPTIC PROBE; Bile Duct Stent Exchange and Biopsy,balloon sweep of bile duct;  Surgeon: Win Strauss MD;  [facility]     ENDOSCOPIC ULTRASOUND UPPER GASTROINTESTINAL TRACT (GI) N/A 2020    Procedure: ENDOSCOPIC ULTRASOUND, ESOPHAGOSCOPY / UPPER GASTROINTESTINAL TRACT (GI)with Fine Needle biopsy;  Surgeon: Cody Baumann MD;  [facility]     ENDOSCOPIC ULTRASOUND UPPER GASTROINTESTINAL TRACT (GI) N/A 2020    Procedure: ENDOSCOPIC ULTRASOUND, ESOPHAGOSCOPY / UPPER GASTROINTESTINAL TRACT (GI);  Surgeon: Guru Bailey Rossi MD;  [facility]     ESOPHAGOSCOPY, GASTROSCOPY, DUODENOSCOPY (EGD), COMBINED N/A 2020    Procedure: ESOPHAGOGASTRODUODENOSCOPY (EGD);  Surgeon: Carlos A  "Win Machado MD;  [facility]     ESOPHAGOSCOPY, GASTROSCOPY, DUODENOSCOPY (EGD), COMBINED N/A 8/11/2020    Procedure: Esophagogastroduodenoscopy, With Fine Needle Aspiration Biopsy, With Endoscopic Ultrasound Guidance;  Surgeon: Guru Bailey Rossi MD;  [facility]     EXPLORE COMMON BILE DUCT N/A 5/12/2020    Procedure: extra-hepatic bile duct resection;  Surgeon: Oswaldo Hale MD;  [facility]     HC KNEE SCOPE, DIAGNOSTIC  1995    Arthroscopy, Knee; left     HC VASECTOMY UNILAT/BILAT W POSTOP SEMEN      Vasectomy     HEPATECTOMY PARTIAL N/A 5/12/2020    Procedure: Exploratory Laparotomy, Open right hepatectomy, Radical Extra-Hepatic Bile Duct Resection, Portal Lymph Node Dissection, Intraoperative Ultrasound, Intra Air-Cholangiogram ,Bianca-En-Y Left Hepaticojejunostomy, Excision Skin Lesion;  Surgeon: Oswaldo Hale MD;  [facility]     INSERT PORT VASCULAR ACCESS Right 9/28/2020    Procedure: ultrasound guided right internal venous access port placement with flouroscopy;  Surgeon: Fercho Wayne DO;  [facility]     IR ABSCESS TUBE CHANGE  6/12/2020     IR FOLLOW UP VISIT OUTPATIENT  7/24/2020     IR FOLLOW UP VISIT OUTPATIENT  8/3/2020     IR FOLLOW UP VISIT OUTPATIENT  10/19/2020     IR SINOGRAM INJECTION DIAGNOSTIC  7/14/2020     IR SINOGRAM INJECTION DIAGNOSTIC  9/14/2020     IR SINOGRAM INJECTION DIAGNOSTIC  10/13/2020     IR SINOGRAM INJECTION THERAPEUTIC  8/25/2020     IR SINOGRAM INJECTION THERAPEUTIC  9/25/2020     IR SINOGRAM INJECTION THERAPEUTIC  10/2/2020     IR SINOGRAM INJECTION THERAPEUTIC  9/18/2020     IR THORACENTESIS  5/16/2020     LYMPHADENECTOMY ABDOMINAL N/A 5/12/2020    Procedure: portal lymph node dissection, intraoperative liver ultrasound;  Surgeon: Oswaldo Hale MD;  [facility]      Allergies   Allergen Reactions     Metformin Other (See Comments)     \" I think my kidneys shut down\"      Social History     Tobacco Use "     Smoking status: Never Smoker     Smokeless tobacco: Never Used   Substance Use Topics     Alcohol use: Yes     Comment: occaisional       Wt Readings from Last 1 Encounters:   02/01/21 80.3 kg (177 lb)        Anesthesia Evaluation   Pt has had prior anesthetic. Type: General.    No history of anesthetic complications       ROS/MED HX  ENT/Pulmonary:       Neurologic:     (+) CVA,     Cardiovascular:     (+) hypertension-----    METS/Exercise Tolerance:     Hematologic:     (+) anemia,     Musculoskeletal:  - neg musculoskeletal ROS     GI/Hepatic: Comment: Metastatic cholangiocarcinoma       Renal/Genitourinary:  - neg Renal ROS     Endo:     (+) type II DM, Not using insulin,     Psychiatric/Substance Use:  - neg psychiatric ROS     Infectious Disease:  - neg infectious disease ROS     Malignancy:  - neg malignancy ROS     Other:            Physical Exam    Airway        Mallampati: II   TM distance: > 3 FB   Neck ROM: full   Mouth opening: > 3 cm    Respiratory Devices and Support         Dental  no notable dental history         Cardiovascular   cardiovascular exam normal          Pulmonary   pulmonary exam normal                OUTSIDE LABS:  CBC:   Lab Results   Component Value Date    WBC 2.9 (L) 02/02/2021    WBC 4.3 02/01/2021    HGB 8.4 (L) 02/02/2021    HGB 9.5 (L) 02/01/2021    HCT 25.1 (L) 02/02/2021    HCT 29.6 (L) 02/01/2021     02/02/2021     02/01/2021     BMP:   Lab Results   Component Value Date     02/02/2021     02/01/2021    POTASSIUM 3.6 02/02/2021    POTASSIUM 3.6 02/01/2021    CHLORIDE 106 02/02/2021    CHLORIDE 104 02/01/2021    CO2 27 02/02/2021    CO2 26 02/01/2021    BUN 8 02/02/2021    BUN 10 02/01/2021    CR 0.86 02/02/2021    CR 0.91 02/01/2021     (H) 02/02/2021     (H) 02/01/2021     COAGS:   Lab Results   Component Value Date    PTT 30 11/18/2020    INR 1.83 (H) 02/02/2021     POC:   Lab Results   Component Value Date     (H)  02/02/2021     HEPATIC:   Lab Results   Component Value Date    ALBUMIN 1.6 (L) 02/02/2021    PROTTOTAL 5.8 (L) 02/02/2021    ALT 30 02/02/2021     (H) 02/02/2021    ALKPHOS 544 (H) 02/02/2021    BILITOTAL 1.9 (H) 02/02/2021    BILIDIRECT 0.4 06/12/2002     OTHER:   Lab Results   Component Value Date    PH 7.46 (H) 05/12/2020    LACT 1.3 02/01/2021    A1C 6.0 (H) 05/04/2020    ERIC 8.1 (L) 02/02/2021    PHOS 2.3 (L) 05/17/2020    MAG 1.4 (L) 01/04/2021    LIPASE 111 02/01/2021    AMYLASE 164 (H) 04/16/2020    TSH 3.60 01/21/2002    T4 0.7 01/21/2002    CRP 14.0 (H) 02/02/2021       Anesthesia Plan     History & Physical Review      ASA Status:  3. NPO Status:  NPO Appropriate. .  Plan for General with Intravenous induction. Device: ETT Maintenance will be Balanced.         PONV prophylaxis:  Ondansetron (or other 5HT-3) and Dexamethasone or Solumedrol.       Consents      Extended Intubation/Ventilatory Support Discussed No No     Use of blood products discussed: No.          Postoperative Care  Postoperative pain management: IV analgesics.           Benjamin Conti MD

## 2021-02-02 NOTE — H&P
"[facility]     History and Physical - Hospitalist Service, Gold Night        Date of Admission:  2/1/2021    Assessment & Plan   Fuentes Estrada is a 65 year old male admitted on 2/1/2021. 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 Per wife, he has never had nausea with any of his chemo. With non-bloody vomiting and inability to tolerate PO.   Abdomen non-tender, KUB without bowel obstruction, last BM 1/30 was normal without diarrhea or blood, +flatus.   Unclear etiology of new nausea and vomiting, may possibly be related to metastatic disease and malignancy vs. Self-limited Gastroenteritis. No signs of cholangitis or sepsis at this time.   -Continue mIVF with LR at 125/hr overnight   -Zofran and compazine PRN for nausea. Check EKG for QTC.   -NPO at [facility] for ERCP tomorrow   -Give antiemetics prior to PO meds, otherwise can change antibiotics to IV if needed   -Advance diet as tolerated after ERCP tomorrow. If nausea and vomiting not improved may need further evaluation with repeat CT scan of abdomen, discussion with GI and oncology etc.     Progressive Weakness   With good strength and non-focal on exam. Reports b/l LE weakness, legs \"giving out\" and a fall 2 weeks ago.   Suspect related to deconditioning in the setting of underlying malignancy.   -PT and OT consults in AM     Metastatic Cholangiocarcinoma   Janelle-hepatic fluid collection, likely abscess vs. Bile leak    History of " recurrent cholangitis   Per outpatient notes: Had a right hepatectomy with a radical extrahepatic bile duct resection for hilar cholangiocarcinoma in 05/2020.  Surgery was quite complicated because of the extent of his disease and his postoperative course was complicated by a bile leak.  This ultimately turned out to be quite protracted requiring long-term external abdominal drainage.  In the meantime, this was further complicated by the fact that he developed essentially rapid recurrence of his cancer with peritoneal metastases.  A few months ago, the abdominal drain was able to be removed.  However, at the end of 2020, Mr. Estrada developed fevers and was found to have small fluid collection adjacent to the cut surface of his remnant liver.  He was started on antibiotics and his fevers defervesced.  He has had 3 CT scans over the course of the last 2 months, which have shown essentially stable amount of fluid, which is somewhat indeterminate in nature but felt to be consistent with an abscess based on his previous symptoms.  He is currently on antibiotics for that.  His chemotherapy has been held.  Had external drain removed on 11/9.      -Continue PTA ciprofloxacin and Flagyl for possible abscess   -Has seen surgery in clinic 1/27, prefer not to do external drainage of fluid collection and no surgical interventions indicated at this time   -ERCP for possible attempt at internal drainage of fluid collection   -GI consult in AM  -Oncology consult in AM   -See outpatient oncology note 1/21 for further details regarding his previous oncology care. Chemotherapy on hold while managing abscess/possible biliary leak.  -No pain currently but can resume home oxycodone 5mg PO as needed for pain if develops   -Holding home marinol for appetite stimulation, resume when tolerating PO     DM2 with hypoglycemia   A1c 6.0 5/2020. With repeated episodes of hypoglycemia at home, likely due to poor nutritional status.   -Hold home  glipizide, likely can discontinue permanently if having issues with hypoglycemia  -Hypoglycemia protocol, no insulin ordered while NPO   -Nutrition consult when tolerating PO     Anemia of Chronic Disease   Stable.   -Monitor      HTN   Home lisinopril recently decreased from 30 --> 15 given hypotension at home and weight loss related to malignancy.   -Holding lisinopril     Hx of CVA 10 years ago, has mild short term memory deficits  On plavix, atorvastatin PTA  -Holding plavix since 1/27 due to upcoming ERCP, will continue to hold   -Resume atorvastatin 40mg daily when tolerating PO/at discharge     Chronic Hypomag  S/t cisplatin. On mg gluconate daily PTA   -Hold Mg and monitor levels while admitted          Diet: NPO per Anesthesia Guidelines for Procedure/Surgery Except for: Meds    DVT Prophylaxis: Pneumatic Compression Devices. Holding chemical prophylaxis pending upcoming procedure.   Em Catheter: not present  Code Status:   Full Code          Disposition Plan   Expected discharge: 2 - 3 days, recommended to pending once adequate pain management/ tolerating PO medications and safe disposition plan/ TCU bed available.  Entered: Gloria Mccollum DO 02/01/2021, 10:46 PM     The patient's care was discussed with the Bedside Nurse, Patient and Patient's Family.    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     Per patient and his wife he has has nausea and vomiting, inability to tolerate PO since Friday. He has vomited 3x, non-bloody. Nausea and vomiting are new for him, he never experienced this before even when on chemotherapy. He denies abdominal pain, fevers, blood in his stool or bloody emesis. Last BM was Saturday 1/31, was normal he reports, and has been passing gas since. Denies any chest pain, dyspnea, cough. Had scheduled ERCP for tomorrow for attempt at internal drainage of fluid collection, but was told by his GI doctor to come in tonight for IV fluids prior to the procedure.     He reports progressive weakness of b/l LE, possibly slightly weaker on the Right. He reports a fall a few week ago in the bathroom, and he and his wife reports his legs \"giving out\" and \"becoming noodles\" on several occassions. He denies UE weakness, numbness, tingling, HA, vision changes.     Review of Systems    The 10 point Review of Systems is negative other than noted in the HPI or here.     Past Medical History    I have reviewed this patient's medical history and updated it with pertinent information if needed.   Past Medical History:   Diagnosis Date     Cerebrovascular accident (CVA) (H) 12/2010     Cholangiocarcinoma (H)      Diabetes (H)      Essential hypertension, benign     Hypertension, Benign     Nonspecific abnormal results of liver function study     Hx of elevated LFT's       Past Surgical History   I have reviewed this patient's surgical history and updated it with pertinent information if needed.  Past Surgical History:   Procedure Laterality Date     ENDOSCOPIC RETROGRADE CHOLANGIOPANCREATOGRAM N/A 3/31/2020    Procedure: ENDOSCOPIC RETROGRADE " CHOLANGIOPANCREATOGRAPHY, WITH with biliary sphincterotomy, biopsies and brushings, biliary stent placement;  Surgeon: Win Strauss MD;  Location: [facility] OR     ENDOSCOPIC RETROGRADE CHOLANGIOPANCREATOGRAM N/A 4/6/2020    Procedure: ENDOSCOPIC RETROGRADE CHOLANGIOPANCREATOGRAPHY;  Surgeon: Win Strauss MD;  Location: [facility] OR     ENDOSCOPIC RETROGRADE CHOLANGIOPANCREATOGRAM WITH SPYGLASS  4/16/2020    Procedure: ENDOSCOPIC RETROGRADE CHOLANGIOPANCREATOGRAPHY, WITH DIRECT DUCT VISUALIZATION, USING PANCREATICOBILIARY FIBEROPTIC PROBE; Bile Duct Stent Exchange and Biopsy,balloon sweep of bile duct;  Surgeon: Win Strauss MD;  Location: [facility] OR     ENDOSCOPIC ULTRASOUND UPPER GASTROINTESTINAL TRACT (GI) N/A 4/16/2020    Procedure: ENDOSCOPIC ULTRASOUND, ESOPHAGOSCOPY / UPPER GASTROINTESTINAL TRACT (GI)with Fine Needle biopsy;  Surgeon: Cody Baumann MD;  Location: [facility] OR     ENDOSCOPIC ULTRASOUND UPPER GASTROINTESTINAL TRACT (GI) N/A 8/11/2020    Procedure: ENDOSCOPIC ULTRASOUND, ESOPHAGOSCOPY / UPPER GASTROINTESTINAL TRACT (GI);  Surgeon: Guru Bailey Rossi MD;  Location: [facility] GI     ESOPHAGOSCOPY, GASTROSCOPY, DUODENOSCOPY (EGD), COMBINED N/A 4/6/2020    Procedure: ESOPHAGOGASTRODUODENOSCOPY (EGD);  Surgeon: Win Strauss MD;  Location: [facility]     ESOPHAGOSCOPY, GASTROSCOPY, DUODENOSCOPY (EGD), COMBINED N/A 8/11/2020    Procedure: Esophagogastroduodenoscopy, With Fine Needle Aspiration Biopsy, With Endoscopic Ultrasound Guidance;  Surgeon: Guru Bailey Rossi MD;  Location: [facility]     EXPLORE COMMON BILE DUCT N/A 5/12/2020    Procedure: extra-hepatic bile duct resection;  Surgeon: Oswaldo Hale MD;  Location: [facility] OR     HC KNEE SCOPE, DIAGNOSTIC  1995    Arthroscopy, Knee; left     HC VASECTOMY UNILAT/BILAT W POSTOP SEMEN      Vasectomy     HEPATECTOMY PARTIAL N/A 5/12/2020    Procedure: Exploratory Laparotomy, Open right hepatectomy, Radical  Extra-Hepatic Bile Duct Resection, Portal Lymph Node Dissection, Intraoperative Ultrasound, Intra Air-Cholangiogram ,Bianca-En-Y Left Hepaticojejunostomy, Excision Skin Lesion;  Surgeon: Oswaldo Hale MD;  Location: [facility]     INSERT PORT VASCULAR ACCESS Right 9/28/2020    Procedure: ultrasound guided right internal venous access port placement with flouroscopy;  Surgeon: Fercho Wayne DO;  Location: [facility]     IR ABSCESS TUBE CHANGE  6/12/2020     IR FOLLOW UP VISIT OUTPATIENT  7/24/2020     IR FOLLOW UP VISIT OUTPATIENT  8/3/2020     IR FOLLOW UP VISIT OUTPATIENT  10/19/2020     IR SINOGRAM INJECTION DIAGNOSTIC  7/14/2020     IR SINOGRAM INJECTION DIAGNOSTIC  9/14/2020     IR SINOGRAM INJECTION DIAGNOSTIC  10/13/2020     IR SINOGRAM INJECTION THERAPEUTIC  8/25/2020     IR SINOGRAM INJECTION THERAPEUTIC  9/25/2020     IR SINOGRAM INJECTION THERAPEUTIC  10/2/2020     IR SINOGRAM INJECTION THERAPEUTIC  9/18/2020     IR THORACENTESIS  5/16/2020     LYMPHADENECTOMY ABDOMINAL N/A 5/12/2020    Procedure: portal lymph node dissection, intraoperative liver ultrasound;  Surgeon: Oswaldo Hale MD;  Location: [facility]       Social History   I have reviewed this patient's social history and updated it with pertinent information if needed.  Social History     Tobacco Use     Smoking status: Never Smoker     Smokeless tobacco: Never Used   Substance Use Topics     Alcohol use: Yes     Comment: occaisional      Drug use: No       Family History   I have reviewed this patient's family history and updated it with pertinent information if needed.  Family History   Problem Relation Age of Onset     Arthritis Mother         RA     Diabetes Father         diet controlled     Hypertension Father        Prior to Admission Medications   Prior to Admission Medications   Prescriptions Last Dose Informant Patient Reported? Taking?   Ferrous Sulfate (IRON) 325 (65 Fe) MG tablet  Spouse/Significant Other No No   Sig: Take 1  tablet by mouth 3 times daily (with meals)   LORazepam (ATIVAN) 0.5 MG tablet  Spouse/Significant Other No No   Sig: Take 1 tablet (0.5 mg) by mouth every 4 hours as needed (Anxiety, Nausea/Vomiting or Sleep)   atorvastatin (LIPITOR) 40 MG tablet  Spouse/Significant Other Yes No   Sig: Take 40 mg by mouth At Bedtime    ciprofloxacin (CIPRO) 500 MG tablet   No No   Sig: Take 1 tablet (500 mg) by mouth 2 times daily   clopidogrel (PLAVIX) 75 MG tablet  Spouse/Significant Other Yes No   Sig: Take 75 mg by mouth At Bedtime    dexamethasone (DECADRON) 4 MG tablet   No No   Sig: Take 1 tablet (4 mg) by mouth daily (with breakfast) Daily for the 3 days after chemotherapy in the morning with food   dronabinol (MARINOL) 5 MG capsule   No No   Sig: Take 2 capsules in am and 1 capsule in pm (before meals)   dronabinol (MARINOL) 5 MG capsule   No No   Sig: Take 2 capsules in am and 1 capsule in pm (before meals)   fenofibrate (TRIGLIDE/LOFIBRA) 160 MG tablet  Spouse/Significant Other Yes No   Sig: Take 160 mg by mouth At Bedtime    furosemide (LASIX) 20 MG tablet  Spouse/Significant Other Yes No   Sig: Take 20 mg by mouth every evening    glipiZIDE (GLUCOTROL) 10 MG tablet  Spouse/Significant Other Yes No   Sig: Take 10 mg by mouth At Bedtime    lisinopril (ZESTRIL) 30 MG tablet  Spouse/Significant Other Yes No   Sig: Take 30 mg by mouth every evening    magnesium gluconate 30 MG tablet   No No   Sig: Take 1 tablet (30 mg) by mouth daily   metroNIDAZOLE (FLAGYL) 500 MG tablet   No No   Sig: Take 1 tablet (500 mg) by mouth 3 times daily   ondansetron (ZOFRAN) 8 MG tablet  Spouse/Significant Other No No   Sig: Take 1 tablet (8 mg) by mouth every 8 hours as needed (Nausea/Vomiting)   oxyCODONE (ROXICODONE) 5 MG tablet  Spouse/Significant Other No No   Sig: Take 1-2 tablets (5-10 mg) by mouth every 4 hours as needed for moderate to severe pain   prochlorperazine (COMPAZINE) 10 MG tablet  Spouse/Significant Other No No   Sig: Take  "0.5 tablets (5 mg) by mouth every 6 hours as needed (Nausea/Vomiting)   sildenafil (REVATIO) 20 MG tablet  Spouse/Significant Other Yes No   Sig: Take 20 mg by mouth as needed   zolpidem (AMBIEN) 10 MG tablet   No No   Sig: Take 1 tablet (10 mg) by mouth nightly as needed for sleep      Facility-Administered Medications: None     Allergies   Allergies   Allergen Reactions     Metformin Other (See Comments)     \" I think my kidneys shut down\"       Physical Exam   Vital Signs: Temp: 98.2  F (36.8  C) Temp src: Oral BP: 128/85 Pulse: 73   Resp: 11 SpO2: 98 % O2 Device: None (Room air)    Weight: 179 lbs 0 oz  Constitutional: awake, alert, cooperative, no apparent distress  Eyes: pupils equal, round and reactive to light, sclera clear, conjunctiva normal  ENT: normocepalic, without obvious abnormality  Hematologic / Lymphatic: no cervical lymphadenopathy and no supraclavicular lymphadenopathy  Respiratory: No increased work of breathing, clear to auscultation bilaterally, no crackles or wheezing  Cardiovascular:  Regular rate and rhythm, normal S1 and S2, no S3 or S4, and no murmur noted, no edema  GI: Normal bowel sounds, soft, mildly distended, non-tender, small reducible incisional hernia RUQ   Skin: no redness, warmth, or swelling, no rashes, no lesions and no jaundice  Neurologic: Awake, alert, oriented to name, place and time.  Strength seems to be 5/5 b/l UE, b/l LE equal and may be 4+/5 in b/l hip flexion due to effort but otherwise 5/5 on my exam. CN 2-12 intact, sensation grossly intact.     Data   Data reviewed today: I reviewed all medications, new labs and imaging results over the last 24 hours. I personally reviewed no images or EKG's today.    Recent Labs   Lab 02/01/21  1638   WBC 4.3   HGB 9.5*   MCV 93      INR 1.72*      POTASSIUM 3.6   CHLORIDE 104   CO2 26   BUN 10   CR 0.91   ANIONGAP 4   ERIC 8.5   *   ALBUMIN 1.9*   PROTTOTAL 6.8   BILITOTAL 2.4*   ALKPHOS 633*   ALT 34   AST " 90*   LIPASE 111     Most Recent 3 CBC's:  Recent Labs   Lab Test 02/01/21  1638 01/19/21  1238 01/04/21  1215   WBC 4.3 6.0 6.5   HGB 9.5* 9.0* 8.8*   MCV 93 88 87    278 271     Most Recent 3 BMP's:  Recent Labs   Lab Test 02/01/21  1638 01/19/21  1238 01/04/21  1215    132* 133   POTASSIUM 3.6 3.9 4.0   CHLORIDE 104 100 102   CO2 26 27 28   BUN 10 13 11   CR 0.91 1.03 0.86   ANIONGAP 4 5 3   ERIC 8.5 8.1* 8.0*   * 132* 148*     Most Recent 2 LFT's:  Recent Labs   Lab Test 02/01/21  1638 01/19/21  1238   AST 90* 103*   ALT 34 32   ALKPHOS 633* 857*   BILITOTAL 2.4* 2.3*     Recent Results (from the past 24 hour(s))   XR Abdomen Port 1 View    Impression    Impression: No evidence of small or large bowel obstruction.

## 2021-02-02 NOTE — PLAN OF CARE
A&Ox4. Wife (Bri) at bedside. Denies N/V and pain during AM shift. NPO. Pt uses commode/urinal, dark colored urine noted. BG Q 4 hours. Pt currently off unit getting XR/ERCP. Port located in R chest wall. Continue plan of care.

## 2021-02-02 NOTE — PLAN OF CARE
"/82 (BP Location: Right arm)   Pulse 63   Temp 97.1  F (36.2  C) (Oral)   Resp 18   Ht 1.854 m (6' 1\")   Wt 80.3 kg (177 lb)   SpO2 97%   BMI 23.35 kg/m   RA.Pt was admitted at start of night shift from ED for weakness and nausea & vomiting.Pt came to floor and was upset and tired.Had emesis x1 and refused any zofran as he was feeling better after.2Nurse skin check done with this nurse and Rosalie RN.Pt has scabs on both elbows and right hand and no open sores noted..Lungs clear.Has generalized weakness and denies pain or numbness or tinging.Was neg for covid and EKG was done.Pt is NPO since midnight for Blood glucose 112Endoscopy today.Using urinal at bedside.Was A&O x3 but forgetful of time and wanted to go to sleep.Pt uses walker at home and can be unsteady on his feet.Bed alarm on and instructed to use call light.His wife Bri called during the night to see how he was doing and will be in this morning as she is sleeping at a hotel near by.Blood glucose 112 before bed and will continue to monitor.  "

## 2021-02-02 NOTE — CONSULTS
GASTROENTEROLOGY CONSULTATION    Date: 02/02/2021  Date of Admission: 2/1/2021  Reason for Consultation: cholangiocarcinoma  Requested by:   Dr. Jj Baca  Brief Summary:   We were asked by Dr. Jj Baca to evaluate this patient with cholangiocarcinoma coming with nausea vomiting      ASSESSMENT AND RECOMMENDATIONS:   Assessment:  65 year old male with a history of perihilar/intrahepatic cholangiocarcinoma s/p radical extrahepatic bile duct resection, open right hepatectomy, portal lymph node dissection and Bianca-en-Y hepaticojejunostomy to left hepatic duct and peritoneal nodule biopsy in May 2020, surgical pathology with tumor present at the right posterior hepatic duct margin and portal lymph node.  EUS guided FNA with positive cytology for lymph node at the GDA/common hepatic artery concerning for peritoneal metastases.  Clinical course complicated by recurring fluid collection, s/p external drain placement with successful capping trial and eventual removal 11/2020, persistent abscess/ fluid collection near the remnant liver concerning for possible bile leak with plans for ERCP with internal drainage.     Perihepatic fluid collection concerning for bile leak  Pt is afebrile and clinically stable regarding the perihepatic collection. Pt had sinogram in Sep 2020 showing small fluid collection with communication to biliary tree. Pt had a sinogram in Oct 2020 showing a small fluid collection but no communication to the biliary tree. Given the sinogram findings, doubt that it is a bile leak, but plan to proceed with endoscopic evaluation to r/o a persistent bile leak and internal drainage if possible.    Nausea/Vomiting  Pt had nausea and vomiting which can be due to progression of disease without any signs of SBO upon AXR and recent CT abd. No signs of pyloric/gastric outlet obstruction. Due to acuity of presentation, we will consider optimizing medical management for his nausea with hope to tolerate liquid diet  leading to possible regular once nausea is well controlled.     Recommendations  --Plan to proceed with ERCP today per schedule  --Continue antibiotics as per primary team  --Recommend to start clear liquid diet after the ERCP today   --Recommend to involve dietician for caloric intake   --Recommend to start bowel regimen with Miralax this evening  --May consider oncology/neurology input for LE numbness/weakness    Gastroenterology outpatient follow up recommendations: pending clinical course    Thank you for involving us in this patient's care. Please do not hesitate to contact the GI service with any questions or concerns.     Pt care plan discussed with Dr. Baumann, GI staff physician.    This note was created with voice recognition software, and while reviewed for accuracy, typos may remain.    Morgan Mcdonnell MD  GI Fellow  Pager: [phone]  -------------------------------------------------------------------------------------------------------------------           History of Present Illness:   Fuentes Estrada is a 65 year old male with a history of  perihilar/intrahepatic cholangiocarcinoma s/p radical extrahepatic bile duct resection, open right hepatectomy, portal lymph node dissection and Bianca-en-Y hepaticojejunostomy to left hepatic duct and peritoneal nodule biopsy, surgical pathology with tumor present at the right posterior hepatic duct margin and portal lymph node.  EUS guided FNA with positive cytology for lymph node at the GDA/common hepatic artery concerning for peritoneal metastases.  Clinical course complicated by recurring abdominal fluid collection, s/p external drain placement with successful capping trial and eventual removal, persistent abscess/fluid collection near the remnant liver concerning for possible bile leak with plans for ERCP with internal drainage.     Patient underwent radical extrahepatic bile duct resection and right hepatectomy in May 2020 with 1+ lymph node, postop course  complicated by a bile leak and abscess.  Patient was found to have increased nodularity in the resection bed and regional lymph nodes in July 2020 followed by EUS in August 2020 with FNA demonstrating presence of peritoneal metastases disease.  In Aug 2020 Cisplatin/Gemcitabine were started with palliative intent.  For his abscess he underwent percutaneous drain placement. sinogram identified a communication to biliary tree, Sinograms and cavitary sclerotherapy Aug 2020, drain removed on 11/9/2020, clinical course complicated by outside hospital admission from septic shock secondary to infectious enterocolitis, readmitted with cholangitis in November 2020.    Pt stated that his current symptoms of nausea and vomiting started 4 days back when he had bouts of emesis, not able to tolerate oral intake. He took antiemetics, no abd pain, hematochezia or melena. His last BM was on 3 days back as he used antidiarrhea medication. C/o numbness and LE weakness.    No smoking, occasional ETOH intake previously  No FHx of ca                Past Medical History:   Reviewed and edited as appropriate  Past Medical History:   Diagnosis Date     Cerebrovascular accident (CVA) (H) 12/2010     Cholangiocarcinoma (H)      Diabetes (H)      Essential hypertension, benign     Hypertension, Benign     Nonspecific abnormal results of liver function study     Hx of elevated LFT's            Past Surgical History:   Reviewed and edited as appropriate   Past Surgical History:   Procedure Laterality Date     ENDOSCOPIC RETROGRADE CHOLANGIOPANCREATOGRAM N/A 3/31/2020    Procedure: ENDOSCOPIC RETROGRADE CHOLANGIOPANCREATOGRAPHY, WITH with biliary sphincterotomy, biopsies and brushings, biliary stent placement;  Surgeon: Win Strauss MD;  Location: [facility]     ENDOSCOPIC RETROGRADE CHOLANGIOPANCREATOGRAM N/A 4/6/2020    Procedure: ENDOSCOPIC RETROGRADE CHOLANGIOPANCREATOGRAPHY;  Surgeon: Win Strauss MD;  [facility]      ENDOSCOPIC RETROGRADE CHOLANGIOPANCREATOGRAM WITH SPYGLASS  4/16/2020    Procedure: ENDOSCOPIC RETROGRADE CHOLANGIOPANCREATOGRAPHY, WITH DIRECT DUCT VISUALIZATION, USING PANCREATICOBILIARY FIBEROPTIC PROBE; Bile Duct Stent Exchange and Biopsy,balloon sweep of bile duct;  Surgeon: Win Strauss MD;  Location: [facility]     ENDOSCOPIC ULTRASOUND UPPER GASTROINTESTINAL TRACT (GI) N/A 4/16/2020    Procedure: ENDOSCOPIC ULTRASOUND, ESOPHAGOSCOPY / UPPER GASTROINTESTINAL TRACT (GI)with Fine Needle biopsy;  Surgeon: Cody Baumann MD;  Location: [facility]     ENDOSCOPIC ULTRASOUND UPPER GASTROINTESTINAL TRACT (GI) N/A 8/11/2020    Procedure: ENDOSCOPIC ULTRASOUND, ESOPHAGOSCOPY / UPPER GASTROINTESTINAL TRACT (GI);  Surgeon: Guru Bailey Rossi MD;  Location: [facility]     ESOPHAGOSCOPY, GASTROSCOPY, DUODENOSCOPY (EGD), COMBINED N/A 4/6/2020    Procedure: ESOPHAGOGASTRODUODENOSCOPY (EGD);  Surgeon: Win Strauss MD;  Location: [facility]     ESOPHAGOSCOPY, GASTROSCOPY, DUODENOSCOPY (EGD), COMBINED N/A 8/11/2020    Procedure: Esophagogastroduodenoscopy, With Fine Needle Aspiration Biopsy, With Endoscopic Ultrasound Guidance;  Surgeon: Guru Bailey Rossi MD;  Location: [facility]     EXPLORE COMMON BILE DUCT N/A 5/12/2020    Procedure: extra-hepatic bile duct resection;  Surgeon: Oswaldo Hale MD;  Location: [facility]     HC KNEE SCOPE, DIAGNOSTIC  1995    Arthroscopy, Knee; left     HC VASECTOMY UNILAT/BILAT W POSTOP SEMEN      Vasectomy     HEPATECTOMY PARTIAL N/A 5/12/2020    Procedure: Exploratory Laparotomy, Open right hepatectomy, Radical Extra-Hepatic Bile Duct Resection, Portal Lymph Node Dissection, Intraoperative Ultrasound, Intra Air-Cholangiogram ,Bianca-En-Y Left Hepaticojejunostomy, Excision Skin Lesion;  Surgeon: Oswaldo Hale MD;  Location: [facility]     INSERT PORT VASCULAR ACCESS Right 9/28/2020    Procedure: ultrasound guided right internal venous access port placement  with flouroscopy;  Surgeon: Fercho Wayne DO;  Location: [facility]     IR ABSCESS TUBE CHANGE  6/12/2020     IR FOLLOW UP VISIT OUTPATIENT  7/24/2020     IR FOLLOW UP VISIT OUTPATIENT  8/3/2020     IR FOLLOW UP VISIT OUTPATIENT  10/19/2020     IR SINOGRAM INJECTION DIAGNOSTIC  7/14/2020     IR SINOGRAM INJECTION DIAGNOSTIC  9/14/2020     IR SINOGRAM INJECTION DIAGNOSTIC  10/13/2020     IR SINOGRAM INJECTION THERAPEUTIC  8/25/2020     IR SINOGRAM INJECTION THERAPEUTIC  9/25/2020     IR SINOGRAM INJECTION THERAPEUTIC  10/2/2020     IR SINOGRAM INJECTION THERAPEUTIC  9/18/2020     IR THORACENTESIS  5/16/2020     LYMPHADENECTOMY ABDOMINAL N/A 5/12/2020    Procedure: portal lymph node dissection, intraoperative liver ultrasound;  Surgeon: Oswaldo Hale MD;  Location: [facility]            Previous Endoscopy:   No results found for this or any previous visit.         Social History:   Reviewed and edited as appropriate  Social History     Socioeconomic History     Marital status:      Spouse name: Robyn     Number of children: 2     Years of education: 14     Highest education level: Not on file   Occupational History     Occupation: self employed   Social Needs     Financial resource strain: Not on file     Food insecurity     Worry: Not on file     Inability: Not on file     Transportation needs     Medical: Not on file     Non-medical: Not on file   Tobacco Use     Smoking status: Never Smoker     Smokeless tobacco: Never Used   Substance and Sexual Activity     Alcohol use: Yes     Comment: occaisional      Drug use: No     Sexual activity: Yes     Partners: Female   Lifestyle     Physical activity     Days per week: Not on file     Minutes per session: Not on file     Stress: Not on file   Relationships     Social connections     Talks on phone: Not on file     Gets together: Not on file     Attends Hoahaoism service: Not on file     Active member of club or organization: Not on file     Attends  "meetings of clubs or organizations: Not on file     Relationship status: Not on file     Intimate partner violence     Fear of current or ex partner: Not on file     Emotionally abused: Not on file     Physically abused: Not on file     Forced sexual activity: Not on file   Other Topics Concern      Service No     Blood Transfusions No     Caffeine Concern No     Occupational Exposure No     Hobby Hazards No     Sleep Concern No     Stress Concern No     Weight Concern No     Special Diet No     Back Care No     Exercise Yes     Bike Helmet No     Seat Belt Yes     Self-Exams No     Parent/sibling w/ CABG, MI or angioplasty before 65F 55M? Not Asked   Social History Narrative     Not on file            Family History:   Reviewed and edited as appropriate  Family History   Problem Relation Age of Onset     Arthritis Mother         RA     Diabetes Father         diet controlled     Hypertension Father             Allergies:   Reviewed and edited as appropriate     Allergies   Allergen Reactions     Metformin Other (See Comments)     \" I think my kidneys shut down\"            Medications:     Current Facility-Administered Medications   Medication     ciprofloxacin (CIPRO) tablet 500 mg     glucose gel 15-30 g    Or     dextrose 50 % injection 25-50 mL    Or     glucagon injection 1 mg     lactated ringers infusion     lidocaine (LMX4) cream     lidocaine 1 % 0.1-1 mL     melatonin tablet 3 mg     metroNIDAZOLE (FLAGYL) tablet 500 mg     naloxone (NARCAN) injection 0.2 mg    Or     naloxone (NARCAN) injection 0.4 mg    Or     naloxone (NARCAN) injection 0.2 mg    Or     naloxone (NARCAN) injection 0.4 mg     ondansetron (ZOFRAN-ODT) ODT tab 4 mg    Or     ondansetron (ZOFRAN) injection 4 mg     oxyCODONE (ROXICODONE) tablet 5 mg     prochlorperazine (COMPAZINE) injection 5 mg    Or     prochlorperazine (COMPAZINE) tablet 5 mg    Or     prochlorperazine (COMPAZINE) suppository 12.5 mg     sodium chloride (PF) " "0.9% PF flush 3 mL     sodium chloride (PF) 0.9% PF flush 3 mL             Review of Systems:     A complete 10 point review of systems was performed and is negative except as noted in the HPI           Physical Exam:   /76 (BP Location: Right arm)   Pulse 67   Temp 96.8  F (36  C) (Oral)   Resp 16   Ht 1.854 m (6' 1\")   Wt 80.3 kg (177 lb)   SpO2 96%   BMI 23.35 kg/m    Wt:   Wt Readings from Last 2 Encounters:   02/01/21 80.3 kg (177 lb)   01/27/21 80.3 kg (177 lb)      Constitutional: cooperative, pleasant  Eyes: Sclera anicteric  Ears/nose/mouth/throat: mucus membranes moist  Neck: supple  CV: No edema  Respiratory: Unlabored breathing  Abd: Nondistended, +bs, nontender  Skin: warm, perfused, no jaundice  Neuro: AAO x 3, LE no sensation to light touch  MSK: No gross deformities         Data:   Labs and imaging below were independently reviewed and interpreted    BMP  Recent Labs   Lab 02/02/21  0632 02/01/21  1638    135   POTASSIUM 3.6 3.6   CHLORIDE 106 104   ERIC 8.1* 8.5   CO2 27 26   BUN 8 10   CR 0.86 0.91   * 150*     CBC  Recent Labs   Lab 02/02/21  0632 02/01/21  1638   WBC 2.9* 4.3   RBC 2.69* 3.19*   HGB 8.4* 9.5*   HCT 25.1* 29.6*   MCV 93 93   MCH 31.2 29.8   MCHC 33.5 32.1   RDW 19.9* 19.9*    233     INR  Recent Labs   Lab 02/02/21  0632 02/01/21  1638   INR 1.83* 1.72*     LFTs  Recent Labs   Lab 02/02/21  0632 02/01/21  1638   ALKPHOS 544* 633*   * 90*   ALT 30 34   BILITOTAL 1.9* 2.4*   PROTTOTAL 5.8* 6.8   ALBUMIN 1.6* 1.9*      PANC  Recent Labs   Lab 02/01/21  1638   LIPASE 111       Imaging:  CT ABD 1/19/21:  IMPRESSION:   1.  Stable postoperative changes posterior right hepatic lobe  consistent with partial right hepatic lobe resection. Low-attenuation  collection posterior right hepatic lobe operative bed now measures 4.6  x 2.5 cm, previously 3.5 x 2.1 cm.  2.  Stable cirrhotic configuration of the liver with portal  hypertension. Stable large " volume abdominal ascites.  3.  Stable tiny gas bubble distal common bile duct at pancreatic head.  4.  Small bilateral pleural effusions with concordant basilar  compressive atelectasis, increased from prior exam.    EUS 8/11/20:  Impression:          - A 7 mm hypoechoic lesion was seen in the region of the                        gastroduodenal/common hepatic artery. Although                        endosonographically this could represent a malignant                        appearing lymph node, this is likely to be peritoneal                        nodule. EUS guided fine needle biopsy was performed and                        preliminary onsite cytology was concerning for                        metastatic malignancy with no lymphocytes.                        - Multiple malignant appearing lymph nodes were seen in                        the perigastric region, yousuf hepatis region. EUS guided                        fine needle biopsy was performed from the three                        nodes/lesions matted together with 22 G needle and                        preliminary onsite cytology was concerning for                        malignancy.                        - One 21 mm by 16 mm abnormal lymph node was visualized                        in the yousuf hepatis region. Since onsite cytology has                        established a diagnosis of metastasis this was not                        sampled.                        - No focal lesions or masses seen in the visualized                        portion of the liver   Recommendation:      - Observe patient in GI recovery unit for ongoing care.                        - Await cytology results. Updated patient of the                        preliminary concerns                        - Findings were discussed with our referring provider Dr Hale who recommended medical oncology follow up with                        Dr Beard. Patient and wife updated  of onsite cytology                        concerns

## 2021-02-02 NOTE — PROGRESS NOTES
Pt arrived to 7C from ED with his belongings and phone at change of shift.Blood glucose [id number] before he came.Pt was angry and upset it took so long to come to floor and wanted to be left to sleep.IV infusing at  hrin his port that was accessed in ED.Lungs clear .Has generalized weakness but denies dizziness or pain.To have Endoscopy today.Will continue to monitor.

## 2021-02-02 NOTE — ANESTHESIA PROCEDURE NOTES
Airway   Date/Time: 2/2/2021 2:29 PM   Patient location during procedure: OR  Staff -   CRNA: Latrice Deal APRN CRNA  Performed By: CRNA    Consent for Airway   Urgency: elective    Indications and Patient Condition  Indications for airway management: janelle-procedural  Induction type:intravenousMask difficulty assessment: 1 - vent by mask    Final Airway Details  Final airway type: endotracheal airway  Successful airway:Diagnostic laryngoscopy only  Endotracheal Airway Details   Cuffed: yes  Successful intubation technique: direct laryngoscopy  Grade View of Cords: 3  Adjucts: stylet  Measured from: gums/teeth  Secured at (cm): 24  Secured with: pink tape  Bite block used: None    Post intubation assessment   Placement verified by: capnometry   Number of attempts at approach: 1  Secured with:pink tape  Ease of procedure: easy  Dentition: Intact and Unchanged

## 2021-02-02 NOTE — ANESTHESIA CARE TRANSFER NOTE
Patient: Fuentes Estrada    Procedure(s):  Enteroscopy small bowel    Diagnosis: Cholangiocarcinoma (H) [C22.1]  Diagnosis Additional Information: No value filed.    Anesthesia Type:   General     Note:    Oropharynx: oropharynx clear of all foreign objects  Level of Consciousness: drowsy  Oxygen Supplementation: face mask    Independent Airway: airway patency satisfactory and stable  Dentition: dentition unchanged  Vital Signs Stable: post-procedure vital signs reviewed and stable  Report to RN Given: handoff report given  Patient transferred to: PACU    Handoff Report: Identifed the Patient, Identified the Reponsible Provider, Reviewed the pertinent medical history, Discussed the surgical course, Reviewed Intra-OP anesthesia mangement and issues during anesthesia, Set expectations for post-procedure period and Allowed opportunity for questions and acknowledgement of understanding      Vitals: (Last set prior to Anesthesia Care Transfer)  CRNA VITALS  2/2/2021 1450 - 2/2/2021 1529      2/2/2021             Resp Rate (observed):  (!) 2        Electronically Signed By: VICKI Bailey CRNA  February 2, 2021  3:29 PM

## 2021-02-02 NOTE — PROGRESS NOTES
[facility]     Medicine Progress Note - Hospitalist Service, Gold 6       Date of Admission:  2/1/2021  Assessment & Plan         Fuentes Estrada is a 65 year old male with past medical history significant for HTN, type 2 DM, CVA, and recently diagnosed hilar cholangiocarcinoma (dx 5/2020) s/p R hepatectomy with radical extrahepatic bile duct resection now w/ peritoneal mets c/b recurrent cholangitis as well as perihepatic fluid collection s/p external drain placement (removed in 11/2020) and now persistent and c/f abscess vs bile leak currently on abx admitted for nausea and vomiting, inability to tolerate PO, and progressive weakness and falls.      # Nausea, vomiting - Onset 3-4 days ago prior to admission.  No issues w/ nausea or vomiting in the past, even w/ chemo.  Having BMs.  AXR w/o e/o bowel obstruction.  Currently w/o signs/symptoms acute cholangitis or sepsis.  Afebrile.  Leukopenia to 2.9.  No chemo since 12/9.    - NPO for now d/t ERCP today   - Continue MIVFs w/ LR 125cc/hr for now   - Continue Zofran PRN     # Progressive bilateral lower extremity weakness  # Deconditioning   Reports bilateral lower extremity weakness worse since Friday.  Unremarkable exam, no focal deficits, strength/sensation intact.   - Check B12, folate, thiamine, mag, phos, vitamin D   - Continue fluid resuscitation  - If symptoms not improved with fluids, nutrition, and labs normal, will pursue imaging, likely spinal MRI, possible further discussion w/ Neurology   - PT, OT consults        # Metastatic hilar cholangiocarcinoma s/p R hepatectomy w/ radical extrahepatic bile duct resection   # Perihepatic fluid collection felt to be 2/2 abscess vs bile leak   # Hx recurrent cholangitis   Diagnosed in 5/2020 and underwent surgery as above with complicated course d/t extent of disease and bile leak.  Follows w/ Dr. Beard outpatient.  Protracted postop course c/b long-term external  abdominal drainage requiring drain placement.  In the meantime, this was further complicated by the fact that he developed essentially rapid recurrence of his cancer with peritoneal metastases.  S/p removal of abdominal drain with IR on 11/9/2020.  Admitted to [facility] 11/18-11/19 for fevers and concern for acute cholangitis, found to have small fluid collection adjacent to the cut surface of his remnant liver.  At that time treated with Zosyn and transitioned to Flagyl and Cipro on discharge, which was extended in 12/2020.  Off chemo since 12/9.    - GI consulted, undergoing ERCP today for possible drainage of fluid collection   - Oncology consult placed   - Continue PTA Cipro and Flagyl for now, will consider IV abx if e/o acute infection or fevers   - Trend CRP, procal, and CBC         # HTN - On Lisinopril at home, recently decreased from 30mg to 15mg d/t hypotension and weight loss.  BPs 125/80 this AM.    - Hold Lisinopril for now d/t stable BPs, dehydration   - Monitor BPs       # Type 2 DM - Last Hgb A1c 7.7% in 10/2020.  BGs 106-150 last 24 hr.  Per d/w pt and his wife, he has slowly been tapered off several meds over the last year.  Currently only on Glipizide.    - Hypoglycemia protocol   - Hold Glipizide while inpatient   - Continue to monitor BGs BID for now     # Hx CVA - Occurred ~ 10 years ago.  Has residual mild short term memory deficits.  On Plavix and Atorvastatin PTA.    - PTA Plavix on hold since 1/27 d/t ERCP today   - Will resume Atorvastatin post procedure and able to tolerate PO      # Anemia - Hgb 8.4 today, prev 9.5 on admission.  Drop likely 2/2 hemodilution d/t fluid resuscitation.    - Trend CBC in AM       # Concern for possible malnutrition - In setting of malignancy.  Reported 50lb weight loss since diagnosis in 5/2020.  Has not been able to tolerate PO last 4 days due to nausea and vomiting.  On Marinol PTA for appetite stimulation.    - Nutrition consult in AM     # Chronic  hypomagnesemia - Likely 2/2 previous tx w/ cisplatin.  On PO magnesium supplementation PTA.   - Check Mag level   - Start electrolyte replacement if indicated   - Resume supplements once tolerating PO        Diet: NPO per Anesthesia Guidelines for Procedure/Surgery Except for: Meds    DVT Prophylaxis: Pneumatic Compression Devices  Em Catheter: not present  Code Status: Full Code           Disposition Plan   Expected discharge: 4 - 7 days, recommended to TBD once adequate pain management/ tolerating PO medications, hemoglobin stable and specialist evaluations complete.  Entered: VICKI Layne CNP 02/02/2021, 8:18 AM       The patient's care was discussed with the Attending Physician, Dr. Abram Baca.    VICKI Layne CNP  Hospitalist Service, [address]   Contact information available via [facility] Paging/Directory  Please see sign in/sign out for up to date coverage information  ______________________________________________________________________    Interval History   Fuentes is resting in bed.  His wife Bri is at the bedside.  Fuentes says that he began having nausea and vomiting on Friday evening, and starting feeling weaker in his legs that day as well.  Bri says that she took him to Urgent Care on Saturday for IV fluids as well, and that on Sunday his oral intake was a little bit better, but then his symptoms worsened on Monday so they spoke to the RN triage line w/ Yulissa, and they were advised to come to the U.  He denies having any abdominal pain or cramping, mostly just vomiting and nausea.  Unable to keep anything down.  No hematemesis.  Denies cough, chest pain, dyspnea, numbness/tingling in extremities. No complaints of pain.      Data reviewed today: I reviewed all medications, new labs and imaging results over the last 24 hours.     Physical Exam   Vital Signs: Temp: 96.8  F (36  C) Temp src: Oral BP: 123/76 Pulse: 67   Resp: 16  SpO2: 96 % O2 Device: None (Room air)    Weight: 177 lbs 0 oz    GENERAL: Alert and oriented x 3. Well developed but appears undernourished d/t illness.  No acute distress.    HEENT: Normocephalic, atraumatic. Anicteric sclera. Mucous membranes moist.   CV: RRR. S1, S2. No murmurs appreciated.   RESPIRATORY: Effort normal on room air. Lungs CTAB with no wheezing, rales, or rhonchi.   GI: Abdomen soft and non distended, bowel sounds present x all 4 quadrants. No tenderness, rebound, or guarding.   NEUROLOGICAL: No focal deficits. Follows commands.  Strength 4/5 in upper and lower extremities. Sensation intact.   MUSCULOSKELETAL: No joint swelling or tenderness. Moves all extremities.   EXTREMITIES: No gross deformities. No peripheral edema.   SKIN: Grossly warm, dry, and intact. No jaundice. No rashes.       Data   Recent Labs   Lab 02/02/21  0632 02/01/21  1638   WBC 2.9* 4.3   HGB 8.4* 9.5*   MCV 93 93    233   INR 1.83* 1.72*    135   POTASSIUM 3.6 3.6   CHLORIDE 106 104   CO2 27 26   BUN 8 10   CR 0.86 0.91   ANIONGAP 4 4   ERIC 8.1* 8.5   * 150*   ALBUMIN 1.6* 1.9*   PROTTOTAL 5.8* 6.8   BILITOTAL 1.9* 2.4*   ALKPHOS 544* 633*   ALT 30 34   * 90*   LIPASE  --  111     Recent Results (from the past 24 hour(s))   XR Abdomen Port 1 View    Narrative    Exam: XR ABDOMEN PORT 1 VW, 2/1/2021 8:51 PM    Indication: nausea, eval for bowel obstruction    Comparison: 1/19/2021 CT abdomen pelvis    Findings:   Supine, portable view of the abdomen. Numerous surgical clips  projecting over the right upper and right lower quadrant. Bowel gas  pattern is nonspecific. No pneumatosis or portal venous gas. Bridging  syndesmophytes in the mid thoracic spine.      Impression    Impression: No evidence of small or large bowel obstruction.    I have personally reviewed the examination and initial interpretation  and I agree with the findings.    MARIA C POWELL MD     Medications     lactated ringers 125 mL/hr  at 02/02/21 0507       ciprofloxacin  500 mg Oral Q12H BELKYS     metroNIDAZOLE  500 mg Oral TID

## 2021-02-02 NOTE — ED NOTES
"[facility]    ED Nurse to Floor Handoff     Fuentes Estrada is a 65 year old male who speaks English and lives with a spouse,  in a home  They arrived in the ED by car from home    ED Chief Complaint: No chief complaint on file.    ED Dx;   Final diagnoses:   Malnutrition, unspecified type (H)   Dehydration   Cholangiocarcinoma (H)         Needed?: No    Allergies:   Allergies   Allergen Reactions     Metformin Other (See Comments)     \" I think my kidneys shut down\"   .  Past Medical Hx:   Past Medical History:   Diagnosis Date     Cerebrovascular accident (CVA) (H) 12/2010     Cholangiocarcinoma (H)      Diabetes (H)      Essential hypertension, benign     Hypertension, Benign     Nonspecific abnormal results of liver function study     Hx of elevated LFT's      Baseline Mental status: WDL  Current Mental Status changes: at basesline    Infection present or suspected this encounter: cultures pending  Sepsis suspected: No  Isolation type: No active isolations  Patient tested for COVID 19 prior to admission: YES, patient tested on 1/30     Activity level - Baseline/Home:  Independent and Walker  Activity Level - Current:   Stand with Assist and Walker    Bariatric equipment needed?: No    In the ED these meds were given:   Medications   lactated ringers BOLUS 1,000 mL (0 mLs Intravenous Stopped 2/1/21 1902)     Followed by   lactated ringers BOLUS 1,000 mL (1,000 mLs Intravenous New Bag 2/1/21 1904)   ondansetron (ZOFRAN) injection 4 mg (has no administration in time range)       Drips running?  No    Home pump  No    Current LDAs  Port A Cath Single 09/28/20 Right Chest wall (Active)   Number of days: 126       Port A Cath Single 09/28/20 Right Chest wall (Active)   Site Assessment WDL 02/01/21 1648   Dressing Status clean;dry;intact 02/01/21 1648   Dressing Intervention Transparent 02/01/21 1648   Dressing change due 02/08/21 02/01/21 1648   Line Status Blood " "return noted;Saline locked 02/01/21 1648   Access Date 02/01/21 02/01/21 1648   Access Attempts 1 02/01/21 1648   Gauge Power noncoring 90 degree bend;3/4 inch;20 gauge 02/01/21 1648   Number of days: 126       Labs results:   Labs Ordered and Resulted from Time of ED Arrival Up to the Time of Departure from the ED   CBC WITH PLATELETS DIFFERENTIAL - Abnormal; Notable for the following components:       Result Value    RBC Count 3.19 (*)     Hemoglobin 9.5 (*)     Hematocrit 29.6 (*)     RDW 19.9 (*)     All other components within normal limits   COMPREHENSIVE METABOLIC PANEL - Abnormal; Notable for the following components:    Glucose 150 (*)     Bilirubin Total 2.4 (*)     Albumin 1.9 (*)     Alkaline Phosphatase 633 (*)     AST 90 (*)     All other components within normal limits   INR - Abnormal; Notable for the following components:    INR 1.72 (*)     All other components within normal limits   LACTIC ACID WHOLE BLOOD   LIPASE   BLOOD CULTURE   BLOOD CULTURE       Imaging Studies: No results found for this or any previous visit (from the past 24 hour(s)).    Recent vital signs:   /75   Pulse 71   Temp 98.2  F (36.8  C) (Oral)   Resp 27   Ht 1.854 m (6' 1\")   Wt 81.2 kg (179 lb)   SpO2 98%   BMI 23.62 kg/m      [place] Coma Scale Score: 15 (02/01/21 1630)       Cardiac Rhythm: Normal Sinus  Pt needs tele? No  Skin/wound Issues: None    Code Status: Full Code    Pain control: pt had none    Nausea control: pt had none    Abnormal labs/tests/findings requiring intervention: see results    Family present during ED course? Yes  Family Comments/Social Situation comments: Wife is staying at a hotel nearby, her number is listed. Please feel free to call as needed.     Tasks needing completion: None    Adrienne Blanco, RN  [phone] [facility]      "

## 2021-02-02 NOTE — OP NOTE
ERCP 02/02/2021  2:27 PM [address] [place] [address] [phone]     Endoscopy Department   _______________________________________________________________________________   Patient Name: Fuentes Estrada            Procedure Date: 2/2/2021 2:27 PM   MRN: [id number]                       Account Number: [id number]   YOB: 1955              Admit Type: Outpatient   Age: 65                               Room: OR   Gender: Male                          Note Status: Finalized   Attending MD: Chiki Swan MD   Total Sedation Time:   _______________________________________________________________________________       Procedure:           ERCP   Indications:         Suspected bile leak   Providers:           Chiki Swan MD   Patient Profile:     Mr Estrada is a 65 year old male with a history of                        perihilar/intrahepatic cholangiocarcinoma post radical                        extrahepatic bile duct resection, open right                        hepatectomy, portal lymph node dissection and Bianca-en-Y                        hepaticojejunostomy to left hepatic duct and peritoneal                        nodule biopsy in May 2020. Surgical pathology                        demonstrated persitent tumor at the right posterior                        hepatic duct margin and portal lymph node. EUS guided                        FNA followed with positive cytology for lymph node at                        the GDA/common hepatic artery concerning for peritoneal                        metastases. His clinical course was complicated by                        recurring fluid collection, post external drain                        placement with successful capping trial and eventual                        removal 11/2020, however there is a persistent abscess                        or fluid collection near the remnant lobe concerning for                         possible bile leak. He now proceeds to enteroscopy for                        ERCP to allow for stent placement and optimized internal                        drainage.   Referring MD:        Oswaldo Hale MD, Reta Purcell   Medicines:           General Anesthesia   Complications:       No immediate complications.   _______________________________________________________________________________   Procedure:           Pre-Anesthesia Assessment:                        - Prior to the procedure, a History and Physical was                        performed, and patient medications and allergies were                        reviewed. The patient is competent. The risks and                        benefits of the procedure and the sedation options and                        risks were discussed with the patient. All questions                        were answered and informed consent was obtained. Patient                        identification and proposed procedure were verified by                        the nurse in the pre-procedure area. Mental Status                        Examination: alert and oriented. Airway Examination:                        Mallampati Class II (the uvula but not tonsillar pillars                        visualized). Respiratory Examination: clear to                        auscultation. CV Examination: normal. ASA Grade                        Assessment: III - A patient with severe systemic                        disease. After reviewing the risks and benefits, the                        patient was deemed in satisfactory condition to undergo                        the procedure. The anesthesia plan was to use general                        anesthesia. Immediately prior to administration of                        medications, the patient was re-assessed for adequacy to                        receive sedatives. The heart rate, respiratory rate,                        oxygen  saturations, blood pressure, adequacy of                        pulmonary ventilation, and response to care were                        monitored throughout the procedure. The physical status                        of the patient was re-assessed after the procedure.                        After obtaining informed consent, the scope was passed                        under direct vision. Throughout the procedure, the                        patient's blood pressure, pulse, and oxygen saturations                        were monitored continuously. The pediatric colonoscope                        was introduced through the mouth, with the intention of                        advancing to the bile ducts. The scope was advanced to                        the duodenum before the procedure was aborted.                        Medications were given. The ERCP was accomplished                        without difficulty. The patient tolerated the procedure                        well.                                                                                     Findings:         films demonstated numerous surgical clips in the right upper        quadrant. A pediatric colonoscope was advanced to the biliary limb        without issue however the limb was edematous and tortuous, obviating        passage to the hepaticojejunostomy. The esophagus was unermarkable.        There was no demonstration of varices, however there was moderate        scaling of the stomach mucosa diffusely, suggesting portal hypertension.        There were no other lesions of the stomach, the duodenum or the proximal        jejunum upstream of a healthy, end to side jejunojejunostomy. The        biliary limb was identified, however as above, the HJ could not be        approached due to the edematous and tortuous course of the biliary limb.                                                                                     Impression:          -  Foregut notable for moderate scaling of the stomach                        suggestive of portal hypertension                        - Unremarkable duodenum and healthy appearing end to                        side jejunojejunosotmy                        - Edematous and tortuous biliary limb obviating passage                        of the pediatric gastroscope (and more than likely an                        enteroscope with overtube balloon)                        - ERCP not feasible   Recommendation:      - General anesthesia recovery with return to the floor                        when appropriate                        - No futher endoscopy planned at this juncture                        - Case should be reviewed with Dr Hale (or his team)                        who knows his course well and can provide input for                        management of the abscess/fluid collection, though the                        patient is strongly against repeat percutaneous                        manipulation                        - The findings and recommendations were discussed with                        the patient and their family                                                                                       electronically signed by TORY Swan

## 2021-02-02 NOTE — CONSULTS
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  He underwent surgical resection, however developed metastatic disease quite rapidly over the next few months.  See oncologic history outlined below.  His course has been complicated by postoperative bile leak and abscess, and had an external drain in place till November 2020. Course is also been complicated by episodes of cholangitis and sepsis.  He has had a persistent fluid collection around his operative site s which has been concerning for abscess versus leak and he has been on prolonged antibiotics for this.  An external drain was not advised (see Dr. Blank note 1/27/2021).  He is planned to undergo an ERCP with possible internal drainage today.  He presented to the ED yesterday with complaints of nausea and vomiting, inability to tolerate p.o., and progressive weakness.  His last cycle of chemotherapy with cisplatin and gemcitabine was in December 2020, and has been on hold because of the fluid collection.    Problem list:   #Metastatic cholangiocarcinoma  #Failure to thrive  #Weakness   #nausea vomiting  #Perihepatic fluid collection, abscess versus leak    Summary of Recommendations:    Appreciate GI evaluation and plan for ERCP/possible internal drainage of fluid collection this afternoon     Supportive cares per primary team    Appreciate palliative care involvement    We will notify Dr. Beard of his admission    Agree with PT and OT evaluation given progressive weakness    Thank you for involving us in the care of this patient. We will continue to follow during the  hospitalization.    Patient was seen and plan of care developed with Dr. Bradshaw           History of Present Illness:   History obtained from chart review and confirmed with patient.    Fuentes Estrada is a 65 year old male with a PMH of HTN, DM, CVA with metastatic cholangiocarcinoma, see oncological history outlined below, most recently on cisplatin/gemcitabine (last cycle was in 12/2020, currently on hold due to janelle-hepatic fluid collection), who is admitted for nausea and vomiting, weakness and failure to thrive.   His last CT abdomen was 1/19/2021, without any bowel obstruction, with stable large volume ascites. XR abdomen on admission with no acute pathology.   At time of interview, he reports that he has had intermittent nausea and vomiting for quite some time, but probably gotten worse over the past week. He tried antiemetics at home without much benefit. He has been unable to tolerate food intake at home and has been progressively getting weaker. He denies abdominal pain or fevers. He denies blood in stool or blood in vomitus. He reports that he has been lying in bed most of the day due to progressive weakness.     Oncology history from clinic notes  Fuentes Estrada is a 65 year old man with a prior hx of HTN, DM, CVA on plavix who presented with elevated LFTs in the spring of 2020. 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 On May 12, 2020 he underwent  A radical extrahepatic bile duct resection and R hepatectomy.   He had all of his disease resected with negative margins and one positive lymph node.  He had a complicated postoperative course with a bile leak and abscess that had delayed starting his adjuvant chemotherapy.       On imaging in July 2020,  he appeared to be developing increasing nodularity in the resection bed and regional lymph nodes that has led to an EUS with a fine-needle aspiration of the lymph nodes on 8/11/20,  demonstrating the presence of metastatic disease.      On 8/27/20, he  "initiated treatment with palliative intent Cisplatin/Gemcitabine.      His abscess/biliary drain was managed by IR. Sinograms identified a communication to the biliary tree. He failed a capping trial with development of a fever. He underwent sinograms and cavitary sclerotherapy every 2 weeks starting August 25, 2020.  Drain was removed on 11/9/2020 9/28/20: port placement     Admission to [facility] 10/9/20-10/11/20 with septic shock from infectious enterocolitis, all cultures NGTD. Chemo delayed one week for recovery.  Cis/Crumrod restarted 10/21.     Admission to [facility] 11/18/20-11/19/20 with cholangitis - fever 100.8, bilirubin 3.2, elevated alk phos. MRCP showed no stones or strictures. Paracentesis removed 1L of fluid, no signs of SBP. He was dismissed on a 7-day course of Flagyl and ciprofloxacin.  C5D8 was delayed due to hospitalization.     He was seen on 12/14/2020 with an interim CT scan which showed findings concerning for abscess at the operative site along the posterior aspect of liver with gas seen in this fluid collection.  Patient was also having chills, chemotherapy was held and he was started on ciprofloxacin and Flagyl.  There was further discussion about management of this recurrent abscess with Dr. Purcell, Dr. Hale and Dr. Strauss about the abscess.  It was felt that aspiration is not likely to prevent recurrence of the abscess. A biliary drain is possible, but likely to be permanent. It has been concluded an endoscopic procedure is not possible. At 12/21/20 visit with Dr. Beard, antibiotic course was extended and he finished 12/31/20. Disease on 12/11/20 was stable\"         Review of Systems:   A comprehensive ROS was performed with the patient and was found to be negative with the exception of that noted in the HPI above.       Past Medical History:     Past Medical History:   Diagnosis Date     Cerebrovascular accident (CVA) (H) 12/2010     Cholangiocarcinoma (H)      Diabetes " (H)      Essential hypertension, benign     Hypertension, Benign     Nonspecific abnormal results of liver function study     Hx of elevated LFT's            Past Surgical History:     Past Surgical History:   Procedure Laterality Date     ENDOSCOPIC RETROGRADE CHOLANGIOPANCREATOGRAM N/A 3/31/2020    Procedure: ENDOSCOPIC RETROGRADE CHOLANGIOPANCREATOGRAPHY, WITH with biliary sphincterotomy, biopsies and brushings, biliary stent placement;  Surgeon: Win Strauss MD;  Location: [facility] OR     ENDOSCOPIC RETROGRADE CHOLANGIOPANCREATOGRAM N/A 4/6/2020    Procedure: ENDOSCOPIC RETROGRADE CHOLANGIOPANCREATOGRAPHY;  Surgeon: Win Strauss MD;  Location: [facility] OR     ENDOSCOPIC RETROGRADE CHOLANGIOPANCREATOGRAM WITH SPYGLASS  4/16/2020    Procedure: ENDOSCOPIC RETROGRADE CHOLANGIOPANCREATOGRAPHY, WITH DIRECT DUCT VISUALIZATION, USING PANCREATICOBILIARY FIBEROPTIC PROBE; Bile Duct Stent Exchange and Biopsy,balloon sweep of bile duct;  Surgeon: Win Strauss MD;  Location: [facility] OR     ENDOSCOPIC ULTRASOUND UPPER GASTROINTESTINAL TRACT (GI) N/A 4/16/2020    Procedure: ENDOSCOPIC ULTRASOUND, ESOPHAGOSCOPY / UPPER GASTROINTESTINAL TRACT (GI)with Fine Needle biopsy;  Surgeon: Cody Baumann MD;  Location: [facility] OR     ENDOSCOPIC ULTRASOUND UPPER GASTROINTESTINAL TRACT (GI) N/A 8/11/2020    Procedure: ENDOSCOPIC ULTRASOUND, ESOPHAGOSCOPY / UPPER GASTROINTESTINAL TRACT (GI);  Surgeon: Guru Bailey Rossi MD;  Location: [facility] GI     ESOPHAGOSCOPY, GASTROSCOPY, DUODENOSCOPY (EGD), COMBINED N/A 4/6/2020    Procedure: ESOPHAGOGASTRODUODENOSCOPY (EGD);  Surgeon: Win Strauss MD;  Location: [facility] OR     ESOPHAGOSCOPY, GASTROSCOPY, DUODENOSCOPY (EGD), COMBINED N/A 8/11/2020    Procedure: Esophagogastroduodenoscopy, With Fine Needle Aspiration Biopsy, With Endoscopic Ultrasound Guidance;  Surgeon: Guru Bailey Rossi MD;  Location: [facility]     EXPLORE COMMON BILE DUCT N/A 5/12/2020     Procedure: extra-hepatic bile duct resection;  Surgeon: Oswaldo Hale MD;  Location: [facility] OR     HC KNEE SCOPE, DIAGNOSTIC  1995    Arthroscopy, Knee; left     HC VASECTOMY UNILAT/BILAT W POSTOP SEMEN      Vasectomy     HEPATECTOMY PARTIAL N/A 5/12/2020    Procedure: Exploratory Laparotomy, Open right hepatectomy, Radical Extra-Hepatic Bile Duct Resection, Portal Lymph Node Dissection, Intraoperative Ultrasound, Intra Air-Cholangiogram ,Bianca-En-Y Left Hepaticojejunostomy, Excision Skin Lesion;  Surgeon: Oswaldo Hale MD;  Location: [facility] OR     INSERT PORT VASCULAR ACCESS Right 9/28/2020    Procedure: ultrasound guided right internal venous access port placement with flouroscopy;  Surgeon: Fercho Wayne DO;  Location: [facility]     IR ABSCESS TUBE CHANGE  6/12/2020     IR FOLLOW UP VISIT OUTPATIENT  7/24/2020     IR FOLLOW UP VISIT OUTPATIENT  8/3/2020     IR FOLLOW UP VISIT OUTPATIENT  10/19/2020     IR SINOGRAM INJECTION DIAGNOSTIC  7/14/2020     IR SINOGRAM INJECTION DIAGNOSTIC  9/14/2020     IR SINOGRAM INJECTION DIAGNOSTIC  10/13/2020     IR SINOGRAM INJECTION THERAPEUTIC  8/25/2020     IR SINOGRAM INJECTION THERAPEUTIC  9/25/2020     IR SINOGRAM INJECTION THERAPEUTIC  10/2/2020     IR SINOGRAM INJECTION THERAPEUTIC  9/18/2020     IR THORACENTESIS  5/16/2020     LYMPHADENECTOMY ABDOMINAL N/A 5/12/2020    Procedure: portal lymph node dissection, intraoperative liver ultrasound;  Surgeon: Oswaldo Hale MD;  Location: [facility] OR            Social History:     Social History     Socioeconomic History     Marital status:      Spouse name: Robyn     Number of children: 2     Years of education: 14     Highest education level: Not on file   Occupational History     Occupation: self employed   Social Needs     Financial resource strain: Not on file     Food insecurity     Worry: Not on file     Inability: Not on file     Transportation needs     Medical: Not on file     Non-medical: Not on file  "  Tobacco Use     Smoking status: Never Smoker     Smokeless tobacco: Never Used   Substance and Sexual Activity     Alcohol use: Yes     Comment: occaisional      Drug use: No     Sexual activity: Yes     Partners: Female   Lifestyle     Physical activity     Days per week: Not on file     Minutes per session: Not on file     Stress: Not on file   Relationships     Social connections     Talks on phone: Not on file     Gets together: Not on file     Attends [employer] service: Not on file     Active member of club or organization: Not on file     Attends meetings of clubs or organizations: Not on file     Relationship status: Not on file     Intimate partner violence     Fear of current or ex partner: Not on file     Emotionally abused: Not on file     Physically abused: Not on file     Forced sexual activity: Not on file   Other Topics Concern      Service No     Blood Transfusions No     Caffeine Concern No     Occupational Exposure No     Hobby Hazards No     Sleep Concern No     Stress Concern No     Weight Concern No     Special Diet No     Back Care No     Exercise Yes     Bike Helmet No     Seat Belt Yes     Self-Exams No     Parent/sibling w/ CABG, MI or angioplasty before 65F 55M? Not Asked   Social History Narrative     Not on file            Family History:     Family History   Problem Relation Age of Onset     Arthritis Mother         RA     Diabetes Father         diet controlled     Hypertension Father             Allergies:     Allergies   Allergen Reactions     Metformin Other (See Comments)     \" I think my kidneys shut down\"            Medications:     Medications Prior to Admission   Medication Sig Dispense Refill Last Dose     atorvastatin (LIPITOR) 40 MG tablet Take 40 mg by mouth At Bedtime         ciprofloxacin (CIPRO) 500 MG tablet Take 1 tablet (500 mg) by mouth 2 times daily 28 tablet 0      clopidogrel (PLAVIX) 75 MG tablet Take 75 mg by mouth At Bedtime         dexamethasone " "(DECADRON) 4 MG tablet Take 1 tablet (4 mg) by mouth daily (with breakfast) Daily for the 3 days after chemotherapy in the morning with food 3 tablet 1      dronabinol (MARINOL) 5 MG capsule Take 2 capsules in am and 1 capsule in pm (before meals) 10 capsule 0      dronabinol (MARINOL) 5 MG capsule Take 2 capsules in am and 1 capsule in pm (before meals) 80 capsule 0      fenofibrate (TRIGLIDE/LOFIBRA) 160 MG tablet Take 160 mg by mouth At Bedtime         Ferrous Sulfate (IRON) 325 (65 Fe) MG tablet Take 1 tablet by mouth 3 times daily (with meals) 180 tablet 1      furosemide (LASIX) 20 MG tablet Take 20 mg by mouth every evening         glipiZIDE (GLUCOTROL) 10 MG tablet Take 10 mg by mouth At Bedtime         lisinopril (ZESTRIL) 30 MG tablet Take 30 mg by mouth every evening         LORazepam (ATIVAN) 0.5 MG tablet Take 1 tablet (0.5 mg) by mouth every 4 hours as needed (Anxiety, Nausea/Vomiting or Sleep) 30 tablet 5      magnesium gluconate 30 MG tablet Take 1 tablet (30 mg) by mouth daily 90 tablet 3      metroNIDAZOLE (FLAGYL) 500 MG tablet Take 1 tablet (500 mg) by mouth 3 times daily 42 tablet 0      ondansetron (ZOFRAN) 8 MG tablet Take 1 tablet (8 mg) by mouth every 8 hours as needed (Nausea/Vomiting) 10 tablet 5      oxyCODONE (ROXICODONE) 5 MG tablet Take 1-2 tablets (5-10 mg) by mouth every 4 hours as needed for moderate to severe pain 20 tablet 0      prochlorperazine (COMPAZINE) 10 MG tablet Take 0.5 tablets (5 mg) by mouth every 6 hours as needed (Nausea/Vomiting) 30 tablet 5      sildenafil (REVATIO) 20 MG tablet Take 20 mg by mouth as needed        zolpidem (AMBIEN) 10 MG tablet Take 1 tablet (10 mg) by mouth nightly as needed for sleep 30 tablet 0             Physical Exam:   /76 (BP Location: Right arm)   Pulse 67   Temp 96.8  F (36  C) (Oral)   Resp 16   Ht 1.854 m (6' 1\")   Wt 80.3 kg (177 lb)   SpO2 96%   BMI 23.35 kg/m    Vitals:    02/01/21 1612 02/01/21 2333   Weight: 81.2 kg " (179 lb) 80.3 kg (177 lb)     General: Appears tired, lying in bed, in no acute distress.  Heme/Lymph: No overt bleeding.   Skin: No concerning lesions   ecchymoses on exposed surfaces.  HEENT: NCAT. EOMI, anicteric sclera.  Respiratory: Non-labored breathing,  Cardiovascular: Regular rate and rhythm.   Gastrointestinal: Normoactive bowel sounds. Abdomen soft  Extremities: No extremity edema.   Neurologic: A&O x 3         Data:   I have personally reviewed the following labs/imaging:  CBC  Recent Labs   Lab 02/02/21  0632 02/01/21  1638   WBC 2.9* 4.3   RBC 2.69* 3.19*   HGB 8.4* 9.5*   HCT 25.1* 29.6*   MCV 93 93   MCH 31.2 29.8   MCHC 33.5 32.1   RDW 19.9* 19.9*    233     CMP  Recent Labs   Lab 02/02/21  0632 02/01/21  1638    135   POTASSIUM 3.6 3.6   CHLORIDE 106 104   CO2 27 26   ANIONGAP 4 4   * 150*   BUN 8 10   CR 0.86 0.91   GFRESTIMATED 89 86   GFRESTBLACK >90 >90   ERIC 8.1* 8.5   PROTTOTAL 5.8* 6.8   ALBUMIN 1.6* 1.9*   BILITOTAL 1.9* 2.4*   ALKPHOS 544* 633*   * 90*   ALT 30 34     INR  Recent Labs   Lab 02/02/21  0632 02/01/21  1638   INR 1.83* 1.72*

## 2021-02-03 NOTE — ANESTHESIA POSTPROCEDURE EVALUATION
Patient: Fuentes Estrada    Procedure(s):  Enteroscopy small bowel    Diagnosis:Cholangiocarcinoma (H) [C22.1]  Diagnosis Additional Information: No value filed.    Anesthesia Type:  General    Note:  Disposition: Outpatient   Postop Pain Control: Uneventful            Sign Out: Well controlled pain   PONV: No   Neuro/Psych: Uneventful            Sign Out: Acceptable/Baseline neuro status   Airway/Respiratory: Uneventful            Sign Out: Acceptable/Baseline resp. status   CV/Hemodynamics: Uneventful            Sign Out: Acceptable CV status   Other NRE: NONE   DID A NON-ROUTINE EVENT OCCUR? No         Last vitals:  Vitals:    02/02/21 1737 02/02/21 1946 02/02/21 2306   BP: 129/79 114/73 114/70   Pulse: 63 72 72   Resp: 14 14 16   Temp: 35.1  C (95.2  F) 36.6  C (97.9  F) 36.7  C (98  F)   SpO2: 97% 98% 95%       Electronically Signed By: Edmund Guajardo MD  February 2, 2021  11:07 PM

## 2021-02-03 NOTE — PROGRESS NOTES
02/03/21 1100   Quick Adds   Type of Visit Initial PT Evaluation   Living Environment   People in home spouse   Current Living Arrangements house   Home Accessibility stairs to enter home   Number of Stairs, Main Entrance 3   Stair Railings, Main Entrance railing on right side (ascending)  (on 2 steps, then taller threshold into home)   Transportation Anticipated family or friend will provide   Living Environment Comments Pt's wife reports she has been providing steadying assist with pt amb with aminah schwab, min A for transfers if needed, assists with LE dressing at times if pt fatigued, assisting with sponge baths as pt no energy to get into shower (has bench)   Self-Care   Usual Activity Tolerance fair   Current Activity Tolerance fair   Regular Exercise No   Equipment Currently Used at Home walker, rolling   Activity/Exercise/Self-Care Comment See zoie environ, Pt receiving progressively increased assist at home from wife (wife present with PT gunjan today)   Disability/Function   Hearing Difficulty or Deaf no   Wear Glasses or Blind yes   Vision Management glasses   Concentrating, Remembering or Making Decisions Difficulty yes   Concentration Management Wife assists in accurate subjective interview   Walking or Climbing Stairs Difficulty yes   Walking or Climbing Stairs ambulation difficulty, requires equipment;ambulation difficulty, assistance 1 person;stair climbing difficulty, assistance 1 person;transferring difficulty, assistance 1 person   Mobility Management wife CGA to min A with pt ghada schwab   Dressing/Bathing Difficulty yes   Dressing/Bathing bathing difficulty, assistance 1 person;dressing difficulty, assistance 1 person   Toileting issues no  (Puja reports pt usually I except assist for balance into room)   Change in Functional Status Since Onset of Current Illness/Injury yes   General Information   Onset of Illness/Injury or Date of Surgery 02/01/21   Patient/Family Therapy Goals Statement (PT)  return home, today preferrably   Pertinent History of Current Problem (include personal factors and/or comorbidities that impact the POC) 65 year old male with past medical history significant for HTN, type 2 DM, CVA, and recently diagnosed hilar cholangiocarcinoma (dx 5/2020) s/p R hepatectomy with radical extrahepatic bile duct resection now w/ peritoneal mets c/b recurrent cholangitis as well as perihepatic fluid collection s/p external drain placement (removed in 11/2020) and now persistent and c/f abscess vs bile leak currently on abx admitted for nausea and vomiting, inability to tolerate PO, and progressive weakness and falls.     Existing Precautions/Restrictions fall   Heart Disease Risk Factors Medical history   General Observations Pt supportive wife present, reports pt closer to baseline mobility (she provides CGA to min A usually with pt using wh walker) than he was last Weekend when admitted   Cognition   Orientation Status (Cognition) person;place   Affect/Mental Status (Cognition) flat/blunted affect;sad/depressed affect   Follows Commands (Cognition) follows one-step commands;50-74% accuracy;physical/tactile prompts required;repetition of directions required;verbal cues/prompting required   Safety Deficit (Cognition) moderate deficit;awareness of need for assistance;insight into deficits/self-awareness   Posture    Posture Protracted shoulders;Forward head position   Range of Motion (ROM)   ROM Comment B LEs decreased overall due to deconditioning   Strength   Strength Comments B LEs decreased overall due to deconditioning   Bed Mobility   Comment (Bed Mobility) Min A sit>supine   Transfers   Transfer Safety Comments Min A sit<>stand   Gait/Stairs (Locomotion)   Comment (Gait/Stairs) Min A to amb 65' wh walker   Balance   Balance Comments unsteady wh walker turning, no overt LOB this session   Clinical Impression   Criteria for Skilled Therapeutic Intervention yes, treatment indicated   PT Diagnosis  (PT) impaired functional mobility   Influenced by the following impairments decreased strength, balance, act tolerance   Functional limitations due to impairments decreased I with transfers, gait, bed mob, barrier navigaiton, ADLs   Clinical Presentation Stable/Uncomplicated   Clinical Presentation Rationale clinicla judgement   Clinical Decision Making (Complexity) low complexity   Therapy Frequency (PT) 6x/week   Predicted Duration of Therapy Intervention (days/wks) 2/8   Planned Therapy Interventions (PT) balance training;gait training;home exercise program;patient/family education;stair training;strengthening;transfer training   Risk & Benefits of therapy have been explained care plan/treatment goals reviewed;risks/benefits reviewed;participants voiced agreement with care plan;participants included;patient;spouse/significant other   PT Discharge Planning    PT Discharge Recommendation (DC Rec) Transitional Care Facility   PT Rationale for DC Rec Per OT pt declining TCU (Pt session interrupted by MD before able to discuss with PT) but pt well below basline and needing some A from wife for most mobility and ADLs at home, would benefit from TCU stay to progress functional act tolerance and safety with mobility. If does d/c home, highly reccoment 24/7 assistance and home PT/OT.   PT Brief overview of current status  Min A transfers wh walker and short gait, fatigues very quickly   Total Evaluation Time   Total Evaluation Time (Minutes) 7

## 2021-02-03 NOTE — PROGRESS NOTES
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  EUS guided FNA with positive cytology for lymph node at the GDA/common hepatic artery concerning for peritoneal metastases.  Clinical course complicated by recurring fluid collection, s/p external drain placement with successful capping trial and eventual removal 11/2020, persistent abscess/ fluid collection near the remnant liver concerning for possible bile leak with plans for ERCP with internal drainage.     Unfortunately ERCP unsuccessful in reaching the hepaticojejunostomy anastomosis due to a edematous and tortuous biliary limb. No further endoscopy planned and case should be reviewed with the surgical oncology team (Dr. Hale) who can provide input for management of the abscess/fluid collection, though the patient is strongly against repeat percutaneous manipulation.     RECOMMENDATIONS:  No additional endoscopy planned  Pain/nausea control per primary team  Diet as tolerated    Discussed with primary team    The inpatient advanced endoscopy/pancreaticobiliary service will sign off at this time. Please call or repost with questions or if status changes. Thank you for allowing us to participate in the care of this patient.    The patient was discussed and plan agreed upon with GI staff, Dr Arellano.      Bharati Napier PA-C  Advanced Endoscopy/Pancreaticobiliary GI Service  [facility]  Text Page  _______________________________________________________________      Subjective: Nursing notes and 24hr  "events reviewed. Patient seen and examined at 1030. Patient reports that he is feeling \"about the same\" this morning. Tolerated a muffin and peaches for breakfast. No further vomiting.    ROS:   4 pt ROS negative unless noted in subjective.     Objective:  Blood pressure (!) 142/75, pulse 73, temperature 97.8  F (36.6  C), temperature source Oral, resp. rate 16, height 1.854 m (6' 1\"), weight 80.3 kg (177 lb), SpO2 96 %.  Gen: Sitting in bed. Appears comfortable but chronically ill  HEENT: NCAT. Conjunctiva clear. Sclera icteric  Resp: normal work of breathing  Msk: no gross deformity  Skin:  mild jaundice  Ext: warm, well perfused  Neuro: grossly normal  Mental status/Psych: A&O. Asks/answers questions appropriately     Date 02/03/21 0700 - 02/04/21 0659   Shift 6733-8880 6431-9564 5516-4072 24 Hour Total   INTAKE   P.O. 240   240   Shift Total(mL/kg) 240(2.99)   240(2.99)   OUTPUT   Shift Total(mL/kg)       Weight (kg) 80.29 80.29 80.29 80.29         PROCEDURES:  EGD 2/2 Dr. Swan  - Foregut notable for moderate scaling of the stomach                        suggestive of portal hypertension                        - Unremarkable duodenum and healthy appearing end to                        side jejunojejunosotmy                        - Edematous and tortuous biliary limb obviating passage                        of the pediatric gastroscope (and more than likely an                        enteroscope with overtube balloon)                        - ERCP not feasible     LABS:  BMP  Recent Labs   Lab 02/03/21  0647 02/02/21  0632 02/01/21  1638    136 135   POTASSIUM 3.6 3.6 3.6   CHLORIDE 106 106 104   ERIC 8.4* 8.1* 8.5   CO2 26 27 26   BUN 9 8 10   CR 0.86 0.86 0.91   GLC 95 106* 150*     CBC  Recent Labs   Lab 02/03/21  0647 02/02/21  0632 02/01/21  1638   WBC 3.2* 2.9* 4.3   RBC 2.92* 2.69* 3.19*   HGB 9.0* 8.4* 9.5*   HCT 27.7* 25.1* 29.6*   MCV 95 93 93   MCH 30.8 31.2 29.8   MCHC 32.5 33.5 32.1   RDW " 20.0* 19.9* 19.9*    166 233     INR  Recent Labs   Lab 02/02/21  0632 02/01/21  1638   INR 1.83* 1.72*     LFTs  Recent Labs   Lab 02/03/21  0647 02/02/21  0632 02/01/21  1638   ALKPHOS 602* 544* 633*   * 108* 90*   ALT 35 30 34   BILITOTAL 2.1* 1.9* 2.4*   PROTTOTAL 6.0* 5.8* 6.8   ALBUMIN 1.7* 1.6* 1.9*      PANC  Recent Labs   Lab 02/02/21  0632 02/01/21  1638   LIPASE 196 111   AMYLASE 75  --          IMAGING:  CT ABDOMEN PELVIS W CONTRAST 1/19/2021 1:15 PM     CLINICAL HISTORY: Cholangiocarcinoma (H). History of bile leak.     TECHNIQUE: CT scan of the abdomen and pelvis was performed following  injection of IV contrast. Multiplanar reformats were obtained. Dose  reduction techniques were used.  CONTRAST: 85 ml Isovue- 370 mL     COMPARISON: Multiple, most recent 1/4/2021     FINDINGS:   LOWER CHEST: Small bilateral pleural effusions and concordant basilar  compressive atelectasis.     HEPATOBILIARY: Stable post operative changes posterior right hepatic  lobe consistent with partial right hepatic lobe resection.  Low-attenuation collection posterior right hepatic lobe operative bed  now measures 4.6 x 2.5 cm, previously 3.5 x 2.1 cm. Stable cirrhotic  configuration of the liver with portal hypertension. Similar large  volume of abdominal ascites.     PANCREAS: Similar tiny gas bubble at the distal common duct at the  pancreatic head.     SPLEEN: Normal.     ADRENAL GLANDS: Normal.     KIDNEYS/BLADDER: Normal.     BOWEL: Normal.     PELVIC ORGANS: Stable prostate gland enlargement.     ADDITIONAL FINDINGS: Normal.     MUSCULOSKELETAL: Degenerative bony changes.                                                                      IMPRESSION:   1.  Stable postoperative changes posterior right hepatic lobe  consistent with partial right hepatic lobe resection. Low-attenuation  collection posterior right hepatic lobe operative bed now measures 4.6  x 2.5 cm, previously 3.5 x 2.1 cm.  2.  Stable  cirrhotic configuration of the liver with portal  hypertension. Stable large volume abdominal ascites.  3.  Stable tiny gas bubble distal common bile duct at pancreatic head.  4.  Small bilateral pleural effusions with concordant basilar  compressive atelectasis, increased from prior exam.

## 2021-02-03 NOTE — PLAN OF CARE
Alert and oriented x 4. Anxious to go home.Vital signs stable. On room air.Denies pain. Fair oral intake.Voids using urinal. Up with assist of one to bedside chair and using gait belt. Intervention/Assessment: Reviewed discharge instruction with patient and patient's wife, questions answered. De accessed port. Discharged, left the unit by wheelchair, had their belongings with them. Patient and wife appreciated care.

## 2021-02-03 NOTE — CONSULTS
Care Management Discharge Note    Discharge Date: 02/04/21       Discharge Disposition:  Home with home care services once stable for discharge by the MD team.    Discharge Services:  Skilled home care RN, PT and OT.    Discharge DME:  No Request    Discharge Transportation: Spouse:  Robyn    Patient/family educated on Medicare website which has current facility and service quality ratings:  Yes    Education Provided on the Discharge Plan:  Yes  Persons Notified of Discharge Plans: Patient and spouse  Patient/Family in Agreement with the Plan:  Yes    Handoff Referral Completed: Yes    Additional Information  : The following home care plans of care were arranged after offering choice of all home care agencies in home area:    Please fax discharge orders to [facility]  [place] Office     Ph:  [phone]     Fax: [phone]     Skilled home care RN for initial home safety evaluation and 1-3 times a week to evaluate medication management, nutrition and hydration evaluation, endurance evaluation, and general status evaluation after discharge from the acute care hospital setting.     Skilled home care RN to assist with management and education reinforcement as designated in MD team discharge orders/plan of care.  Please admit Mr. Estrada to home care as soon as staffing allows and or Saturday February 6, 2021 will suffice as an admission date.  Patient will discharge to home with spouse, Ms. Robyn Estrada who is patients designated caregiver in home setting.     Skilled home care Physical Therapy and Occupational Therapy to evaluate and treat in home setting per recommendations of the inpatient Rehabilitation Consult Team.    Inpatient Care Coordinator RN will continue to follow for updated transition needs prn.    Ashanti Fernández,  CHAO.S.N., R.N., P.H.N..  Care Coordinator     Pager   [facility]

## 2021-02-03 NOTE — PROGRESS NOTES
"CLINICAL NUTRITION SERVICES - ASSESSMENT NOTE     Nutrition Prescription    RECOMMENDATIONS FOR MDs/PROVIDERS TO ORDER:  None at this time     Malnutrition Status:    Severe malnutrition in the context of chronic illness    Recommendations already ordered by Registered Dietitian (RD):  PRN supplement order    Future/Additional Recommendations:  Encourage PO intake with meals and snacks/supplements between meals, especially while appetite remains low (can schedule supplements and snacks between meals, pt today prefers a PRN supplement order for now).  If poor PO intake continues  calorie counts to help assess PO intake adequacy.  If ongoing poor PO intake and/or weight loss, consider need for nutrition support if appropriate/pt agreeable.     REASON FOR ASSESSMENT  Fuentes Estrada is a/an 65 year old male assessed by the dietitian for Provider Order - \"Concern for malnutrition in setting of malignancy, 50 lb weight loss since dx, surgery in 5/2020\" and Malnutrition screening tool score >/=2    NUTRITION HISTORY  Pt reports poor PO intake since last Friday 2/2 nausea/vomiting, prior to that was eating more at his baseline but says even then he didn't have a \"typical\" day of eating and just eats when he's hungry.  Pt does not like Boost supplement drinks.  Pt denies any known food allergies.     Per chart PMH includesmetastatic hilar cholangiocarcinoma s/p R hepatectomy w/ radical extrahepatic bile duct resection now w/ peritoneal mets c/b recurrent cholangitis as well as perihepatic fluid collection s/p external drain placement (removed in 11/2020); and T2DM.  Pt has been off chemo since 12/9/20.  On marinol PTA per provider note.     CURRENT NUTRITION ORDERS  Diet: Regular  Intake/Tolerance: pt says he had a muffin and juice this morning.  Pt reports he would like to go home today.    LABS  Labs reviewed  - Mg++ 1.3 (L) --> per provider note yesterday pt with chronic hypomagnesemia \"likely 2/2 previous tx w/ " "cisplatin. On PO magnesium\"    MEDICATIONS  Medications reviewed    ANTHROPOMETRICS  Height: 185.4 cm (6' 1\")  Most Recent Weight: 80.3 kg (177 lb)    IBW: 83.6 kg   BMI: Normal BMI  Weight History: Wt appears to be mostly stable since Aug 2020, with some fluctuations.  Weight is overall down 28 lb since May 2020 (14% wt loss).  Pt says before getting sick his UBW was 226 lb  Wt Readings from Last 40 Encounters:   02/01/21 80.3 kg (177 lb)   01/27/21 80.3 kg (177 lb)   01/04/21 80.3 kg (177 lb)   12/09/20 81.5 kg (179 lb 11.2 oz)   11/25/20 81.2 kg (179 lb 1.6 oz)   11/19/20 77.8 kg (171 lb 9.6 oz)   11/11/20 83.3 kg (183 lb 9.6 oz)   10/28/20 80.5 kg (177 lb 8 oz)   10/21/20 83.9 kg (184 lb 14.4 oz)   10/13/20 83 kg (183 lb)   10/07/20 83.1 kg (183 lb 4.8 oz)   10/02/20 79.8 kg (176 lb)   09/28/20 79.8 kg (176 lb)   09/25/20 79.8 kg (176 lb)   09/23/20 81.6 kg (180 lb)   09/23/20 80 kg (176 lb 4.8 oz)   09/18/20 79.1 kg (174 lb 6.4 oz)   09/16/20 79.1 kg (174 lb 6.4 oz)   09/14/20 80.7 kg (178 lb)   09/02/20 80.4 kg (177 lb 4.8 oz)   08/27/20 80 kg (176 lb 4.8 oz)   06/09/20 87.5 kg (193 lb)   06/01/20 (P) 87.5 kg (193 lb)   05/15/20 98 kg (216 lb 1.6 oz)   05/04/20 93 kg (205 lb)   04/16/20 92.9 kg (204 lb 12.9 oz)   04/05/20 93.1 kg (205 lb 3.2 oz)   03/31/20 94.6 kg (208 lb 8.9 oz)   06/12/02 102.5 kg (226 lb)   01/31/02 105.5 kg (232 lb 8 oz)   01/21/02 103.9 kg (229 lb)      Dosing Weight: 80 kg (actual)    ASSESSED NUTRITION NEEDS  Estimated Energy Needs: 3514-6516 kcals/day (30 - 35 kcals/kg)  Justification: Increased needs with metastatic disease  Estimated Protein Needs:  grams protein/day (1.2 - 1.5 grams of pro/kg)  Justification: Increased needs with metastatic disease  Estimated Fluid Needs: (1 mL/kcal)   Justification: Maintenance, or other per provider pending fluid status    PHYSICAL FINDINGS  See malnutrition section below.    MALNUTRITION  % Intake: </= 50% for >/= 5 days (severe)  % Weight " Loss: Up to 20% in 1 year (non-severe)  Subcutaneous Fat Loss: Facial region, Upper arm, and Lower arm: moderate observed upper body only  Muscle Loss: Temporal, Facial & jaw region, Upper arm (bicep, tricep), and Lower arm  (forearm):  Moderate observed upper body only  Fluid Accumulation/Edema: mild per flowsheets  Malnutrition Diagnosis: Severe malnutrition in the context of chronic illness    NUTRITION DIAGNOSIS  Inadequate oral intake related to poor PO intake PTA 2/2 nausea vomiting as evidenced by poor PO intake x 5 days     INTERVENTIONS  Implementation  Nutrition Education: Provided education on role of RD and nutrition POC.  Suggested scheduled supplements and/or snacks to help increase PO intake, pt politely declined at this time but knows they are available.  Pt does not have any questions for RD at this time.   Medical food supplement therapy: PRN    Goals  Patient to consume % of nutritionally adequate meal trays TID, or the equivalent with supplements/snacks.     Monitoring/Evaluation  Progress toward goals will be monitored and evaluated per protocol.     Dolores Yadav RD, LD  7C RD pager: [phone]

## 2021-02-03 NOTE — PROGRESS NOTES
Documentation of Face to Face and Certification for Home Health Services     I certify that patient: Fuentes Estrada is under my care and that I, or a nurse practitioner or physician's assistant working with me, had a face-to-face encounter that meets the physician face-to-face encounter requirements with this patient on: February 3, 2021.     This encounter with the patient was in whole, or in part, for the following medical condition, which is the primary reason for home health care: Cholangiocarcinoma and admitted with malnutrition, dehydration and weakness.     I certify that, based on my findings, the following services are medically necessary home health services: Skilled home care RN, PT and OT.     My clinical findings support the need for the above services because: MD orders and recommendation by the inpatient [facility] interdisciplinary team.     Further, I certify that my clinical findings support that this patient is homebound secondary to illness.     Based on the above findings. I certify that this patient is confined to the home and needs intermittent skilled nursing care, physical therapy, and occupational therapy.  The patient is under my care, and I have initiated the establishment of the plan of care.  This patient will be followed by a physician who will periodically review the plan of care.   Physician/Provider to provide follow up care: Tolu Noland     Attending hospital physician (the Medicare certified [facility] provider): Dr. Abram Baca M.D.   Physician Signature: See electronic signature associated with these discharge orders.     Date: 2/3/2021

## 2021-02-03 NOTE — PLAN OF CARE
Patient arrived from PACU at 1730 post ERCP. 2 RN skin assessment completed. AVSS. A&O x 4. R port heparin locked. Chlorhexidine bath/wipes done. Did not get out of bed for me, patient states he does not get out of bed. Voids spont, dark soledad, via urinal, adequate output. Passing gas, no BM today. Clear liquids, did not have an appetite.     ** Patients wife, Robyn, would like the staff to know that we can call her anytime with updates if needed.

## 2021-02-03 NOTE — PROGRESS NOTES
02/03/21 1000   Quick Adds   Type of Visit Initial Occupational Therapy Evaluation   Living Environment   People in home spouse   Current Living Arrangements house   Home Accessibility stairs to enter home   Number of Stairs, Main Entrance 3   Transportation Anticipated family or friend will provide   Self-Care   Usual Activity Tolerance fair   Current Activity Tolerance fair   Regular Exercise No   Equipment Currently Used at Home walker, standard   Activity/Exercise/Self-Care Comment pt is not forthcoming with giving information. pt endorses wife assists with all ADL.    Disability/Function   Hearing Difficulty or Deaf no   Wear Glasses or Blind yes   Vision Management glasses   Fall history within last six months yes   Number of times patient has fallen within last six months 2   Change in Functional Status Since Onset of Current Illness/Injury yes   General Information   Referring Physician Amanda Li   Patient/Family Therapy Goal Statement (OT) I want to go home.    Additional Occupational Profile Info/Pertinent History of Current Problem Fuentes Estrada is a 65 year old male admitted on 2/1/2021. He has a history of hilar cholangiocarcinoma diagnosed on 5/2020 s/p R hepatectomy with radical extrahepatic bile duct resection now with peritoneal mets complicated by recurrent cholangitis as well as janelle-hepatic fluid collection s/p previous external drain removed 11/2020 but persistent and concerning for abscess vs bile leak, DM2, Hypertension, and CVA on plavix admitted prior to scheduled ERCP for nausea and vomiting and inability to tolerate PO, progressive weakness   Existing Precautions/Restrictions abdominal;fall   General Observations and Info pt appearing frustrated and with flat affect.    Cognitive Status Examination   Orientation Status person;place   Affect/Mental Status (Cognitive) agitated;confused;flat/blunted affect   Follows Commands follows one-step commands;over 90% accuracy   Memory Deficit  moderate deficit   Cognitive Status Comments further testing required. Pt forgetful throughout session and with flat affect.    Visual Perception   Visual Impairment/Limitations WFL   Sensory   Sensory Quick Adds No deficits were identified   Range of Motion Comprehensive   General Range of Motion no range of motion deficits identified   Strength Comprehensive (MMT)   General Manual Muscle Testing (MMT) Assessment other (see comments)   Comment, General Manual Muscle Testing (MMT) Assessment pt grossly deconditioned in B UE/LE   Coordination   Coordination Comments no new concerns.    Bed Mobility   Bed Mobility supine-sit   Supine-Sit [place] (Bed Mobility) contact guard   Transfers   Transfers bed-chair transfer;sit-stand transfer   Transfer Skill: Bed to Chair/Chair to Bed   Bed-Chair [place] (Transfers) contact guard   Transfer Comments with 2ww.    Balance   Balance Assessment standing balance: dynamic   Standing Balance: Dynamic fair balance   Balance Comments 2ww    Lower Body Dressing Assessment/Training   [place] Level (Lower Body Dressing) minimum assist (75% patient effort)   Instrumental Activities of Daily Living (IADL)   IADL Comments Pt endorses SO assists with all ADL/IADL as needed.    Clinical Impression   Criteria for Skilled Therapeutic Interventions Met (OT) yes   OT Diagnosis decreased ADL I   Assessment of Occupational Performance 3-5 Performance Deficits   Identified Performance Deficits dressing, bathing, toileting, G/H, medication management, leisure.    Planned Therapy Interventions (OT) ADL retraining;IADL retraining;cognition;strengthening;transfer training;home program guidelines;progressive activity/exercise;risk factor education   Clinical Decision Making Complexity (OT) low complexity   Therapy Frequency (OT) 5x/week   Predicted Duration of Therapy 1 week   Risk & Benefits of therapy have been explained evaluation/treatment results reviewed;care plan/treatment goals  "reviewed;risks/benefits reviewed;current/potential barriers reviewed;participants voiced agreement with care plan;participants included;patient   Comment-Clinical Impression Pt presents to OT with cognitive impairments, deconditioning and decreased activity tolerance leading to decreased ADL I. Pt to benefit from skilled OT intervention to address the above mentioned problem list.    OT Discharge Planning    OT Discharge Recommendation (DC Rec) Home with assist;home with home care occupational therapy;Transitional Care Facility   OT Rationale for DC Rec pt below baseline in AD lI/safety and endorses having fluctuating days endorsing \"today is a good day for me, I can walk, other days are tough\".    OT Brief overview of current status  pt CGA with bed mobility and min assist LE dressing, CGA pivot to chair, refusing walking today.    Total Evaluation Time (Minutes)   Total Evaluation Time (Minutes) 5     "

## 2021-02-03 NOTE — PLAN OF CARE
"Pt A&O. VSS on RA. Bedfast this shift. Denies pain and nausea. LR at 125 mL/hr through R port. CLD. No BM. Voiding w/ urinal. Did not want to be disturbed overnight. Slept most of the shift. Pt appears frustrated and wants to go home this AM, stating \"there is nothing else they can do for me here.\" Will cont to monitor.  "

## 2021-02-04 NOTE — PROGRESS NOTES
"RN Care Coordination Note  Post Hospital Discharge     Outgoing Call:   Placed call to patient and wife to follow-up after hospital discharge. Wife states patient is \"doing ok.\" Report he has not had any further nausea/vomiting. They are anxious to hear what the plan of treatment will be for fluid collection/abcess. Report he is planning to start PT/OT. Continuing on Flagyl and Cipro. Verbal permission given to speak with patient's daughter. Reassured wife/patient writer would be reaching out to care team for next steps.     Call placed to patient's daughter. Daughter also inquires what \"timeline for taking care of abscess is.\" She expresses immense frustration patient was discharged from hospital is no plan of how abscess would be fixed. States she feels as though there is no one advocating for patient and \"we will just sit until the cancer starts to grow.\" We reviewed Dr Beard feels cancer is stable and is not concerned about treatment break. Also discussed oncology team is advocating for patient and reached out to IR and surgery for plan of \"next steps.\" She asks to speak with Dr Beard, request sent on. She has no further questions at this time. Encouraged her to call clinic if further questions or concerns arise.         Chey Arias RN, BSN, OCN   RN Care Coordinator   [facility]          "

## 2021-02-04 NOTE — PLAN OF CARE
Pt discharged to home prior to follow up PT session    Physical Therapy Discharge Summary    Reason for therapy discharge:    Discharged to home with home therapy.    Progress towards therapy goal(s). See goals on Care Plan in [facility] electronic health record for goal details.  Goals not met.  Barriers to achieving goals:   discharge from facility and discharge on same date as initial evaluation.    Therapy recommendation(s):    Continued therapy is recommended.  Rationale/Recommendations:  progress functional safety, tolerance.

## 2021-02-04 NOTE — TELEPHONE ENCOUNTER
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 Given that this is recurrent bile collection and the technical challenge and discomfort of an internal/external biliary drain, he underwent attempted internal biliary stent placement. Due to anatomy, biliary stent placement was not possible.In order to restart chemotherapy asap, the collection needs to be drained. Internal/external biliary drain may be considered at a later time if needed or warranted pending the progress and response of his cancer treatment.     I had a long conversation with Mrs. Estrada regarding the above information. They would like proceed with perihepatic biloma drain replacement.  Internal/external biliary drain may be considered at a later time if needed or warranted pending the progress and response of his cancer treatment.     Reta Purcell MD  Interventional Radiology   Pager [phone]      CC  Patient Care Team:  Tolu Noland as PCP - General  Layla Hernandez NP as Referring Physician  Rosalind Whatley, RN as Registered Nurse  Carey Rodriguez, RN as Specialty Care Coordinator (Surgical Oncology)  Oswaldo Hale MD as MD (Surgery)  Aydin Beard MD as MD (Oncology)  Chey Arias, JASMIN as Specialty Care Coordinator (Hematology & Oncology)  Oswaldo Hale MD as Assigned Surgical Provider  Aydin Beard MD as Assigned Cancer Care Provider

## 2021-02-04 NOTE — PLAN OF CARE
Occupational Therapy Discharge Summary    Reason for therapy discharge:    Discharged to home with home therapy.    Progress towards therapy goal(s). See goals on Care Plan in [facility] electronic health record for goal details.  Goals partially met.  Barriers to achieving goals:   discharge from facility.    Therapy recommendation(s):    Continued therapy is recommended.  Rationale/Recommendations:  to progress ADL/IADL IND.

## 2021-02-04 NOTE — DISCHARGE SUMMARY
I, Abram Baca MD, personally saw and evaluated Fuentes Estrada as part of a shared visit.  I have reviewed and discussed with the advanced practice provider their discharge plan.     My key history or physical exam findings from the day of discharge:   AAOx3, NAD     Key management decisions made by me:   65 unit(s)/o male with hx of hilar cholangioCA s/p right hepatectomy c/w biliary leak s/p drain placement (removed on 11/2020) with recurrence of liver fluid collection that is presumed to be an abscess. He was admitted for scheduled ERCP to attempt to drain the fluid endoscopically however failed to obtain access. Discussed with primary surgeon and oncology team , no plan for percutaneous intervention as risk is high drain will be permanent. Continue oral Abx he was on PTa with plan to follow up with oncology/surgery outpatient.      Abram Baca  Date of Service (when I saw the patient): 2/3/21  ----------------------------    [facility]  Hospitalist Discharge Summary      Date of Admission:  2/1/2021  Date of Discharge:  2/3/2021  3:46 PM  Discharging Provider: VICKI Layne CNP, Abram Baca MD   Discharge Team: Hospitalist Service Gold 6    Discharge Diagnoses     # Nausea, vomiting   # Progressive bilateral lower extremity weakness  # Deconditioning   # Metastatic hilar cholangiocarcinoma s/p R hepatectomy w/ radical extrahepatic bile duct resection   # Perihepatic fluid collection felt to be 2/2 abscess vs bile leak   # Hx recurrent cholangitis   # HTN   # Type 2 DM   # Hx CVA  # Anemia   # Concern for possible malnutrition   # Chronic hypomagnesemia   # Severe Protein-Calorie Malnutrition       Follow-ups Needed After Discharge   Follow-up Appointments     Adult [facility] Follow-up and recommended labs and tests      Follow up with primary care provider, Tolu Noland, within 7 days for   hospital follow- up.  The following labs/tests  are recommended: CMP, CBC,   mag, phos.      Appointments on [place] and/or [facility] (with [facility] or [facility]   provider or service). Call [phone] if you haven't heard regarding   these appointments within 7 days of discharge.          Follow up with Oncology outpatient, currently scheduled for early March but discussed w/ inpatient team to try and move up appointment     Unresulted Labs Ordered in the Past 30 Days of this Admission     Date and Time Order Name Status Description    2/2/2021 1851 Vitamin B1 plasma In process     2/1/2021 1730 Blood culture Preliminary     2/1/2021 1730 Blood culture Preliminary           Discharge Disposition   Discharged to home  Condition at discharge: Stable    Hospital Course      Fuentes Estrada is a 65 year old male with past medical history significant for HTN, type 2 DM, CVA, and recently diagnosed hilar cholangiocarcinoma (dx 5/2020) s/p R hepatectomy with radical extrahepatic bile duct resection now w/ peritoneal mets c/b recurrent cholangitis as well as perihepatic fluid collection s/p external drain placement (removed in 11/2020) and now persistent and c/f abscess vs bile leak currently on abx admitted for nausea and vomiting, inability to tolerate PO, and progressive weakness and falls.       # Nausea, vomiting - Onset 3-4 days ago prior to admission.  No issues w/ nausea or vomiting in the past, even w/ chemo.  Having BMs.  AXR w/o e/o bowel obstruction.  Currently w/o signs/symptoms acute cholangitis or sepsis.  Afebrile.  Leukopenia to 2.9.  No chemo since 12/9.  Resolved and tolerating diet at time of discharge.       # Progressive bilateral lower extremity weakness  # Deconditioning   # Vitamin D deficiency  Reports bilateral lower extremity weakness worse since Friday.  Unremarkable exam, no focal deficits, strength/sensation intact.  Gilliantes wnl.  - Vitamin D level returned low after discharge, will call in supplementation for patient           #  Metastatic hilar cholangiocarcinoma s/p R hepatectomy w/ radical extrahepatic bile duct resection   # Perihepatic fluid collection felt to be 2/2 abscess vs bile leak   # Hx recurrent cholangitis   Diagnosed in 5/2020 and underwent surgery as above with complicated course d/t extent of disease and bile leak.  Follows w/ Dr. Beard outpatient.  Protracted postop course c/b long-term external abdominal drainage requiring drain placement.  In the meantime, this was further complicated by the fact that he developed essentially rapid recurrence of his cancer with peritoneal metastases.  S/p removal of abdominal drain with IR on 11/9/2020.  Admitted to [facility] 11/18-11/19 for fevers and concern for acute cholangitis, found to have small fluid collection adjacent to the cut surface of his remnant liver.  At that time treated with Zosyn and transitioned to Flagyl and Cipro on discharge, which was extended in 12/2020.  Off chemo since 12/9.  ERCP attempted on 2/2, unsuccessful d/t anatomy, found to have edematous and tortuous biliary limb obviating passage of the pediatric gastroscope and thus unable to drain fluid collection.  This was discussed with patient and his wife.  Plan is for outpatient follow up with IR (Dr. Purcell). D/w Oncology, will need to continue antibiotics with Cipro and Flagyl until clinic follow up.    # HTN - On Lisinopril at home, recently decreased from 30mg to 15mg d/t hypotension and weight loss.  BPs 125/80 this AM.  Resumed Lisinopril 15mg daily.      # Type 2 DM - Last Hgb A1c 7.7% in 10/2020.  BGs 106-150 last 24 hr.  Per d/w pt and his wife, he has slowly been tapered off several meds over the last year.  Currently only on Glipizide, held during admission.  Resume Glipizide once appetite, oral intake normalizes.       # Hx CVA - Occurred ~ 10 years ago.  Has residual mild short term memory deficits.  On Plavix and Atorvastatin PTA.  Resumed statin and Plavix at discharge.       # Anemia - Hgb  8.4 today, prev 9.5 on admission.  Drop likely 2/2 hemodilution d/t fluid resuscitation.  Improved to 9.0 at discharge.      # Concern for possible malnutrition - In setting of malignancy.  Reported 50lb weight loss since diagnosis in 5/2020.  Has not been able to tolerate PO last 4 days due to nausea and vomiting.  On Marinol PTA for appetite stimulation.      # Chronic hypomagnesemia - Likely 2/2 previous tx w/ cisplatin.  On PO magnesium supplementation PTA.  Mag low at 1.3 and given IV replacement.  Resumed PTA supplementation at discharge.      Consultations This Hospital Stay   GI PANCREATICOBILIARY ADULT IP CONSULT  ONCOLOGY ADULT IP CONSULT  NUTRITION SERVICES ADULT IP CONSULT  PHYSICAL THERAPY ADULT IP CONSULT  OCCUPATIONAL THERAPY ADULT IP CONSULT  CARE MANAGEMENT / SOCIAL WORK IP CONSULT    Code Status   Prior    Time Spent on this Encounter   I, VICKI Layne CNP, personally saw the patient today and spent greater than 30 minutes discharging this patient.       VICKI Layne CNP  [facility] [address] [place] [id number]  Phone: [phone]  ______________________________________________________________________    Physical Exam   Vital Signs:                   Weight: 177 lbs 0 oz    GENERAL: Alert and oriented x 3. Well developed but appears undernourished d/t illness.  No acute distress.    HEENT: Normocephalic, atraumatic. Anicteric sclera. Mucous membranes moist.   CV: RRR. S1, S2. No murmurs appreciated.   RESPIRATORY: Effort normal on room air. Lungs CTAB with no wheezing, rales, or rhonchi.   GI: Abdomen soft and non distended, bowel sounds present x all 4 quadrants. No tenderness, rebound, or guarding.   NEUROLOGICAL: No focal deficits. Follows commands.  Strength equal in upper and lower extremities. Grossly intact CNs. Sensation globally intact.   MUSCULOSKELETAL: No joint swelling or tenderness. Moves all extremities.   EXTREMITIES: No gross  deformities. No peripheral edema.   SKIN: Grossly warm, dry, and intact. No jaundice. No rashes.          Primary Care Physician   Tolu Noland    Discharge Orders      Home care nursing referral      MD face to face encounter    Documentation of Face to Face and Certification for Home Health Services    I certify that patient: Fuentes Estrada is under my care and that I, or a nurse practitioner or physician's assistant working with me, had a face-to-face encounter that meets the physician face-to-face encounter requirements with this patient on: February 3, 2021.    This encounter with the patient was in whole, or in part, for the following medical condition, which is the primary reason for home health care: Cholangiocarcinoma and admitted with malnutrition, dehydration and weakness.    I certify that, based on my findings, the following services are medically necessary home health services: Skilled home care RN, PT and OT.    My clinical findings support the need for the above services because: MD orders and recommendation by the inpatient [facility] interdisciplinary team.    Further, I certify that my clinical findings support that this patient is homebound secondary to illness.    Based on the above findings. I certify that this patient is confined to the home and needs intermittent skilled nursing care, physical therapy, and occupational therapy.  The patient is under my care, and I have initiated the establishment of the plan of care.  This patient will be followed by a physician who will periodically review the plan of care.  Physician/Provider to provide follow up care: Tolu Noland    Attending hospital physician (the Medicare certified PEC provider): Dr. Abram Baca M.D.  Physician Signature: See electronic signature associated with these discharge orders.    Date: 2/3/2021     Reason for your hospital stay    Dear Mr. Estrada,    You were admitted to [facility] from 2/1 to 2/3 with new  onset nausea, vomiting, and lower extremity weakness ongoing for 3-4 days.  You were also scheduled for ERCP on 2/2 to address a fluid collection which has been recurrent over the last year and previously required drain placement and antibiotics.  GI was unable to complete the ERCP due to anatomical limitations (edematous and tortuous biliary limb) which prevented safe passage of the endoscope, and thus fluid was unable to be drained.  We discussed this with your Surgical Oncologist (Dr. Hale).  Currently, there are no indications for surgical drainage during this admission.  Drain placement may be revisited in the future, which will require ongoing discussion between your IR, Medical Oncology, and GI teams.  We discussed this with Oncology here in the hospital as well, and currently the plan is for you to continue on oral antibiotics until follow up in Oncology clinic; currently the plan is to see Dr. Beard in early March, but we have asked to try and get this appointment moved up to the next 1-2 weeks.      You were also evaluated for lower extremity weakness.  Your exam was reassuring, as were evaluations by PT and OT.  Magnesium was low, likely due to having vomitted at home and not eating much lately. We are replacing this by IV today, and you can continue your home supplementation.  Folate and B12 are normal.      Please follow up with Oncology outpatient to address antibiotic and chemo plans.        It was a pleasure to care for you during your hospital stay.  Please let us know if there is anything else we can do for you so that we can be sure you are leaving completely satisfied with your care experience.    If you have any questions, please call the hospital at [phone] and ask to talk to a nurse on unit 7C.    Take care,    Amanda Li CNP  [facility] - Internal Medicine   Hospitalist Service     Adult [facility] Follow-up and recommended labs and tests    Follow up with primary  care provider, Tolu Noland, within 7 days for hospital follow- up.  The following labs/tests are recommended: CMP, CBC, mag, phos.      Appointments on [place] and/or [facility] (with [facility] or [facility] provider or service). Call [phone] if you haven't heard regarding these appointments within 7 days of discharge.     Activity    Your activity upon discharge: activity as tolerated     When to contact your care team    Call your PCP or return to ED for temperatures > 100.7 degrees, worsening or changing pain, uncontrolled vomiting or inability to tolerate oral intake, new or worsening diarrhea, new or worsening shortness of breath, decreased urine output, yellowing of the eyes or skin, confusion, weakness, blood in urine or stools.     Discharge Instructions    Recommend resuming Lisinopril once oral intake has normalized and dehydration resolved.     Diet    Follow this diet upon discharge: Regular Diet Adult       Significant Results and Procedures   Most Recent 3 CBC's:  Recent Labs   Lab Test 02/03/21  0647 02/02/21  0632 02/01/21  1638   WBC 3.2* 2.9* 4.3   HGB 9.0* 8.4* 9.5*   MCV 95 93 93    166 233     Most Recent 3 BMP's:  Recent Labs   Lab Test 02/03/21  0647 02/02/21  0632 02/01/21  1638    136 135   POTASSIUM 3.6 3.6 3.6   CHLORIDE 106 106 104   CO2 26 27 26   BUN 9 8 10   CR 0.86 0.86 0.91   ANIONGAP 4 4 4   ERIC 8.4* 8.1* 8.5   GLC 95 106* 150*     Most Recent 2 LFT's:  Recent Labs   Lab Test 02/03/21  0647 02/02/21  0632   * 108*   ALT 35 30   ALKPHOS 602* 544*   BILITOTAL 2.1* 1.9*     Most Recent 3 INR's:  Recent Labs   Lab Test 02/02/21  0632 02/01/21  1638 11/18/20  0649   INR 1.83* 1.72* 1.40*   ,   Results for orders placed or performed during the hospital encounter of 02/01/21   XR Abdomen Port 1 View    Narrative    Exam: XR ABDOMEN PORT 1 VW, 2/1/2021 8:51 PM    Indication: nausea, eval for bowel obstruction    Comparison: 1/19/2021 CT abdomen pelvis    Findings:    Supine, portable view of the abdomen. Numerous surgical clips  projecting over the right upper and right lower quadrant. Bowel gas  pattern is nonspecific. No pneumatosis or portal venous gas. Bridging  syndesmophytes in the mid thoracic spine.      Impression    Impression: No evidence of small or large bowel obstruction.    I have personally reviewed the examination and initial interpretation  and I agree with the findings.    MARIA C POWELL MD   XR Surgery BRIGID L/T 5 Min Fluoro w Stills    Narrative    This exam was marked as non-reportable because it will not be read by a   radiologist or a [place] non-radiologist provider.               Discharge Medications   Discharge Medication List as of 2/3/2021  3:27 PM      CONTINUE these medications which have CHANGED    Details   ciprofloxacin (CIPRO) 500 MG tablet Take 1 tablet (500 mg) by mouth 2 times daily, Disp-28 tablet, R-2, E-Prescribe      lisinopril (ZESTRIL) 30 MG tablet Take 0.5 tablets (15 mg) by mouth every evening, Historical      metroNIDAZOLE (FLAGYL) 500 MG tablet Take 1 tablet (500 mg) by mouth 3 times daily, Disp-42 tablet, R-2, E-Prescribe         CONTINUE these medications which have NOT CHANGED    Details   atorvastatin (LIPITOR) 40 MG tablet Take 40 mg by mouth At Bedtime , Historical      clopidogrel (PLAVIX) 75 MG tablet Take 75 mg by mouth At Bedtime , Historical      dexamethasone (DECADRON) 4 MG tablet Take 1 tablet (4 mg) by mouth daily (with breakfast) Daily for the 3 days after chemotherapy in the morning with food, Disp-3 tablet, R-1, E-Prescribe      dronabinol (MARINOL) 5 MG capsule Take 2 capsules in am and 1 capsule in pm (before meals), Disp-10 capsule, R-0, E-PrescribeDispense 1/12      fenofibrate (TRIGLIDE/LOFIBRA) 160 MG tablet Take 160 mg by mouth At Bedtime , Historical      Ferrous Sulfate (IRON) 325 (65 Fe) MG tablet Take 1 tablet by mouth 3 times daily (with meals), Disp-180 tablet, R-1, E-Prescribe      furosemide  "(LASIX) 20 MG tablet Take 20 mg by mouth every evening , Historical      LORazepam (ATIVAN) 0.5 MG tablet Take 1 tablet (0.5 mg) by mouth every 4 hours as needed (Anxiety, Nausea/Vomiting or Sleep), Disp-30 tablet, R-5, Local Print      magnesium gluconate 30 MG tablet Take 1 tablet (30 mg) by mouth daily, Disp-90 tablet, R-3, E-Prescribe      ondansetron (ZOFRAN) 8 MG tablet Take 1 tablet (8 mg) by mouth every 8 hours as needed (Nausea/Vomiting), Disp-10 tablet, R-5, E-Prescribe      oxyCODONE (ROXICODONE) 5 MG tablet Take 1-2 tablets (5-10 mg) by mouth every 4 hours as needed for moderate to severe pain, Disp-20 tablet, R-0, E-Prescribe      prochlorperazine (COMPAZINE) 10 MG tablet Take 0.5 tablets (5 mg) by mouth every 6 hours as needed (Nausea/Vomiting), Disp-30 tablet, R-5, E-Prescribe      sildenafil (REVATIO) 20 MG tablet Take 20 mg by mouth as needed, Historical      zolpidem (AMBIEN) 10 MG tablet Take 1 tablet (10 mg) by mouth nightly as needed for sleep, Disp-30 tablet, R-0, E-Prescribe         STOP taking these medications       glipiZIDE (GLUCOTROL) 10 MG tablet Comments:   Reason for Stopping:             Allergies   Allergies   Allergen Reactions     Metformin Other (See Comments)     \" I think my kidneys shut down\"       "

## 2021-02-05 NOTE — PROGRESS NOTES
RN Care Coordination Note  Outgoing Call:   Placed call to patient's wife to discuss cancelling visit with VICKI Mae CNP on 2/8/20. We reviewed plan for drain placement. Wife states they had a good conversation with Dr Kwok yesterday. She doesn't feel the appointment with Leeanne is needed. We also reviewed currently scheduled CT and follow-up with Dr Beard is appropriate timing. They know to call with any questions or concerns.     Chey Arias RN, BSN, OCN   RN Care Coordinator   [facility]

## 2021-02-05 NOTE — TELEPHONE ENCOUNTER
I called and spoke with Fuentes Carlson is set up for his drain placement procedure with Dr Purcell.  I have sent a message thru [employer] with appointment details.  DANIELA Dumont RN, BSN  Interventional Radiology Nurse Coordinator   Phone:  [phone]

## 2021-02-14 NOTE — TELEPHONE ENCOUNTER
Call received from the patient's wife at 11:45 AM on 2/14/2021.    Patient was asked to be off Plavix for 5 days but somehow by mistake he took his Plavix on 2/13/2021.    It was explained to the patient that he will be at high risk of bleeding if we do not hold Plavix for 5 days.    Patient is otherwise fine no fever or sepsis.    At this is an elective procedure it was advised to keep the patient off Plavix for 5 days.    We will discuss with the IR team and Dr. Purcell tomorrow morning and reach out to the patient regarding the procedure.    Signed,    Florin Kemp M.D.  Fellow, Department of Vascular and Interventional Radiology  [place], [place].  February 14, 2021  Beeper:  [phone] After Hours

## 2021-02-15 NOTE — PROGRESS NOTES
Pt back from IR s/p drain placement right side.  VSS.  Pt sleepy but arouses easily to voice.  Pt denies any pain.  Rt drain tube paten, draining cloudy red drg.  Pt's family at the bedside.

## 2021-02-15 NOTE — IP AVS SNAPSHOT
[facility] [address] [address]                                    After Visit Summary   2/15/2021    Fuentes Estrada    MRN: [id number]           After Visit Summary Signature Page    I have received my discharge instructions, and my questions have been answered. I have discussed any challenges I see with this plan with the nurse or doctor.    ..........................................................................................................................................  Patient/Patient Representative Signature      ..........................................................................................................................................  Patient Representative Print Name and Relationship to Patient    ..................................................               ................................................  Date                                   Time    ..........................................................................................................................................  Reviewed by Signature/Title    ...................................................              ..............................................  Date                                               Time          22EPIC Rev 08/18

## 2021-02-15 NOTE — SEDATION DOCUMENTATION
[facility] Procedure Note        Sedation:      Performed by: Stanley Benavidez DO  Authorized by: Stanley Benavidez DO    Pre-Procedure Assessment done at 1200.    Expected Level:  Moderate Sedation    Indication:  Sedation is required to allow for Drain placement    Consent obtained from patient after discussing the risks, benefits and alternatives.    PO Intake:  Appropriately NPO for procedure    ASA Class:  Class 2 - MILD SYSTEMIC DISEASE, NO ACUTE PROBLEMS, NO FUNCTIONAL LIMITATIONS.    Mallampati:  Grade 2:  Soft palate, base of uvula, tonsillar pillars, and portion of posterior pharyngeal wall visible    Lungs: Lungs Clear with good breath sounds bilaterally.     Heart: Normal heart sounds and rate    History and physical reviewed and no updates needed. I have reviewed the lab findings, diagnostic data, medications, and the plan for sedation. I have determined this patient to be an appropriate candidate for the planned sedation and procedure and have reassessed the patient IMMEDIATELY PRIOR to sedation and procedure.

## 2021-02-15 NOTE — PROGRESS NOTES
Prepped for placement of abdominal drain.  Denies pain.  Wife in room with him.  H & P current.  Consent signed.  No labs ordered for today.  POC glucose = 137.

## 2021-02-15 NOTE — PROGRESS NOTES
Pt drain has thick pink drainage out of drain.  All supplies given to wife.  Discharge instructions went over.  Port heplocked and deaccessed.  Script given to wife for supplies.  Pt wheeled to front lobby at 1600 and left with his wife.

## 2021-02-15 NOTE — PROCEDURES
[facility]    Procedure: IR Procedure Note    Date/Time: 2/15/2021 2:21 PM  Performed by: Reta Purcell MD  Authorized by: Reta Purcell MD     UNIVERSAL PROTOCOL   Site Marked: NA  Prior Images Obtained and Reviewed:  Yes  Required items: Required blood products, implants, devices and special equipment available    Patient identity confirmed:  Verbally with patient, arm band, provided demographic data and hospital-assigned identification number  Patient was reevaluated immediately before administering moderate or deep sedation or anesthesia  Confirmation Checklist:  Patient's identity using two indicators, relevant allergies, procedure was appropriate and matched the consent or emergent situation and correct equipment/implants were available  Time out: Immediately prior to the procedure a time out was called    Universal Protocol: the Joint Commission Universal Protocol was followed    Preparation: Patient was prepped and draped in usual sterile fashion           ANESTHESIA    Anesthesia: Local infiltration  Local Anesthetic:  Lidocaine 1% without epinephrine      SEDATION    Patient Sedated: Yes    Sedation Type:  Moderate (conscious) sedation  Sedation:  See MAR for details  Vital signs: Vital signs monitored during sedation    See dictated procedure note for full details.  Findings: 25 minutes    Specimens: none    Complications: None    Condition: Stable    Plan: 1. Drain two gravity  2. Flush TID withy 10 mL sterile saline  3. Fluid culture results pending  4. Once output declines to less than 10 mL/day consistently, then CT with IV contrast can be considered to assess for residual fluid and post-CT sinogram can be done to assess for fistula.    PROCEDURE   Patient Tolerance:  Patient tolerated the procedure well with no immediate complications  Describe Procedure: 10 Fr drain into periheaptic collection, 75 mL purulent fluid removed. Cavity  aspirated dry and flushed with sterile saline  Length of time physician/provider present for 1:1 monitoring during sedation: 25

## 2021-02-15 NOTE — PROGRESS NOTES
Patient Name: Fuentes Estrada  Medical Record Number: [id number]  Today's Date: 2/15/2021    Procedure: Image guided perihepatic drain placement.  Proceduralist: MD Jazzy.    Procedure Start: 1340  Procedure end: 1410  Sedation medications administered: Fentanyl: 100 mcg Versed: 2mg    Report given to: 2A, RN  : n/a    Other Notes: Pt arrived to IR room Ct 2 from . Consent reviewed. Pt denies any questions or concerns regarding procedure. Pt positioned sidelying and monitored per protocol.  Specimens collected and sent to lab.Pt tolerated procedure without any noted complications. Pt transferred back to .    Lin Hernandez, RN

## 2021-02-15 NOTE — DISCHARGE INSTRUCTIONS
"[facility]  Interventional Radiology   Drainage Tube Instructions      AFTER YOU GO HOME:    Have an adult stay with you for 24 hours.     Drink plenty of fluids and resume your regular diet.    For 24 hours:       Do not drive or operate machinery at home or at work    No alcoholic beverages    Do not make any important legal decisions    No heavy lifting greater than 10 lb until instructed by your physician     Call Your Physician if:    You develop nausea or vomiting.    Your develop hives or rash or unexplained itching    Additional Instructions: Please call for the following problems:    No fluid draining from the tube, check that the tube is not kinked or if stop cock is closed.    Flush with 10 cc's Normal Saline three times a day. Empty bag and monitor output daily subtracting amount inserted.    Skin around tube is red, painful or has any drainage.    You have increased pain in your abdomen    Fever greater than 100.5 F and chills    You feel nauseated and  \"just not right\"      Change dressing every 48 hour or when it gets wet    Interventional Radiology Department    Physician:                              Date:February 15, 2021  Telephone numbers: [phone]...Monday-Friday 8:00am-4:30pm                                  [phone]... After 4:30 Monday-Friday, Weekends and Holiday. Ask for the Interventional Radiologist on call. Someone is on call 24 hours a day.                                  "

## 2021-02-15 NOTE — IP AVS SNAPSHOT
MRN:[id number]                      After Visit Summary   2/15/2021    Fuentes Estrada    MRN: [id number]           Visit Information        Department      2/15/2021 11:05 AM [facility] [address] [place]          Review of your medicines      UNREVIEWED medicines. Ask your doctor about these medicines       Dose / Directions   atorvastatin 40 MG tablet  Commonly known as: LIPITOR      Dose: 40 mg  Take 40 mg by mouth At Bedtime  Refills: 0     ciprofloxacin 500 MG tablet  Commonly known as: CIPRO  Used for: Cholangiocarcinoma (H)      Dose: 500 mg  Take 1 tablet (500 mg) by mouth 2 times daily  Quantity: 28 tablet  Refills: 2     clopidogrel 75 MG tablet  Commonly known as: PLAVIX      Dose: 75 mg  Take 75 mg by mouth At Bedtime  Refills: 0     dexamethasone 4 MG tablet  Commonly known as: DECADRON  Used for: Cholangiocarcinoma (H)      Dose: 4 mg  Take 1 tablet (4 mg) by mouth daily (with breakfast) Daily for the 3 days after chemotherapy in the morning with food  Quantity: 3 tablet  Refills: 1     dronabinol 5 MG capsule  Commonly known as: MARINOL  Used for: Hilar cholangiocarcinoma (H)      Take 2 capsules in am and 1 capsule in pm (before meals)  Quantity: 10 capsule  Refills: 0     fenofibrate 160 MG tablet  Commonly known as: TRIGLIDE/LOFIBRA      Dose: 160 mg  Take 160 mg by mouth At Bedtime  Refills: 0     furosemide 20 MG tablet  Commonly known as: LASIX      Dose: 20 mg  Take 20 mg by mouth every evening  Refills: 0     iron 325 (65 Fe) MG tablet  Used for: Iron deficiency anemia due to chronic blood loss      Dose: 1 tablet  Take 1 tablet by mouth 3 times daily (with meals)  Quantity: 180 tablet  Refills: 1     lisinopril 30 MG tablet  Commonly known as: ZESTRIL      Dose: 5 mg  Take 5 mg by mouth every evening  Refills: 0     LORazepam 0.5 MG tablet  Commonly known as: ATIVAN  Used for: Hilar cholangiocarcinoma (H)      Dose: 0.5 mg  Take 1 tablet (0.5 mg) by mouth every 4  hours as needed (Anxiety, Nausea/Vomiting or Sleep)  Quantity: 30 tablet  Refills: 5     magnesium gluconate 30 MG tablet  Used for: Hilar cholangiocarcinoma (H)      Dose: 30 mg  Take 1 tablet (30 mg) by mouth daily  Quantity: 90 tablet  Refills: 3     metroNIDAZOLE 500 MG tablet  Commonly known as: FLAGYL  Used for: Cholangiocarcinoma (H)      Dose: 500 mg  Take 1 tablet (500 mg) by mouth 3 times daily  Quantity: 42 tablet  Refills: 2     ondansetron 8 MG tablet  Commonly known as: ZOFRAN  Used for: Hilar cholangiocarcinoma (H)      Dose: 8 mg  Take 1 tablet (8 mg) by mouth every 8 hours as needed (Nausea/Vomiting)  Quantity: 10 tablet  Refills: 5     oxyCODONE 5 MG tablet  Commonly known as: ROXICODONE  Used for: Cholangiocarcinoma (H)      Dose: 5-10 mg  Take 1-2 tablets (5-10 mg) by mouth every 4 hours as needed for moderate to severe pain  Quantity: 20 tablet  Refills: 0     prochlorperazine 10 MG tablet  Commonly known as: COMPAZINE  Used for: Hilar cholangiocarcinoma (H)      Dose: 5 mg  Take 0.5 tablets (5 mg) by mouth every 6 hours as needed (Nausea/Vomiting)  Quantity: 30 tablet  Refills: 5     sildenafil 20 MG tablet  Commonly known as: REVATIO      Dose: 20 mg  Take 20 mg by mouth as needed  Refills: 0     zolpidem 10 MG tablet  Commonly known as: AMBIEN  Used for: Hilar cholangiocarcinoma (H)      Dose: 10 mg  Take 1 tablet (10 mg) by mouth nightly as needed for sleep  Quantity: 30 tablet  Refills: 0              Protect others around you: Learn how to safely use, store and throw away your medicines at www.disposemymeds.org.       Follow-ups after your visit       Your next 10 appointments already scheduled    Feb 26, 2021 10:15 AM  Cancer Rehab Treatment with CALLIE Arndt [facility] [place] ([facility] ) [address] [place] [address] DR [place] [address]  [phone]      Feb 26, 2021 12:30 PM  Level 1 with NL INFUSION CHAIR 3  [facility]  Infusion Services [place] ([facility] ) [facility] DR [place] [address]  [phone]      Feb 26, 2021  1:00 PM  CT CHEST/ABDOMEN/PELVIS W CONTRAST with PHCT1  [facility] ([facility] ) [address] [facility] [place] [address]  [phone]   How do I prepare for my exam? (Food and drink instructions)  To prepare: No Food and Drink Restrictions    How do I prepare for my exam? (Other instructions)  Please arrive 30 minutes early for your CT.  Once in the department you might be asked to drink water 15-20 minutes prior to your exam.  If indicated you may be asked to drink an oral contrast in advance of your CT.  If this is the case, the imaging team will let you know or be in contact with you prior to your appointment    Patients over 70 or patients with diabetes or kidney problems: If you haven t had a blood test (creatinine test) within the last 30 days, the Cardiologist/Radiologist may require you to get this test prior to your exam.    If you have diabetes:  Continue to take your metformin medication on the day of your exam    What should I wear: Please wear loose clothing, such as a sweat suit or jogging clothes. Avoid snaps, zippers and other metal. We may ask you to undress and put on a hospital gown.    How long does the exam take: Most scans take less than 20 minutes.    What should I bring: Please bring any scans or X-rays taken at other hospitals, if similar tests were done. Also bring a list of your medicines, including vitamins, minerals and over-the-counter drugs. It is safest to leave personal items at home.    Do I need a : No  is needed.    What do I need to tell my doctor?  Be sure to tell your doctor:  * If you have any allergies.  * If there s any chance you are pregnant.  * If you are breastfeeding.    What should I do after the exam: No restrictions, You may resume normal activities.    What  is this test: A CT (computed tomography) scan is a series of pictures that allows us to look inside your body. The scanner creates images of the body in cross sections, much like slices of bread. This helps us see any problems more clearly. You may receive contrast (X-ray dye) before or during your scan. You will be asked to drink the contrast.    Who should I call with questions: If you have any questions, please call the Imaging Department where you will have your exam. Directions, parking instructions, and other information is available on our website, [email or web address].     Mar 01, 2021  3:30 PM  (Arrive by 3:15 PM)  Video Visit with Aydin Beard MD  [facility] ([facility] ) [address] [place] [address] [phone]   Please Note: This is a virtual visit; there is no need to come to the facility.       Mar 02, 2021  1:30 PM  Cancer Rehab Treatment with Radha Santillan PT  [facility] ([facility] ) [address] [place] [address] DR [place] [address]  [phone]      Mar 09, 2021  2:15 PM  Cancer Rehab Treatment with Radha Santillan PT  [facility] ([facility] ) [address] [place] [address] DR [place] [address]  [phone]      Mar 16, 2021  3:00 PM  Cancer Rehab Treatment with Radha Santillan PT  [facility] ([facility] ) [address] [place] [address] DR [place] [address]  [phone]         Care Instructions       Further instructions from your care team       [facility]  Interventional Radiology   Drainage Tube Instructions      AFTER YOU GO HOME:    Have an adult stay with you for 24 hours.     Drink plenty of fluids and resume your regular diet.    For 24 hours:       Do not drive or operate machinery at home or at work    No  "alcoholic beverages    Do not make any important legal decisions    No heavy lifting greater than 10 lb until instructed by your physician     Call Your Physician if:    You develop nausea or vomiting.    Your develop hives or rash or unexplained itching    Additional Instructions: Please call for the following problems:    No fluid draining from the tube, check that the tube is not kinked or if stop cock is closed.    Flush with 10 cc's Normal Saline three times a day. Empty bag and monitor output daily subtracting amount inserted.    Skin around tube is red, painful or has any drainage.    You have increased pain in your abdomen    Fever greater than 100.5 F and chills    You feel nauseated and  \"just not right\"      Change dressing every 48 hour or when it gets wet    Interventional Radiology Department    Physician:                              Date:February 15, 2021  Telephone numbers: [phone]...Monday-Friday 8:00am-4:30pm                                  [phone]... After 4:30 Monday-Friday, Weekends and Holiday. Ask for the Interventional Radiologist on call. Someone is on call 24 hours a day.                                    Additional Information About Your Visit       [employer] Information    [employer] gives you secure access to your electronic health record. If you see a primary care provider, you can also send messages to your care team and make appointments. If you have questions, please call your primary care clinic.  If you do not have a primary care provider, please call [phone] and they will assist you.       Care EveryWhere ID    This is your Care EveryWhere ID. This could be used by other organizations to access your [facility] medical records  [id number]       Your Vitals Were  Most recent update: 2/15/2021  2:33 PM    Blood Pressure   109/71 (BP Location: Right arm)          Pulse   75          Temperature   98.1  F (36.7  C) (Oral)          Respirations   12          Height   1.854 " "m (6' 1\")             Weight   80.3 kg (177 lb)    Pulse Oximetry   95%    BMI (Body Mass Index)   23.35 kg/m           Primary Care Provider    Tolu Noland      Equal Access to Services    DENA SANTOS : Hadii aad ku hadmeggano Soomaali, waaxda luqadaha, qaybta kaalmada adeegyada, stephanie clevelandn sumeet carbajal lasandeepn ah. So [facility] [phone].    ATENCIÓN: Si habla español, tiene a kaufman disposición servicios gratuitos de asistencia lingüística. Llame al [phone].    We comply with applicable federal and state civil rights laws, including the [place] Human Rights Act. We do not discriminate on the basis of race, color, creed, Episcopal, national origin, marital status, age, disability, sex, sexual orientation, or gender identity.    If you would like an itemization of your charges they will now be available in [employer] 30 days after discharge. To access the itemized statements in [employer] go to billing/billing summary. From there select view account. There will be multiple tabs showing an overview of your account, detail, payments, and communications. From the communications tab you can see your monthly statements, your itemized statements, and any billing letters generated for your account. If you do not have a [employer] account and need help getting access please contact [employer] support at [phone].  If you would prefer to have your itemized statements mailed please contact our automated itemized bill request line at [phone] option  2.       Thank you!    Thank you for choosing [facility] for your care. Our goal is always to provide you with excellent care. Hearing back from our patients is one way we can continue to improve our services. Please take a few minutes to complete the written survey that you may receive in the mail after you visit with us. Thank you!            Medication List      ASK your doctor about these medications          Morning Afternoon Evening Bedtime As Needed    atorvastatin 40 MG " tablet  Also known as: LIPITOR  INSTRUCTIONS: Take 40 mg by mouth At Bedtime                     ciprofloxacin 500 MG tablet  Also known as: CIPRO  INSTRUCTIONS: Take 1 tablet (500 mg) by mouth 2 times daily                     clopidogrel 75 MG tablet  Also known as: PLAVIX  INSTRUCTIONS: Take 75 mg by mouth At Bedtime                     dexamethasone 4 MG tablet  Also known as: DECADRON  INSTRUCTIONS: Take 1 tablet (4 mg) by mouth daily (with breakfast) Daily for the 3 days after chemotherapy in the morning with food                     dronabinol 5 MG capsule  Also known as: MARINOL  INSTRUCTIONS: Take 2 capsules in am and 1 capsule in pm (before meals)  Doctor's comments: Dispense 1/12                     fenofibrate 160 MG tablet  Also known as: TRIGLIDE/LOFIBRA  INSTRUCTIONS: Take 160 mg by mouth At Bedtime                     furosemide 20 MG tablet  Also known as: LASIX  INSTRUCTIONS: Take 20 mg by mouth every evening                     iron 325 (65 Fe) MG tablet  INSTRUCTIONS: Take 1 tablet by mouth 3 times daily (with meals)                     lisinopril 30 MG tablet  Also known as: ZESTRIL  INSTRUCTIONS: Take 5 mg by mouth every evening                     LORazepam 0.5 MG tablet  Also known as: ATIVAN  INSTRUCTIONS: Take 1 tablet (0.5 mg) by mouth every 4 hours as needed (Anxiety, Nausea/Vomiting or Sleep)                     magnesium gluconate 30 MG tablet  INSTRUCTIONS: Take 1 tablet (30 mg) by mouth daily                     metroNIDAZOLE 500 MG tablet  Also known as: FLAGYL  INSTRUCTIONS: Take 1 tablet (500 mg) by mouth 3 times daily                     ondansetron 8 MG tablet  Also known as: ZOFRAN  INSTRUCTIONS: Take 1 tablet (8 mg) by mouth every 8 hours as needed (Nausea/Vomiting)                     oxyCODONE 5 MG tablet  Also known as: ROXICODONE  INSTRUCTIONS: Take 1-2 tablets (5-10 mg) by mouth every 4 hours as needed for moderate to severe pain                     prochlorperazine 10 MG  tablet  Also known as: COMPAZINE  INSTRUCTIONS: Take 0.5 tablets (5 mg) by mouth every 6 hours as needed (Nausea/Vomiting)                     sildenafil 20 MG tablet  Also known as: REVATIO  INSTRUCTIONS: Take 20 mg by mouth as needed                     zolpidem 10 MG tablet  Also known as: AMBIEN  INSTRUCTIONS: Take 1 tablet (10 mg) by mouth nightly as needed for sleep

## 2021-02-18 NOTE — PROGRESS NOTES
Fuentes is now s/p drain placement. Reviewed fluid culture from 2/15/21: growing strept anginosus, sensitive to ampicillin, PCN, vanco and cephalosporins.  -will discontinue cipro/flagyl and start ampicillin. Will refer to ID to help direct on duration of antibiotic therapy.  Will have f/u with Dr. Beard on 3/1 with CT imaging prior to the visit.

## 2021-02-18 NOTE — TELEPHONE ENCOUNTER
Received call from [facility] rep Francoise stating Rx for ampicillin was received but they do not have in stock. Pharmacist recommend and has in stock  mg 4 times a day for partial fill, can get the rest to pt by tomorrow.    Per Leeanne Lake APRN CNP ok for alternative, verbal order given to [place] who will notify pt.

## 2021-02-18 NOTE — PROGRESS NOTES
"RN Care Coordination Note  Placed call to patient and wife to review changes in antibiotic. Reviewed Cipro/Flagyl should be stopped. Leeanne Lake DNP sent into new order for ampicillin, however pharmacy does not have on hand. Penicillin 500mg is an acceptable alternative. Also reviewed to expect appointment with infectious disease to help dictate abx course and length of treatment.     Wife also reports patient has had some trouble with \"keeping food down\" States last night and then again this morning, Fuentes vomited after eating. Denies nausea. She states \"its like a full feeling.\" Wife explains he asked for breakfast this morning and made it most of the way through \"then it came right back up.\" She gave him a zofran after. We reviewed trying zofran/compazine about 20-30 mins prior to eating. This is more diffiucult for them as he's eating very on demand and not planned meals. Denies signs of SBO; no abd pain, continues to have good BMs. RNCC will follow-up with Leeanne for any additional recommendations.      Reviewed with Leeanne, no additional recommendations at this time. If having heart burn/indigestion symptoms, ok to try Prilosec. Patient/wife updated via [facility].        Chey Arias RN, BSN, OCN   RN Care Coordinator   [facility]          "

## 2021-02-22 NOTE — TELEPHONE ENCOUNTER
RECORDS RECEIVED FROM: Internal   DATE RECEIVED: 03.05.2021    NOTES (Gather within 2 years) STATUS DETAILS   OFFICE NOTE from referring provider   Internal 02.18.2021 Leeanne Lake, VICKI CNP   OFFICE NOTE from other specialist Internal 01.27.2021 Oswaldo Hale MD    01.05.2021 [place] Quiles CNP    12.21.2020 Aydin Baerd MD   DISCHARGE SUMMARY from hospital Internal 02.01.2021 Abram Ray MD    10.09.2020 Karon Matute MD     DISCHARGE REPORT from the ER N/A    LABS (any labs) Internal / CE    MEDICATION LIST Internal    IMAGING  (NEED IMAGES AND REPORTS)     Osteomyelitis: Foot imaging  N/A    Liver Abscess: Abdominal imaging N/A    Other (anything related to diagnoses Internal 01.19.2021, 01.04.2021 CT ABDOMEN PELVIS W   CONTRAST     11.18.2020 MRI abdomen and MRCP

## 2021-02-23 NOTE — PROGRESS NOTES
"RN Care Coordination Note  Placed call to patients wife in regards to recently mychart. Wife states while helping patient shower yesterday she noted his abdomen was much larger. \"He usually has an inny belly button and it is almost an outie.\" Patient is not uncomfortable, denies pain of SOB. States she wanted care team to be aware.     Discussed with wife he may have ascites, which will be visualized on upcoming CT. Of note, 1/19/21 CT CAP report does mention \"large volume abdominal ascites.\" Reviewed Dr Beard may recommend paracentesis and discussed what can be commonly expected with procedure. They comfortable waiting for planned CT and follow up with Dr Beard. Encouraged wife to call with any other questions or concerns.         Chey Arias RN, BSN, OCN   RN Care Coordinator   [facility]          "

## 2021-02-24 NOTE — TELEPHONE ENCOUNTER
Spoke to wife Robyn:    Person requesting refill: Fuentes (pt)  Name of medication:Dronabinol  Date last filled: 1/12 by [place] Quiles    Mostly uses 2 capsules in morning and 1 capsule in evening which has helped a little appetite.    Having a feeling of fullness right under ribs and continues to have fluid build up in abdomen, denies discomfort.   Drainage is patent and flushing well.  LBM 2/24  T97.4F    Denies fevers/chills, cough, sore throat, SOB, n/v/c/d, bladder issues.      12:04Routed/Paged to prescribing provider Leeanne Lake DARA- may need to be ordered by pharmacy.  by Friday 2/26.

## 2021-02-26 NOTE — TELEPHONE ENCOUNTER
KIRILL from Jennifer at the [facility] pharmacy stating that pt is presently there and asking if he can be prescribed more than Marinol qty 10. This was refilled on 02/24 by Leeanne Lake but is usually prescribed a higher qty by [place] Quiles. He needs to travel to get to the pharmacy and they are hoping to save a trip to [place] again so soon. Told Jennifer that request will be sent to [place] as it appears she may have been out of the office on 02/24 with Leeanne turner and prescribed this instead.

## 2021-02-26 NOTE — PROGRESS NOTES
Infusion Nursing Note:  Fuentes Estrada presents today for Power port access and labs.    Patient seen by provider today: No   present during visit today: Not Applicable.    Note: N/A.  Patient just had Covid test prior to arrival in IVO.      Intravenous Access:  Labs drawn without difficulty.  Implanted Port.    Treatment Conditions:  Lab Results   Component Value Date    HGB 10.4 02/26/2021     Lab Results   Component Value Date    WBC 4.7 02/26/2021      Lab Results   Component Value Date    ANEU 2.4 02/26/2021     Lab Results   Component Value Date     02/26/2021      Lab Results   Component Value Date     02/26/2021                   Lab Results   Component Value Date    POTASSIUM 3.7 02/26/2021           Lab Results   Component Value Date    MAG 1.4 02/26/2021            Lab Results   Component Value Date    CR 0.81 02/26/2021                   Lab Results   Component Value Date    ERIC 8.2 02/26/2021                Lab Results   Component Value Date    BILITOTAL 2.4 02/26/2021           Lab Results   Component Value Date    ALBUMIN 1.9 02/26/2021                    Lab Results   Component Value Date    ALT 38 02/26/2021           Lab Results   Component Value Date     02/26/2021           Post Infusion Assessment:  Blood return noted pre and post infusion.  Site patent and intact, free from redness, edema or discomfort.  Access discontinued per protocol.       Discharge Plan:   Discharge instructions reviewed with: Patient.  Patient discharged in stable condition accompanied by: self.  Departure Mode: W/C with wife. .    Leatha Rodriguez RN

## 2021-03-01 NOTE — LETTER
"    3/1/2021         RE: Fuentes Estrada  [address]  [place] [address]        Dear Colleague,    Thank you for referring your patient, Fuentes Estrada, to the [facility]. Please see a copy of my visit note below.    Fuentes is a 65 year old who is being evaluated via a billable telephone visit.      What phone number would you like to be contacted at? [phone]  How would you like to obtain your AVS? MyChart     Vitals - Patient Reported  Systolic (Patient Reported): 113  Diastolic (Patient Reported): 78  Weight (Patient Reported): 80.3 kg (177 lb)  Height (Patient Reported): 185.4 cm (6' 1\")  BMI (Based on Pt Reported Ht/Wt): 23.35  SpO2 (Patient Reported): 97  Temperature (Patient Reported): 97.8  F (36.6  C)  Pulse (Patient Reported): 74      I have reviewed and updated patient's allergy and medication list.    Concerns: NONE  Refills: NONE      [facility]      Oncology Note - Video/Phone Visit     Fuentes Estrada MRN# [id number]   Age: 65 year old YOB: 1955     Interval History:   Fuentes Estrada  who is being evaluated via a telephone/video visit due to COVID-19 pandemic.      He had recent hospitalization 2/1-2/3/21 for nausea and vomiting and FTT. He is recovering, appetite is still poor even with adding marinol. Has distended abd and his abd drainage output is decreasing. No fever or chills, no N/V/D. No CP/SOB.    No urine issues or legs swelling. No pain.     Remainder of her 10 point review of systems is otherwise negative.      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 On May 12, 2020 he underwent a radical extrahepatic bile duct resection and R hepatectomy.   He had all of his disease resected with negative margins and one positive lymph node.  He had a complicated postoperative course with a bile " leak and abscess that had delayed starting his adjuvant chemotherapy.    On imaging in July 2020,  he appeared to be developing increasing nodularity in the resection bed and regional lymph nodes that has led to an EUS with a fine-needle aspiration of the lymph nodes on 8/11/20,  demonstrating the presence of metastatic disease.   On 8/27/20, he initiated treatment with palliative intent Cisplatin/Gemcitabine.      His abscess/biliary drain was managed by IR. Sinograms identified a communication to the biliary tree. He failed a capping trial with development of a fever. He underwent sinograms and cavitary sclerotherapy every 2 weeks starting August 25, 2020.  Drain was removed on 11/9/2020 9/28/20: port placement  Admission to [facility] 10/9/20-10/11/20 with septic shock from infectious enterocolitis, all cultures NGTD. Chemo delayed one week for recovery.  Cis/Marin restarted 10/21.  Admission to [facility] 11/18/20-11/19/20 with cholangitis - fever 100.8, bilirubin 3.2, elevated alk phos. MRCP showed no stones or strictures. Paracentesis removed 1L of fluid, no signs of SBP. He was dismissed on a 7-day course of Flagyl and ciprofloxacin.  C5D8 was delayed due to hospitalization.     He was seen on 12/14/2020 with an interim CT scan which showed findings concerning for abscess at the operative site along the posterior aspect of liver with gas seen in this fluid collection.  Patient was also having chills, chemotherapy was held and he was started on ciprofloxacin and Flagyl.  There was further discussion about management of this recurrent abscess with Dr. Purcell, Dr. Hale and Dr. Strauss about the abscess.  It was felt that aspiration is not likely to prevent recurrence of the abscess. A biliary drain is possible, but likely to be permanent. It has been concluded an endoscopic procedure is not possible. At 12/21/20 visit with Dr. Beard, antibiotic course was extended and he finished 12/31/20. Disease on  "12/11/20 was stable.   2/2/21 admission for Admitted for nausea, vomiting and FTT. - Underwent ERCP for possible internal drainage 2/2; unfortunately ERCP was not feasible due to \"edematous and tortuous biliary limb obviating passage\"   Biliary tract tube put in on 2/15/2021.       Oncology Medications 8/27/2020 9/2/2020   Day, Cycle Day 1, Cycle 1 Day 8, Cycle 1   CISplatin (PLATINOL) IV 25 mg/m2 = 53 mg 25 mg/m2 = 53 mg   gemcitabine (GEMZAR) IV 2,200 mg 2,200 mg     Oncology Medications 9/16/2020 9/23/2020   Day, Cycle Day 1, Cycle 2 Day 8, Cycle 2   CISplatin (PLATINOL) IV 25 mg/m2 = 53 mg 25 mg/m2 = 53 mg   gemcitabine (GEMZAR) IV 2,200 mg 2,200 mg     Oncology Medications 10/7/2020 10/21/2020   Day, Cycle Day 1, Cycle 3 Day 1, Cycle 4   CISplatin (PLATINOL) IV 25 mg/m2 = 53 mg 25 mg/m2 = 53 mg   gemcitabine (GEMZAR) IV 2,200 mg 2,200 mg     Oncology Medications 10/28/2020 11/11/2020 11/25/2020   Day, Cycle Day 8, Cycle 4 Day 1, Cycle 5 Day 8, Cycle 5   CISplatin (PLATINOL) IV 25 mg/m2 = 53 mg 25 mg/m2 = 53 mg 50 mg   gemcitabine (GEMZAR) IV 2,200 mg 2,200 mg 2,000 mg     Oncology Medications 12/9/2020   Day, Cycle Day 1, Cycle 6   CISplatin (PLATINOL) IV 25 mg/m2 = 53 mg   gemcitabine (GEMZAR) IV 2,200 mg       Medications: reviewed.  Current Outpatient Medications   Medication     atorvastatin (LIPITOR) 40 MG tablet     ciprofloxacin (CIPRO) 500 MG tablet     clopidogrel (PLAVIX) 75 MG tablet     dexamethasone (DECADRON) 4 MG tablet     dronabinol (MARINOL) 5 MG capsule     fenofibrate (TRIGLIDE/LOFIBRA) 160 MG tablet     Ferrous Sulfate (IRON) 325 (65 Fe) MG tablet     furosemide (LASIX) 20 MG tablet     lisinopril (ZESTRIL) 30 MG tablet     LORazepam (ATIVAN) 0.5 MG tablet     magnesium gluconate 30 MG tablet     metroNIDAZOLE (FLAGYL) 500 MG tablet     ondansetron (ZOFRAN) 8 MG tablet     oxyCODONE (ROXICODONE) 5 MG tablet     penicillin V (VEETID) 500 MG tablet     prochlorperazine (COMPAZINE) 10 MG " tablet     sildenafil (REVATIO) 20 MG tablet     zolpidem (AMBIEN) 10 MG tablet     No current facility-administered medications for this visit.      Facility-Administered Medications Ordered in Other Visits   Medication     fentaNYL (PF) (SUBLIMAZE) injection 25-50 mcg     flumazenil (ROMAZICON) injection 0.2 mg     lidocaine (LMX4) cream     lidocaine 1 % 0.1-1 mL     lidocaine 1 % 1-30 mL     medication instruction     midazolam (VERSED) injection 0.5-2 mg     naloxone (NARCAN) injection 0.2 mg    Or     naloxone (NARCAN) injection 0.4 mg    Or     naloxone (NARCAN) injection 0.2 mg    Or     naloxone (NARCAN) injection 0.4 mg     sodium chloride (PF) 0.9% PF flush 3 mL     sodium chloride (PF) 0.9% PF flush 3 mL     sodium chloride 0.9 % bag TABLE SOLN     sodium chloride 0.9% infusion            Physical Exam:   This is a phone visit and no physical exam.         Data:   I have personally reviewed the following labs/imaging:  Recent Labs   Lab Test 02/26/21  1241 02/03/21  0647 02/02/21  0632   WBC 4.7 3.2* 2.9*   RBC 3.38* 2.92* 2.69*   HGB 10.4* 9.0* 8.4*   HCT 31.0* 27.7* 25.1*   MCV 92 95 93   MCH 30.8 30.8 31.2   MCHC 33.5 32.5 33.5   RDW 17.2* 20.0* 19.9*    183 166   NEUTROPHIL 50.7 54.8 48.3     Recent Labs   Lab Test 03/01/21  1145 02/26/21  1241 02/03/21  0647 02/02/21  0632   NA  --  135 136 136   POTASSIUM  --  3.7 3.6 3.6   CHLORIDE  --  102 106 106   CO2  --  28 26 27   ANIONGAP  --  5 4 4   * 128* 95 106*   BUN  --  10 9 8   CR  --  0.81 0.86 0.86   ERIC  --  8.2* 8.4* 8.1*     Recent Labs   Lab Test 02/26/21  1241 02/03/21  0647 02/02/21  0632   PROTTOTAL 6.7* 6.0* 5.8*   ALBUMIN 1.9* 1.7* 1.6*   BILITOTAL 2.4* 2.1* 1.9*   ALKPHOS 855* 602* 544*   * 123* 108*   ALT 38 35 30     Cancer antigen     Images:   CT CAP 2/26/21   IMPRESSION:  1.  Interval placement of a right lateral abdomen are cutaneous subhepatic drainage catheter with resolution of the fluid collection is  present adjacent to the right hepatic lobe surgical margin and right kidney.  2.  Large amount of abdominal and pelvic ascites limits evaluation for possible bile leak. Consider therapeutic and diagnostic paracentesis to evaluate for bile within the ascites fluid.  3.  Stable partial hepatectomy post surgical changes.  4.  Interval increase in size of the small left pleural effusion. Stable small right pleural effusion. Persistent bilateral lower lobe compressive atelectasis.  5.  Wall enhancement of the stomach and proximal duodenum suggests gastritis or peptic disease.  6.  Extensive upper abdomen varices.  7.  Heterogeneous enhancement of the liver parenchyma may be due to IV contrast bolus timing or possible hepatitis. No evidence of discrete liver mass.         Assessment and Plan:   Fuentes Estrada is a 65 year old with meastatic cholangiocarcinoma was diagnosed May 2020. He underwent surgical resection but unfortunately developed metastatic disease quite rapidly. He start palliative intent Cisplatin/Gemcitabine in August 2020. His course has been complicated by abscess/biliary s/p drain placement and subsequent removal, cholangitis and sepsis. He has been off chemotherapy due to this persistent fluid collection concerning for abscess vs leak and has been on prolonged antibiotics for this.     He has recent hospitalization for nausea, vomiting, FTT and Perihepatic fluid collection, abscess vs leak with drainage.  Multidispinary team including Dr. Hale, Dr. Purcell, Dr. Swan and   have been involved in his case. He has a dainage catheter and on PCN. He was on around 10 weeks of flagyl and cipro in the past. Marinol was added as appetite stimulant and getting outpatient PT/OT.      His current CT CAP on 2/26/2021 showed resolution of the fluid collection adjacent to the right hepatic lobe surgical margin and right kidney, large ascites and interval increase in the small left pleural effusion. No  evidence of discrete liver mass.  Therefore we told Mr. Estrada and his wife there is no indication to resume his chemo now in addition to poor performance status and need for recovering in his physical therapy and nutrition. His Magnesium is low and vitaminD is below 10.     His poor appetite may improve on physical therapy and taking off some ascetic fluid. We will schedule him for paracentesis.     Metastatic Cholangiocarcinoma   -initiated on cisplatin/gemcitabine on 8/27/20. Off of treatment since 12/9/20 due to the abscess/fluid collection  -no evidence of cancer progression at this time. Will continue to hold chemotherapy    -Repeat CT CAP in 3 months     Perihepatic fluid collection, abscess vs leak s/p drainage    - IR is taking care of his drainage  tube     Anemia,  s/t chronic disease and chemotherapy, no acute bleeding  -improving off of chemotherapy. monitor     DM   -Glipizide held since last admission.       Hx of CVA 10 years ago, has mild short term memory deficits  -on plavix, atorvastatin    HTN  -on Lisinopril    -encouraged him to follow with his PCP for further adjustment    Deconditioning  -continue to work with PT  -remains weak/tired, but not significantly changed in the past few weeks    Plan  - Paracentesis in 1 week  - increase Mg to 2 times a day  - Vitamin D OTC 50.000 unit once   - CT CAP in 3 months   - RTC in 3 months     My attending (Dr. Beard) and I have participated in reviewing patient's history, labs/images and treatment options.       Yajaira Castaneda M.D.   Heme/Onc Fellow  Pager: [phone]    03/01/2021      Phone call duration: 45 minutes    Attending note: I saw the patient in conjunction with the fellow and agree with the above.      Again, thank you for allowing me to participate in the care of your patient.        Sincerely,        Aydin Beard MD

## 2021-03-01 NOTE — PRE-PROCEDURE
GENERAL PRE-PROCEDURE:     Verbal consent obtained?: Yes    Written consent obtained?: Yes    Risks and benefits: Risks, benefits and alternatives were discussed    Consent given by:  Patient  Patient states understanding of procedure being performed: Yes    Patient's understanding of procedure matches consent: Yes    Procedure consent matches procedure scheduled: Yes    Appropriately NPO:  Yes  ASA Class:  Class 3- Severe systemic disease, definite functional limitations  Mallampati  :  Grade 3- soft palate visible, posterior pharyngeal wall not visible  Lungs:  Lungs clear with good breath sounds bilaterally (Posterior auscultation not performed. Lungs otherwise clear.)  Heart:  Normal heart sounds and rate  History & Physical reviewed:  History and physical reviewed and no updates needed  Statement of review:  I have reviewed the lab findings, diagnostic data, medications, and the plan for sedation

## 2021-03-01 NOTE — PROGRESS NOTES
Pt arrived on 2a post sinogram. No sedation given. VSS RA. Dressing c/d/i. No pain. Family at bedside. Pt declines food and drink.

## 2021-03-01 NOTE — IP AVS SNAPSHOT
[facility] Interventional Radiology  [address] [address]  Phone: [phone]                                    After Visit Summary   3/1/2021    Fuentes Estrada    MRN: [id number]           After Visit Summary Signature Page    I have received my discharge instructions, and my questions have been answered. I have discussed any challenges I see with this plan with the nurse or doctor.    ..........................................................................................................................................  Patient/Patient Representative Signature      ..........................................................................................................................................  Patient Representative Print Name and Relationship to Patient    ..................................................               ................................................  Date                                   Time    ..........................................................................................................................................  Reviewed by Signature/Title    ...................................................              ..............................................  Date                                               Time          22EPIC Rev 08/18

## 2021-03-01 NOTE — IP AVS SNAPSHOT
MRN:[id number]                      After Visit Summary   3/1/2021    Fuentes Estrada    MRN: [id number]           Visit Information        Department      3/1/2021  9:59 AM [facility] Interventional Radiology          Review of your medicines      UNREVIEWED medicines. Ask your doctor about these medicines       Dose / Directions   atorvastatin 40 MG tablet  Commonly known as: LIPITOR      Dose: 40 mg  Take 40 mg by mouth At Bedtime  Refills: 0     ciprofloxacin 500 MG tablet  Commonly known as: CIPRO  Used for: Cholangiocarcinoma (H)      Dose: 500 mg  Take 1 tablet (500 mg) by mouth 2 times daily  Quantity: 28 tablet  Refills: 2     clopidogrel 75 MG tablet  Commonly known as: PLAVIX      Dose: 75 mg  Take 75 mg by mouth At Bedtime  Refills: 0     dexamethasone 4 MG tablet  Commonly known as: DECADRON  Used for: Cholangiocarcinoma (H)      Dose: 4 mg  Take 1 tablet (4 mg) by mouth daily (with breakfast) Daily for the 3 days after chemotherapy in the morning with food  Quantity: 3 tablet  Refills: 1     dronabinol 5 MG capsule  Commonly known as: MARINOL  Used for: Hilar cholangiocarcinoma (H)      Take 2 capsules in am and 1 capsule in pm (before meals)  Quantity: 90 capsule  Refills: 1     fenofibrate 160 MG tablet  Commonly known as: TRIGLIDE/LOFIBRA      Dose: 160 mg  Take 160 mg by mouth At Bedtime  Refills: 0     furosemide 20 MG tablet  Commonly known as: LASIX      Dose: 20 mg  Take 20 mg by mouth every evening  Refills: 0     iron 325 (65 Fe) MG tablet  Used for: Iron deficiency anemia due to chronic blood loss      Dose: 1 tablet  Take 1 tablet by mouth 3 times daily (with meals)  Quantity: 180 tablet  Refills: 1     lisinopril 30 MG tablet  Commonly known as: ZESTRIL      Dose: 5 mg  Take 5 mg by mouth every evening  Refills: 0     LORazepam 0.5 MG tablet  Commonly known as: ATIVAN  Used for: Hilar cholangiocarcinoma (H)      Dose: 0.5 mg  Take 1 tablet (0.5 mg) by mouth  every 4 hours as needed (Anxiety, Nausea/Vomiting or Sleep)  Quantity: 30 tablet  Refills: 5     magnesium gluconate 30 MG tablet  Used for: Hilar cholangiocarcinoma (H)      Dose: 30 mg  Take 1 tablet (30 mg) by mouth daily  Quantity: 90 tablet  Refills: 3     metroNIDAZOLE 500 MG tablet  Commonly known as: FLAGYL  Used for: Cholangiocarcinoma (H)      Dose: 500 mg  Take 1 tablet (500 mg) by mouth 3 times daily  Quantity: 42 tablet  Refills: 2     ondansetron 8 MG tablet  Commonly known as: ZOFRAN  Used for: Hilar cholangiocarcinoma (H)      Dose: 8 mg  Take 1 tablet (8 mg) by mouth every 8 hours as needed (Nausea/Vomiting)  Quantity: 10 tablet  Refills: 5     oxyCODONE 5 MG tablet  Commonly known as: ROXICODONE  Used for: Cholangiocarcinoma (H)      Dose: 5-10 mg  Take 1-2 tablets (5-10 mg) by mouth every 4 hours as needed for moderate to severe pain  Quantity: 20 tablet  Refills: 0     penicillin V 500 MG tablet  Commonly known as: VEETID  Used for: Intra-abdominal abscess (H)      Dose: 500 mg  Take 1 tablet (500 mg) by mouth 4 times daily  Quantity: 120 tablet  Refills: 0     prochlorperazine 10 MG tablet  Commonly known as: COMPAZINE  Used for: Hilar cholangiocarcinoma (H)      Dose: 5 mg  Take 0.5 tablets (5 mg) by mouth every 6 hours as needed (Nausea/Vomiting)  Quantity: 30 tablet  Refills: 5     sildenafil 20 MG tablet  Commonly known as: REVATIO      Dose: 20 mg  Take 20 mg by mouth as needed  Refills: 0     zolpidem 10 MG tablet  Commonly known as: AMBIEN  Used for: Hilar cholangiocarcinoma (H)      Dose: 10 mg  Take 1 tablet (10 mg) by mouth nightly as needed for sleep  Quantity: 30 tablet  Refills: 0              Protect others around you: Learn how to safely use, store and throw away your medicines at www.disposemymeds.org.       Follow-ups after your visit       Your next 10 appointments already scheduled    Mar 01, 2021  3:30 PM  (Arrive by 3:15 PM)  Video Visit with MD DUNG Araiza  [facility] ([facility] ) [address] [place] [address]  [phone]   Please Note: This is a virtual visit; there is no need to come to the facility.       Mar 02, 2021  1:30 PM  Cancer Rehab Treatment with Radha Santillan PT  [facility] ([facility] ) [address] [place] [address]  [phone]      Mar 03, 2021  9:30 AM  Level 5 with NL INFUSION CHAIR 1  [facility] [place] ([facility] ) [address] [place] [address]  [phone]      Mar 05, 2021  2:30 PM  (Arrive by 2:15 PM)  Video Visit with Yuri Dale MD  [facility] [place] ([facility] ) [address] [place] [address]  [phone]   Please Note: This is a virtual visit; there is no need to come to the facility.       Mar 16, 2021  3:00 PM  Cancer Rehab Treatment with Radha Santillan PT  [facility] ([facility] ) [address] [place] [address]  [phone]         Care Instructions       Further instructions from your care team       [facility]  Interventional Radiology   Sinogram      AFTER YOU GO HOME:     Drink plenty of fluids and resume your regular diet.     Call Your Physician if:    You develop nausea or vomiting.    Your develop hives or rash or unexplained itching    Additional Instructions: Please call for the following problems:    No fluid draining from the tube, check that the tube is not kinked or if stop cock is closed.    Flush with 10 cc's three times daily with Normal Saline, empty bag and monitor output daily subtracting amount inserted.    Skin around tube is red, painful or has any drainage.    You have increased pain in your  "abdomen    Fever greater than 100.5 F and chills    You feel nauseated and  \"just not right\"      Change dressing every 48 hour or when it gets wet    Interventional Radiology Department     Physician:Dr. Purcell   Date:March 1, 2021  Telephone numbers:  [phone]...Monday-Friday 8:00am-4:30pm                                      [phone]... After 4:30 Monday-Friday, Weekends and Holiday. Ask for the Interventional Radiologist on call. Someone is on call 24 hours a day.                                    Additional Information About Your Visit       exoro systemhart Information    [facility] gives you secure access to your electronic health record. If you see a primary care provider, you can also send messages to your care team and make appointments. If you have questions, please call your primary care clinic.  If you do not have a primary care provider, please call [phone] and they will assist you.       Care EveryWhere ID    This is your Care EveryWhere ID. This could be used by other organizations to access your [facility] medical records  [id number]       Your Vitals Were  Most recent update: 3/1/2021  2:03 PM    Blood Pressure   113/78          Pulse   74          Temperature   97.8  F (36.6  C) (Oral)          Respirations   16          Height   1.854 m (6' 1\")             Weight   80.3 kg (177 lb)    Pulse Oximetry   97%    BMI (Body Mass Index)   23.35 kg/m           Primary Care Provider Office Phone # Fax #    Tolu Noland [phone]      Equal Access to Services    DINESH SANTOS AH: Hadii aura ku hadasho Soomaali, waaxda luqadaha, qaybta kaalmada adeegyada, waxay lin ayon adeamanuel carbajal lanadine . So [facility] [phone].    ATENCIÓN: Si habla español, tiene a kaufman disposición servicios gratuitos de asistencia lingüística. Llame al [phone].    We comply with applicable federal and state civil rights laws, including the [place] Human Rights Act. We do not discriminate on the basis of " race, color, creed, Faith, national origin, marital status, age, disability, sex, sexual orientation, or gender identity.    If you would like an itemization of your charges they will now be available in [employer] 30 days after discharge. To access the itemized statements in [employer] go to billing/billing summary. From there select view account. There will be multiple tabs showing an overview of your account, detail, payments, and communications. From the communications tab you can see your monthly statements, your itemized statements, and any billing letters generated for your account. If you do not have a [employer] account and need help getting access please contact [employer]pert support at [phone].  If you would prefer to have your itemized statements mailed please contact our automated itemized bill request line at [phone] option  2.       Thank you!    Thank you for choosing [facility] for your care. Our goal is always to provide you with excellent care. Hearing back from our patients is one way we can continue to improve our services. Please take a few minutes to complete the written survey that you may receive in the mail after you visit with us. Thank you!            Medication List      ASK your doctor about these medications          Morning Afternoon Evening Bedtime As Needed    atorvastatin 40 MG tablet  Also known as: LIPITOR  INSTRUCTIONS: Take 40 mg by mouth At Bedtime                     ciprofloxacin 500 MG tablet  Also known as: CIPRO  INSTRUCTIONS: Take 1 tablet (500 mg) by mouth 2 times daily                     clopidogrel 75 MG tablet  Also known as: PLAVIX  INSTRUCTIONS: Take 75 mg by mouth At Bedtime                     dexamethasone 4 MG tablet  Also known as: DECADRON  INSTRUCTIONS: Take 1 tablet (4 mg) by mouth daily (with breakfast) Daily for the 3 days after chemotherapy in the morning with food                     dronabinol 5 MG capsule  Also known as: MARINOL  INSTRUCTIONS: Take 2  capsules in am and 1 capsule in pm (before meals)                     fenofibrate 160 MG tablet  Also known as: TRIGLIDE/LOFIBRA  INSTRUCTIONS: Take 160 mg by mouth At Bedtime                     furosemide 20 MG tablet  Also known as: LASIX  INSTRUCTIONS: Take 20 mg by mouth every evening                     iron 325 (65 Fe) MG tablet  INSTRUCTIONS: Take 1 tablet by mouth 3 times daily (with meals)                     lisinopril 30 MG tablet  Also known as: ZESTRIL  INSTRUCTIONS: Take 5 mg by mouth every evening                     LORazepam 0.5 MG tablet  Also known as: ATIVAN  INSTRUCTIONS: Take 1 tablet (0.5 mg) by mouth every 4 hours as needed (Anxiety, Nausea/Vomiting or Sleep)                     magnesium gluconate 30 MG tablet  INSTRUCTIONS: Take 1 tablet (30 mg) by mouth daily                     metroNIDAZOLE 500 MG tablet  Also known as: FLAGYL  INSTRUCTIONS: Take 1 tablet (500 mg) by mouth 3 times daily                     ondansetron 8 MG tablet  Also known as: ZOFRAN  INSTRUCTIONS: Take 1 tablet (8 mg) by mouth every 8 hours as needed (Nausea/Vomiting)                     oxyCODONE 5 MG tablet  Also known as: ROXICODONE  INSTRUCTIONS: Take 1-2 tablets (5-10 mg) by mouth every 4 hours as needed for moderate to severe pain                     penicillin V 500 MG tablet  Also known as: VEETID  INSTRUCTIONS: Take 1 tablet (500 mg) by mouth 4 times daily  Doctor's comments: Verbal order given to Zhao Shafer                     prochlorperazine 10 MG tablet  Also known as: COMPAZINE  INSTRUCTIONS: Take 0.5 tablets (5 mg) by mouth every 6 hours as needed (Nausea/Vomiting)                     sildenafil 20 MG tablet  Also known as: REVATIO  INSTRUCTIONS: Take 20 mg by mouth as needed                     zolpidem 10 MG tablet  Also known as: AMBIEN  INSTRUCTIONS: Take 1 tablet (10 mg) by mouth nightly as needed for sleep

## 2021-03-01 NOTE — IR NOTE
Patient Name: Fuentes Estrada  Medical Record Number: [id number]  Today's Date: 3/1/2021    Procedure: Sinogram  Proceduralist: Dr. Purcell    Procedure Start: 1343  Procedure end: 1348  Sedation medications administered: NA     Report given to: Sheryl CASTRO  : MYRTLE    Other Notes: Pt arrived to IR room 2 from . Consent reviewed. Pt denies any questions or concerns regarding procedure. Pt positioned supine and monitored per protocol. Pt tolerated procedure without any noted complications. Pt transferred back to .

## 2021-03-01 NOTE — PROGRESS NOTES
"Fuentes is a 65 year old who is being evaluated via a billable telephone visit.      What phone number would you like to be contacted at? [phone]  How would you like to obtain your AVS? Braden     Vitals - Patient Reported  Systolic (Patient Reported): 113  Diastolic (Patient Reported): 78  Weight (Patient Reported): 80.3 kg (177 lb)  Height (Patient Reported): 185.4 cm (6' 1\")  BMI (Based on Pt Reported Ht/Wt): 23.35  SpO2 (Patient Reported): 97  Temperature (Patient Reported): 97.8  F (36.6  C)  Pulse (Patient Reported): 74      I have reviewed and updated patient's allergy and medication list.    Concerns: NONE  Refills: NONE      Margarita Dunham [facility]      Oncology Note - Video/Phone Visit     Fuentes Estrada MRN# [id number]   Age: 65 year old YOB: 1955     Interval History:   Fuentes Estrada  who is being evaluated via a telephone/video visit due to COVID-19 pandemic.      He had recent hospitalization 2/1-2/3/21 for nausea and vomiting and FTT. He is recovering, appetite is still poor even with adding marinol. Has distended abd and his abd drainage output is decreasing. No fever or chills, no N/V/D. No CP/SOB.    No urine issues or legs swelling. No pain.     Remainder of her 10 point review of systems is otherwise negative.      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 On May 12, 2020 he underwent a radical extrahepatic bile duct resection and R hepatectomy.   He had all of his disease resected with negative margins and one positive lymph node.  He had a complicated postoperative course with a bile leak and abscess that had delayed starting his adjuvant chemotherapy.    On imaging in July 2020,  he appeared to be developing increasing nodularity in the resection bed and regional lymph nodes that has led to an EUS with a fine-needle aspiration " "of the lymph nodes on 8/11/20,  demonstrating the presence of metastatic disease.   On 8/27/20, he initiated treatment with palliative intent Cisplatin/Gemcitabine.      His abscess/biliary drain was managed by IR. Sinograms identified a communication to the biliary tree. He failed a capping trial with development of a fever. He underwent sinograms and cavitary sclerotherapy every 2 weeks starting August 25, 2020.  Drain was removed on 11/9/2020 9/28/20: port placement  Admission to [facility] 10/9/20-10/11/20 with septic shock from infectious enterocolitis, all cultures NGTD. Chemo delayed one week for recovery.  Cis/[place] restarted 10/21.  Admission to [facility] 11/18/20-11/19/20 with cholangitis - fever 100.8, bilirubin 3.2, elevated alk phos. MRCP showed no stones or strictures. Paracentesis removed 1L of fluid, no signs of SBP. He was dismissed on a 7-day course of Flagyl and ciprofloxacin.  C5D8 was delayed due to hospitalization.     He was seen on 12/14/2020 with an interim CT scan which showed findings concerning for abscess at the operative site along the posterior aspect of liver with gas seen in this fluid collection.  Patient was also having chills, chemotherapy was held and he was started on ciprofloxacin and Flagyl.  There was further discussion about management of this recurrent abscess with Dr. Purcell, Dr. Hale and Dr. Strauss about the abscess.  It was felt that aspiration is not likely to prevent recurrence of the abscess. A biliary drain is possible, but likely to be permanent. It has been concluded an endoscopic procedure is not possible. At 12/21/20 visit with Dr. Beard, antibiotic course was extended and he finished 12/31/20. Disease on 12/11/20 was stable.   2/2/21 admission for Admitted for nausea, vomiting and FTT. - Underwent ERCP for possible internal drainage 2/2; unfortunately ERCP was not feasible due to \"edematous and tortuous biliary limb obviating passage\"   Biliary tract " tube put in on 2/15/2021.       Oncology Medications 8/27/2020 9/2/2020   Day, Cycle Day 1, Cycle 1 Day 8, Cycle 1   CISplatin (PLATINOL) IV 25 mg/m2 = 53 mg 25 mg/m2 = 53 mg   gemcitabine (GEMZAR) IV 2,200 mg 2,200 mg     Oncology Medications 9/16/2020 9/23/2020   Day, Cycle Day 1, Cycle 2 Day 8, Cycle 2   CISplatin (PLATINOL) IV 25 mg/m2 = 53 mg 25 mg/m2 = 53 mg   gemcitabine (GEMZAR) IV 2,200 mg 2,200 mg     Oncology Medications 10/7/2020 10/21/2020   Day, Cycle Day 1, Cycle 3 Day 1, Cycle 4   CISplatin (PLATINOL) IV 25 mg/m2 = 53 mg 25 mg/m2 = 53 mg   gemcitabine (GEMZAR) IV 2,200 mg 2,200 mg     Oncology Medications 10/28/2020 11/11/2020 11/25/2020   Day, Cycle Day 8, Cycle 4 Day 1, Cycle 5 Day 8, Cycle 5   CISplatin (PLATINOL) IV 25 mg/m2 = 53 mg 25 mg/m2 = 53 mg 50 mg   gemcitabine (GEMZAR) IV 2,200 mg 2,200 mg 2,000 mg     Oncology Medications 12/9/2020   Day, Cycle Day 1, Cycle 6   CISplatin (PLATINOL) IV 25 mg/m2 = 53 mg   gemcitabine (GEMZAR) IV 2,200 mg       Medications: reviewed.  Current Outpatient Medications   Medication     atorvastatin (LIPITOR) 40 MG tablet     ciprofloxacin (CIPRO) 500 MG tablet     clopidogrel (PLAVIX) 75 MG tablet     dexamethasone (DECADRON) 4 MG tablet     dronabinol (MARINOL) 5 MG capsule     fenofibrate (TRIGLIDE/LOFIBRA) 160 MG tablet     Ferrous Sulfate (IRON) 325 (65 Fe) MG tablet     furosemide (LASIX) 20 MG tablet     lisinopril (ZESTRIL) 30 MG tablet     LORazepam (ATIVAN) 0.5 MG tablet     magnesium gluconate 30 MG tablet     metroNIDAZOLE (FLAGYL) 500 MG tablet     ondansetron (ZOFRAN) 8 MG tablet     oxyCODONE (ROXICODONE) 5 MG tablet     penicillin V (VEETID) 500 MG tablet     prochlorperazine (COMPAZINE) 10 MG tablet     sildenafil (REVATIO) 20 MG tablet     zolpidem (AMBIEN) 10 MG tablet     No current facility-administered medications for this visit.      Facility-Administered Medications Ordered in Other Visits   Medication     fentaNYL (PF) (SUBLIMAZE)  injection 25-50 mcg     flumazenil (ROMAZICON) injection 0.2 mg     lidocaine (LMX4) cream     lidocaine 1 % 0.1-1 mL     lidocaine 1 % 1-30 mL     medication instruction     midazolam (VERSED) injection 0.5-2 mg     naloxone (NARCAN) injection 0.2 mg    Or     naloxone (NARCAN) injection 0.4 mg    Or     naloxone (NARCAN) injection 0.2 mg    Or     naloxone (NARCAN) injection 0.4 mg     sodium chloride (PF) 0.9% PF flush 3 mL     sodium chloride (PF) 0.9% PF flush 3 mL     sodium chloride 0.9 % bag TABLE SOLN     sodium chloride 0.9% infusion            Physical Exam:   This is a phone visit and no physical exam.         Data:   I have personally reviewed the following labs/imaging:  Recent Labs   Lab Test 02/26/21  1241 02/03/21  0647 02/02/21  0632   WBC 4.7 3.2* 2.9*   RBC 3.38* 2.92* 2.69*   HGB 10.4* 9.0* 8.4*   HCT 31.0* 27.7* 25.1*   MCV 92 95 93   MCH 30.8 30.8 31.2   MCHC 33.5 32.5 33.5   RDW 17.2* 20.0* 19.9*    183 166   NEUTROPHIL 50.7 54.8 48.3     Recent Labs   Lab Test 03/01/21  1145 02/26/21  1241 02/03/21  0647 02/02/21  0632   NA  --  135 136 136   POTASSIUM  --  3.7 3.6 3.6   CHLORIDE  --  102 106 106   CO2  --  28 26 27   ANIONGAP  --  5 4 4   * 128* 95 106*   BUN  --  10 9 8   CR  --  0.81 0.86 0.86   ERIC  --  8.2* 8.4* 8.1*     Recent Labs   Lab Test 02/26/21  1241 02/03/21  0647 02/02/21  0632   PROTTOTAL 6.7* 6.0* 5.8*   ALBUMIN 1.9* 1.7* 1.6*   BILITOTAL 2.4* 2.1* 1.9*   ALKPHOS 855* 602* 544*   * 123* 108*   ALT 38 35 30     Cancer antigen     Images:   CT CAP 2/26/21   IMPRESSION:  1.  Interval placement of a right lateral abdomen are cutaneous subhepatic drainage catheter with resolution of the fluid collection is present adjacent to the right hepatic lobe surgical margin and right kidney.  2.  Large amount of abdominal and pelvic ascites limits evaluation for possible bile leak. Consider therapeutic and diagnostic paracentesis to evaluate for bile within the ascites  fluid.  3.  Stable partial hepatectomy post surgical changes.  4.  Interval increase in size of the small left pleural effusion. Stable small right pleural effusion. Persistent bilateral lower lobe compressive atelectasis.  5.  Wall enhancement of the stomach and proximal duodenum suggests gastritis or peptic disease.  6.  Extensive upper abdomen varices.  7.  Heterogeneous enhancement of the liver parenchyma may be due to IV contrast bolus timing or possible hepatitis. No evidence of discrete liver mass.         Assessment and Plan:   Fuentes Estrada is a 65 year old with meastatic cholangiocarcinoma was diagnosed May 2020. He underwent surgical resection but unfortunately developed metastatic disease quite rapidly. He start palliative intent Cisplatin/Gemcitabine in August 2020. His course has been complicated by abscess/biliary s/p drain placement and subsequent removal, cholangitis and sepsis. He has been off chemotherapy due to this persistent fluid collection concerning for abscess vs leak and has been on prolonged antibiotics for this.     He has recent hospitalization for nausea, vomiting, FTT and Perihepatic fluid collection, abscess vs leak with drainage.  Multidispinary team including Dr. Hale, Dr. Purcell, Dr. Swan and   have been involved in his case. He has a dainage catheter and on PCN. He was on around 10 weeks of flagyl and cipro in the past. Marinol was added as appetite stimulant and getting outpatient PT/OT.      His current CT CAP on 2/26/2021 showed resolution of the fluid collection adjacent to the right hepatic lobe surgical margin and right kidney, large ascites and interval increase in the small left pleural effusion. No evidence of discrete liver mass.  Therefore we told Mr. Estrada and his wife there is no indication to resume his chemo now in addition to poor performance status and need for recovering in his physical therapy and nutrition. His Magnesium is low and vitaminD  is below 10.     His poor appetite may improve on physical therapy and taking off some ascetic fluid. We will schedule him for paracentesis.     Metastatic Cholangiocarcinoma   -initiated on cisplatin/gemcitabine on 8/27/20. Off of treatment since 12/9/20 due to the abscess/fluid collection  -no evidence of cancer progression at this time. Will continue to hold chemotherapy    -Repeat CT CAP in 3 months     Perihepatic fluid collection, abscess vs leak s/p drainage    - IR is taking care of his drainage  tube     Anemia,  s/t chronic disease and chemotherapy, no acute bleeding  -improving off of chemotherapy. monitor     DM   -Glipizide held since last admission.       Hx of CVA 10 years ago, has mild short term memory deficits  -on plavix, atorvastatin    HTN  -on Lisinopril    -encouraged him to follow with his PCP for further adjustment    Deconditioning  -continue to work with PT  -remains weak/tired, but not significantly changed in the past few weeks    Plan  - Paracentesis in 1 week  - increase Mg to 2 times a day  - Vitamin D OTC 50.000 unit once   - CT CAP in 3 months   - RTC in 3 months     My attending (Dr. Beard) and I have participated in reviewing patient's history, labs/images and treatment options.       Yajaira Castaneda M.D.   Heme/Onc Fellow  Pager: [phone]    03/01/2021      Phone call duration: 45 minutes    Attending note: I saw the patient in conjunction with the fellow and agree with the above.

## 2021-03-01 NOTE — PROGRESS NOTES
Discharge instructions reviewed, copy given to pt. Port flushed with normal saline and heparin and deaccessed. Pt discharged home accompanied by wife.

## 2021-03-01 NOTE — DISCHARGE INSTRUCTIONS
"[facility]  Interventional Radiology   Sinogram      AFTER YOU GO HOME:     Drink plenty of fluids and resume your regular diet.     Call Your Physician if:    You develop nausea or vomiting.    Your develop hives or rash or unexplained itching    Additional Instructions: Please call for the following problems:    No fluid draining from the tube, check that the tube is not kinked or if stop cock is closed.    Flush with 10 cc's three times daily with Normal Saline, empty bag and monitor output daily subtracting amount inserted.    Skin around tube is red, painful or has any drainage.    You have increased pain in your abdomen    Fever greater than 100.5 F and chills    You feel nauseated and  \"just not right\"      Change dressing every 48 hour or when it gets wet    Interventional Radiology Department     Physician:Dr. Purcell   Date:March 1, 2021  Telephone numbers:  [phone]...Monday-Friday 8:00am-4:30pm                                      [phone]... After 4:30 Monday-Friday, Weekends and Holiday. Ask for the Interventional Radiologist on call. Someone is on call 24 hours a day.                                  "

## 2021-03-01 NOTE — PROCEDURES
[facility]    Procedure: IR Procedure Note    Date/Time: 3/1/2021 1:57 PM  Performed by: Reta Purcell MD  Authorized by: Reta Purcell MD     UNIVERSAL PROTOCOL   Site Marked: NA  Prior Images Obtained and Reviewed:  Yes  Required items: Required blood products, implants, devices and special equipment available    Patient identity confirmed:  Verbally with patient, arm band, provided demographic data and hospital-assigned identification number  Patient was reevaluated immediately before administering moderate or deep sedation or anesthesia  Confirmation Checklist:  Patient's identity using two indicators, relevant allergies, procedure was appropriate and matched the consent or emergent situation and correct equipment/implants were available  Time out: Immediately prior to the procedure a time out was called    Universal Protocol: the Joint Commission Universal Protocol was followed    Preparation: Patient was prepped and draped in usual sterile fashion           ANESTHESIA    Anesthesia: Local infiltration  Local Anesthetic:  Lidocaine 1% without epinephrine      SEDATION    Patient Sedated: No    See dictated procedure note for full details.  Findings: No sedation    Specimens: none    Complications: None    Condition: Stable    Plan: CT and same day Sinogram after chemotherapy is completed. If no cavity or biliary fistula at that time, will plan to cap drain    PROCEDURE   Patient Tolerance:  Patient tolerated the procedure well with no immediate complications  Describe Procedure: Small cavity, no clear biliary fistula  Length of time physician/provider present for 1:1 monitoring during sedation: 0

## 2021-03-05 NOTE — LETTER
"3/5/2021       RE: Fuentes Estrada  [address] [facility] [address]     Dear Colleague,    Thank you for referring your patient, Fuentes Estrada, to the [facility] [place] at [facility]. Please see a copy of my visit note below.    Patient's wife Bri will be involved in today's visit as she is the primary caregiver.    PER BRI, THEY HAVE HAD AUDIO ISSUES IN THE PAST WITH THE VIDEO VISITS - IF THE AUDIO CONNECTION IS POOR, SHE IS FINE SWITCHING TO PHONE VISIT  PH# [phone]      Fuentes is a 65 year old who is being evaluated via a billable video visit.      How would you like to obtain your AVS? MyChart     If the video visit is dropped, Please call patient on phone at [phone] - they have had issues with audio in the past and have had to switch to phone visit.      Will anyone else be joining your video visit? No  {If patient encounters technical issues they should call [phone] :[id number]}    Video Start Time: {video visit start/end time for provider to select:646005}  Video-Visit Details    Type of service:  Video Visit    Video End Time:{video visit start/end time for provider to select:690510}    Originating Location (pt. Location): {video visit patient location:314468::\"Home\"}    Distant Location (provider location):  [facility] [place]     Platform used for Video Visit: {Virtual Visit Platforms:687591::\"MyEnergy\"}        Again, thank you for allowing me to participate in the care of your patient.      Sincerely,    Yuri Dale MD      "

## 2021-03-05 NOTE — PROGRESS NOTES
Patient's wife Bri will be involved in today's visit as she is the primary caregiver.    PER BIANCAMOOSE, THEY HAVE HAD AUDIO ISSUES IN THE PAST WITH THE VIDEO VISITS - IF THE AUDIO CONNECTION IS POOR, SHE IS FINE SWITCHING TO PHONE VISIT  # [phone]      Fuentes Estrada is a 65 year old who is being evaluated via a billable telephone visit.      What phone number would you like to be contacted at? [phone]  How would you like to obtain your AVS? 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  He had radical extrahepatic bile duct resection and R hepatectomy in May 2020. The presence of metastatic disease was identified by EUS biopsy in August 2020.     He developed an abscess and a biliary drain was placed, managed by IR. Sinograms identified a communication to the biliary tree. He failed a capping trial with development of a fever. He underwent sinograms and cavitary sclerotherapy every 2 weeks starting 8/25/2020.  Drain was removed on 11/9/2020, but he presented just 11/18/2020 with cholangitis, fever 100.8 F, bilirubin 3.2, elevated alk phos.      12/11/2020 CT scan with findings concerning for an abscess at the operative site along the posterior aspect of the liver with gas seen in this fluid collection.  He was started on ciprofloxacin and Flagyl.  There was further discussion about management of this recurrent abscess with Dr. Purcell, Dr. Hale and Dr. Strauss.  It was felt that aspiration was unlikely to prevent recurrence of the abscess. If a biliary drain was placed, it would likely be permanent. 12/21/20 visit with Dr. Beard, antibiotic course of cipro/Flagyl was extended and he finished 12/31/20.    2/2/2021 admission for nausea, vomiting and  "FTT. Fuentes underwent ERCP for possible internal drainage, but ERCP was not feasible due to \"edematous and tortuous biliary limb obviating passage.\"   Wife tells me he was given more cipro/Flagyl at one point, and was taking it still at the time of biliary drain placement by IR, done on 2/15/2021. Cultures from drainage growing Strep anginosus.   Penicillin was prescribed     Sinogram 3/1/2021 with no fistula identified and 10mL of residual cavity.  The drain is currently putting out just 5-10ccs per day.   Plan is for repeat CT abdomen/pelvis and same day sinogram 3/25/2021.    Fuentes does not have much of any appetite.  Wife thinks it got a bit better after stopping Flagyl.  No fevers.      I have reviewed and updated the patient's Past Medical History, Social History, Family History and Medication List.    OBJECTIVE:    No current facility-administered medications for this visit.      Current Outpatient Medications   Medication     amoxicillin (AMOXIL) 875 MG tablet     sodium chloride, PF, 0.9% PF flush     Facility-Administered Medications Ordered in Other Visits   Medication     bisacodyl (DULCOLAX) Suppository 10 mg     clopidogrel (PLAVIX) tablet 75 mg     glucose gel 15-30 g    Or     dextrose 50 % injection 25-50 mL    Or     glucagon injection 1 mg     ferrous sulfate (FEROSUL) tablet 325 mg     heparin 100 UNIT/ML injection 5 mL     heparin lock flush 10 UNIT/ML injection 5-10 mL     heparin lock flush 10 UNIT/ML injection 5-10 mL     lidocaine (LMX4) cream     lidocaine 1 % 0.1-1 mL     LORazepam (ATIVAN) tablet 0.5 mg     magnesium gluconate (MAGONATE) tablet 500 mg     melatonin tablet 1 mg     multivitamin w/minerals (THERA-VIT-M) tablet 1 tablet     ondansetron (ZOFRAN-ODT) ODT tab 4 mg    Or     ondansetron (ZOFRAN) injection 4 mg     piperacillin-tazobactam (ZOSYN) 4.5 g vial to attach to  mL bag     polyethylene glycol (MIRALAX) Packet 17 g     potassium & sodium phosphates (NEUTRA-PHOS) Packet 1 " "packet     prochlorperazine (COMPAZINE) injection 5 mg    Or     prochlorperazine (COMPAZINE) tablet 5 mg    Or     prochlorperazine (COMPAZINE) suppository 12.5 mg     senna-docusate (SENOKOT-S/PERICOLACE) 8.6-50 MG per tablet 1 tablet    Or     senna-docusate (SENOKOT-S/PERICOLACE) 8.6-50 MG per tablet 2 tablet     sodium chloride (PF) 0.9% PF flush 10 mL     sodium chloride (PF) 0.9% PF flush 10-20 mL     sodium chloride (PF) 0.9% PF flush 10-20 mL     sodium chloride (PF) 0.9% PF flush 3 mL     sodium chloride (PF) 0.9% PF flush 3 mL     sodium chloride (PF) 0.9% PF flush 3 mL     sodium chloride (PF) 0.9% PF flush 3 mL     sodium chloride (PF) 0.9% PF flush 3 mL     sodium chloride (PF) 0.9% PF flush 3 mL     sodium chloride 0.9% infusion     vancomycin (VANCOCIN) 2,000 mg in sodium chloride 0.9 % 250 mL intermittent infusion     zolpidem (AMBIEN) tablet 5 mg       Allergies   Allergen Reactions     Metformin Other (See Comments)     \" I think my kidneys shut down\"         Examination via telephone visit:     GENERAL: Patient is alert and does not seem to be in any acute distress    RESPIRATORY:  No cough or labored breathing is noted.  PSYCH: Affect is bright. Speech fluent and appropriate.      The rest of a comprehensive physical examination is deferred due to the public health emergency telephone visit restrictions.      No new labs or imaging to review    Microbiology    2/15/2021 culture of drainage when biliary drain was placed.  Moderate growth   Streptococcus anginosus   This organism is susceptible to ampicillin, penicillin, vancomycin and the cephalosporins.      ASSESSMENT and PLAN:    Hepatic abscess now drained, and biliary drain remains in place.  Drainage cultures grew Streptococcus anginosus while the patient was taking long term ciprofloxacin and Flagyl.     Next evaluation of drain is planned for 3/25, so I think reasonable to continue antibiotics targeted at the Strep until then at " least.    Patient is taking PenVK four times daily.  He will finish the bottle that he has, and then can transition to amoxicillin 875mg po BID.    I will have another video visit 3/16/2021 to check in.          Yuri Dale MD, MS  Infectious Disease    Time:  A total of 50 minutes was spent in care of the patient today. 30 minutes were spent on the telephone, with an additional 20 minutes spent on extensive chart review and care coordination with messages to other providers.

## 2021-03-12 NOTE — TELEPHONE ENCOUNTER
I called and spoke with Robyn.  Fuentes had his paracentesis yesterday, 7L off at [place] and feels much better.  He had been having full sensation and taught abdominal girth.  She is flushing his drain 3x/day with about 10cc, notes very decreased outputs less than 10cc for weeks,  And resistance towards the end of flushing.  We discussed decrease to 5cc twice a day.  We discussed should he need another para to schedule one, and what to look for as far as signs that he may be reaccumulating fluid.  They both got their J&J covid vaccines on Saturday 3/6/21 and did well.  I have him scheduled for CT and Sinogram at [facility] on 3/25 and have sent a Flukle message with appointment details.  Robyn to let me know if he needs a para added onto this date.  DANIELA Dumont, RN, BSN  Interventional Radiology Nurse Coordinator   Phone:  [phone]

## 2021-03-15 NOTE — PROGRESS NOTES
"RN Care Coordination Note  Incoming Call:   Received voicemail from patient's wife stating she had questions regarding patient.      Outgoing Call:   Placed return call to wife. States she thought patient would feel better after paracentesis. Reports no abdominal pain, no fever. Patient \"just feels crummy.\" We discussed it could take some time for patients strength and stamina to return. Reviewed lab results from paracentesis. Answered all questions to her stated satisfaction.     Chey Arias RN, BSN, OCN   RN Care Coordinator   [facility]          "

## 2021-03-16 NOTE — PROGRESS NOTES
Fuenets Estrada is a 66 year old man who is being evaluated via a billable telephone visit.      What phone number would you like to be contacted at? [phone]  How would you like to obtain your AVS? Reneebridgette     Phone call duration: 15 minutes    Infectious Disease telephone visit, Return for hepatic abscess    SUBJECTIVE:    Appetite is maybe a bit better.   No new fevers or pain.   Very little has come out from the drain.    CT abdomen/pelvis with sinogram is now planned for 3/25 with a tentative plan to cap the drain if fluid collection is decompressed.    Fuentes will run out of the penicillin 500mg po four times daily shortly.    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  He had radical extrahepatic bile duct resection and R hepatectomy in May 2020. The presence of metastatic disease was identified by EUS biopsy in August 2020.      He developed an abscess and a biliary drain was placed, managed by IR. Sinograms identified a communication to the biliary tree. He failed a capping trial with development of a fever. He underwent sinograms and cavitary sclerotherapy every 2 weeks starting 8/25/2020.  Drain was removed on 11/9/2020, but he presented just 11/18/2020 with cholangitis, fever 100.8 F, bilirubin 3.2, elevated alk phos.       12/11/2020 CT scan with findings concerning for an abscess at the operative site along the posterior aspect of the liver with gas seen in this fluid collection.  He was started on ciprofloxacin and Flagyl.  There was further discussion about management of this recurrent abscess with Dr. Purcell, Dr. Hale and Dr. Strauss.  It was felt that aspiration was unlikely to prevent recurrence of the abscess. If a biliary drain was placed, it would likely be permanent. 12/21/20 visit with Dr. Beard, antibiotic course of cipro/Flagyl was extended and he finished  "12/31/20.     2/2/2021 admission for nausea, vomiting and FTT. Fuentes underwent ERCP for possible internal drainage, but ERCP was not feasible due to \"edematous and tortuous biliary limb obviating passage.\"   Wife tells me he was given more cipro/Flagyl at one point, and was taking it still at the time of biliary drain placement by IR, done on 2/15/2021. Cultures from drainage growing Strep anginosus.   Penicillin was prescribed     Sinogram 3/1/2021 with no fistula identified and 10mL of residual cavity.  The drain is currently putting out just 5-10ccs per day.   Plan is for repeat CT abdomen/pelvis and same day sinogram 3/25/2021.     Fuentes does not have much of any appetite.  Wife thinks it got a bit better after stopping Flagyl.  No fevers.        I have reviewed and updated the patient's Past Medical History, Social History, Family History and Medication List.     OBJECTIVE:     No current facility-administered medications for this visit.           Current Outpatient Medications   Medication     amoxicillin (AMOXIL) 875 MG tablet     sodium chloride, PF, 0.9% PF flush          Facility-Administered Medications Ordered in Other Visits   Medication     bisacodyl (DULCOLAX) Suppository 10 mg     clopidogrel (PLAVIX) tablet 75 mg     glucose gel 15-30 g     Or     dextrose 50 % injection 25-50 mL     Or     glucagon injection 1 mg     ferrous sulfate (FEROSUL) tablet 325 mg     heparin 100 UNIT/ML injection 5 mL     heparin lock flush 10 UNIT/ML injection 5-10 mL     heparin lock flush 10 UNIT/ML injection 5-10 mL     lidocaine (LMX4) cream     lidocaine 1 % 0.1-1 mL     LORazepam (ATIVAN) tablet 0.5 mg     magnesium gluconate (MAGONATE) tablet 500 mg     melatonin tablet 1 mg     multivitamin w/minerals (THERA-VIT-M) tablet 1 tablet     ondansetron (ZOFRAN-ODT) ODT tab 4 mg     Or     ondansetron (ZOFRAN) injection 4 mg     piperacillin-tazobactam (ZOSYN) 4.5 g vial to attach to  mL bag     polyethylene glycol " "(MIRALAX) Packet 17 g     potassium & sodium phosphates (NEUTRA-PHOS) Packet 1 packet     prochlorperazine (COMPAZINE) injection 5 mg     Or     prochlorperazine (COMPAZINE) tablet 5 mg     Or     prochlorperazine (COMPAZINE) suppository 12.5 mg     senna-docusate (SENOKOT-S/PERICOLACE) 8.6-50 MG per tablet 1 tablet     Or     senna-docusate (SENOKOT-S/PERICOLACE) 8.6-50 MG per tablet 2 tablet     sodium chloride (PF) 0.9% PF flush 10 mL     sodium chloride (PF) 0.9% PF flush 10-20 mL     sodium chloride (PF) 0.9% PF flush 10-20 mL     sodium chloride (PF) 0.9% PF flush 3 mL     sodium chloride (PF) 0.9% PF flush 3 mL     sodium chloride (PF) 0.9% PF flush 3 mL     sodium chloride (PF) 0.9% PF flush 3 mL     sodium chloride (PF) 0.9% PF flush 3 mL     sodium chloride (PF) 0.9% PF flush 3 mL     sodium chloride 0.9% infusion     vancomycin (VANCOCIN) 2,000 mg in sodium chloride 0.9 % 250 mL intermittent infusion     zolpidem (AMBIEN) tablet 5 mg               Allergies   Allergen Reactions     Metformin Other (See Comments)       \" I think my kidneys shut down\"            Examination via telephone visit:     GENERAL: Patient is alert and does not seem to be in any acute distress    RESPIRATORY:  No cough or labored breathing is noted.  PSYCH: Affect is bright. Speech fluent and appropriate.      The rest of a comprehensive physical examination is deferred due to the public health emergency telephone visit restrictions.        No new labs or imaging to review     Microbiology     2/15/2021 culture of drainage when biliary drain was placed.  Moderate growth   Streptococcus anginosus   This organism is susceptible to ampicillin, penicillin, vancomycin and the cephalosporins.        ASSESSMENT and PLAN:     Hepatic abscess now drained, and biliary drain remains in place.  Drainage cultures grew Streptococcus anginosus while the patient was taking long term ciprofloxacin and Flagyl.      Next evaluation of drain is " planned for 3/25, so I think reasonable to continue antibiotics targeted at the Strep until then at least.     Now that the patient is finishing up the bottle that he has of PenVK 500mg po four times daily, I will transition him to amoxicillin 875mg po BID x 30 days to get him through the CT and potential capping trial.       I have set up a video visit for 4/16/2021 at 1:00pm, but I can check in sooner if the drain is out or if there is an issue.        Yuri Dale MD, MS  Infectious Disease    Time:  A total of 25 minutes was spent in care of the patient today. 15 minutes were spent on the telephone, with an additional 10 minutes spent on chart review, orders, and care coordination.

## 2021-03-25 NOTE — DISCHARGE INSTRUCTIONS
"[facility]  Interventional Radiology   Drainage Tube Instructions      AFTER YOU GO HOME:    Drink plenty of fluids and resume your regular diet.       Call Your Physician if:    You develop nausea or vomiting.    Your develop hives or rash or unexplained itching    Additional Instructions: Please call for the following problems:    Skin around tube is red, painful or has any drainage.    You have increased pain in your abdomen    Fever greater than 100.5 F and chills    You feel nauseated and  \"just not right\"      Change dressing every 48 hour or when it gets wet    Interventional Radiology Department    Physician: Dr. Purcell                               Date:March 25, 2021  Telephone numbers: [phone]...Monday-Friday 8:00am-4:30pm                                  [phone]... After 4:30 Monday-Friday, Weekends and Holiday. Ask for the Interventional Radiologist on call. Someone is on call 24 hours a day.                                  "

## 2021-03-25 NOTE — PROGRESS NOTES
Patient tolerated recovery stage well.  No sedation given, local only.  VSS, RUQ abdomen site clean/dry/intact, no hematoma, and denies pain. Patient tolerated PO fluids, declined PO food. Teaching was done and discharge instructions were given. Patient ambulated, voided, and port was de-accessed and heparin-locked.  Patient discharged from the hospital via wheel chair to home with wife @ 1325.

## 2021-03-25 NOTE — IR NOTE
Patient Name: Fuentes Estrada  Medical Record Number: [id number]  Today's Date: 3/25/2021    Procedure: image guided sinogram of a perihepatic abscess drain. Paracentesis  Proceduralist: Jazzy    Procedure Start: 1145  Procedure end: 1155  Sedation medications administered: local numbing only     Report given to: JASMIN Alcantara 2A  : n/a    Other Notes: Pt arrived to IR room 1 from . Consent reviewed. Pt denies any questions or concerns regarding procedure. Pt positioned lateral, right side up and monitored per protocol. Pt tolerated procedure without any noted complications. Pt transferred back to .

## 2021-03-25 NOTE — PROGRESS NOTES
Pt back from IR s/p sinogram.  VSS.  Pt alert and oriented x4.  Pt denies any pain.  Rt abd drg tube was capped in IR.  Pt's family at the bedside.

## 2021-03-25 NOTE — PRE-PROCEDURE
GENERAL PRE-PROCEDURE:   Procedure:  Perihepatic drain sinogram, possible paracentesis  Date/Time:  3/25/2021 11:11 AM    Verbal consent obtained?: Yes    Written consent obtained?: Yes    Risks and benefits: Risks, benefits and alternatives were discussed    Consent given by:  Patient  Patient states understanding of procedure being performed: Yes    Patient's understanding of procedure matches consent: Yes    Procedure consent matches procedure scheduled: Yes    Expected level of sedation:  Moderate  Appropriately NPO:  Yes  ASA Class:  Class 2- mild systemic disease, no acute problems, no functional limitations  Mallampati  :  Grade 3- soft palate visible, posterior pharyngeal wall not visible  Lungs:  Lungs clear with good breath sounds bilaterally  Heart:  Normal heart sounds and rate  History & Physical reviewed:  History and physical reviewed and no updates needed  Statement of review:  I have reviewed the lab findings, diagnostic data, medications, and the plan for sedation

## 2021-03-25 NOTE — IP AVS SNAPSHOT
After Visit Summary Template Not Found    This Print Group is only intended to be used in the After Visit Summary and can only be used in a report that uses a released After Visit Summary Template.                       MRN:[id number]                      After Visit Summary   3/25/2021    Fuentes Estrada    MRN: [id number]           Visit Information        Department      3/25/2021  9:40 AM [facility] [address] [place]          Review of your medicines      UNREVIEWED medicines. Ask your doctor about these medicines       Dose / Directions   amoxicillin 875 MG tablet  Commonly known as: AMOXIL  Used for: Liver abscess      Dose: 875 mg  Take 1 tablet (875 mg) by mouth 2 times daily  Quantity: 60 tablet  Refills: 0     atorvastatin 40 MG tablet  Commonly known as: LIPITOR      Dose: 40 mg  Take 40 mg by mouth At Bedtime  Refills: 0     clopidogrel 75 MG tablet  Commonly known as: PLAVIX      Dose: 75 mg  Take 75 mg by mouth At Bedtime  Refills: 0     dexamethasone 4 MG tablet  Commonly known as: DECADRON  Used for: Cholangiocarcinoma (H)      Dose: 4 mg  Take 1 tablet (4 mg) by mouth daily (with breakfast) Daily for the 3 days after chemotherapy in the morning with food  Quantity: 3 tablet  Refills: 1     dronabinol 5 MG capsule  Commonly known as: MARINOL  Used for: Hilar cholangiocarcinoma (H)      Take 2 capsules in am and 1 capsule in pm (before meals)  Quantity: 90 capsule  Refills: 1     fenofibrate 160 MG tablet  Commonly known as: TRIGLIDE/LOFIBRA      Dose: 160 mg  Take 160 mg by mouth At Bedtime  Refills: 0     furosemide 20 MG tablet  Commonly known as: LASIX      Dose: 20 mg  Take 20 mg by mouth every evening  Refills: 0     iron 325 (65 Fe) MG tablet  Used for: Iron deficiency anemia due to chronic blood loss      Dose: 1 tablet  Take 1 tablet by mouth 3 times daily (with meals)  Quantity: 180 tablet  Refills: 1     lisinopril 30 MG tablet  Commonly known as: ZESTRIL      Dose: 5 mg  Take 5  mg by mouth every evening  Refills: 0     LORazepam 0.5 MG tablet  Commonly known as: ATIVAN  Used for: Hilar cholangiocarcinoma (H)      Dose: 0.5 mg  Take 1 tablet (0.5 mg) by mouth every 4 hours as needed (Anxiety, Nausea/Vomiting or Sleep)  Quantity: 30 tablet  Refills: 5     magnesium gluconate 30 MG tablet  Used for: Hilar cholangiocarcinoma (H)      Dose: 30 mg  Take 1 tablet (30 mg) by mouth daily  Quantity: 90 tablet  Refills: 3     ondansetron 8 MG tablet  Commonly known as: ZOFRAN  Used for: Hilar cholangiocarcinoma (H)      Dose: 8 mg  Take 1 tablet (8 mg) by mouth every 8 hours as needed (Nausea/Vomiting)  Quantity: 30 tablet  Refills: 5     oxyCODONE 5 MG tablet  Commonly known as: ROXICODONE  Used for: Cholangiocarcinoma (H)      Dose: 5-10 mg  Take 1-2 tablets (5-10 mg) by mouth every 4 hours as needed for moderate to severe pain  Quantity: 20 tablet  Refills: 0     prochlorperazine 10 MG tablet  Commonly known as: COMPAZINE  Used for: Hilar cholangiocarcinoma (H)      Dose: 5 mg  Take 0.5 tablets (5 mg) by mouth every 6 hours as needed (Nausea/Vomiting)  Quantity: 30 tablet  Refills: 5     sildenafil 20 MG tablet  Commonly known as: REVATIO      Dose: 20 mg  Take 20 mg by mouth as needed  Refills: 0     zolpidem 10 MG tablet  Commonly known as: AMBIEN  Used for: Hilar cholangiocarcinoma (H)      Dose: 10 mg  Take 1 tablet (10 mg) by mouth nightly as needed for sleep  Quantity: 30 tablet  Refills: 0              Protect others around you: Learn how to safely use, store and throw away your medicines at www.disposemymeds.org.       Follow-ups after your visit       Your next 10 appointments already scheduled    Mar 30, 2021  1:30 PM  Cancer Rehab Treatment with Radha Santillan PT  [facility] [place] ([facility] ) [address] [place] [address]  [phone]      Apr 06, 2021  2:30 PM  Cancer Rehab Treatment with Radha Santillan  PT  [facility] ([facility] ) [address] [place] [address]  [phone]      Apr 13, 2021  2:45 PM  Cancer Rehab Treatment with Radha Santillan, PT  [facility] ([facility] ) [address] [place] [address]  [phone]      Apr 16, 2021  1:00 PM  (Arrive by 12:45 PM)  Video Visit with Yuri Dale MD  [facility] [place] ([facility] ) [address] [id number] [phone]   Please Note: This is a virtual visit; there is no need to come to the facility.       Apr 20, 2021  2:45 PM  Cancer Rehab Treatment with Radha Santillan, PT  [facility] ([facility] ) [address] [place] [address] [place] [address]  [phone]      Jun 02, 2021  1:30 PM  Level 1 with NL INFUSION CHAIR 3  [facility] [place] ([facility] ) [address] [place] [address] [place] [address]  [phone]      Jun 02, 2021  2:30 PM  (Arrive by 2:00 PM)  CT CHEST ABDOMEN PELVIS W/O & W CONTRAST with PHCT1  [facility] ([facility] ) [address] [address]  [phone]   How do I prepare for my exam? (Food and drink instructions)  **You will have contrast for this exam.**  No Food and Drink Restrictions    How do I prepare for my exam? (Other instructions)  Please arrive 30 minutes early for your CT.  Once in the department you might be asked to drink water 15-20 minutes prior to your exam.  If indicated you may be asked to drink an oral contrast in advance of your CT.  If this is the case, the imaging team will let you know or be in contact with you prior to your appointment    If you have diabetes:  Continue to take your metformin  "medication on the day of your exam    What should I wear: Please wear loose clothing, such as a sweat suit or jogging clothes. Avoid snaps, zippers and other metal. We may ask you to undress and put on a hospital gown.    How long does the exam take: Most scans take less than 20 minutes.    What should I bring: Please bring any scans or X-rays taken at other hospitals, if similar tests were done. It is safest to leave personal items at home.    Do I need a : No  is needed.    What do I need to tell my doctor?  Be sure to tell your doctor:  * If you have any allergies.  * If there's any chance you are pregnant.  * If you are breastfeeding.    What should I do after the exam: No restrictions, You may resume normal activities.    What is this test: A CT (computed tomography) scan is a series of pictures that allows us to look inside your body. The scanner creates images of the body in cross sections, much like slices of bread. This helps us see any problems more clearly. You may receive contrast (X-ray dye) before or during your scan. You will be asked to drink the contrast.    Who should I call with questions: If you have any questions, please call the Imaging Department where you will have your exam. Directions, parking instructions, and other information is available on our website, [email or web address].        Care Instructions       Further instructions from your care team       [facility]  Interventional Radiology   Drainage Tube Instructions      AFTER YOU GO HOME:    Drink plenty of fluids and resume your regular diet.       Call Your Physician if:    You develop nausea or vomiting.    Your develop hives or rash or unexplained itching    Additional Instructions: Please call for the following problems:    Skin around tube is red, painful or has any drainage.    You have increased pain in your abdomen    Fever greater than 100.5 F and chills    You feel nauseated and  \"just not " "right\"      Change dressing every 48 hour or when it gets wet    Interventional Radiology Department    Physician: Dr. Purcell                               Date:March 25, 2021  Telephone numbers: [phone]...Monday-Friday 8:00am-4:30pm                                  [phone]... After 4:30 Monday-Friday, Weekends and Holiday. Ask for the Interventional Radiologist on call. Someone is on call 24 hours a day.                                    Additional Information About Your Visit       [facility] Information    [facility] gives you secure access to your electronic health record. If you see a primary care provider, you can also send messages to your care team and make appointments. If you have questions, please call your primary care clinic.  If you do not have a primary care provider, please call [phone] and they will assist you.       Care EveryWhere ID    This is your Care EveryWhere ID. This could be used by other organizations to access your [facility] medical records  [id number]       Your Vitals Were  Most recent update: 3/25/2021 12:19 PM    Blood Pressure   109/62 (BP Location: Right arm)          Pulse   67          Temperature   97.6  F (36.4  C) (Oral)          Respirations   15          Height   1.854 m (6' 1\")             Weight   74.8 kg (165 lb)    Pulse Oximetry   99%    BMI (Body Mass Index)   21.77 kg/m           Primary Care Provider Office Phone # Fax #    Tolu Noland PA-C [phone]      Equal Access to Services    [facility] AH: Hadii aad ku hadasho Soomaali, waaxda luqadaha, qaybta kaalmada adeegyada, waxay idiin hayisaacn sumeet carbajal la'karla . So [facility] [phone].    ATENCIÓN: Si habla español, tiene a kaufman disposición servicios gratuitos de asistencia lingüística. Llame al [phone].    We comply with applicable federal and state civil rights laws, including the [place] Human Rights Act. We do not discriminate on the basis of race, color, creed, Sabianist, " national origin, marital status, age, disability, sex, sexual orientation, or gender identity.    If you would like an itemization of your charges they will now be available in [facility] 30 days after discharge. To access the itemized statements in [facility] [employer] go to billing/billing summary. From there select view account. There will be multiple tabs showing an overview of your account, detail, payments, and communications. From the communications tab you can see your monthly statements, your itemized statements, and any billing letters generated for your account. If you do not have a [facility] account and need help getting access please contact [employer] [facility] [employer] support at [phone].  If you would prefer to have your itemized statements mailed please contact our automated itemized bill request line at [phone] option  2.       Thank you!    Thank you for choosing [facility] for your care. Our goal is always to provide you with excellent care. Hearing back from our patients is one way we can continue to improve our services. Please take a few minutes to complete the written survey that you may receive in the mail after you visit with us. Thank you!            Medication List      ASK your doctor about these medications          Morning Afternoon Evening Bedtime As Needed    amoxicillin 875 MG tablet  Also known as: AMOXIL  INSTRUCTIONS: Take 1 tablet (875 mg) by mouth 2 times daily                     atorvastatin 40 MG tablet  Also known as: LIPITOR  INSTRUCTIONS: Take 40 mg by mouth At Bedtime                     clopidogrel 75 MG tablet  Also known as: PLAVIX  INSTRUCTIONS: Take 75 mg by mouth At Bedtime                     dexamethasone 4 MG tablet  Also known as: DECADRON  INSTRUCTIONS: Take 1 tablet (4 mg) by mouth daily (with breakfast) Daily for the 3 days after chemotherapy in the morning with food                     dronabinol 5 MG capsule  Also known as: MARINOL  INSTRUCTIONS: Take 2 capsules in am and 1 capsule in pm  (before meals)                     fenofibrate 160 MG tablet  Also known as: TRIGLIDE/LOFIBRA  INSTRUCTIONS: Take 160 mg by mouth At Bedtime                     furosemide 20 MG tablet  Also known as: LASIX  INSTRUCTIONS: Take 20 mg by mouth every evening                     iron 325 (65 Fe) MG tablet  INSTRUCTIONS: Take 1 tablet by mouth 3 times daily (with meals)                     lisinopril 30 MG tablet  Also known as: ZESTRIL  INSTRUCTIONS: Take 5 mg by mouth every evening                     LORazepam 0.5 MG tablet  Also known as: ATIVAN  INSTRUCTIONS: Take 1 tablet (0.5 mg) by mouth every 4 hours as needed (Anxiety, Nausea/Vomiting or Sleep)                     magnesium gluconate 30 MG tablet  INSTRUCTIONS: Take 1 tablet (30 mg) by mouth daily                     ondansetron 8 MG tablet  Also known as: ZOFRAN  INSTRUCTIONS: Take 1 tablet (8 mg) by mouth every 8 hours as needed (Nausea/Vomiting)                     oxyCODONE 5 MG tablet  Also known as: ROXICODONE  INSTRUCTIONS: Take 1-2 tablets (5-10 mg) by mouth every 4 hours as needed for moderate to severe pain                     prochlorperazine 10 MG tablet  Also known as: COMPAZINE  INSTRUCTIONS: Take 0.5 tablets (5 mg) by mouth every 6 hours as needed (Nausea/Vomiting)                     sildenafil 20 MG tablet  Also known as: REVATIO  INSTRUCTIONS: Take 20 mg by mouth as needed                     zolpidem 10 MG tablet  Also known as: AMBIEN  INSTRUCTIONS: Take 1 tablet (10 mg) by mouth nightly as needed for sleep

## 2021-03-25 NOTE — IP AVS SNAPSHOT
[facility] [address] [address]                                    After Visit Summary   3/25/2021    Fuentes Estrada    MRN: [id number]           After Visit Summary Signature Page    I have received my discharge instructions, and my questions have been answered. I have discussed any challenges I see with this plan with the nurse or doctor.    ..........................................................................................................................................  Patient/Patient Representative Signature      ..........................................................................................................................................  Patient Representative Print Name and Relationship to Patient    ..................................................               ................................................  Date                                   Time    ..........................................................................................................................................  Reviewed by Signature/Title    ...................................................              ..............................................  Date                                               Time          22EPIC Rev 08/18

## 2021-03-25 NOTE — PROGRESS NOTES
Patient arrived on 2A for sinogram and possible paracentesis.  Port previously accessed by CT.  Labs sent, antibiotic infusing.  Awaiting consent, otherwise prep complete.

## 2021-03-26 NOTE — TELEPHONE ENCOUNTER
Writer received call from Robyn who reports that she has noticed the whites of Fuentes's eyes are yellow for the past couple of days. He was seen in IR on 3/25 and is doing a capping trial of his drain. Per Dr Beard, by phone, he should have labs done today, CBC and CMP, and leave drain capped for now. If abdominal pain increases or if he develops fever, he needs to come down to [facility] ED. This was communicated to Robyn and she will bring him to [place] [facility] for labs.

## 2021-03-27 PROBLEM — R17 CHOLESTATIC JAUNDICE: Status: ACTIVE | Noted: 2021-01-01

## 2021-03-27 NOTE — ED TRIAGE NOTES
Pt sent by Oncologist due to elevated bilirubin.  H/o cholangiocarcinoma.  Denies pain or fever.  Skin icteric.

## 2021-03-27 NOTE — LETTER
Transition Communication Hand-off for Care Transitions to Next Level of Care Provider    Name: Fuentes Estrada  : 1955  MRN #: [id number]  Primary Care Provider: Tolu Noland  Primary Clinic: [facility] [address] [address]     Reason for Hospitalization:    Cholestatic jaundice [R17]  Cholangiocarcinoma (H) [C22.1]    Admit Date/Time: 3/27/2021  4:15 PM  Discharge Date: 2021    Payor Source: Payor: BCBS / Plan: BCBS PLATINUM BLUE / Product Type: PPO /          Reason for Communication Hand-off Referral: Other new Home Care start    Discharge Plan:  Return home with spouse, hepatic drain, and new home care RN PT OT      Follow-up plan:    Future Appointments   Date Time Provider Department Center   2021  1:00 PM Leeanne Lake APRN CNP [facility]   2021  2:45 PM Radha Santillan, PT PHPT [facility]   2021  1:00 PM Yuri Dale MD [facility]   2021  2:45 PM [facility]   2021  1:30 PM NL INFUSION CHAIR 3 [facility]   2021  2:30 PM PHCT1 PHCT [facility]   2021  3:00 PM Aydin Beard MD [facility]           Referrals     Future Labs/Procedures    Home care nursing referral     Comments:    [facility]   Phone: [phone]  Fax: [phone]     RN skilled nursing visit  RN to assess vital signs and weight, respiratory and cardiac status, pain level and activity tolerance, hydration, nutrition and bowel status   RN to complete home safety evaluation.  RN to complete weekly medication management and set-up, please involve family/primary caregiver in med education.  RN to assess hepatic drain, assist with cares/teaching PRN    Physical Therapy to evaluate and treat  Occupational Therapy to evaluate and treat      Your provider has ordered home care nursing services.   If you have not been contacted within 2 days of your discharge please call    Home Care OT Referral for Hospital  Discharge     Home Care PT Referral for Hospital Discharge               Nydia Rodriguez RN, BSN, PHN  Care Coordinator  [facility]  Direct phone: [phone]      AVS/Discharge Summary is the source of truth; this is a helpful guide for improved communication of patient story

## 2021-03-27 NOTE — TELEPHONE ENCOUNTER
Wife called and she and patient will make their way down to the hospital.    Arlyn Meza RN  [place] Nurse Advisor  11:17 AM  3/27/2021

## 2021-03-27 NOTE — TELEPHONE ENCOUNTER
Per Dr Edwards - Send patient to the ED    Arlyn Meza RN  [place] Nurse Advisor  11:00 AM  3/27/2021

## 2021-03-27 NOTE — TELEPHONE ENCOUNTER
No abdominal pain, no fever 97.7 as of this am.    Yellow eyes started over the past 3-4 days.    Wife is very concerned about the amox that infectious Disease Dr Dale is prescribing for the intra-abdominal abscess.    She has not given the amox last night or today.    Drain is currently capped.    This drained was placed on Dr Purcell on 2/15/2021 and output has been nil. That is why they have felt good about capping the drain.    Wife is very anxious and would like to know a providers feed back.    On call provider (Dr Edwards paged at 10:45am)    Arlyn Meza RN  [place] Nurse Advisor  10:47 AM  3/27/2021             Additional Information    Negative: Unconscious or difficult to awaken    Negative: Acting confused (e.g., disoriented, slurred speech)    Negative: Seizure has occurred    Negative: Has fainted (passed out)    Negative: Very weak (e.g., can't stand)    Negative: Acetaminophen overdose or poisoning suspected    Negative: Sounds like a life-threatening emergency to the triager    Negative: [1] Drinking very little AND [2] dehydration suspected (e.g., no urine > 12 hours, very dry mouth, very lightheaded)    Negative: Patient sounds very sick or weak to the triager    Negative: Abdominal pain    Jaundice    Protocols used: [id number]

## 2021-03-28 NOTE — CONSULTS
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 Given that this is recurrent bile collection and the technical challenge and discomfort of an internal/external biliary drain, he underwent attempted internal biliary stent placement. Due to anatomy, biliary stent placement was not possible. IR placed a drain in the collection. Last sinogram on 3/25/2021 demonstrated minimal fluid pocket and along with low output, the drain was capped.     Pt presented overnight 3/27/2021 with elevated bilirubin & alk wolfgang concerning for biliary obstruction clinically. Although no direct communication between the drain and the biliary system was identified on the most recent sinogram, IR will recommend to uncap the drain and trends patient's lab and watch patient closely for any further signs of biliary obstruction.     Discussed with Dr. Ocampo IR attending and primary team Dr. Cody Webb.     Discussed with Dr. Chaves. Agree with assessment and plan.

## 2021-03-28 NOTE — PROGRESS NOTES
03/28/21 1000   Quick Adds   Type of Visit Initial PT Evaluation   Living Environment   People in home spouse   Current Living Arrangements house   Home Accessibility no concerns   Transportation Anticipated family or friend will provide   Living Environment Comments Pt lives in single story home with his wife. Pt's wife working from home. Pt states no stairs at home   Self-Care   Usual Activity Tolerance moderate   Current Activity Tolerance fair   Regular Exercise Other (see comments)  (OP cancer rehab 1x/weekend)   Equipment Currently Used at Home shower chair;walker, standard   Activity/Exercise/Self-Care Comment Pt mod I with FWW. 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 He was admitted to Medicine for evaluation.    Existing  Precautions/Restrictions fall   General Observations Activity: up with assist   Cognition   Orientation Status (Cognition) oriented x 3   Pain Assessment   Patient Currently in Pain No   Integumentary/Edema   Integumentary/Edema Comments scabs on R knee cap from fall   Posture    Posture Forward head position;Protracted shoulders   Range of Motion (ROM)   ROM Comment WFL   Strength   Strength Comments Dexter LEs grossly 4/5   Bed Mobility   Comment (Bed Mobility) SBA   Transfers   Transfer Safety Comments SBA   Gait/Stairs (Locomotion)   Distance in Feet (Required for LE Total Joints) 130ft   Comment (Gait/Stairs) SBA with FWW   Balance   Balance Comments steady with FWW   Sensory Examination   Sensory Perception patient reports no sensory changes   Coordination   Coordination no deficits were identified   Muscle Tone   Muscle Tone no deficits were identified   Clinical Impression   Criteria for Skilled Therapeutic Intervention yes, treatment indicated   PT Diagnosis (PT) Deconditioned   Influenced by the following impairments decreased activity tolerance, weakness, low BPs, balance   Functional limitations due to impairments below baseline, community ambulation, ADLs/IADLs   Clinical Presentation Stable/Uncomplicated   Clinical Presentation Rationale chart review and clinical judgement   Clinical Decision Making (Complexity) low complexity   Therapy Frequency (PT) 4x/week   Predicted Duration of Therapy Intervention (days/wks) 5 days   Planned Therapy Interventions (PT) balance training;gait training;neuromuscular re-education;patient/family education;stair training;strengthening   Risk & Benefits of therapy have been explained evaluation/treatment results reviewed;care plan/treatment goals reviewed;risks/benefits reviewed;current/potential barriers reviewed;participants voiced agreement with care plan;participants included;patient;spouse/significant other   PT Discharge Planning    PT Discharge Recommendation (DC  Rec) home with assist;home with home care physical therapy   PT Rationale for DC Rec Pt mobizling with SBA with FWW, safe to return home with assist from wife. Pt was doing OP cancer rehab, recommend pt do HH PT/OT at this time for home safety eval and progress strength, activity tolerance to return to OP cancer rehab   PT Brief overview of current status  Ax1 with FWW   Total Evaluation Time   Total Evaluation Time (Minutes) 8

## 2021-03-28 NOTE — ED PROVIDER NOTES
ED Provider Note  [facility]      History     Chief Complaint   Patient presents with     Abnormal Labs     HPI  Fuentes Estrada is a 66 year old male who presents with jaundice for the past 4-5 days. He has a history of cholangiocarcinoma of the hilum and had prior right hepatectomy and common bile duct surgery for obstruction that was complicated by bile leak and chronic abscess which has been treated with drain.. He and his wife noted that he was becoming jaundiced and had labs drawn hear home yesterday. These demonstrated new rise in bilirubin. He has been on amoxacillin since December for abscess associated with the prior surgery and has a drain in place on the right flank that has been capped for a few days due to low output. He has chills, but no fever. He has no URI symptoms, cough, sputum or shortness of breath. He has no chest pain or abdominal pain. He has no difficulty with urination. Urine has been dark for 3-4 days. He has bilateral foot tingling, but no calf swelling. He has not been on chemotherapy since December.    Past Medical History:   Diagnosis Date     Cerebrovascular accident (CVA) (H) 12/2010     Cholangiocarcinoma (H)      Diabetes (H)      Essential hypertension, benign     Hypertension, Benign     Nonspecific abnormal results of liver function study     Hx of elevated LFT's       Past Surgical History:   Procedure Laterality Date     ENDOSCOPIC RETROGRADE CHOLANGIOPANCREATOGRAM N/A 3/31/2020    Procedure: ENDOSCOPIC RETROGRADE CHOLANGIOPANCREATOGRAPHY, WITH with biliary sphincterotomy, biopsies and brushings, biliary stent placement;  Surgeon: Win Strauss MD;  Location: [facility]     ENDOSCOPIC RETROGRADE CHOLANGIOPANCREATOGRAM N/A 4/6/2020    Procedure: ENDOSCOPIC RETROGRADE CHOLANGIOPANCREATOGRAPHY;  Surgeon: Win Strauss MD;  Location: [facility] OR     ENDOSCOPIC RETROGRADE CHOLANGIOPANCREATOGRAM WITH SPYGLASS  4/16/2020    Procedure: ENDOSCOPIC  RETROGRADE CHOLANGIOPANCREATOGRAPHY, WITH DIRECT DUCT VISUALIZATION, USING PANCREATICOBILIARY FIBEROPTIC PROBE; Bile Duct Stent Exchange and Biopsy,balloon sweep of bile duct;  Surgeon: Win Strauss MD;  Location: [facility] OR     ENDOSCOPIC ULTRASOUND UPPER GASTROINTESTINAL TRACT (GI) N/A 4/16/2020    Procedure: ENDOSCOPIC ULTRASOUND, ESOPHAGOSCOPY / UPPER GASTROINTESTINAL TRACT (GI)with Fine Needle biopsy;  Surgeon: Cody Baumann MD;  Location: [facility]     ENDOSCOPIC ULTRASOUND UPPER GASTROINTESTINAL TRACT (GI) N/A 8/11/2020    Procedure: ENDOSCOPIC ULTRASOUND, ESOPHAGOSCOPY / UPPER GASTROINTESTINAL TRACT (GI);  Surgeon: Guru Bailey Rossi MD;  Location: [facility]     ENTEROSCOPY SMALL BOWEL N/A 2/2/2021    Procedure: Enteroscopy small bowel;  Surgeon: Chiki Swan MD;  Location: [facility]     ESOPHAGOSCOPY, GASTROSCOPY, DUODENOSCOPY (EGD), COMBINED N/A 4/6/2020    Procedure: ESOPHAGOGASTRODUODENOSCOPY (EGD);  Surgeon: Win Strauss MD;  Location: [facility]     ESOPHAGOSCOPY, GASTROSCOPY, DUODENOSCOPY (EGD), COMBINED N/A 8/11/2020    Procedure: Esophagogastroduodenoscopy, With Fine Needle Aspiration Biopsy, With Endoscopic Ultrasound Guidance;  Surgeon: Guru Bailey Rossi MD;  Location: [facility]     EXPLORE COMMON BILE DUCT N/A 5/12/2020    Procedure: extra-hepatic bile duct resection;  Surgeon: Oswaldo Hale MD;  Location: [facility]     HC KNEE SCOPE, DIAGNOSTIC  1995    Arthroscopy, Knee; left     HC VASECTOMY UNILAT/BILAT W POSTOP SEMEN      Vasectomy     HEPATECTOMY PARTIAL N/A 5/12/2020    Procedure: Exploratory Laparotomy, Open right hepatectomy, Radical Extra-Hepatic Bile Duct Resection, Portal Lymph Node Dissection, Intraoperative Ultrasound, Intra Air-Cholangiogram ,Bianca-En-Y Left Hepaticojejunostomy, Excision Skin Lesion;  Surgeon: Oswaldo Hale MD;  Location: [facility]     INSERT PORT VASCULAR ACCESS Right 9/28/2020    Procedure: ultrasound guided right  internal venous access port placement with flouroscopy;  Surgeon: Fercho Wayne DO;  Location[facility]     IR ABSCESS TUBE CHANGE  6/12/2020     IR FOLLOW UP VISIT OUTPATIENT  7/24/2020     IR FOLLOW UP VISIT OUTPATIENT  8/3/2020     IR FOLLOW UP VISIT OUTPATIENT  10/19/2020     IR SINOGRAM INJECTION DIAGNOSTIC  7/14/2020     IR SINOGRAM INJECTION DIAGNOSTIC  9/14/2020     IR SINOGRAM INJECTION DIAGNOSTIC  10/13/2020     IR SINOGRAM INJECTION DIAGNOSTIC  3/1/2021     IR SINOGRAM INJECTION THERAPEUTIC  8/25/2020     IR SINOGRAM INJECTION THERAPEUTIC  9/25/2020     IR SINOGRAM INJECTION THERAPEUTIC  10/2/2020     IR SINOGRAM INJECTION THERAPEUTIC  9/18/2020     IR THORACENTESIS  5/16/2020     LYMPHADENECTOMY ABDOMINAL N/A 5/12/2020    Procedure: portal lymph node dissection, intraoperative liver ultrasound;  Surgeon: Oswaldo Hale MD;  Location: [facility]       Family History   Problem Relation Age of Onset     Arthritis Mother         RA     Diabetes Father         diet controlled     Hypertension Father        Social History     Tobacco Use     Smoking status: Never Smoker     Smokeless tobacco: Never Used   Substance Use Topics     Alcohol use: Not Currently     Comment: occaisional           Review of Systems   Constitutional: Positive for chills. Negative for fever.   HENT: Negative for congestion and trouble swallowing.    Eyes: Negative for visual disturbance.   Respiratory: Negative for cough, shortness of breath and wheezing.    Cardiovascular: Negative for chest pain, palpitations and leg swelling.   Gastrointestinal: Negative for abdominal pain, diarrhea, nausea and vomiting.   Genitourinary: Negative for difficulty urinating and dysuria (dark urine).   Musculoskeletal: Negative for back pain.   Skin: Negative for rash.   Neurological: Positive for weakness (generalized). Negative for light-headedness, numbness and headaches.   Hematological: Negative for adenopathy.   Psychiatric/Behavioral:  "Negative for confusion.         Physical Exam   BP: 98/63  Pulse: 74  Temp: 98.1  F (36.7  C)  Resp: 16  Height: 185.4 cm (6' 1\")  Weight: 73 kg (161 lb)  SpO2: 98 %  Physical Exam  Vitals signs and nursing note reviewed.   Constitutional:       Appearance: He is ill-appearing. He is not toxic-appearing.   HENT:      Head: Normocephalic and atraumatic.      Right Ear: External ear normal.      Left Ear: External ear normal.      Nose: Nose normal.      Mouth/Throat:      Mouth: Mucous membranes are moist.   Eyes:      General: Scleral icterus present.      Extraocular Movements: Extraocular movements intact.      Pupils: Pupils are equal, round, and reactive to light.   Neck:      Musculoskeletal: Normal range of motion and neck supple.   Cardiovascular:      Rate and Rhythm: Normal rate and regular rhythm.      Heart sounds: No murmur.   Pulmonary:      Effort: Pulmonary effort is normal.      Breath sounds: Normal breath sounds. No wheezing or rales.   Abdominal:      Palpations: Abdomen is soft.      Tenderness: There is no abdominal tenderness. There is no guarding.      Comments: Drain right flank   Musculoskeletal:      Right lower leg: No edema.      Left lower leg: No edema.   Skin:     General: Skin is warm and dry.   Neurological:      General: No focal deficit present.      Mental Status: He is alert and oriented to person, place, and time.      Cranial Nerves: No cranial nerve deficit.   Psychiatric:         Mood and Affect: Mood normal.         Behavior: Behavior normal.         ED Course      Procedures          Labs/Imaging    Results for orders placed or performed during the hospital encounter of 03/27/21 (from the past 24 hour(s))   CBC with platelets differential   Result Value Ref Range    WBC 5.7 4.0 - 11.0 10e9/L    RBC Count 3.20 (L) 4.4 - 5.9 10e12/L    Hemoglobin 9.4 (L) 13.3 - 17.7 g/dL    Hematocrit 27.5 (L) 40.0 - 53.0 %    MCV 86 78 - 100 fl    MCH 29.4 26.5 - 33.0 pg    MCHC 34.2 31.5 - " 36.5 g/dL    RDW 16.8 (H) 10.0 - 15.0 %    Platelet Count 224 150 - 450 10e9/L    Diff Method Automated Method     % Neutrophils 68.6 %    % Lymphocytes 21.3 %    % Monocytes 8.7 %    % Eosinophils 0.7 %    % Basophils 0.2 %    % Immature Granulocytes 0.5 %    Nucleated RBCs 0 0 /100    Absolute Neutrophil 3.9 1.6 - 8.3 10e9/L    Absolute Lymphocytes 1.2 0.8 - 5.3 10e9/L    Absolute Monocytes 0.5 0.0 - 1.3 10e9/L    Absolute Eosinophils 0.0 0.0 - 0.7 10e9/L    Absolute Basophils 0.0 0.0 - 0.2 10e9/L    Abs Immature Granulocytes 0.0 0 - 0.4 10e9/L    Absolute Nucleated RBC 0.0    Lipase   Result Value Ref Range    Lipase 123 73 - 393 U/L   Blood culture    Specimen: Portacath; Blood    Portacath   Result Value Ref Range    Specimen Description Blood Portacath     Culture Micro PENDING    Procalcitonin   Result Value Ref Range    Procalcitonin 1.05 ng/ml   CRP inflammation   Result Value Ref Range    CRP Inflammation 100.0 (H) 0.0 - 8.0 mg/L   Lactic acid   Result Value Ref Range    Lactic Acid 1.1 0.7 - 2.0 mmol/L   Comprehensive metabolic panel   Result Value Ref Range    Sodium 129 (L) 133 - 144 mmol/L    Potassium 4.1 3.4 - 5.3 mmol/L    Chloride 96 94 - 109 mmol/L    Carbon Dioxide 28 20 - 32 mmol/L    Anion Gap 5 3 - 14 mmol/L    Glucose 124 (H) 70 - 99 mg/dL    Urea Nitrogen 22 7 - 30 mg/dL    Creatinine 0.98 0.66 - 1.25 mg/dL    GFR Estimate 79 >60 mL/min/[1.73_m2]    GFR Estimate If Black >90 >60 mL/min/[1.73_m2]    Calcium 9.2 8.5 - 10.1 mg/dL    Bilirubin Total 6.6 (H) 0.2 - 1.3 mg/dL    Albumin 1.5 (L) 3.4 - 5.0 g/dL    Protein Total 6.5 (L) 6.8 - 8.8 g/dL    Alkaline Phosphatase 999 (H) 40 - 150 U/L    ALT 59 0 - 70 U/L     (H) 0 - 45 U/L   Magnesium   Result Value Ref Range    Magnesium 1.7 1.6 - 2.3 mg/dL   Phosphorus   Result Value Ref Range    Phosphorus 2.7 2.5 - 4.5 mg/dL   TSH with free T4 reflex   Result Value Ref Range    TSH 5.16 (H) 0.40 - 4.00 mU/L   Vitamin B12   Result Value Ref Range     Vitamin B12 1,732 (H) 193 - 986 pg/mL   Hemoglobin A1c   Result Value Ref Range    Hemoglobin A1C 5.6 0 - 5.6 %   T4 free   Result Value Ref Range    T4 Free 1.74 (H) 0.76 - 1.46 ng/dL   INR   Result Value Ref Range    INR 1.48 (H) 0.86 - 1.14   Asymptomatic Influenza A/B & SARS-CoV2 (COVID-19) Virus PCR Multiplex    Specimen: Nasopharyngeal   Result Value Ref Range    Flu A/B & SARS-COV-2 PCR Source Nasopharyngeal     SARS-CoV-2 PCR Result NEGATIVE     Influenza A PCR Negative NEG^Negative    Influenza B PCR Negative NEG^Negative    Respiratory Syncytial Virus PCR Negative NEG^Negative    Flu A/B & SARS-CoV-2 PCR Comment (Note)    Abdomen US, limited ([facility] only)    Narrative    EXAMINATION: US ABDOMEN LIMITED  3/27/2021 8:38 PM      CLINICAL HISTORY: History of cholangiocarcinoma, S?P right  hepatectomy. Rising bilirubin, Rising bilirubin    COMPARISON: Abdominal CT 3/25/2021        FINDINGS:  Hepatic artery is patent with normal resistive index and peak systolic  velocity of 71 cm/s. The portal vein is patent with antegrade flow.    The liver is normal in contour and echogenicity. The liver measures 15  cm in length. There is no intrahepatic biliary ductal dilatation.  (History right hepatectomy and left hepaticojejunostomy.) There is  dirty shadowing within the liver suggestive of intrahepatic  pneumobilia.    There is septated ascites inferior to the liver.    The right kidney measures 10.9 cm in length no visible hydronephrosis.      Impression    IMPRESSION:   1. Suspected intrahepatic pneumobilia, (may be due to  hepaticojejunostomy).   2. Septated ascites inferior to the liver.    I have personally reviewed the examination and initial interpretation  and I agree with the findings.    JANET SCHWAB MD         Medications   0.9% sodium chloride BOLUS (0 mLs Intravenous Stopped 3/27/21 2207)     Followed by   sodium chloride 0.9% infusion ( Intravenous New Bag 3/28/21 0009)   lidocaine 1 % 0.1-1 mL (has  no administration in time range)   lidocaine (LMX4) cream (has no administration in time range)   sodium chloride (PF) 0.9% PF flush 3 mL (3 mLs Intracatheter Not Given 3/27/21 2055)   sodium chloride (PF) 0.9% PF flush 3 mL (has no administration in time range)   sodium chloride (PF) 0.9% PF flush 3 mL (has no administration in time range)   melatonin tablet 1 mg (has no administration in time range)   senna-docusate (SENOKOT-S/PERICOLACE) 8.6-50 MG per tablet 1 tablet (1 tablet Oral Not Given 3/27/21 2148)     Or   senna-docusate (SENOKOT-S/PERICOLACE) 8.6-50 MG per tablet 2 tablet ( Oral See Alternative 3/27/21 2148)   polyethylene glycol (MIRALAX) Packet 17 g (has no administration in time range)   bisacodyl (DULCOLAX) Suppository 10 mg (has no administration in time range)   ondansetron (ZOFRAN-ODT) ODT tab 4 mg (has no administration in time range)     Or   ondansetron (ZOFRAN) injection 4 mg (has no administration in time range)   prochlorperazine (COMPAZINE) injection 5 mg (has no administration in time range)     Or   prochlorperazine (COMPAZINE) tablet 5 mg (has no administration in time range)     Or   prochlorperazine (COMPAZINE) suppository 12.5 mg (has no administration in time range)   piperacillin-tazobactam (ZOSYN) 4.5 g vial to attach to  mL bag (4.5 g Intravenous New Bag 3/27/21 2224)   zolpidem (AMBIEN) tablet 5 mg (has no administration in time range)   magnesium gluconate (MAGONATE) tablet 500 mg (has no administration in time range)   LORazepam (ATIVAN) tablet 0.5 mg (has no administration in time range)   glucose gel 15-30 g (has no administration in time range)     Or   dextrose 50 % injection 25-50 mL (has no administration in time range)     Or   glucagon injection 1 mg (has no administration in time range)   dronabinol (MARINOL) capsule 10 mg (has no administration in time range)   dronabinol (MARINOL) capsule 5 mg (5 mg Oral Not Given 3/27/21 9044)   ferrous sulfate (FEROSUL)  tablet 325 mg (has no administration in time range)   sodium chloride (PF) 0.9% PF flush 10 mL (10 mLs Intracatheter Not Given 3/27/21 8351)   multivitamin w/minerals (THERA-VIT-M) tablet 1 tablet (has no administration in time range)        Assessments & Plan (with Medical Decision Making)   Impression:  Middle aged male with a history of cholangiocarcinoma of the hilum s/p right hepatectomy and radical bile duct resection complicated by bile leak and chronic abscess in the resection bed. He has regional lymph node metastatic disease. Palliative chemo has been held since December due to the abscess. Past attempts at endoscopic management of biliary stenosis were unsuccessful due to post operative tortuous anatomy of the bile duct. He now presents with acute jaundice with rise in bilirubin from 2 to 7 range and alkaline phosphatase. He had CT of the abdomen 2 days ago that showed only minimal fluid in the abscess cavity. He has no current symptoms of fever or sepsis. He will be admitted to the medicine/oncology service for what is likely extrahepatic biliary obstruction. He may need radiologic placement of transcutaneous bile drain if this is indeed the case. He is at risk for development of cholangitis.    I have reviewed the nursing notes. I have reviewed the findings, diagnosis, plan and need for follow up with the patient.    Current Discharge Medication List          Final diagnoses:   Cholestatic jaundice   Cholangiocarcinoma (H)       --  Abdullahi Camarena  [facility] EMERGENCY DEPARTMENT  3/27/2021     Abdullahi Camarena MD  03/28/21 0259

## 2021-03-28 NOTE — H&P
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 He was admitted to Medicine for evaluation.     # Metastatic hilar cholangiocarcinoma (dx 5/2020) with peritoneal mets:  Dx 5/2020 with peritoneal mets. Follows with Dr. Beard, Oncology, last visit 3/1. Had been initiated on Cisplatin/Cemcitabine 8/2020, however, chemo on hold since 12/9/20 due to abscess/fluid collection as per below. CT 3/25 there is no intrahepatic biliary dilation, no hepatic lesions demonstrated but along R lateral peritoneal wall there is a 4.5 x 2.0 ill defined area of enhancing soft tissue/ill defined mass as well as other evidence of peritoneal nodularity which is increasing (but seen on CT 8/3/2020).    - Heme/Onc consulted   - Patient is from 2 hours away. Wife Robyn is staying in hotel. Please notify Robyn (wife) with daily updates as able if not present during rounds ([phone])    # History of biliary leak vs abscess s/p IR perihepatic drain placement (2/15) culture growing strep anginosus  # Jaundice  # History of cholangitis:  History of metastatic cholangiocarcinoma as above and underwent R hepatectomy w/  radical extrahepatic bile duct resection, portal LN resection, and Bianca en Y hepaticojejunostomy to left hepatic duct c/b biliary leak s/p drain placement (removed 11/2020) with persistent perihepatic fluid collection  c/f biloma vs abscess.  He was admitted 2/1 and ERCP attempted by Dr. Swan was unsuccessful d/t anatomy, and unable to drain fluid collection. Therefore, discharged on antibiotics (cipro, metro) and followed up with Dr. Purcell, IR and underwent perihepatic drain placement on 2/15, cultures growing strep anginosus (sensitive to ampicillin, PCN, vanco and cephalosporin). Followed with ID Dr. Dale, and transitioned to Amoxicillin BID based on cultures, which was to continue until formalized plan with IR drain. Had IR sinogram on 3/25: CT 3/25 with minimal residual fluid in area of drain, and patient having very minimal output from drain therefore perihepatic drain was capped (still in place). Now presenting with 3-4 days of worsening jaundice and labs 3/26 notable for: Bilirubin Total 7.3 (~2 at baseline), , ALT 59, ALKP 1000s -> mildly downtrending today. Last Amoxicillin dose was 2 days ago (stopped per wife as patient not feeling well and due to capping trial). He denies fevers, but notes rigors and , and Procal 1.05. No leukocytosis or lactic acidosis. No abdominal pain or RUQ pain. Given hx of rigors, as well as elevated inflammatory markers will start antibiotics to cover for infection (hx of abscess, less like cholangitis), BCx, UA obtained in ED.   - GI, IR and ID consulted   - Perihepatic drain capped 3/25 - will up cap the drain and continue flushes 10cc TID and assess output   - Discussed case with GI this evening: Agreed with uncapping the IR drain as above to assess output. Day team to touch base with GI/IR in early AM to discuss plan. May require percutaneous hepatic drain placement if aligns w/ goals of care   - Abdominal US pending   - Start IV Zosyn 4.5mg every 6  hours   - Trend CBC, CMP and CRP in AM  - Trend Procal in 48hrs   - Blood cultures x2 pending  - UA/UCx ordered   - Monitor fever curve   - Holding Plavix in case of possible procedure (last dose 2 days ago)     # Ascites  # Extensive abdominal varices:  Ascites likely 2/2 malignancy. Had paracentesis 3/11 with 7L removed with symptom relief.   Cultures with NGTD. Cytology negative. No history of cirrhosis therefore spontaneous SBP unlikely.   - Monitor     # Hyponatremia:  Na 129, from 131 yesterday, previously WNL. Suspect 2/2 hypovolemia given history of poor oral intake over the past few days. S/p 1L bolus of NS in the ED and MIVF.  - Continue MIVF of NS at 125cc/hr   - Trend CMP in AM     # Anemia:  9.4 MCV 86. At baseline. On ferrous sulfate supplementation. No s/sx of bleeding.   - Trend CBC in AM  - Continue PTA iron supplementation     # HTN  # History of lower extremity edema, improved:  On PTA Lisinopril 5mg daily and Lasix 20mg daily (for lower extremity edema). Has been on hold the past few days per wife due to softer BP at home. BP currently 100/67.  - Hold PTA Lisinopril 5mg daily for now  - Hold PTA Lasix 20mg daily for now (for lower extremity edema)     # Lower extremity paresthesias  # History of Vit D deficiency:  Notes 2 week history of paresthesias to lower extremities - starting from foot and now up to ankle. No other paresthesias elsewhere. No increased lower extremity weakness. NV intact, but sensation subjectively diminished below ankle. No focal neurological deficits on exam. Has hx of diabetes, but recent HgbA1c controlled at 6.0 (5/2020). Last Vit D level 10, s/p replacement. The lower extremity symptoms were also documented in 2/2020 hospitalization. Denies any lower back pain.   - Trend TSH, Vit b12, Hemoglobin A1c and Vit D level  - Monitor, consider further work-up  - PT/OT     # T2DM:  Hgb A1c 6.0 in 5/2020. Previously on Glipizide which was held 2/2021 given poor oral intake with  plans to resume one appetite improved, but now no longer taking. Glucose currently 124.   - BG checks q4hrs while NPO - start MSSI as needed  - HgbA1c recheck     # Malnutrition:   2/2 malignancy and chronic illness. On PTA Marinol.   - Nutrition consulted  - Trend CMP, Mag and Phos   - Continue PTA Marinol   - Start MVI     # History of CVA:   Occurred ~10 years ago. Has residual mild short term memory deficits. On PTA Plavix and Atorvastatin. Plavix has been on hold the past 2 days.   - Holding PTA Atorvastatin given transaminitis as above  - Holding PTA Plavix in case procedure needs to take place    # Chronic hypomagnesemia:  - Continue PTA Magnesium supplement  - Trend Mag level     # Anxiety  # Insomnia:  - Continue PTA Ativan 5mg every 4 hours PRN for anxiety, N/V or sleep (hold for sedation)  - Continue PTA Ambien 5mg daily at bedtime (hold for sedation)    Diet: NPO at midnight   Fluids: NS at 125cc/hr  Em: None  DVT Prophylaxis: Mechanical prophylaxis, holding pharmacologic prophylaxis in case procedure needs to occur   Code Status: Full Code    Disposition Plan   Expected discharge: 4 - 7 days; recommended to prior living arrangement once LFTs improving, pending speciality recommendations with GI/IR/ID and goals of care and safe disposition plan in place.     Patient was discussed with Dr. Whitaker, attending physician who agrees with plan of care.     Candace Watson PA-C  Hospitalist Service  [facility]  Pager [phone]    Chief Complaint   Jaundice for 3-4 days     History is obtained from the patient    History of Present Illness   Fuentes Estrada is a 66 year old male with a history of metastatic cholangiocarcinoma diagnosed in 5/2020, and underwent R hepatectomy w/ radical extrahepatic bile duct resection, portal LN resection, and Bianca en Y hepaticojejunosopy to left hepatic duct c/b biliary leak s/p drain placement (removed 11/2020) with persistent perihepatic fluid  "collection with concerning for biloma given history of biliary leak vs abscess.    He was admitted 2/1 and ERCP attempted to access fluid collection, however was unsuccessful d/t anatomy. Therefore, he discharged on antibiotics (cipro, metro) and followed up with Dr. Purcell, IR and underwent perihepatic drain placement on 2/15, cultures growing strep anginosus (sensitive to ampicillin, PCN, vanco and cephalosporin). Followed with ID Dr. Dale, and transitioned to Amoxicillin BID based on cultures, with plan to follow up with ID to formulize plan for discontinuation of antibiotics.    Had IR sinogram with Dr. Purcell on 3/25 (report pending): CT 3/25 with minimal residual fluid in area of drain, and patient having very minimal output from drain therefore perihepatic drain was capped.     Now presenting with 3-4 days of worsening jaundice. He was instructed to get labs done on 3/26 and labs 3/26 notable for: Bilirubin Total 7.3 (~2 at baseline), , ALT 59, ALKP 1000s. Patient's wife reports that his last Amoxicillin dose was 2 days ago. She reports she stopped the antibiotics given the IR drain was capped and that he was not feeling well and she had not been able to confirm with ID the stop date, but thought that it was pending the drain plan.     Today, patient reports feeling \"cold,\" and reports having some rigors over the past few weeks. No fever. No abdominal pain, worsening nausea/vomiting, melena, hematochezia, dysuria, frequency, urgency, chest pain, SOB or cough. Last BM was yesterday and formed.     Review of Systems   10 point ROS performed and negative unless otherwise noted in HPI    Past Medical History    I have reviewed this patient's medical history and updated it with pertinent information if needed.   Past Medical History:   Diagnosis Date     Cerebrovascular accident (CVA) (H) 12/2010     Cholangiocarcinoma (H)      Diabetes (H)      Essential hypertension, benign     Hypertension, Benign "     Nonspecific abnormal results of liver function study     Hx of elevated LFT's        Past Surgical History   I have reviewed this patient's surgical history and updated it with pertinent information if needed.  Past Surgical History:   Procedure Laterality Date     ENDOSCOPIC RETROGRADE CHOLANGIOPANCREATOGRAM N/A 3/31/2020    Procedure: ENDOSCOPIC RETROGRADE CHOLANGIOPANCREATOGRAPHY, WITH with biliary sphincterotomy, biopsies and brushings, biliary stent placement;  Surgeon: Win Strauss MD;  Location: [facility]     ENDOSCOPIC RETROGRADE CHOLANGIOPANCREATOGRAM N/A 4/6/2020    Procedure: ENDOSCOPIC RETROGRADE CHOLANGIOPANCREATOGRAPHY;  Surgeon: Win Strauss MD;  Location: [facility] OR     ENDOSCOPIC RETROGRADE CHOLANGIOPANCREATOGRAM WITH SPYGLASS  4/16/2020    Procedure: ENDOSCOPIC RETROGRADE CHOLANGIOPANCREATOGRAPHY, WITH DIRECT DUCT VISUALIZATION, USING PANCREATICOBILIARY FIBEROPTIC PROBE; Bile Duct Stent Exchange and Biopsy,balloon sweep of bile duct;  Surgeon: Win Strauss MD;  Location: [facility] OR     ENDOSCOPIC ULTRASOUND UPPER GASTROINTESTINAL TRACT (GI) N/A 4/16/2020    Procedure: ENDOSCOPIC ULTRASOUND, ESOPHAGOSCOPY / UPPER GASTROINTESTINAL TRACT (GI)with Fine Needle biopsy;  Surgeon: Cody Baumann MD;  Location: [facility]     ENDOSCOPIC ULTRASOUND UPPER GASTROINTESTINAL TRACT (GI) N/A 8/11/2020    Procedure: ENDOSCOPIC ULTRASOUND, ESOPHAGOSCOPY / UPPER GASTROINTESTINAL TRACT (GI);  Surgeon: Guru Bailey Rossi MD;  Location: [facility]     ENTEROSCOPY SMALL BOWEL N/A 2/2/2021    Procedure: Enteroscopy small bowel;  Surgeon: Chiki Swan MD;  Location: [facility]     ESOPHAGOSCOPY, GASTROSCOPY, DUODENOSCOPY (EGD), COMBINED N/A 4/6/2020    Procedure: ESOPHAGOGASTRODUODENOSCOPY (EGD);  Surgeon: Win Strauss MD;  Location: [facility]     ESOPHAGOSCOPY, GASTROSCOPY, DUODENOSCOPY (EGD), COMBINED N/A 8/11/2020    Procedure: Esophagogastroduodenoscopy, With Fine Needle  Aspiration Biopsy, With Endoscopic Ultrasound Guidance;  Surgeon: Guru Bailey Rossi MD;  Location: [facility]     EXPLORE COMMON BILE DUCT N/A 5/12/2020    Procedure: extra-hepatic bile duct resection;  Surgeon: Oswaldo Hale MD;  Location: [facility] OR     HC KNEE SCOPE, DIAGNOSTIC  1995    Arthroscopy, Knee; left     HC VASECTOMY UNILAT/BILAT W POSTOP SEMEN      Vasectomy     HEPATECTOMY PARTIAL N/A 5/12/2020    Procedure: Exploratory Laparotomy, Open right hepatectomy, Radical Extra-Hepatic Bile Duct Resection, Portal Lymph Node Dissection, Intraoperative Ultrasound, Intra Air-Cholangiogram ,Bianca-En-Y Left Hepaticojejunostomy, Excision Skin Lesion;  Surgeon: Oswaldo Hale MD;  Location: [facility]     INSERT PORT VASCULAR ACCESS Right 9/28/2020    Procedure: ultrasound guided right internal venous access port placement with flouroscopy;  Surgeon: Fercho Wayne DO;  Location: [facility]     IR ABSCESS TUBE CHANGE  6/12/2020     IR FOLLOW UP VISIT OUTPATIENT  7/24/2020     IR FOLLOW UP VISIT OUTPATIENT  8/3/2020     IR FOLLOW UP VISIT OUTPATIENT  10/19/2020     IR SINOGRAM INJECTION DIAGNOSTIC  7/14/2020     IR SINOGRAM INJECTION DIAGNOSTIC  9/14/2020     IR SINOGRAM INJECTION DIAGNOSTIC  10/13/2020     IR SINOGRAM INJECTION DIAGNOSTIC  3/1/2021     IR SINOGRAM INJECTION THERAPEUTIC  8/25/2020     IR SINOGRAM INJECTION THERAPEUTIC  9/25/2020     IR SINOGRAM INJECTION THERAPEUTIC  10/2/2020     IR SINOGRAM INJECTION THERAPEUTIC  9/18/2020     IR THORACENTESIS  5/16/2020     LYMPHADENECTOMY ABDOMINAL N/A 5/12/2020    Procedure: portal lymph node dissection, intraoperative liver ultrasound;  Surgeon: Oswaldo Hale MD;  Location: [facility]      Social History   Social History     Tobacco Use     Smoking status: Never Smoker     Smokeless tobacco: Never Used   Substance Use Topics     Alcohol use: Not Currently     Comment: occaisional      Drug use: No       Family History   I have reviewed this  patient's family history and updated it with pertinent information if needed.   Family History   Problem Relation Age of Onset     Arthritis Mother         RA     Diabetes Father         diet controlled     Hypertension Father        Prior to Admission Medications   Prior to Admission Medications   Prescriptions Last Dose Informant Patient Reported? Taking?   Ferrous Sulfate (IRON) 325 (65 Fe) MG tablet  Spouse/Significant Other No No   Sig: Take 1 tablet by mouth 3 times daily (with meals)   LORazepam (ATIVAN) 0.5 MG tablet  Spouse/Significant Other No No   Sig: Take 1 tablet (0.5 mg) by mouth every 4 hours as needed (Anxiety, Nausea/Vomiting or Sleep)   amoxicillin (AMOXIL) 875 MG tablet   No No   Sig: Take 1 tablet (875 mg) by mouth 2 times daily   atorvastatin (LIPITOR) 40 MG tablet  Spouse/Significant Other Yes No   Sig: Take 40 mg by mouth At Bedtime    clopidogrel (PLAVIX) 75 MG tablet  Spouse/Significant Other Yes No   Sig: Take 75 mg by mouth At Bedtime    dexamethasone (DECADRON) 4 MG tablet  Spouse/Significant Other No No   Sig: Take 1 tablet (4 mg) by mouth daily (with breakfast) Daily for the 3 days after chemotherapy in the morning with food   dronabinol (MARINOL) 5 MG capsule  Spouse/Significant Other No No   Sig: Take 2 capsules in am and 1 capsule in pm (before meals)   fenofibrate (TRIGLIDE/LOFIBRA) 160 MG tablet  Spouse/Significant Other Yes No   Sig: Take 160 mg by mouth At Bedtime    furosemide (LASIX) 20 MG tablet  Spouse/Significant Other Yes No   Sig: Take 20 mg by mouth every evening    lisinopril (ZESTRIL) 30 MG tablet  Spouse/Significant Other Yes No   Sig: Take 5 mg by mouth every evening    magnesium gluconate 30 MG tablet  Spouse/Significant Other No No   Sig: Take 1 tablet (30 mg) by mouth daily   ondansetron (ZOFRAN) 8 MG tablet   No No   Sig: Take 1 tablet (8 mg) by mouth every 8 hours as needed (Nausea/Vomiting)   oxyCODONE (ROXICODONE) 5 MG tablet  Spouse/Significant Other No No  "  Sig: Take 1-2 tablets (5-10 mg) by mouth every 4 hours as needed for moderate to severe pain   prochlorperazine (COMPAZINE) 10 MG tablet  Spouse/Significant Other No No   Sig: Take 0.5 tablets (5 mg) by mouth every 6 hours as needed (Nausea/Vomiting)   sildenafil (REVATIO) 20 MG tablet  Spouse/Significant Other Yes No   Sig: Take 20 mg by mouth as needed   zolpidem (AMBIEN) 10 MG tablet  Spouse/Significant Other No No   Sig: Take 1 tablet (10 mg) by mouth nightly as needed for sleep      Facility-Administered Medications: None     Allergies   Allergies   Allergen Reactions     Metformin Other (See Comments)     \" I think my kidneys shut down\"       Physical Exam   /67   Pulse 70   Temp 98.1  F (36.7  C) (Oral)   Resp 19   Ht 1.854 m (6' 1\")   Wt 73 kg (161 lb)   SpO2 100%   BMI 21.24 kg/m    GENERAL: Alert and oriented x 3. NAD. Thin and cachectic.   HEENT: Scleral icterus. PERRL. Mucous membranes moist and without lesions.   CV: RRR. S1, S2. No murmurs appreciated.   RESPIRATORY: Effort normal on room air. Lungs CTAB with no wheezing, rales, rhonchi.   GI: Abdomen soft with mild distension, bowel sounds present. Right perihepatic percutaneous drain in place covered by dressing that is C/D/I (capped). No skin erythema. No tenderness, rebound, guarding.   MUSCULOSKELETAL: No joint swelling or tenderness.   NEUROLOGICAL: No focal deficits. Moves all extremities.   EXTREMITIES: No peripheral edema. Intact bilateral pedal pulses. Intact but decreased sensation distal to ankles on bilateral feet. PT/DP pulses 2+. FROM of the BLE and strength intact and 5/5 bilaterally.   SKIN: No jaundice. No rashes.     Data   Data reviewed today: I reviewed all medications, new labs and imaging results over the last 24 hours.       "

## 2021-03-28 NOTE — CONSULTS
Care Management Initial Consult    General Information  Assessment completed with: Patient, Spouse or significant other,    Type of CM/SW Visit: Offer D/C Planning    Primary Care Provider verified and updated as needed: Yes   Readmission within the last 30 days:           Advance Care Planning: Advance Care Planning Reviewed: other (comment)(Pt needs documents.)          Communication Assessment  Patient's communication style: spoken language (English or Bilingual)    Hearing Difficulty or Deaf: no   Wear Glasses or Blind: yes    Cognitive  Cognitive/Neuro/Behavioral: WDL                      Living Environment:   People in home: alone, spouse     Current living Arrangements: house      Able to return to prior arrangements: yes       Family/Social Support:  Care provided by: self  Provides care for:    Marital Status:   Wife          Description of Support System: Supportive, Involved         Current Resources:   Patient receiving home care services:       Community Resources:    Equipment currently used at home: walker, standard, shower chair, grab bar, tub/shower, grab bar, toilet, raised toilet seat  Supplies currently used at home:      Employment/Financial:  Employment Status: retired        Financial Concerns:        Finance Comments: Pt wife is interested in voucher/assistance for hotel stays, as they live out of town    Lifestyle & Psychosocial Needs:        Socioeconomic History     Marital status:      Spouse name: Robyn     Number of children: 2     Years of education: 14     Highest education level: Not on file   Occupational History     Occupation: self employed     Tobacco Use     Smoking status: Never Smoker     Smokeless tobacco: Never Used   Substance and Sexual Activity     Alcohol use: Not Currently     Comment: occaisional      Drug use: No     Sexual activity: Yes     Partners: Female       Functional Status:  Prior to admission patient needed assistance:              Mental  Health Status:          Chemical Dependency Status:                Values/Beliefs:  Spiritual, Cultural Beliefs, Worship Practices, Values that affect care:                 Additional Information:  Pt and wife still need to discuss home health care, however state they are open to home health. The company in their area they prefer to use is [employer] [phone].  Orders placed. Please check with pt before sending.     Eloisa Del Valle RN

## 2021-03-28 NOTE — CONSULTS
Oncology  Consult Note   Date of Service: 03/28/2021    Patient: Fuentes Estrada  MRN: [id number]  Admission Date: 3/27/2021  Hospital Day # 1  Cancer Diagnosis: Metastatic cholangiocarcinoma  Primary Outpatient Oncologist: Dr. [email or web address]  Current Treatment Plan: Cisplatin+Gemciatbine (treatment on hold since 12/2020)    Reason for Consult: Cholagiocarcinoma c/b recurrent cholangitis       History of Present Illness:    Fuentes Estrada is a 66 year old year old male with Hx of hilar metastatic cholangiocarcinoma not currently on active systemic therapy.  Initial dx 5/2020 s/p R hepatectomy w/ radical extrahepatic bile duct resection, portal LN resection, and Bianca en Y hepaticojejunosopy to left hepatic duct. He received cis+gen from Aug to Dec 2020. He has been off chemotherapy since 12/2020. His course has been c/b biliary leak s/p drain placement (removed 11/2020) with persistent perihepatic fluid collection c/f biloma vs abscess. He was on chronic suppressive prolonged antibiotic therapy. Recently he is s/p IR perihepatic drain placement 2/15 with subsequent sinogram 3/25 in which the drain is now capped. He was last seen [facility] 3/1 at the time no evidence of progressive metastatic disease therefore not resumed any systemic therapy. His CT CAP on 2/26/2021 showed resolution of the fluid collection adjacent to the right hepatic lobe surgical margin and right kidney, large ascites and interval increase in the small left pleural effusion. No evidence of discrete liver mass.     His percutaneous drain was capped Thursday. He is now admitted for obstructive jaundice that was first noticed by wife on Friday. No fever or chills or any infectious symptoms. He did have paracentesis 2 weeks ago when 7 L of fluid was removed.  CT 3/25 with no intrahepatic biliary dilation, no hepatic lesions demonstrated but along R lateral peritoneal wall there is a 4.5 x 2.0 ill defined area of enhancing soft tissue/ill defined mass as well  as other evidence of peritoneal nodularity which is increasing (but seen on CT 8/3/2020).      He has no abdominal paiin or discomfort. His appetite is ok but has been slowly but progressively getting worse.    ROS is otherwsie neg.      Oncologic History:    Fuentes Estrada is a 65 year old man with a prior hx of HTN, DM, CVA on plavix who presented with elevated LFTs in the spring of 2020 and found to have a hilar cholangiocarcinoma. On May 12, 2020 he underwent a radical extrahepatic bile duct resection and R hepatectomy.   He had all of his disease resected with negative margins and one positive lymph node.  He had a complicated postoperative course with a bile leak and abscess that had delayed starting his adjuvant chemotherapy.    On imaging in July 2020,  he appeared to be developing increasing nodularity in the resection bed and regional lymph nodes that has led to an EUS with a fine-needle aspiration of the lymph nodes on 8/11/20,  demonstrating the presence of metastatic disease.   On 8/27/20, he initiated treatment with palliative intent Cisplatin/Gemcitabine.      His abscess/biliary drain was managed by IR. Sinograms identified a communication to the biliary tree. He failed a capping trial with development of a fever. He underwent sinograms and cavitary sclerotherapy every 2 weeks starting August 25, 2020.  Drain was removed on 11/9/2020 9/28/20: port placement  Admission to [facility] 10/9/20-10/11/20 with septic shock from infectious enterocolitis, all cultures NGTD. Chemo delayed one week for recovery.  Cis/[place] restarted 10/21.  Admission to [facility] 11/18/20-11/19/20 with cholangitis - fever 100.8, bilirubin 3.2, elevated alk phos. MRCP showed no stones or strictures. Paracentesis removed 1L of fluid, no signs of SBP. He was dismissed on a 7-day course of Flagyl and ciprofloxacin.  C5D8 was delayed due to hospitalization.     He was seen on 12/14/2020 with an interim CT scan which showed  "findings concerning for abscess at the operative site along the posterior aspect of liver with gas seen in this fluid collection.  Patient was also having chills, chemotherapy was held and he was started on ciprofloxacin and Flagyl.  There was further discussion about management of this recurrent abscess with Dr. Purcell, Dr. Hale and Dr. Strauss about the abscess.  It was felt that aspiration is not likely to prevent recurrence of the abscess. A biliary drain is possible, but likely to be permanent. It has been concluded an endoscopic procedure is not possible. At 12/21/20 visit with Dr. Beard, antibiotic course was extended and he finished 12/31/20. Disease on 12/11/20 was stable.   2/2/21 admission for Admitted for nausea, vomiting and FTT. - Underwent ERCP for possible internal drainage 2/2; unfortunately ERCP was not feasible due to \"edematous and tortuous biliary limb obviating passage\"   Biliary tract tube put in on 2/15/2021.         Oncology Medications 8/27/2020 9/2/2020   Day, Cycle Day 1, Cycle 1 Day 8, Cycle 1   CISplatin (PLATINOL) IV 25 mg/m2 = 53 mg 25 mg/m2 = 53 mg   gemcitabine (GEMZAR) IV 2,200 mg 2,200 mg      Oncology Medications 9/16/2020 9/23/2020   Day, Cycle Day 1, Cycle 2 Day 8, Cycle 2   CISplatin (PLATINOL) IV 25 mg/m2 = 53 mg 25 mg/m2 = 53 mg   gemcitabine (GEMZAR) IV 2,200 mg 2,200 mg      Oncology Medications 10/7/2020 10/21/2020   Day, Cycle Day 1, Cycle 3 Day 1, Cycle 4   CISplatin (PLATINOL) IV 25 mg/m2 = 53 mg 25 mg/m2 = 53 mg   gemcitabine (GEMZAR) IV 2,200 mg 2,200 mg      Oncology Medications 10/28/2020 11/11/2020 11/25/2020   Day, Cycle Day 8, Cycle 4 Day 1, Cycle 5 Day 8, Cycle 5   CISplatin (PLATINOL) IV 25 mg/m2 = 53 mg 25 mg/m2 = 53 mg 50 mg   gemcitabine (GEMZAR) IV 2,200 mg 2,200 mg 2,000 mg      Oncology Medications 12/9/2020   Day, Cycle Day 1, Cycle 6   CISplatin (PLATINOL) IV 25 mg/m2 = 53 mg   gemcitabine (GEMZAR) IV 2,200 mg                 Review of Systems:  A " comprehensive ROS was performed and found to be negative or non-contributory with the exception of that noted in the HPI above.    Past Medical History:  Past Medical History:   Diagnosis Date     Cerebrovascular accident (CVA) (H) 12/2010     Cholangiocarcinoma (H)      Diabetes (H)      Essential hypertension, benign     Hypertension, Benign     Nonspecific abnormal results of liver function study     Hx of elevated LFT's       Past Surgical History:  Past Surgical History:   Procedure Laterality Date     ENDOSCOPIC RETROGRADE CHOLANGIOPANCREATOGRAM N/A 3/31/2020    Procedure: ENDOSCOPIC RETROGRADE CHOLANGIOPANCREATOGRAPHY, WITH with biliary sphincterotomy, biopsies and brushings, biliary stent placement;  Surgeon: Win tSrauss MD;  Location: [facility] OR     ENDOSCOPIC RETROGRADE CHOLANGIOPANCREATOGRAM N/A 4/6/2020    Procedure: ENDOSCOPIC RETROGRADE CHOLANGIOPANCREATOGRAPHY;  Surgeon: Win Strauss MD;  Location: [facility] OR     ENDOSCOPIC RETROGRADE CHOLANGIOPANCREATOGRAM WITH SPYGLASS  4/16/2020    Procedure: ENDOSCOPIC RETROGRADE CHOLANGIOPANCREATOGRAPHY, WITH DIRECT DUCT VISUALIZATION, USING PANCREATICOBILIARY FIBEROPTIC PROBE; Bile Duct Stent Exchange and Biopsy,balloon sweep of bile duct;  Surgeon: Win Strauss MD;  Location: [facility] OR     ENDOSCOPIC ULTRASOUND UPPER GASTROINTESTINAL TRACT (GI) N/A 4/16/2020    Procedure: ENDOSCOPIC ULTRASOUND, ESOPHAGOSCOPY / UPPER GASTROINTESTINAL TRACT (GI)with Fine Needle biopsy;  Surgeon: Cody Baumann MD;  Location: [facility] OR     ENDOSCOPIC ULTRASOUND UPPER GASTROINTESTINAL TRACT (GI) N/A 8/11/2020    Procedure: ENDOSCOPIC ULTRASOUND, ESOPHAGOSCOPY / UPPER GASTROINTESTINAL TRACT (GI);  Surgeon: Guru Bailey Rossi MD;  Location: [facility]     ENTEROSCOPY SMALL BOWEL N/A 2/2/2021    Procedure: Enteroscopy small bowel;  Surgeon: Chiki Swan MD;  Location: [facility]     ESOPHAGOSCOPY, GASTROSCOPY, DUODENOSCOPY (EGD), COMBINED N/A  4/6/2020    Procedure: ESOPHAGOGASTRODUODENOSCOPY (EGD);  Surgeon: Win Strauss MD;  Location: [facility]     ESOPHAGOSCOPY, GASTROSCOPY, DUODENOSCOPY (EGD), COMBINED N/A 8/11/2020    Procedure: Esophagogastroduodenoscopy, With Fine Needle Aspiration Biopsy, With Endoscopic Ultrasound Guidance;  Surgeon: Guru Bailey Rossi MD;  Location: [facility]     EXPLORE COMMON BILE DUCT N/A 5/12/2020    Procedure: extra-hepatic bile duct resection;  Surgeon: Oswaldo Hale MD;  Location: [facility]      KNEE SCOPE, DIAGNOSTIC  1995    Arthroscopy, Knee; left     HC VASECTOMY UNILAT/BILAT W POSTOP SEMEN      Vasectomy     HEPATECTOMY PARTIAL N/A 5/12/2020    Procedure: Exploratory Laparotomy, Open right hepatectomy, Radical Extra-Hepatic Bile Duct Resection, Portal Lymph Node Dissection, Intraoperative Ultrasound, Intra Air-Cholangiogram ,Bianca-En-Y Left Hepaticojejunostomy, Excision Skin Lesion;  Surgeon: Oswaldo Hale MD;  Location: [facility]     INSERT PORT VASCULAR ACCESS Right 9/28/2020    Procedure: ultrasound guided right internal venous access port placement with flouroscopy;  Surgeon: Fercho Wayne DO;  Location: [facility]     IR ABSCESS TUBE CHANGE  6/12/2020     IR FOLLOW UP VISIT OUTPATIENT  7/24/2020     IR FOLLOW UP VISIT OUTPATIENT  8/3/2020     IR FOLLOW UP VISIT OUTPATIENT  10/19/2020     IR SINOGRAM INJECTION DIAGNOSTIC  7/14/2020     IR SINOGRAM INJECTION DIAGNOSTIC  9/14/2020     IR SINOGRAM INJECTION DIAGNOSTIC  10/13/2020     IR SINOGRAM INJECTION DIAGNOSTIC  3/1/2021     IR SINOGRAM INJECTION THERAPEUTIC  8/25/2020     IR SINOGRAM INJECTION THERAPEUTIC  9/25/2020     IR SINOGRAM INJECTION THERAPEUTIC  10/2/2020     IR SINOGRAM INJECTION THERAPEUTIC  9/18/2020     IR THORACENTESIS  5/16/2020     LYMPHADENECTOMY ABDOMINAL N/A 5/12/2020    Procedure: portal lymph node dissection, intraoperative liver ultrasound;  Surgeon: Oswaldo Hale MD;  Location: [facility] OR       Social  History:  Social History     Socioeconomic History     Marital status:      Spouse name: Robyn     Number of children: 2     Years of education: 14     Highest education level: None   Occupational History     Occupation: self employed   Social Needs     Financial resource strain: None     Food insecurity     Worry: None     Inability: None     Transportation needs     Medical: None     Non-medical: None   Tobacco Use     Smoking status: Never Smoker     Smokeless tobacco: Never Used   Substance and Sexual Activity     Alcohol use: Not Currently     Comment: occaisional      Drug use: No     Sexual activity: Yes     Partners: Female   Lifestyle     Physical activity     Days per week: None     Minutes per session: None     Stress: None   Relationships     Social connections     Talks on phone: None     Gets together: None     Attends Rastafari service: None     Active member of club or organization: None     Attends meetings of clubs or organizations: None     Relationship status: None     Intimate partner violence     Fear of current or ex partner: None     Emotionally abused: None     Physically abused: None     Forced sexual activity: None   Other Topics Concern      Service No     Blood Transfusions No     Caffeine Concern No     Occupational Exposure No     Hobby Hazards No     Sleep Concern No     Stress Concern No     Weight Concern No     Special Diet No     Back Care No     Exercise Yes     Bike Helmet No     Seat Belt Yes     Self-Exams No     Parent/sibling w/ CABG, MI or angioplasty before 65F 55M? Not Asked   Social History Narrative     None        Family History  Family History   Problem Relation Age of Onset     Arthritis Mother         RA     Diabetes Father         diet controlled     Hypertension Father        Outpatient Medications:  No current facility-administered medications on file prior to encounter.   amoxicillin (AMOXIL) 875 MG tablet, Take 1 tablet (875 mg) by mouth 2 times  "daily  atorvastatin (LIPITOR) 40 MG tablet, Take 40 mg by mouth At Bedtime   clopidogrel (PLAVIX) 75 MG tablet, Take 75 mg by mouth At Bedtime   dexamethasone (DECADRON) 4 MG tablet, Take 1 tablet (4 mg) by mouth daily (with breakfast) Daily for the 3 days after chemotherapy in the morning with food  dronabinol (MARINOL) 5 MG capsule, Take 2 capsules in am and 1 capsule in pm (before meals)  fenofibrate (TRIGLIDE/LOFIBRA) 160 MG tablet, Take 160 mg by mouth At Bedtime   Ferrous Sulfate (IRON) 325 (65 Fe) MG tablet, Take 1 tablet by mouth 3 times daily (with meals)  furosemide (LASIX) 20 MG tablet, Take 20 mg by mouth every evening   lisinopril (ZESTRIL) 30 MG tablet, Take 5 mg by mouth every evening   LORazepam (ATIVAN) 0.5 MG tablet, Take 1 tablet (0.5 mg) by mouth every 4 hours as needed (Anxiety, Nausea/Vomiting or Sleep)  magnesium gluconate 30 MG tablet, Take 1 tablet (30 mg) by mouth daily  ondansetron (ZOFRAN) 8 MG tablet, Take 1 tablet (8 mg) by mouth every 8 hours as needed (Nausea/Vomiting)  oxyCODONE (ROXICODONE) 5 MG tablet, Take 1-2 tablets (5-10 mg) by mouth every 4 hours as needed for moderate to severe pain  prochlorperazine (COMPAZINE) 10 MG tablet, Take 0.5 tablets (5 mg) by mouth every 6 hours as needed (Nausea/Vomiting)  sildenafil (REVATIO) 20 MG tablet, Take 20 mg by mouth as needed  zolpidem (AMBIEN) 10 MG tablet, Take 1 tablet (10 mg) by mouth nightly as needed for sleep         Physical Exam:    Blood pressure 97/63, pulse 70, temperature 98.3  F (36.8  C), temperature source Oral, resp. rate 18, height 1.854 m (6' 1\"), weight 73 kg (161 lb), SpO2 95 %.  General: alert and cooperative, lying in bed, no acute distress  HEENT: sclera anicteric, EOMI, MMM  Neck: supple, normal ROM  CV: RRR, no murmurs  Resp: CTAB, normal respiratory effort on ambient air  GI: soft, non-tender, distended, some ascites+, bowel sounds present and normoactive  Drain in the rt uppe rflank  MSK: warm and " well-perfused, normal tone  Skin: no rashes on limited exam, jaundice++  Neuro: Alert and interactive, moves all extremities equally, no focal deficits    Labs & Studies: I personally reviewed the following studies:  ROUTINE LABS (Last four results):  CMP  Recent Labs   Lab 03/28/21  0713 03/27/21 1913 03/26/21  1741   * 129* 131*   POTASSIUM 4.1 4.1 4.2   CHLORIDE 102 96 97   CO2 27 28 29   ANIONGAP 4 5 5   GLC 84 124* 177*   BUN 19 22 19   CR 1.09 0.98 0.88   GFRESTIMATED 69 79 88   GFRESTBLACK 80 >90 >90   ERIC 8.8 9.2 9.0   MAG 1.8 1.7  --    PHOS 2.9 2.7  --    PROTTOTAL 5.9* 6.5* 7.3   ALBUMIN 1.3* 1.5* 1.7*   BILITOTAL 5.8* 6.6* 7.4*   ALKPHOS 920* 999* 1,116*   * 139* 193*   ALT 48 59 76*     CBC  Recent Labs   Lab 03/28/21  0713 03/27/21 1913 03/26/21  1741 03/25/21  1020   WBC 4.5 5.7 5.2 4.7   RBC 2.84* 3.20* 3.70* 2.97*   HGB 8.5* 9.4* 11.4* 9.0*   HCT 25.0* 27.5* 32.3* 25.8*   MCV 88 86 87 87   MCH 29.9 29.4 30.8 30.3   MCHC 34.0 34.2 35.3 34.9   RDW 17.0* 16.8* 16.8* 16.6*    224 238 172     INR  Recent Labs   Lab 03/28/21  0713 03/27/21 1924 03/25/21  1020   INR 1.54* 1.48* 1.58*       Assessment & Plan:     #Metastatic cholangiocarcinoma:    Baseline T. Bili 3-4 range now up to 7.4 with worsening obstuctive picture wo any clincial signs of cholangitis. CT scan with some evidence of tumor progression and ascites but does not explain the most recent worsening of liver function...    Recommendations:   - COnsider MRI of abdomen or MRCP to evaluate obstruction  - GI and IR input for potential sourcesof obstruction  - Empiric Antibiotics per primary team  - Initiating Chemo treatment will be decided as an outpatient in conjucntin with Dr Kisha Molina MD  Oncology Attending

## 2021-03-28 NOTE — PROGRESS NOTES
"CLINICAL NUTRITION SERVICES - ASSESSMENT NOTE     Nutrition Prescription    RECOMMENDATIONS FOR MDs/PROVIDERS TO ORDER:  - Once pt appropriate to resume oral intakes, recommend High Calorie/High Protein diet d/t pt's higher estimated nutritional needs.  - Recommend obtaining calorie counts to help quantify adequacy of po intakes    Malnutrition Status:    Unable to determine at this time    Recommendations already ordered by Registered Dietitian (RD):  None at this time d/t NPO status     Future/Additional Recommendations:  - Monitor for diet adv, tolerance/intakes, need for ONS betw meals if consuming < 75% of meal intakes vs  need for nutrition support (TF therapy) if po intakes < 50% of meals.       REASON FOR ASSESSMENT  Fuentes Estrada is a/an 66 year old male assessed by the dietitian for Provider Order - Malnutrition risk      NUTRITION HISTORY  Unable to obtain due to pt sleeping at time of visit. Per chart review, reported that pt has had poor po intakes x several days PTA.     CURRENT NUTRITION ORDERS  Diet: NPO since MN. Previous order for CLs on 3/27  Intake/Tolerance: Unknown, no meals ordered yet this admission    LABS  No BMP, Mg or PO4 ordered at this time. K+, Mg++ and PO4 WNL on 3/27  Alb 1.5 (low) on 3/27, however, is not consider to be a good indicator of pt's nutritional status and suspect reflective of an inflammatory process d/t elevated CRP (100) on 3/27.     MEDICATIONS  Marinol  Thera-Vit-M    ANTHROPOMETRICS  Height: 185.4 cm (6' 1\")  Most Recent Weight: 73 kg (161 lb) - 3/27 (admit)  IBW: 83.6 kg (87% of IBW)  BMI: Normal BMI  Weight History: Per review of EMR, pt's wt fairly stable until beginning of March, but over the past 3 weeks, pt has lost 16 lbs (9%). Noted pt had paracentesis performed on 3/11 with 7 liters of fluid removed.   Wt Readings from Last 10 Encounters:   03/27/21 73 kg (161 lb)   03/25/21 74.8 kg (165 lb)   03/01/21 80.3 kg (177 lb)   02/15/21 80.3 kg (177 lb) "   02/01/21 80.3 kg (177 lb)   01/27/21 80.3 kg (177 lb)   01/04/21 80.3 kg (177 lb)   12/09/20 81.5 kg (179 lb 11.2 oz)   11/25/20 81.2 kg (179 lb 1.6 oz)   11/19/20 77.8 kg (171 lb 9.6 oz)       Dosing Weight: 73 kg    ASSESSED NUTRITION NEEDS  Estimated Energy Needs: 2370-2416 kcals/day (30 - 35 kcals/kg )  Justification: Increased needs d/t Underweight  Estimated Protein Needs: grams protein/day (1.2 - 1.5 grams of pro/kg)  Justification: Repletion  Estimated Fluid Needs: (1 mL/kcal)   Justification: Maintenance    PHYSICAL FINDINGS  See malnutrition section below.  Unable to assess. Per chart review, Jaundice, scleral icterus, cachetic noted.     MALNUTRITION  % Intake: suspect </= 50% for >/= 5 days (severe) d/t reported poor po intakes for several days PTA and current NPO as of 3/27  % Weight Loss: Weight loss does not meet criteria d/t recent paracentesis performed on 3/11 with 7 liters of fluid removed.   Subcutaneous Fat Loss: Unable to assess  Muscle Loss: Unable to assess  Fluid Accumulation/Edema: None noted  Malnutrition Diagnosis: Pt at risk d/t recent poor po intakes and underweight status, but unable to determine the severity at this time.    NUTRITION DIAGNOSIS  Underweight related to question inadequate nutritional intakes vs hypermetabolism with illness as evidenced by wt loss of 16 lbs over past 3 weeks (may in part d/t paracentesis), underweight at 87% of IBW, poor po intakes x several days PTA and NPO at present.    INTERVENTIONS  Implementation  Nutrition Education: No education needs assessed at this time     Goals  1. Diet adv within 24 to 48 hrs  2. Patient to consume % of nutritionally adequate meal trays TID, or the equivalent with supplements/snacks.     Monitoring/Evaluation  Progress toward goals will be monitored and evaluated per protocol.    Fern Reyes RD,LD  Weekend pager [phone]

## 2021-03-28 NOTE — PROGRESS NOTES
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 Drain was uncapped on admission with slight improvement in labs today.     Metastatic hilar cholangiocarcinoma (dx 5/2020) with peritoneal mets:  - follows with Dr. Beard, oncology  - treated with cisplatin/cemcitabin 8/2020-12/9 when chemo was stopped due to abscess/fluid collection as above  - concern for progression based on CT on admission  - Oncology consulted    Jaundice  Biliary obstruction  Hx of biliary leak vs abscess s/p IR perihepatic drain placed on 2/15  Hx of Strep anginosus abdominal abscess  - no evidence of cholangitis on admission  - Uncapped IR perihepatic drain on admission with slight improvement in labs  - continue to trend LFTs  - GI consulted, not an ERCP candidate, recommend following the labs  - IR consulted, with improvement in labs, recommend no procedural intervention at this time, continue to trend labs with uncapped drain  - ID consulted, continue antibiotics for now  - continue zosyn  - blood cultures x2 in process  - elevated CRP, elevated Procalcitonin  - MRCP ordered    Ascites  - 2/2 malignancy  - last paracentesis on 3/11 with 7L removed  - fluid wave present on exam today  - US on 3/27 with  septate ascites. Given no fevers and no leukocytosis unlikely that there is active infection there causing obstruction, but it is something to consider.    Hyponatremia  - due to hypovolemia, malnutrtion, liver disease  - continue to trend    Anemia of chronic disease  - continue ferrous sulfate supplementation    HTN  - hold lisinopril (had been held at home)    Lower Extremity Edema  - likely due to liver disease  - hold lasix    Diabetes Mellitus, not insulin dependent  - MSSI as needed, was on glipizde at home, but when appetite dropped this was stopped    Malnutrition  - Nutrition consulted  - Continue PTA marinol  - Start MVI    Hx of CVA  - hold atorvastatin and plavix    Chronic Hypomagnesemia  - continue magnesium supplementation    Anxiety  - continue PTA ativan    Insomnia  Sleeplessness  - continue prior to admission ambien    Depression   Adjustment disorder  - endorses feeling of being overwhelmed and how hard the year has been.   - declined medication intervention or psychology at this time, will let me know if that changes     Diet: Regular Diet Adult    DVT Prophylaxis: Pneumatic Compression Devices  Em Catheter: not present  Code Status: Full Code           Disposition Plan   Expected discharge: 2 - 3 days, recommended to prior living arrangement once adequate pain management/ tolerating PO medications.  Entered: Cody Webb MD 03/28/2021, 3:19 PM       The patient's care was discussed with the Bedside Nurse, Care Coordinator/, Patient and ID/Hepatology/Oncology Consultant.    Cody Webb MD  Hospitalist Service, [address]  Contact information available via [facility] Paging/Directory  Please see sign in/sign out for up to date coverage information      Time Spent on this Encounter   I spent 45 minutes on the unit/floor managing the care of Fuentes Estrada. Over 50% of my time was spent on the following:   - Counseling the  patient and/or family regarding: diagnostic results, prognosis, risks and benefits of treatment options and recommended follow-up  - Coordination of care with the: consultant(s)    - coordination with ID, GI, Oncology, IR  - discussion with family about plan, diagnostic plan including repeat labs and MRCP    Cody Webb MD      ______________________________________________________________________    Interval History   Overnight admitted for jaundice, found to have labs consistent with obstruction. Kept NPO overnight and this morning which patient was not happy about but understood why. Today has no pain, no abdominal pain, no nausea, no vomiting. No fevers, no cough, no shortness of breath.    Otherwise 4pt ROS is negative    Data reviewed today: I reviewed all medications, new labs and imaging results over the last 24 hours. I personally reviewed no images or EKG's today.    Physical Exam   Vital Signs: Temp: 98.3  F (36.8  C) Temp src: Oral BP: 105/66 Pulse: 77   Resp: 18 SpO2: 96 % O2 Device: None (Room air)    Weight: 161 lbs 0 oz  Gen: NAD, sitting comfortably in bed, thin, slight jaundice on face and neck  Eyes: PERRLA, EOMI, conjuctiva icteric  Mouth: OP clear, no lesions  Neck: No cervical LAD  CV: RRR, no murmurs, 2+ radial pulses  RESP: CTA bilaterally, no w/r/c  Abd: soft, nontender, nondistended  Ext: no edema bilaterally  Neuro: CNII-CNXII intact     Data   Recent Labs   Lab 03/28/21  0713 03/27/21  1924 03/27/21  1913 03/26/21  1741 03/25/21  1020 03/25/21  1020   WBC 4.5  --  5.7 5.2  --  4.7   HGB 8.5*  --  9.4* 11.4*  --  9.0*   MCV 88  --  86 87  --  87     --  224 238  --  172   INR 1.54* 1.48*  --   --   --  1.58*   *  --  129* 131*  --   --    POTASSIUM 4.1  --  4.1 4.2  --   --    CHLORIDE 102  --  96 97  --   --    CO2 27  --  28 29  --   --    BUN 19  --  22 19  --   --    CR 1.09  --  0.98 0.88  --   --    ANIONGAP 4  --  5 5  --   --    ERIC 8.8  --  9.2 9.0  --   --    GLC  84  --  124* 177*  --   --    ALBUMIN 1.3*  --  1.5* 1.7*   < >  --    PROTTOTAL 5.9*  --  6.5* 7.3   < >  --    BILITOTAL 5.8*  --  6.6* 7.4*   < >  --    ALKPHOS 920*  --  999* 1,116*   < >  --    ALT 48  --  59 76*   < >  --    *  --  139* 193*   < >  --    LIPASE  --   --  123  --   --   --     < > = values in this interval not displayed.     Recent Results (from the past 24 hour(s))   Abdomen US, limited (RUQ only)    Narrative    EXAMINATION: US ABDOMEN LIMITED  3/27/2021 8:38 PM      CLINICAL HISTORY: History of cholangiocarcinoma, S?P right  hepatectomy. Rising bilirubin, Rising bilirubin    COMPARISON: Abdominal CT 3/25/2021        FINDINGS:  Hepatic artery is patent with normal resistive index and peak systolic  velocity of 71 cm/s. The portal vein is patent with antegrade flow.    The liver is normal in contour and echogenicity. The liver measures 15  cm in length. There is no intrahepatic biliary ductal dilatation.  (History right hepatectomy and left hepaticojejunostomy.) There is  dirty shadowing within the liver suggestive of intrahepatic  pneumobilia.    There is septated ascites inferior to the liver.    The right kidney measures 10.9 cm in length no visible hydronephrosis.      Impression    IMPRESSION:   1. Suspected intrahepatic pneumobilia, (may be due to  hepaticojejunostomy).   2. Septated ascites inferior to the liver.    I have personally reviewed the examination and initial interpretation  and I agree with the findings.    JANET SCHWAB MD

## 2021-03-28 NOTE — CONSULTS
GASTROENTEROLOGY CONSULTATION      Date of Admission:  3/27/2021           Reason for Consultation:   We were asked by Dr. Webb of medicine to evaluate this patient with cholangiocarcinoma and abnormal LFT's           ASSESSMENT AND RECOMMENDATIONS:   Assessment:  66 year old male with a history of perihilar/intrahepatic cholangiocarcinoma w/ peritoneal mets s/p radical extrahepatic bile duct resection, open right hepatectomy, and Ibanca-en-Y hepaticojejunostomy to left hepatic duct in May 2020 w/ a persistent fluid collection near the remnant Rt hepatic lobe w/ a percutaneous perihepatic drain placed on 2/15, CVA, DMII, and HTN who presented w/ jaundice and abnormal LFT's, found to have worsening LFT's in a cholestatic pattern.     Given that the patients chemotherapy for cholangiocarcinoma has been held in the setting of his frailty and the perihepatic fluid collection, the worsening LFT's are c/f progression of his cholangiocarcinoma. Unfortunately, during patients most recent ERCP on 2/2 for a suspected bile leak, the hepaticojejunostomy was unable to be reached as his biliary limb was tortuous and edematous, meaning future ERCP's are likely to be unsuccessful as well.      Recommendations  - Agree w/ MRCP  - Appreciate IR and oncology involvement    Thank you for involving us in this patient's care. Please do not hesitate to contact the GI service with any questions or concerns.     Pt care plan discussed with Dr. Rossi, GI staff physician.    Melissa Weber MD  -------------------------------------------------------------------------------------------------------------------         History of Present Illness:   Fuentes Estrada is a 66 year old male with a history of perihilar/intrahepatic cholangiocarcinoma w/ peritoneal mets s/p radical extrahepatic bile duct resection, open right hepatectomy, and Bianca-en-Y hepaticojejunostomy to left hepatic duct in May 2020 w/ a persistent fluid collection  near the remnant Rt hepatic lobe w/ a percutaneous perihepatic drain placed on 2/15, CVA, DMII, and HTN who presented w/ jaundice and abnormal LFT's.     The patients wife noticed that the patient appeared more yellow in his skin and eyes several days PTA. The patient himself denied fevers, chills, N/V, or abdominal pain. He has a poor appetite although this is not new. He had a sinogram on 3/25 w/ IR and on that date began a capping trial of his perihepatic drain. Routine labs checked on 3/26 showed a Tbili of 7.4 (2.4 on 2/26) and alk phos 1,116 (855 on 2/26). The patient was told by his oncology team to present to the ED.          Data:   Key relevant imaging:  US ABDOMEN LIMITED  3/27/2021   IMPRESSION:   1. Suspected intrahepatic pneumobilia, (may be due to  hepaticojejunostomy).   2. Septated ascites inferior to the liver.           Previous Endoscopy:   Procedure:           ERCP 2/2/21  Indications:         Suspected bile leak    Impression:          - Foregut notable for moderate scaling of the stomach                        suggestive of portal hypertension                        - Unremarkable duodenum and healthy appearing end to                        side jejunojejunosotmy                        - Edematous and tortuous biliary limb obviating passage                        of the pediatric gastroscope (and more than likely an                        enteroscope with overtube balloon)                        - ERCP not feasible        Medications:     Medications Prior to Admission   Medication Sig Dispense Refill Last Dose     amoxicillin (AMOXIL) 875 MG tablet Take 1 tablet (875 mg) by mouth 2 times daily 60 tablet 0      atorvastatin (LIPITOR) 40 MG tablet Take 40 mg by mouth At Bedtime         clopidogrel (PLAVIX) 75 MG tablet Take 75 mg by mouth At Bedtime         dexamethasone (DECADRON) 4 MG tablet Take 1 tablet (4 mg) by mouth daily (with breakfast) Daily for the 3 days after chemotherapy in the  "morning with food 3 tablet 1      dronabinol (MARINOL) 5 MG capsule Take 2 capsules in am and 1 capsule in pm (before meals) 90 capsule 1      fenofibrate (TRIGLIDE/LOFIBRA) 160 MG tablet Take 160 mg by mouth At Bedtime         Ferrous Sulfate (IRON) 325 (65 Fe) MG tablet Take 1 tablet by mouth 3 times daily (with meals) 180 tablet 1      furosemide (LASIX) 20 MG tablet Take 20 mg by mouth every evening         lisinopril (ZESTRIL) 30 MG tablet Take 5 mg by mouth every evening         LORazepam (ATIVAN) 0.5 MG tablet Take 1 tablet (0.5 mg) by mouth every 4 hours as needed (Anxiety, Nausea/Vomiting or Sleep) 30 tablet 5      magnesium gluconate 30 MG tablet Take 1 tablet (30 mg) by mouth daily 90 tablet 3      ondansetron (ZOFRAN) 8 MG tablet Take 1 tablet (8 mg) by mouth every 8 hours as needed (Nausea/Vomiting) 30 tablet 5      oxyCODONE (ROXICODONE) 5 MG tablet Take 1-2 tablets (5-10 mg) by mouth every 4 hours as needed for moderate to severe pain 20 tablet 0      prochlorperazine (COMPAZINE) 10 MG tablet Take 0.5 tablets (5 mg) by mouth every 6 hours as needed (Nausea/Vomiting) 30 tablet 5      sildenafil (REVATIO) 20 MG tablet Take 20 mg by mouth as needed        zolpidem (AMBIEN) 10 MG tablet Take 1 tablet (10 mg) by mouth nightly as needed for sleep 30 tablet 0              Physical Exam:   /66 (BP Location: Left arm)   Pulse 77   Temp 98.3  F (36.8  C) (Oral)   Resp 18   Ht 1.854 m (6' 1\")   Wt 73 kg (161 lb)   SpO2 96%   BMI 21.24 kg/m    Wt:   Wt Readings from Last 2 Encounters:   03/27/21 73 kg (161 lb)   03/25/21 74.8 kg (165 lb)      Constitutional: NAD, on RA  Eyes: conjunctiva icteric  CV: No edema  Respiratory: Unlabored breathing  Abd: nondistended, +bs, nontender, no peritoneal signs; Rt flank perc tube w/ minimal OP   Skin: warm, perfused, jaundice  Neuro: AAO x 3, fluent speech   Psych: Normal affect  MSK: No gross deformities          Past Medical History:   Reviewed and edited as " appropriate  Past Medical History:   Diagnosis Date     Cerebrovascular accident (CVA) (H) 12/2010     Cholangiocarcinoma (H)      Diabetes (H)      Essential hypertension, benign     Hypertension, Benign     Nonspecific abnormal results of liver function study     Hx of elevated LFT's            Past Surgical History:   Reviewed and edited as appropriate   Past Surgical History:   Procedure Laterality Date     ENDOSCOPIC RETROGRADE CHOLANGIOPANCREATOGRAM N/A 3/31/2020    Procedure: ENDOSCOPIC RETROGRADE CHOLANGIOPANCREATOGRAPHY, WITH with biliary sphincterotomy, biopsies and brushings, biliary stent placement;  Surgeon: Win Strauss MD;  Location: [facility]     ENDOSCOPIC RETROGRADE CHOLANGIOPANCREATOGRAM N/A 4/6/2020    Procedure: ENDOSCOPIC RETROGRADE CHOLANGIOPANCREATOGRAPHY;  Surgeon: Win Strauss MD;  Location: [facility]     ENDOSCOPIC RETROGRADE CHOLANGIOPANCREATOGRAM WITH SPYGLASS  4/16/2020    Procedure: ENDOSCOPIC RETROGRADE CHOLANGIOPANCREATOGRAPHY, WITH DIRECT DUCT VISUALIZATION, USING PANCREATICOBILIARY FIBEROPTIC PROBE; Bile Duct Stent Exchange and Biopsy,balloon sweep of bile duct;  Surgeon: Win Strauss MD;  Location: [facility]     ENDOSCOPIC ULTRASOUND UPPER GASTROINTESTINAL TRACT (GI) N/A 4/16/2020    Procedure: ENDOSCOPIC ULTRASOUND, ESOPHAGOSCOPY / UPPER GASTROINTESTINAL TRACT (GI)with Fine Needle biopsy;  Surgeon: Cody Baumann MD;  Location: [facility]     ENDOSCOPIC ULTRASOUND UPPER GASTROINTESTINAL TRACT (GI) N/A 8/11/2020    Procedure: ENDOSCOPIC ULTRASOUND, ESOPHAGOSCOPY / UPPER GASTROINTESTINAL TRACT (GI);  Surgeon: Guru Bailey Rossi MD;  Location: [facility]     ENTEROSCOPY SMALL BOWEL N/A 2/2/2021    Procedure: Enteroscopy small bowel;  Surgeon: Chiki Swan MD;  Location: [facility]     ESOPHAGOSCOPY, GASTROSCOPY, DUODENOSCOPY (EGD), COMBINED N/A 4/6/2020    Procedure: ESOPHAGOGASTRODUODENOSCOPY (EGD);  Surgeon: Win Strauss MD;   Location: [facility]     ESOPHAGOSCOPY, GASTROSCOPY, DUODENOSCOPY (EGD), COMBINED N/A 8/11/2020    Procedure: Esophagogastroduodenoscopy, With Fine Needle Aspiration Biopsy, With Endoscopic Ultrasound Guidance;  Surgeon: Guru Bailey Rossi MD;  Location: [facility]     EXPLORE COMMON BILE DUCT N/A 5/12/2020    Procedure: extra-hepatic bile duct resection;  Surgeon: Oswaldo Hale MD;  Location: [facility]     HC KNEE SCOPE, DIAGNOSTIC  1995    Arthroscopy, Knee; left     HC VASECTOMY UNILAT/BILAT W POSTOP SEMEN      Vasectomy     HEPATECTOMY PARTIAL N/A 5/12/2020    Procedure: Exploratory Laparotomy, Open right hepatectomy, Radical Extra-Hepatic Bile Duct Resection, Portal Lymph Node Dissection, Intraoperative Ultrasound, Intra Air-Cholangiogram ,Bianca-En-Y Left Hepaticojejunostomy, Excision Skin Lesion;  Surgeon: Oswaldo Hale MD;  Location: [facility]     INSERT PORT VASCULAR ACCESS Right 9/28/2020    Procedure: ultrasound guided right internal venous access port placement with flouroscopy;  Surgeon: Fercho Wayne DO;  Location: [facility]     IR ABSCESS TUBE CHANGE  6/12/2020     IR FOLLOW UP VISIT OUTPATIENT  7/24/2020     IR FOLLOW UP VISIT OUTPATIENT  8/3/2020     IR FOLLOW UP VISIT OUTPATIENT  10/19/2020     IR SINOGRAM INJECTION DIAGNOSTIC  7/14/2020     IR SINOGRAM INJECTION DIAGNOSTIC  9/14/2020     IR SINOGRAM INJECTION DIAGNOSTIC  10/13/2020     IR SINOGRAM INJECTION DIAGNOSTIC  3/1/2021     IR SINOGRAM INJECTION THERAPEUTIC  8/25/2020     IR SINOGRAM INJECTION THERAPEUTIC  9/25/2020     IR SINOGRAM INJECTION THERAPEUTIC  10/2/2020     IR SINOGRAM INJECTION THERAPEUTIC  9/18/2020     IR THORACENTESIS  5/16/2020     LYMPHADENECTOMY ABDOMINAL N/A 5/12/2020    Procedure: portal lymph node dissection, intraoperative liver ultrasound;  Surgeon: Oswaldo Hale MD;  Location: [facility]            Social History:   Alcohol use: denied  Smoking history: denied   Living situation: lives with wife        "   Family History:   Reviewed and edited as appropriate  Family History   Problem Relation Age of Onset     Arthritis Mother         RA     Diabetes Father         diet controlled     Hypertension Father      No known history of colorectal cancer, liver disease, or inflammatory bowel disease.         Allergies:   Reviewed and edited as appropriate     Allergies   Allergen Reactions     Metformin Other (See Comments)     \" I think my kidneys shut down\"              Review of Systems:     A complete 10 point review of systems was performed and is negative except as noted in the HPI          "

## 2021-03-28 NOTE — ED NOTES
"[facility]   ED Nurse to Floor Handoff     Fuentes Estrada is a 66 year old male who speaks English and lives with a spouse,  in a home  They arrived in the ED by car from home    ED Chief Complaint: Abnormal Labs    ED Dx;   Final diagnoses:   Cholestatic jaundice   Cholangiocarcinoma (H)         Needed?: No    Allergies:   Allergies   Allergen Reactions     Metformin Other (See Comments)     \" I think my kidneys shut down\"   .  Past Medical Hx:   Past Medical History:   Diagnosis Date     Cerebrovascular accident (CVA) (H) 12/2010     Cholangiocarcinoma (H)      Diabetes (H)      Essential hypertension, benign     Hypertension, Benign     Nonspecific abnormal results of liver function study     Hx of elevated LFT's      Baseline Mental status: WDL  Current Mental Status changes: at basesline    Infection present or suspected this encounter: cultures pending  Sepsis suspected: No  Isolation type: No active isolations  Patient tested for COVID 19 prior to admission: YES     Activity level - Baseline/Home:  Stand with Assist  Activity Level - Current:   Stand with assist x2    Bariatric equipment needed?: No    In the ED these meds were given:   Medications   0.9% sodium chloride BOLUS (1,000 mLs Intravenous New Bag 3/27/21 2036)     Followed by   sodium chloride 0.9% infusion (has no administration in time range)   lidocaine 1 % 0.1-1 mL (has no administration in time range)   lidocaine (LMX4) cream (has no administration in time range)   sodium chloride (PF) 0.9% PF flush 3 mL (3 mLs Intracatheter Not Given 3/27/21 2055)   sodium chloride (PF) 0.9% PF flush 3 mL (has no administration in time range)   sodium chloride (PF) 0.9% PF flush 3 mL (has no administration in time range)   melatonin tablet 1 mg (has no administration in time range)   senna-docusate (SENOKOT-S/PERICOLACE) 8.6-50 MG per tablet 1 tablet (1 tablet Oral Not Given 3/27/21 2148)     Or "   senna-docusate (SENOKOT-S/PERICOLACE) 8.6-50 MG per tablet 2 tablet ( Oral See Alternative 3/27/21 2148)   polyethylene glycol (MIRALAX) Packet 17 g (has no administration in time range)   bisacodyl (DULCOLAX) Suppository 10 mg (has no administration in time range)   ondansetron (ZOFRAN-ODT) ODT tab 4 mg (has no administration in time range)     Or   ondansetron (ZOFRAN) injection 4 mg (has no administration in time range)   prochlorperazine (COMPAZINE) injection 5 mg (has no administration in time range)     Or   prochlorperazine (COMPAZINE) tablet 5 mg (has no administration in time range)     Or   prochlorperazine (COMPAZINE) suppository 12.5 mg (has no administration in time range)   piperacillin-tazobactam (ZOSYN) 4.5 g vial to attach to  mL bag (has no administration in time range)       Drips running?  Yes    Home pump  No    Current LDAs  Port A Cath Single 09/28/20 Right Chest wall (Active)   Number of days: 180       Port A Cath Single 09/28/20 Right Chest wall (Active)   Site Assessment WDL 03/27/21 1902   Dressing Status clean;dry;intact 03/27/21 1902   Dressing Intervention Transparent 03/27/21 1902   Dressing change due 04/03/21 03/27/21 1902   Line Status Blood return noted;Saline locked 03/27/21 1902   Access Date 03/27/21 03/27/21 1902   Access Attempts 1 03/27/21 1902   Gauge 20 gauge;1 inch 03/27/21 1902   Needle Change Due 04/03/21 03/27/21 1902   Number of days: 180       Labs results:   Labs Ordered and Resulted from Time of ED Arrival Up to the Time of Departure from the ED   CBC WITH PLATELETS DIFFERENTIAL - Abnormal; Notable for the following components:       Result Value    RBC Count 3.20 (*)     Hemoglobin 9.4 (*)     Hematocrit 27.5 (*)     RDW 16.8 (*)     All other components within normal limits   CRP INFLAMMATION - Abnormal; Notable for the following components:    CRP Inflammation 100.0 (*)     All other components within normal limits   COMPREHENSIVE METABOLIC PANEL -  "Abnormal; Notable for the following components:    Sodium 129 (*)     Glucose 124 (*)     Bilirubin Total 6.6 (*)     Albumin 1.5 (*)     Protein Total 6.5 (*)     Alkaline Phosphatase 999 (*)      (*)     All other components within normal limits   INR - Abnormal; Notable for the following components:    INR 1.48 (*)     All other components within normal limits   LIPASE   PROCALCITONIN   LACTIC ACID WHOLE BLOOD   INFLUENZA A/B & SARS-COV2 PCR MULTIPLEX   MAGNESIUM   PHOSPHORUS   ROUTINE UA WITH MICROSCOPIC REFLEX TO CULTURE   TSH WITH FREE T4 REFLEX   VITAMIN B12   VITAMIN D DEFICIENCY SCREENING   IP ASSIGN PROVIDER TEAM TO TREATMENT TEAM   PERIPHERAL IV CATHETER   PERIPHERAL IV CATHETER   VITAL SIGNS   PAIN ASSESSMENT   PULSE OXIMETRY NURSING   INTAKE AND OUTPUT   DRAIN CARE   WOUND CARE   APPLY PNEUMATIC COMPRESSION DEVICE (PCD)   BLOOD CULTURE   BLOOD CULTURE       Imaging Studies: No results found for this or any previous visit (from the past 24 hour(s)).    Recent vital signs:   /78   Pulse 70   Temp 98.1  F (36.7  C) (Oral)   Resp 19   Ht 1.854 m (6' 1\")   Wt 73 kg (161 lb)   SpO2 99%   BMI 21.24 kg/m      [place] Coma Scale Score: 15 (03/27/21 1604)       Cardiac Rhythm: Normal Sinus  Pt needs tele? No  Skin/wound Issues:  RUQ drain    Code Status: Full Code    Pain control: pt had none    Nausea control: pt had none    Abnormal labs/tests/findings requiring intervention: elevated bilirubin    Family present during ED course? Yes   Family Comments/Social Situation comments: Wife at bedside, supportive    Tasks needing completion: Needs UA    Jailyn Hernandez RN    [phone] [facility] ED      "

## 2021-03-28 NOTE — CONSULTS
GENERAL INFECTIOUS DISEASES CONSULTATION     Patient:  Fuentes Estrada   Date of birth 1955, Medical record number [id number]  Date of Visit:  03/28/2021  Date of Admission: 3/27/2021  Consult Requester:No att. providers found          Assessment and Recommendations:   RECOMMENDATION:  1. Continue IV Zosyn 4.5g q6hrs at this time while GI work up/recs pending.   2. Will follow pending BCx to assure bacteremia is not contributing to presentation.     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    Afebrile on admission. HDS. WBC wnl. CRP elevated at 100 with downtrend to 87 over 24hrs. Procal of 1.05. Lactic <2. BCx 3/27 NGTD.     CT AP w/ contrast 3/25 with minimal residual fluid in area of perc drain posterior to liver, increased peritoneal implants along R peritoneal wall with ill defined mass 4.5 x 2.0 cm along with other e/o peritoneal nodularity (this has been increasing since 8/2020). Large amount of ascites and abdominal varices. US abdomen 3/27 with possible intrahepatic pneumobilia; septated ascites inferior to liver.     Started on Zosyn.     With normal wbc, no new infectious symptoms ( no fevers, increased chills, etc), and other possible contributors to elevated CRP/Procal (malignancy, transaminitis, inflammation), acute infection is not a high suspicion at this point. However will await GI evaluation prior to deescalation of abx.     Thank you for this consult. ID will continue to follow.      Patient was discussed with Dr. Paige.     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    Recent admission 2/1 with ERCP to attempt access of fluid collection which was unsuccessful. He discharged on cipro+flagyl until perihepatic drain was placed 2/25, cultures grew strep anginosus. Seen Dr. Dale in ID clinic and transitioned to Amoxicillin BID. Did have sinogram 3/25 of perihepatic drain, now capped. Now developing 3-4 days of worsening jaundice. Labs 3/26 c/v rise in bilirubin to 7 (from 2), transaminase elevation to 193 AST and 509 ALT, and ALP >1000. Denies fevers. Pt states he is always cold/chilled and has not noticed an increase/worsneing of this. Denies abdominal pain, n/v/d, dysuria, chest pain, SOB, cough or sputum production. Appetite stable. Patient stopped taking Amoxicillin BID on own when drain was capped.     Last chemo (Lewis/Cis) 12/2020 due to abscess/fluid collection and deconditioning.          Review of Systems:   CONSTITUTIONAL:  No fevers, chills or night sweats.   EYES: negative for redness, or purulent drainage. Positive for worsening icterus.  ENT:  negative for nasal congestion, rhinorrhea, or sore throat.  RESPIRATORY:  negative for cough with sputum and dyspnea.  CARDIOVASCULAR:  negative for chest pain or palpitations.  GASTROINTESTINAL:  negative for nausea, vomiting, diarrhea and  constipation.  GENITOURINARY:  negative for dysuria, frequency, or urgency.   HEME:  No easy bruising or bleeding.  INTEGUMENT:  negative for rash and pruritus. Positive for worsening jaundice.  NEURO:  Negative for headache, vision changes, or numbness/tingling in extremities.         Past Medical History:     Past Medical History:   Diagnosis Date     Cerebrovascular accident (CVA) (H) 12/2010     Cholangiocarcinoma (H)      Diabetes (H)      Essential hypertension, benign     Hypertension, Benign     Nonspecific abnormal results of liver function study     Hx of elevated LFT's            Past Surgical History:     Past Surgical History:   Procedure Laterality Date     ENDOSCOPIC RETROGRADE CHOLANGIOPANCREATOGRAM N/A 3/31/2020    Procedure: ENDOSCOPIC RETROGRADE CHOLANGIOPANCREATOGRAPHY, WITH with biliary sphincterotomy, biopsies and brushings, biliary stent placement;  Surgeon: Win Strauss MD;  Location: [facility]     ENDOSCOPIC RETROGRADE CHOLANGIOPANCREATOGRAM N/A 4/6/2020    Procedure: ENDOSCOPIC RETROGRADE CHOLANGIOPANCREATOGRAPHY;  Surgeon: Win Strauss MD;  Location: [facility]     ENDOSCOPIC RETROGRADE CHOLANGIOPANCREATOGRAM WITH SPYGLASS  4/16/2020    Procedure: ENDOSCOPIC RETROGRADE CHOLANGIOPANCREATOGRAPHY, WITH DIRECT DUCT VISUALIZATION, USING PANCREATICOBILIARY FIBEROPTIC PROBE; Bile Duct Stent Exchange and Biopsy,balloon sweep of bile duct;  Surgeon: Win Strauss MD;  Location: [facility]     ENDOSCOPIC ULTRASOUND UPPER GASTROINTESTINAL TRACT (GI) N/A 4/16/2020    Procedure: ENDOSCOPIC ULTRASOUND, ESOPHAGOSCOPY / UPPER GASTROINTESTINAL TRACT (GI)with Fine Needle biopsy;  Surgeon: Cody Baumann MD;  Location: [facility]     ENDOSCOPIC ULTRASOUND UPPER GASTROINTESTINAL TRACT (GI) N/A 8/11/2020    Procedure: ENDOSCOPIC ULTRASOUND, ESOPHAGOSCOPY / UPPER GASTROINTESTINAL TRACT (GI);  Surgeon: Guru Bailey Rossi MD;  Location: [facility]     ENTEROSCOPY SMALL BOWEL N/A  2/2/2021    Procedure: Enteroscopy small bowel;  Surgeon: Chiki Swan MD;  Location: [facility]     ESOPHAGOSCOPY, GASTROSCOPY, DUODENOSCOPY (EGD), COMBINED N/A 4/6/2020    Procedure: ESOPHAGOGASTRODUODENOSCOPY (EGD);  Surgeon: Win Strauss MD;  Location: [facility]     ESOPHAGOSCOPY, GASTROSCOPY, DUODENOSCOPY (EGD), COMBINED N/A 8/11/2020    Procedure: Esophagogastroduodenoscopy, With Fine Needle Aspiration Biopsy, With Endoscopic Ultrasound Guidance;  Surgeon: Guru Bailey Rossi MD;  Location: [facility]     EXPLORE COMMON BILE DUCT N/A 5/12/2020    Procedure: extra-hepatic bile duct resection;  Surgeon: Oswaldo Hale MD;  Location: [facility]     HC KNEE SCOPE, DIAGNOSTIC  1995    Arthroscopy, Knee; left     HC VASECTOMY UNILAT/BILAT W POSTOP SEMEN      Vasectomy     HEPATECTOMY PARTIAL N/A 5/12/2020    Procedure: Exploratory Laparotomy, Open right hepatectomy, Radical Extra-Hepatic Bile Duct Resection, Portal Lymph Node Dissection, Intraoperative Ultrasound, Intra Air-Cholangiogram ,Bianca-En-Y Left Hepaticojejunostomy, Excision Skin Lesion;  Surgeon: Oswaldo Hale MD;  Location: [facility]     INSERT PORT VASCULAR ACCESS Right 9/28/2020    Procedure: ultrasound guided right internal venous access port placement with flouroscopy;  Surgeon: Fercho Wayne DO;  Location: [facility]     IR ABSCESS TUBE CHANGE  6/12/2020     IR FOLLOW UP VISIT OUTPATIENT  7/24/2020     IR FOLLOW UP VISIT OUTPATIENT  8/3/2020     IR FOLLOW UP VISIT OUTPATIENT  10/19/2020     IR SINOGRAM INJECTION DIAGNOSTIC  7/14/2020     IR SINOGRAM INJECTION DIAGNOSTIC  9/14/2020     IR SINOGRAM INJECTION DIAGNOSTIC  10/13/2020     IR SINOGRAM INJECTION DIAGNOSTIC  3/1/2021     IR SINOGRAM INJECTION THERAPEUTIC  8/25/2020     IR SINOGRAM INJECTION THERAPEUTIC  9/25/2020     IR SINOGRAM INJECTION THERAPEUTIC  10/2/2020     IR SINOGRAM INJECTION THERAPEUTIC  9/18/2020     IR THORACENTESIS  5/16/2020     LYMPHADENECTOMY  "ABDOMINAL N/A 5/12/2020    Procedure: portal lymph node dissection, intraoperative liver ultrasound;  Surgeon: Oswaldo Hale MD;  Location:  OR            Family History:     Family History   Problem Relation Age of Onset     Arthritis Mother         RA     Diabetes Father         diet controlled     Hypertension Father             Social History:     Social History     Tobacco Use     Smoking status: Never Smoker     Smokeless tobacco: Never Used   Substance Use Topics     Alcohol use: Not Currently     Comment: occaisional      History   Sexual Activity     Sexual activity: Yes     Partners: Female            Current Medications:       dronabinol  10 mg Oral QAM     dronabinol  5 mg Oral Daily with supper     ferrous sulfate  325 mg Oral TID w/meals     magnesium gluconate  500 mg Oral Daily     multivitamin w/minerals  1 tablet Oral Daily     piperacillin-tazobactam  4.5 g Intravenous Q6H     senna-docusate  1 tablet Oral BID    Or     senna-docusate  2 tablet Oral BID     sodium chloride (PF)  10 mL Intracatheter Q8H     sodium chloride (PF)  3 mL Intracatheter Q8H            Allergies:     Allergies   Allergen Reactions     Metformin Other (See Comments)     \" I think my kidneys shut down\"            Physical Exam:   Vitals were reviewed  Patient Vitals for the past 24 hrs:   BP Temp Temp src Pulse Resp SpO2 Height Weight   03/28/21 0810 93/59 98.3  F (36.8  C) Oral 70 18 95 % -- --   03/28/21 0338 94/70 98.5  F (36.9  C) Oral 65 20 97 % -- --   03/27/21 2200 110/78 -- -- 70 -- 99 % -- --   03/27/21 2117 -- -- -- -- -- 99 % -- --   03/27/21 2116 104/64 -- -- -- -- -- -- --   03/27/21 1930 100/67 -- -- 70 19 -- -- --   03/27/21 1900 103/75 -- -- 79 28 100 % -- --   03/27/21 1604 98/63 98.1  F (36.7  C) Oral 74 16 98 % 1.854 m (6' 1\") 73 kg (161 lb)       Physical Examination:  Constitutional: Pleasant male seen lying in bed, in NAD. Did ambulate into restroom with nursing assistance while " visiting.  HEENT: NCAT, icteric sclerae, conjunctiva clear. Moist mucous membranes.  Respiratory: Non-labored breathing, good air exchange on RA. Lungs are clear to auscultation bilaterally, without wheezing, crackles or rhonchi. No cough noted.   Cardiovascular: Regular rate and rhythm with no murmur, rub or gallop.  GI: Normoactive BS. Abdomen is soft, mild distended, and non-tender to palpation. No rigidity or guarding.  Skin: Warm and dry. No rashes or lesions on exposed surfaces.  Musculoskeletal: Extremities grossly normal. No tenderness.  Neurologic: A &O x3, speech normal, answering questions appropriately. Moves all extremities spontaneously. Grossly non-focal.  Neuropsychiatric: Mentation and affect normal/appropriate.  VAD: Port is c/d/i with no erythema, drainage, or tenderness.         Laboratory Data:     Inflammatory Markers    Recent Labs   Lab Test 03/28/21  0713 03/27/21 1913 02/02/21  0632   CRP 87.0* 100.0* 14.0*       Hematology Studies    Recent Labs   Lab Test 03/28/21  0713 03/27/21 1913 03/26/21 1741 03/25/21  1020 02/26/21  1241 02/26/21  1241 02/03/21  0647 02/02/21  0632 02/01/21  1638   WBC 4.5 5.7 5.2 4.7  --  4.7 3.2* 2.9* 4.3   ANEU  --  3.9 3.6 3.2  --  2.4 1.7 1.4* 2.2   AEOS  --  0.0 0.1 0.0  --   --  0.0 0.0 0.0   HGB 8.5* 9.4* 11.4* 9.0*  --  10.4* 9.0* 8.4* 9.5*   MCV 88 86 87 87  --  92 95 93 93    224 238 172   < > 210 183 166 233    < > = values in this interval not displayed.       Metabolic Studies     Recent Labs   Lab Test 03/28/21  0713 03/27/21 1913 03/26/21 1741 02/26/21  1241 02/03/21  0647   * 129* 131* 135 136   POTASSIUM 4.1 4.1 4.2 3.7 3.6   CHLORIDE 102 96 97 102 106   CO2 27 28 29 28 26   BUN 19 22 19 10 9   CR 1.09 0.98 0.88 0.81 0.86   GFRESTIMATED 69 79 88 >90 89       Hepatic Studies    Recent Labs   Lab Test 03/28/21  0713 03/27/21  1913 03/26/21  1741 02/26/21  1241 02/03/21  0647 02/02/21  0632   BILITOTAL 5.8* 6.6* 7.4* 2.4* 2.1* 1.9*    ALKPHOS 920* 999* 1,116* 855* 602* 544*   ALBUMIN 1.3* 1.5* 1.7* 1.9* 1.7* 1.6*   * 139* 193* 135* 123* 108*   ALT 48 59 76* 38 35 30       Microbiology:  Culture Micro   Date Value Ref Range Status   03/27/2021 No growth after 8 hours  Preliminary   03/27/2021 No growth after 11 hours  Preliminary   03/11/2021 No growth  Final   02/15/2021 No anaerobes isolated  Final   02/15/2021 Culture negative after 4 weeks  Final   02/15/2021 (A)  Final    Moderate growth  Streptococcus anginosus  This organism is susceptible to ampicillin, penicillin, vancomycin and the cephalosporins.   If treatment is required AND your patient is allergic to penicillin, contact the   Microbiology Lab within 5 days to request susceptibility testing.  Testing unavailable due to 's backorder.     02/15/2021   Final    Critical Value/Significant Value called to and read back by  JOSE CRUZ COHEN @[facility] 2/18/21. [facility] [place]     02/01/2021 No growth  Final   02/01/2021 No growth  Final   11/18/2020 No growth  Final   11/18/2020   Final    No anaerobes isolated  Since this specimen was not transported in the proper anaerobic transport media, the   absence of anaerobes in this culture does not rule out the presence of anaerobes in this   specimen.     05/16/2020 Light growth  Enterococcus faecium   (A)  Final   05/16/2020   Final    Critical Value/Significant Value, preliminary result only, called to and read back by  Cody Wagner RN on 5.17.20 at 1141.      05/16/2020 No growth  Final   05/14/2020 No growth  Final   05/14/2020 No growth  Final   05/12/2020 (A)  Final    Light growth  Veillonella species  Susceptibility testing not routinely done     05/12/2020 (A)  Final    Light growth  Prevotella species  Beta lactamase positive  Susceptibility testing not routinely done     05/12/2020 Heavy growth  Enterococcus faecium   (A)  Final   05/12/2020 (A)  Final    Light growth  Streptococcus anginosus  Susceptibility testing not  routinely done     05/12/2020 (A)  Final    Light growth  Coagulase negative Staphylococcus  Susceptibility testing not routinely done         Urine Studies    Recent Labs   Lab Test 05/14/20  0158   LEUKEST Trace*   WBCU 4       Vancomycin Levels  No lab results found.    Invalid input(s): VANCO    Hepatitis B Testing No lab results found.  Hepatitis C Testing   No results found for: HCVAB, HQTG, HCGENO, HCPCR, HQTRNA, HEPRNA  Respiratory Virus Testing    No results found for: RS, FLUAG    IMAGING    3/27 US Abd  IMPRESSION:   1. Suspected intrahepatic pneumobilia, (may be due to  hepaticojejunostomy).   2. Septated ascites inferior to the liver.    3/25 CT AP w/ contrast  Impression: In this patient with a history of cholangiocarcinoma,  partial right hepatectomy, and percutaneously drained bile leak:   1. Minimal residual fluid in the area with the percutaneous drain  posterior to the liver.  2. Increase in peritoneal implants along the right peritoneal wall  demonstrated is 4.5 x 2.0 cm ill-defined mass as well as other  evidence of peritoneal nodularity. This has been increasing  (demonstrated clearly on prior CT 8/3/2020 .)  3. Large amount of ascites and extensive abdominal varices.

## 2021-03-28 NOTE — PROGRESS NOTES
03/28/21 1400   Quick Adds   Type of Visit Initial Occupational Therapy Evaluation   Living Environment   People in home spouse   Current Living Arrangements house   Home Accessibility no concerns   Transportation Anticipated family or friend will provide   Living Environment Comments Pt and wife live in a single story home. Pt reporting no PAXTON or within home. Pt's wife reporting they have walk in shower with a shower chair and grab bars near the toilet. Pt's wife works from home currently and is able to assist with ADLs. Pt enjoys fishing, riding his motor cycle, play with his granddaughter, and mowing the lawn.   Self-Care   Usual Activity Tolerance moderate   Current Activity Tolerance fair   Equipment Currently Used at Home walker, standard;shower chair;grab bar, tub/shower;grab bar, toilet;raised toilet seat   Activity/Exercise/Self-Care Comment Pt utilizies walker at baseline, Pt's wife assists with transfers and ADLs. Pt reporting increased weakness and fatigue. He sees OP Cancer rehab weekly.   Disability/Function   Hearing Difficulty or Deaf no   Wear Glasses or Blind yes   Vision Management reading glasses   Concentrating, Remembering or Making Decisions Difficulty no   Difficulty Communicating no   Difficulty Eating/Swallowing no   Walking or Climbing Stairs Difficulty yes   Walking or Climbing Stairs ambulation difficulty, requires equipment   Mobility Management   (walker)   Dressing/Bathing Difficulty yes   Dressing/Bathing bathing difficulty, requires equipment;dressing difficulty, assistance 1 person   Dressing/Bathing Management Shower chair/grab bars, wife assists with socks and shoes   Toileting issues yes   Toileting Assistance toileting difficulty, requires equipment   Doing Errands Independently Difficulty (such as shopping) yes   Errands Management wife assists    Fall history within last six months yes   Number of times patient has fallen within last six months 1   Change in Functional  Status Since Onset of Current Illness/Injury yes   General Information   Onset of Illness/Injury or Date of Surgery 03/27/21   Referring Physician Candace Watson PA-C   Patient/Family Therapy Goal Statement (OT) To go home, gain energy/strength    Existing Precautions/Restrictions fall   Cognitive Status Examination   Orientation Status orientation to person, place and time   Affect/Mental Status (Cognitive) WFL   Sensory   Sensory Comments No deficits noted   Posture   Posture forward head position   Range of Motion Comprehensive   Comment, General Range of Motion BUE WFL    Strength Comprehensive (MMT)   Comment, General Manual Muscle Testing (MMT) Assessment Not formally tested, based on observation, generalized weakness in BUEs   Coordination   Coordination Comments No deficits noted   Bed Mobility   Bed Mobility supine-sit   Supine-Sit [place] (Bed Mobility) modified independence   Transfers   Transfers sit-stand transfer;toilet transfer   Sit-Stand Transfer   Sit-Stand [place] (Transfers) supervision   Assistive Device (Sit-Stand Transfers) walker, front-wheeled   Sit/Stand Transfer Comments SBA    Toilet Transfer   Type (Toilet Transfer) sit-stand;stand-sit   [place] Level (Toilet Transfer) modified independence   Assistive Device (Toilet Transfer) grab bars/safety frame   Instrumental Activities of Daily Living (IADL)   Previous Responsibilities yardwork   IADL Comments Wife assists    Clinical Impression   Criteria for Skilled Therapeutic Interventions Met (OT) yes   OT Diagnosis Pt is below baseline for ADLs   OT Problem List-Impairments impacting ADL activity tolerance impaired;mobility;strength;pain   Assessment of Occupational Performance 1-3 Performance Deficits   Identified Performance Deficits dressing, bathing, functional endurance   Planned Therapy Interventions (OT) ADL retraining;home program guidelines;progressive activity/exercise;risk factor education;transfer  training;strengthening   Clinical Decision Making Complexity (OT) low complexity   Therapy Frequency (OT) 4x/week   Predicted Duration of Therapy 1 week    Anticipated Equipment Needs Upon Discharge (OT)   (TBD)   Risk & Benefits of therapy have been explained evaluation/treatment results reviewed;care plan/treatment goals reviewed;risks/benefits reviewed;participants voiced agreement with care plan;patient;spouse/significant other   OT Discharge Planning    OT Discharge Recommendation (DC Rec) Home with assist;home with home care occupational therapy   OT Rationale for DC Rec Pt is below baseline for IND with ADLs and functional endurance. Pt would benefit from  OT to progress independence with ADLs and activity tolerance and to provide strategies for home modifications, energy conservation, and safety.    OT Brief overview of current status  SBA + FWW

## 2021-03-28 NOTE — PLAN OF CARE
[id number] - [id number]    65 y/o M w/hx of metastatic hilar cholangiocarcinoma (dx 5/2020) w/peritoneal mets s/p hepatectomy (complicated by biliary leak - s/p drain placement - w/persistent perihepatic fluid collection); perihepatic drain placement on 2/15/21 w/sinogram 3/25 & capping of drain; hx also includes hypertension, DM2, CVA, anemia, malnutrition, hypomagnesemia. Referred to [facility] following 4-5 days of jaundice, elevated LFTs.     Vitals: Temp: 98.5  F (36.9  C) Temp src: Oral BP: 94/70 Pulse: 65   Resp: 20 SpO2: 97 % O2 Device: None (Room air)     Pain/Nausea: Denies pain & nausea.   Diet: NPO except meds.   BG orders: Q4 checks; no insulin orders at this time.   LDA: R chest port-a-cath. R biliary drain to gravity w/q8hr saline flushes. Purulence present at biliary site.   GI: No reported s/s.   : Voiding, not saving.   Skin: Mild jaundice.Thin, fragile.   Neuro: A&Ox4. Lethargic.   Mobility: Assist of 1 w/walker/IV pole.   Education: Oriented to care setting, discussed plan of care.   Plan: Imaging, labs, consults.     Of note this shift: Patient appears cachectic and is experiencing severe weakness r/t underlying disease process.

## 2021-03-28 NOTE — PROGRESS NOTES
Cross Cover Note    Cross cover provider contacted by nursing regarding if patient needs BG every 4 hours still ordered. Patient is eating a regular diet. BG have been controlled.     Will change BG to TID with meals, and hs. If NPO would resume BG checks every 4 hours.    Of note, patient was due to have MRCP this evening. Per RN, the patient went down for the MRCP, but the MRCP was terminated. RN stated the MRI tech said there was too much ascites to get accurate image. (May need paracentesis in AM). Dayteam to call MRI in AM for further discussion of this.     Please contact Medicine provider on call overnight with any additional questions or concerns.     Candace Watson PA-C  Hospitalist Service  Pager [phone]

## 2021-03-29 NOTE — PLAN OF CARE
"/73 (BP Location: Left arm)   Pulse 69   Temp 97.7  F (36.5  C) (Oral)   Resp 18   Ht 1.854 m (6' 1\")   Wt 73 kg (161 lb)   SpO2 98%   BMI 21.24 kg/m      [id number]  Hypotensive, team aware, OVSS on RA. Wife supportive at bedside. Denies pain or nausea. ACHS, blood sugars stable. Regular diet, fair appetite. Port w/NS @125ml/hr. Voiding adequately. Urine sample sent. CHG wipes done. R biliary drain to gravity, q8 flushes. No drainage.  Dsg changed x1. Up with 1 and walker. Pt went down for MRCP, unable to complete, team aware. Will continue to monitor and notify team with changes.  "

## 2021-03-29 NOTE — PROGRESS NOTES
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 Drain was uncapped on admission with progressive improvement in labs. MRCP scheduled today after paracentesis.      Metastatic hilar cholangiocarcinoma (dx 5/2020) with peritoneal mets:  - follows with Dr. Beard, oncology  - treated with cisplatin/cemcitabin 8/2020-12/9 when chemo was stopped due to abscess/fluid collection as above  - concern for progression based on CT on admission  - Oncology consulted    Jaundice  Biliary obstruction  Hx of biliary leak vs abscess s/p IR perihepatic drain placed on 2/15  Hx of Strep anginosus abdominal abscess  No evidence of cholangitis on admission. IR Uncapped his perihepatic drain on admission with progressive improvement in labs.  - Continue to trend LFTs  - GI consulted, not an ERCP candidate, recommend following the labs  - IR consulted, with improvement in labs, recommend no procedural intervention at this time, continue to trend labs with uncapped drain  - ID consulted, continue antibiotics for now- zosyn  - blood cultures x2, no growth after 2 days  - elevated CRP, though down trending  - Procalcitonin wnl   - MRCP scheduled for today 4pm    Ascites  - 2/2 malignancy  - paracentesis  today, 3800 ml of yellow colored fluid removed  - US on 3/27 with septate ascites. Given no fevers and no leukocytosis unlikely that there is active infection there causing obstruction, but it is something to consider.    Hyponatremia  - due to hypovolemia, malnutrtion, liver disease  - continue to trend    Anemia of chronic disease  - continue ferrous sulfate supplementation    HTN  - hold lisinopril (had been held at home)    Lower Extremity Edema  - likely due to liver disease  - hold lasix    Diabetes Mellitus, not insulin dependent  - MSSI as needed, was on glipizde at home, but when appetite dropped this was stopped    Malnutrition  - Nutrition consulted  - Continue PTA marinol  - Start MVI    Hx of CVA  - hold atorvastatin and plavix    Chronic Hypomagnesemia  - continue magnesium supplementation    Anxiety  - continue PTA ativan    Insomnia  Sleeplessness  - continue prior to admission ambien    Depression   Adjustment disorder  - endorses feeling of being overwhelmed and how hard the year has been.   - declined medication intervention or psychology at this time      Diet: Regular Diet Adult    DVT Prophylaxis: Pneumatic Compression Devices  Em Catheter: not present  Code Status: Full Code           Disposition Plan   Expected discharge: 2 - 3 days, recommended to prior living arrangement once adequate pain management/ tolerating PO medications.  Entered: Morenita Tompkins 03/29/2021, 2:07 PM       The patient's care was discussed with the Bedside Nurse, Care Coordinator/, Patient and ID/Hepatology/Oncology Consultant, and attending Dr. Webb.    Morenita Tompkins, MS4  Hospitalist Service, [address]  Contact information available via [facility] Paging/Directory  Please see sign in/sign out for up to date coverage information    Physician Attestation   I, Cody Webb, was present with the medical/DARA student who participated in  the service and in the documentation of the note.  I have verified the history and personally performed the physical exam and medical decision making.  I agree with the assessment and plan of care as documented in the note.      I personally reviewed vital signs, medications, labs and imaging.    66 year old male with a history of metastatic hilar cholangiocarcinoma with admission for increasing jaundice and rising LFTs. MRCP done yesterday unable to see biliary tracts due to ascites, plan for paracentesis today and MRCP after. Vitals stable, exam unchanged, labs with continued improvement in bilirubin and alk phos. Will need coordination with consultants tomorrow pending MRCP results.    Cody Webb MD  Date of Service (when I saw the patient): 03/29/21    ______________________________________________________________________    Interval History   Patient is feeling well this morning, though seems to have low spirits. He is reserved and distant. We stopped by during his paracentesis again. He was somnolent during the procedure. We discussed the plan with his wife in the room. Both are in agreement with the plan to undergo MRCP today. Patient denies any symptoms.    Otherwise 4pt ROS is negative    Data reviewed today: I reviewed all medications, new labs and imaging results over the last 24 hours. I personally reviewed no images or EKG's today.    Physical Exam   Vital Signs: Temp: 97.8  F (36.6  C) Temp src: Oral BP: 107/72 Pulse: 63   Resp: 20 SpO2: 98 % O2 Device: None (Room air)    Weight: 161 lbs 0 oz  Gen: NAD, sitting comfortably in bed, thin, slight jaundice on face and neck  Eyes: PERRLA, EOMI, conjuctiva icteric  CV: RRR, no murmurs, 2+ radial pulses  RESP: CTA bilaterally, no w/r/c  Abd: soft, nontender, distended, ascites fluid wave on exam   Ext: no edema bilaterally  Neuro: CNII-CNXII intact     Data   Recent Labs   Lab 03/29/21  0552 03/28/21  0713 03/27/21 1924 03/27/21 1913 03/25/21  1021  03/25/21  1020   WBC 3.9* 4.5  --  5.7   < > 4.7   HGB 8.0* 8.5*  --  9.4*   < > 9.0*   MCV 90 88  --  86   < > 87    170  --  224   < > 172   INR  --  1.54* 1.48*  --   --  1.58*    132*  --  129*   < >  --    POTASSIUM 3.6 4.1  --  4.1   < >  --    CHLORIDE 104 102  --  96   < >  --    CO2 25 27  --  28   < >  --    BUN 15 19  --  22   < >  --    CR 1.00 1.09  --  0.98   < >  --    ANIONGAP 5 4  --  5   < >  --    ERIC 8.1* 8.8  --  9.2   < >  --    * 84  --  124*   < >  --    ALBUMIN 1.2* 1.3*  --  1.5*   < >  --    PROTTOTAL 5.5* 5.9*  --  6.5*   < >  --    BILITOTAL 4.3* 5.8*  --  6.6*   < >  --    ALKPHOS 777* 920*  --  999*   < >  --    ALT 40 48  --  59   < >  --    AST 91* 118*  --  139*   < >  --    LIPASE  --   --   --  123  --   --     < > = values in this interval not displayed.     Recent Results (from the past 24 hour(s))   MR Abdomen w/o Contrast    Narrative    MRI ABDOMEN WITHOUT CONTRAST 3/29/2021    CLINICAL HISTORY: Jaundice; history of hilar adenocarcinoma s/p R  hepatectomy, worsening biliruin and alk phos on admission concerning  for obstruction    TECHNIQUE:     Images were acquired without intravenous contrast through the abdomen.  The following MR images were acquired: TrueFISP, multiplanar T2  weighted, axial T1 in/out of phase, axial fat-saturated T1.  Examination was terminated prior to acquisition of the remainder of  the protocol.    Comparison study: CT 3/25/2021    FINDINGS:    Limited evaluation of the upper abdomen secondary to moderate amount  of ascites resulting in dielectric and non-uniformity artifacts.    Liver: Post-surgical changes of partial right hepatectomy. The liver  surface is smooth. No focal suspicious mass. Evaluation for focal  abnormality is markedly limited.    Gallbladder: Surgically absent. No intrahepatic or extrahepatic  biliary ductal dilatation.       Spleen: Enlarged, measuring 16.1 cm in sagittal dimension.    Kidneys:  No kidney  stone, cysts or masses. No pelvocaliectasis.      Adrenal glands: Normal.     Pancreas:  Diffuse atrophy of the pancreas. No focal masses.  Pancreatic duct normal in caliber. No side branch ductal dilatation.     Bowel: Unremarkable, with no evidence of bowel dilatation. Colonic  diverticulosis without evidence of diverticulitis.      Lymph nodes: No abdominal lymphadenopathy by size criteria.    Blood vessels: Major blood vessels are patent with no evidence of clot  or obstruction. Extensive intraperitoneal varices along the anterior  abdominal wall.         Lung bases: Bilateral trace pleural effusion and associated  atelectasis. No abnormal T2 hyperintensity in the lung bases.    Bones and soft tissues: No evidence of acute bony abnormality. Mild  diffuse anasarca.    Mesentery and abdominal wall: Scattered regions of peritoneal  thickening better delineated on prior CT 3/25/2021, for example along  the right lateral peritoneal wall (series 4 image 11). Percutaneous  drain along the posterior margin of liver at the site of previous  collection. No evidence of new fluid collection.    Ascites: Moderate amount ascites throughout the abdomen and pelvis.       Impression    IMPRESSION: Incomplete examination of the upper abdomen secondary to  patient unable to tolerate exam completion.  1. Post-surgical changes of partial right hepatectomy. No focal  suspicious mass.  2. No intrahepatic or extrahepatic biliary ductal dilatation.  3. Scattered regions of peritoneal thickening better delineated on  prior CT 3/25/2021.  4. Percutaneous drain along the posterior margin of liver at the site  of previous collection. No evidence of new fluid collection.  5. Moderate amount ascites throughout the abdomen and pelvis.  Extensive intraperitoneal varices along the anterior abdominal wall.       I have personally reviewed the examination and initial interpretation  and I agree with the findings.    AUDI PARNELL MD   POC US  Guide for Paracentesis    Impression    POCUS Abdomen    Indication: Evaluate for ascites for safe site for paracentesis    Findings:  LLQ: Large ascites  Catheter tip was shown at peritoneum before entry and final position within fluid pocket of abdominal cavity.

## 2021-03-29 NOTE — PLAN OF CARE
"     Incomplete           /69   Pulse 69   Temp 98.5  F (36.9  C) (Oral)   Resp 20   Ht 1.854 m (6' 1\")   Wt 73 kg (161 lb)   SpO2 97%   BMI 21.24 kg/m          67 y/o M w/hx of metastatic hilar cholangiocarcinoma (dx 5/2020) w/peritoneal mets s/p hepatectomy (complicated by biliary leak - s/p drain placement - w/persistent perihepatic fluid collection); perihepatic drain placement on 2/15/21 w/sinogram 3/25 & capping of drain; hx also includes hypertension, DM2, CVA, anemia, malnutrition, hypomagnesemia. Referred to [facility] following 4-5 days of jaundice, elevated LFTs.     [id number]      Pain/Nausea/PRN's: Denies nausea and Pain. Slept well through the night.   Diet: Regular diet, tolerating well.   LDA: R chest Port A Cath, NS infusion 125mL/hr. R biliary drain to gravity, nothing out.   GI/:  Spontaneously voiding, not saving. BM x 1  Skin: R biliary drain dressing changed 0500   Mobility: Assist of 1 with walker   BG: ACHS. 134 at bedtime  Plan/Goals: possible MRCP or alternate imaging to be scheduled               "

## 2021-03-29 NOTE — CONSULTS
Consult and Procedure Service - Procedure Note    Attending: Dr. Buzz Bearden  Resident: Tatum Roland  Procedure: Diagnostic and therapeutic paracentesis  Indication: large volume ascites, unable to obtain MRCP in light of large volume ascites   Risk Assessment: low risk   Pre-procedure diagnosis: cholangiocarcinoma complicated by large volume ascites  Post-procedure diagnosis: cholangiocarcinoma complicated by large volume ascites    The risks and benefits of the procedure were explained to patient and his wife who expressed understanding and opted to proceed.  Consent was obtained and placed in the chart.  A time out was performed.  An area of ascites was located and marked using ultrasound guidance in the left lower quadrant; the area was prepped and draped in the usual sterile fashion.  5 ml of 1% lidocaine was instilled and ascites located.  The 5Fr paracentesis catheter and needle were inserted under real-time guidance until ascites obtained then the needle removed and the catheter advanced.  The apparatus was connected to vacuum bottles and a total of 3800 ml of yellow colored fluid removed.   A specimen was sent for analysis. The catheter was withdrawn and the area dressed.  Patient tolerated the procedure well with no immediate complications.  Please contact the Consult and Procedure Service if any complications or concerns arise.     EBL 0.5mL    Tatum Roland MD  Internal Medicine-Pediatrics, PGY4  [facility]  Pager: [phone]

## 2021-03-29 NOTE — PLAN OF CARE
OT 7A: Pt having procedure in room upon AM attempt, unable to check back in PM due to scheduling demands. Will reschedule per POC.

## 2021-03-29 NOTE — PHARMACY-ADMISSION MEDICATION HISTORY
Admission medication history interview status for the 3/27/2021 admission is complete. See Epic admission navigator for allergy information, pharmacy, prior to admission medications and immunization status.     Medication history interview sources:  Ayo Lang) via phone due to COVID-19 restrictions    Changes made to PTA medication list (reason)  Added:   -vitamin D 10,000 units by mouth daily  -lactobacillus rhamnosus, GG, capsule, 1 cap by mouth daily  -Tadalafil 10 mg by mouth daily as needed    Deleted:   -None    Changed:   -Furosemide clarified to be 20 mg by mouth every Monday, Wednesday, Friday, and Saturday morning.  -Lisinopril clarified to be 5 mg my mouth every evening.  -Magnesium gluconate clarified to be 30 mg by mouth twice daily.    Additional medication history information (including reliability of information, actions taken by pharmacist):  -Patient's wife manages his medications and is very knowledgeable of them.  -She tells me he has not used the dexamethasone since his last chemotherapy in December, 2020. She anticipates he will need further use and would like this medication kept on his list.  -She tells me that his blood pressures have been recently low and dose reductions to the furosemide and lisinopril have occurred.  -She states that the magnesium gluconate was recently increased to twice daily dosing, up from once daily dosing.   -He has not used oxycodone but has it available should it be needed.  -She tells me that he has both a supply of sildenafil and tadalfil.  He has not used either recently.      Prior to Admission medications    Medication Sig Last Dose Taking? Auth Provider   lactobacillus rhamnosus, GG, (CULTURELL) capsule Take 1 capsule by mouth daily 3/25/2021 at am Yes Unknown, Entered By History   LORazepam (ATIVAN) 0.5 MG tablet Take 1 tablet (0.5 mg) by mouth every 4 hours as needed (Anxiety, Nausea/Vomiting or Sleep) Past Week at Unknown time Yes Aydin Beard  MD Shon   magnesium gluconate 30 MG tablet Take 1 tablet (30 mg) by mouth daily  Patient taking differently: Take 30 mg by mouth 2 times daily  3/27/2021 at am Yes Aydin Beard MD   ondansetron (ZOFRAN) 8 MG tablet Take 1 tablet (8 mg) by mouth every 8 hours as needed (Nausea/Vomiting) Past Week at Unknown time Yes Aydin Beard MD   prochlorperazine (COMPAZINE) 10 MG tablet Take 0.5 tablets (5 mg) by mouth every 6 hours as needed (Nausea/Vomiting) Past Month at Unknown time Yes Aydin Beard MD   tadalafil (CIALIS) 10 MG tablet Take 10 mg by mouth daily as needed  Yes Unknown, Entered By History   vitamin D3 (CHOLECALCIFEROL) 250 mcg (19436 units) capsule Take 1 capsule by mouth daily 3/26/2021 at am Yes Unknown, Entered By History   zolpidem (AMBIEN) 10 MG tablet Take 1 tablet (10 mg) by mouth nightly as needed for sleep Past Week at Unknown time Yes Leslie Quiles CNP   amoxicillin (AMOXIL) 875 MG tablet Take 1 tablet (875 mg) by mouth 2 times daily 3/25/2021 at pm  Yuri Dale MD   atorvastatin (LIPITOR) 40 MG tablet Take 40 mg by mouth At Bedtime  3/25/2021 at pm  Reported, Patient   clopidogrel (PLAVIX) 75 MG tablet Take 75 mg by mouth At Bedtime  3/25/2021 at pm  Reported, Patient   dexamethasone (DECADRON) 4 MG tablet Take 1 tablet (4 mg) by mouth daily (with breakfast) Daily for the 3 days after chemotherapy in the morning with food More than a month at Unknown time  Leeanne Lake APRN CNP   dronabinol (MARINOL) 5 MG capsule Take 2 capsules in am and 1 capsule in pm (before meals) 3/27/2021 at am  Leeanne Lake APRN CNP   fenofibrate (TRIGLIDE/LOFIBRA) 160 MG tablet Take 160 mg by mouth At Bedtime  3/26/2021 at pm  Reported, Patient   Ferrous Sulfate (IRON) 325 (65 Fe) MG tablet Take 1 tablet by mouth 3 times daily (with meals) 3/26/2021 at pm  Hussain Irizarry,    furosemide (LASIX) 20 MG tablet Take 20 mg by mouth Every Monday, Wednesday, Friday, and  Saturday morning 3/22/2021 at am  Reported, Patient   lisinopril (ZESTRIL) 5 MG tablet Take 5 mg by mouth every evening  3/25/2021 at pm  Amanda Li APRN CNP   oxyCODONE (ROXICODONE) 5 MG tablet Take 1-2 tablets (5-10 mg) by mouth every 4 hours as needed for moderate to severe pain   Yuliet Lundberg, PAAashishC   sildenafil (REVATIO) 20 MG tablet Take 20 mg by mouth as needed   Reported, Patient         Medication history completed by:   Armando Lawrence, PharmD -- pager [phone]

## 2021-03-29 NOTE — CONSULTS
Social Work Services Progress Note    Hospital Day: 3  Collaborated with:  Patient's wife Robyn    Data:  Informed patient's wife, Robyn, asked for assistance with hotel.    Intervention:  Robyn indicated she needs to stay with the patient to be his advocate.  They arrived on Saturday and live two hours away.  Unsure of when discharge will be as they have not learned reason for current medically condition.  Patient has been dealing with medical issues for one year.  Robyn indicated she understands will be unable to receive assistance with entire hotel stay but asking for any assistance with would very helpful to them financially.    Assessment:  Writer indicated hospital could assist with two night stay at the [facility] where Robyn is currently staying.  Informed ruth Galevz, of request.    Plan:    Anticipated Disposition:  Home, no needs identified at this time.    Barriers to d/c plan:  Medically stable    Follow Up:  SW will continue to follow to assist as indicated.    NORMA Simmons, [facility]  Transplant   Pager: [phone]

## 2021-03-29 NOTE — PLAN OF CARE
"/70 (BP Location: Left arm)   Pulse 62   Temp 98.3  F (36.8  C) (Oral)   Resp 18   Ht 1.854 m (6' 1\")   Wt 73 kg (161 lb)   SpO2 98%   BMI 21.24 kg/m      Shift: 2679-4196  VS: stable on RA, afebrile  Neuro: AOx4  BG: AC/,118, 218. No insulin coverage  Labs: Wbc 3.9, Hgb 8.0  Respiratory: Lungs clear bilaterally sating well in upper 90s on RA  Pain/Nausea/PRN: Denies pain/nausea this shift  Diet: Regular diet, fair appetite  LDA: R chest port a cath infusing Nacl at 125 ml/hr R biliary drain, no output this shift.   GI/: Voiding spontaneously without difficulty. Had BM this AM  Skin: pale,Jaundiced  Mobility: Up SBA/assist of one with walker  Plan: Had paracentesis today, awaiting MRCP later today.    Will continue with POC and notify MD with changes or concerns.  "

## 2021-03-30 NOTE — PLAN OF CARE
OT 7A: Cancel - per discussion with PT, pt declined participation in therapy this AM and leaving the unit for MRI. Therapist unable to check back at a later time. Will reschedule.

## 2021-03-30 NOTE — PROGRESS NOTES
General ID Service: Follow-up Note      Patient:  Fuentes Estrada, Date of birth 1955, Medical record number [id number]  Date of Visit:  March 30, 2021  Reason for consult: jaundice         Assessment and Recommendations:       RECOMMENDATION:  1. Would discontinue empiric broad spectrum antibiotics and continue monitor off antibiotics.   2. Remaining work-up/management as per GI and heme/onc.       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 Dale in ID clinic) who presented to the hospital with 4-5 days of jaundice. He was otherwise afebrile on admission, HDS, WBC wnl, procal of 1.05, Lactate, <2, BCx 3/27 NGTD, CT AP w/ contrast 3/25 with minimal residual fluid in area of perc drain posterior to liver, increased peritoneal implants along R peritoneal wall with ill defined mass 4.5 x 2.0 cm along with other e/o peritoneal nodularity (this has been increasing since 8/2020), and large amount of ascites and abdominal varices; was started empirically on Zosyn. A paracentesis was performed with 279 WBC (7% neutrophils) similar to prior ascitic studies.    With normal wbc, no new infectious symptoms (no fevers, abdominal pain, etc), and other possible contributors to elevated CRP/Procal (malignancy, transaminitis, inflammation), acute infection is not a high suspicion at this point. GI recommended MRCP which showed  scattered peritoneal thickening, ascites, and no evidence of any new fluid collection. Abdominal drain remains in place without any significant output. Given these findings, low suspicion that any new infectious etiology is the cause of patient's jaundice; thus could discontinue empiric zosyn from an ID perspective.    Thank you for allowing us to participate in the care of this patient. ID will sign off at this time. Can call with questions.     Attestation:  Case is high risk/complexity due to metastatic cholangiocarcinoma, jaundice, recent intraabdominal fluid collection.    Lakhwinder Shah MD  Infectious Diseases     03/30/21            Interval History:      Patient remains afebrile, hemodynamically stable, denies fevers/chills, nausea/vomiting, diarrhea, abdominal pain, or diarrrhea. Jaundice improving per wife. Drain remains in place, uncapped now but still no significant output. Had MRCP done today with results noted below.         Review of Systems:   Full 8 point ROS obtained, pertinent positives and negatives as above.          Current Antimicrobials   IV zosyn         Physical Exam:   Ranges for vital signs:  Temp:  [97.8  F (36.6  C)-98.5  F (36.9  C)] 98  F (36.7  C)  Pulse:  [62-75] 64  Resp:  [14-20] 16  BP: (106-114)/(68-79) 114/79  SpO2:  [95 %-98 %] 97 %    Intake/Output Summary (Last 24 hours) at 3/30/2021 0944  Last data filed at 3/30/2021 0930  Gross per 24 hour   Intake 470 ml   Output --   Net 470 ml     Exam:  Constitutional: Pleasant male seen lying in bed, in NAD.   HEENT: NCAT, mildly icteric sclerae, conjunctiva clear. Moist mucous membranes.  Respiratory: Non-labored breathing, on RA.   Cardiovascular: Regular rate   GI:  Abdomen is soft, mild distended, and non-tender to palpation. No rigidity or guarding.  Skin: Warm and dry. No rashes or lesions on exposed surfaces.  Musculoskeletal: Extremities grossly normal. No tenderness.  Neurologic: Awake, alert,  interactive  Neuropsychiatric: Affect normal/appropriate.  VAD: Port is c/d/i with no erythema, drainage, or tenderness.         Laboratory Data:   Reviewed.        Inflammatory Markers    Recent Labs   Lab Test 03/28/21  0713 03/27/21 1913 02/02/21  0632   CRP 87.0* 100.0* 14.0*       Hematology Studies    Recent Labs   Lab Test 03/30/21  0654 03/29/21  0552 03/28/21  0713 03/27/21 1913 03/26/21  1741 03/25/21  1020 02/26/21  1241 02/26/21  1241 02/03/21  0647 02/02/21  0632 02/01/21  1638   WBC 4.0 3.9* 4.5 5.7 5.2 4.7  --  4.7 3.2* 2.9* 4.3   ANEU  --   --   --  3.9 3.6 3.2  --  2.4 1.7 1.4* 2.2   AEOS  --   --   --  0.0 0.1 0.0  --   --  0.0 0.0 0.0   HGB 8.3* 8.0* 8.5* 9.4* 11.4* 9.0*  --  10.4* 9.0* 8.4* 9.5*   MCV 89 90 88 86 87 87  --  92 95 93 93    159 170 224 238 172   < > 210 183 166 233    < > = values in this interval not displayed.       Metabolic Studies     Recent Labs   Lab Test 03/30/21  0654 03/29/21  0552 03/28/21  0713 03/27/21 1913 03/26/21  1741    134 132* 129* 131*   POTASSIUM 3.5 3.6 4.1 4.1 4.2   CHLORIDE 107 104 102 96 97   CO2 24 25 27 28 29   BUN 10 15 19 22 19   CR 0.84 1.00 1.09 0.98 0.88   GFRESTIMATED 90 77 69 79 88       Hepatic Studies    Recent Labs   Lab Test 03/30/21  0654 03/29/21  0552 03/28/21  0713 03/27/21 1913 03/26/21  1741 02/26/21  1241   BILITOTAL 4.3* 4.3* 5.8* 6.6* 7.4* 2.4*   ALKPHOS 789* 777* 920* 999* 1,116* 855*   ALBUMIN 1.2* 1.2* 1.3* 1.5* 1.7* 1.9*   * 91* 118* 139* 193* 135*   ALT 45 40 48 59 76* 38       Microbiology:    Culture Micro   Date Value Ref Range Status   03/27/2021 No growth after 3 days  Preliminary   03/27/2021 No growth after 3 days  Preliminary   03/11/2021 No growth  Final   02/15/2021 No anaerobes isolated  Final   02/15/2021 Culture negative after 4 weeks  Final   02/15/2021 (A)  Final    Moderate growth  Streptococcus anginosus  This organism is susceptible to ampicillin, penicillin, vancomycin and the  cephalosporins.   If treatment is required AND your patient is allergic to penicillin, contact the   Microbiology Lab within 5 days to request susceptibility testing.  Testing unavailable due to 's backorder.     02/15/2021   Final    Critical Value/Significant Value called to and read back by  JOSE CRUZ COHEN @1300 2/18/21. SCG     02/01/2021 No growth  Final   02/01/2021 No growth  Final       Urine Studies    Recent Labs   Lab Test 03/28/21  1555 05/14/20  0158   LEUKEST Negative Trace*   WBCU 3 4            Imaging:     3/30 MRCP  IMPRESSION:  1. Post-surgical changes of partial right hepatectomy. No focal  suspicious mass.  2. No intrahepatic or extrahepatic biliary ductal dilatation.  3. Scattered regions of peritoneal thickening better delineated on  prior CT 3/25/2021.  4. Percutaneous drain along the posterior margin of liver at the site  of previous collection. No evidence of new fluid collection.  5. Slightly decreased moderate amount ascites throughout the abdomen  and pelvis status post-paracentesis since MRI 3/28/2021. Extensive  intraperitoneal varices along the anterior abdominal wall. 6. Small  right and small-to-moderate left pleural effusions with adjacent  compressive atelectasis.      3/27 US Abd  IMPRESSION:   1. Suspected intrahepatic pneumobilia, (may be due to  hepaticojejunostomy).   2. Septated ascites inferior to the liver.     3/25 CT AP w/ contrast  Impression: In this patient with a history of cholangiocarcinoma,  partial right hepatectomy, and percutaneously drained bile leak:   1. Minimal residual fluid in the area with the percutaneous drain  posterior to the liver.  2. Increase in peritoneal implants along the right peritoneal wall  demonstrated is 4.5 x 2.0 cm ill-defined mass as well as other  evidence of peritoneal nodularity. This has been increasing  (demonstrated clearly on prior CT 8/3/2020 .)  3. Large amount of ascites and extensive abdominal varices.

## 2021-03-30 NOTE — PROGRESS NOTES
Hematology / Oncology  Daily Progress Note   Date of Service: 03/30/2021  Patient: Fuentes Estrada  MRN: [id number]  Admission Date: 3/27/2021  Hospital Day # 3  Cancer Diagnosis: Metastatic Cholangiocarcinoma   Primary Outpatient Oncologist: Dr. Beard  Current Treatment Plan: Cisplatin + Gemcitabine (last had C6D1 12/9/20)    Recommendations:   - Plan for MRCP today  - Continue IV Zosyn and supportive care     - Appreciate input from GI and IR   - Rescheduled outpatient oncology follow up 4/8/21    Assessment & Plan:   Fuentes Estrada is a 66 year old male with a history of 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     # Metastatic Cholangiocarcinoma   Please see original consult note for full oncologic history. In brief, patient was diagnosed in spring of 2020 with hilar cholangiocarcinoma. In May 2020 he underwent a radical extrahepatic bile duct resection and right hepatectomy. He had all of his disease resected with negative margins and one positive lymph node.  He had a complicated postoperative course with a bile leak and abscess that had delayed starting his adjuvant chemotherapy. On imaging in July 2020,  he appeared to be developing increasing nodularity in the resection bed and regional lymph nodes that has led to an EUS with a fine-needle aspiration of the lymph nodes on 8/11/20, demonstrating the presence of metastatic disease. Palliative intent cycle 1 Cisplatin/Gemcitabine =  "8/27/20. His abscess/billary drain was managed by IR. Sinogram identified a communication to the biliary tree.  He underwent sinograms and cavitary sclerotherapy every 2 weeks starting August 25, 2020.  Drain was removed on 11/9/2020. Admission to [facility] 10/9/20-10/11/20 with septic shock from infectious enterocolitis. Chemo delayed and restarted 10/21/20. Admission to [facility] 11/18/20-11/19/20 with cholangitis. He was seen on 12/14/2020 with an interim CT scan which showed findings concerning for abscess at the operative site along the posterior aspect of liver with gas seen in this fluid collection.  Patient was also having chills, chemotherapy was held and he was started on ciprofloxacin and Flagyl.  It was felt that aspiration is not likely to prevent recurrence of the abscess. A biliary drain is possible, but likely to be permanent. It has been concluded an endoscopic procedure is not possible. At 12/21/20 visit with Dr. Beard, antibiotic course was extended and he finished 12/31/20. Disease on 12/11/20 was stable. 2/2/21 admission for Admitted for nausea, vomiting and FTT. - Underwent ERCP for possible internal drainage 2/2; unfortunately ERCP was not feasible due to \"edematous and tortuous biliary limb obviating passage\" Biliary tract tube put in on 2/15/2021.   - CT CAP 3/25/21 shows increase in peritoneal implants along the right peritoneal wall demonstrated a 4.5 x 2.0 cm ill-defined mass as well as other evidence of peritoneal nodularity.   - GI consulted; appreciated recs   - Rescheduled outpatient DARA appointment to 4/8/21 may need to discuss restarting therapy based on CT CAP. No indication to initiate chemotherapy while inpatient.      # Elevated LFTs   # Hyperbilirubinemia   # Hx of biliary leak vs abscess s/p IR perihepatic drain placed on 2/15  Patient T bili typically around 2-3 currently elevated. Continues to have stably elevated LFTs.   - Continue to monitor daily   - Antibiotics " "per primary team     # Ascites 2/2 malignancy   MRCP originally planned for 3/28 but could not be appropriately completed d/t ascites interfering with the imaging.   - s/p paracentesis 3/29 with removal of 3400 mL     Patient's plan of care was discussed with attending physician Dr. Molina.    Thank you for the opportunity to partake in this patients plan of care. Please do not hesitate to page with questions. We will continue to follow.     Pratibha Rodriguez PA-C   Hematology/Oncology   Pager: [phone]   ___________________________________________________________________    Subjective & Interval History:    No acute events noted overnight. Fuentes was feeling well this morning. He denies any pain or discomfort. He states that he is not having any drainage from his biliary drain. He states he should have the MRCP completed today. No oncologic questions at this time.     A comprehensive review of systems was obtained and is negative other than noted here or in the HPI.       Physical Exam:    Blood pressure 100/73, pulse 68, temperature 97.4  F (36.3  C), temperature source Oral, resp. rate 16, height 1.854 m (6' 1\"), weight 73 kg (161 lb), SpO2 98 %.    General: lying in bed, no acute distress  HEENT: EOMI, MMM  Neck: supple, normal ROM  CV: RRR, normal S1/S2, no m/r/g  Resp: CTAB, no wheezing/crackles, normal respiratory effort on ambient air  GI: soft, non-tender, non-distended, bowel sounds present and normoactive  MSK: warm and well-perfused, normal tone  Skin: no rashes on limited exam, minimal jaundice noted  Neuro: Alert and interactive, moves all extremities equally, no focal deficits    Labs & Studies: I personally reviewed the following studies:  ROUTINE LABS (Last four results):  CMP  Recent Labs   Lab 03/30/21  0654 03/29/21  0552 03/28/21  0713 03/27/21  1913    134 132* 129*   POTASSIUM 3.5 3.6 4.1 4.1   CHLORIDE 107 104 102 96   CO2 24 25 27 28   ANIONGAP 5 5 4 5   * 122* 84 124*   BUN 10 15 19 " 22   CR 0.84 1.00 1.09 0.98   GFRESTIMATED 90 77 69 79   GFRESTBLACK >90 90 80 >90   ERIC 7.8* 8.1* 8.8 9.2   MAG 1.7 1.7 1.8 1.7   PHOS  --   --  2.9 2.7   PROTTOTAL 5.7* 5.5* 5.9* 6.5*   ALBUMIN 1.2* 1.2* 1.3* 1.5*   BILITOTAL 4.3* 4.3* 5.8* 6.6*   ALKPHOS 789* 777* 920* 999*   * 91* 118* 139*   ALT 45 40 48 59     CBC  Recent Labs   Lab 03/30/21  0654 03/29/21  0552 03/28/21  0713 03/27/21  1913   WBC 4.0 3.9* 4.5 5.7   RBC 2.81* 2.61* 2.84* 3.20*   HGB 8.3* 8.0* 8.5* 9.4*   HCT 25.1* 23.6* 25.0* 27.5*   MCV 89 90 88 86   MCH 29.5 30.7 29.9 29.4   MCHC 33.1 33.9 34.0 34.2   RDW 17.2* 17.2* 17.0* 16.8*    159 170 224     INR  Recent Labs   Lab 03/28/21  0713 03/27/21  1924 03/25/21  1020   INR 1.54* 1.48* 1.58*       Medications list for reference:  Current Facility-Administered Medications   Medication     bisacodyl (DULCOLAX) Suppository 10 mg     glucose gel 15-30 g    Or     dextrose 50 % injection 25-50 mL    Or     glucagon injection 1 mg     dronabinol (MARINOL) capsule 10 mg     dronabinol (MARINOL) capsule 5 mg     ferrous sulfate (FEROSUL) tablet 325 mg     lidocaine (LMX4) cream     lidocaine 1 % 0.1-1 mL     LORazepam (ATIVAN) tablet 0.5 mg     magnesium gluconate (MAGONATE) tablet 500 mg     melatonin tablet 1 mg     multivitamin w/minerals (THERA-VIT-M) tablet 1 tablet     ondansetron (ZOFRAN-ODT) ODT tab 4 mg    Or     ondansetron (ZOFRAN) injection 4 mg     piperacillin-tazobactam (ZOSYN) 4.5 g vial to attach to  mL bag     polyethylene glycol (MIRALAX) Packet 17 g     prochlorperazine (COMPAZINE) injection 5 mg    Or     prochlorperazine (COMPAZINE) tablet 5 mg    Or     prochlorperazine (COMPAZINE) suppository 12.5 mg     senna-docusate (SENOKOT-S/PERICOLACE) 8.6-50 MG per tablet 1 tablet    Or     senna-docusate (SENOKOT-S/PERICOLACE) 8.6-50 MG per tablet 2 tablet     sodium chloride (PF) 0.9% PF flush 10 mL     sodium chloride (PF) 0.9% PF flush 3 mL     sodium chloride (PF)  0.9% PF flush 3 mL     sodium chloride (PF) 0.9% PF flush 3 mL     sodium chloride 0.9% infusion     zolpidem (AMBIEN) tablet 5 mg

## 2021-03-30 NOTE — PLAN OF CARE
Pt slept well throughout the night. Plan for MRCP in the AM d/t scheduling conflicts the night before. No c/o pain. NS continues @125/ht.

## 2021-03-30 NOTE — PROGRESS NOTES
[facility]    Medicine Progress Note - Hospitalist Service, [place]       Date of Admission:  3/27/2021  Assessment & Plan       Fuentes Estrada is a 66 year old male with a history of 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 Drain was uncapped on admission with progressive improvement in labs. MRCP today.      Jaundice  Biliary obstruction  Hx of biliary leak vs abscess s/p IR perihepatic drain placed on 2/15  Hx of Strep anginosus abdominal abscess  No evidence of cholangitis on admission. IR Uncapped his perihepatic drain on admission with progressive improvement in labs.  - Continue to trend LFTs  - GI consulted, recommend following the labs, will follow up for plan after MRCP results   - IR consulted, no further procedural intervention at this time, continue to trend labs with uncapped drain  - blood cultures x2, no growth after 3 days  - elevated CRP, though down trending; procalcitonin wnl   - MRCP today (sheduling issues occurred yesterday evening)  - ID consulted, Stopping Zosyn 3/30 and will monitor    Metastatic hilar cholangiocarcinoma (dx 5/2020) with peritoneal mets:  - follows with Dr. Beard, oncology  - treated with cisplatin/cemcitabin 8/2020-12/9 when chemo was stopped due to abscess/fluid collection as above  - concern for progression based on CT on admission  - Oncology consulted    Ascites  - 2/2 malignancy  - paracentesis on  3/29 (3800 ml of yellow colored fluid removed)  - US on 3/27 with septate ascites. Given no fevers and no leukocytosis unlikely that there is active infection there causing obstruction, but it is something to consider.    Hyponatremia  - due to hypovolemia, malnutrtion, liver disease  - continue to monitor     Anemia of chronic disease  - continue ferrous sulfate supplementation    HTN  - hold lisinopril (had been held at home)    Lower Extremity Edema  - likely due to liver disease  - hold lasix    Diabetes Mellitus, not insulin dependent  - MSSI as needed, was on glipizde at home, but when appetite dropped this was stopped    Malnutrition  - Nutrition consulted  - Continue PTA marinol  - Start MVI    Hx of CVA  - hold atorvastatin and plavix    Chronic Hypomagnesemia  - continue magnesium supplementation    Anxiety  - continue PTA ativan    Insomnia  Sleeplessness  - continue prior to admission ambien    Depression   Adjustment disorder  - endorses feeling of being overwhelmed and how hard the year has been.   - declined medication intervention or psychology at this time      Diet: Regular Diet Adult    DVT Prophylaxis: Pneumatic Compression Devices  Em Catheter: not present  Code Status: Full Code           Disposition Plan   Expected discharge: 2 - 3 days, recommended to prior living arrangement once adequate pain management/ tolerating PO medications & proper GI plan to prevent further obstruction.   Entered: Morenita Tompkins 03/30/2021, 11:21 AM       The patient's care was discussed with the Bedside Nurse, Care Coordinator/, Patient and ID/Hepatology/Oncology Consultant, and attending Dr. Leonardo. .    Morenita Tompkins, MS4  Hospitalist Service, [address]  Contact information available via [facility] Paging/Directory  Please see sign in/sign out for up to date coverage information    Physician Attestation   I,  Balbir Lenoardo DO, was  present with the medical/DARA student who participated in the service and in the documentation of the note.  I have verified the history and personally performed the physical exam and medical decision making.  I agree with the assessment and plan of care as documented in the note.      I personally reviewed vital signs, medications, labs and imaging.    66 year old male with a history of metastatic hilar cholangiocarcinoma with admission for increasing jaundice and rising LFTs. MRCP done today with no new fluid collection so will stop abx and monitor. Will touch base with GI and possibly discharge tomorrow.    Balbir Leonardo, DO on 3/30/2021 at 6:44 PM    Date of Service (when I saw the patient): 03/30/21    ______________________________________________________________________    Interval History   Patient is feeling well this morning, though seems to have low spirits. He is reserved, but more conversational than our previous encounters. He really wants to go home, and is frustrated by the lack of progress with the MRCP scheduling. We discussed the plan to reassess & generate a plan with GI once MRCP results give us a better clinical picture. Patient denies any symptoms. He had no further questions or concerns.     Otherwise 4pt ROS is negative    Data reviewed today: I reviewed all medications, new labs and imaging results over the last 24 hours. I personally reviewed no images or EKG's today.    Physical Exam   Vital Signs: Temp: 97.4  F (36.3  C) Temp src: Oral BP: 100/73 Pulse: 68   Resp: 16 SpO2: 98 % O2 Device: None (Room air)    Weight: 161 lbs 0 oz  Gen: NAD, sitting comfortably in bed, thin,  jaundiced on face and neck  Eyes: EOMI, conjuctiva icteric  CV: RRR, no murmurs, 2+ radial pulses  RESP: CTA bilaterally, no w/r/c  Abd: soft, nontender, mildly distended   Ext: no edema bilaterally  Neuro: CNII-CNXII intact     Data   Recent Labs   Lab 03/30/21  0654 03/29/21  0552 03/28/21  0713 03/27/21  1924  03/27/21  1913 03/25/21  1020 03/25/21  1020   WBC 4.0 3.9* 4.5  --  5.7   < > 4.7   HGB 8.3* 8.0* 8.5*  --  9.4*   < > 9.0*   MCV 89 90 88  --  86   < > 87    159 170  --  224   < > 172   INR  --   --  1.54* 1.48*  --   --  1.58*    134 132*  --  129*   < >  --    POTASSIUM 3.5 3.6 4.1  --  4.1   < >  --    CHLORIDE 107 104 102  --  96   < >  --    CO2 24 25 27  --  28   < >  --    BUN 10 15 19  --  22   < >  --    CR 0.84 1.00 1.09  --  0.98   < >  --    ANIONGAP 5 5 4  --  5   < >  --    ERIC 7.8* 8.1* 8.8  --  9.2   < >  --    * 122* 84  --  124*   < >  --    ALBUMIN 1.2* 1.2* 1.3*  --  1.5*   < >  --    PROTTOTAL 5.7* 5.5* 5.9*  --  6.5*   < >  --    BILITOTAL 4.3* 4.3* 5.8*  --  6.6*   < >  --    ALKPHOS 789* 777* 920*  --  999*   < >  --    ALT 45 40 48  --  59   < >  --    * 91* 118*  --  139*   < >  --    LIPASE  --   --   --   --  123  --   --     < > = values in this interval not displayed.     No results found for this or any previous visit (from the past 24 hour(s)).[id number]}

## 2021-03-30 NOTE — CONSULTS
Care Management Follow Up    Length of Stay (days): 3    Expected Discharge Date: 03/31/21     Concerns to be Addressed: all concerns addressed in this encounter     Patient plan of care discussed at interdisciplinary rounds: Yes    Anticipated Discharge Disposition: Home with assist     Anticipated Discharge Services: IMM  Anticipated Discharge DME: TBD    Patient/family educated on Medicare website which has current facility and service quality ratings: yes  Education Provided on the Discharge Plan: yes  Patient/Family in Agreement with the Plan: yes    Referrals Placed by CM/SW: External Care Coordination  Private pay costs discussed: Not applicable    Additional Information:  DEX met with patient's wife Robyn at bedside. Patient was sleeping throughout our meeting. Robyn would like a HCD and knows that if patient completes during his hospital stay it can be notarized. DEX provided SW phone number if there were any further questions or concerns. Robyn thanked DEX for the help with paying for the 2 nights at the hotel.     NORMA Ramsey, STACEY  7A   Ph: [phone]  Pager: [phone]

## 2021-03-30 NOTE — PLAN OF CARE
"/69 (BP Location: Left arm)   Pulse 68   Temp 97.6  F (36.4  C) (Oral)   Resp 16   Ht 1.854 m (6' 1\")   Wt 73 kg (161 lb)   SpO2 97%   BMI 21.24 kg/m      Shift: 4814-9576  VS: stable on RA, afebrile  Neuro: AOx4  BG: AC/, 174, and 171 no insulin coverage required.  Labs: Hbg 8.4  Respiratory: Lungs clear bilaterally, SATs in upper 90s on RA  Pain/Nausea/PRN: Denies pain and nausea  Diet: Regular diet, fair appetite  LDA: R chest port a cath infusing NS at 125 ml/hr, RUQ  Biliary stent drain, no output  GI/: Voiding spontaneously without difficulty, BM today  Skin: Pale, jaundiced  Mobility: Assist of one/SBA with walker.  Changes: IV antibiotics discontinued.  Plan: Had MRCP this AM. Awaiting team's decision.    Will continue with POC and notify MD with changes or concerns.  "

## 2021-03-31 NOTE — DISCHARGE SUMMARY
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  Ascites  Hyponatremia  Anemia of chronic disease  HTN  Lower Extremity Edema  Diabetes Mellitus, not insulin dependent  Malnutrition  Chronic Hypomagnesemia  Anxiety  Insomnia  Sleeplessness   Depression   Adjustment disorder      Follow-ups Needed After Discharge      Please get a CMP on 4/1 we will follow these results     Please follow up with Oncology as scheduled on 4/8, please get another CMP on 4/7 prior to this    Unresulted Labs Ordered in the Past 30 Days of this Admission     Date and Time Order Name Status Description    3/27/2021 1852 Blood culture Preliminary     3/27/2021 1852 Blood culture Preliminary       These results will be followed up by medicine Gold 9.     Discharge Disposition   Discharged to home  Condition at discharge: Stable    Hospital Course    Fuentes Estrada is a 66 year old male with a history of metastatic hilar cholangiocarcinoma (5/2020) complicated by peritoneal metastasis s/p R hepatectomy w/ radical extrahepatic bile duct resection, protal LN resection, Bianca-en-Y hepaticojejunostopy to left hepatic duct c/b recent biliary leak and biloma s/p drain placement (removed 11/2020) on prolonged antibiotic therapy with IR drain placement on 2/15, with singogram on 3/25 with subsequent capping of IR drain who presented with worsening jaundice and labs concerning for rise in bilirubin & alkaline phosphatase. Drain was uncapped on admission with progressive improvement in labs, though no external drainage was observed. Further extensive work up involving multiple specialists including: Interventional radiology, hematology/oncology, infectious disease,  and gastroenterology was performed (see below). After several conversations with specialists, the patient, and his wife, Robyn, the patient's wishes to go home with close follow up are reasonable.     Jaundice  Biliary obstruction?  Hx of biliary leak vs abscess s/p IR perihepatic drain placed on 2/15  Hx of Strep anginosus abdominal abscess  No evidence of cholangitis on admission. IR Uncapped his perihepatic drain on admission with progressive improvement in labs, though no drainage was actually observed to be excreted so question if this was just an incidental improvement. MRCP equivocal. Given his absent findings of infection antibiotics were stopped and he remained stable. After discussion with GI, IR, and hematology/oncology it was felt ok for patient to discharge with follow up on 4/8. He will keep the drain uncapped.  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 Beard, oncology  - treated with cisplatin/cemcitabin 8/2020-12/9 when chemo was stopped due to abscess/fluid collection as above  - Oncology consulted inpatient, appreciate recommendations   -Concern for progression based on CT on admission  -No indication to initiate chemotherapy while inpatient  -Outpatient oncology follow up 4/8/21     Ascites  - 2/2 malignancy  - paracentesis on 3/29 (3800 ml of yellow colored fluid removed)  - US on 3/27 with septate ascites  -ID consulted- Given no fevers and no leukocytosis unlikely that there is active infection there causing  obstruction, this was considered.     Hyponatremia- resolved  - due to hypovolemia, malnutrtion, liver disease  - continue to monitor      Anemia of chronic disease  - continue ferrous sulfate supplementation     HTN- resolving   - hold lisinopril      Lower Extremity Edema- resolved  - likely due to liver disease  - hold lasix     Diabetes Mellitus, not insulin dependent  - MSSI as needed, was on glipizde at home, but when appetite dropped this was stopped     Malnutrition  - Nutrition consulted  - Continue PTA marinol  - Start MVI     Hx of CVA  - hold atorvastatin and plavix     Chronic Hypomagnesemia  - continue magnesium supplementation     Anxiety  - continue PTA ativan     Insomnia  Sleeplessness  - continue prior to admission ambien     Depression   Adjustment disorder  - declined medication intervention or psychology at this time          Consultations This Hospital Stay   GI PANCREATICOBILIARY ADULT IP CONSULT  INFECTIOUS DISEASE GENERAL ADULT IP CONSULT  ONCOLOGY ADULT IP CONSULT  INTERVENTIONAL RADIOLOGY ADULT/PEDS IP CONSULT  PHYSICAL THERAPY ADULT IP CONSULT  OCCUPATIONAL THERAPY ADULT IP CONSULT  NUTRITION SERVICES ADULT IP CONSULT  CARE MANAGEMENT / SOCIAL WORK IP CONSULT  CARE MANAGEMENT / SOCIAL WORK IP CONSULT  INTERNAL MEDICINE PROCEDURE TEAM ADULT IP CONSULT [place] - [facility]  MEDICATION HISTORY IP PHARMACY CONSULT  CARE MANAGEMENT / SOCIAL WORK IP CONSULT    Code Status   Full Code    Time Spent on this Encounter   IBalbir DO, personally saw the patient today and spent greater than 30 minutes discharging this patient.       Balbir Leonardo DO, MS4  [facility] [address] [address]  Phone: [phone]  ______________________________________________________________________    Physical Exam   Vital Signs: Temp: 98.3  F (36.8  C) Temp src: Oral BP: 124/80 Pulse: 62   Resp: 16 SpO2: 100 % O2 Device: None (Room air)    Weight: 161  lbs 0 oz  Gen: NAD, resting comfortably in bed, thin,  jaundiced  Eyes: EOMI, conjuctiva icteric  CV: RRR, no murmurs, 2+ radial pulses  RESP: CTA bilaterally, no w/r/c  Abd: soft, nontender, mildly distended, drain site c/d/i   Ext: no edema bilaterally  Neuro: CNII-CNXII grossly intact          Primary Care Physician   Tolu Noland    Discharge Orders      Comprehensive metabolic panel     Comprehensive metabolic panel     Home Care OT Referral for Hospital Discharge      Home Care PT Referral for Hospital Discharge      MD face to face encounter    Documentation of Face to Face and Certification for Home Health Services    I certify that patient: Fuentes Estrada is under my care and that I, or a nurse practitioner or physician's assistant working with me, had a face-to-face encounter that meets the physician face-to-face encounter requirements with this patient on: 3/28/2021.    This encounter with the patient was in whole, or in part, for the following medical condition, which is the primary reason for home health care: Abnormal labs and possible biliary obstruction.    I certify that, based on my findings, the following services are medically necessary home health services: Occupational Therapy and Physical Therapy.    My clinical findings support the need for the above services because: Occupational Therapy Services are needed to assess and treat cognitive ability and address ADL safety due to impairment in dressing, bathing, and functional endurance and Physical Therapy Services are needed to assess and treat the following functional impairments: balance, strength, and gait.    Further, I certify that my clinical findings support that this patient is homebound (i.e. absences from home require considerable and taxing effort and are for medical reasons or [employer] services or infrequently or of short duration when for other reasons) because: Leaving home is medically contraindicated for the following  reason(s): Infection risk / immunocompromised state where it is safer for them to receive services in the home...    Based on the above findings. I certify that this patient is confined to the home and needs intermittent skilled nursing care, physical therapy and/or speech therapy.  The patient is under my care, and I have initiated the establishment of the plan of care.  This patient will be followed by a physician who will periodically review the plan of care.  Physician/Provider to provide follow up care: Tolu Noland    Attending hospital physician (the Medicare certified [place] provider): Eloisa Del Valle, JASMIN  Physician Signature: See electronic signature associated with these discharge orders.  Date: 3/28/2021     Reason for your hospital stay    You were in the hospital due to rising in liver enzymes. This was felt to possibly be due to capping your drain or progression of your malignancy     Adult [facility] Follow-up and recommended labs and tests    Please follow up with oncology as scheduled on 4/8, they will help you schedule further follow ups with IR as needed    We are ordering a CMP tomorrow and next week to follow your liver enzymes.     Appointments on [place] and/or [facility] (with [facility] or [facility] provider or service). Call [phone] if you haven't heard regarding these appointments within 7 days of discharge.     Activity    Your activity upon discharge: activity as tolerated     Tubes and drains    You are going home with the following tubes or drains: Right abdominal drain.  Follow these instructions please flush with 5 mL twice a day as previously instructed to care for your tube     Discharge Instructions    It is ok to stop your amoxicillin as we do not feel there is an infection anymore     We are ordering a CMP to follow your liver enzymes and it is ok if they are getting higher. Please call your oncologist or return to the hospital if you start to develop new fevers or worsening  abdominal pain    We are ordering another CMP to get on 4/7 before your oncology appointment on 4/8     Diet    Follow this diet upon discharge: Orders Placed This Encounter      Regular Diet Adult       Significant Results and Procedures   Results for orders placed or performed during the hospital encounter of 03/27/21   Abdomen US, limited (RUQ only)    Narrative    EXAMINATION: US ABDOMEN LIMITED  3/27/2021 8:38 PM      CLINICAL HISTORY: History of cholangiocarcinoma, S?P right  hepatectomy. Rising bilirubin, Rising bilirubin    COMPARISON: Abdominal CT 3/25/2021        FINDINGS:  Hepatic artery is patent with normal resistive index and peak systolic  velocity of 71 cm/s. The portal vein is patent with antegrade flow.    The liver is normal in contour and echogenicity. The liver measures 15  cm in length. There is no intrahepatic biliary ductal dilatation.  (History right hepatectomy and left hepaticojejunostomy.) There is  dirty shadowing within the liver suggestive of intrahepatic  pneumobilia.    There is septated ascites inferior to the liver.    The right kidney measures 10.9 cm in length no visible hydronephrosis.      Impression    IMPRESSION:   1. Suspected intrahepatic pneumobilia, (may be due to  hepaticojejunostomy).   2. Septated ascites inferior to the liver.    I have personally reviewed the examination and initial interpretation  and I agree with the findings.    JANET SCHWAB MD   POC US Guide for Paracentesis    Impression    POCUS Abdomen    Indication: Evaluate for ascites for safe site for paracentesis    Findings:  LLQ: Large ascites  Catheter tip was shown at peritoneum before entry and final position within fluid pocket of abdominal cavity.      MR Abdomen MRCP w/o & w Contrast    Narrative    MRI ABDOMEN WITH AND WITHOUT CONTRAST 3/30/2021    CLINICAL HISTORY: Jaundice; history of hilar adenocarcinoma s/p R  hepatectomy, worsening biliruin and alk phos on admission concerning  for  obstruction    TECHNIQUE:  Images were acquired with and without intravenous contrast  through the abdomen. The following MR images were acquired: TrueFISP,  multiplanar T2 weighted, axial T1 in/out of phase, axial fat-saturated  T1, diffusion-weighted. Multiplanar T1-weighted images with fat  saturation were before contrast administration and at multiple time  points following the administration of intravenous contrast. Contrast  dose: Gadavist 7 mL    Comparison study: CT 3/25/2021 and MRI 3/28/2021    FINDINGS:    Liver: Post-surgical changes of partial right hepatectomy. The liver  surface is smooth. No focal suspicious mass. No significant hepatic  steatosis or iron deposition.    Gallbladder: Surgically absent. No intrahepatic or extrahepatic  biliary ductal dilatation upstream of the hepaticojejunostomy.       Spleen: Enlarged, measuring 16 cm in sagittal dimension. No focal  mass.    Kidneys:  No kidney stone, cysts or masses. No pelvocaliectasis.      Adrenal glands: Normal.     Pancreas:  Diffuse atrophy of the pancreas. No focal masses.  Pancreatic duct normal in caliber. No side branch ductal dilatation.     Bowel: Unremarkable, with no evidence of bowel dilatation. Colonic  diverticulosis without evidence of diverticulitis.      Lymph nodes: No abdominal lymphadenopathy by size criteria.    Blood vessels: Major blood vessels are patent with no evidence of clot  or obstruction. Extensive intraperitoneal varices along the anterior  abdominal wall.         Lung bases: Small right and small to moderate left pleural effusions  with associated atelectasis. No abnormal T2 hyperintensity in the lung  bases.    Bones and soft tissues: No evidence of acute bony abnormality. Mild  diffuse anasarca.     Mesentery and abdominal wall: Scattered regions of peritoneal  thickening better delineated on prior CT 3/25/2021, for example along  the right lateral peritoneal wall (series 8 image 30). Percutaneous  drain along  the posterior margin of liver at the site of previous  collection. No evidence of new fluid collection.    Ascites: Slightly decreased moderate amount ascites throughout the  abdomen and pelvis status post-paracentesis.       Impression    IMPRESSION:  1. Post-surgical changes of partial right hepatectomy. No focal  suspicious mass.  2. No intrahepatic or extrahepatic biliary ductal dilatation.  3. Scattered regions of peritoneal thickening better delineated on  prior CT 3/25/2021.  4. Percutaneous drain along the posterior margin of liver at the site  of previous collection. No evidence of new fluid collection.  5. Slightly decreased moderate amount ascites throughout the abdomen  and pelvis status post-paracentesis since MRI 3/28/2021. Extensive  intraperitoneal varices along the anterior abdominal wall. 6. Small  right and small-to-moderate left pleural effusions with adjacent  compressive atelectasis.    I have personally reviewed the examination and initial interpretation  and I agree with the findings.    ASHLEY HARRINGTON, DO   MR Abdomen w/o Contrast    Narrative    MRI ABDOMEN WITHOUT CONTRAST 3/29/2021    CLINICAL HISTORY: Jaundice; history of hilar adenocarcinoma s/p R  hepatectomy, worsening biliruin and alk phos on admission concerning  for obstruction    TECHNIQUE:     Images were acquired without intravenous contrast through the abdomen.  The following MR images were acquired: TrueFISP, multiplanar T2  weighted, axial T1 in/out of phase, axial fat-saturated T1.  Examination was terminated prior to acquisition of the remainder of  the protocol.    Comparison study: CT 3/25/2021    FINDINGS:    Limited evaluation of the upper abdomen secondary to moderate amount  of ascites resulting in dielectric and non-uniformity artifacts.    Liver: Post-surgical changes of partial right hepatectomy. The liver  surface is smooth. No focal suspicious mass. Evaluation for focal  abnormality is markedly  limited.    Gallbladder: Surgically absent. No intrahepatic or extrahepatic  biliary ductal dilatation.       Spleen: Enlarged, measuring 16.1 cm in sagittal dimension.    Kidneys:  No kidney stone, cysts or masses. No pelvocaliectasis.      Adrenal glands: Normal.     Pancreas:  Diffuse atrophy of the pancreas. No focal masses.  Pancreatic duct normal in caliber. No side branch ductal dilatation.     Bowel: Unremarkable, with no evidence of bowel dilatation. Colonic  diverticulosis without evidence of diverticulitis.      Lymph nodes: No abdominal lymphadenopathy by size criteria.    Blood vessels: Major blood vessels are patent with no evidence of clot  or obstruction. Extensive intraperitoneal varices along the anterior  abdominal wall.         Lung bases: Bilateral trace pleural effusion and associated  atelectasis. No abnormal T2 hyperintensity in the lung bases.    Bones and soft tissues: No evidence of acute bony abnormality. Mild  diffuse anasarca.    Mesentery and abdominal wall: Scattered regions of peritoneal  thickening better delineated on prior CT 3/25/2021, for example along  the right lateral peritoneal wall (series 4 image 11). Percutaneous  drain along the posterior margin of liver at the site of previous  collection. No evidence of new fluid collection.    Ascites: Moderate amount ascites throughout the abdomen and pelvis.       Impression    IMPRESSION: Incomplete examination of the upper abdomen secondary to  patient unable to tolerate exam completion.  1. Post-surgical changes of partial right hepatectomy. No focal  suspicious mass.  2. No intrahepatic or extrahepatic biliary ductal dilatation.  3. Scattered regions of peritoneal thickening better delineated on  prior CT 3/25/2021.  4. Percutaneous drain along the posterior margin of liver at the site  of previous collection. No evidence of new fluid collection.  5. Moderate amount ascites throughout the abdomen and pelvis.  Extensive  intraperitoneal varices along the anterior abdominal wall.       I have personally reviewed the examination and initial interpretation  and I agree with the findings.    AUDI PARNELL MD       Discharge Medications   Current Discharge Medication List      START taking these medications    Details   sodium chloride, PF, 0.9% PF flush 10 mLs by Intracatheter route every 12 hours  Qty: 420 mL, Refills: 1    Comments: Please dispense as 10 mL syringes for drain flush  Associated Diagnoses: Cholangiocarcinoma (H); Intra-abdominal abscess (H)         CONTINUE these medications which have NOT CHANGED    Details   lactobacillus rhamnosus, GG, (CULTURELL) capsule Take 1 capsule by mouth daily      LORazepam (ATIVAN) 0.5 MG tablet Take 1 tablet (0.5 mg) by mouth every 4 hours as needed (Anxiety, Nausea/Vomiting or Sleep)  Qty: 30 tablet, Refills: 5    Associated Diagnoses: Hilar cholangiocarcinoma (H)      magnesium gluconate 30 MG tablet Take 1 tablet (30 mg) by mouth daily  Qty: 90 tablet, Refills: 3    Associated Diagnoses: Hilar cholangiocarcinoma (H)      ondansetron (ZOFRAN) 8 MG tablet Take 1 tablet (8 mg) by mouth every 8 hours as needed (Nausea/Vomiting)  Qty: 30 tablet, Refills: 5    Associated Diagnoses: Hilar cholangiocarcinoma (H)      prochlorperazine (COMPAZINE) 10 MG tablet Take 0.5 tablets (5 mg) by mouth every 6 hours as needed (Nausea/Vomiting)  Qty: 30 tablet, Refills: 5    Associated Diagnoses: Hilar cholangiocarcinoma (H)      tadalafil (CIALIS) 10 MG tablet Take 10 mg by mouth daily as needed      vitamin D3 (CHOLECALCIFEROL) 250 mcg (16652 units) capsule Take 1 capsule by mouth daily      zolpidem (AMBIEN) 10 MG tablet Take 1 tablet (10 mg) by mouth nightly as needed for sleep  Qty: 30 tablet, Refills: 0    Associated Diagnoses: Hilar cholangiocarcinoma (H)      atorvastatin (LIPITOR) 40 MG tablet Take 40 mg by mouth At Bedtime       clopidogrel (PLAVIX) 75 MG tablet Take 75 mg by mouth At Bedtime  "      dexamethasone (DECADRON) 4 MG tablet Take 1 tablet (4 mg) by mouth daily (with breakfast) Daily for the 3 days after chemotherapy in the morning with food  Qty: 3 tablet, Refills: 1    Associated Diagnoses: Cholangiocarcinoma (H)      dronabinol (MARINOL) 5 MG capsule Take 2 capsules in am and 1 capsule in pm (before meals)  Qty: 90 capsule, Refills: 1    Associated Diagnoses: Hilar cholangiocarcinoma (H)      fenofibrate (TRIGLIDE/LOFIBRA) 160 MG tablet Take 160 mg by mouth At Bedtime       Ferrous Sulfate (IRON) 325 (65 Fe) MG tablet Take 1 tablet by mouth 3 times daily (with meals)  Qty: 180 tablet, Refills: 1    Associated Diagnoses: Iron deficiency anemia due to chronic blood loss      furosemide (LASIX) 20 MG tablet Take 20 mg by mouth Every Monday, Wednesday, Friday, and Saturday morning      lisinopril (ZESTRIL) 5 MG tablet Take 5 mg by mouth every evening       oxyCODONE (ROXICODONE) 5 MG tablet Take 1-2 tablets (5-10 mg) by mouth every 4 hours as needed for moderate to severe pain  Qty: 20 tablet, Refills: 0    Associated Diagnoses: Cholangiocarcinoma (H)      sildenafil (REVATIO) 20 MG tablet Take 20 mg by mouth as needed         STOP taking these medications       amoxicillin (AMOXIL) 875 MG tablet Comments:   Reason for Stopping:             Allergies   Allergies   Allergen Reactions     Metformin Other (See Comments)     \" I think my kidneys shut down\"     "

## 2021-03-31 NOTE — PLAN OF CARE
7A - Pt declining due to getting ready for discharge. Per pt and wife, has walker at home, plan for home therapy to get set-up, no further questions/concerns at this time.

## 2021-03-31 NOTE — PLAN OF CARE
OT: Pt declined therapy when OT attempted in AM. OT will check back as available/appropriate or reschedule for 4/1.

## 2021-03-31 NOTE — PROGRESS NOTES
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     Percutaneous perihepatic drain placed 2/15/21 (drain is located in the posterior margin of the liver at site of prior biloma, not in the biliary tree)  for recurrent bile collection after ERCP 2/2021 not technically feasible (edematous and tortuous hepatobiliary limb). Sinogram 3/25 noted minimal fluid collection so drain was capped. However, patient subsequently presented 3/26/21 with jaundice noted ot have elevated bilirubin and ALP concerning or obstruction. GI recommended IR involvement for management due to limited utility of any future ERCP for decompression. IR consulted 3/28 and recommended uncapping present drain, subsequently patient's Bilirubin and ALP have improved and now stable (T Bili ~ 4.3, ALP 780s), patient afebrile, zosyn stopped 3/30 per ID. MRCP done and unchanged. GI will sign off, additional management per IR.    Addendum: LFTs resulted later this morning, T Bili increased again from 4.3 -> 5.8. Primary team again questioning if potential inadequate biliary drainage. As per prior discussions, endoscopic management for drainage of the biliary tree is not technically feasible. Recommend further discussions with IR regarding consideration for PTC (placement of percutaneous biliary drain in addition to drain alongside the liver at site of prior biloma).     RECOMMENDATIONS:  -- Ongoing management of drain per IR, if concern for inadequate biliary drainage may consider PTC  per IR discretion  -- Follow up direct bilirubin (added on for you)  -- Follow up recommendations:    --  No outpatient GI clinic follow-up indicated. Follow up with Heme/onc after discharge.    -- Inpatient GI consults service will sign off. No further recommendations at this time. If primary team has addition questions, please page consult fellow listed in [place].    Patient and plan discussed with Dr. Arellano.     Joanne Fofana MD  GI Fellow, PGY-6  P: [phone]    ==============  S: Minimal output from RUQ biliary drain overnight. Patient remains afebrile.    MRCP:  FINDINGS:     Liver: Post-surgical changes of partial right hepatectomy. The liver  surface is smooth. No focal suspicious mass. No significant hepatic  steatosis or iron deposition.     Gallbladder: Surgically absent. No intrahepatic or extrahepatic  biliary ductal dilatation upstream of the hepaticojejunostomy.       Spleen: Enlarged, measuring 16 cm in sagittal dimension. No focal  mass.     Kidneys:  No kidney stone, cysts or masses. No pelvocaliectasis.       Adrenal glands: Normal.      Pancreas:  Diffuse atrophy of the pancreas. No focal masses.  Pancreatic duct normal in caliber. No side branch ductal dilatation.      Bowel: Unremarkable, with no evidence of bowel dilatation. Colonic  diverticulosis without evidence of diverticulitis.       Lymph nodes: No abdominal lymphadenopathy by size criteria.     Blood vessels: Major blood vessels are patent with no evidence of clot  or obstruction. Extensive intraperitoneal varices along the anterior  abdominal wall.         Lung bases: Small right and small to moderate left pleural effusions  with associated atelectasis. No abnormal T2 hyperintensity in the lung  bases.     Bones and soft tissues: No evidence of acute bony abnormality. Mild  diffuse anasarca.      Mesentery and abdominal wall: Scattered regions of peritoneal  thickening better delineated on prior CT 3/25/2021, for example along  the  right lateral peritoneal wall (series 8 image 30). Percutaneous  drain along the posterior margin of liver at the site of previous  collection. No evidence of new fluid collection.     Ascites: Slightly decreased moderate amount ascites throughout the  abdomen and pelvis status post-paracentesis.                                                                       IMPRESSION:  1. Post-surgical changes of partial right hepatectomy. No focal  suspicious mass.  2. No intrahepatic or extrahepatic biliary ductal dilatation.  3. Scattered regions of peritoneal thickening better delineated on  prior CT 3/25/2021.  4. Percutaneous drain along the posterior margin of liver at the site  of previous collection. No evidence of new fluid collection.  5. Slightly decreased moderate amount ascites throughout the abdomen  and pelvis status post-paracentesis since MRI 3/28/2021. Extensive  intraperitoneal varices along the anterior abdominal wall. 6. Small  right and small-to-moderate left pleural effusions with adjacent  compressive atelectasis.

## 2021-03-31 NOTE — PROGRESS NOTES
Hematology / Oncology  Daily Progress Note   Date of Service: 03/31/2021  Patient: Fuentes Estrada  MRN: [id number]  Admission Date: 3/27/2021  Hospital Day # 4  Cancer Diagnosis: Metastatic Cholangiocarcinoma   Primary Outpatient Oncologist: Dr. 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     # Metastatic Cholangiocarcinoma   Please see original consult note for full oncologic history. In brief, patient was diagnosed in spring of 2020 with hilar cholangiocarcinoma. In May 2020 he underwent a radical extrahepatic bile duct resection and right hepatectomy. He had all of his disease resected with negative margins and one positive lymph node.  He had a complicated postoperative course with a bile leak and abscess that had delayed starting his adjuvant chemotherapy. On imaging in July 2020,  he appeared to be developing increasing nodularity in the resection bed and regional lymph nodes that has led to an EUS with a fine-needle aspiration of the lymph nodes on 8/11/20, demonstrating the presence of metastatic disease. Palliative intent cycle 1 Cisplatin/Gemcitabine = 8/27/20. His abscess/billary drain was managed by  "IR. Sinogram identified a communication to the biliary tree.  He underwent sinograms and cavitary sclerotherapy every 2 weeks starting August 25, 2020.  Drain was removed on 11/9/2020. Admission to [facility] 10/9/20-10/11/20 with septic shock from infectious enterocolitis. Chemo delayed and restarted 10/21/20. Admission to [facility] 11/18/20-11/19/20 with cholangitis. He was seen on 12/14/2020 with an interim CT scan which showed findings concerning for abscess at the operative site along the posterior aspect of liver with gas seen in this fluid collection.  Patient was also having chills, chemotherapy was held and he was started on ciprofloxacin and Flagyl.  It was felt that aspiration is not likely to prevent recurrence of the abscess. A biliary drain is possible, but likely to be permanent. It has been concluded an endoscopic procedure is not possible. At 12/21/20 visit with Dr. Beard, antibiotic course was extended and he finished 12/31/20. Disease on 12/11/20 was stable. 2/2/21 admission for Admitted for nausea, vomiting and FTT. - Underwent ERCP for possible internal drainage 2/2; unfortunately ERCP was not feasible due to \"edematous and tortuous biliary limb obviating passage\" Biliary tract tube put in on 2/15/2021.   - CT CAP 3/25/21 shows increase in peritoneal implants along the right peritoneal wall demonstrated a 4.5 x 2.0 cm ill-defined mass as well as other evidence of peritoneal nodularity.   - GI consulted; appreciated recs   - Rescheduled outpatient [facility] appointment to 4/8/21 may need to discuss restarting therapy based on CT CAP. No indication to initiate chemotherapy while inpatient.      # Elevated LFTs   # Hyperbilirubinemia   # Hx of biliary leak vs abscess s/p IR perihepatic drain placed on 2/15  Patient T bili typically around 2-3 currently elevated. Continues to have stably elevated LFTs.   - Continue to monitor daily   - Antibiotics per primary team     # Ascites 2/2 malignancy   MRCP " originally planned for 3/28 but could not be appropriately completed d/t ascites interfering with the imaging.   - s/p paracentesis 3/29 with removal of 3400 mL     Patient's plan of care was discussed with attending physician Dr. Molina.    Thank you for the opportunity to partake in this patients plan of care. Please do not hesitate to page with questions. We will continue to follow.     Pratibha Rodriguez PA-C   Hematology/Oncology   Pager: [phone]   ___________________________________________________________________    Subjective & Interval History:    No acute events noted overnight. Fuentes was doing ok today frustrated with being in the hospital. Talked extensively with wife Robyn regarding the rise in patient's LFTs and T bili and possible causes. Agree with primary team's plan of reaching out to IR to assess drain. Will continue to monitor labs daily. All questions answered at this time.    A comprehensive review of systems was obtained and is negative other than noted here or in the HPI.     Labs & Studies: I personally reviewed the following studies:  ROUTINE LABS (Last four results):  CMP  Recent Labs   Lab 03/31/21  0934 03/30/21  0654 03/29/21  0552 03/28/21  0713 03/27/21  1913    136 134 132* 129*   POTASSIUM 3.6 3.5 3.6 4.1 4.1   CHLORIDE 109 107 104 102 96   CO2 22 24 25 27 28   ANIONGAP 5 5 5 4 5   * 113* 122* 84 124*   BUN 8 10 15 19 22   CR 0.72 0.84 1.00 1.09 0.98   GFRESTIMATED >90 90 77 69 79   GFRESTBLACK >90 >90 90 80 >90   ERIC 8.0* 7.8* 8.1* 8.8 9.2   MAG  --  1.7 1.7 1.8 1.7   PHOS  --   --   --  2.9 2.7   PROTTOTAL 5.9* 5.7* 5.5* 5.9* 6.5*   ALBUMIN 1.3* 1.2* 1.2* 1.3* 1.5*   BILITOTAL 5.8* 4.3* 4.3* 5.8* 6.6*   ALKPHOS 827* 789* 777* 920* 999*   * 107* 91* 118* 139*   ALT 50 45 40 48 59     CBC  Recent Labs   Lab 03/31/21  0934 03/30/21  0654 03/29/21  0552 03/28/21  0713   WBC 3.9* 4.0 3.9* 4.5   RBC 2.87* 2.81* 2.61* 2.84*   HGB 8.6* 8.3* 8.0* 8.5*   HCT 25.7* 25.1*  23.6* 25.0*   MCV 90 89 90 88   MCH 30.0 29.5 30.7 29.9   MCHC 33.5 33.1 33.9 34.0   RDW 17.1* 17.2* 17.2* 17.0*    172 159 170     INR  Recent Labs   Lab 03/28/21  0713 03/27/21  1924 03/25/21  1020   INR 1.54* 1.48* 1.58*       Medications list for reference:  Current Facility-Administered Medications   Medication     bisacodyl (DULCOLAX) Suppository 10 mg     glucose gel 15-30 g    Or     dextrose 50 % injection 25-50 mL    Or     glucagon injection 1 mg     dronabinol (MARINOL) capsule 10 mg     dronabinol (MARINOL) capsule 5 mg     ferrous sulfate (FEROSUL) tablet 325 mg     lidocaine (LMX4) cream     lidocaine 1 % 0.1-1 mL     LORazepam (ATIVAN) tablet 0.5 mg     magnesium gluconate (MAGONATE) tablet 500 mg     melatonin tablet 1 mg     multivitamin w/minerals (THERA-VIT-M) tablet 1 tablet     ondansetron (ZOFRAN-ODT) ODT tab 4 mg    Or     ondansetron (ZOFRAN) injection 4 mg     polyethylene glycol (MIRALAX) Packet 17 g     prochlorperazine (COMPAZINE) injection 5 mg    Or     prochlorperazine (COMPAZINE) tablet 5 mg    Or     prochlorperazine (COMPAZINE) suppository 12.5 mg     senna-docusate (SENOKOT-S/PERICOLACE) 8.6-50 MG per tablet 1 tablet    Or     senna-docusate (SENOKOT-S/PERICOLACE) 8.6-50 MG per tablet 2 tablet     sodium chloride (PF) 0.9% PF flush 10 mL     sodium chloride (PF) 0.9% PF flush 3 mL     sodium chloride (PF) 0.9% PF flush 3 mL     sodium chloride (PF) 0.9% PF flush 3 mL     sodium chloride 0.9% infusion     zolpidem (AMBIEN) tablet 5 mg

## 2021-04-01 NOTE — PROGRESS NOTES
Care Management Follow Up    Length of Stay (days): 5    Expected Discharge Date: 04/03/21     Concerns to be Addressed: all concerns addressed in this encounter     Patient plan of care discussed at interdisciplinary rounds: Yes    Anticipated Discharge Disposition: Home, Home Care     Anticipated Discharge Services: IMM  Anticipated Discharge DME: TBD    Patient/family educated on Medicare website which has current facility and service quality ratings: yes  Education Provided on the Discharge Plan: yes  Patient/Family in Agreement with the Plan: yes    Referrals Placed by CM/SW: External Care Coordination  Private pay costs discussed: Not applicable    Additional Information:  SW was informed by 7A RN[facility] that patient's wife had a few questions about the HCD and wanted to set up a time for a notary. SW met with Robny and answered her questions. SW also indicated how to complete the rest of the HCD. SW set up a mobile notary for 1100 on 4/2, which was okay with wife Robyn. She will work with patient on completing his HCD today.     NORMA Ramsey, STACEY  7A   Ph: [phone]  Pager: [phone]

## 2021-04-01 NOTE — PROGRESS NOTES
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 Drain was uncapped on admission with progressive improvement in labs, though no external drainage was observed. Team was planning for discharge 3/31 when the LFTs were again noted to increase. IR was contact to discuss options for biliary drainage vs drain interrogation.    Case and imaging was reviewed with Dr. Purcell from IR. Concern for liver insufficiency as cause of worsening LFTs as opposed to obstruction. Patient does now have reaccumulating ascites requiring paracentesis, though review of imaging shows small/moderate amount of ascites back to at least December of 2020. Would defer percutaneous biliary drain at this time given lack of biliary dilatation on MRCP from 3/30. In patient with partial hepatectomy and no biliary dilation percutaneous drainage would be very technically challenging. Drain course likely to be intercostal which can be more painful. Drain would need to remain in >6 weeks, but would likely be chronic. Patient and wife a very reluctant to proceed with any additional drain placement at this time.     Current perihepatic drain was without surrounding collection and sinogram revealed no bile leak or fistula. Drain has had no output over the last few weeks, even after being  "opened to drainage on admission. Unlikely the opening of this drain was the cause of the improved LFTs given lack of any output and no known communication to the biliary system. Recommending no interrogation of this drain at this time. Drain flushes per it's norm, is in position on imaging, and has no surrounding collection. After patient is discharged and labs have stabilized we can plan for re-attempt and capping and hopefully eventual removal.     Recommendations were reviewed with Dr. Leonardo, Dr. Fofana from  and [facility]. Reviewed with patient and wife at the bedside.     Vitals:   /76   Pulse 59   Temp 98  F (36.7  C)   Resp 16   Ht 1.854 m (6' 1\")   Wt 73 kg (161 lb)   SpO2 99%   BMI 21.24 kg/m      Pertinent Labs:     Lab Results   Component Value Date    WBC 5.2 04/01/2021    WBC 7.8 03/31/2021    WBC 3.9 (L) 03/31/2021       Lab Results   Component Value Date    HGB 7.9 04/01/2021    HGB 8.5 03/31/2021    HGB 8.6 03/31/2021       Lab Results   Component Value Date     04/01/2021     03/31/2021     03/31/2021       Lab Results   Component Value Date    INR 1.54 (H) 03/28/2021    PTT 30 11/18/2020       Lab Results   Component Value Date    POTASSIUM 3.9 04/01/2021        VICKI Perez CNP  Interventional Radiology   Pager: [phone]    "

## 2021-04-01 NOTE — PROGRESS NOTES
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 Drain was uncapped on admission with progressive improvement in labs. MRCP equivocal. Pt was ready to discharge on 3/31, but then developed chills, fever, & vomiting. IR & ID consulted for further management.     Fever   Developed fevers to 100.8 at 2000 about 24 hours after stopping abx so started back on Zosyn with resolution. Infectious workup largely unremarkable so far including HIDA scan without signs of obstruction.   -ID consulted, Zosyn resumed   -Oncology consulted, nuclear medicine biliary scan ordered   -IR consulted, plan to await scan results per oncology's recommendations   -GI consulted, no additional actions recommended at this time   -CXR: Streaky bibasilar and hazy perihilar opacities, likely atelectasis. Small right and probable trace left pleural effusions  -UA: no sign of infection   -Blood cx: results pending   -CMP: AST, alk phos, & bili improving, Cr WNL, no anion gap   -CBC: WBC WNL  -CRP: 55, improving. Was 87 on 3/28    Jaundice  Biliary obstruction  Hx of biliary leak vs abscess s/p IR perihepatic drain placed on 2/15  Hx of Strep anginosus abdominal abscess  No evidence of cholangitis on imaging.  IR Uncapped his perihepatic drain on admission with progressive improvement in labs.  - Continue to trend LFTs  - GI consulted, MRCP equivocal, no further actions at this time   - Oncology consulted, nuclear medicine biliary scan ordered   - IR consulted, the possibility of a percutaneous biliary drain is deferred at this time given lack of biliary dilatation on MRCP from 3/30.  -In patient with partial hepatectomy and no biliary dilation percutaneous drainage would be very technically challenging. Drain course likely to be intercostal which can be more painful. Drain would need to remain in >6 weeks, but would likely be chronic. Patient and wife a very reluctant to proceed with any additional drain placement at this time.   - ID consulted, Zosyn resumed (was stopped on 3/30)   - blood cultures x3, ngtd  - elevated CRP, though down trending  - MRCP, equivocal     Metastatic hilar cholangiocarcinoma (dx 5/2020) with peritoneal mets:  - follows with Dr. Beard, oncology  - treated with cisplatin/cemcitabin 8/2020-12/9 when chemo was stopped due to abscess/fluid collection as above  - concern for progression based on CT on admission  - Oncology consulted    Ascites  - 2/2 malignancy  - paracentesis on 3/29 (3800 ml of yellow colored fluid removed)  - US on 3/27 with septate ascites. Given no fevers and no leukocytosis unlikely that there is active infection there causing obstruction, but it is something to consider.    Hyponatremia- resolved  - due to hypovolemia, malnutrtion, liver disease  - continue to monitor     Anemia of chronic disease  - continue ferrous sulfate supplementation    HTN- resolved?   Bp medications have been held at home for some time now given pt's BP has been on softer side for some time now   - hold lisinopril     Lower Extremity Edema- resolved  - likely due to liver disease  - hold lasix    Diabetes Mellitus, not insulin dependent  - MSSI as needed, was on glipizde at home, but when appetite  "dropped this was stopped    Malnutrition  - Nutrition consulted  - Continue PTA marinol  - Start MVI    Hx of CVA  - hold atorvastatin and plavix    Chronic Hypomagnesemia  - continue magnesium supplementation    Anxiety  - continue PTA ativan    Insomnia  Sleeplessness  - continue prior to admission ambien    Depression   Adjustment disorder  - endorses feeling of being overwhelmed and how hard the year has been.   - declined medication intervention or psychology at this time      Diet: Regular Diet Adult  Diet    DVT Prophylaxis: Pneumatic Compression Devices  Em Catheter: not present  Code Status: Full Code           Disposition Plan   Expected discharge: 2 - 3 days, recommended to prior living arrangement once proper antibiotic plan & follow up is established.       Entered: Morenita Tompkisn, MS4 04/01/2021, 11:07 AM     The patient's care was discussed with the Bedside Nurse, Care Coordinator/, Patient and ID/Hepatology/Oncology Consultant, and attending Dr. Leonardo.     Morenita Tompkins, MS4  Hospitalist Service, [facility]  C: [phone]    [facility]  Contact information available via [facility] Paging/Directory  Please see sign in/sign out for up to date coverage information    Physician Attestation   I,  Balbir Leonardo DO, was present with the medical/DARA student who participated in the service and in the documentation of the note.  I have verified the history and personally performed the physical exam and medical decision making.  I agree with the assessment and plan of care as documented in the note.      I personally reviewed vital signs, medications, labs and imaging.    Balbir Leonardo DO on 4/1/2021 at 7:37 PM    Date of Service (when I saw the patient): 04/1/21    ______________________________________________________________________    Interval History    Last night he was feeling \"mediocre\" given chills, fevers, and vomiting. Overall " "though, he still wants to go home and is \"tired of being in the hospital.\" Patient is feeling \"ok\" this morning. He denies any pain, nausea, chills, & subjective fevers since yesterday. Patient denies any symptoms. He had no further questions or concerns.     Otherwise 4pt ROS is negative    Data reviewed today: I reviewed all medications, new labs and imaging results over the last 24 hours. I personally reviewed no images or EKG's today.    Physical Exam   Vital Signs: Temp: 98  F (36.7  C) Temp src: Oral BP: 120/76 Pulse: 59   Resp: 16 SpO2: 99 % O2 Device: None (Room air)    Weight: 161 lbs 0 oz  Gen: NAD, sleeping comfortably in bed, thin,  jaundiced   Eyes: EOMI, conjuctiva icteric  CV: RRR, no murmurs   RESP: CTA bilaterally, no w/r/c  Abd: soft, nontender, mildly distended   Ext: no edema bilaterally  Neuro: CNII-CNXII grossly intact     Data   Recent Labs   Lab 04/01/21  0622 03/31/21  2255 03/31/21  0934 03/28/21  0713 03/28/21  0713 03/27/21  1924 03/27/21  1913   WBC 5.2 7.8 3.9*   < > 4.5  --  5.7   HGB 7.9* 8.5* 8.6*   < > 8.5*  --  9.4*   MCV 92 90 90   < > 88  --  86    182 189   < > 170  --  224   INR  --   --   --   --  1.54* 1.48*  --     136 137   < > 132*  --  129*   POTASSIUM 3.9 3.9 3.6   < > 4.1  --  4.1   CHLORIDE 109 107 109   < > 102  --  96   CO2 24 22 22   < > 27  --  28   BUN 10 9 8   < > 19  --  22   CR 0.73 0.73 0.72   < > 1.09  --  0.98   ANIONGAP 5 7 5   < > 4  --  5   ERIC 8.0* 8.0* 8.0*   < > 8.8  --  9.2   * 130* 117*   < > 84  --  124*   ALBUMIN 1.2* 1.3* 1.3*   < > 1.3*  --  1.5*   PROTTOTAL 5.5* 6.0* 5.9*   < > 5.9*  --  6.5*   BILITOTAL 6.5* 6.8* 5.8*   < > 5.8*  --  6.6*   ALKPHOS 722* 807* 827*   < > 920*  --  999*   ALT 42 48 50   < > 48  --  59   AST 91* 109* 130*   < > 118*  --  139*   LIPASE  --   --   --   --   --   --  123    < > = values in this interval not displayed.     Recent Results (from the past 24 hour(s))   XR Chest Port 1 View    Narrative "    EXAM: XR CHEST PORT 1 VW  3/31/2021 11:35 PM     HISTORY:  Fevers, evaluate for possible infection       COMPARISON:  Chest abdomen pelvis CT 2/26/2021, chest x-ray 9/28/2020    FINDINGS: AP radiograph of the chest. Right chest wall Port-A-Cath  with tip overlying the mid SVC.     The cardiomediastinal silhouette is within normal limits. Small right  and possible trace left pleural effusions. No pneumothorax. Streaky  bibasilar and hazy perihilar opacities. Surgical drain overlying the  right upper quadrant. Multiple surgical clips overlying the right  upper quadrant of the abdomen. No acute osseous abnormality.      Impression    IMPRESSION:  1. Streaky bibasilar and hazy perihilar opacities, likely atelectasis.  2. Small right and probable trace left pleural effusions.    I have personally reviewed the examination and initial interpretation  and I agree with the findings.    MICHAEL ARMSTRONG MD

## 2021-04-01 NOTE — PROGRESS NOTES
Brief Medicine Cross Cover Note   31 March 2021     Paged by RN that patient developed chills, shaking, and small/med volume non-bloody emesis right before he was supposed to discharge home this evening.  Patient seen and examined at the bedside, pt states that he feels unwell and still feels shaky.  Afebrile at that time, with ongoing c/o chills and nausea.  No abdominal tenderness, no TTP.  D/w RN to monitor for fevers and decide further workup (imaging, labs) at that point.  Discharge order discontinued.      Developed fevers to 100.8 at 2000, notified by RN at 2200.  Ordered STAT blood cx, UA/UC, CXR, CMP, CBC, and procal.  Will resume Zosyn once blood cx drawn.  Otherwise VSS.        Amanda Li, CNP, APRN  Internal Medicine [facility]  Pager [phone]

## 2021-04-01 NOTE — PROGRESS NOTES
General ID Service: Follow-up Note      Patient:  Fuentes Estrada, Date of birth 1955, Medical record number [id number]  Date of Visit:  March 30, 2021  Reason for consult: jaundice         Assessment and Recommendations:       RECOMMENDATION:  1. Could continue zosyn for now.  2. Follow up IR imaging and drainage plan.       ASSESSMENT:  Fuentes Estrada is a 66 year old male with PMHx significant for HTN, DM2, CVA, and metastatic hilar cholangiocarcinoma (dx 5/2020) with peritoneal metastases s/p R hepatectomy w/ radical extrahepatic bile duct resection, portal LN resection and Bianca-en-Y hepaticojejunostomy c/b biliary leak s/p drain placement (removed 11/2020) with persistence and development of perihepatic fluid collection c/f biloma vs abscess s/p perihepatic drain placement (2/15/21) and prolonged antibiotics (cipro/flagyl followed by po amoxicillin ending 2 days prior to admission for previous Strep anginosus in 2/15 abdominal fluid culture; followed with Dr. Dale in ID clinic) who presented to the hospital with 4-5 days of jaundice. He was otherwise has been HDS, WBC wnl, procal of 1.05, Lactate, <2, BCx 3/27 NGTD, CT AP w/ contrast 3/25 with minimal residual fluid in area of perc drain posterior to liver, increased peritoneal implants along R peritoneal wall with ill defined mass 4.5 x 2.0 cm along with other e/o peritoneal nodularity (this has been increasing since 8/2020), and large amount of ascites and abdominal varices; was started empirically on Zosyn. A paracentesis was performed with 279 WBC (7% neutrophils) similar to prior ascitic studies. Patient then febrile on 3/31 and with up-trending bilirubin. ID consulted due to concern for intraabdominal infection.        Thank you for allowing us to participate in the care of this patient. ID will continue to follow.    Attestation:  Case is high risk/complexity due to metastatic cholangiocarcinoma, jaundice, recent intraabdominal fluid  collection.    Lakhwinder Shah MD  Infectious Diseases     03/30/21            Interval History:      ID re-consulted because yesterday at evening the patient developed chills/rigors, vomiting, malaise, and a temp to 100.8. Also found to have increasing bilirubin. Empiric zosyn was restarted due to concern for cholangitis. ID consulted for antibiotic recommendations.         Review of Systems:   Full 8 point ROS obtained, pertinent positives and negatives as above.          Current Antimicrobials   IV zosyn         Physical Exam:   Ranges for vital signs:  Temp:  [97.6  F (36.4  C)-100.8  F (38.2  C)] 98.3  F (36.8  C)  Pulse:  [59-95] 66  Resp:  [16-18] 16  BP: ()/(53-85) 112/72  SpO2:  [94 %-99 %] 98 %    Intake/Output Summary (Last 24 hours) at 3/30/2021 0944  Last data filed at 3/30/2021 0930  Gross per 24 hour   Intake 470 ml   Output --   Net 470 ml     Exam:  Constitutional: Pleasant male seen lying in bed, in NAD.   HEENT: NCAT, mildly icteric sclerae, conjunctiva clear. Moist mucous membranes.  Respiratory: Non-labored breathing, on RA.   Cardiovascular: Regular rate   GI:  Abdomen is soft, mild distended, and non-tender to palpation. No rigidity or guarding.  Skin: Warm and dry. No rashes or lesions on exposed surfaces.  Musculoskeletal: Extremities grossly normal. No tenderness.  Neurologic: Awake, alert, interactive  Neuropsychiatric: Affect normal/appropriate.  VAD: Port is c/d/i with no erythema, drainage, or tenderness.         Laboratory Data:   Reviewed.        Inflammatory Markers    Recent Labs   Lab Test 04/01/21  0622 03/28/21  0713 03/27/21  1913 02/02/21  0632   CRP 55.0* 87.0* 100.0* 14.0*       Hematology Studies    Recent Labs   Lab Test 04/01/21  0622 03/31/21  2255 03/31/21  0934 03/30/21  0654 03/29/21  0552 03/28/21  0713 03/27/21  1913 03/26/21  1741 03/25/21  1020 02/26/21  1241 02/26/21  1241 02/03/21  0647 02/02/21  0632 02/01/21  1638   WBC 5.2 7.8 3.9* 4.0 3.9* 4.5 5.7 5.2  4.7  --  4.7 3.2* 2.9* 4.3   ANEU  --   --   --   --   --   --  3.9 3.6 3.2  --  2.4 1.7 1.4* 2.2   AEOS  --   --   --   --   --   --  0.0 0.1 0.0  --   --  0.0 0.0 0.0   HGB 7.9* 8.5* 8.6* 8.3* 8.0* 8.5* 9.4* 11.4* 9.0*  --  10.4* 9.0* 8.4* 9.5*   MCV 92 90 90 89 90 88 86 87 87  --  92 95 93 93    182 189 172 159 170 224 238 172   < > 210 183 166 233    < > = values in this interval not displayed.       Metabolic Studies     Recent Labs   Lab Test 04/01/21 0622 03/31/21 2255 03/31/21  0934 03/30/21  0654 03/29/21  0552    136 137 136 134   POTASSIUM 3.9 3.9 3.6 3.5 3.6   CHLORIDE 109 107 109 107 104   CO2 24 22 22 24 25   BUN 10 9 8 10 15   CR 0.73 0.73 0.72 0.84 1.00   GFRESTIMATED >90 >90 >90 90 77       Hepatic Studies    Recent Labs   Lab Test 04/01/21 0622 03/31/21 2255 03/31/21  0934 03/30/21  0654 03/29/21  0552 03/28/21  0713   BILITOTAL 6.5* 6.8* 5.8* 4.3* 4.3* 5.8*   ALKPHOS 722* 807* 827* 789* 777* 920*   ALBUMIN 1.2* 1.3* 1.3* 1.2* 1.2* 1.3*   AST 91* 109* 130* 107* 91* 118*   ALT 42 48 50 45 40 48       Microbiology:    Culture Micro   Date Value Ref Range Status   03/27/2021 No growth after 3 days  Preliminary   03/27/2021 No growth after 3 days  Preliminary   03/11/2021 No growth  Final   02/15/2021 No anaerobes isolated  Final   02/15/2021 Culture negative after 4 weeks  Final   02/15/2021 (A)  Final    Moderate growth  Streptococcus anginosus  This organism is susceptible to ampicillin, penicillin, vancomycin and the cephalosporins.   If treatment is required AND your patient is allergic to penicillin, contact the   Microbiology Lab within 5 days to request susceptibility testing.  Testing unavailable due to 's backorder.     02/15/2021   Final    Critical Value/Significant Value called to and read back by  JOSE CRUZ COHEN @[facility] 2/18/21. SCG     02/01/2021 No growth  Final   02/01/2021 No growth  Final       Urine Studies    Recent Labs   Lab Test 04/01/21  0956  03/28/21  1555 05/14/20  0158   LEUKEST Negative Negative Trace*   WBCU <1 3 4            Imaging:     3/30 MRCP  IMPRESSION:  1. Post-surgical changes of partial right hepatectomy. No focal  suspicious mass.  2. No intrahepatic or extrahepatic biliary ductal dilatation.  3. Scattered regions of peritoneal thickening better delineated on  prior CT 3/25/2021.  4. Percutaneous drain along the posterior margin of liver at the site  of previous collection. No evidence of new fluid collection.  5. Slightly decreased moderate amount ascites throughout the abdomen  and pelvis status post-paracentesis since MRI 3/28/2021. Extensive  intraperitoneal varices along the anterior abdominal wall. 6. Small  right and small-to-moderate left pleural effusions with adjacent  compressive atelectasis.      3/27 US Abd  IMPRESSION:   1. Suspected intrahepatic pneumobilia, (may be due to  hepaticojejunostomy).   2. Septated ascites inferior to the liver.     3/25 CT AP w/ contrast  Impression: In this patient with a history of cholangiocarcinoma,  partial right hepatectomy, and percutaneously drained bile leak:   1. Minimal residual fluid in the area with the percutaneous drain  posterior to the liver.  2. Increase in peritoneal implants along the right peritoneal wall  demonstrated is 4.5 x 2.0 cm ill-defined mass as well as other  evidence of peritoneal nodularity. This has been increasing  (demonstrated clearly on prior CT 8/3/2020 .)  3. Large amount of ascites and extensive abdominal varices.

## 2021-04-01 NOTE — PROGRESS NOTES
Hematology / Oncology  Daily Progress Note   Date of Service: 04/01/2021  Patient: Fuentes Estrada  MRN: [id number]  Admission Date: 3/27/2021  Hospital Day # 5  Cancer Diagnosis: Metastatic Cholangiocarcinoma   Primary Outpatient Oncologist: Dr. 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     # Metastatic Cholangiocarcinoma   Please see original consult note for full oncologic history. In brief, patient was diagnosed in spring of 2020 with hilar cholangiocarcinoma. In May 2020 he underwent a radical extrahepatic bile duct resection and right hepatectomy. He had all of his disease resected with negative margins and one positive lymph node.  He had a complicated postoperative course with a bile leak and abscess that had delayed starting his adjuvant chemotherapy. On imaging in July 2020,  he appeared to be developing increasing nodularity in the resection bed and regional lymph nodes that has led to an EUS with a fine-needle aspiration of the  "lymph nodes on 8/11/20, demonstrating the presence of metastatic disease. Palliative intent cycle 1 Cisplatin/Gemcitabine = 8/27/20. His abscess/billary drain was managed by IR. Sinogram identified a communication to the biliary tree.  He underwent sinograms and cavitary sclerotherapy every 2 weeks starting August 25, 2020.  Drain was removed on 11/9/2020. Admission to [facility] 10/9/20-10/11/20 with septic shock from infectious enterocolitis. Chemo delayed and restarted 10/21/20. Admission to [facility] 11/18/20-11/19/20 with cholangitis. He was seen on 12/14/2020 with an interim CT scan which showed findings concerning for abscess at the operative site along the posterior aspect of liver with gas seen in this fluid collection.  Patient was also having chills, chemotherapy was held and he was started on ciprofloxacin and Flagyl.  It was felt that aspiration is not likely to prevent recurrence of the abscess. A biliary drain is possible, but likely to be permanent. It has been concluded an endoscopic procedure is not possible. At 12/21/20 visit with Dr. Beard, antibiotic course was extended and he finished 12/31/20. Disease on 12/11/20 was stable. 2/2/21 admission for Admitted for nausea, vomiting and FTT. - Underwent ERCP for possible internal drainage 2/2; unfortunately ERCP was not feasible due to \"edematous and tortuous biliary limb obviating passage\" Biliary tract tube put in on 2/15/2021.   - CT CAP 3/25/21 shows increase in peritoneal implants along the right peritoneal wall demonstrated a 4.5 x 2.0 cm ill-defined mass as well as other evidence of peritoneal nodularity.   - GI consulted; appreciated recs   - Rescheduled outpatient DARA appointment to 4/8/21 may need to discuss restarting therapy based on CT CAP. No indication to initiate chemotherapy while inpatient.      # Elevated LFTs   # Hyperbilirubinemia   # Fever - 100.8 (3/31)  # Hx of biliary leak vs abscess s/p IR perihepatic drain placed on " "2/15  Patient T bili typically around 2-3 currently elevated. Continues to have stably elevated LFTs. Patient was preparing to discharge when he developed chills. He subsequently overnight developed a fever to 100.8 and had an episode of emesis. Fever occurred within 24 hours of discontinuation of antibiotics.   - Continue to monitor daily   - Antibiotics per primary team  - ID consulted; appreciate input     # Ascites 2/2 malignancy   MRCP originally planned for 3/28 but could not be appropriately completed d/t ascites interfering with the imaging.   - s/p paracentesis 3/29 with removal of 3400 mL     Patient's plan of care was discussed with attending physician Dr. Molina.    Thank you for the opportunity to partake in this patients plan of care. Please do not hesitate to page with questions. We will continue to follow.     Pratibha Rodriguez PA-C   Hematology/Oncology   Pager: [phone]   ___________________________________________________________________    Subjective & Interval History:    Fuentes was planning on discharging yesterday, but he began feeling unwell and had some chills. He then spiked a fever to 100.8 he was started on IV zosyn and had CXR, Blood Cxs. Patient frustrated as he was hopeful to get out of the hospital. Had a long conversation with patient and wife regarding concern for ongoing obstruction despite normal MRCP especially since patient had not even gone more than 24 hours off antibiotics w/o having a fever. Discussed he may need to be on prophylactic antibiotic but will discuss with ID and primary team. All questions answered at this time.    A comprehensive review of systems was obtained and is negative other than noted here or in the HPI.     Physical Exam:  /72   Pulse 66   Temp 98.3  F (36.8  C)   Resp 16   Ht 1.854 m (6' 1\")   Wt 78.1 kg (172 lb 3.2 oz)   SpO2 98%   BMI 22.72 kg/m      General: lying in bed, no acute distress  HEENT: EOMI, MMM  Neck: supple, normal ROM  CV: " RRR, normal S1/S2, no m/r/g  Resp: CTAB, no wheezing/crackles, normal respiratory effort on ambient air  GI: soft, non-tender, non-distended, bowel sounds present and normoactive  MSK: warm and well-perfused, normal tone  Skin: no rashes on limited exam, slight jaundice noted  Neuro: Alert and interactive, moves all extremities equally, no focal deficits      Labs & Studies: I personally reviewed the following studies:  ROUTINE LABS (Last four results):  CMP  Recent Labs   Lab 04/01/21  0622 03/31/21  2255 03/31/21  0934 03/30/21  0654 03/29/21  0552 03/28/21  0713 03/27/21  1913    136 137 136 134 132* 129*   POTASSIUM 3.9 3.9 3.6 3.5 3.6 4.1 4.1   CHLORIDE 109 107 109 107 104 102 96   CO2 24 22 22 24 25 27 28   ANIONGAP 5 7 5 5 5 4 5   * 130* 117* 113* 122* 84 124*   BUN 10 9 8 10 15 19 22   CR 0.73 0.73 0.72 0.84 1.00 1.09 0.98   GFRESTIMATED >90 >90 >90 90 77 69 79   GFRESTBLACK >90 >90 >90 >90 90 80 >90   ERIC 8.0* 8.0* 8.0* 7.8* 8.1* 8.8 9.2   MAG  --   --   --  1.7 1.7 1.8 1.7   PHOS  --   --   --   --   --  2.9 2.7   PROTTOTAL 5.5* 6.0* 5.9* 5.7* 5.5* 5.9* 6.5*   ALBUMIN 1.2* 1.3* 1.3* 1.2* 1.2* 1.3* 1.5*   BILITOTAL 6.5* 6.8* 5.8* 4.3* 4.3* 5.8* 6.6*   ALKPHOS 722* 807* 827* 789* 777* 920* 999*   AST 91* 109* 130* 107* 91* 118* 139*   ALT 42 48 50 45 40 48 59     CBC  Recent Labs   Lab 04/01/21  0622 03/31/21  2255 03/31/21  0934 03/30/21  0654   WBC 5.2 7.8 3.9* 4.0   RBC 2.60* 2.84* 2.87* 2.81*   HGB 7.9* 8.5* 8.6* 8.3*   HCT 23.8* 25.5* 25.7* 25.1*   MCV 92 90 90 89   MCH 30.4 29.9 30.0 29.5   MCHC 33.2 33.3 33.5 33.1   RDW 17.5* 17.2* 17.1* 17.2*    182 189 172     INR  Recent Labs   Lab 03/28/21  0713 03/27/21  1924   INR 1.54* 1.48*       Medications list for reference:  Current Facility-Administered Medications   Medication     bisacodyl (DULCOLAX) Suppository 10 mg     glucose gel 15-30 g    Or     dextrose 50 % injection 25-50 mL    Or     glucagon injection 1 mg     dronabinol  (MARINOL) capsule 10 mg     dronabinol (MARINOL) capsule 5 mg     ferrous sulfate (FEROSUL) tablet 325 mg     heparin 100 UNIT/ML injection 5 mL     heparin lock flush 10 UNIT/ML injection 5-10 mL     heparin lock flush 10 UNIT/ML injection 5-10 mL     lidocaine (LMX4) cream     lidocaine 1 % 0.1-1 mL     LORazepam (ATIVAN) tablet 0.5 mg     magnesium gluconate (MAGONATE) tablet 500 mg     melatonin tablet 1 mg     multivitamin w/minerals (THERA-VIT-M) tablet 1 tablet     ondansetron (ZOFRAN-ODT) ODT tab 4 mg    Or     ondansetron (ZOFRAN) injection 4 mg     piperacillin-tazobactam (ZOSYN) 4.5 g vial to attach to  mL bag     polyethylene glycol (MIRALAX) Packet 17 g     prochlorperazine (COMPAZINE) injection 5 mg    Or     prochlorperazine (COMPAZINE) tablet 5 mg    Or     prochlorperazine (COMPAZINE) suppository 12.5 mg     senna-docusate (SENOKOT-S/PERICOLACE) 8.6-50 MG per tablet 1 tablet    Or     senna-docusate (SENOKOT-S/PERICOLACE) 8.6-50 MG per tablet 2 tablet     sodium chloride (PF) 0.9% PF flush 10 mL     sodium chloride (PF) 0.9% PF flush 10-20 mL     sodium chloride (PF) 0.9% PF flush 10-20 mL     sodium chloride (PF) 0.9% PF flush 3 mL     sodium chloride (PF) 0.9% PF flush 3 mL     sodium chloride (PF) 0.9% PF flush 3 mL     sodium chloride 0.9% infusion     zolpidem (AMBIEN) tablet 5 mg

## 2021-04-01 NOTE — PROVIDER NOTIFICATION
Action: sent text page @5:55 PM to 0581 Angelica Hahn PA: ALONZO Estrada 7215-2: FYI patient experiencing severe chills/shaking. Thanks - Amira [phone].   Result: return phone call from provider; blood cultures, tylenol ordered.     Action: sent text page @7:00 PM to 0521 Angelica Hahn PA:ALONZO Estrada 7215-2: chills stopped but have restarted and are worse. Can you assess? Pt and wife are very concerned. Thanks - Amira [phone]  Result: Provider to bedside to assess. Rapid response, stroke code ultimately called dt acute change in patient condition, witnessed by provider.

## 2021-04-01 NOTE — PROGRESS NOTES
Care Management Follow Up    Length of Stay (days): 5    Expected Discharge Date: 04/03/21     Concerns to be Addressed: all concerns addressed in this encounter     Patient plan of care discussed at interdisciplinary rounds: Yes    Anticipated Discharge Disposition: Home, Home Care     Anticipated Discharge Services: (IMM)  Anticipated Discharge DME: (TBD)    Patient/family educated on Medicare website which has current facility and service quality ratings: yes  Education Provided on the Discharge Plan:    Patient/Family in Agreement with the Plan: yes    Referrals Placed by CM/SW: External Care Coordination    Additional Information:  Pt status reviewed/discussed during care team rounds.  Hem/Onc team reassessing possible need for additional drain placement.   It is anticipated pt will discharge once fever free and tolerating oral antibiotics for at least 24 hours.      Writer followed up with pt and his wife regarding home care services.  Declines need for home RN.   Pt wife notes no concerns regarding her ability manage drain cares and assist pt with medication management.  Pt and spouse would be open to PT/OT services. [facility] is preferred agency.       Spoke with rep from [facility] [phone] who confirmed that this agency is unable to accept patient d/t staffing issues.    Spoke with pt wife via phone.  Reviewed above information. Reviewed the three other home care agencies that service patients home area.    Pt wife requested writer call back as pt lunch had arrived to discuss home care options.       The three home care agencies that service patients home area are:    [facility] [phone] - they do not service pt home area.  [facility] [phone]  [facility] [phone]      1400: Followed up with pt and his wife.  Pt and spouse open to referrals being made to [facility].   Reviewed that home PT/OT would complete an assessment and based on the assessment  it would determine the # of visits pt would have.  Reviewed that home therapies do not come daily.      Referral made to [facility] [phone], Fax [phone].  Agency is able to accept patient but with start of care estimated to be end of next week.      Reviewed above with Kevin Dodson 9.  Provider ok with therapy start of care end of next week.    Reviewed with patient and spouse.       Final plan for discharge pending medical clearance.  Plan for home with home PT/OT services.      Tanya Jean RN BSN, PHN, ACM-RN  7A RN Care Coordinator  Phone: [phone]  Pager [phone]    4/1/2021 2:57 PM

## 2021-04-01 NOTE — PROVIDER NOTIFICATION
Action: sent text page @10:14 PM to [phone]: ALONZO Estrada 7215-2: FYI 2000 temp was 100.8. Thanks, Amira [phone].    Result:  Stat labs and chest x-ray ordered.

## 2021-04-01 NOTE — PLAN OF CARE
"/72   Pulse 66   Temp 98.3  F (36.8  C)   Resp 16   Ht 1.854 m (6' 1\")   Wt 78.1 kg (172 lb 3.2 oz)   SpO2 98%   BMI 22.72 kg/m     Neuro: A/Ox3  VS: VSS on RA  Pain: Denies  GI: on  regular diet with poor appetite. no nausea or vomiting this shift. BMx1  : Voiding not saving  Skin: Generalized jaundiced/skin intact  RUQ biliary drain in place and no drainage in bag  R upper chest Port A Cath infusion NS at 125ml/h  Activity - Assist of 1/SBA with walker/GB  Education - infection/fall prevention  Plan of Care - UA/UC sample sent to lab. Pt getting NH hepatobiliary scan this afternoon. Pt's wife at bedside and supportive. Continue with POC    "

## 2021-04-02 NOTE — PROGRESS NOTES
General ID Service: Follow-up Note      Patient:  Fuentes Estrada, Date of birth 1955, Medical record number [id number]  Date of Visit:  March 30, 2021  Reason for consult: jaundice         Assessment and Recommendations:       RECOMMENDATION:  1. Follow up repeat paracentesis studies/cultures to evaluate for peritonitis  2. Follow up pending blood cultures  3. Could continue empiric vancomycin and zosyn for now while awaiting culture data  4. WBC scan as proposed by team would be reasonable to further work up his condition       ASSESSMENT:  Fuentes Estrada is a 66 year old male with PMHx significant for HTN, DM2, CVA, and metastatic hilar cholangiocarcinoma (dx 5/2020) with peritoneal metastases s/p R hepatectomy w/ radical extrahepatic bile duct resection, portal LN resection and Bianca-en-Y hepaticojejunostomy c/b biliary leak s/p drain placement (removed 11/2020) with persistence and development of perihepatic fluid collection c/f biloma vs abscess s/p perihepatic drain placement (2/15/21) and prolonged antibiotics (cipro/flagyl followed by po amoxicillin ending 2 days prior to admission for previous Strep anginosus in 2/15 abdominal fluid culture; followed with Dr. Dale in ID clinic) who presented to the hospital with 4-5 days of jaundice. On infectious work up, BCx all NGTD, CT AP w/ contrast 3/25 with minimal residual fluid in area of perc drain posterior to liver, increased peritoneal implants along R peritoneal wall with ill defined mass 4.5 x 2.0 cm along with other e/o peritoneal nodularity (this has been increasing since 8/2020), and large amount of ascites and abdominal varices; was started empirically on Zosyn. A paracentesis was performed with 279 WBC (7% neutrophils) similar to prior ascitic studies. Patient then febrile on 3/31 and with up-trending bilirubin. ID consulted due to concern for intraabdominal infection. Patient again with episode overnight last night, this time with some  stroke-like symptoms also, so IV vancomycin was empirically added overnight. Again without localizing complaints or localizing signs of infection. MRCP and CT also without any new abscess/fluid collection. HIDA scan without signs of biliary obstruction/infection, though bilirubin and alk phos remain elevated. Unclear etiology of patient's recurrent decompensations and overall clinical condition -- ddx including infection (though work-up thus far has been unrevealing), complication of malignancy/metastasis, autonomic, other.        Thank you for allowing us to participate in the care of this patient. ID will continue to follow.    Attestation:  Case is high risk/complexity due to metastatic cholangiocarcinoma, jaundice, recent intraabdominal fluid collection.    Lakhwinder Shah MD  Infectious Diseases     03/30/21            Interval History:      Last night patient had another episode after walking around experienced chills, rigors, low grade fever, emesis, and confusion/aphasia; he was evaluated for stroke and it was thought to be recrudescence of his old stroke. He denied any abdominal pain during the episode.    Today he and his wife report he's been doing better again -- no further chills/rigors, emesis. Still denies any new localizing symptoms, still with abdominal distension but no abdominal pain. Had repeat diagnostic paracentesis today.         Review of Systems:   Full 8 point ROS obtained, pertinent positives and negatives as above.          Current Antimicrobials   IV vancomycin and zosyn         Physical Exam:   Ranges for vital signs:  Temp:  [96.8  F (36  C)-100.4  F (38  C)] 97.7  F (36.5  C)  Pulse:  [] 59  Resp:  [18-34] 18  BP: ()/() 117/75  SpO2:  [93 %-100 %] 99 %    Intake/Output Summary (Last 24 hours) at 3/30/2021 0944  Last data filed at 3/30/2021 0930  Gross per 24 hour   Intake 470 ml   Output --   Net 470 ml     Exam:  Constitutional: Pleasant male seen lying in bed, in  NAD.   HEENT: NCAT, mildly icteric sclerae, conjunctiva clear. Moist mucous membranes.  Respiratory: Non-labored breathing, on RA.   Cardiovascular: Regular rate   GI:  Abdomen is soft, mild distended, and non-tender to palpation. No rigidity or guarding.  Skin: Warm and dry. No rashes or lesions on exposed surfaces.  Musculoskeletal: Extremities grossly normal. No tenderness.  Neurologic: Awake, alert, interactive  Neuropsychiatric: Affect normal/appropriate.  VAD: Port is c/d/i with no erythema, drainage, or tenderness.         Laboratory Data:   Reviewed.        Inflammatory Markers    Recent Labs   Lab Test 04/02/21  0423 04/01/21  2044 04/01/21  0622 03/28/21  0713 03/27/21  1913 02/02/21  0632   CRP 57.0* 53.0* 55.0* 87.0* 100.0* 14.0*       Hematology Studies    Recent Labs   Lab Test 04/02/21  0540 04/02/21  0423 04/01/21  1945 04/01/21  0622 03/31/21  2255 03/31/21  0934 03/27/21  1913 03/27/21  1913 03/26/21  1741 03/25/21  1020 02/26/21  1241 02/26/21  1241 02/03/21  0647 02/02/21  0632   WBC 10.7 10.6 3.4* 5.2 7.8 3.9*   < > 5.7 5.2 4.7  --  4.7 3.2* 2.9*   ANEU 9.6*  --   --   --   --   --   --  3.9 3.6 3.2  --  2.4 1.7 1.4*   AEOS 0.0  --   --   --   --   --   --  0.0 0.1 0.0  --   --  0.0 0.0   HGB 6.7* 6.9* 9.4* 7.9* 8.5* 8.6*   < > 9.4* 11.4* 9.0*  --  10.4* 9.0* 8.4*   MCV 93 92 90 92 90 90   < > 86 87 87  --  92 95 93   * 132* 222 162 182 189   < > 224 238 172   < > 210 183 166    < > = values in this interval not displayed.       Metabolic Studies     Recent Labs   Lab Test 04/02/21 0423 04/01/21 1945 04/01/21 0622 03/31/21 2255 03/31/21  0934    137 138 136 137   POTASSIUM 3.6 3.7 3.9 3.9 3.6   CHLORIDE 111* 110* 109 107 109   CO2 20 15* 24 22 22   BUN 11 12 10 9 8   CR 0.83 0.76 0.73 0.73 0.72   GFRESTIMATED >90 >90 >90 >90 >90       Hepatic Studies    Recent Labs   Lab Test 04/02/21 0423 04/01/21 1945 04/01/21 0622 03/31/21 2255 03/31/21  0934 03/30/21  0654   BILITOTAL  5.7* 7.9* 6.5* 6.8* 5.8* 4.3*   ALKPHOS 549* 825* 722* 807* 827* 789*   ALBUMIN 1.8* 1.4* 1.2* 1.3* 1.3* 1.2*   AST 67* 99* 91* 109* 130* 107*   ALT 33 48 42 48 50 45       Microbiology:    Culture Micro   Date Value Ref Range Status   2021 No growth after 3 days  Preliminary   2021 No growth after 3 days  Preliminary   2021 No growth  Final   02/15/2021 No anaerobes isolated  Final   02/15/2021 Culture negative after 4 weeks  Final   02/15/2021 (A)  Final    Moderate growth  Streptococcus anginosus  This organism is susceptible to ampicillin, penicillin, vancomycin and the cephalosporins.   If treatment is required AND your patient is allergic to penicillin, contact the   Microbiology Lab within 5 days to request susceptibility testing.  Testing unavailable due to 's backorder.     02/15/2021   Final    Critical Value/Significant Value called to and read back by  JOSE CRUZ COHEN @[employer]. SCG     2021 No growth  Final   2021 No growth  Final       Urine Studies    Recent Labs   Lab Test 21  0956 21  1555 20  0158   LEUKEST Negative Negative Trace*   WBCU <1 3 4            Imagin/2 MRI brain  Impression:  1. No evidence of acute infarction.  2. No abnormal enhancing lesions intracranially.  3. Head MRA demonstrates no definite aneurysm or stenosis of the major  intracranial arteries.  4. Neck MRA demonstrates approximately 60% narrowing of the proximal  left internal carotid artery.  5. Leukokraurosis and chronic infarcts in the left cerebral  hemisphere.     HIDA  Impression:  1. No evidence of biliary obstruction.  2. No evidence of biliary leak.  3. Persistent hepatogram suggestive of significantly poor hepatic  function.  4. Diffuse ascites.  5. Bilateral pleural effusions with associated atelectasis.    3/30 MRCP  IMPRESSION:  1. Post-surgical changes of partial right hepatectomy. No focal  suspicious mass.  2. No intrahepatic or  extrahepatic biliary ductal dilatation.  3. Scattered regions of peritoneal thickening better delineated on  prior CT 3/25/2021.  4. Percutaneous drain along the posterior margin of liver at the site  of previous collection. No evidence of new fluid collection.  5. Slightly decreased moderate amount ascites throughout the abdomen  and pelvis status post-paracentesis since MRI 3/28/2021. Extensive  intraperitoneal varices along the anterior abdominal wall. 6. Small  right and small-to-moderate left pleural effusions with adjacent  compressive atelectasis.      3/27 US Abd  IMPRESSION:   1. Suspected intrahepatic pneumobilia, (may be due to  hepaticojejunostomy).   2. Septated ascites inferior to the liver.     3/25 CT AP w/ contrast  Impression: In this patient with a history of cholangiocarcinoma,  partial right hepatectomy, and percutaneously drained bile leak:   1. Minimal residual fluid in the area with the percutaneous drain  posterior to the liver.  2. Increase in peritoneal implants along the right peritoneal wall  demonstrated is 4.5 x 2.0 cm ill-defined mass as well as other  evidence of peritoneal nodularity. This has been increasing  (demonstrated clearly on prior CT 8/3/2020 .)  3. Large amount of ascites and extensive abdominal varices.

## 2021-04-02 NOTE — PLAN OF CARE
Major Shift Events:  HGB drop this AM, MD on call made aware, recheck & albumin ordered- plan to transfuse?  Neuro: A/O, lethargic, mild-moderate aphasia is intermittent, possibly correlated with being tired(MD aware), neuro's intact  CV: SR Afebrile, BP WNL  Respi: LS clear/dim placed on 2L NC   Gi/: lyman placed, low UOP, MD aware, sm BM overnight, regular diet, belly distended, firm, bili drain with no output, MD aware    Plan: monitor labs, possible tap? More abdominal imaging? Update team with changes  For vital signs and complete assessments, please see documentation flowsheets.

## 2021-04-02 NOTE — PROGRESS NOTES
Rapid Response escalated to Stroke Code @ 2003 for acute aphasia. STAT head CT ordered and ammonia level.

## 2021-04-02 NOTE — PROGRESS NOTES
Rapid Response Team Note    Assessment   In assessment a rapid response was called on Fuentes Estrada due to Lactic Acidosis  This presentation is likely due to infection and worsened by malignancy.     Plan   -  STAT Vancomycin ordered, CT A/P w/ contrast, VBG, CBC, CMP, Procal, CRP, BCx2, ammonia  - Continue Zosyn  - Repeat LA in 2 hours  - EKG, Troponin  - Stroke Code called for acute aphasia- STAT CT head  - IVF bolus  - Disposition will be determined following head imaging; LA  -  The Internal Medicine primary team was able to be reached and they are in agreement with the above plan.  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 Drain uncapped on admission w/ progressive improvement in labs. MRCP equivocal. HIDA scan w/o  Patient w/ fever, chills, nausea vomiting over past 24 hours. RRT called for LA of 6.4. RN noted patient to have shaking chills, nausea and non-bloody emesis.  Upon bedside evaluation, patient w/ no complaints, though with acute change in mental status (acute aphasia), prompting Stroke Code and STAT CT head. BG stable. BP stable. Mild tachycardia. Tmax 100.4. Patient started on STAT vancomycin, IVF, Labs, CT head, CT A/P ordered. Further eval and recommendations  pending the above      Admission Diagnosis:   Cholestatic jaundice [R17]  Cholangiocarcinoma (H) [C22.1]     Physical Exam   Temp: 99.6  F (37.6  C) Temp  Min: 97.4  F (36.3  C)  Max: 100.8  F (38.2  C)  Resp: (!) 34 Resp  Min: 16  Max: 34  SpO2: 95 % SpO2  Min: 94 %  Max: 99 %  Pulse: 126 Pulse  Min: 59  Max: 126    No data recorded  BP: (!) 174/100 Systolic (24hrs), Av , Min:95 , Max:174   Diastolic (24hrs), Av, Min:53, Max:100     I/Os: I/O last 3 completed shifts:  In: 2800 [P.O.:240; I.V.:2560]  Out: 100 [Urine:100]     Exam:   General: chronically ill appearing  Mental Status: altered level of consciousness based on aphasia.  CV: Tachycardia, though no m/r/g  Resp: Breathing non-labored on RA  Neuro: Unable to follow commands or answer simple questions. Moves all extremities. When asked to touch nose patient opens mouth. Does not recognize wife at bedside.  Skin: Jaundice  GI: Abdomen mildly distended. Biliary drain on right. BS+. No rebound or guarding.     Significant Results and Procedures   Lactic Acid:   Recent Labs   Lab Test 21  1754 20  0450 20  0015 20  0203 20  0203   LACT  --  1.1 1.3 2.3*  --    < >  --    LACTS 6.3*  --   --   --  3.0*  --  1.5    < > = values in this interval not displayed.     CBC:   Recent Labs   Lab Test 21  2255   WBC 3.4* 5.2 7.8   HGB 9.4* 7.9* 8.5*   HCT 28.4* 23.8* 25.5*    162 182        Sepsis Evaluation   The patient is known to have an infection.  Fuentes Estrada meets SIRS criteria AND has a lactate >2 or other evidence of acute organ damage.  These vital signs, lab and physical exam findings are consistent with SEVERE SEPSIS.    Sepsis Time-Zero (time severe sepsis diagnosis confirmed):   21 as this was the time when Lactate resulted, and the level was > 2.0     Anti-infectives (From now, onward)    Start     Dose/Rate Route Frequency Ordered Stop     04/01/21 2030  vancomycin 1500 mg in 0.9% NaCl 250 ml intermittent infusion 1,500 mg      1,500 mg  over 90 Minutes Intravenous EVERY 12 HOURS 04/01/21 2013 03/31/21 2300  piperacillin-tazobactam (ZOSYN) 4.5 g vial to attach to  mL bag      4.5 g  over 30 Minutes Intravenous EVERY 6 HOURS 03/31/21 2234      03/31/21 0000  amoxicillin (AMOXIL) 875 MG tablet      875 mg Oral 2 TIMES DAILY 03/31/21 1814          Current antibiotic coverage is appropriate for source of infection.    3 Hour Severe Sepsis Bundle Completion:  1. Initial Lactic Acid result shown above. Repeat lactic acid ordered for 2 hours from now.   2. Blood Cultures before Antibiotics: Yes  3. Broad Spectrum Antibiotics Administered: yes  4. Fluids: 1500 mL fluids ORDERED to be given

## 2021-04-02 NOTE — CONSULTS
Consult and Procedure Service - Procedure Note    Attending: Marcella Man  Resident: Tatum Roland  Procedure: Diagnostic paracentesis  Indication: end stage liver disease with ascites, rule out SBP  Risk Assessment: low   Pre-procedure diagnosis: end stage liver disease with ascites, rule out SBP  Post-procedure diagnosis: end stage liver disease with ascites, rule out SBP    The risks and benefits of the procedure were explained to the patient and his wife who expressed understanding and opted to proceed.  Consent was obtained and placed in the chart.  A time out was performed.  An area of ascites was located and marked using ultrasound guidance in the left lower quadrant; the area was prepped and draped in the usual sterile fashion.  10 ml of 1% lidocaine was instilled and ascites located.  The paracentesis needle were inserted under real-time guidance until yellow-colored ascites obtained then the needle removed. A specimen was sent for analysis. The catheter was withdrawn and the area dressed.  Patient tolerated the procedure well with no immediate complications.  Please contact the Consult and Procedure Service if any complications or concerns arise.      DOS:  04/02/21    Tatum Roland MD  Internal Medicine-Pediatrics, PGY4  [facility]  Pager: [phone]

## 2021-04-02 NOTE — PROGRESS NOTES
Stroke Code Nurse-Responder Note    Arrival Time to Stroke Code: 2010    Stroke Code Team interventions:   - De-escalated at 2105 by NeuroCrit    ED/Bedside Nurse providing handoff: Amira    Time left for CT: 2015    Time arrived to next location (ED/Unit/IR): 2040    ED/Bedside Nurse given handoff (name/time): Jermaine Coffey RN

## 2021-04-02 NOTE — PLAN OF CARE
Admitted/transferred from: 7A  Reason for admission/transfer: pt requiring higher level of care for elevated lactic  2 RN skin assessment: completed by Karine BLANCAS  Result of skin assessment and interventions/actions: see EMR, skin WNL  Height, weight, drug calc weight: Done  Patient belongings (see Flowsheet)  MDRO education added to care plan: NA  ?  Pt transferred to unit, belongings with pt. Pt oriented to unit. Pt had moderate aphasia, Kevin Boyle MD paged re: pt was able to communicate and acknowledge that he was tired. Pt requested that wife be notified closer to 6AM, this RN will call & update wife.

## 2021-04-02 NOTE — PROGRESS NOTES
Hematology / Oncology  Daily Progress Note   Date of Service: 04/02/2021  Patient: Fuentes Estrada  MRN: [id number]  Admission Date: 3/27/2021  Hospital Day # 6  Cancer Diagnosis: Metastatic Cholangiocarcinoma   Primary Outpatient Oncologist: Dr. 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     # Metastatic Cholangiocarcinoma   Please see original consult note for full oncologic history. In brief, patient was diagnosed in spring of 2020 with hilar cholangiocarcinoma. In May 2020 he underwent a radical extrahepatic bile duct resection and right hepatectomy. He had all of his disease resected with negative margins and one positive lymph node.  He had a complicated postoperative course with a bile leak and abscess that had delayed starting his adjuvant chemotherapy. On imaging in July 2020,  he appeared to be developing increasing nodularity in the resection bed and regional lymph  "nodes that has led to an EUS with a fine-needle aspiration of the lymph nodes on 8/11/20, demonstrating the presence of metastatic disease. Palliative intent cycle 1 Cisplatin/Gemcitabine = 8/27/20. His abscess/billary drain was managed by IR. Sinogram identified a communication to the biliary tree.  He underwent sinograms and cavitary sclerotherapy every 2 weeks starting August 25, 2020.  Drain was removed on 11/9/2020. Admission to [facility] 10/9/20-10/11/20 with septic shock from infectious enterocolitis. Chemo delayed and restarted 10/21/20. Admission to [facility] 11/18/20-11/19/20 with cholangitis. He was seen on 12/14/2020 with an interim CT scan which showed findings concerning for abscess at the operative site along the posterior aspect of liver with gas seen in this fluid collection.  Patient was also having chills, chemotherapy was held and he was started on ciprofloxacin and Flagyl.  It was felt that aspiration is not likely to prevent recurrence of the abscess. A biliary drain is possible, but likely to be permanent. It has been concluded an endoscopic procedure is not possible. At 12/21/20 visit with Dr. Beard, antibiotic course was extended and he finished 12/31/20. Disease on 12/11/20 was stable. 2/2/21 admission for Admitted for nausea, vomiting and FTT. - Underwent ERCP for possible internal drainage 2/2; unfortunately ERCP was not feasible due to \"edematous and tortuous biliary limb obviating passage\" Biliary tract tube put in on 2/15/2021.   - CT CAP 3/25/21 shows increase in peritoneal implants along the right peritoneal wall demonstrated a 4.5 x 2.0 cm ill-defined mass as well as other evidence of peritoneal nodularity.   - GI consulted; appreciated recs   - HIDA 4/1 no evidence of biliary obstruction, no evidence of biliary leak, persistent hepatogram suggestive of significantly poor hepatic function. Diffuse ascites.   - Rescheduled outpatient DARA appointment to 4/8/21 may need to " discuss restarting therapy based on CT CAP. No indication to initiate chemotherapy while inpatient.      # Elevated LFTs   # Hyperbilirubinemia   # Fever - 100.8 (3/31)  # Hx of biliary leak vs abscess s/p IR perihepatic drain placed on 2/15  Patient T bili typically around 2-3 currently elevated. Continues to have stably elevated LFTs. Patient was preparing to discharge when he developed chills. He subsequently overnight developed a fever to 100.8 and had an episode of emesis. Fever occurred within 24 hours of discontinuation of antibiotics. 4/1 patient developed rigors with LA 6.3   - Continue to monitor daily   - Antibiotics per primary team  - ID consulted; appreciate input     # Ascites 2/2 malignancy   MRCP originally planned for 3/28 but could not be appropriately completed d/t ascites interfering with the imaging.   - s/p paracentesis 3/29 with removal of 3400 mL     # Transiant Aphasia   Patient had an episode of aphasia and word finding difficulty. Stroke code was called stat CT head 4/1 showed no acute intracranial pathology, chronic infract in the left frontal operculum, subinsular region and parietal occipital. Chronic small vessel ischemic disease and diffuse cerebral volume loss.  Aphasia and transient confusion resolved on its own within 10-15 minutes   - Neurology     Patient's plan of care was discussed with attending physician Dr. Molina.    Thank you for the opportunity to partake in this patients plan of care. Please do not hesitate to page with questions. We will continue to follow.     Pratibha Rodriguez PA-C   Hematology/Oncology   Pager: [phone]   ___________________________________________________________________    Subjective & Interval History:    Fuentes have fever and rigors overnight. He had an episode of aphasia stroke code called CT head without evidence of acute intracranial pathology. LA was 6.3 and IV vancomycin was added. Patient had fluctuating BPs and was transferred to ICU. Fuentes is  "feeling ok this morning he has not noted any changes in his abdomen or with distention or tenderness. Discussed extensively with wife at bedside.     A comprehensive review of systems was obtained and is negative other than noted here or in the HPI.     Physical Exam:  /65   Pulse 57   Temp 97.7  F (36.5  C) (Oral)   Resp 18   Ht 1.854 m (6' 1\")   Wt 83.6 kg (184 lb 4.9 oz)   SpO2 100%   BMI 24.32 kg/m      General: ill appearing male lying in bed, no acute distress  HEENT: EOMI, MMM  Neck: supple, normal ROM  CV: RRR, normal S1/S2, no m/r/g  Resp: CTAB, no wheezing/crackles, normal respiratory effort on ambient air  GI: soft, non-tender, mildly-distended, bowel sounds present and normoactive  MSK: warm and well-perfused, normal tone  Skin: no rashes on limited exam, slight jaundice noted  Neuro: Alert and interactive, moves all extremities equally, no focal deficits      Labs & Studies: I personally reviewed the following studies:  ROUTINE LABS (Last four results):  CMP  Recent Labs   Lab 04/02/21  0423 04/01/21  2044 04/01/21  1945 04/01/21  0622 03/31/21  2255 03/30/21  0654 03/30/21  0654 03/29/21  0552 03/28/21  0713 03/27/21  1913     --  137 138 136   < > 136 134 132* 129*   POTASSIUM 3.6  --  3.7 3.9 3.9   < > 3.5 3.6 4.1 4.1   CHLORIDE 111*  --  110* 109 107   < > 107 104 102 96   CO2 20  --  15* 24 22   < > 24 25 27 28   ANIONGAP 7  --  13 5 7   < > 5 5 4 5   *  --  97 116* 130*   < > 113* 122* 84 124*   BUN 11  --  12 10 9   < > 10 15 19 22   CR 0.83  --  0.76 0.73 0.73   < > 0.84 1.00 1.09 0.98   GFRESTIMATED >90  --  >90 >90 >90   < > 90 77 69 79   GFRESTBLACK >90  --  >90 >90 >90   < > >90 90 80 >90   ERIC 7.8*  --  8.4* 8.0* 8.0*   < > 7.8* 8.1* 8.8 9.2   MAG 2.1 1.5*  --   --   --   --  1.7 1.7 1.8 1.7   PHOS  --  1.6*  --   --   --   --   --   --  2.9 2.7   PROTTOTAL 5.4*  --  6.6* 5.5* 6.0*   < > 5.7* 5.5* 5.9* 6.5*   ALBUMIN 1.8*  --  1.4* 1.2* 1.3*   < > 1.2* 1.2* 1.3* " 1.5*   BILITOTAL 5.7*  --  7.9* 6.5* 6.8*   < > 4.3* 4.3* 5.8* 6.6*   ALKPHOS 549*  --  825* 722* 807*   < > 789* 777* 920* 999*   AST 67*  --  99* 91* 109*   < > 107* 91* 118* 139*   ALT 33  --  48 42 48   < > 45 40 48 59    < > = values in this interval not displayed.     CBC  Recent Labs   Lab 04/02/21  0540 04/02/21  0423 04/01/21  1945 04/01/21  0622   WBC 10.7 10.6 3.4* 5.2   RBC 2.14* 2.23* 3.15* 2.60*   HGB 6.7* 6.9* 9.4* 7.9*   HCT 19.8* 20.6* 28.4* 23.8*   MCV 93 92 90 92   MCH 31.3 30.9 29.8 30.4   MCHC 33.8 33.5 33.1 33.2   RDW 17.6* 17.5* 17.3* 17.5*   * 132* 222 162     INR  Recent Labs   Lab 04/02/21  0423 03/28/21  0713 03/27/21  1924   INR 1.95* 1.54* 1.48*       Medications list for reference:  Current Facility-Administered Medications   Medication     bisacodyl (DULCOLAX) Suppository 10 mg     glucose gel 15-30 g    Or     dextrose 50 % injection 25-50 mL    Or     glucagon injection 1 mg     ferrous sulfate (FEROSUL) tablet 325 mg     heparin 100 UNIT/ML injection 5 mL     heparin lock flush 10 UNIT/ML injection 5-10 mL     heparin lock flush 10 UNIT/ML injection 5-10 mL     lidocaine (LMX4) cream     lidocaine 1 % 0.1-1 mL     LORazepam (ATIVAN) tablet 0.5 mg     magnesium gluconate (MAGONATE) tablet 500 mg     melatonin tablet 1 mg     multivitamin w/minerals (THERA-VIT-M) tablet 1 tablet     ondansetron (ZOFRAN-ODT) ODT tab 4 mg    Or     ondansetron (ZOFRAN) injection 4 mg     piperacillin-tazobactam (ZOSYN) 4.5 g vial to attach to  mL bag     polyethylene glycol (MIRALAX) Packet 17 g     prochlorperazine (COMPAZINE) injection 5 mg    Or     prochlorperazine (COMPAZINE) tablet 5 mg    Or     prochlorperazine (COMPAZINE) suppository 12.5 mg     senna-docusate (SENOKOT-S/PERICOLACE) 8.6-50 MG per tablet 1 tablet    Or     senna-docusate (SENOKOT-S/PERICOLACE) 8.6-50 MG per tablet 2 tablet     sodium chloride (PF) 0.9% PF flush 10 mL     sodium chloride (PF) 0.9% PF flush 10-20 mL      sodium chloride (PF) 0.9% PF flush 10-20 mL     sodium chloride (PF) 0.9% PF flush 3 mL     sodium chloride (PF) 0.9% PF flush 3 mL     sodium chloride (PF) 0.9% PF flush 3 mL     sodium chloride (PF) 0.9% PF flush 3 mL     sodium chloride (PF) 0.9% PF flush 3 mL     sodium chloride (PF) 0.9% PF flush 3 mL     sodium chloride 0.9% infusion     thiamine (B-1) 500 mg in sodium chloride 0.9 % 50 mL intermittent infusion     vancomycin 1500 mg in 0.9% NaCl 250 ml intermittent infusion 1,500 mg     zolpidem (AMBIEN) tablet 5 mg

## 2021-04-02 NOTE — ACP (ADVANCE CARE PLANNING)
Care Management Follow Up    Length of Stay (days): 6    Expected Discharge Date: 04/06/21     Concerns to be Addressed: all concerns addressed in this encounter     Patient plan of care discussed at interdisciplinary rounds: Yes    Anticipated Discharge Disposition: Home, Home Care     Referrals Placed by CM/DEX: Advance Care Planning  Private pay costs discussed: Not applicable    Additional Information:  SW was alerted by 7A  that pt had a scheduled appointment with Torsten Lopez to notarize his healthcare directive at 11 am today. Per chart review and bedside RN, pt mentally appropriate to complete directive.     SW assisted [employer] and pt and wife with directive. [employer] also notarized pt's wife's directive. Pt and wife denied additional concerns at this time. Directives scanned to [employer] [employer] to be uploaded in chart. SW will continue to remain available for patient and family support, discharge planning, other resources and support PRN.    NORMA Holloway, [facility]  ICU    [facility]   P: [phone]  Pager: [phone]

## 2021-04-02 NOTE — PROGRESS NOTES
IR follow-up note    Pt with episode of fever (100.4) overnight and bilirubin and Alkphos that remains elevated. GI remains concern for biliary obstruction not clear on dx imaging and cholestasis. PTC with biliary drain placement remains very technically challenging procedure in patient without dilation of bile ducts and with small liver. Additionally, patient has expressed no desire for placement of another drain. LFTs down trended slightly today and patient is HDS. IR would continue to recommend conservative management without additional drain placement.    Imaging and Case reviewed with Dr. Mcfarland, Dr. Purcell, Dr. Brennan, and Dr. Hurtado from IR. Discussed between Dr. Arellano and Dr. Hurtado. IR will continue to follow. Please call IR on-call at [phone] pager if there are concerns for sepsis or worsening LFTs.    Cheri Estrada DNP, APRN  Interventional Radiology   IR on-call pager: [phone]

## 2021-04-02 NOTE — PROGRESS NOTES
Physician Attestation   I,  Balbir Leonardo DO, was present with the medical/DARA student who participated in the service and in the documentation of the note.  I have verified the history and personally performed the physical exam and medical decision making.  I agree with the assessment and plan of care as documented in the note.      I personally reviewed vital signs, medications, labs and imaging.    Patient with another RRT overnight after rigors and fever found to have elevated lactate and aphasia which improved. Head CT without infarct and lactate improved rapidly with fluid and Vanco. Unclear if due to worsening infection or perhaps he was more volume deplete than realized. Noted in the AM to have Hgb < 7 likely from dilution but given RBC and will check coags. Follow-up MRI without infarcts. Plan for diagnostic para to look for infection (distension not bothering patient so will not do therapeutic) and also a tagged wbc scan to look for infection. If no source of infection identified will need to discuss next options/goals of care as patient really wants to go home.     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 Drain was uncapped on admission with progressive improvement in labs. MRCP equivocal. Pt was ready to discharge on 3/31, but then developed chills, fever, & vomiting. IR & ID consulted for further management.     Changes/plan today  -MRI w & w/o contrast ordered showing no acute infarct   -Trending procal & crp   -Paracentesis to assess ascites for infection or evidence of bleeding   -WBC nuclear medicine scan to find source of infection    -PRBCs given   -Continue abx   -Plan to resume Plavix tomorrow once we have scan results & further steps (procedures?)    Sepsis   Fever   Jaundice  Biliary obstruction  Hx of biliary leak vs abscess s/p IR perihepatic drain placed on 2/15  Hx of Strep anginosus abdominal abscess  Pt presented to the ED with rise in jaundice & was found to have elevated LFTs. No evidence of cholangitis or biliary obstruction on initial imaging. IR Uncapped his perihepatic drain on admission with progressive improvement in labs. Pt was on prophylactic zosyn, but was then stopped on 3/30 given LFT improvement & pt clinical presentation. Pt was ready for discharge on 3/31. However, pt developed N/V, fevers and chills on 3/31, so zosyn was resumed & pt stayed in the hospital. Pt was stable 3/31 at 6 pm when he developed uncontrollable chills, an episode of emesis, tachycardia to 144, HTN to 173/100, tachypnea (RR in 30s), and febrile tmax 100.4. Sepsis protocol ordered & stat lactic draw w/result of 6.3. Tele & 12-lead EKG showed sinus tachycardia. Rapid response code called. During rapid response code, patient suddenly became confused (went from A&Ox4 to disoriented to time, situation, place w/aphasia/word-finding difficulty). Stroke code called. Head CT negative for stroke. IVF administered. Mag, K replaced; thiamine administered. Albumin administered. Em was placed to track I&Os. Patient transferred to . Differential  includes, but is not limited to: SBP vs. Biliary blockage/cholangitis vs. Hepatic abscess vs. UTI vs. pneumonia  Infectious workup largely unremarkable so far including the tests below:    - Continue to trend LFTs  -IVF  - GI consulted, MRCP & HIDA equivocal, no further actions at this time   - Oncology consulted, WBC nuclear medicine scan ordered   - IR consulted, percutaneous biliary drain is deferred at this time given lack of biliary dilatation on MRCP   -In patient with partial hepatectomy and no biliary dilation percutaneous drainage would be very technically challenging. Drain course likely to be intercostal which can be more painful. Drain would need to remain in >6 weeks, but would likely be chronic. Patient and wife a very reluctant to proceed with any additional drain placement at this time.   - blood cultures x3, ngtd     Imaging   -CXR: Streaky bibasilar and hazy perihilar opacities, likely atelectasis. Small right and probable trace left pleural effusions  -MRCP, equivocal   -HIDA- no evidence of biliary obstruction, no evidence of biliary leak, persistent hepatogram suggestive of significantly poor hepatic function; Diffuse ascites; Bilateral pleural effusions with associated atelectasis  -CTABP- No acute intra-abdominal pathology, large volume ascites with small bilateral pleural effusions; partial right hepatectomy with percutaneous drain in place, splenomegaly.   -CTA Head/neck no acute large vessel occlusion, aneurysm or stenosis of the major intracranial arteries; neck CTA demonstrates greater than 60-70 % stenosis of the left proximal ICA.  -CT head w/o contrast: No acute intracranial pathology; chronic infarct in the left frontal operculum, subinsular region and parietal occipital regions; chronic small vessel ischemic disease and diffuse cerebral volume loss.  -US carotid- RIGHT ICA: Less than 50% diameter narrowing by grayscale imaging and sonographic velocity criteria; LEFT ICA:  50-69%  diameter stenosis by grayscale imaging and sonographic velocity criteria; Mildly prolonged systolic upstroke throughout the right and left carotid arteries which may suggest some component of aortic stenosis.  -MRI brain w & w/o contrast- pending No evidence of acute infarction; No abnormal enhancing lesions intracranially; Head MRA demonstrates no definite aneurysm or stenosis of the major  intracranial arteries.; Neck MRA demonstrates approximately 60% narrowing of the proximal  left internal carotid artery.; Leukokraurosis and chronic infarcts in the left cerebral  Hemisphere.  - EKG, unremarkable; Troponin WNL     Labs   -LA 2.1 at 1 am, was 4.1 yesterday PM   - elevated CRP, though unchanged from before   -UA: no sign of infection   -CMP: AST, alk phos, & bili improving, Cr WNL, no anion gap   -CBC: WBC WNL  -ammonia WNL  -VBG (ph 7.4, pCO2 27, bicarb 16, PO2 45)   -Procal 2.25 (was 0.74 on 3/31)      Transiet aphasia 2/2 TIA?   On assessment patient was noted to be lethargic, disoriented, responding to my question with incoherent speech. On re evaluation after the head CT scan patient was noted to be back to baseline. Focal neurological defecit was not noted. Head CT unremarkable for acute ischemic changes. CTA with 60-70% stenosis of left proximal ICA. Per report and imaging finding, patient had an old left MCA stroke. Suspect the acute onset of aphasia is 2/2 old stroke recrudescence in the setting of active infection and metabolic abnormality. However, cannot fully exclude acute ischemic event or metastatic disease as cause of symptoms, therefore would recommend MRI brain.  - No thrombotic treatment given minor/isolated/quickly resolving symptoms.  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 Beard, oncology  - treated with cisplatin/cemcitabin 8/2020-12/9 when chemo was stopped due to abscess/fluid collection as above  - concern for progression based on CT on admission  - Oncology consulted    Ascites  - 2/2 malignancy  - paracentesis on 3/29 (3800 ml of yellow colored fluid removed)  - US on 3/27 with septate ascites. Given no fevers and no leukocytosis unlikely that there is active infection there causing obstruction, but it is something to consider.  -Paracentesis today to assess ascites for infection or evidence of bleeding      Hyponatremia- resolved  - due to hypovolemia, malnutrtion, liver disease  - continue to monitor     Anemia of chronic disease  - continue ferrous sulfate supplementation    HTN- resolved?   Bp medications have been held at home for some time now given pt's BP has been on softer side for some time now   - hold lisinopril     Lower Extremity Edema- resolved  - likely due to liver disease  - hold lasix    Diabetes Mellitus, not insulin dependent  - MSSI as needed, was on glipizde at home, but when appetite dropped this was stopped    Malnutrition  - Nutrition consulted  - Continue PTA marinol  - Start MVI    Hx of CVA  - hold atorvastatin and plavix until MRI results available, THEN restart      Chronic Hypomagnesemia  - continue magnesium supplementation    Anxiety  - continue PTA ativan    Insomnia  Sleeplessness  - continue prior to admission ambien    Depression   Adjustment disorder  - endorses feeling of being overwhelmed and how hard the year has been.   - declined medication intervention or psychology at this time      Diet: Regular Diet Adult  Diet  Snacks/Supplements Adult: Ensure Max Protein; Between Meals    DVT  Prophylaxis: Pneumatic Compression Devices  Em Catheter: in place, indication: Retention  Code Status: Full Code           Disposition Plan   Expected discharge: 2 - 3 days, recommended to prior living arrangement once pt is stable & has a proper antibiotic plan & follow up is established.       Entered: Morenita Tompkins, LALI 04/02/2021, 12:04 PM     The patient's care was discussed with the Bedside Nurse, Care Coordinator/, Patient and ID/Hepatology/Oncology Consultant, and attending Dr. Leonardo.      Morenita Tompkins, MS4  Hospitalist Service, [facility]  C: [phone]    [facility]  Contact information available via [facility] Paging/Directory  Please see sign in/sign out for up to date coverage information      ______________________________________________________________________    Interval History    Last night pt developed fever, chills, and aphasia. Stroke code & rapid response were called given acute symptom presentation & elevated lactate 6.4. Pt was transferred to . More details on this acute event above. This morning pt seems fatigued and in low spirits. He denies experiencing fevers, chills, pain, nausea, & vomiting this morning. We discussed the plan with him. He has no further questions or concerns at this time.     Otherwise 4pt ROS is negative    Data reviewed today: I reviewed all medications, new labs and imaging results over the last 24 hours. I personally reviewed no images or EKG's today.    Physical Exam   Vital Signs: Temp: 97.7  F (36.5  C) Temp src: Oral BP: 115/65 Pulse: 57   Resp: 18 SpO2: 100 % O2 Device: Nasal cannula Oxygen Delivery: 1.5 LPM  Weight: 184 lbs 4.87 oz  Gen: NAD, sleeping comfortably in bed, thin,  Jaundiced, fatigued, non-diaphoretic   Eyes: EOMI, conjuctiva icteric  CV: RRR, no murmurs, no carotid bruit on R or L    RESP: CTA bilaterally, no w/r/c  Abd: soft, nontender, moderately distended   Ext: no  edema bilaterally  Neuro: CNII-CNXII grossly intact, bilateral strength is equal, appropriate mentation, able to communicate, sensation is equal and intact bilaterally, no signs of focal neurologic deficits     Data   Recent Labs   Lab 04/02/21  1502 04/02/21  0540 04/02/21  0423 04/01/21 2044 04/01/21  1945 04/01/21  0622 03/28/21  0713 03/28/21  0713 03/27/21  1924 03/27/21  1913   WBC  --  10.7 10.6  --  3.4* 5.2   < > 4.5  --  5.7   HGB 8.0* 6.7* 6.9*  --  9.4* 7.9*   < > 8.5*  --  9.4*   MCV  --  93 92  --  90 92   < > 88  --  86   PLT  --  132* 132*  --  222 162   < > 170  --  224   INR  --   --  1.95*  --   --   --   --  1.54* 1.48*  --    NA  --   --  138  --  137 138   < > 132*  --  129*   POTASSIUM  --   --  3.6  --  3.7 3.9   < > 4.1  --  4.1   CHLORIDE  --   --  111*  --  110* 109   < > 102  --  96   CO2  --   --  20  --  15* 24   < > 27  --  28   BUN  --   --  11  --  12 10   < > 19  --  22   CR  --   --  0.83  --  0.76 0.73   < > 1.09  --  0.98   ANIONGAP  --   --  7  --  13 5   < > 4  --  5   ERIC  --   --  7.8*  --  8.4* 8.0*   < > 8.8  --  9.2   GLC  --   --  115*  --  97 116*   < > 84  --  124*   ALBUMIN  --   --  1.8*  --  1.4* 1.2*   < > 1.3*  --  1.5*   PROTTOTAL  --   --  5.4*  --  6.6* 5.5*   < > 5.9*  --  6.5*   BILITOTAL  --   --  5.7*  --  7.9* 6.5*   < > 5.8*  --  6.6*   ALKPHOS  --   --  549*  --  825* 722*   < > 920*  --  999*   ALT  --   --  33  --  48 42   < > 48  --  59   AST  --   --  67*  --  99* 91*   < > 118*  --  139*   LIPASE  --   --   --  102  --   --   --   --   --  123   TROPI  --   --   --  <0.015  --   --   --   --   --   --     < > = values in this interval not displayed.     Recent Results (from the past 24 hour(s))   CT Abdomen Pelvis w Contrast    Narrative    EXAMINATION: CT ABDOMEN PELVIS W CONTRAST, 4/1/2021 8:58 PM    TECHNIQUE:  Helical CT images from the lung bases through the  symphysis pubis were obtained with IV contrast. Contrast dose:  iopamidol (ISOVUE-370)  solution 105 mL    COMPARISON: CT abdomen pelvis nuclear medicine study 4/1/2021,  abdominal MRI 3/30/2021    HISTORY: Abdominal abscess/infection suspected; Bowel obstruction  suspected    FINDINGS:    Liver: Postoperative changes of partial hepatectomy from the right  lobe of the liver. There is a percutaneous pigtail drain with  intercostal approach located directly within this collection.  Collection measures approximately 1.1 x 5.2 cm. Remainder the liver is  within normal limits.  Biliary system: Cholecystectomy clips. No intrahepatic or extrahepatic  biliary ductal dilatation.  Pancreas: No focal mass or dilation of the main pancreatic duct.  Stomach: Small sliding hilum hernia..  Spleen: Measures 14.4 cm and critical caudal dimension.  Adrenal glands: Within normal limits.  Kidneys: No focal mass, hydronephrosis, or stone.  Bladder: Within normal limits.  Reproductive organs: Within normal limits.  Colon: Scattered colonic diverticuli without adjacent fat stranding.  Appendix: Within normal limits.  Small Bowel: No dilated loops of bowel. Surgical anastomosis in the  right mid abdomen.  Lymph nodes: No intra-abdominal or pelvic lymphadenopathy.  Vasculature: Within normal limits other than mild to moderate arterial  calcification and stable anterior upper abdominal varices. Stable  calcified plaque in the right renal artery.    Large amount of simple intra-abdominal ascites. Previous  omental/peritoneal thickening is not appreciated on today's  examination due to ascites.    Lung bases: Small bilateral pleural effusions with associated passive  atelectasis in the lower lobes. Cardiac size is normal without  pericardial effusion. Moderate to heavy coronary calcium..    Bones and soft tissues: No suspicious soft tissue mass or fluid  collection. No suspicious osseous lesion. Midline hernia filled with  ascites. Mild degenerative changes of the lower lumbar spine.      Impression    IMPRESSION:   1. No acute  intra-abdominal pathology.  2. Large volume ascites with small bilateral pleural effusions.  Consider SBP for elevated lactate.  3. Postoperative changes of partial right hepatectomy with  percutaneous drain in place. This cavity is essentially collapsed.   4. Splenomegaly.    I have personally reviewed the examination and initial interpretation  and I agree with the findings.    MICHAEL ARMSTRONG MD   CTA Head Neck with Contrast    Narrative    CTA  HEAD NECK WITH CONTRAST 4/1/2021 8:59 PM    CT angiogram of the neck   CT angiogram of the base of the brain with contrast  Reconstruction by the Radiologist on the 3D workstation    Provided History:  Neuro deficit, acute, stroke suspected    Comparison:  None available.      Technique:  HEAD and NECK CTA: During rapid bolus intravenous injection of  nonionic contrast material, axial images were obtained using thin  collimation multidetector helical technique from the base of the upper  aortic arch through the [place] of Marcelo. This CT angiogram data was  reconstructed at thin intervals with mild overlap. Images were sent to  the [employer] workstation, and 3D reconstructions were obtained. The  axial source images, multiplanar reformations, 3D reconstructions in  both maximum intensity projection display and volume rendered models  were reviewed, with reconstructions performed by the technologist and  the radiologist.    Contrast: iopamidol (ISOVUE-370) solution 105 mL    Findings:    Head CTA demonstrates scattered calcified plaques in the distal  cortical branches. For instance, left M2 region on series 7, image 183  and very distal left M4 in the parietal region demonstrated on series  7, image 93. No aneurysm or stenosis of the major intracranial  arteries. Fetal left PCA. Right posterior communicating arteries not  visualized. Anterior communicating artery is patent.    Neck CTA demonstrates calcified 60-70% stenosis the left ICA at the  level of bulb from bulky  calcification. Atherosclerotic calcification  at the right carotid bulb without significant stenosis. The origins of  the great vessels from the aortic arch are patent. Aberrant right  subclavian artery. Left vertebral artery arises directed from the  aorta. Atherosclerotic calcifications in bilateral vertebral arteries.  The normal distal right internal carotid artery measures 5.5 mm. The  normal distal left internal carotid artery measures 5.5 mm.     No mass within the visualized portions of the cervical soft tissues or  lung apices. No significant neuroforaminal narrowing or spinal canal  stenosis. Limbus vertebrae C6 body. Right-sided Port-A-Cath. Small  moderate left-sided pleural effusion.      Impression    Impression:    1. Head CTA demonstrates no acute large vessel occlusion, aneurysm or  stenosis of the major intracranial arteries.   2. Neck CTA demonstrates greater than 60-70 % stenosis of the left  proximal ICA.    I have personally reviewed the examination and initial interpretation  and I agree with the findings.    CEDRICK CRAVEN MD   CT Head w/o Contrast    Narrative    CT HEAD W/O CONTRAST 4/1/2021 8:59 PM    History: Neuro deficit, acute, stroke suspected; Mental status change,  unknown cause     Comparison: None available    Technique: Using multidetector thin collimation helical acquisition  technique, axial, coronal and sagittal CT images from the skull base  to the vertex were obtained without intravenous contrast.    Findings: White matter hypoattenuation in subcortical region of the  left frontal operculum and left parieto-occipital region. Gray-white  differentiation is maintained. There is no intracranial hemorrhage,  mass effect, or midline shift. Mild ex vacuo dilation of the left  lateral ventricle. Remainder the ventricles are proportionate to the  cerebral sulci. Mild frontotemporal predominant volume loss. The basal  cisterns are clear. Calcification of the carotid and  vertebral  arteries.    The bony calvaria and the bones of the skull base are normal. Small  amount of mucosal thickening in the right maxillary sinus. Mastoid air  cells are clear.      Impression    Impression:  1. No acute intracranial pathology.  2. Chronic infarct in the left frontal operculum, subinsular region  and parietal occipital regions.  3. Chronic small vessel ischemic disease and diffuse cerebral volume  loss.    I have personally reviewed the examination and initial interpretation  and I agree with the findings.    CEDRICK CRAVEN MD   US Carotid Bilateral    Narrative    BILATERAL CAROTID DUPLEX DOPPLER ULTRASOUND 4/2/2021 8:54 AM    CLINICAL HISTORY: left carotid stenosis on CTA    COMPARISON: None    REFERRING PROVIDER: [facility]BIGN[facility] CH[facility]BERS    TECHNIQUE: Grayscale (B-mode) and duplex and spectral Doppler  ultrasound of the common carotid, extracranial internal carotid,  external carotid, and vertebral artery origins. Velocity measurements  obtained with angle correction at or less than 60 degrees.    FINDINGS:    RIGHT SIDE:     Plaque Morphology: Predominantly echogenic, with irregular plaque at  the carotid bulb       Proximal CCA: 73/18 cm/s     Mid CCA: 81/20 cm/s     Distal CCA: 73/16 cm/s     External CA: 123/0 cm/s       Proximal ICA: 59/17 cm/s     Mid ICA: 71/22 cm/s     Distal ICA: 59/18 cm/s       Vertebral artery: Antegrade: 45/15 cm/s     ICA/CCA ratio: 0.97     There is mildly prolonged systolic upstroke throughout the carotid  arteries with diminished velocities which may suggest some component  of aortic stenosis.    LEFT SIDE:     Plaque Morphology: Predominantly echogenic, with bulky shadowing  irregular plaque at the carotid bulb and proximal ICA       Proximal CCA: 66/13 cm/s     Mid CCA: 92/15 cm/s     Distal CCA: 80/22 cm/s     External CA: 69/0 cm/s       Proximal ICA: 212/53 cm/s     Mid ICA: 125/33 cm/s     Distal ICA: 55/23 cm/s       Vertebral artery: Antegrade: 39/14 cm/s      ICA/CCA ratio: 1.57     There is mildly prolonged systolic upstroke of the carotid arteries  which may suggest some component of aortic stenosis.      Impression    IMPRESSION:    1. RIGHT ICA: Less than 50% diameter narrowing by grayscale imaging  and sonographic velocity criteria.    2. LEFT ICA:  50-69% diameter stenosis by grayscale imaging and  sonographic velocity criteria.    3. Mildly prolonged systolic upstroke throughout the right and left  carotid arteries which may suggest some component of aortic stenosis.    Consensus Panel Gray-Scale and Doppler US Criteria for Diagnosis of  ICA Stenosis (Radiology 11/2003) additionally modified by Ledy et  al. in Journal of Vascular Surgery 1/2011, [phone].       Normal         ICA PSV < 140 cm/sec       Plaque Estimate None       ICA/CCA  PSV Ratio < 2.0       ICA EDV < 40 cm/sec       < 50%          ICA PSV < 140 cm/sec       Plaque Estimate < 50%       ICA/CCA  PSV Ratio < 2.0       ICA EDV < 40 cm/sec       50- 69%       ICA -230 cm/sec       Plaque Estimate > or = 50%       ICA/CCA PSV Ratio 2.0-4.0       ICA EDV  cm/sec         > or = 70%, less than near occlusion       ICA PSV > 230 cm/sec       Plaque Estimate > or = 50%       ICA/CCA Ratio > 4.0       ICA EDV > 100 cm/sec                                            Additional criteria from vascular surgery     > 80%       EDV > 120 cm/sec     I have personally reviewed the examination and initial interpretation  and I agree with the findings.    ANUJA BERRY MD   MR Brain for Stroke [facility] w/o & w Contras    Narrative    MRI brain without and with contrast  MRA of the head without contrast  Neck MRA without and with contrast    Provided History:  Transient ischemic attack (TIA).    Per chart, patient with transient aphasia. History of old left MCA  stroke.    Comparison:  CTA head/neck 4/1/2021, CT head 4/1/2021, carotid  ultrasound 4/2/2021      Technique:   Brain MRI:  Axial diffusion,  FLAIR, T2-weighted, susceptibility, and  coronal T1-weighted images were obtained without intravenous contrast.  Following intravenous gadolinium-based contrast administration, axial  and coronal T1-weighted images were obtained.    Head MRA: 3D time-of-flight MRA of the [place] of [place]o was performed  without intravenous contrast.  Neck MRA:  Limited non contrast 2DTOF images were obtained of the  mid-cervical region. Following intravenous gadolinium-based contrast  administration, a contrast enhanced MRA of the neck/cervical vessels  was performed.  Three-dimensional reconstructions of the neck and head MRA were  created, which were reviewed by the radiologist.    Dose: 8ml gadavist    Findings:   Brain MRI: Axial diffusion weighted images demonstrate no definite  acute infarct. Encephalomalacia and gliosis in the left parietal lobe  and left subinsular region.. Generalized parenchymal volume loss, most  significant of the left frontotemporal lobes. Mild ex vacuo dilatation  of the left lateral ventricle. The remaining ventricles are  unremarkable. Mild leukoaraiosis. Several areas with susceptibility  artifact, involving the bilateral basal ganglia and left parietal  occipital lobes, suggestive of microhemorrhage. Contrast-enhanced  images of the brain demonstrate no abnormal intra- or extra-axial  enhancement.    Head MRA demonstrates no definite aneurysm or stenosis of the major  intracranial arteries. Fetal origin of the left PCA. The right  posterior communicating artery is hypoplastic. The anterior  communicating artery is patent.    Neck MRA demonstrates patent major cervical arteries. There is  approximately 60% narrowing of the proximal left internal carotid  artery. The normal distal right internal carotid artery measures 6 mm.  The normal distal left internal carotid artery measures 5 mm.  Antegrade flow in the major cervical vasculature. Aberrant right  subclavian artery. The left vertebral artery  arises from the aortic  arch.      Impression    Impression:  1. No evidence of acute infarction.  2. No abnormal enhancing lesions intracranially.  3. Head MRA demonstrates no definite aneurysm or stenosis of the major  intracranial arteries.  4. Neck MRA demonstrates approximately 60% narrowing of the proximal  left internal carotid artery.  5. Leukokraurosis and chronic infarcts in the left cerebral  hemisphere.    I have personally reviewed the examination and initial interpretation  and I agree with the findings.    CEDRICK CRAVEN MD

## 2021-04-02 NOTE — PROVIDER NOTIFICATION
04/01/21 2000   Call Information   Date of Call 04/01/21   Time of Call 1956   Name of person requesting the team Amira   Title of person requesting team RN   RRT Arrival time 1958   Time RRT ended 2003  (stroke code called)   Reason for call   Type of RRT Adult   Primary reason for call Sepsis suspected   Sepsis Suspected Elevated Lactate level;Heart Rate > 100;WBC <4 or >12   Was patient transferred from the ED, ICU, or PACU within last 24 hours prior to RRT call? 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 s/p R hepatectomy w/ radical extrahepatic bile duct resection, portal LN resection, and Bianca en Y hepaticojejunosopy to left hepatic duct c/b biliary leak s/p drain placement (removed 11/2020) with persistent perihepatic fluid collection c/f biloma vs abscess on prolonged antibiotic therapy now s/p IR perihepatic drain placement 2/15 with subsequent sinogram 3/25 in which the drain is now capped (but remains in place), HTN, T2DM, CVA, anemia, malnutrition, and hypomagnesemia who presents with jaudince for the past 4-5 days and labs 3/26 demonstrated new rise in bilirubin and transaminitis   Notable History/Conditions Cancer;Organ failure;Diabetes;Hypertension  (CVA)   Assessment A/OX3, but quickly had word finding difficulties and confusion, slightly febrile, tachycardic, tachypneic, abdomin distended with hernia, not following all commands   Interventions Fluid bolus;Labs;Other (describe)  (CT ABD and head)   RRT Team   Attending/Primary/Covering Physician Gold 9   Date Attending Physician notified 04/01/21   Time Attending Physician notified 1956   Physician(s) Marcella Carrasco RN Lin Rodriguez   RT N/A   Post RRT Intervention Assessment   Post RRT Assessment Stable/Improved   Date Follow Up Done 04/01/21   Time Follow Up Done 2232   Comments pt to be transferred to [facility] when bed available

## 2021-04-02 NOTE — PLAN OF CARE
7A>4C PT. Cancel. Transferred down to ICU today due to neuro changes. At time of PM check in, pt/spouse working with providers. Pt had plans were for discharge home, will reschedule and follow-up if new mobility concerns impact discharge plan.

## 2021-04-02 NOTE — CONSULTS
[facility]    Stroke Consult Note    Reason for Consult: Stroke Code    Chief Complaint: Aphasia    HPI  Fuentes Estrada is a 66 year old male with past medical history of metastatic cholangiocarcinoma diagnosed on 5/2020, complicated by peritoneal metastasis status post right hepatectomy w/ radical extrahepatic bile duct resection, portal LN resection, RNY hepaticojejunostomy to left hepatic duct c/b recent biliary leak and biloma s/p drain placement (removed 11/2020) on prolonged abx therapy w/ IR drain placement on 2/15/21, sinogram on 3/25/21 w/ subsequent capping of IR drain who presented w/ worsening jaundice and concern for obstruction.  Biliary drain uncapped on day of admission with progressive improvement in labs.  Over the past 24 hours patient was noted to have fever, chills and nausea concerning for active infection.  Was started on IV antibiotic. RRT was called minutes before stroke code for lactate of 6.4 . At the time of RRT evaluation patient noted to have aphasia and stroke code was called.  Last known normal about 10 minutes before calling stroke code at 19:55.    hrombolytic Treatment   Not given due to minor/isolated/quickly resolving symptoms.    Endovascular Treatment  Not initiated due to absence of proximal vessel occlusion    Impression  #Transiet aphasia  On assessment patient was noted to be lethargic, disoriented, responding to my question with incoherent speech. On re evaluation after the head CT scan patient was noted to be back to baseline. Focal neurological defecit was not noted. Head CT unremarkable for acute ischemic changes.     CTA with 60-70% stenosis of left proximal ICA. Per report and imaging finding, patient had an old left MCA stroke. Suspect the acute onset of aphasia is 2/2 old stroke recrudescence in the setting of active infection and metabolic abnormality.  The left sylvian fissures shows a calcified plaque, likely  representing a atheroembolic M2 MCA stroke as can be seen on head CT without contrast.  Carotid duplex study completed in 4/2/2021 showed the left ICA was 50 to 69% stenosed, with peak maximum systolic velocity of 212 cm/sec. MRI of the brain completed 4/2/2021 did not show any acute areas of ischemia, with no additional silent infarcts other than the encephalomalacia noted on head CT [patient has been documented to have had a stroke since 2010].  A carotid disease is typically considered symptomatic, when there is a stroke within the past 6 months.  At this point, given that the patient is combating sepsis in the setting of metastatic cholangiocarcinoma, it is possible that the patient's symptoms are likely recrudescence of her prior stroke.  Would recommend management of underlying infection/cancer per primary team, with further outpatient evaluation for carotid artery stenting versus an endarterectomy [the bifurcation is heavily calcified and thus the patient may need a endarterectomy, however at this point he is not hemodynamically stable to pursue operative intervention]    Continue with secondary stroke prophylaxis with Plavix 75 mg daily and atorvastatin 40 mg at bedtime, when able to resume antithrombotic therapy.    No further neurological work-up indicated at this point. NCC team will sign off, please call with further questions.    D/w  Dr. Mondragon.    Stroke fellow  Torsten Vieira    ______________________________________________________    Past Medical History   Past Medical History:   Diagnosis Date     Cerebrovascular accident (CVA) (H) 12/2010     Cholangiocarcinoma (H)      Diabetes (H)      Essential hypertension, benign     Hypertension, Benign     Nonspecific abnormal results of liver function study     Hx of elevated LFT's     Past Surgical History   Past Surgical History:   Procedure Laterality Date     ENDOSCOPIC RETROGRADE CHOLANGIOPANCREATOGRAM N/A 3/31/2020    Procedure: ENDOSCOPIC  RETROGRADE CHOLANGIOPANCREATOGRAPHY, WITH with biliary sphincterotomy, biopsies and brushings, biliary stent placement;  Surgeon: Win Strauss MD;  Location: [facility]     ENDOSCOPIC RETROGRADE CHOLANGIOPANCREATOGRAM N/A 4/6/2020    Procedure: ENDOSCOPIC RETROGRADE CHOLANGIOPANCREATOGRAPHY;  Surgeon: Win Strauss MD;  Location: [facility]     ENDOSCOPIC RETROGRADE CHOLANGIOPANCREATOGRAM WITH SPYGLASS  4/16/2020    Procedure: ENDOSCOPIC RETROGRADE CHOLANGIOPANCREATOGRAPHY, WITH DIRECT DUCT VISUALIZATION, USING PANCREATICOBILIARY FIBEROPTIC PROBE; Bile Duct Stent Exchange and Biopsy,balloon sweep of bile duct;  Surgeon: Win Strauss MD;  Location: [facility]     ENDOSCOPIC ULTRASOUND UPPER GASTROINTESTINAL TRACT (GI) N/A 4/16/2020    Procedure: ENDOSCOPIC ULTRASOUND, ESOPHAGOSCOPY / UPPER GASTROINTESTINAL TRACT (GI)with Fine Needle biopsy;  Surgeon: Cody Baumann MD;  Location: [facility]     ENDOSCOPIC ULTRASOUND UPPER GASTROINTESTINAL TRACT (GI) N/A 8/11/2020    Procedure: ENDOSCOPIC ULTRASOUND, ESOPHAGOSCOPY / UPPER GASTROINTESTINAL TRACT (GI);  Surgeon: Guru Bailey Rossi MD;  Location: [facility]     ENTEROSCOPY SMALL BOWEL N/A 2/2/2021    Procedure: Enteroscopy small bowel;  Surgeon: Chiki Swan MD;  Location: [facility]     ESOPHAGOSCOPY, GASTROSCOPY, DUODENOSCOPY (EGD), COMBINED N/A 4/6/2020    Procedure: ESOPHAGOGASTRODUODENOSCOPY (EGD);  Surgeon: Win Strauss MD;  Location: [facility]     ESOPHAGOSCOPY, GASTROSCOPY, DUODENOSCOPY (EGD), COMBINED N/A 8/11/2020    Procedure: Esophagogastroduodenoscopy, With Fine Needle Aspiration Biopsy, With Endoscopic Ultrasound Guidance;  Surgeon: Guru Bailey Rossi MD;  Location: [facility]     EXPLORE COMMON BILE DUCT N/A 5/12/2020    Procedure: extra-hepatic bile duct resection;  Surgeon: Oswaldo aHle MD;  Location: [facility]     HC KNEE SCOPE, DIAGNOSTIC  1995    Arthroscopy, Knee; left     HC VASECTOMY  UNILAT/BILAT W POSTOP SEMEN      Vasectomy     HEPATECTOMY PARTIAL N/A 5/12/2020    Procedure: Exploratory Laparotomy, Open right hepatectomy, Radical Extra-Hepatic Bile Duct Resection, Portal Lymph Node Dissection, Intraoperative Ultrasound, Intra Air-Cholangiogram ,Bianca-En-Y Left Hepaticojejunostomy, Excision Skin Lesion;  Surgeon: Oswaldo Hale MD;  Location: [facility]     INSERT PORT VASCULAR ACCESS Right 9/28/2020    Procedure: ultrasound guided right internal venous access port placement with flouroscopy;  Surgeon: Fercho Wayne DO;  Location: [facility]     IR ABSCESS TUBE CHANGE  6/12/2020     IR FOLLOW UP VISIT OUTPATIENT  7/24/2020     IR FOLLOW UP VISIT OUTPATIENT  8/3/2020     IR FOLLOW UP VISIT OUTPATIENT  10/19/2020     IR SINOGRAM INJECTION DIAGNOSTIC  7/14/2020     IR SINOGRAM INJECTION DIAGNOSTIC  9/14/2020     IR SINOGRAM INJECTION DIAGNOSTIC  10/13/2020     IR SINOGRAM INJECTION DIAGNOSTIC  3/1/2021     IR SINOGRAM INJECTION THERAPEUTIC  8/25/2020     IR SINOGRAM INJECTION THERAPEUTIC  9/25/2020     IR SINOGRAM INJECTION THERAPEUTIC  10/2/2020     IR SINOGRAM INJECTION THERAPEUTIC  9/18/2020     IR THORACENTESIS  5/16/2020     LYMPHADENECTOMY ABDOMINAL N/A 5/12/2020    Procedure: portal lymph node dissection, intraoperative liver ultrasound;  Surgeon: Oswaldo Hale MD;  Location: [facility]     Medications   Home Meds  Prior to Admission medications    Medication Sig Start Date End Date Taking? Authorizing Provider   amoxicillin (AMOXIL) 875 MG tablet Take 1 tablet (875 mg) by mouth 2 times daily 3/31/21  Yes Balbir Leonardo DO   lactobacillus rhamnosus, GG, (CULTURELL) capsule Take 1 capsule by mouth daily   Yes Unknown, Entered By History   LORazepam (ATIVAN) 0.5 MG tablet Take 1 tablet (0.5 mg) by mouth every 4 hours as needed (Anxiety, Nausea/Vomiting or Sleep) 8/27/20  Yes Aydin Beard MD   magnesium gluconate 30 MG tablet Take 1 tablet (30 mg) by mouth daily  Patient taking  differently: Take 30 mg by mouth 2 times daily  11/5/20 11/5/21 Yes Aydin Beard MD   ondansetron (ZOFRAN) 8 MG tablet Take 1 tablet (8 mg) by mouth every 8 hours as needed (Nausea/Vomiting) 3/3/21  Yes Aydin Beard MD   prochlorperazine (COMPAZINE) 10 MG tablet Take 0.5 tablets (5 mg) by mouth every 6 hours as needed (Nausea/Vomiting) 8/27/20  Yes Aydin Beard MD   sodium chloride, PF, 0.9% PF flush 10 mLs by Intracatheter route every 12 hours 3/31/21  Yes Balbir Leonardo DO   tadalafil (CIALIS) 10 MG tablet Take 10 mg by mouth daily as needed   Yes Unknown, Entered By History   vitamin D3 (CHOLECALCIFEROL) 250 mcg (09513 units) capsule Take 1 capsule by mouth daily   Yes Unknown, Entered By History   zolpidem (AMBIEN) 10 MG tablet Take 1 tablet (10 mg) by mouth nightly as needed for sleep 1/5/21  Yes Leslie Quiles CNP   atorvastatin (LIPITOR) 40 MG tablet Take 40 mg by mouth At Bedtime     Reported, Patient   clopidogrel (PLAVIX) 75 MG tablet Take 75 mg by mouth At Bedtime     Reported, Patient   dexamethasone (DECADRON) 4 MG tablet Take 1 tablet (4 mg) by mouth daily (with breakfast) Daily for the 3 days after chemotherapy in the morning with food 12/9/20   Leeanne Lake APRN CNP   dronabinol (MARINOL) 5 MG capsule Take 2 capsules in am and 1 capsule in pm (before meals) 2/26/21   Leeanne Lake APRN CNP   fenofibrate (TRIGLIDE/LOFIBRA) 160 MG tablet Take 160 mg by mouth At Bedtime     Reported, Patient   Ferrous Sulfate (IRON) 325 (65 Fe) MG tablet Take 1 tablet by mouth 3 times daily (with meals) 4/8/20   Hussain Irizarry DO   furosemide (LASIX) 20 MG tablet Take 20 mg by mouth Every Monday, Wednesday, Friday, and Saturday morning    Reported, Patient   lisinopril (ZESTRIL) 5 MG tablet Take 5 mg by mouth every evening  2/3/21   JenAmanda frey APRN CNP   oxyCODONE (ROXICODONE) 5 MG tablet Take 1-2 tablets (5-10 mg) by mouth every 4 hours as needed for  "moderate to severe pain 5/18/20   Yuliet Lundberg, PAAashishC   sildenafil (REVATIO) 20 MG tablet Take 20 mg by mouth as needed    Reported, Patient       Scheduled Meds    sodium chloride 0.9%  1,000 mL Intravenous Once     dronabinol  10 mg Oral QAM     dronabinol  5 mg Oral Daily with supper     ferrous sulfate  325 mg Oral TID w/meals     heparin  5 mL Intracatheter Q28 Days     heparin lock flush  5-10 mL Intracatheter Q24H     magnesium gluconate  500 mg Oral Daily     magnesium sulfate  4 g Intravenous Once     multivitamin w/minerals  1 tablet Oral Daily     piperacillin-tazobactam  4.5 g Intravenous Q6H     senna-docusate  1 tablet Oral BID    Or     senna-docusate  2 tablet Oral BID     sodium chloride (PF)  10 mL Intracatheter Q8H     sodium chloride (PF)  3 mL Intracatheter Q8H     sodium chloride (PF)  3 mL Intracatheter Q8H     sodium phosphate  15 mmol Intravenous Once     thiamine  500 mg Intravenous Q8H     vancomycin (VANCOCIN) IV  1,500 mg Intravenous Q12H       Infusion Meds    sodium chloride Stopped (04/01/21 6213)       PRN Meds  bisacodyl, glucose **OR** dextrose **OR** glucagon, heparin lock flush, lidocaine 4%, lidocaine 4%, lidocaine (buffered or not buffered), lidocaine (buffered or not buffered), LORazepam, melatonin, ondansetron **OR** ondansetron, polyethylene glycol, prochlorperazine **OR** prochlorperazine **OR** prochlorperazine, sodium chloride (PF), sodium chloride (PF), sodium chloride (PF), sodium chloride (PF), sodium chloride (PF), sodium chloride (PF), zolpidem    Allergies   Allergies   Allergen Reactions     Metformin Other (See Comments)     \" I think my kidneys shut down\"     Family History   Family History   Problem Relation Age of Onset     Arthritis Mother         RA     Diabetes Father         diet controlled     Hypertension Father      Social History   Social History     Tobacco Use     Smoking status: Never Smoker     Smokeless tobacco: Never Used   Substance Use " Topics     Alcohol use: Not Currently     Comment: occaisional      Drug use: No       Review of Systems   The 10 point Review of Systems is negative other than noted in the HPI or here.        PHYSICAL EXAMINATION  Temp:  [97.4  F (36.3  C)-100.4  F (38  C)] 100.4  F (38  C)  Pulse:  [] 94  Resp:  [16-34] 18  BP: ()/() 111/63  SpO2:  [93 %-100 %] 97 %     General:  patient lying in bed without any acute distress    HEENT:  normocephalic/atraumatic  Cardio:  RRR  Pulmonary:  no respiratory distress  Abdomen:  non-distended  Extremities:  no edema  Skin:  intact     Neurologic  Mental Status:  Alert, oriented x 1, follows simple commands, incoherent word salad in response to questions,  Unable to name, read and repeat, comprehension intact by following commands  Cranial Nerves:  visual fields intact, EOMI with normal smooth pursuit, facial sensation intact and symmetric, facial movements symmetric, no dysarthria, shoulder shrug strong bilaterally  Motor: moves all ext against gravity and equally  Reflexes:  toes down-going  Sensory:  light touch sensation intact and symmetric throughout upper and lower extremities, no extinction on double simultaneous stimulation   Coordination:  normal finger-to-nose bilaterally without dysmetria  Gait: Deffered    Stroke Scales    NIHSS  Interval     Interval Comments     1a. Level of Consciousness 2-->Not alert, requires repeated stimulation to attend, or is obtunded and requires strong or painful stimulation to make movements (not stereotyped)   1b. LOC Questions 2-->Answers neither question correctly   1c. LOC Commands 0-->Performs both tasks correctly   2.   Best Gaze 0-->Normal   3.   Visual 0-->No visual loss   4.   Facial Palsy 0-->Normal symmetrical movements   5a. Motor Arm, Left 0-->No drift, limb holds 90 (or 45) degrees for full 10 secs   5b. Motor Arm, Right 0-->No drift, limb holds 90 (or 45) degrees for full 10 secs   6a. Motor Leg, Left 0-->No  drift, leg holds 30 degree position for full 5 secs   6b. Motor Leg, right 0-->No drift, leg holds 30 degree position for full 5 secs   7.   Limb Ataxia 0-->Absent   8.   Sensory 0-->Normal, no sensory loss   9.   Best Language 2-->Severe aphasia, all communication is through fragmentary expression, great need for inference, questioning, and guessing by the listener. Range of information that can be exchanged is limited, listener carries burden of. . . (see row details)   10. Dysarthria 0-->Normal   11. Extinction and Inattention  0-->No abnormality   Total 6 (04/01/21 2031)       Imaging  I personally reviewed all imaging; relevant findings per HPI.     Lab Results Data   CBC  Recent Labs   Lab 04/01/21 1945 04/01/21 0622 03/31/21  2255   WBC 3.4* 5.2 7.8   RBC 3.15* 2.60* 2.84*   HGB 9.4* 7.9* 8.5*   HCT 28.4* 23.8* 25.5*    162 182     Basic Metabolic Panel    Recent Labs   Lab 04/01/21 1945 04/01/21 0622 03/31/21  2255    138 136   POTASSIUM 3.7 3.9 3.9   CHLORIDE 110* 109 107   CO2 15* 24 22   BUN 12 10 9   CR 0.76 0.73 0.73   GLC 97 116* 130*   ERIC 8.4* 8.0* 8.0*     Liver Panel  Recent Labs   Lab 04/01/21 1945 04/01/21 0622 03/31/21  2255   PROTTOTAL 6.6* 5.5* 6.0*   ALBUMIN 1.4* 1.2* 1.3*   BILITOTAL 7.9* 6.5* 6.8*   ALKPHOS 825* 722* 807*   AST 99* 91* 109*   ALT 48 42 48     INR    Recent Labs   Lab Test 03/28/21  0713 03/27/21 1924 03/25/21  1020   INR 1.54* 1.48* 1.58*      Lipid Profile  No lab results found.  A1C    Recent Labs   Lab Test 03/27/21 1913 05/04/20  1327   A1C 5.6 6.0*     Troponin I  No results for input(s): TROPI in the last 168 hours.       Stroke Code / Stroke Consult Data Data   Stroke Code Data  (for stroke code without tele)  Stroke code activated 04/01/21 2005   First stroke provider response 04/01/21 2008   Last known normal 04/01/21 1955   Time of discovery   (or onset of symptoms)         Head CT read by Stroke Neuro Dr/Provider 04/01/21 2035   Was  stroke code de-escalated? Yes 04/01/21 2100

## 2021-04-02 NOTE — PHARMACY-VANCOMYCIN DOSING SERVICE
Pharmacy Vancomycin Initial Note  Date of Service 2021  Patient's  1955  66 year old, male    Indication: Sepsis    Current estimated CrCl = CrCl cannot be calculated (This lab value cannot be used to calculate CrCl because it is not a number: PENDING).    Creatinine for last 3 days  3/30/2021:  6:54 AM Creatinine 0.84 mg/dL  3/31/2021:  9:34 AM Creatinine 0.72 mg/dL; 10:55 PM Creatinine 0.73 mg/dL  2021:  6:22 AM Creatinine 0.73 mg/dL;  7:45 PM Creatinine PENDING mg/dL    Recent Vancomycin Level(s) for last 3 days  No results found for requested labs within last 72 hours.      Vancomycin IV Administrations (past 72 hours)      No vancomycin orders with administrations in past 72 hours.                Nephrotoxins and other renal medications (From now, onward)    Start     Dose/Rate Route Frequency Ordered Stop    21  vancomycin 1500 mg in 0.9% NaCl 250 ml intermittent infusion 1,500 mg      1,500 mg  over 90 Minutes Intravenous EVERY 12 HOURS 21 2300  piperacillin-tazobactam (ZOSYN) 4.5 g vial to attach to  mL bag      4.5 g  over 30 Minutes Intravenous EVERY 6 HOURS 21 2234            Contrast Orders - past 72 hours (72h ago, onward)    Start     Dose/Rate Route Frequency Ordered Stop    21 1500  technetium Tc 99m mebrofenin (CHOLETEC) radioisotope injection 5-7 millicurie      5-7 millicurie Intravenous ONCE 21 1439 21 1439    21 1500  technetium Tc 99m mebrofenin (CHOLETEC) radioisotope injection 4.8-7.2 millicurie  Status:  Discontinued      4.8-7.2 millicurie Intravenous ONCE 21 1441 21 1442    21 1300  gadobutrol (GADAVIST) injection 7 mL      7 mL Intravenous ONCE 21 1230 21 1155                Plan:  1.  Start vancomycin  1500 mg IV q12h.   2.  Goal Trough Level: 15-20 mg/L   3.  Pharmacy will check trough levels as appropriate in 1-3 Days.    4. Serum creatinine levels will be ordered  daily for the first week of therapy and at least twice weekly for subsequent weeks.    5. [place] method utilized to dose vancomycin therapy: Method 2    Bony Phillips RPH

## 2021-04-02 NOTE — PROGRESS NOTES
CLINICAL NUTRITION SERVICES - REASSESSMENT NOTE     Nutrition Prescription    RECOMMENDATIONS FOR MDs/PROVIDERS TO ORDER:  --Consider starting an appetite stimulant, if appropriate (re: Remeron, Marinol, or Megace).   --If PO intake remains poor, consider placing feeding tube and starting enteral nutrition.     Malnutrition Status:    Severe malnutrition in the context of acute on chronic illness    Recommendations already ordered by Registered Dietitian (RD):  --Ensure Max Protein (vanilla) TID between meals (plus x1 @ 6 pm).  --Special K bar @ 10 am daily (plus x1 @ 6 pm).  --Wife may bring in food/supplements.     Future/Additional Recommendations:  --Monitor PO intake and ability to order calorie counts if PO intake starts to improve.  --Supplement tolerance.  --If EN becomes POC:  --GOAL: Osmolite 1.5 Russell @ goal of 65ml/hr (1560ml/day) will provide: 2340 kcals (32 kcal/kg), 97 g PRO (1.3 g/kg), 1188 ml free H20, 317 g CHO, and 0 g fiber daily.  --Start TF @ 15 ml/hr and advance by 10 ml q 8 hrs until goal rate.  --Do not start or advance TF rate unless K+ >3.0, Mg++ > 1.5,  and Phos > 1.9.  --Minimum 30 ml q 4 hrs water flushes for tube patency.  --If gastric enteral access: HOB > 30 degrees       EVALUATION OF THE PROGRESS TOWARD GOALS   Diet: Regular  Intake: % prior to 4/1 per flowsheets; poor appetite documented in RN notes since ICU admission     NEW FINDINGS   Weight: weight trending up, confounded by ascites  03/27/21 73 kg (161 lb)   03/25/21 74.8 kg (165 lb)   03/01/21 80.3 kg (177 lb)   02/15/21 80.3 kg (177 lb)     02/01/21 80.3 kg (177 lb)   01/27/21 80.3 kg (177 lb)   01/04/21 80.3 kg (177 lb)   12/09/20 81.5 kg (179 lb 11.2 oz)   11/25/20 81.2 kg (179 lb 1.6 oz)   11/19/20 77.8 kg (171 lb 9.6 oz)   11/11/20 83.3 kg (183 lb 9.6 oz)   10/28/20 80.5 kg (177 lb 8 oz)   10/21/20 83.9 kg (184 lb 14.4 oz)   10/13/20 83 kg (183 lb)   10/07/20 83.1 kg (183 lb 4.8 oz)   10/02/20 79.8 kg (176 lb)    09/28/20 79.8 kg (176 lb)   09/25/20 79.8 kg (176 lb)   09/23/20 81.6 kg (180 lb)   09/23/20 80 kg (176 lb 4.8 oz)   09/18/20 79.1 kg (174 lb 6.4 oz)   09/16/20 79.1 kg (174 lb 6.4 oz)   09/14/20 80.7 kg (178 lb)   09/02/20 80.4 kg (177 lb 4.8 oz)   08/27/20 80 kg (176 lb 4.8 oz)   06/09/20 87.5 kg (193 lb)   06/01/20 (P) 87.5 kg (193 lb)   05/15/20 98 kg (216 lb 1.6 oz)   05/04/20 93 kg (205 lb)   04/16/20 92.9 kg (204 lb 12.9 oz)   04/05/20 93.1 kg (205 lb 3.2 oz)   03/31/20 94.6 kg (208 lb 8.9 oz)   06/12/02 102.5 kg (226 lb)   01/31/02 105.5 kg (232 lb 8 oz)   01/21/02 103.9 kg (229 lb)     Labs: K+ 3.6 (low normal), Mg++ 1.5 (L on 4/1), Phos 1.6 (L on 4/1), total bili 5.7 (H), Alk phos 549 (H), lactic acid 2.1 (H)  Meds: ferrous sulfate, magnesium gluconate, thera-vit-m, senna-docusate BID, thiamine 500 mg IV TID (4/1-4/4)  GI: abdomen distended, last BM 4/1  CT abdomen/pelvis 4/1:  --Large volume ascites with small bilateral pleural effusions.  --Postoperative changes of partial right hepatectomy with percutaneous drain in place. This cavity is essentially collapsed.     Pt transferred to ICU 2/2 elevated lactic acid. Visited with pt and wife at bedside. Pt minimally conversive but reports no appetite today. Per wife, pt ate well for breakfast/lunch on 4/1 prior to ICU admission. Per wife, pt drinks Premier Protein Vanilla blended with whole milk q night with meds. Pt dislikes Boost/Ensure, but agreed to try Ensure Max Protein which is similar to Premier Protein. Pt declined vanilla Magic Cups today, but may try them once feeling better. Pt also agreed to Special K bar. Placed orders for 6 pm today (per pt preference) and snack times TID.     Per wife, pt was eating well PTA and weight had improved (pt lost a significant amount of weight after admission in May last year and then again in October 2020). Deferred complete NFPE, but able to visualize wasting in face (pt had blankets up to his neck).      MALNUTRITION  % Intake: Decreased intake does not meet criteria  % Weight Loss: > 20% in 1 year (severe) --> 25% wt loss x 11 months  Subcutaneous Fat Loss: Facial region:  moderate  Muscle Loss: Temporal:  moderate and Facial & jaw region:  moderate  Fluid Accumulation/Edema: mild 2+ edema  Malnutrition Diagnosis: Severe malnutrition in the context of acute on chronic illness    Previous Goals   1. Diet adv within 24 to 48 hrs  2. Patient to consume % of nutritionally adequate meal trays TID, or the equivalent with supplements/snacks.  Evaluation: met goal 1, not consistently meeting goal 2    Previous Nutrition Diagnosis  Underweight related to question inadequate nutritional intakes vs hypermetabolism with illness as evidenced by wt loss of 16 lbs over past 3 weeks (may in part d/t paracentesis), underweight at 87% of IBW, poor po intakes x several days PTA and NPO at present.  Evaluation: modified below    CURRENT NUTRITION DIAGNOSIS  Inadequate oral intake related to poor appetite and suspected hypermetabolism 2/2 diagnosis as evidenced by pt/wife report of PO intake, 25% weight loss x 11 months, and evident muscle/fat wasting on exam consistent with severe malnutrition.       INTERVENTIONS  Implementation  Enteral Nutrition - recs  Medical food supplement therapy  Modify composition of meals/snacks    Goals  Patient to consume % of nutritionally adequate meal trays TID, or the equivalent with supplements/snacks.    Monitoring/Evaluation  Progress toward goals will be monitored and evaluated per protocol.    Neema Hartley, MS, RD, LD, [facility]  [facility]U pager: [phone]  ASCOM: [id number]

## 2021-04-02 NOTE — PROGRESS NOTES
Rapid Response Team Note    Assessment   In assessment a rapid response was called on Fuentes Estrada due to Lactic AcidosisThis presentation is likely due to sepsis and worsened by malignancy.     Plan   -  Transfer patient to [facility]  - Give 50g of 25% albumin  -  Repeat LA in 2 hours  -  The Internal Medicine primary team was able to be reached and they are in agreement with the above plan.  -  Disposition: The patient will remain on the current unit. We will continue to monitor this patient closely. and will be transferred to [facility].  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 Drain uncapped on admission w/ progressive improvement in labs. MRCP equivocal. HIDA scan w/o  Patient w/ fever, chills, nausea vomiting over past 24 hours. RRT called for LA of 4.1 (6.4). LA trending down following IVF. CT head and CT A/P negative for acute pathology. EKG w/o acute change and troponin neg. Procal 2.25 (0.74). BP soft- will give albumin and transfer patient to [facility]. On broad spectrum abx. Continues to be without complaint. Discussed POC with wife at bedside, Bedside RN, ICU float RN.   Admission Diagnosis:   Cholestatic jaundice [R17]  Cholangiocarcinoma (H) [C22.1]   Physical Exam   Temp: 100.4  F (38  C) Temp  Min: 97.4  F (36.3  C)  Max: 100.4  F (38  C)  Resp: 18  Resp  Min: 16  Max: 34  SpO2: 97 % SpO2  Min: 93 %  Max: 100 %  Pulse: 94 Pulse  Min: 59  Max: 144    No data recorded  BP: 103/64 Systolic (24hrs), Av , Min:95 , Max:174   Diastolic (24hrs), Av, Min:53, Max:100     I/Os: I/O last 3 completed shifts:  In: 2800 [P.O.:240; I.V.:2560]  Out: 100 [Urine:100]     Exam:   General: chronically ill appearing  Mental Status: oriented x1.  Skin: Jaundice  GI: Abdomen mildly distended. Biliary drain on right. BS+. No rebound or guarding.  Neuro: Answers yes and no. Somnolent.   CV: RRR though no m/r/g  Resp: Breathing non-labored on RA      Significant Results and Procedures   Lactic Acid:   Recent Labs   Lab Test 21  1913 21  1754 20  0015 20  0015 20  0203 20  0203   LACT 4.1*  --  1.1 1.3   < >  --    < >  --    LACTS  --  6.3*  --   --   --  3.0*  --  1.5    < > = values in this interval not displayed.     CBC:   Recent Labs   Lab Test 21  0622 21  2255   WBC 3.4* 5.2 7.8   HGB 9.4* 7.9* 8.5*   HCT 28.4* 23.8* 25.5*    162 182        Sepsis Evaluation   The patient is known to have an infection.  Fuentes Estrada meets SIRS criteria AND has a lactate >2 or other evidence of acute organ damage.  These vital signs, lab and physical exam findings are consistent with SEVERE SEPSIS.    Sepsis Time-Zero (time severe sepsis diagnosis confirmed):   21 as this was the time when Lactate resulted, and the level was > 2.0     Anti-infectives (From now, onward)    Start     Dose/Rate Route Frequency Ordered Stop    21  vancomycin 1500 mg in 0.9% NaCl 250 ml intermittent infusion 1,500 mg      1,500 mg  over 90 Minutes Intravenous EVERY 12 HOURS 21 2300  piperacillin-tazobactam (ZOSYN) 4.5 g vial to attach to  mL bag      4.5 g  over 30 Minutes Intravenous EVERY 6 HOURS 21 2234      21 0000  amoxicillin (AMOXIL) 875 MG  tablet      875 mg Oral 2 TIMES DAILY 03/31/21 3738          Current antibiotic coverage is appropriate for source of infection.    3 Hour Severe Sepsis Bundle Completion:  1. Initial Lactic Acid result shown above. Repeat lactic acid ordered for 2 hours from now.   2. Blood Cultures before Antibiotics: Yes  3. Broad Spectrum Antibiotics Administered: yes  4. Fluids: Albumin mL fluids ORDERED to be given

## 2021-04-02 NOTE — PROGRESS NOTES
1500 - 0145    67 y/o M w/hx of metastatic hilar cholangiocarcinoma (dx 5/2020) w/peritoneal mets s/p hepatectomy (complicated by biliary leak - s/p drain placement - w/persistent perihepatic fluid collection); perihepatic drain placement on 2/15/21 w/sinogram 3/25 & capping of drain; hx also includes hypertension, DM2, CVA, anemia, malnutrition, hypomagnesemia. Referred to [facility] following 4-5 days of jaundice, elevated LFTs & admitted on 3/27/21.     Vitals: Temp: 98.4  F (36.9  C) Temp src: Oral BP: 103/78 Pulse: 86   Resp: 28 SpO2: 96 % O2 Device: None (Room air)    Pain/Nausea: Denies pain & nausea; one episode of emesis w/200 out @ approx 1900.   Diet: Regular; poor appetite.   BG orders: ACHS; 90@ 2000.   LDA: LPIV x2, R chest port-a-cath.   GI: BM on 4/1/21.   : No void from 3449-3081. Em catheter placed w/200 of icteric urine out.   Skin: Jaundiced, thin/fragile.   Neuro: A&Ox4; episode of disoriented & aphasia.   Mobility: Assist of 1 w/walker, gait belt.    Plan: Transfer to  for higher level of care.     Of note this shift: Patient had acute change in condition in PM beginning at 1730 w/uncontrollable chills. At approx 1930 RN entered patient room to find that he had just had an episode of emesis, was tachycardic to 144, hypertensive to 173/100, tachypneic (RR in 30s), and febrile. Provider was already at the bedside. Sepsis protocol ordered & stat lactic draw w/result of 6.3. Tele & 12-lead EKG showed sinus tachycardia. Rapid response code called. During rapid response code, patient suddenly became confused (went from A&Ox4 to disoriented to time, situation, place w/aphasia/word-finding difficulty). Stroke code called. Head CT negative for stroke. On-call neuro MD explained to pt & his wife that she believed the aphasia was related to deficits/damage from his previous CVA. Plavix has been held this admission. Several boluses were ordered & administered in addition to labs. Mag, K replaced; thiamine  administered. Albumin administered. Patient BP unstable during this time but showed that patient may be dropping his pressures. Em was placed to track I&Os. RN was in close contact with team before, during, after, code. Patient ultimately transferred to  for higher level of care.

## 2021-04-03 NOTE — PLAN OF CARE
Transferred to:  at 1500  Status at time of transfer: VSS, room air, A&Ox4.   Belongings: sent with pt  Lyman removed? (if no, why?): retention lyman per order  Chart and medications: sent with pt  Family notified: Spouse at bedside.        Problem: Adult Inpatient Plan of Care  Goal: Plan of Care Review  Outcome: Improving     Problem: Adult Inpatient Plan of Care  Goal: Absence of Hospital-Acquired Illness or Injury  Outcome: Improving

## 2021-04-03 NOTE — PLAN OF CARE
ICU End of Shift Summary. See flowsheets for vital signs and detailed assessment.    Changes this shift: Patient is 6B overflow on [place] service, also followed by oncology.  On broad spectrum antibiotics.  Worked up for suspected infection and concern for emboli based on last night RR/Stroke code where he experienced some mild aphasia.  Beside US performed, Head and neck MRI, diagnostic paracentesis - samples sent to lab. Patient had 2 large loose/soft BMs today.  Dc'd NC at 2L, pt SATs 100%, now room air. 1 unit prbcs given, hgb increased to 8.    Plan: Continue POC.  Notify team of any significant changes.

## 2021-04-03 NOTE — PLAN OF CARE
Time: 0856-6631    Reason for admission: increased jaundice  Vitals: VSS on RA, eva intermittent soft BP  Activity: Ax1 with walker/gait belt  Pain: denies pain  Neuro: A&Ox4  Cardiac: Bradycardic  Respiratory: LS diminished in bases denies SOB/cough  GI/: lyman in place for retention, urine icteric/soledad.  No BM this shift  Diet: regular diet, poor appetite requiring encouragement with eating  Lines: PIV infusing, R port-a-cath  Skin: declined 4eyes assessment/full skin assessment  Labs: K 3.2, Phos 1.5 replaced  New this shift: pt arrived on 5A from 4C in bed with chart, meds and belongings.  Wife at bedside.  Cultures ordered from bili drain.    Plan: Will continue to monitor and follow POC

## 2021-04-03 NOTE — PROGRESS NOTES
Physician Attestation   I,  Balbir Leonardo DO, was present with the medical/DARA student who participated in the service and in the documentation of the note.  I have verified the history and personally performed the physical exam and medical decision making.  I agree with the assessment and plan of care as documented in the note.      I personally reviewed vital signs, medications, labs and imaging.    Has been stable on current abx. Discussed with ID who aren't sure this is an infection but we will go ahead with WBC scan to see if we can find any source if none identified will need to delve further into his causes of fevers and rigors in the evening or transition to oral abx and watch. He does have some output from his drain now which we will culture. Will also get TTE to look for endocarditis although would be culture negative endocarditis and he has no other sequela of endocarditis making it unlikely. Again patient really wants to go home so hopefully we get answers soon.    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 Drain was uncapped on admission with progressive improvement in labs. MRCP equivocal. Pt was ready to discharge on  3/31, but then developed chills, fever, & vomiting. IR & ID consulted for further management.     Changes/plan today  -Ascites fluid reassuring, will follow up with the culture, ngtd  -Echo to search for source of infection   -Vit K   -WBC nuclear medicine scan to find source of infection    -Resume Plavix once we have scan results & further steps (procedures?)    Sepsis   Fever   Jaundice  Biliary obstruction  Hx of biliary leak vs abscess s/p IR perihepatic drain placed on 2/15  Hx of Strep anginosus abdominal abscess  Pt presented to the ED with rise in jaundice & was found to have elevated LFTs. No evidence of cholangitis or biliary obstruction on initial imaging. IR Uncapped his perihepatic drain on admission with progressive improvement in labs. Pt was on prophylactic zosyn, but was then stopped on 3/30 given LFT improvement & pt clinical presentation. Pt was ready for discharge on 3/31. However, pt developed N/V, fevers and chills on 3/31, so zosyn was resumed & pt stayed in the hospital. Pt was stable 3/31 at 6 pm when he developed uncontrollable chills, an episode of emesis, tachycardia to 144, HTN to 173/100, tachypnea (RR in 30s), and febrile tmax 100.4. Sepsis protocol ordered & stat lactic draw w/result of 6.3. Tele & 12-lead EKG showed sinus tachycardia. Rapid response code called. During rapid response code, patient suddenly became confused (went from A&Ox4 to disoriented to time, situation, place w/aphasia/word-finding difficulty). Stroke code called. Head CT negative for stroke. IVF administered. Mag, K replaced; thiamine administered. Albumin administered. Em was placed to track I&Os. Patient transferred to . Differential includes, but is not limited to: SBP vs. Biliary blockage/cholangitis vs. Hepatic abscess vs. UTI vs. pneumonia  Infectious workup largely unremarkable so far including the tests below. Will follow up with WBC NM scan.   -Echo ordered to search for source of infection    -  Continue to trend LFTs  -IVF  - GI consulted, MRCP & HIDA equivocal, no further actions at this time   - Oncology consulted, WBC nuclear medicine scan ordered   - IR consulted, percutaneous biliary drain is deferred at this time given lack of biliary dilatation on MRCP   -In patient with partial hepatectomy and no biliary dilation percutaneous drainage would be very technically challenging. Drain course likely to be intercostal which can be more painful. Drain would need to remain in >6 weeks, but would likely be chronic. Patient and wife a very reluctant to proceed with any additional drain placement at this time.   - blood cultures x3, ngtd     Imaging   -CXR: Streaky bibasilar and hazy perihilar opacities, likely atelectasis. Small right and probable trace left pleural effusions  -MRCP, equivocal   -HIDA- no evidence of biliary obstruction, no evidence of biliary leak, persistent hepatogram suggestive of significantly poor hepatic function; Diffuse ascites; Bilateral pleural effusions with associated atelectasis  -CTABP- No acute intra-abdominal pathology, large volume ascites with small bilateral pleural effusions; partial right hepatectomy with percutaneous drain in place, splenomegaly.   -CTA Head/neck no acute large vessel occlusion, aneurysm or stenosis of the major intracranial arteries; neck CTA demonstrates greater than 60-70 % stenosis of the left proximal ICA.  -CT head w/o contrast: No acute intracranial pathology; chronic infarct in the left frontal operculum, subinsular region and parietal occipital regions; chronic small vessel ischemic disease and diffuse cerebral volume loss.  -US carotid- RIGHT ICA: Less than 50% diameter narrowing by grayscale imaging and sonographic velocity criteria; LEFT ICA:  50-69% diameter stenosis by grayscale imaging and sonographic velocity criteria; Mildly prolonged systolic upstroke throughout the right and left carotid arteries which may suggest some component of  aortic stenosis.  -MRI brain w & w/o contrast- pending No evidence of acute infarction; No abnormal enhancing lesions intracranially; Head MRA demonstrates no definite aneurysm or stenosis of the major  intracranial arteries.; Neck MRA demonstrates approximately 60% narrowing of the proximal  left internal carotid artery.; Leukokraurosis and chronic infarcts in the left cerebral  Hemisphere.  - EKG, unremarkable; Troponin WNL     Labs   -Ascites fluid from 4/2/21 reassuring, will follow up with the culture, ngtd  -LA 2.1 at 1 am, was 4.1 yesterday PM   - elevated CRP, though unchanged from before   -UA: no sign of infection   -CMP: AST, alk phos, & bili improving, Cr WNL, no anion gap   -CBC: WBC WNL  -ammonia WNL  -VBG (ph 7.4, pCO2 27, bicarb 16, PO2 45)   -Procal 2.25 (was 0.74 on 3/31)      Transiet aphasia 2/2 TIA?   On assessment patient was noted to be lethargic, disoriented, responding to my question with incoherent speech. On re evaluation after the head CT scan patient was noted to be back to baseline. Focal neurological defecit was not noted. Head CT unremarkable for acute ischemic changes. CTA with 60-70% stenosis of left proximal ICA. Per report and imaging finding, patient had an old left MCA stroke. Suspect the acute onset of aphasia is 2/2 old stroke recrudescence in the setting of active infection and metabolic abnormality. However, cannot fully exclude acute ischemic event or metastatic disease as cause of symptoms, therefore would recommend MRI brain.  - No thrombotic treatment given minor/isolated/quickly resolving symptoms.  -Resume secondary stroke prophylaxis with Plavix 75 mg daily after WBC NM scan results (INR 1.95) and atorvastatin 40 mg at bedtime, assuming MRI shows no contraindications     -No endovascular treatment due to absence of proximal vessel occlusion  - Bilateral US carotid to further evaluate flow given 60-70% stenosis demonstrated on CTA neck  - MRI brain w & w/o contrast  (results above)  - We will follow along as imaging studies are completed      Metastatic hilar cholangiocarcinoma (dx 5/2020) with peritoneal mets:  - follows with Dr. Beard, oncology  - treated with cisplatin/cemcitabin 8/2020-12/9 when chemo was stopped due to abscess/fluid collection as above  - concern for progression based on CT on admission  - Oncology consulted    Ascites  - 2/2 malignancy  - paracentesis on 3/29 (3800 ml of yellow colored fluid removed)  - US on 3/27 with septate ascites. Given no fevers and no leukocytosis unlikely that there is active infection there causing obstruction, but it is something to consider.  -Ascites fluid from 4/2/21 reassuring, will follow up with the culture, ngtd    Hyponatremia- resolved  - due to hypovolemia, malnutrtion, liver disease  - continue to monitor     Anemia of chronic disease  - continue ferrous sulfate supplementation    HTN- resolved?   Bp medications have been held at home for some time now given pt's BP has been on softer side for some time now   - hold lisinopril     Lower Extremity Edema- resolved  - likely due to liver disease  - hold lasix    Diabetes Mellitus, not insulin dependent  - MSSI as needed, was on glipizde at home, but when appetite dropped this was stopped    Malnutrition  - Nutrition consulted  - Continue PTA marinol  - Start MVI    Hx of CVA  - hold atorvastatin and plavix until MRI results available, THEN restart (INR 1.95)    Chronic Hypomagnesemia  - continue magnesium supplementation    Anxiety  - continue PTA ativan    Insomnia  Sleeplessness  - continue prior to admission ambien    Depression   Adjustment disorder  - endorses feeling of being overwhelmed and how hard the year has been.   - declined medication intervention or psychology at this time      Diet: Regular Diet Adult  Diet  Snacks/Supplements Adult: Ensure Max Protein; Between Meals    DVT Prophylaxis: Pneumatic Compression Devices  Em Catheter: in place, indication:  Retention  Code Status: Full Code           Disposition Plan   Expected discharge: 2 - 3 days, recommended to prior living arrangement once pt is stable & has a proper antibiotic plan & follow up is established.       Entered: Morenita Tompkins, LALI 04/02/2021, 12:04 PM     The patient's care was discussed with the Bedside Nurse, Patient and ID/Hepatology/Oncology Consultant, and attending Dr. Leonardo.      Morenita Tompkins, MS4  Hospitalist Service, [facility] 9  C: [phone]    [facility]  Contact information available via [facility] Paging/Directory  Please see sign in/sign out for up to date coverage information      ______________________________________________________________________    Interval History    Pt slept well overnight. No acute events. He denies any pain, fevers, nausea or vomiting, but endorses loose stools overnight, seem to be improving today. This morning he feels well. He is ready to go home. He has no further questions or concerns at this time.     Otherwise 4pt ROS is negative    Data reviewed today: I reviewed all medications, new labs and imaging results over the last 24 hours. I personally reviewed no images or EKG's today.    Physical Exam   Vital Signs: Temp: 97.7  F (36.5  C) Temp src: Oral BP: 116/65 Pulse: (!) 49   Resp: 16 SpO2: 100 % O2 Device: None (Room air) Oxygen Delivery: 2 LPM  Weight: 186 lbs 4.62 oz  Gen: NAD, sleeping comfortably in bed, thin,  Jaundiced, fatigued  Eyes: EOMI, conjuctiva icteric  CV: RRR, no murmurs, no carotid bruit on R or L    RESP: CTA bilaterally, no w/r/c  Abd: soft, nontender, moderately distended   Ext: 1+ edema bilaterally  Neuro: CNII-CNXII grossly intact, appropriate mentation, able to communicate, no signs of focal neurologic deficits     Data   Recent Labs   Lab 04/03/21  0403 04/02/21  1502 04/02/21  0540 04/02/21  0423 04/01/21  2044 04/01/21  1945 03/28/21  0713 03/28/21  0713  03/27/21 1913 03/27/21 1913   WBC 7.0  --  10.7 10.6  --  3.4*   < > 4.5  --  5.7   HGB 8.0* 8.0* 6.7* 6.9*  --  9.4*   < > 8.5*  --  9.4*   MCV 91  --  93 92  --  90   < > 88  --  86   *  --  132* 132*  --  222   < > 170  --  224   INR 1.95*  --   --  1.95*  --   --   --  1.54*   < >  --      --   --  138  --  137   < > 132*  --  129*   POTASSIUM 3.3*  --   --  3.6  --  3.7   < > 4.1  --  4.1   CHLORIDE 114*  --   --  111*  --  110*   < > 102  --  96   CO2 21  --   --  20  --  15*   < > 27  --  28   BUN 14  --   --  11  --  12   < > 19  --  22   CR 0.76  --   --  0.83  --  0.76   < > 1.09  --  0.98   ANIONGAP 7  --   --  7  --  13   < > 4  --  5   ERIC 7.9*  --   --  7.8*  --  8.4*   < > 8.8  --  9.2   *  --   --  115*  --  97   < > 84  --  124*   ALBUMIN 1.6*  --   --  1.8*  --  1.4*   < > 1.3*  --  1.5*   PROTTOTAL 5.0*  --   --  5.4*  --  6.6*   < > 5.9*  --  6.5*   BILITOTAL 4.4*  --   --  5.7*  --  7.9*   < > 5.8*  --  6.6*   ALKPHOS 505*  --   --  549*  --  825*   < > 920*  --  999*   ALT 33  --   --  33  --  48   < > 48  --  59   AST 74*  --   --  67*  --  99*   < > 118*  --  139*   LIPASE  --   --   --   --  102  --   --   --   --  123   TROPI  --   --   --   --  <0.015  --   --   --   --   --     < > = values in this interval not displayed.     Recent Results (from the past 24 hour(s))   MR Brain for Stroke Cmpl w/o & w Contras    Narrative    MRI brain without and with contrast  MRA of the head without contrast  Neck MRA without and with contrast    Provided History:  Transient ischemic attack (TIA).    Per chart, patient with transient aphasia. History of old left MCA  stroke.    Comparison:  CTA head/neck 4/1/2021, CT head 4/1/2021, carotid  ultrasound 4/2/2021      Technique:   Brain MRI:  Axial diffusion, FLAIR, T2-weighted, susceptibility, and  coronal T1-weighted images were obtained without intravenous contrast.  Following intravenous gadolinium-based contrast administration,  axial  and coronal T1-weighted images were obtained.    Head MRA: 3D time-of-flight MRA of the [place] of [place] was performed  without intravenous contrast.  Neck MRA:  Limited non contrast 2DTOF images were obtained of the  mid-cervical region. Following intravenous gadolinium-based contrast  administration, a contrast enhanced MRA of the neck/cervical vessels  was performed.  Three-dimensional reconstructions of the neck and head MRA were  created, which were reviewed by the radiologist.    Dose: 8ml gadavist    Findings:   Brain MRI: Axial diffusion weighted images demonstrate no definite  acute infarct. Encephalomalacia and gliosis in the left parietal lobe  and left subinsular region.. Generalized parenchymal volume loss, most  significant of the left frontotemporal lobes. Mild ex vacuo dilatation  of the left lateral ventricle. The remaining ventricles are  unremarkable. Mild leukoaraiosis. Several areas with susceptibility  artifact, involving the bilateral basal ganglia and left parietal  occipital lobes, suggestive of microhemorrhage. Contrast-enhanced  images of the brain demonstrate no abnormal intra- or extra-axial  enhancement.    Head MRA demonstrates no definite aneurysm or stenosis of the major  intracranial arteries. Fetal origin of the left PCA. The right  posterior communicating artery is hypoplastic. The anterior  communicating artery is patent.    Neck MRA demonstrates patent major cervical arteries. There is  approximately 60% narrowing of the proximal left internal carotid  artery. The normal distal right internal carotid artery measures 6 mm.  The normal distal left internal carotid artery measures 5 mm.  Antegrade flow in the major cervical vasculature. Aberrant right  subclavian artery. The left vertebral artery arises from the aortic  arch.      Impression    Impression:  1. No evidence of acute infarction.  2. No abnormal enhancing lesions intracranially.  3. Head MRA demonstrates no  definite aneurysm or stenosis of the major  intracranial arteries.  4. Neck MRA demonstrates approximately 60% narrowing of the proximal  left internal carotid artery.  5. Leukokraurosis and chronic infarcts in the left cerebral  hemisphere.    I have personally reviewed the examination and initial interpretation  and I agree with the findings.    CEDRICK CRAVEN MD   POC US Guide for Paracentesis    Impression    POCUS Abdomen    Indication: Evaluate for ascites for safe site for paracentesis    Findings:  LLQ: Large volume ascites.  Needle tip was shown at peritoneum before entry and final position within fluid pocket of abdominal cavity.

## 2021-04-03 NOTE — PLAN OF CARE
ICU End of Shift Summary. See flowsheets for vital signs and detailed assessment.    Changes this shift: A/Ox4, SR-SB, B/P 90/50's, HR 40-50's. Bolus given, EKG SB, multiple loose stools, Low urine output. Electrolytes replaced. Minimal output from bili drain. Wife Robyn updated.    Plan: Continue plan of care, transfer to

## 2021-04-04 NOTE — PHARMACY-VANCOMYCIN DOSING SERVICE
Pharmacy Vancomycin Note  Date of Service 2021  Patient's  1955   66 year old, male    Indication: Sepsis  Goal Trough Level: 15-20 mg/L  Day of Therapy: 3  Current Vancomycin regimen:  1500 mg IV q12h    Current estimated CrCl = Estimated Creatinine Clearance: 112.7 mL/min (based on SCr of 0.75 mg/dL).    Creatinine for last 3 days  2021:  7:45 PM Creatinine 0.76 mg/dL  2021:  4:23 AM Creatinine 0.83 mg/dL  4/3/2021:  4:03 AM Creatinine 0.76 mg/dL  2021:  7:40 AM Creatinine 0.75 mg/dL    Recent Vancomycin Levels (past 3 days)  2021:  7:40 AM Vancomycin Level 24.8 mg/L 11.33hr level, pretty good trough    Vancomycin IV Administrations (past 72 hours)                   vancomycin 1500 mg in 0.9% NaCl 250 ml intermittent infusion 1,500 mg (mg) 1,500 mg New Bag 21 0841     1,500 mg New Bag 21     1,500 mg New Bag  0905     1,500 mg New Bag 21     1,500 mg New Bag  0834     1,500 mg New Bag 21                Nephrotoxins and other renal medications (From now, onward)    Start     Dose/Rate Route Frequency Ordered Stop    21  vancomycin 1500 mg in 0.9% NaCl 250 ml intermittent infusion 1,500 mg      1,500 mg  over 90 Minutes Intravenous EVERY 12 HOURS 21 2300  piperacillin-tazobactam (ZOSYN) 4.5 g vial to attach to  mL bag      4.5 g  over 30 Minutes Intravenous EVERY 6 HOURS 21 2234               Contrast Orders - past 72 hours (72h ago, onward)    Start     Dose/Rate Route Frequency Ordered Stop    21 1300  gadobutrol (GADAVIST) injection 8.36 mL      0.1 mL/kg × 83.6 kg Intravenous ONCE 21 1236 21 1237    21  iopamidol (ISOVUE-370) solution 105 mL      105 mL Intravenous ONCE 2101/21 1500  technetium Tc 99m mebrofenin (CHOLETEC) radioisotope injection 5-7 millicurie      5-7 millicurie Intravenous ONCE 21 1439 21 1432     04/01/21 1500  technetium Tc 99m mebrofenin (CHOLETEC) radioisotope injection 4.8-7.2 millicurie  Status:  Discontinued      4.8-7.2 millicurie Intravenous ONCE 04/01/21 1441 04/01/21 1442          Interpretation of levels and current regimen:  Trough level is  Supratherapeutic    Has serum creatinine changed > 50% in last 72 hours: No    Renal Function: Stable    Plan:  1.  Decrease Dose to 2g Q24H, received this AM's dose already today, will start new regimen 4/5 AM  2.  Pharmacy will check trough levels as appropriate in 1-3 Days.    3. Serum creatinine levels will be ordered daily for the first week of therapy and at least twice weekly for subsequent weeks.      Bharat Bruce PharmD, [facility]    [phone]  Pager [phone]          .

## 2021-04-04 NOTE — PROGRESS NOTES
ID brief note -     S:   Patient without recurrence of clinical decompensation since 4/1. Has been on empiric IV vancomycin and zosyn, and blood cultures and ascitic cultures no growth. His old IR drain output was cultured on 4/3. He reports no fevers/chills, no respiratory symptoms, no nausea/vomiting, no abdominal pain, no diarrhea. +abdominal distension.    O:  Temp: 97.4  F (36.3  C) Temp src: Oral BP: 109/56 Pulse: 52   Resp: 18 SpO2: 93 % O2 Device: None (Room air)     Constitutional: Pleasant male seen lying in bed, in NAD.   HEENT: NCAT, mildly icteric sclerae, conjunctiva clear. Moist mucous membranes.  Respiratory: Non-labored breathing, on RA.   Cardiovascular: Regular rate   GI:  Abdomen is soft, +distended, and non-tender to palpation. No rigidity or guarding. Abdominal drain in place with scant dark yellow fluid output.   Skin: Warm and dry. No rashes or lesions on exposed surfaces.  Musculoskeletal: Extremities grossly normal. No tenderness.  Neurologic: Awake, alert, interactive  Neuropsychiatric: Affect normal/appropriate.    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 Dale in ID clinic) who presented to the hospital with 4-5 days of jaundice.  Since admission, has had CT abdomen, MRCP, and HIDA scans which were largely unrevealing for new/acute infections -- showed signs of malignancy with possible peritoneal mets, and resolution of prior abdominal fluid collection, no cholangitis/biliary obstruction. DDx including ongoing intraabdominal infection, complications of malignancy/metastases, autonomic dysfunction/orthostatic hypotension, endocrine dysfunction.       Recs:  - could continue empiric IV vancomycin and zosyn for now given the unclear cause of his repeated decompensations this admission with ddx including ongoing intraabdominal infection  - continue to follow up cultures to guide antimicrobial therapy  - work-up for etiologies of his clinical decompensations pending: primary team considering WBC scan and non-infectious work up also if remaining work-up unrevealing  - if discharging while work-up is still pending, then could convert to empiric po Augmentin and Bactrim until outpatient follow up with ID (already planned for 4/16 with Dr. Dale)      Lakhwinder Shah MD  Infectious Diseases  Date of service: 04/04/21

## 2021-04-04 NOTE — PLAN OF CARE
Time: 8336-1701     Reason for admission: increased jaundice  Vitals: VSS on RA, bradycardic  Activity: Ax1 with walker/gait belt  Pain: denies pain  Neuro: A&Ox4  Cardiac: Bradycardic  Respiratory: LS diminished in bases denies SOB/cough  GI/: lyman in place for retention, urine icteric/soledad.  2 BM this shift  Diet: regular diet, poor appetite requiring encouragement with eating  Lines: PIV infusing TKO, R port-a-cath infusing NS at 75ml/hr.  IV thiamin, vanco and zosyn  Skin: scabing to forearms/knees.  Skin tear on R forearm, primapore re-applied.  Edema in L arm, L knee and BLE  Labs: K 3.2 replaced recheck 3.9, Phos 1.5 replaced waiting on recheck  New this shift: pt arrived on 5A from 4C in bed with chart, meds and belongings.  Wife at bedside.  Cultures ordered from bili drain.  incontinent of stool x1  Plan: Nuc Med WBC study.  Will continue to monitor and follow POC

## 2021-04-04 NOTE — PLAN OF CARE
"/56 (BP Location: Right arm)   Pulse 52   Temp 97.4  F (36.3  C) (Oral)   Resp 18   Ht 1.854 m (6' 1\")   Wt 84.5 kg (186 lb 4.6 oz)   SpO2 93%   BMI 24.58 kg/m      Time: 7810-0251    Reason for admission: Cholangiocarcinoma  Activity: A1 w/ GB+W. Bed alarm in place for safety. Pt calling approprietly.   Pain: Denies   Neuro: A&O x4.  Cardiac: Tele: Bradycardic. Denies chest pain.  Respiratory: Pt on RA. Denies SOB. Lung sounds diminished. No cough  GI/: BM x2 overnight- loose green/brown. Lyman cath in place- soledad output. Low output.   Diet: Regular diet, tolerating.   Lines: PIV @ 125 ml/hr. R chest port infused antibiotics overnight.   Skin: Pt declined full skin assessment.   Labs/Imaging: B & 134.    New changes this shift: BM x2 overnight- loose brown/green. Minimal output in lyman & justine drain. Wife update.     Continue to monitor and follow POC     "

## 2021-04-04 NOTE — PROGRESS NOTES
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 Drain was uncapped on admission with progressive improvement in labs. MRCP equivocal. Pt was ready to discharge on 3/31, but then developed chills, fever, & vomiting. IR & ID consulted for further management.     Sepsis   Fever   Jaundice  Biliary obstruction  Hx of biliary leak vs abscess s/p IR perihepatic drain placed on 2/15  Hx of Strep anginosus abdominal abscess  Pt presented to the ED with rise in jaundice & was found to have elevated LFTs. No evidence of cholangitis or biliary obstruction on initial imaging. IR Uncapped his perihepatic drain on admission with progressive improvement in labs. Pt was on prophylactic zosyn, but was then stopped on 3/30 given LFT improvement & pt clinical presentation. Pt was ready for discharge on 3/31. However, pt developed N/V, fevers and chills on 3/31, so zosyn was resumed & pt stayed in the hospital. While on zosyn patient had another SIRS response with associated aphasia and vancomycin was added and more fluids given with rapid resolution in lactate and AMS. Stroke workup was negative. Patient has remained stable on Vanc/zosyn and further infectious workup is underway including NM wbc scan  and now cultures of drain fluid that started to put out again.  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 On re evaluation after the head CT scan patient was noted to be back to baseline. Focal neurological defecit was not noted. Head CT unremarkable for acute ischemic changes. CTA with 60-70% stenosis of left proximal ICA. Follow up brain MRI without any acute stroke. Aphasia likely recrudescence of old stroke.  - Will resume plavix and statin  - Follow up regarding stenosis as needed     Metastatic hilar cholangiocarcinoma (dx 5/2020) with peritoneal mets:  - follows with Dr. Beard, oncology  - treated with cisplatin/cemcitabin 8/2020-12/9 when chemo was stopped due to abscess/fluid collection as above  - concern for progression based on CT on admission  - Oncology consulted    LUE swelling   Likely due to IV infiltrate but given lack of anticoagulation and malignancy will get LUE US to rule out DVT    Ascites  - 2/2 malignancy  - paracentesis on 3/29 (3800 ml of yellow colored fluid removed)  - US on 3/27 with septate ascites. Given no fevers and no leukocytosis unlikely that there is active infection there causing obstruction, but it is something to consider.  -Ascites fluid from 4/2/21 w/o SBP, will follow up with the culture, ngtd    Coagulopathy  Likely from malnutrition will give VitK and recheck.    Hyponatremia- resolved  - due to hypovolemia, malnutrtion, liver disease  - continue to monitor     Anemia of chronic  disease  - continue ferrous sulfate supplementation    Hx of HTN  Bp medications have been held at home for some time now given pt's BP has been on softer side for some time now   - hold lisinopril     Lower Extremity Edema  Increasing again due to fluid resuscitation. Will reduce fluids and consider lasix  - likely due to liver disease  - hold lasix    Diabetes Mellitus, not insulin dependent  - MSSI as needed, was on glipizde at home, but when appetite dropped this was stopped    Malnutrition  - Nutrition consulted  - Continue PTA marinol  - Start MVI    Chronic Hypomagnesemia  - continue magnesium supplementation    Anxiety  - continue PTA ativan    Insomnia  Sleeplessness  - continue prior to admission ambien    Depression   Adjustment disorder  - endorses feeling of being overwhelmed and how hard the year has been.   - declined medication intervention or psychology at this time      Diet: Regular Diet Adult  Diet  Snacks/Supplements Adult: Ensure Max Protein; Between Meals    DVT Prophylaxis: Pneumatic Compression Devices  Em Catheter: in place, indication: Retention  Code Status: Full Code           Disposition Plan   Expected discharge: 2 - 3 days, recommended to prior living arrangement once pt is stable & has a proper antibiotic plan & follow up is established.        The patient's care was discussed with the Bedside Nurse, Patient and ID/Hepatology/Oncology Consultant, and attending Dr. Leonardo.     Balbir Leonardo, DO  Hospitalist Service, [facility] 9  P: [phone]    M [facility]  Contact information available via [facility] Paging/Directory  Please see sign in/sign out for up to date coverage information      ______________________________________________________________________    Interval History     No events overnight. Gram stain of drain fluid with gram negative rods which is different than before. He otherwise is feeling the same. Does have more abdominal distension as  well as LE edema. Left forearm also swollen.    Otherwise 4pt ROS is negative    Data reviewed today: I reviewed all medications, new labs and imaging results over the last 24 hours. I personally reviewed EKG which shows sinus bradycardia     Physical Exam   Vital Signs: Temp: 97.4  F (36.3  C) Temp src: Oral BP: 109/56 Pulse: 52   Resp: 18 SpO2: 93 % O2 Device: None (Room air)    Weight: 186 lbs 4.62 oz  Gen: NAD, laying in bed, jaundiced  Eyes: EOMI, conjuctiva icteric  CV: RRR, no murmurs, no carotid bruit on R or L    RESP: CTA bilaterally, no w/r/c  Abd: soft, nontender, moderately distended   Ext: LUE forearm swollen medially, non tender non pitting, bilateral LE edema 1-2+ increased from prior, no knee swelling or erythema   Neuro: CNII-CNXII grossly intact, appropriate mentation, able to communicate, no signs of focal neurologic deficits     Data   Recent Labs   Lab 04/04/21  0740 04/03/21  1353 04/03/21  0403 04/02/21  1502 04/02/21  0540 04/02/21  0423 04/01/21  2044   WBC 5.1  --  7.0  --  10.7 10.6  --    HGB 8.9*  --  8.0* 8.0* 6.7* 6.9*  --    MCV 94  --  91  --  93 92  --    *  --  111*  --  132* 132*  --    INR  --   --  1.95*  --   --  1.95*  --      --  141  --   --  138  --    POTASSIUM 3.2* 3.6 3.3*  --   --  3.6  --    CHLORIDE 115*  --  114*  --   --  111*  --    CO2 19*  --  21  --   --  20  --    BUN 14  --  14  --   --  11  --    CR 0.75  --  0.76  --   --  0.83  --    ANIONGAP 6  --  7  --   --  7  --    ERIC 8.2*  --  7.9*  --   --  7.8*  --    GLC 93  --  102*  --   --  115*  --    ALBUMIN 1.6*  --  1.6*  --   --  1.8*  --    PROTTOTAL 5.5*  --  5.0*  --   --  5.4*  --    BILITOTAL 4.2*  --  4.4*  --   --  5.7*  --    ALKPHOS 660*  --  505*  --   --  549*  --    ALT 43  --  33  --   --  33  --    AST 95*  --  74*  --   --  67*  --    LIPASE  --   --   --   --   --   --  102   TROPI  --   --   --   --   --   --  <0.015     Recent Results (from the past 24 hour(s))   Echo  Limited    Narrative    [id number]  [id number]  [id number]  [id number]     [facility]  Echocardiography Laboratory  [address] [place] [address]     Name: SARAH PAINTING  MRN: [id number]  : 1955  Study Date: 2021 10:20 AM  Age: 66 yrs  Gender: Male  Patient Location: [facility]  Reason For Study: Endocarditis  Ordering Physician: ALBA OLEA  Performed By: Soniya Rosa OMID     BSA: 2.1 m2  Height: 73 in  Weight: 186 lb  HR: 55  BP: 114/69 mmHg  ______________________________________________________________________________  Procedure  Limited Portable Echo Adult. Technically difficult study. Poor acoustic  windows. Limited information was obtained during study.  ______________________________________________________________________________  Interpretation Summary  Technically difficult study. Limited information was obtained during study.  Global and regional left ventricular function is normal with an EF of 55-60%.  No pericardial effusion is present.  No vegetation or mass identified, however this does not exclude endocarditis.  ______________________________________________________________________________  Left Ventricle  Global and regional left ventricular function is normal with an EF of 55-60%.     Mitral Valve  The mitral valve is normal.     Tricuspid Valve  The tricuspid valve is normal.     Vessels  The inferior vena cava is normal.     Pericardium  No pericardial effusion is present.     Miscellaneous  Ascites is noted. A bilateral pleural effusion is present.  ______________________________________________________________________________  MMode/2D Measurements & Calculations  LVOT diam: 2.3 cm  LVOT area: 4.1 cm2     Doppler Measurements & Calculations  MV E max lola: 45.6 cm/sec  MV A max lola: 68.2 cm/sec  MV E/A: 0.67  MV dec slope: 211.0 cm/sec2  Ao V2 max: 179.0 cm/sec  Ao max P.0 mmHg  Ao V2 mean: 116.0 cm/sec  Ao mean P.0  mmHg  Ao V2 VTI: 44.6 cm  BRUCE(I,D): 2.0 cm2  BRUCE(V,D): 2.1 cm2  LV V1 max PG: 3.5 mmHg  LV V1 max: 93.1 cm/sec  LV V1 VTI: 21.8 cm  SV(LVOT): 88.3 ml  SI(LVOT): 42.3 ml/m2  AV Robert Ratio (DI): 0.52  BRUCE Index (cm2/m2): 0.95  E/E' av.1  Lateral E/e': 3.9     Medial E/e': 6.3     ______________________________________________________________________________  Report approved by: MD Johnathon Ryan 2021 12:35 PM

## 2021-04-05 NOTE — PROGRESS NOTES
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 Drain was uncapped on admission with progressive improvement in labs. MRCP equivocal. Pt was ready to discharge on 3/31, but then developed chills, fever, & vomiting and was restarted on broad spectrum abx with resolution of fever and rigors. Hepatic drain now with some fluid growing gram negative rods so will follow that and hopefully discharge soon on abx.    Sepsis, resolved   Jaundice  Biliary obstruction  Hx of biliary leak vs abscess s/p IR perihepatic drain placed on 2/15  Hx of Strep anginosus abdominal abscess  Pt presented to the ED with rise in jaundice & was found to have elevated LFTs. No evidence of cholangitis or biliary obstruction on initial imaging. IR Uncapped his perihepatic drain on admission with progressive improvement in labs. Pt was on prophylactic zosyn, but was then stopped on 3/30 given LFT improvement & pt clinical presentation. Pt was ready for discharge on 3/31. However, pt developed N/V, fevers and chills on 3/31, so zosyn was resumed & pt stayed in the hospital. While on zosyn patient had another SIRS response with associated aphasia and vancomycin was added and more fluids given with rapid resolution  in lactate and AMS. Stroke workup was negative. Patient has remained stable on Vanc/zosyn with biliary drain growing two strains of gram negative lactose fermenting rods (although from old drain so unclear significance.) Discussed with rads and not much utility in WBC scan unless it is indium which is not usually covered by insurance so will hold off. Plan to wait for speciation and consider sending out on orals versus IV abx. Other than this culture infectious workup including imaging has been unrevealing for source.  - Continue to trend LFTs  - IVF 75 mL/hr may stop if intake increases  - GI consulted, MRCP & HIDA equivocal, no further actions at this time   - Oncology consulted, WBC nuclear medicine scan ordered   - IR consulted, percutaneous biliary drain is deferred  - blood cultures x3, ngtd   - Continue Vanc/Zosyn while following cultures     Hx of CVA  Transiet aphasia 2/2   Left Carotid Stenosis  On assessment patient was noted to be lethargic, disoriented, responding to my question with incoherent speech. On re evaluation after the head CT scan patient was noted to be back to baseline. Focal neurological defecit was not noted. Head CT unremarkable for acute ischemic changes. CTA with 60-70% stenosis of left proximal ICA. Follow up brain MRI without any acute stroke. Aphasia likely recrudescence of old stroke.  - Will resume plavix and statin after para  - Follow up regarding stenosis as needed     Metastatic hilar cholangiocarcinoma (dx 5/2020) with peritoneal mets:  - follows with Dr. Beard, oncology  - treated with cisplatin/cemcitabin 8/2020-12/9 when chemo was stopped due to abscess/fluid collection as above  - concern for progression based on CT on admission  - Oncology consulted    LUE swelling   Likely due to IV infiltrate but given lack of anticoagulation and malignancy will get LUE US to rule out DVT    Ascites  - 2/2 malignancy  - US on 3/27 with septate ascites. Given no fevers and no  leukocytosis unlikely that there is active infection there causing obstruction, but it is something to consider.  -Ascites fluid from 4/2/21 w/o SBP, will follow up with the stephanie, jose angel  - paracentesis on 3/29 (3800 ml of yellow colored fluid removed,) Repeat therapeutic para for 4/5    Coagulopathy  Likely from malnutrition will give VitK and recheck.    Hyponatremia- resolved  - due to hypovolemia, malnutrtion, liver disease  - continue to monitor     Anemia of chronic disease  - continue ferrous sulfate supplementation    Hx of HTN  Bp medications have been held at home for some time now given pt's BP has been on softer side for some time now   - hold lisinopril     Lower Extremity Edema  Increasing again due to fluid resuscitation. Will reduce fluids and consider lasix  - likely due to liver disease  - hold lasix    Diabetes Mellitus, not insulin dependent  - MSSI as needed, was on glipizde at home, but when appetite dropped this was stopped    Severe Protein Calorie Malnutrition  Due to poor PO intake and malignancy.  - Nutrition consulted  - Continue PTA marinol  - Start MVI    Hypokalemia    Likely due to malnutrition. Replacement protocol started    Chronic Hypomagnesemia  - continue magnesium supplementation    Anxiety  - continue PTA ativan    Insomnia  Sleeplessness  - continue prior to admission ambien    Depression   Adjustment disorder  - endorses feeling of being overwhelmed and how hard the year has been.   - declined medication intervention or psychology at this time      Diet: Regular Diet Adult  Diet  Snacks/Supplements Adult: Ensure Max Protein; Between Meals    DVT Prophylaxis: Pneumatic Compression Devices  Em Catheter: in place, indication: Retention  Code Status: Full Code           Disposition Plan   Expected discharge: 2 - 3 days, recommended to prior living arrangement once pt is stable & has a proper antibiotic plan & follow up is established.        The patient's care was discussed  with the Bedside Nurse, Patient and ID/Hepatology/Oncology Consultant, and attending Dr. Leonardo.     Balbir Leonardo, DO  Hospitalist Service, Gold 9  P: [phone]    M [facility]  Contact information available via [facility] Paging/Directory  Please see sign in/sign out for up to date coverage information      ______________________________________________________________________    Interval History     No events overnight and has remained afebrile. Has some emesis this morning but otherwise is about the same.    Otherwise 4pt ROS is negative    Data reviewed today: I reviewed all medications, new labs and imaging results over the last 24 hours. I personally reviewed EKG which shows sinus bradycardia     Physical Exam   Vital Signs: Temp: 97.3  F (36.3  C) Temp src: Oral BP: (!) 148/87 Pulse: 60   Resp: 16 SpO2: 97 % O2 Device: None (Room air)    Weight: 186 lbs 4.62 oz  Gen: NAD, laying in bed, jaundiced  Eyes: EOMI, conjuctiva icteric  CV: RRR, no murmurs, no carotid bruit on R or L    RESP: CTA bilaterally, no w/r/c  Abd: soft, nontender, moderately distended   Ext: LUE forearm swollen medially, non tender but some pitting, bilateral LE edema 1-2+ increased from prior, no knee swelling or erythema   Neuro: CNII-CNXII grossly intact, appropriate mentation, able to communicate, no signs of focal neurologic deficits     Data   Recent Labs   Lab 04/05/21  0640 04/04/21  1612 04/04/21  0740 04/03/21  0403 04/03/21  0403 04/02/21  0423 04/02/21  0423 04/01/21  2044   WBC 5.6  --  5.1  --  7.0   < > 10.6  --    HGB 8.7*  --  8.9*  --  8.0*   < > 6.9*  --    MCV 92  --  94  --  91   < > 92  --    *  --  129*  --  111*   < > 132*  --    INR  --   --   --   --  1.95*  --  1.95*  --      --  140  --  141  --  138  --    POTASSIUM 3.7 3.9 3.2*   < > 3.3*  --  3.6  --    CHLORIDE 115*  --  115*  --  114*  --  111*  --    CO2 20  --  19*  --  21  --  20  --    BUN 12  --  14  --  14   --  11  --    CR 0.72  --  0.75  --  0.76  --  0.83  --    ANIONGAP 6  --  6  --  7  --  7  --    ERIC 8.2*  --  8.2*  --  7.9*  --  7.8*  --    GLC 97  --  93  --  102*  --  115*  --    ALBUMIN 1.6*  --  1.6*  --  1.6*  --  1.8*  --    PROTTOTAL 5.6*  --  5.5*  --  5.0*  --  5.4*  --    BILITOTAL 4.9*  --  4.2*  --  4.4*  --  5.7*  --    ALKPHOS 696*  --  660*  --  505*  --  549*  --    ALT 42  --  43  --  33  --  33  --    AST 87*  --  95*  --  74*  --  67*  --    LIPASE  --   --   --   --   --   --   --  102   TROPI  --   --   --   --   --   --   --  <0.015    < > = values in this interval not displayed.     Recent Results (from the past 24 hour(s))   US Upper Extremity Venous Duplex Left    Narrative    EXAMINATION: DOPPLER VENOUS ULTRASOUND OF THE LEFT UPPER EXTREMITY,  4/4/2021 5:45 PM     COMPARISON: None.    HISTORY: 66-year-old male with unilateral left arm swelling.    TECHNIQUE:  Gray-scale evaluation with compression, spectral flow and  color Doppler assessment of the deep venous system of the left upper  extremity.    FINDINGS:  Left: Normal blood flow and waveforms are demonstrated in the internal  jugular, subclavian, and axillary veins. There is nonspecific to and  fro waveform demonstrated in the left innominate vein. There is normal  compressibility of the brachial vein. The basilic and cephalic veins  were not visualized.      Impression    IMPRESSION:  1.  No evidence of left upper extremity deep venous thrombosis.  2.  Basilic and cephalic veins are not visualized.    I have personally reviewed the examination and initial interpretation  and I agree with the findings.    AUDI PARNELL MD

## 2021-04-05 NOTE — CONSULTS
Care Management Follow Up    Length of Stay (days): 9    Expected Discharge Date: 04/07/21     Concerns to be Addressed: potential for IV ABX needed at dc    Patient plan of care discussed at interdisciplinary rounds: Yes    Anticipated Discharge Disposition: Home     Anticipated Discharge Services: TBD poss Home Infusion    Anticipated Discharge DME: none    Patient/family educated on Medicare website which has current facility and service quality ratings: will be completed by I Liaison when appropriate  Education Provided on the Discharge Plan: not yet; waiting on final MD plan for IV ABX or not    Referrals Placed by CM/SW: yes    Per Care Management Department protocol a referral for Home Infusion services was emailed to [facility], the [facility] Liaisons and the [facility]. Insurance benefit check results and offering choice of Home Infusion Agency (if applicable) will be discussed with the patient/family by a [facility] Liaison.     If patient does not have Home Infusion insurance coverage once daily infusions, at an Outpatient Infusion Center (of patient s choice), will need to be arranged by an RN Care Coordinator. If patient needs multiple daily infusions, or a frequency of infusions that is greater than once daily, a TCU stay (of patient s choice) will need to be arranged by a .    \[facility]\"" Benefit check returned:  Patient does NOT have IV abx coverage in the home with their [facility] Medicare advantage plan. Drug would be billed to the part D and supplies will be self pay. Based on Zosyn 4.5g q6h total cost is $82.51/day for drug and supplies. Nursing is only covered if patient is homebound if not cost is $90.00 per visit if \[facility]\"" bills for it. Patient should have coverage in a TCU or infusion center.    Private pay costs discussed: insurance costs out of pocket expenses, co-pays and deductibles will be discussed by \[facility]\"" Liaison  when/if appropriate        RNCC will continue to follow for dc planning needs.  Nydia Rodriguez RN, BSN, PHN  Care Coordinator  [facility]  Direct phone: [phone]  Pager: [phone]    To contact the on-call Weekend Care Coordination Team please page [phone]

## 2021-04-05 NOTE — PLAN OF CARE
Time: 7174-8901    Reason for admit: Cholestatic jaundice [R17]  Cholangiocarcinoma (H) [C22.1]  Vitals:   Activity: A2 to bathroom  Neuro: AOx4   Mood/Behavior: Flat, frustrated and cooperative   Lines/Drains: PIV infusing NS at 75 ml/hr, Port A cath on R chest wall infusing TKO between zosyn and vanco. Biliary drain w minimal output noted, flushed once during shift. Drain should be flushed w 3-5 mL of NS to prevent leaking.   Cardiac: Tele eva sinus   Resp: Stable on RA, LS clear and equal throughout  Diet: Reg w max protein bars/drinks between meals. Pt has poor appetite. BG levels checked at meals and bedtime.   GI/: Pt has urinary cath in place for retention, output in flowsheets. Urine is dark soledad/brown. Pt was incontinent of stool once during shift due to the inability to get to the bathroom in time, he is aware that he needs to go and calls appropriately. Pt had one bout of emesis during AM. Pts abdomen is distended, taut and firm in all four quadrants.   Skin: Pt has edema in all extremities. Scab on R elbow and L knee open to air w no drainage.   Pain: Pt denied pain     New this shift: Paracentesis scheduled for today. Pt had one loose stool during shift. One bout of emesis during shift, zofran given with good relief. Nuc Med WBC study cancelled due to not being covered by insurance. BG levels stable throughout shift. Pt has had multiple bouts of loose stool during shift, no stool softeners since hospital stay began, no fever, no abd pain, and no prior C-diff test.     Plan: Continue w IV abx.

## 2021-04-05 NOTE — PROGRESS NOTES
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 Drain was uncapped on admission with progressive improvement in labs. MRCP today.      Jaundice  Biliary obstruction  Hx of biliary leak vs abscess s/p IR perihepatic drain placed on 2/15  Hx of Strep anginosus abdominal abscess  No evidence of cholangitis on admission. IR Uncapped his perihepatic drain on admission with progressive improvement in labs.  - Continue to trend LFTs  - GI consulted, recommend following the labs, will follow up for plan after MRCP results   - IR consulted, no further procedural intervention at this time, continue to trend labs with uncapped drain  - blood cultures x2, no growth after 3 days  - elevated CRP, though down trending; procalcitonin wnl   - MRCP with no sign of infection   - ID consulted, Stopping Zosyn 3/30 and will monitor    Metastatic hilar cholangiocarcinoma (dx 5/2020) with peritoneal mets:  - follows with Dr. Beard, oncology  - treated with cisplatin/cemcitabin 8/2020-12/9 when chemo was stopped due to abscess/fluid collection as above  - concern for progression based on CT on admission  - Oncology consulted    Ascites  - 2/2 malignancy  - paracentesis on 3/29 (3800 ml of yellow  colored fluid removed)  - US on 3/27 with septate ascites. Given no fevers and no leukocytosis unlikely that there is active infection there causing obstruction, but it is something to consider.    Hyponatremia  - due to hypovolemia, malnutrtion, liver disease  - continue to monitor     Anemia of chronic disease  - continue ferrous sulfate supplementation    HTN  - hold lisinopril (had been held at home)    Lower Extremity Edema  - likely due to liver disease  - hold lasix    Diabetes Mellitus, not insulin dependent  - MSSI as needed, was on glipizde at home, but when appetite dropped this was stopped    Malnutrition  - Nutrition consulted  - Continue PTA marinol  - Start MVI    Hx of CVA  - hold atorvastatin and plavix    Chronic Hypomagnesemia  - continue magnesium supplementation    Anxiety  - continue PTA ativan    Insomnia  Sleeplessness  - continue prior to admission ambien    Depression   Adjustment disorder  - endorses feeling of being overwhelmed and how hard the year has been.   - declined medication intervention or psychology at this time      Diet: Regular Diet Adult  Diet  Snacks/Supplements Adult: Ensure Max Protein; Between Meals    DVT Prophylaxis: Pneumatic Compression Devices  Em Catheter: in place, indication: Retention  Code Status: Full Code           Disposition Plan   Expected discharge: 2 - 3 days, recommended to prior living arrangement once adequate pain management/ tolerating PO medications & proper GI plan to prevent further obstruction.   Entered: Balbir Leonardo DO 04/05/2021, 12:12 PM       The patient's care was discussed with the Bedside Nurse, Care Coordinator/, Patient and ID/Hepatology/Oncology Consultant, and attending Dr. Leonardo. .    Balbir Leonardo DO,  Hospitalist Service, [address]  Contact information available via [facility] Paging/Directory  Please see sign in/sign out for up to date coverage  information    ______________________________________________________________________    Interval History     Patient doing well today stable off abx wanting to go home.    Otherwise 4pt ROS is negative    Data reviewed today: I reviewed all medications, new labs and imaging results over the last 24 hours. I personally reviewed no images or EKG's today.    Physical Exam   Vital Signs: Temp: 97.3  F (36.3  C) Temp src: Oral BP: (!) 148/87 Pulse: 60   Resp: 16 SpO2: 97 % O2 Device: None (Room air)    Weight: 186 lbs 4.62 oz  Gen: NAD, sitting comfortably in bed, thin,  jaundiced on face and neck  Eyes: EOMI, conjuctiva icteric  CV: RRR, no murmurs, 2+ radial pulses  RESP: CTA bilaterally, no w/r/c  Abd: soft, nontender, mildly distended   Ext: no edema bilaterally  Neuro: CNII-CNXII intact     Data   Recent Labs   Lab 04/05/21  0640 04/04/21  1612 04/04/21  0740 04/03/21  0403 04/03/21  0403 04/02/21  0423 04/02/21  0423 04/01/21  2044   WBC 5.6  --  5.1  --  7.0   < > 10.6  --    HGB 8.7*  --  8.9*  --  8.0*   < > 6.9*  --    MCV 92  --  94  --  91   < > 92  --    *  --  129*  --  111*   < > 132*  --    INR  --   --   --   --  1.95*  --  1.95*  --      --  140  --  141  --  138  --    POTASSIUM 3.7 3.9 3.2*   < > 3.3*  --  3.6  --    CHLORIDE 115*  --  115*  --  114*  --  111*  --    CO2 20  --  19*  --  21  --  20  --    BUN 12  --  14  --  14  --  11  --    CR 0.72  --  0.75  --  0.76  --  0.83  --    ANIONGAP 6  --  6  --  7  --  7  --    ERIC 8.2*  --  8.2*  --  7.9*  --  7.8*  --    GLC 97  --  93  --  102*  --  115*  --    ALBUMIN 1.6*  --  1.6*  --  1.6*  --  1.8*  --    PROTTOTAL 5.6*  --  5.5*  --  5.0*  --  5.4*  --    BILITOTAL 4.9*  --  4.2*  --  4.4*  --  5.7*  --    ALKPHOS 696*  --  660*  --  505*  --  549*  --    ALT 42  --  43  --  33  --  33  --    AST 87*  --  95*  --  74*  --  67*  --    LIPASE  --   --   --   --   --   --   --  102   TROPI  --   --   --   --   --   --   --  <0.015     < > = values in this interval not displayed.     Recent Results (from the past 24 hour(s))   US Upper Extremity Venous Duplex Left    Narrative    EXAMINATION: DOPPLER VENOUS ULTRASOUND OF THE LEFT UPPER EXTREMITY,  4/4/2021 5:45 PM     COMPARISON: None.    HISTORY: 66-year-old male with unilateral left arm swelling.    TECHNIQUE:  Gray-scale evaluation with compression, spectral flow and  color Doppler assessment of the deep venous system of the left upper  extremity.    FINDINGS:  Left: Normal blood flow and waveforms are demonstrated in the internal  jugular, subclavian, and axillary veins. There is nonspecific to and  fro waveform demonstrated in the left innominate vein. There is normal  compressibility of the brachial vein. The basilic and cephalic veins  were not visualized.      Impression    IMPRESSION:  1.  No evidence of left upper extremity deep venous thrombosis.  2.  Basilic and cephalic veins are not visualized.    I have personally reviewed the examination and initial interpretation  and I agree with the findings.    AUDI PARNELL MD   TE:[id number]}

## 2021-04-05 NOTE — PLAN OF CARE
"Time: 4460-0643     Neuro: A&Ox4. Withdrawn and reports frustration.  Cardiac: Tele reads sinus eva. +2/3 generalized edema. HTN.  Respiratory: Diminished bases. SpO2 96% on RA.  GI/: Em in place for urinary retention, soledad output. Diarrhea, 5-6 episodes during 8hr shift.  Skin: Jaundiced throughout w/icteric sclera. Scattered bruising and scabs. Ascites, and umbilical hernia. Generalized edema.  Activity: A2. Remained on bedrest, too unsteady on feet to ambulate safely.  Pain: Reports abd discomfort.  Diet: Reg diet, poor appetite d/t nausea.  Lines: R PIV infusing w/IVMF NS @75ml/hr. R chest port infusing TKO between abx. R biliary drain w/moderate amount of clear green output.    Shift changes: Afebrile and VSS on RA. Diagnostic and therapeutic paracentesis performed w/o complications. 4L removed and samples collected for cultures. C.diff collected, results pending. Pt's wife, Robyn, remained at bedside for majority of shift.  Plan of care: Await C.diff results. Pt would like to participate more w/therapy tomorrow. Nursing to encourage getting up to chair for meals, could use lift.      Blood pressure (!) 147/80, pulse 56, temperature 97.4  F (36.3  C), temperature source Oral, resp. rate 16, height 1.854 m (6' 1\"), weight 84.5 kg (186 lb 4.6 oz), SpO2 96 %.    "

## 2021-04-05 NOTE — PLAN OF CARE
OT 5A: Cx. Attempted to see pt 2x in AM, upon first attempt pt having just received breakfast and requesting OT to check back. On second attempt, pt reporting just having episode of emesis and not feeling well, continued to decline despite encouragement. Unable to check back in PM due to scheduling demands, will reschedule per POC.

## 2021-04-05 NOTE — PLAN OF CARE
"PT 5A: Attempted to see patient for PT but adamantly declined any OOB activity as patient with \"upset stomach\" and just generally not feeling well. Attempted to encourage in room activity but again declined. Educated to ambulate this evening when feeling better- agreeable. Will see tomorrow to progress per POC.   "

## 2021-04-05 NOTE — PLAN OF CARE
"/62 (BP Location: Right arm)   Pulse 54   Temp 96.5  F (35.8  C) (Axillary)   Resp 16   Ht 1.854 m (6' 1\")   Wt 84.5 kg (186 lb 4.6 oz)   SpO2 95%   BMI 24.58 kg/m      Time: 7211-8046     Reason for admission: Cholangiocarcinoma  Activity: A2 w/ GB+W. Pt calling approprietly.   Pain: Denies   Neuro: A&O x4.  Cardiac: Tele: Bradycardic. Denies chest pain.  Respiratory: Pt on RA. Denies SOB. Lung sounds diminished. No cough  GI/: BM x3 overnight- loose green/brown. Lyman cath in place- brown output. Low output.   Diet: Regular diet, tolerating.   Lines: PIV @ 75 ml/hr. R chest port infused antibiotics overnight.   Skin: Pt declined full skin assessment.   Labs/Imaging: BG:      New changes this shift: No new events overnight.  BM x3 overnight- loose brown/green. Minimal output in lyman & justine drain.     Continue to monitor and follow POC  "

## 2021-04-05 NOTE — PROGRESS NOTES
General ID Service: Follow-up Note      Patient:  Fuentes Estrada, Date of birth 1955, Medical record number [id number]  Date of Visit:  April 5, 2021  Reason for consult: abdominal infection         Assessment and Recommendations:     Problem list:  1. Metastatic cholangiocarcinoma c/b peritoneal metastases  2. H/o Strep anginosis abdominal abscess  3. Diarrhea  4. Ascites      Recs:  - follow up stool cdiff and pending cultures  - follow up WBC scan as per oncology  - could convert empiric antibiotic therapy to po Augmentin + po Bactrim for continued empiric coverage for possible intraabdominal infection until outpatient follow up up (already planned for 4/16 with Dr. Dale) to reassess  - drain management as per IR      Discussion:  Fuentes Estrada is a 66 year old male with PMHx significant for HTN, DM2, CVA, and metastatic hilar cholangiocarcinoma (dx 5/2020) with peritoneal metastases s/p R hepatectomy w/ radical extrahepatic bile duct resection, portal LN resection and Bianca-en-Y hepaticojejunostomy c/b biliary leak s/p drain placement (removed 11/2020) with persistence and development of perihepatic fluid collection c/f biloma vs abscess s/p perihepatic drain placement (2/15/21) and prolonged antibiotics (cipro/flagyl followed by po amoxicillin ending 2 days prior to admission for previous Strep anginosus in 2/15 abdominal fluid culture; followed with Dr. Dale in ID clinic) who presented to the hospital with 4-5 days of jaundice. Since admission, has had CT abdomen, MRCP, and HIDA scans which were largely unrevealing for new/acute infections -- showed signs of malignancy with possible peritoneal mets, and resolution of prior abdominal fluid collection, no cholangitis/biliary obstruction. During his hospitalization he had 2 episodes of low grade fevers, rigors, hypotension (once with stroke-like symptoms thought to be recrudescence of old stroke); ddx for his decompensation while hospitalized  including ongoing intraabdominal infection, complications of malignancy/metastases, orthostatic hypotension, endocrine dysfunction.     Unclear if the Enterobacter cloacae growing in the cultures sent from the old drain are representative of drain colonization vs infection, but given the unclear cause of his decompensation this admission and concern for possible abdominal infection (though with negative imaging/work-up) and continued drain output, would favor also covering for it for now with Bactrim. In addition, Augmentin would cover the prior Strep that grew in his abdominal cultures in addition to anaerobes empirically.      Thank you for allowing us to participate in the care of this patient. Can call with questions.     Attestation:  Case is moderate risk/complexity due to cholangiocarcinoma, diarrhea, ascites.    Lakhwinder Shah MD  Infectious Diseases     04/05/21            Interval History:     Patient reports diarrhea today with multiple BMs (non-bloody). Did have some nausea/vomiting this AM. No fevers/chills, no abdominal pain.     His old biliary drain has started putting out few ml's of clear/yellow fluid and team has sent cultures off of this old biliary drain which are now +Enterobacter.    Also had large volume paracentesis today with studies pending.         Review of Systems:   Full 8 point ROS obtained, pertinent positives and negatives as above.            Current Antimicrobials   IV vancomycin and zosyn         Physical Exam:   Ranges for vital signs:  Temp:  [96.5  F (35.8  C)-97.4  F (36.3  C)] 97.4  F (36.3  C)  Pulse:  [54-68] 56  Resp:  [16-20] 16  BP: (113-148)/(62-87) 147/80  SpO2:  [95 %-97 %] 96 %    Intake/Output Summary (Last 24 hours) at 4/5/2021 1823  Last data filed at 4/5/2021 1315  Gross per 24 hour   Intake 760 ml   Output 541 ml   Net 219 ml     Exam:  Constitutional: Pleasant male seen lying in bed, in NAD.   HEENT: NCAT, mildly icteric sclerae, conjunctiva clear. Moist  mucous membranes.  Respiratory: Non-labored breathing, on RA.   Cardiovascular: Regular rate   GI:  Abdomen is soft, +distended, and non-tender to palpation. No rigidity or guarding. Abdominal drain in place with scant clear/yellow fluid output.   Skin: Warm and dry. No rashes or lesions on exposed surfaces.  Musculoskeletal: Extremities grossly normal. No tenderness.  Neurologic: Awake, alert, interactive  Neuropsychiatric: Affect normal/appropriate.         Laboratory Data:   Reviewed.      Inflammatory Markers    Recent Labs   Lab Test 04/02/21  0423 04/01/21  2044 04/01/21  0622 03/28/21  0713 03/27/21  1913 02/02/21  0632   CRP 57.0* 53.0* 55.0* 87.0* 100.0* 14.0*       Hematology Studies    Recent Labs   Lab Test 04/05/21  0640 04/04/21  0740 04/03/21  0403 04/02/21  1502 04/02/21  0540 04/02/21  0423 04/01/21  1945 03/27/21  1913 03/27/21  1913 03/26/21  1741 03/25/21  1020 02/26/21  1241 02/26/21  1241 02/03/21  0647 02/02/21  0632   WBC 5.6 5.1 7.0  --  10.7 10.6 3.4*   < > 5.7 5.2 4.7  --  4.7 3.2* 2.9*   ANEU  --   --   --   --  9.6*  --   --   --  3.9 3.6 3.2  --  2.4 1.7 1.4*   AEOS  --   --   --   --  0.0  --   --   --  0.0 0.1 0.0  --   --  0.0 0.0   HGB 8.7* 8.9* 8.0* 8.0* 6.7* 6.9* 9.4*   < > 9.4* 11.4* 9.0*  --  10.4* 9.0* 8.4*   MCV 92 94 91  --  93 92 90   < > 86 87 87  --  92 95 93   * 129* 111*  --  132* 132* 222   < > 224 238 172   < > 210 183 166    < > = values in this interval not displayed.       Metabolic Studies     Recent Labs   Lab Test 04/05/21  0640 04/04/21  1612 04/04/21  0740 04/03/21  1353 04/03/21  0403 04/02/21  0423 04/01/21  1945     --  140  --  141 138 137   POTASSIUM 3.7 3.9 3.2* 3.6 3.3* 3.6 3.7   CHLORIDE 115*  --  115*  --  114* 111* 110*   CO2 20  --  19*  --  21 20 15*   BUN 12  --  14  --  14 11 12   CR 0.72  --  0.75  --  0.76 0.83 0.76   GFRESTIMATED >90  --  >90  --  >90 >90 >90       Hepatic Studies    Recent Labs   Lab Test 04/05/21  0640  04/04/21  0740 04/03/21  0403 04/02/21  0423 04/01/21  1945 04/01/21  0622   BILITOTAL 4.9* 4.2* 4.4* 5.7* 7.9* 6.5*   ALKPHOS 696* 660* 505* 549* 825* 722*   ALBUMIN 1.6* 1.6* 1.6* 1.8* 1.4* 1.2*   AST 87* 95* 74* 67* 99* 91*   ALT 42 43 33 33 48 42       Microbiology:  Culture Micro   Date Value Ref Range Status   04/03/2021 Culture negative after 2 days  Preliminary   04/03/2021 (A)  Preliminary    Moderate growth  Lactose fermenting gram negative rods  Susceptibility testing in progress     04/03/2021 (A)  Preliminary    Moderate growth  Strain 2  Lactose fermenting gram negative rods  Susceptibility testing in progress     04/03/2021 Culture negative monitoring continues  Preliminary   04/02/2021 Culture negative monitoring continues  Preliminary   04/02/2021 Culture negative monitoring continues  Preliminary   04/02/2021 Culture negative after 3 days  Preliminary   04/01/2021 No growth after 4 days  Preliminary   04/01/2021 No growth after 4 days  Preliminary   03/31/2021 No growth after 4 days  Preliminary   03/31/2021 No growth after 5 days  Preliminary   03/27/2021 No growth  Final   03/27/2021 No growth  Final   03/11/2021 No growth  Final   02/15/2021 No anaerobes isolated  Final   02/15/2021 Culture negative after 4 weeks  Final   02/15/2021 (A)  Final    Moderate growth  Streptococcus anginosus  This organism is susceptible to ampicillin, penicillin, vancomycin and the cephalosporins.   If treatment is required AND your patient is allergic to penicillin, contact the   Microbiology Lab within 5 days to request susceptibility testing.  Testing unavailable due to 's backorder.     02/15/2021   Final    Critical Value/Significant Value called to and read back by  JOSE CRUZ COHEN @[employer] 2/18/21. SCG     02/01/2021 No growth  Final   02/01/2021 No growth  Final   11/18/2020 No growth  Final   11/18/2020   Final    No anaerobes isolated  Since this specimen was not transported in the proper  anaerobic transport media, the   absence of anaerobes in this culture does not rule out the presence of anaerobes in this   specimen.     2020 Light growth  Enterococcus faecium   (A)  Final   2020   Final    Critical Value/Significant Value, preliminary result only, called to and read back by  Cody Wagner RN on 20 at 1141. bw     2020 No growth  Final   2020 No growth  Final   2020 No growth  Final   2020 (A)  Final    Light growth  Veillonella species  Susceptibility testing not routinely done     2020 (A)  Final    Light growth  Prevotella species  Beta lactamase positive  Susceptibility testing not routinely done     2020 Heavy growth  Enterococcus faecium   (A)  Final   2020 (A)  Final    Light growth  Streptococcus anginosus  Susceptibility testing not routinely done     2020 (A)  Final    Light growth  Coagulase negative Staphylococcus  Susceptibility testing not routinely done       4/3 drain cultures: +Enterobacter cloacae    Urine Studies    Recent Labs   Lab Test 21  0956 21  1555 20  0158   LEUKEST Negative Negative Trace*   WBCU <1 3 4       Vancomycin Levels    Recent Labs   Lab Test 21  0740   VANCOMYCIN 24.8            Imagin/2 MRI brain  Impression:  1. No evidence of acute infarction.  2. No abnormal enhancing lesions intracranially.  3. Head MRA demonstrates no definite aneurysm or stenosis of the major  intracranial arteries.  4. Neck MRA demonstrates approximately 60% narrowing of the proximal  left internal carotid artery.  5. Leukokraurosis and chronic infarcts in the left cerebral  hemisphere.      HIDA  Impression:  1. No evidence of biliary obstruction.  2. No evidence of biliary leak.  3. Persistent hepatogram suggestive of significantly poor hepatic  function.  4. Diffuse ascites.  5. Bilateral pleural effusions with associated atelectasis.     3/30 MRCP  IMPRESSION:  1. Post-surgical  changes of partial right hepatectomy. No focal  suspicious mass.  2. No intrahepatic or extrahepatic biliary ductal dilatation.  3. Scattered regions of peritoneal thickening better delineated on  prior CT 3/25/2021.  4. Percutaneous drain along the posterior margin of liver at the site  of previous collection. No evidence of new fluid collection.  5. Slightly decreased moderate amount ascites throughout the abdomen  and pelvis status post-paracentesis since MRI 3/28/2021. Extensive  intraperitoneal varices along the anterior abdominal wall. 6. Small  right and small-to-moderate left pleural effusions with adjacent  compressive atelectasis.     3/27 US Abd  IMPRESSION:   1. Suspected intrahepatic pneumobilia, (may be due to  hepaticojejunostomy).   2. Septated ascites inferior to the liver.     3/25 CT AP w/ contrast  Impression: In this patient with a history of cholangiocarcinoma,  partial right hepatectomy, and percutaneously drained bile leak:   1. Minimal residual fluid in the area with the percutaneous drain  posterior to the liver.  2. Increase in peritoneal implants along the right peritoneal wall  demonstrated is 4.5 x 2.0 cm ill-defined mass as well as other  evidence of peritoneal nodularity. This has been increasing  (demonstrated clearly on prior CT 8/3/2020 .)  3. Large amount of ascites and extensive abdominal varices.

## 2021-04-05 NOTE — CONSULTS
Consult and Procedure Service - Procedure Note    Attending:Marcella Man MD   Resident: none  Procedure: Diagnostic and therapeutic paracentesis  Indication: Symptomatic distension  Risk Assessment: Optimized  Pre-procedure diagnosis: Ascites  Post-procedure diagnosis: Same    The risks and benefits of the procedure were explained to Fuentes who expressed understanding and opted to proceed.  Consent was obtained and placed in the chart.  A time out was performed.  An area of ascites was located and marked using ultrasound guidance in the left lower quadrant; the area was prepped and draped in the usual sterile fashion.  8 ml of 1% lidocaine was instilled and ascites located.  [employer] paracentesis catheter and needle were inserted under real-time guidance until ascites obtained then the needle removed and the catheter advanced.  The apparatus was connected to vacuum bottles and a total of 4000 ml of clear yellow colored fluid removed.   A specimen was sent for analysis. The catheter was withdrawn and the area dressed.  Patient tolerated the procedure well with no immediate complications.  Please contact the Consult and Procedure Service if any complications or concerns arise.     Marcella Man MD   DOS:  4/5/2021

## 2021-04-06 PROBLEM — K52.9 ENTEROCOLITIS: Status: ACTIVE | Noted: 2020-01-01

## 2021-04-06 NOTE — PROGRESS NOTES
Care Management Discharge Note    Discharge Date: 04/06/21     Discharge Disposition: Home w/spouse    Discharge Services: Replaced by [facility] (RN PT OT) Phone: [phone]  / Fax: [phone]. PT/spouse did not want RN, agency reporting it is a requirement for them that a nurse at least open the case and make one assessment of needs.    Discharge DME: None    Discharge Transportation: family will provide    Private pay costs discussed: Not applicable    PAS Confirmation Code:  NA  Patient/family educated on Medicare website which has current facility and service quality ratings: NA    Education Provided on the Discharge Plan:  yes  Persons Notified of Discharge Plans: patient  Patient/Family in Agreement with the Plan: yes    Handoff Referral Completed: No; CTS    Referral:  [facility]   Phone: [phone]  Fax: [phone]     RN skilled nursing visit  RN to assess vital signs and weight, respiratory and cardiac status, pain level and activity tolerance, hydration, nutrition and bowel status   RN to complete home safety evaluation.  RN to complete weekly medication management and set-up, please involve family/primary caregiver in med education.  RN to assess hepatic drain, assist with cares/teaching PRN    Physical Therapy to evaluate and treat  Occupational Therapy to evaluate and treat      Nydia Rodriguez RN, BSN, PHN  Care Coordinator  [facility]  Direct phone: [phone]  Pager: [phone]    To contact the on-call Weekend Care Coordination Team please page [phone]

## 2021-04-06 NOTE — DISCHARGE INSTRUCTIONS
"You were admitted to the hospital with worsening liver and biliary labs and with history of recurrent fevers, we did many tests for infection (CBC, blood cultures, C. difficile panel, biliary fluid culture) and most of them were negative. Notably the fluid from the drain for testing of infection did grow Enterobacter cloacae, which could be the cause of your symptoms, but could also be part of the normal bacteria found in that part of the body (an \"innocent bystander\").     Because your fevers and symptoms seem to get better with antibiotics we consulted with the infectious disease specialist along with the interventional radiology team, as well as the oncology team.  At this time we believe is medically safe for you to discharge on trimethoprim sulfamethoxazole, and amoxicillin clavulanate acid for a total of 2 weeks minimum, possibly longer depending on how you are feeling at your follow-up appointment with Dr. Dale.  If you are feeling better and have no fevers by the time you complete your antibiotics, is reasonable to stop your antibiotics and monitor.  If you keep having fevers or have worsening symptoms we may need to reevaluate the biliary drain (Dr. Purcell or one of his colleagues could do this) or reevaluate noninfection causes of fever, such as cancer, and your infectious diease doctor and your oncologist can consider further testing or other nuclear medicine scans.    FOR INFECTION and DIARRHEA  -- Take your TMP-SMX and your amoxicillin-clavulanic acid twice daily until you follow-up with Dr. Dale, they will decide if you can stop antibiotics at that visit  --Like we discussed call if you have recurrent fevers, severe nausea vomiting or diarrhea, abdominal pain, or other new symptoms concerning for infection  -- It is safe for you to take 1 or 2 tablets of Imodium for severe diarrhea  -- It is safe for you to take 1000 mg of acetaminophen 1 or 2 times per day (do not exceed more than 3000 mg of " acetaminophen in a 24-hour period)    FOR SWELLING  --Follow-up with your primary care provider for prescription of compression stockings that are above the knee, ask your primary care provider when to start your furosemide on Mondays Wednesdays and Fridays.  You may need to have blood work done before you restart your furosemide  --Keep your legs elevated when you are seated    FOR YOUR DRAIN  --Continue flushing as you were doing previously, the biliary drain should no longer be capped, make sure to follow-up with Dr. Purcell's office about when to begin another trial of capping for this drain

## 2021-04-06 NOTE — PROGRESS NOTES
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 Afebrile.   Pain/PRN: Denies pain  Respiratory: RA. Denies chest pain, cough, SOB.   Neuros: A&O x4. Pt request to sleep thru the night.   GI/: Em catheter, dark soledad output. Regular diet-poor appetite. Denies N&V. Diarrhea persists.   Cardiac: Sinus bradycardia.    Skin/Wounds: Jaundice. Generalized 2+ edema. Dependent 3+ edema. Distended abdomen. Right bili drain CDI, no output.   Lines: Right chest port TKO NS 75 ml/hr. Right PIV.   Infusions: Zosyn.   Activity: Assist x 1 gait belt/walker.   BG/Insulin:      Plan: Negative c-diff. PT to assess.

## 2021-04-06 NOTE — PLAN OF CARE
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 PIV running 75 ml/hr of NS.   Cardiac: Sinus eva, pt on tele   Resp: Stable on RA, LS clear and equal bilaterally  Diet: Reg w poor appetite and protein  GI/: Pt has cath in place for urinary retention w good output. Pt is incontinent of bowel due to urgency, neg to c-diff.   Skin: Bruise on R forearm. Blanchable redness near anus due to loose stool, barrier applied.   Pain: Pt refused pain     New this shift: Dressing over drain changed, site looked good. Cath cares done. Possible discharge to home this evening pending provider orders.     Plan: Discharge to home on oral abx    "

## 2021-04-06 NOTE — PLAN OF CARE
OT 5A: cancel, pt planning on discharging home this PM.     Occupational Therapy Discharge Summary    Reason for therapy discharge:    Discharged to home with home therapy.    Progress towards therapy goal(s). See goals on Care Plan in [facility] electronic health record for goal details.  Goals partially met.  Barriers to achieving goals:   discharge from facility.    Therapy recommendation(s):    Continued therapy is recommended.  Rationale/Recommendations:  pt would benefit from further therapy to progress functional strength and endurance needed for safe completion of all ADLs and mobility within home environment.

## 2021-04-06 NOTE — PLAN OF CARE
"Fuentes was discharged from [facility] 5A at 1715. AVS was printed, discharge instructions were given w/all questions answered. PIV and lyman were removed. PICC was de-accessed. Telemetry leads were removed. Top R telemetry lead on chest left a quarter-sized wound w/yellow drainage. Etiology of wound is unclear; not a pressure injury as no pressure (pt's body weight nor medical device) was on top of the lead, and it seems unlikely to be a allergic reaction as only 1/5 leads had a wound. Nursing cleaned the wound bed w/sterile gauze and microklenz spray, and applied a vaseline gauze w/premipore overtop. Education was given for wound care at home, s/s of local infection, and to follow up w/their PCP. Charge nurse and float nurse were consulted for recs. Pt was wheeled out to lobby and left via ride home from his wife, Robyn.    Blood pressure 139/66, pulse 74, temperature 97.8  F (36.6  C), temperature source Oral, resp. rate 18, height 1.854 m (6' 1\"), weight 84.5 kg (186 lb 4.6 oz), SpO2 97 %.    " Island Pedicle Flap With Canthal Suspension Text: The defect edges were debeveled with a #15 scalpel blade.  Given the location of the defect, shape of the defect and the proximity to free margins an island pedicle advancement flap was deemed most appropriate.  Using a sterile surgical marker, an appropriate advancement flap was drawn incorporating the defect, outlining the appropriate donor tissue and placing the expected incisions within the relaxed skin tension lines where possible. The area thus outlined was incised deep to adipose tissue with a #15 scalpel blade.  The skin margins were undermined to an appropriate distance in all directions around the primary defect and laterally outward around the island pedicle utilizing iris scissors.  There was minimal undermining beneath the pedicle flap. A suspension suture was placed in the canthal tendon to prevent tension and prevent ectropion.

## 2021-04-07 NOTE — TELEPHONE ENCOUNTER
"[facility] Call Center    Phone Message    May a detailed message be left on voicemail: yes     Reason for Call: Other: Pts discharge instructions from discharging provider Dr Edmund Nelson state \"Dr Lawrence , with Interventional Radiology, to evaluate after surgery. No follow up labs or test are needed. You can discuss when to consider capping the drain at that visit (and AFTER you finish antibiotics)\" Thanks!     Action Taken: Message routed to:  [facility] ([facility]): IR    Travel Screening: Not Applicable                                                                        "

## 2021-04-07 NOTE — PLAN OF CARE
Physical Therapy Discharge Summary    Reason for therapy discharge:    Discharged to home with home therapy.    Progress towards therapy goal(s). See goals on Care Plan in [facility] electronic health record for goal details.  Goals partially met.  Barriers to achieving goals:   discharge from facility.    Therapy recommendation(s):    Continued therapy is recommended.  Rationale/Recommendations:  to improve functional strength, activity tolerance and safety with mobility.

## 2021-04-07 NOTE — PROGRESS NOTES
The patient is scheduled for clinic follow up within 24-48 hours of hospital discharge. No post-hospital call is needed at this time.    Name: Fuentes Estrada MRN: [id number]   Date: 4/8/2021 Status: [facility]   Time: 1:00 PM Length: 50   Visit Type: TELEPHONE VISIT RETURN [2807] SUSAN: [id number]   Provider: Leeanne Lake APRN CNP Department:  ONCOLOGY ADULT       Praveena Sheth CMA, BAH  Post Hospital Discharge Team

## 2021-04-07 NOTE — DISCHARGE SUMMARY
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 Drain was uncapped on admission with progressive improvement in labs. MRCP equivocal. Pt was ready to discharge on 3/31, but then developed chills, fever, & vomiting and was restarted on broad spectrum abx with resolution of fever and rigors. Hepatic drain now with some fluid growing gram negative rods so will follow that and hopefully discharge soon on abx.     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 (POA, stable)  Lower Extremity Edema  LUE swelling   Ascites (POA, stable)  Coagulopathy (POA, stable)  Hyponatremia- (POA, s/p treatment and resolved)  Anemia of chronic disease (POA stable)  HTN  Diabetes Mellitus, not insulin dependent  Severe Protein Calorie Malnutrition (POA, stable)  Hypokalemia  (POA, s/p treatment and resolved)  Chronic Hypomagnesemia  Anxiety  Insomnia  Sleeplessness  Depression    Adjustment disorder         Follow-ups Needed After Discharge   Follow-up Appointments     Adult [facility] Follow-up and recommended labs and tests      Please follow up with oncology as scheduled on 4/8, they will help you   schedule further follow ups with IR as needed    We are ordering a CMP tomorrow and next week to follow your liver enzymes.       Appointments on [place] and/or [facility] (with [facility] or [facility]   provider or service). Call [phone] if you haven't heard regarding   these appointments within 7 days of discharge.         Adult [facility] Follow-up and recommended labs and tests      Follow up with primary care provider, Tolu Noland, within 7 days for   hospital follow- up.  The following labs/tests are recommended: BMP.    Follow up with Dr. Dale in Infectious Disease clinic this has already   been arranged. As well as with Dr Lawrence , with Interventional   Radiology, to evaluate after surgery. No follow up labs or test are   needed. You can discuss when to consider capping the drain at that visit   (and AFTER you finish antibiotics)    Appointments on [place] and/or [facility] (with [facility] or [facility]   provider or service). Call [phone] if you haven't heard regarding   these appointments within 7 days of discharge.         Follow Up and recommended labs and tests      Follow up with primary care provider, Tolu Noland, within 7 days for   hospital follow- up.  The following labs/tests are recommended: BMP.              ITEMS FOR PCP FOLLOW UP  --Follow up Fungus Cultures, Dr Dale (ID clinic) can assit with fungal cx follow up if needed  --Follow-up edema and ?? If need to begin furosemide or not, trial of compression stockings alone is reasonable  --Follow-up blood pressures currently with acceptable ranges but not hypertensive, lisinopril has been stopped for now PCP decide when to resume  Unresulted Labs Ordered in the Past 30 Days of this Admission     Date and Time  Order Name Status Description    4/5/2021 1202 fluid culture - Aerobic Bacterial Preliminary     4/3/2021 1536 Anaerobic bacterial culture Preliminary     4/3/2021 1536 Fungus culture Preliminary     4/2/2021 0823 Fungus Culture, non-blood Preliminary     4/2/2021 0823 Anaerobic bacterial culture Preliminary       These results will be followed up by PCP    Discharge Disposition   Discharged to home  Condition at discharge: Stable      Hospital Course     66 year old male with a history of 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 Drain was uncapped on admission with progressive improvement in labs. MRCP equivocal. Pt was ready to discharge on 3/31, but then developed chills, fever, & vomiting and was restarted on broad spectrum abx with resolution of fever and rigors. Hepatic drain now with some fluid growing gram negative rods so will follow that and hopefully discharge soon on abx.     #Sepsis, resolved   #Jaundice  #Biliary obstruction  #Hx of biliary leak vs abscess s/p IR perihepatic drain placed on 2/15  #Hx of Strep anginosus abdominal abscess  Pt presented to the ED with rise in jaundice & was found to have elevated LFTs. No evidence of cholangitis or biliary obstruction on initial imaging. IR Uncapped his perihepatic drain on admission with progressive improvement in labs. Pt was on prophylactic zosyn, but was then stopped on 3/30 given LFT improvement & pt clinical presentation. Pt was  ready for discharge on 3/31. However, pt developed N/V, fevers and chills on 3/31, so zosyn was resumed & pt stayed in the hospital. While on zosyn patient had another SIRS response with associated aphasia and vancomycin was added and more fluids given with rapid resolution in lactate and AMS. Stroke workup was negative. Patient has remained stable on Vanc/zosyn with biliary drain growing two strains of gram negative lactose fermenting rods (although from old drain so unclear significance.) Discussed with rads and not much utility in WBC scan unless it is indium which is not usually covered by insurance so will hold off. Plan to wait for speciation and consider sending out on orals versus IV abx. Other than this culture infectious workup including imaging has been unrevealing for source.  - GI consulted, MRCP & HIDA equivocal, no further actions at this time   - Oncology consulted, WBC nuclear medicine scan ordered   - IR consulted, percutaneous biliary drain is deferred for now  - blood cultures x3, NGTD   - Continue TMP-SMX and Augmentin for 14 more days, minimum, Dr Dale in 10 days, can follow up in ID clinic for final duration abtx, he was very stable on Vanc/Zosyn while following cultures      #Hx of CVA  #Transiet aphasia 2/2   #Left Carotid Stenosis  On assessment patient was noted to be lethargic, disoriented, responding to my question with incoherent speech. On re evaluation after the head CT scan patient was noted to be back to baseline. Focal neurological defecit was not noted. Head CT unremarkable for acute ischemic changes. CTA with 60-70% stenosis of left proximal ICA. Follow up brain MRI without any acute stroke. Aphasia likely recrudescence of old stroke.  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 Beard, oncology  - treated with cisplatin/cemcitabin 8/2020-12/9 when  chemo was stopped due to abscess/fluid collection as above  - concern for progression based on CT on admission  - Oncology consulted, has follow up appt 4/8/21 with DR Beard     #LUE swelling  - Likely due to IV infiltrate but given lack of anticoagulation and malignancy, got LUE US to rule out DVT     Ascites  - 2/2 malignancy  - US on 3/27 with septate ascites. Given no fevers and no leukocytosis unlikely that there is active infection there causing obstruction, but it is something to consider.  -Ascites fluid from 4/2/21 w/o SBP, will follow up with the culture in ID clinic and with PCP< NGTD  - paracentesis on 3/29 (3800 ml of yellow colored fluid removed,) Repeat therapeutic para for 4/5      #Hx of HTN  Bp medications have been held at home for some time now given pt's BP has been on softer side for some time now   - hold lisinopril; PCP to follow up when to resume once BP's within acceptable range(s)     #Lower Extremity Edema  Increasing again due to fluid resuscitation. Will reduce fluids and consider lasix  - likely due to liver disease  - hold lasix; PCP to decide when to begin and to follow-up continued use of furosemide, recommend trending BMP as an outpatient     #Severe Protein Calorie Malnutrition  Due to poor PO intake and malignancy.  - Nutrition consulted  - Continue PTA marinol  - started MVI, PCP to follow up ; recommend outpatient follow-up with nutrition     #Depression   Adjustment disorder  - endorses feeling of being overwhelmed and how hard the year has been.   - declined medication intervention or psychology at this time, PCP to follow up      Consultations This Hospital Stay   GI PANCREATICOBILIARY ADULT IP CONSULT  INFECTIOUS DISEASE GENERAL ADULT IP CONSULT  ONCOLOGY ADULT IP CONSULT  INTERVENTIONAL RADIOLOGY ADULT/PEDS IP CONSULT  PHYSICAL THERAPY ADULT IP CONSULT  OCCUPATIONAL THERAPY ADULT IP CONSULT  NUTRITION SERVICES ADULT IP CONSULT  CARE MANAGEMENT / SOCIAL WORK IP  CONSULT  CARE MANAGEMENT / SOCIAL WORK IP CONSULT  INTERNAL MEDICINE PROCEDURE TEAM ADULT IP CONSULT [facility]  MEDICATION HISTORY IP PHARMACY CONSULT  CARE MANAGEMENT / SOCIAL WORK IP CONSULT  PHARMACY TO DOSE VANCO  VASCULAR ACCESS CARE ADULT IP CONSULT  VASCULAR ACCESS CARE ADULT IP CONSULT  VASCULAR ACCESS CARE ADULT IP CONSULT  INTERNAL MEDICINE PROCEDURE TEAM ADULT IP CONSULT [facility]  VASCULAR ACCESS CARE ADULT IP CONSULT  INTERNAL MEDICINE PROCEDURE TEAM ADULT IP CONSULT [facility]  INTERNAL MEDICINE PROCEDURE TEAM ADULT IP CONSULT [facility]  CARE MANAGEMENT / SOCIAL WORK IP CONSULT    Code Status   Full Code    Time Spent on this Encounter   I, Edmund Nelson MD, personally saw the patient today and spent greater than 30 minutes discharging this patient.       Edmund Nelson MD  [facility] [address] [place]  [address] [facility] [address]  Phone: [phone]  ______________________________________________________________________    Physical Exam   Vital Signs:                    Weight: 186 lbs 4.62 oz  General: Tired and frail-appearing man lying flat in bed, watching TV no acute distress  Head: NC, AT  Eye: symm gaze, mildly icteric sclerae  ENT: patent nares wo drainage/epistaxis, MMM  Pulm: CTAB anteriorly, comfortable WOB on room air  CV: normal rate, regular rhythm  GI: soft, NTND  Neuro: awake, alert, hearing speech and phonation, intact grossly  MSk: Thin build, sarcopenia, thenar atrophy, temporal muscle wasting,  4+/5 bilaterally     Subjective;   Patient wife bedside and reviewed above assessment and plan patient eager to go home, able to repeat back return precautions and care plan using his own words, wife asking many questions which were answered to satisfaction, they have close follow-up with infectious disease and oncology and they are well established with their primary care provider.  Education anticipatory  guidance were given for antibiotic plan and return precautions including but not limited to fever, abdominal pain, nausea vomiting, shortness of breath worsening cough, or any other infective symptoms.       Primary Care Physician   Tolu Noland    Discharge Orders      Comprehensive metabolic panel     Comprehensive metabolic panel     **Comprehensive metabolic panel FUTURE 1yr     **CBC with platelets differential FUTURE 2mo    Last Lab Result: Hemoglobin (g/dL)       Date                     Value                 03/31/2021               8.6 (L)          ----------     Home Care OT Referral for Hospital Discharge      Home Care PT Referral for Hospital Discharge      Home care nursing referral      MD face to face encounter    Documentation of Face to Face and Certification for Home Health Services    I certify that patient: Fuentes Estrada is under my care and that I, or a nurse practitioner or physician's assistant working with me, had a face-to-face encounter that meets the physician face-to-face encounter requirements with this patient on: 4/6/2021.    This encounter with the patient was in whole, or in part, for the following medical condition, which is the primary reason for home health care: Abnormal labs and possible biliary obstruction.    I certify that, based on my findings, the following services are medically necessary home health services: RN, Occupational Therapy and Physical Therapy.    My clinical findings support the need for the above services because: Occupational Therapy Services are needed to assess and treat cognitive ability and address ADL safety due to impairment in dressing, bathing, and functional endurance and Physical Therapy Services are needed to assess and treat the following functional impairments: balance, strength, and gait.    Further, I certify that my clinical findings support that this patient is homebound (i.e. absences from home require considerable and taxing effort  and are for medical reasons or Rast[employer] services or infrequently or of short duration when for other reasons) because: Leaving home is medically contraindicated for the following reason(s): Infection risk / immunocompromised state where it is safer for them to receive services in the home...    Based on the above findings. I certify that this patient is confined to the home and needs intermittent skilled nursing care, physical therapy and/or speech therapy.  The patient is under my care, and I have initiated the establishment of the plan of care.  This patient will be followed by a physician who will periodically review the plan of care.  Physician/Provider to provide follow up care: Tolu Noland    Physician Signature: See electronic signature associated with these discharge orders.  Date: 4/6/2021     Reason for your hospital stay    You were in the hospital due to rising in liver enzymes. This was felt to possibly be due to capping your drain or progression of your malignancy     Adult [facility] Follow-up and recommended labs and tests    Please follow up with oncology as scheduled on 4/8, they will help you schedule further follow ups with IR as needed    We are ordering a CMP tomorrow and next week to follow your liver enzymes.     Appointments on [place] and/or [facility] (with [facility] or [facility] provider or service). Call [phone] if you haven't heard regarding these appointments within 7 days of discharge.     Activity    Your activity upon discharge: activity as tolerated     Tubes and drains    You are going home with the following tubes or drains: Right abdominal drain.  Follow these instructions please flush with 5 mL twice a day as previously instructed to care for your tube     Discharge Instructions    Take your antibiotics as directed, make sure to follow up with Dr Dale like we discussed     Reason for your hospital stay    fever     Adult [facility] Follow-up and recommended labs and tests     Follow up with primary care provider, Tolu Noland, within 7 days for hospital follow- up.  The following labs/tests are recommended: BMP.    Follow up with Dr. Dale in Infectious Disease clinic this has already been arranged. As well as with Dr Lawrence , with Interventional Radiology, to evaluate after surgery. No follow up labs or test are needed. You can discuss when to consider capping the drain at that visit (and AFTER you finish antibiotics)    Appointments on [place] and/or [facility] (with [facility] or [facility] provider or service). Call [phone] if you haven't heard regarding these appointments within 7 days of discharge.     Follow Up and recommended labs and tests    Follow up with primary care provider, Tolu Noland, within 7 days for hospital follow- up.  The following labs/tests are recommended: BMP.     Activity    Your activity upon discharge: activity as tolerated     Tubes and drains    You are going home with the following tubes or drains: biliary drain.  Tube cares per hospital or home care instructions, call Dr Mcelroy office if new questions or concerns     Full Code     Diet    Follow this diet upon discharge: Orders Placed This Encounter      Regular Diet Adult     Diet    Follow this diet upon discharge: Orders Placed This Encounter      Snacks/Supplements Adult: Ensure Max Protein; Between Meals      Regular Diet Adult      Diet       Significant Results and Procedures   Most Recent 3 CBC's:  Recent Labs   Lab Test 04/06/21  0629 04/05/21  0640 04/04/21  0740   WBC 4.2 5.6 5.1   HGB 8.6* 8.7* 8.9*   MCV 94 92 94   * 140* 129*     Most Recent 3 BMP's:  Recent Labs   Lab Test 04/06/21  0629 04/05/21  0640 04/04/21  1612 04/04/21  0740    141  --  140   POTASSIUM 3.5 3.7 3.9 3.2*   CHLORIDE 116* 115*  --  115*   CO2 19* 20  --  19*   BUN 10 12  --  14   CR 0.72 0.72  --  0.75   ANIONGAP 6 6  --  6   ERIC 8.2* 8.2*  --  8.2*   * 97  --  93     Most Recent 2  LFT's:  Recent Labs   Lab Test 04/06/21  0629 04/05/21  0640   AST 89* 87*   ALT 42 42   ALKPHOS 703* 696*   BILITOTAL 4.0* 4.9*     Most Recent 3 INR's:  Recent Labs   Lab Test 04/06/21  0629 04/03/21  0403 04/02/21  0423   INR 1.74* 1.95* 1.95*     Most Recent INR's and Anticoagulation Dosing History:  Anticoagulation Dose History     Recent Dosing and Labs Latest Ref Rng & Units 2/2/2021 3/25/2021 3/27/2021 3/28/2021 4/2/2021 4/3/2021 4/6/2021    INR 0.86 - 1.14 1.83(H) 1.58(H) 1.48(H) 1.54(H) 1.95(H) 1.95(H) 1.74(H)        Most Recent 3 Creatinines:  Recent Labs   Lab Test 04/06/21  0629 04/05/21  0640 04/04/21  0740   CR 0.72 0.72 0.75     Most Recent 3 Hemoglobins:  Recent Labs   Lab Test 04/06/21  0629 04/05/21  0640 04/04/21  0740   HGB 8.6* 8.7* 8.9*     Most Recent 3 Troponin's:  Recent Labs   Lab Test 04/01/21 2044   TROPI <0.015     Most Recent 3 BNP's:No lab results found.  Most Recent D-dimer:No lab results found.  Most Recent Cholesterol Panel:No lab results found.  Most Recent 6 Bacteria Isolates From Any Culture (See EPIC Reports for Culture Details):  Recent Labs   Lab Test 04/05/21  1540 04/03/21  1800 04/02/21  1630 04/01/21  1840 04/01/21  1825 03/31/21  2254   CULT Culture negative monitoring continues Culture in progress  Culture negative after 4 days  Moderate growth  Enterobacter cloacae complex  *  Moderate growth  Strain 2  Enterobacter cloacae complex  * No growth  Culture negative after 4 days  Culture negative monitoring continues No growth No growth No growth     Most Recent TSH and T4:  Recent Labs   Lab Test 03/27/21 1913   TSH 5.16*   T4 1.74*     Most Recent Hemoglobin A1c:  Recent Labs   Lab Test 03/27/21  1913   A1C 5.6     Most Recent 6 glucoses:  Recent Labs   Lab Test 04/06/21  0629 04/05/21  0640 04/04/21  0740 04/03/21  0403 04/02/21  0423 04/01/21  1945   * 97 93 102* 115* 97     Most Recent Urinalysis:  Recent Labs   Lab Test 04/01/21  0956   COLOR Dark  Yellow   APPEARANCE Slightly Cloudy   URINEGLC Negative   URINEBILI Moderate*   URINEKETONE Negative   SG 1.035   UBLD Negative   URINEPH 5.5   PROTEIN 10*   NITRITE Negative   LEUKEST Negative   RBCU <1   WBCU <1     Most Recent ABG:  Recent Labs   Lab Test 05/12/20  1646   PH 7.46*   PO2 156*   PCO2 29*   HCO3 20*     Most Recent ESR & CRP:  Recent Labs   Lab Test 04/02/21  0423   CRP 57.0*     Most Recent Anemia Panel:  Recent Labs   Lab Test 04/06/21  0629 03/27/21  1913 03/27/21  1913 02/02/21  0632 02/02/21  0632   WBC 4.2   < > 5.7   < > 2.9*   HGB 8.6*   < > 9.4*   < > 8.4*   HCT 25.7*   < > 27.5*   < > 25.1*   MCV 94   < > 86   < > 93   *   < > 224   < > 166   B12  --   --  1,732*  --  2,269*   FOLIC  --   --   --   --  13.7    < > = values in this interval not displayed.     Most Recent CPK:No lab results found.,   Results for orders placed or performed during the hospital encounter of 03/27/21   Abdomen US, limited (RUQ only)    Narrative    EXAMINATION: US ABDOMEN LIMITED  3/27/2021 8:38 PM      CLINICAL HISTORY: History of cholangiocarcinoma, S?P right  hepatectomy. Rising bilirubin, Rising bilirubin    COMPARISON: Abdominal CT 3/25/2021        FINDINGS:  Hepatic artery is patent with normal resistive index and peak systolic  velocity of 71 cm/s. The portal vein is patent with antegrade flow.    The liver is normal in contour and echogenicity. The liver measures 15  cm in length. There is no intrahepatic biliary ductal dilatation.  (History right hepatectomy and left hepaticojejunostomy.) There is  dirty shadowing within the liver suggestive of intrahepatic  pneumobilia.    There is septated ascites inferior to the liver.    The right kidney measures 10.9 cm in length no visible hydronephrosis.      Impression    IMPRESSION:   1. Suspected intrahepatic pneumobilia, (may be due to  hepaticojejunostomy).   2. Septated ascites inferior to the liver.    I have personally reviewed the examination and  initial interpretation  and I agree with the findings.    JANET SCHWAB MD   POC US Guide for Paracentesis    Impression    POCUS Abdomen    Indication: Evaluate for ascites for safe site for paracentesis    Findings:  LLQ: Large ascites  Catheter tip was shown at peritoneum before entry and final position within fluid pocket of abdominal cavity.      MR Abdomen MRCP w/o & w Contrast    Narrative    MRI ABDOMEN WITH AND WITHOUT CONTRAST 3/30/2021    CLINICAL HISTORY: Jaundice; history of hilar adenocarcinoma s/p R  hepatectomy, worsening biliruin and alk phos on admission concerning  for obstruction    TECHNIQUE:  Images were acquired with and without intravenous contrast  through the abdomen. The following MR images were acquired: TrueFISP,  multiplanar T2 weighted, axial T1 in/out of phase, axial fat-saturated  T1, diffusion-weighted. Multiplanar T1-weighted images with fat  saturation were before contrast administration and at multiple time  points following the administration of intravenous contrast. Contrast  dose: Gadavist 7 mL    Comparison study: CT 3/25/2021 and MRI 3/28/2021    FINDINGS:    Liver: Post-surgical changes of partial right hepatectomy. The liver  surface is smooth. No focal suspicious mass. No significant hepatic  steatosis or iron deposition.    Gallbladder: Surgically absent. No intrahepatic or extrahepatic  biliary ductal dilatation upstream of the hepaticojejunostomy.       Spleen: Enlarged, measuring 16 cm in sagittal dimension. No focal  mass.    Kidneys:  No kidney stone, cysts or masses. No pelvocaliectasis.      Adrenal glands: Normal.     Pancreas:  Diffuse atrophy of the pancreas. No focal masses.  Pancreatic duct normal in caliber. No side branch ductal dilatation.     Bowel: Unremarkable, with no evidence of bowel dilatation. Colonic  diverticulosis without evidence of diverticulitis.      Lymph nodes: No abdominal lymphadenopathy by size criteria.    Blood vessels: Major  blood vessels are patent with no evidence of clot  or obstruction. Extensive intraperitoneal varices along the anterior  abdominal wall.         Lung bases: Small right and small to moderate left pleural effusions  with associated atelectasis. No abnormal T2 hyperintensity in the lung  bases.    Bones and soft tissues: No evidence of acute bony abnormality. Mild  diffuse anasarca.     Mesentery and abdominal wall: Scattered regions of peritoneal  thickening better delineated on prior CT 3/25/2021, for example along  the right lateral peritoneal wall (series 8 image 30). Percutaneous  drain along the posterior margin of liver at the site of previous  collection. No evidence of new fluid collection.    Ascites: Slightly decreased moderate amount ascites throughout the  abdomen and pelvis status post-paracentesis.       Impression    IMPRESSION:  1. Post-surgical changes of partial right hepatectomy. No focal  suspicious mass.  2. No intrahepatic or extrahepatic biliary ductal dilatation.  3. Scattered regions of peritoneal thickening better delineated on  prior CT 3/25/2021.  4. Percutaneous drain along the posterior margin of liver at the site  of previous collection. No evidence of new fluid collection.  5. Slightly decreased moderate amount ascites throughout the abdomen  and pelvis status post-paracentesis since MRI 3/28/2021. Extensive  intraperitoneal varices along the anterior abdominal wall. 6. Small  right and small-to-moderate left pleural effusions with adjacent  compressive atelectasis.    I have personally reviewed the examination and initial interpretation  and I agree with the findings.    ASHLEY HARRINGTON, DO   MR Abdomen w/o Contrast    Narrative    MRI ABDOMEN WITHOUT CONTRAST 3/29/2021    CLINICAL HISTORY: Jaundice; history of hilar adenocarcinoma s/p R  hepatectomy, worsening biliruin and alk phos on admission concerning  for obstruction    TECHNIQUE:     Images were acquired without intravenous  contrast through the abdomen.  The following MR images were acquired: TrueFISP, multiplanar T2  weighted, axial T1 in/out of phase, axial fat-saturated T1.  Examination was terminated prior to acquisition of the remainder of  the protocol.    Comparison study: CT 3/25/2021    FINDINGS:    Limited evaluation of the upper abdomen secondary to moderate amount  of ascites resulting in dielectric and non-uniformity artifacts.    Liver: Post-surgical changes of partial right hepatectomy. The liver  surface is smooth. No focal suspicious mass. Evaluation for focal  abnormality is markedly limited.    Gallbladder: Surgically absent. No intrahepatic or extrahepatic  biliary ductal dilatation.       Spleen: Enlarged, measuring 16.1 cm in sagittal dimension.    Kidneys:  No kidney stone, cysts or masses. No pelvocaliectasis.      Adrenal glands: Normal.     Pancreas:  Diffuse atrophy of the pancreas. No focal masses.  Pancreatic duct normal in caliber. No side branch ductal dilatation.     Bowel: Unremarkable, with no evidence of bowel dilatation. Colonic  diverticulosis without evidence of diverticulitis.      Lymph nodes: No abdominal lymphadenopathy by size criteria.    Blood vessels: Major blood vessels are patent with no evidence of clot  or obstruction. Extensive intraperitoneal varices along the anterior  abdominal wall.         Lung bases: Bilateral trace pleural effusion and associated  atelectasis. No abnormal T2 hyperintensity in the lung bases.    Bones and soft tissues: No evidence of acute bony abnormality. Mild  diffuse anasarca.    Mesentery and abdominal wall: Scattered regions of peritoneal  thickening better delineated on prior CT 3/25/2021, for example along  the right lateral peritoneal wall (series 4 image 11). Percutaneous  drain along the posterior margin of liver at the site of previous  collection. No evidence of new fluid collection.    Ascites: Moderate amount ascites throughout the abdomen and  pelvis.       Impression    IMPRESSION: Incomplete examination of the upper abdomen secondary to  patient unable to tolerate exam completion.  1. Post-surgical changes of partial right hepatectomy. No focal  suspicious mass.  2. No intrahepatic or extrahepatic biliary ductal dilatation.  3. Scattered regions of peritoneal thickening better delineated on  prior CT 3/25/2021.  4. Percutaneous drain along the posterior margin of liver at the site  of previous collection. No evidence of new fluid collection.  5. Moderate amount ascites throughout the abdomen and pelvis.  Extensive intraperitoneal varices along the anterior abdominal wall.       I have personally reviewed the examination and initial interpretation  and I agree with the findings.    AUDI PARNELL MD   XR Chest Port 1 View    Narrative    EXAM: XR CHEST PORT 1 VW  3/31/2021 11:35 PM     HISTORY:  Fevers, evaluate for possible infection       COMPARISON:  Chest abdomen pelvis CT 2/26/2021, chest x-ray 9/28/2020    FINDINGS: AP radiograph of the chest. Right chest wall Port-A-Cath  with tip overlying the mid SVC.     The cardiomediastinal silhouette is within normal limits. Small right  and possible trace left pleural effusions. No pneumothorax. Streaky  bibasilar and hazy perihilar opacities. Surgical drain overlying the  right upper quadrant. Multiple surgical clips overlying the right  upper quadrant of the abdomen. No acute osseous abnormality.      Impression    IMPRESSION:  1. Streaky bibasilar and hazy perihilar opacities, likely atelectasis.  2. Small right and probable trace left pleural effusions.    I have personally reviewed the examination and initial interpretation  and I agree with the findings.    MICHAEL ARMSTRONG MD   NM HepatOBiliary Scan    Narrative    Examination:  NM HEPATOBILIARY SCAN      Date: 4/1/2021 4:17 PM.    Indication:   Pt with cholangiocarcinoma off treatment with recurrent  cholangitis concern for obstruction in biliary  tree     Additional Information: Status post radical extrahepatic bile duct  resection and right hepatic colectomy, postoperative course with  previous bile leak and abscess    Technique:     The patient received 6 mCi of Tc-99m Choletec intravenously. Images  were obtained out through 60 minutes.    SPECT images of the abdomen were obtained. CT images were obtained for  the purposes of attenuation correction and anatomic localization only.  Fused SPECT-CT processing was performed in the[employer] workstation,  archived in PACS, and reviewed by the radiologist.     Findings:  Planar images demonstrate persistent tracer retention throughout the  liver and persistent tracer in the visualized heart. Small bowel is  first visualized at approximately 27 minutes with gradual subsequent  filling with tracer. There is no evidence of tracer leak.    SPECT-CT images demonstrate significant filling of the small bowel  with tracer right percutaneous drain terminating posterior to the  right lobe of the liver. Diffuse peritoneal ascites. Bilateral small  pleural effusions with associated compressive atelectasis. Partially  visualized central venous catheter terminating in the mid SVC. Mild  coronary artery calcifications. No acute osseous lesions.         Impression    Impression:    1. No evidence of biliary obstruction.  2. No evidence of biliary leak.  3. Persistent hepatogram suggestive of significantly poor hepatic  function.  4. Diffuse ascites.  5. Bilateral pleural effusions with associated atelectasis.    I have personally reviewed the examination and initial interpretation  and I agree with the findings.    JANET SCHWAB MD   CT Abdomen Pelvis w Contrast    Narrative    EXAMINATION: CT ABDOMEN PELVIS W CONTRAST, 4/1/2021 8:58 PM    TECHNIQUE:  Helical CT images from the lung bases through the  symphysis pubis were obtained with IV contrast. Contrast dose:  iopamidol (ISOVUE-370) solution 105 mL    COMPARISON: CT  abdomen pelvis nuclear medicine study 4/1/2021,  abdominal MRI 3/30/2021    HISTORY: Abdominal abscess/infection suspected; Bowel obstruction  suspected    FINDINGS:    Liver: Postoperative changes of partial hepatectomy from the right  lobe of the liver. There is a percutaneous pigtail drain with  intercostal approach located directly within this collection.  Collection measures approximately 1.1 x 5.2 cm. Remainder the liver is  within normal limits.  Biliary system: Cholecystectomy clips. No intrahepatic or extrahepatic  biliary ductal dilatation.  Pancreas: No focal mass or dilation of the main pancreatic duct.  Stomach: Small sliding hilum hernia..  Spleen: Measures 14.4 cm and critical caudal dimension.  Adrenal glands: Within normal limits.  Kidneys: No focal mass, hydronephrosis, or stone.  Bladder: Within normal limits.  Reproductive organs: Within normal limits.  Colon: Scattered colonic diverticuli without adjacent fat stranding.  Appendix: Within normal limits.  Small Bowel: No dilated loops of bowel. Surgical anastomosis in the  right mid abdomen.  Lymph nodes: No intra-abdominal or pelvic lymphadenopathy.  Vasculature: Within normal limits other than mild to moderate arterial  calcification and stable anterior upper abdominal varices. Stable  calcified plaque in the right renal artery.    Large amount of simple intra-abdominal ascites. Previous  omental/peritoneal thickening is not appreciated on today's  examination due to ascites.    Lung bases: Small bilateral pleural effusions with associated passive  atelectasis in the lower lobes. Cardiac size is normal without  pericardial effusion. Moderate to heavy coronary calcium..    Bones and soft tissues: No suspicious soft tissue mass or fluid  collection. No suspicious osseous lesion. Midline hernia filled with  ascites. Mild degenerative changes of the lower lumbar spine.      Impression    IMPRESSION:   1. No acute intra-abdominal pathology.  2. Large  volume ascites with small bilateral pleural effusions.  Consider SBP for elevated lactate.  3. Postoperative changes of partial right hepatectomy with  percutaneous drain in place. This cavity is essentially collapsed.   4. Splenomegaly.    I have personally reviewed the examination and initial interpretation  and I agree with the findings.    MICHAEL ARMSTRONG MD   CT Head w/o Contrast    Narrative    CT HEAD W/O CONTRAST 4/1/2021 8:59 PM    History: Neuro deficit, acute, stroke suspected; Mental status change,  unknown cause     Comparison: None available    Technique: Using multidetector thin collimation helical acquisition  technique, axial, coronal and sagittal CT images from the skull base  to the vertex were obtained without intravenous contrast.    Findings: White matter hypoattenuation in subcortical region of the  left frontal operculum and left parieto-occipital region. Gray-white  differentiation is maintained. There is no intracranial hemorrhage,  mass effect, or midline shift. Mild ex vacuo dilation of the left  lateral ventricle. Remainder the ventricles are proportionate to the  cerebral sulci. Mild frontotemporal predominant volume loss. The basal  cisterns are clear. Calcification of the carotid and vertebral  arteries.    The bony calvaria and the bones of the skull base are normal. Small  amount of mucosal thickening in the right maxillary sinus. Mastoid air  cells are clear.      Impression    Impression:  1. No acute intracranial pathology.  2. Chronic infarct in the left frontal operculum, subinsular region  and parietal occipital regions.  3. Chronic small vessel ischemic disease and diffuse cerebral volume  loss.    I have personally reviewed the examination and initial interpretation  and I agree with the findings.    CEDRICK CRAVEN MD   CTA Head Neck with Contrast    Narrative    CTA  HEAD NECK WITH CONTRAST 4/1/2021 8:59 PM    CT angiogram of the neck   CT angiogram of the base of the  brain with contrast  Reconstruction by the Radiologist on the 3D workstation    Provided History:  Neuro deficit, acute, stroke suspected    Comparison:  None available.      Technique:  HEAD and NECK CTA: During rapid bolus intravenous injection of  nonionic contrast material, axial images were obtained using thin  collimation multidetector helical technique from the base of the upper  aortic arch through the [place] of [place]. This CT angiogram data was  reconstructed at thin intervals with mild overlap. Images were sent to  the [employer] workstation, and 3D reconstructions were obtained. The  axial source images, multiplanar reformations, 3D reconstructions in  both maximum intensity projection display and volume rendered models  were reviewed, with reconstructions performed by the technologist and  the radiologist.    Contrast: iopamidol (ISOVUE-370) solution 105 mL    Findings:    Head CTA demonstrates scattered calcified plaques in the distal  cortical branches. For instance, left M2 region on series 7, image 183  and very distal left M4 in the parietal region demonstrated on series  7, image 93. No aneurysm or stenosis of the major intracranial  arteries. Fetal left PCA. Right posterior communicating arteries not  visualized. Anterior communicating artery is patent.    Neck CTA demonstrates calcified 60-70% stenosis the left ICA at the  level of bulb from bulky calcification. Atherosclerotic calcification  at the right carotid bulb without significant stenosis. The origins of  the great vessels from the aortic arch are patent. Aberrant right  subclavian artery. Left vertebral artery arises directed from the  aorta. Atherosclerotic calcifications in bilateral vertebral arteries.  The normal distal right internal carotid artery measures 5.5 mm. The  normal distal left internal carotid artery measures 5.5 mm.     No mass within the visualized portions of the cervical soft tissues or  lung apices. No significant  neuroforaminal narrowing or spinal canal  stenosis. Limbus vertebrae C6 body. Right-sided Port-A-Cath. Small  moderate left-sided pleural effusion.      Impression    Impression:    1. Head CTA demonstrates no acute large vessel occlusion, aneurysm or  stenosis of the major intracranial arteries.   2. Neck CTA demonstrates greater than 60-70 % stenosis of the left  proximal ICA.    I have personally reviewed the examination and initial interpretation  and I agree with the findings.    CEDRICK CRAVEN MD   US Carotid Bilateral    Narrative    BILATERAL CAROTID DUPLEX DOPPLER ULTRASOUND 4/2/2021 8:54 AM    CLINICAL HISTORY: left carotid stenosis on CTA    COMPARISON: None    REFERRING PROVIDER: [facility]    TECHNIQUE: Grayscale (B-mode) and duplex and spectral Doppler  ultrasound of the common carotid, extracranial internal carotid,  external carotid, and vertebral artery origins. Velocity measurements  obtained with angle correction at or less than 60 degrees.    FINDINGS:    RIGHT SIDE:     Plaque Morphology: Predominantly echogenic, with irregular plaque at  the carotid bulb       Proximal CCA: 73/18 cm/s     Mid CCA: 81/20 cm/s     Distal CCA: 73/16 cm/s     External CA: 123/0 cm/s       Proximal ICA: 59/17 cm/s     Mid ICA: 71/22 cm/s     Distal ICA: 59/18 cm/s       Vertebral artery: Antegrade: 45/15 cm/s     ICA/CCA ratio: 0.97     There is mildly prolonged systolic upstroke throughout the carotid  arteries with diminished velocities which may suggest some component  of aortic stenosis.    LEFT SIDE:     Plaque Morphology: Predominantly echogenic, with bulky shadowing  irregular plaque at the carotid bulb and proximal ICA       Proximal CCA: 66/13 cm/s     Mid CCA: 92/15 cm/s     Distal CCA: 80/22 cm/s     External CA: 69/0 cm/s       Proximal ICA: 212/53 cm/s     Mid ICA: 125/33 cm/s     Distal ICA: 55/23 cm/s       Vertebral artery: Antegrade: 39/14 cm/s     ICA/CCA ratio: 1.57     There is mildly  prolonged systolic upstroke of the carotid arteries  which may suggest some component of aortic stenosis.      Impression    IMPRESSION:    1. RIGHT ICA: Less than 50% diameter narrowing by grayscale imaging  and sonographic velocity criteria.    2. LEFT ICA:  50-69% diameter stenosis by grayscale imaging and  sonographic velocity criteria.    3. Mildly prolonged systolic upstroke throughout the right and left  carotid arteries which may suggest some component of aortic stenosis.    Consensus Panel Gray-Scale and Doppler US Criteria for Diagnosis of  ICA Stenosis (Radiology 11/2003) additionally modified by Ledy et  al. in Journal of Vascular Surgery 1/2011, (89)55-54.       Normal         ICA PSV < 140 cm/sec       Plaque Estimate None       ICA/CCA  PSV Ratio < 2.0       ICA EDV < 40 cm/sec       < 50%          ICA PSV < 140 cm/sec       Plaque Estimate < 50%       ICA/CCA  PSV Ratio < 2.0       ICA EDV < 40 cm/sec       50- 69%       ICA -230 cm/sec       Plaque Estimate > or = 50%       ICA/CCA PSV Ratio 2.0-4.0       ICA EDV  cm/sec         > or = 70%, less than near occlusion       ICA PSV > 230 cm/sec       Plaque Estimate > or = 50%       ICA/CCA Ratio > 4.0       ICA EDV > 100 cm/sec                                            Additional criteria from vascular surgery     > 80%       EDV > 120 cm/sec     I have personally reviewed the examination and initial interpretation  and I agree with the findings.    ANUJA BERRY MD   MR Brain for Stroke [facility] w/o & w Contras    Narrative    MRI brain without and with contrast  MRA of the head without contrast  Neck MRA without and with contrast    Provided History:  Transient ischemic attack (TIA).    Per chart, patient with transient aphasia. History of old left MCA  stroke.    Comparison:  CTA head/neck 4/1/2021, CT head 4/1/2021, carotid  ultrasound 4/2/2021      Technique:   Brain MRI:  Axial diffusion, FLAIR, T2-weighted, susceptibility,  and  coronal T1-weighted images were obtained without intravenous contrast.  Following intravenous gadolinium-based contrast administration, axial  and coronal T1-weighted images were obtained.    Head MRA: 3D time-of-flight MRA of the [place] of [place] was performed  without intravenous contrast.  Neck MRA:  Limited non contrast 2DTOF images were obtained of the  mid-cervical region. Following intravenous gadolinium-based contrast  administration, a contrast enhanced MRA of the neck/cervical vessels  was performed.  Three-dimensional reconstructions of the neck and head MRA were  created, which were reviewed by the radiologist.    Dose: 8ml gadavist    Findings:   Brain MRI: Axial diffusion weighted images demonstrate no definite  acute infarct. Encephalomalacia and gliosis in the left parietal lobe  and left subinsular region.. Generalized parenchymal volume loss, most  significant of the left frontotemporal lobes. Mild ex vacuo dilatation  of the left lateral ventricle. The remaining ventricles are  unremarkable. Mild leukoaraiosis. Several areas with susceptibility  artifact, involving the bilateral basal ganglia and left parietal  occipital lobes, suggestive of microhemorrhage. Contrast-enhanced  images of the brain demonstrate no abnormal intra- or extra-axial  enhancement.    Head MRA demonstrates no definite aneurysm or stenosis of the major  intracranial arteries. Fetal origin of the left PCA. The right  posterior communicating artery is hypoplastic. The anterior  communicating artery is patent.    Neck MRA demonstrates patent major cervical arteries. There is  approximately 60% narrowing of the proximal left internal carotid  artery. The normal distal right internal carotid artery measures 6 mm.  The normal distal left internal carotid artery measures 5 mm.  Antegrade flow in the major cervical vasculature. Aberrant right  subclavian artery. The left vertebral artery arises from the aortic  arch.       Impression    Impression:  1. No evidence of acute infarction.  2. No abnormal enhancing lesions intracranially.  3. Head MRA demonstrates no definite aneurysm or stenosis of the major  intracranial arteries.  4. Neck MRA demonstrates approximately 60% narrowing of the proximal  left internal carotid artery.  5. Leukokraurosis and chronic infarcts in the left cerebral  hemisphere.    I have personally reviewed the examination and initial interpretation  and I agree with the findings.    CEDRICK CRAVEN MD   POC US Guide for Paracentesis    Impression    POCUS Abdomen    Indication: Evaluate for ascites for safe site for paracentesis    Findings:  LLQ: Large volume ascites.  Needle tip was shown at peritoneum before entry and final position within fluid pocket of abdominal cavity.      US Upper Extremity Venous Duplex Left    Narrative    EXAMINATION: DOPPLER VENOUS ULTRASOUND OF THE LEFT UPPER EXTREMITY,  4/4/2021 5:45 PM     COMPARISON: None.    HISTORY: 66-year-old male with unilateral left arm swelling.    TECHNIQUE:  Gray-scale evaluation with compression, spectral flow and  color Doppler assessment of the deep venous system of the left upper  extremity.    FINDINGS:  Left: Normal blood flow and waveforms are demonstrated in the internal  jugular, subclavian, and axillary veins. There is nonspecific to and  fro waveform demonstrated in the left innominate vein. There is normal  compressibility of the brachial vein. The basilic and cephalic veins  were not visualized.      Impression    IMPRESSION:  1.  No evidence of left upper extremity deep venous thrombosis.  2.  Basilic and cephalic veins are not visualized.    I have personally reviewed the examination and initial interpretation  and I agree with the findings.    AUDI PARNELL MD   POC US Guide for Paracentesis    Impression    Consult and Procedure Service - Procedure Note    Attending:Marcella Man MD   Resident: none  Procedure: Diagnostic and  therapeutic paracentesis  Indication: Symptomatic distension  Risk Assessment: Optimized  Pre-procedure diagnosis: Ascites  Post-procedure diagnosis: Same    The risks and benefits of the procedure were explained to Fuentes who expressed understanding and opted to proceed.  Consent was obtained and placed in the chart.  A time out was performed.  An area of ascites was located and marked using ultrasound guidance in the left lower quadrant; the area was prepped and draped in the usual sterile fashion.  8 ml of 1% lidocaine was instilled and ascites located.  The iCrimefighter paracentesis catheter and needle were inserted under real-time guidance until ascites obtained then the needle removed and the catheter advanced.  The apparatus was connected to vacuum bottles and a total of 4000 ml of clear yellow colored fluid removed.   A specimen was sent for analysis. The catheter was withdrawn and the area dressed.  Patient tolerated the procedure well with no immediate complications.  Please contact the Consult and Procedure Service if any complications or concerns arise.     Marcella Man MD   DOS:  2021    Echo Limited    Narrative    [id number]  [id number]  [id number]  [id number]^[email or web address]     [facility],[place]  Echocardiography Laboratory  [address] [place] [id number]     Name: FUENTES PAINTING  MRN: [id number]  : 1955  Study Date: 2021 10:20 AM  Age: 66 yrs  Gender: Male  Patient Location: [facility]  Reason For Study: Endocarditis  Ordering Physician: ALBA OLEA  Performed By: Soniya Rosa RDCS     BSA: 2.1 m2  Height: 73 in  Weight: 186 lb  HR: 55  BP: 114/69 mmHg  ______________________________________________________________________________  Procedure  Limited Portable Echo Adult. Technically difficult study. Poor acoustic  windows. Limited information was obtained during study.  ______________________________________________________________________________  Interpretation  Summary  Technically difficult study. Limited information was obtained during study.  Global and regional left ventricular function is normal with an EF of 55-60%.  No pericardial effusion is present.  No vegetation or mass identified, however this does not exclude endocarditis.  ______________________________________________________________________________  Left Ventricle  Global and regional left ventricular function is normal with an EF of 55-60%.     Mitral Valve  The mitral valve is normal.     Tricuspid Valve  The tricuspid valve is normal.     Vessels  The inferior vena cava is normal.     Pericardium  No pericardial effusion is present.     Miscellaneous  Ascites is noted. A bilateral pleural effusion is present.  ______________________________________________________________________________  MMode/2D Measurements & Calculations  LVOT diam: 2.3 cm  LVOT area: 4.1 cm2     Doppler Measurements & Calculations  MV E max robert: 45.6 cm/sec  MV A max robert: 68.2 cm/sec  MV E/A: 0.67  MV dec slope: 211.0 cm/sec2  Ao V2 max: 179.0 cm/sec  Ao max P.0 mmHg  Ao V2 mean: 116.0 cm/sec  Ao mean P.0 mmHg  Ao V2 VTI: 44.6 cm  BRUCE(I,D): 2.0 cm2  BRUCE(V,D): 2.1 cm2  LV V1 max PG: 3.5 mmHg  LV V1 max: 93.1 cm/sec  LV V1 VTI: 21.8 cm  SV(LVOT): 88.3 ml  SI(LVOT): 42.3 ml/m2  AV Robert Ratio (DI): 0.52  BRUCE Index (cm2/m2): 0.95  E/E' av.1  Lateral E/e': 3.9     Medial E/e': 6.3     ______________________________________________________________________________  Report approved by: MD Johnathon Ryan 2021 12:35 PM               Discharge Medications   Discharge Medication List as of 2021  4:17 PM      START taking these medications    Details   amoxicillin-clavulanate (AUGMENTIN) 875-125 MG tablet Take 1 tablet by mouth 2 times daily, Disp-30 tablet, R-0, E-Prescribe      sodium chloride, PF, 0.9% PF flush 10 mLs by Intracatheter route every 12 hours, Disp-420 mL, R-1, E-PrescribePlease dispense  as 10 mL syringes for drain flush      sulfamethoxazole-trimethoprim (BACTRIM DS) 800-160 MG tablet Take 1 tablet by mouth 2 times daily, Disp-30 tablet, R-0, E-Prescribe         CONTINUE these medications which have CHANGED    Details   amoxicillin (AMOXIL) 875 MG tablet Take 1 tablet (875 mg) by mouth 2 times daily, Disp-60 tablet, R-0, E-Prescribe         CONTINUE these medications which have NOT CHANGED    Details   lactobacillus rhamnosus, GG, (CULTURELL) capsule Take 1 capsule by mouth daily, Historical      LORazepam (ATIVAN) 0.5 MG tablet Take 1 tablet (0.5 mg) by mouth every 4 hours as needed (Anxiety, Nausea/Vomiting or Sleep), Disp-30 tablet, R-5, Local Print      magnesium gluconate 30 MG tablet Take 1 tablet (30 mg) by mouth daily, Disp-90 tablet, R-3, E-Prescribe      ondansetron (ZOFRAN) 8 MG tablet Take 1 tablet (8 mg) by mouth every 8 hours as needed (Nausea/Vomiting), Disp-30 tablet, R-5, E-Prescribe      prochlorperazine (COMPAZINE) 10 MG tablet Take 0.5 tablets (5 mg) by mouth every 6 hours as needed (Nausea/Vomiting), Disp-30 tablet, R-5, E-Prescribe      tadalafil (CIALIS) 10 MG tablet Take 10 mg by mouth daily as needed, Historical      vitamin D3 (CHOLECALCIFEROL) 250 mcg (42618 units) capsule Take 1 capsule by mouth daily, Historical      zolpidem (AMBIEN) 10 MG tablet Take 1 tablet (10 mg) by mouth nightly as needed for sleep, Disp-30 tablet, R-0, E-Prescribe      atorvastatin (LIPITOR) 40 MG tablet Take 40 mg by mouth At Bedtime , Historical      clopidogrel (PLAVIX) 75 MG tablet Take 75 mg by mouth At Bedtime , Historical      dexamethasone (DECADRON) 4 MG tablet Take 1 tablet (4 mg) by mouth daily (with breakfast) Daily for the 3 days after chemotherapy in the morning with food, Disp-3 tablet, R-1, E-Prescribe      dronabinol (MARINOL) 5 MG capsule Take 2 capsules in am and 1 capsule in pm (before meals), Disp-90 capsule, R-1, E-Prescribe      fenofibrate (TRIGLIDE/LOFIBRA) 160 MG tablet  "Take 160 mg by mouth At Bedtime , Historical      Ferrous Sulfate (IRON) 325 (65 Fe) MG tablet Take 1 tablet by mouth 3 times daily (with meals), Disp-180 tablet, R-1, E-Prescribe      furosemide (LASIX) 20 MG tablet Take 20 mg by mouth Every Monday, Wednesday, Friday, and Saturday morning, Historical      lisinopril (ZESTRIL) 5 MG tablet Take 5 mg by mouth every evening , Historical      oxyCODONE (ROXICODONE) 5 MG tablet Take 1-2 tablets (5-10 mg) by mouth every 4 hours as needed for moderate to severe pain, Disp-20 tablet, R-0, E-Prescribe      sildenafil (REVATIO) 20 MG tablet Take 20 mg by mouth as needed, Historical         STOP taking these medications       ciprofloxacin (CIPRO) 500 MG tablet Comments:   Reason for Stopping:         metroNIDAZOLE (FLAGYL) 500 MG tablet Comments:   Reason for Stopping:         penicillin V (VEETID) 500 MG tablet Comments:   Reason for Stopping:             Allergies   Allergies   Allergen Reactions     Metformin Other (See Comments)     \" I think my kidneys shut down\"     "

## 2021-04-08 NOTE — PROGRESS NOTES
"Fuentes is a 66 year old who is being evaluated via a billable telephone visit.      What phone number would you like to be contacted at? [phone]  How would you like to obtain your AVS? Braden     I have reviewed and updated the patient's allergies and medication list.    Concerns: Hospitalization. Was also started in 2 new antibiotics.  Refills: Will check between intake call and provider call and let provider know.       Vitals - Patient Reported  Systolic (Patient Reported): 114  Diastolic (Patient Reported): 74  Height (Patient Reported): 185.4 cm (6' 1\")  Temperature (Patient Reported): 97.3  F (36.3  C)  Pain Score: No Pain (0)    nAita Pina CMA      Phone call duration: 37 minutes    Reason for Visit: seen in f/u of metastatic cholangiocarcinoma    Oncology HPI:   Fuentes Estrada is a 65 year old man with a prior hx of HTN, DM, CVA on plavix who presented with elevated LFTs in the spring of 2020. 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 On May 12, 2020 he underwent  A radical extrahepatic bile duct resection and R hepatectomy.   He had all of his disease resected with negative margins and one positive lymph node.  He had a complicated postoperative course with a bile leak and abscess that had delayed starting his adjuvant chemotherapy.       On imaging in July 2020,  he appeared to be developing increasing nodularity in the resection bed and regional lymph nodes that has led to an EUS with a fine-needle aspiration of the lymph nodes on 8/11/20,  demonstrating the presence of metastatic disease.      On 8/27/20, he initiated treatment with palliative intent Cisplatin/Gemcitabine.      His abscess/biliary drain was managed by IR. Sinograms identified a communication to the biliary tree. He failed a capping trial with development of a fever. He underwent sinograms and cavitary sclerotherapy every 2 weeks starting August 25, 2020.  Drain was removed on 11/9/2020 9/28/20: port " placement     Admission to [facility] 10/9/20-10/11/20 with septic shock from infectious enterocolitis, all cultures NGTD. Chemo delayed one week for recovery.  Cis/[place] restarted 10/21.     Admission to [facility] 11/18/20-11/19/20 with cholangitis - fever 100.8, bilirubin 3.2, elevated alk phos. MRCP showed no stones or strictures. Paracentesis removed 1L of fluid, no signs of SBP. He was dismissed on a 7-day course of Flagyl and ciprofloxacin.  C5D8 was delayed due to hospitalization.     He was seen on 12/14/2020 with an interim CT scan which showed findings concerning for abscess at the operative site along the posterior aspect of liver with gas seen in this fluid collection.  Patient was also having chills, chemotherapy was held and he was started on ciprofloxacin and Flagyl.  There was further discussion about management of this recurrent abscess with Dr. Purcell, Dr. Hale and Dr. Strauss about the abscess.  It was felt that aspiration is not likely to prevent recurrence of the abscess. A biliary drain is possible, but likely to be permanent. It has been concluded an endoscopic procedure is not possible. At 12/21/20 visit with Dr. Beard, antibiotic course was extended and he finished 12/31/20. Disease on 12/11/20 was stable.     Admission from 3/27/21 to 4/6/21: presented with biliary obstruction after capping drain. MRCP was equivocal. LFTS improved with uncappnig the drain.  Developed fevers/chills on 3/31 , started on broad spectrum antibioitics. Hepatic drain with fluid growing gram negative rods    Interval history: Fuentes is joined on the phone visit by his wife Robyn. His is tired and has a low appetite. Was starting to improve prior to his last hospitalization, but again feels a little set back. No fevers/chills. Bili drain is open to drainage. Had some loose stools while hospitalized. C.diff was negative. Using imodium 1 tablet/day to mange the diarrhea now. Has noted increased BLE and abdominal  bloating. Had paracentesis x 2 while in the hospital (3 L then 4L). Talked to his PCP and resumed furosemide on M, W, F, S.    -drinking protein shakes, small meals.   -no longer taking any diabetic medications as he was running low  -while hospitalized and having fevers, he had some delirium and was evaluted for stroke/tia. No acute findings. His mentation improved with treatment of infection    Current Outpatient Medications   Medication Sig Dispense Refill     amoxicillin-clavulanate (AUGMENTIN) 875-125 MG tablet Take 1 tablet by mouth 2 times daily 30 tablet 0     atorvastatin (LIPITOR) 40 MG tablet Take 40 mg by mouth At Bedtime        clopidogrel (PLAVIX) 75 MG tablet Take 75 mg by mouth At Bedtime        dexamethasone (DECADRON) 4 MG tablet Take 1 tablet (4 mg) by mouth daily (with breakfast) Daily for the 3 days after chemotherapy in the morning with food 3 tablet 1     dronabinol (MARINOL) 5 MG capsule Take 2 capsules in am and 1 capsule in pm (before meals) 90 capsule 1     fenofibrate (TRIGLIDE/LOFIBRA) 160 MG tablet Take 160 mg by mouth At Bedtime        Ferrous Sulfate (IRON) 325 (65 Fe) MG tablet Take 1 tablet by mouth 3 times daily (with meals) 180 tablet 1     furosemide (LASIX) 20 MG tablet Take 20 mg by mouth Every Monday, Wednesday, Friday, and Saturday morning       lactobacillus rhamnosus, GG, (CULTURELL) capsule Take 1 capsule by mouth daily       lisinopril (ZESTRIL) 5 MG tablet Take 5 mg by mouth every evening        LORazepam (ATIVAN) 0.5 MG tablet Take 1 tablet (0.5 mg) by mouth every 4 hours as needed (Anxiety, Nausea/Vomiting or Sleep) 30 tablet 5     magnesium gluconate 30 MG tablet Take 1 tablet (30 mg) by mouth daily (Patient taking differently: Take 30 mg by mouth 2 times daily ) 90 tablet 3     ondansetron (ZOFRAN) 8 MG tablet Take 1 tablet (8 mg) by mouth every 8 hours as needed (Nausea/Vomiting) 30 tablet 5     oxyCODONE (ROXICODONE) 5 MG tablet Take 1-2 tablets (5-10 mg) by mouth  "every 4 hours as needed for moderate to severe pain 20 tablet 0     prochlorperazine (COMPAZINE) 10 MG tablet Take 0.5 tablets (5 mg) by mouth every 6 hours as needed (Nausea/Vomiting) 30 tablet 5     sildenafil (REVATIO) 20 MG tablet Take 20 mg by mouth as needed       sodium chloride, PF, 0.9% PF flush 10 mLs by Intracatheter route every 12 hours 420 mL 1     sulfamethoxazole-trimethoprim (BACTRIM DS) 800-160 MG tablet Take 1 tablet by mouth 2 times daily 30 tablet 0     tadalafil (CIALIS) 10 MG tablet Take 10 mg by mouth daily as needed       vitamin D3 (CHOLECALCIFEROL) 250 mcg (93706 units) capsule Take 1 capsule by mouth daily       zolpidem (AMBIEN) 10 MG tablet Take 1 tablet (10 mg) by mouth nightly as needed for sleep 30 tablet 0          Allergies   Allergen Reactions     Metformin Other (See Comments)     \" I think my kidneys shut down\"         Phone visit Voice appeared clear. Responded appropriately to my direct questions. There were no vitals taken for this visit.  Wt Readings from Last 4 Encounters:   04/03/21 84.5 kg (186 lb 4.6 oz)   03/25/21 74.8 kg (165 lb)   03/01/21 80.3 kg (177 lb)   02/15/21 80.3 kg (177 lb)         Labs: Results for SARAH PAINTING (MRN [id number]) as of 4/8/2021 17:55   Ref. Range 4/6/2021 06:29   Sodium Latest Ref Range: 133 - 144 mmol/L 141   Potassium Latest Ref Range: 3.4 - 5.3 mmol/L 3.5   Chloride Latest Ref Range: 94 - 109 mmol/L 116 (H)   Carbon Dioxide Latest Ref Range: 20 - 32 mmol/L 19 (L)   Urea Nitrogen Latest Ref Range: 7 - 30 mg/dL 10   Creatinine Latest Ref Range: 0.66 - 1.25 mg/dL 0.72   GFR Estimate Latest Ref Range: >60 mL/min/1.73_m2 >90   GFR Estimate If Black Latest Ref Range: >60 mL/min/1.73_m2 >90   Calcium Latest Ref Range: 8.5 - 10.1 mg/dL 8.2 (L)   Anion Gap Latest Ref Range: 3 - 14 mmol/L 6   Magnesium Latest Ref Range: 1.6 - 2.3 mg/dL 1.5 (L)   Albumin Latest Ref Range: 3.4 - 5.0 g/dL 1.5 (L)   Protein Total Latest Ref Range: 6.8 - 8.8 g/dL 5.3 " (L)   Bilirubin Total Latest Ref Range: 0.2 - 1.3 mg/dL 4.0 (H)   Alkaline Phosphatase Latest Ref Range: 40 - 150 U/L 703 (H)   ALT Latest Ref Range: 0 - 70 U/L 42   AST Latest Ref Range: 0 - 45 U/L 89 (H)       Imaging: reviewed multiple hospital imaging    Impression/plan:  Recurrent abscess/ bile leak  -bili drain is now uncapped. Reviewed fluid cultures freom 4/3/21, growing enterobacter cloacae complex  -on bactrim/augmentin. Bacteria is sensitive to bactrim. Will ask Dr. Dale to review and determine if he can stop the augmentin  -has f/u with Dr. Hodge with repeat imaging in 2 weeks and Dr. Dale next week  -repeat LFTs, CBCDP weekly in [place]      Recurrent cholangiocarcinoma with metastatic disease  -initiated on cisplatin/gemcitabine on 8/27/20. Off of treatment since 12/9/20 due to the abscess/fluid collection  -reviewed his hospital imaging with Dr. Beard. The thickening in the R peritoneum non specific for cancer progression. It could be in part explained by his ongoing infection/inflammation. Discussed with Sarahi that it isn't certain how much cancer their is, but the plan for observation is the same as his low PS and recurring infections make pursuing chemotherapy ill-advised at this time.  -recommended continued monitoring. Has f/u with Dr. Beard in June. I will follow him in a few weeks for ongoing supportive management     Anemia,  s/t chronic disease   -stable, monitor weekly    Ascites/edema   -cytology negative when checked on 3/11/21  -recommended he continue with resumed furosemide. Not advised to pursue regular paracentesis at this time as it will likely lead to a cycle of worsening hypoalbuminemia, dehydration, malnutrition    Malnutrition  -low albumin likely in part related to nutrition but also liver disease  -continue protein shakes, small frequent meals.       DM   -no longer on any medications due to hx of hypoglycemia  -monitor glucoses     Hx of CVA , L carotid  stenosis, has mild short term memory deficits  -on plavix, atorvastatin  -during recent hospitalization had lethargy, disorientation. CT head was baseline. CTA with 60-70% stenosis of the L proximal ICA. Brain MRI without any acute stroke.  Mentation improved with treatment of infection     Hx of htn,  -off of medications  -SBPs in the upper 90s-110     Deconditioning  -remains weak/tired  -continue to work with PT, now getting home PT/OT     50 minutes spent on the date of the encounter doing chart review, review of test results, interpretation of tests, patient visit, documentation and discussion with other provider(s)

## 2021-04-08 NOTE — LETTER
"    4/8/2021         RE: Fuentes Estrada  [address]  [facility] [address]        Dear Colleague,    Thank you for referring your patient, Fuentes Estrada, to the [facility]. Please see a copy of my visit note below.    Fuentes is a 66 year old who is being evaluated via a billable telephone visit.      What phone number would you like to be contacted at? [phone]  How would you like to obtain your AVS? Braden     I have reviewed and updated the patient's allergies and medication list.    Concerns: Hospitalization. Was also started in 2 new antibiotics.  Refills: Will check between intake call and provider call and let provider know.       Vitals - Patient Reported  Systolic (Patient Reported): 114  Diastolic (Patient Reported): 74  Height (Patient Reported): 185.4 cm (6' 1\")  Temperature (Patient Reported): 97.3  F (36.3  C)  Pain Score: No Pain (0)    Anita Pina CMA      Phone call duration: 37 minutes    Reason for Visit: seen in f/u of metastatic cholangiocarcinoma    Oncology HPI:   Fuentes Estrada is a 65 year old man with a prior hx of HTN, DM, CVA on plavix who presented with elevated LFTs in the spring of 2020. 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 On May 12, 2020 he underwent  A radical extrahepatic bile duct resection and R hepatectomy.   He had all of his disease resected with negative margins and one positive lymph node.  He had a complicated postoperative course with a bile leak and abscess that had delayed starting his adjuvant chemotherapy.       On imaging in July 2020,  he appeared to be developing increasing nodularity in the resection bed and regional lymph nodes that has led to an EUS with a fine-needle aspiration of the lymph nodes on 8/11/20,  demonstrating the presence of metastatic disease.      On 8/27/20, he initiated treatment with palliative intent Cisplatin/Gemcitabine.      His abscess/biliary drain was managed by IR. Sinograms identified a " communication to the biliary tree. He failed a capping trial with development of a fever. He underwent sinograms and cavitary sclerotherapy every 2 weeks starting August 25, 2020.  Drain was removed on 11/9/2020 9/28/20: port placement     Admission to [facility] 10/9/20-10/11/20 with septic shock from infectious enterocolitis, all cultures NGTD. Chemo delayed one week for recovery.  Cis/[place] restarted 10/21.     Admission to [facility] 11/18/20-11/19/20 with cholangitis - fever 100.8, bilirubin 3.2, elevated alk phos. MRCP showed no stones or strictures. Paracentesis removed 1L of fluid, no signs of SBP. He was dismissed on a 7-day course of Flagyl and ciprofloxacin.  C5D8 was delayed due to hospitalization.     He was seen on 12/14/2020 with an interim CT scan which showed findings concerning for abscess at the operative site along the posterior aspect of liver with gas seen in this fluid collection.  Patient was also having chills, chemotherapy was held and he was started on ciprofloxacin and Flagyl.  There was further discussion about management of this recurrent abscess with Dr. Purcell, Dr. Hale and Dr. Strauss about the abscess.  It was felt that aspiration is not likely to prevent recurrence of the abscess. A biliary drain is possible, but likely to be permanent. It has been concluded an endoscopic procedure is not possible. At 12/21/20 visit with Dr. Beard, antibiotic course was extended and he finished 12/31/20. Disease on 12/11/20 was stable.     Admission from 3/27/21 to 4/6/21: presented with biliary obstruction after capping drain. MRCP was equivocal. LFTS improved with uncappnig the drain.  Developed fevers/chills on 3/31 , started on broad spectrum antibioitics. Hepatic drain with fluid growing gram negative rods    Interval history: Fuentes is joined on the phone visit by his wife Robyn. His is tired and has a low appetite. Was starting to improve prior to his last hospitalization, but again  feels a little set back. No fevers/chills. Bili drain is open to drainage. Had some loose stools while hospitalized. C.diff was negative. Using imodium 1 tablet/day to mange the diarrhea now. Has noted increased BLE and abdominal bloating. Had paracentesis x 2 while in the hospital (3 L then 4L). Talked to his PCP and resumed furosemide on M, W, F, S.    -drinking protein shakes, small meals.   -no longer taking any diabetic medications as he was running low  -while hospitalized and having fevers, he had some delirium and was evaluted for stroke/tia. No acute findings. His mentation improved with treatment of infection    Current Outpatient Medications   Medication Sig Dispense Refill     amoxicillin-clavulanate (AUGMENTIN) 875-125 MG tablet Take 1 tablet by mouth 2 times daily 30 tablet 0     atorvastatin (LIPITOR) 40 MG tablet Take 40 mg by mouth At Bedtime        clopidogrel (PLAVIX) 75 MG tablet Take 75 mg by mouth At Bedtime        dexamethasone (DECADRON) 4 MG tablet Take 1 tablet (4 mg) by mouth daily (with breakfast) Daily for the 3 days after chemotherapy in the morning with food 3 tablet 1     dronabinol (MARINOL) 5 MG capsule Take 2 capsules in am and 1 capsule in pm (before meals) 90 capsule 1     fenofibrate (TRIGLIDE/LOFIBRA) 160 MG tablet Take 160 mg by mouth At Bedtime        Ferrous Sulfate (IRON) 325 (65 Fe) MG tablet Take 1 tablet by mouth 3 times daily (with meals) 180 tablet 1     furosemide (LASIX) 20 MG tablet Take 20 mg by mouth Every Monday, Wednesday, Friday, and Saturday morning       lactobacillus rhamnosus, GG, (CULTURELL) capsule Take 1 capsule by mouth daily       lisinopril (ZESTRIL) 5 MG tablet Take 5 mg by mouth every evening        LORazepam (ATIVAN) 0.5 MG tablet Take 1 tablet (0.5 mg) by mouth every 4 hours as needed (Anxiety, Nausea/Vomiting or Sleep) 30 tablet 5     magnesium gluconate 30 MG tablet Take 1 tablet (30 mg) by mouth daily (Patient taking differently: Take 30 mg by  "mouth 2 times daily ) 90 tablet 3     ondansetron (ZOFRAN) 8 MG tablet Take 1 tablet (8 mg) by mouth every 8 hours as needed (Nausea/Vomiting) 30 tablet 5     oxyCODONE (ROXICODONE) 5 MG tablet Take 1-2 tablets (5-10 mg) by mouth every 4 hours as needed for moderate to severe pain 20 tablet 0     prochlorperazine (COMPAZINE) 10 MG tablet Take 0.5 tablets (5 mg) by mouth every 6 hours as needed (Nausea/Vomiting) 30 tablet 5     sildenafil (REVATIO) 20 MG tablet Take 20 mg by mouth as needed       sodium chloride, PF, 0.9% PF flush 10 mLs by Intracatheter route every 12 hours 420 mL 1     sulfamethoxazole-trimethoprim (BACTRIM DS) 800-160 MG tablet Take 1 tablet by mouth 2 times daily 30 tablet 0     tadalafil (CIALIS) 10 MG tablet Take 10 mg by mouth daily as needed       vitamin D3 (CHOLECALCIFEROL) 250 mcg (04997 units) capsule Take 1 capsule by mouth daily       zolpidem (AMBIEN) 10 MG tablet Take 1 tablet (10 mg) by mouth nightly as needed for sleep 30 tablet 0          Allergies   Allergen Reactions     Metformin Other (See Comments)     \" I think my kidneys shut down\"         Phone visit Voice appeared clear. Responded appropriately to my direct questions. There were no vitals taken for this visit.  Wt Readings from Last 4 Encounters:   04/03/21 84.5 kg (186 lb 4.6 oz)   03/25/21 74.8 kg (165 lb)   03/01/21 80.3 kg (177 lb)   02/15/21 80.3 kg (177 lb)         Labs: Results for SARAH PAINTING (MRN [id number]) as of 4/8/2021 17:55   Ref. Range 4/6/2021 06:29   Sodium Latest Ref Range: 133 - 144 mmol/L 141   Potassium Latest Ref Range: 3.4 - 5.3 mmol/L 3.5   Chloride Latest Ref Range: 94 - 109 mmol/L 116 (H)   Carbon Dioxide Latest Ref Range: 20 - 32 mmol/L 19 (L)   Urea Nitrogen Latest Ref Range: 7 - 30 mg/dL 10   Creatinine Latest Ref Range: 0.66 - 1.25 mg/dL 0.72   GFR Estimate Latest Ref Range: >60 mL/min/1.73_m2 >90   GFR Estimate If Black Latest Ref Range: >60 mL/min/1.73_m2 >90   Calcium Latest Ref Range: " 8.5 - 10.1 mg/dL 8.2 (L)   Anion Gap Latest Ref Range: 3 - 14 mmol/L 6   Magnesium Latest Ref Range: 1.6 - 2.3 mg/dL 1.5 (L)   Albumin Latest Ref Range: 3.4 - 5.0 g/dL 1.5 (L)   Protein Total Latest Ref Range: 6.8 - 8.8 g/dL 5.3 (L)   Bilirubin Total Latest Ref Range: 0.2 - 1.3 mg/dL 4.0 (H)   Alkaline Phosphatase Latest Ref Range: 40 - 150 U/L 703 (H)   ALT Latest Ref Range: 0 - 70 U/L 42   AST Latest Ref Range: 0 - 45 U/L 89 (H)       Imaging: reviewed multiple hospital imaging    Impression/plan:  Recurrent abscess/ bile leak  -bili drain is now uncapped. Reviewed fluid cultures freom 4/3/21, growing enterobacter cloacae complex  -on bactrim/augmentin. Bacteria is sensitive to bactrim. Will ask Dr. Dale to review and determine if he can stop the augmentin  -has f/u with Dr. Hodge with repeat imaging in 2 weeks and Dr. Dale next week  -repeat LFTs, CBCDP weekly in [place]      Recurrent cholangiocarcinoma with metastatic disease  -initiated on cisplatin/gemcitabine on 8/27/20. Off of treatment since 12/9/20 due to the abscess/fluid collection  -reviewed his hospital imaging with Dr. Beadr. The thickening in the R peritoneum non specific for cancer progression. It could be in part explained by his ongoing infection/inflammation. Discussed with Fuentes and Robyn that it isn't certain how much cancer their is, but the plan for observation is the same as his low PS and recurring infections make pursuing chemotherapy ill-advised at this time.  -recommended continued monitoring. Has f/u with Dr. Beard in June. I will follow him in a few weeks for ongoing supportive management     Anemia,  s/t chronic disease   -stable, monitor weekly    Ascites/edema   -cytology negative when checked on 3/11/21  -recommended he continue with resumed furosemide. Not advised to pursue regular paracentesis at this time as it will likely lead to a cycle of worsening hypoalbuminemia, dehydration, malnutrition    Malnutrition  -low  albumin likely in part related to nutrition but also liver disease  -continue protein shakes, small frequent meals.       DM   -no longer on any medications due to hx of hypoglycemia  -monitor glucoses     Hx of CVA , L carotid stenosis, has mild short term memory deficits  -on plavix, atorvastatin  -during recent hospitalization had lethargy, disorientation. CT head was baseline. CTA with 60-70% stenosis of the L proximal ICA. Brain MRI without any acute stroke.  Mentation improved with treatment of infection     Hx of htn,  -off of medications  -SBPs in the upper 90s-110     Deconditioning  -remains weak/tired  -continue to work with PT, now getting home PT/OT     50 minutes spent on the date of the encounter doing chart review, review of test results, interpretation of tests, patient visit, documentation and discussion with other provider(s)         Again, thank you for allowing me to participate in the care of your patient.        Sincerely,        VICKI Burt CNP

## 2021-04-14 NOTE — PROGRESS NOTES
Infusion Nursing Note:  Fuentes Estrada presents today for Port labs.    Patient seen by provider today: No   present during visit today: Not Applicable.    Note: Patient arrived for labs, has port. No appt in IVO. Put on our schedule to draw labs for today.   Patient  did not meet criteria for an asymptomatic covid-19 PCR test in infusion today. Patient  declined the covid-19 test. Patient being tested end of week for future appt.    Intravenous Access:  Labs drawn without difficulty.  Implanted Port.    Treatment Conditions:  Lab Results   Component Value Date    HGB 9.5 04/14/2021     Lab Results   Component Value Date    WBC 6.3 04/14/2021      Lab Results   Component Value Date    ANEU 4.2 04/14/2021     Lab Results   Component Value Date     04/14/2021      Lab Results   Component Value Date     04/06/2021                   Lab Results   Component Value Date    POTASSIUM 3.5 04/06/2021           Lab Results   Component Value Date    MAG 1.5 04/06/2021            Lab Results   Component Value Date    CR 0.72 04/06/2021                   Lab Results   Component Value Date    ERIC 8.2 04/06/2021                Lab Results   Component Value Date    BILITOTAL 4.0 04/06/2021           Lab Results   Component Value Date    ALBUMIN 1.5 04/06/2021                    Lab Results   Component Value Date    ALT 42 04/06/2021           Lab Results   Component Value Date    AST 89 04/06/2021           Post Infusion Assessment:  Blood return noted pre and post infusion.  Access discontinued per protocol.       Discharge Plan:   Discharge instructions reviewed with: Patient and Family.  Patient discharged in stable condition accompanied by: self and wife.  Departure Mode: Wheelchair.  Patients wife states he need weekly lab appt. Will call them tomorrow to schedule. .    Leatha Rodriguez RN

## 2021-04-14 NOTE — PROGRESS NOTES
"RN Yulissa Triage Note    Called by his wife in f/u to recent hospital stay; he had post op biliary leak and abscess after 5/2020 surgery.    Diagnosis: cholangiocarcinoma  Oncologist:  Kisha  Last oncologist visit: 4/8/21  Recent hospital discharge 3/27/21    Health Status:    Weight at recent PCP visit was 201; weight on discharge was 178   Taking Lasix as ordered    Fevers or Chills:  no    Nausea no  Vomiting no    Diet: regular, but not able to eat due to distention; taking fluids without difficulty; he is not taking enough protein.    He is currently not taking solid food past 2 days, one protein drink 30 gm per day.  Marinol \"was helping with appetite\"    Pain: Not pain, pressure in front of abdomen.     Abdomen is distended;  He currently has abdominal drain that is open to drainage - yellow fluid \"not a lot\"  5-10 ml flushing with 3-5 ml saline.       Bowels: semi formed brown stool 1-2 per day  Voiding clear yellow \"adequate amounts\"    Activity/Restrictions: walks with walker and \"moving better\"  \"steadier on his feet\"    Discussion:    Provided with options for protein supplements and discussed possible consult with dietician as have not seen one in a long time.  We reviewed options to increase protein intake and I have provided examples for this.    They are going to [place] this afternoon for labs at 3:45 pm in case we need to add covid testing.     [place], DARA - plan for ultrasound guided paracentesis    Talked with wife.  They would like to do this at [facility].  Patient currently on Plavix.  Instructed to hold Plavix for 5 days prior to procedure.     Informed wife paracentesis arrival 10 am procedure 11 am at [place].     covid test ordered and they are instructed to have within 4 days of procedure.  They will schedule and testing line # has been provided.    Patient has our contact information and I have asked that they call with any further questions or concerns.    Mary" DANIELA Solis RN   BSN, [facility], [facility]

## 2021-04-16 NOTE — LETTER
4/16/2021       RE: Fuentes Estrada  [address] [place]  [facility] [address]     Dear Colleague,    Thank you for referring your patient, Fuentes Estrada, to the [facility] [place] [facility]. Please see a copy of my visit note below.      Fuentes Estrada is a 66 year old man who is being evaluated via a billable video visit.      How would you like to obtain your AVS? MyChart  If the video visit is dropped, the invitation should be resent by: Text to cell phone: [phone]  Will anyone else be joining your video visit? No  {If patient encounters technical issues they should call [phone]}      Video-Visit Details    Type of service:  Video Visit    Video Start Time: 1:05 PM  Video End Time:  1:30 PM    Originating Location (pt. Location): Home    Distant Location (provider location):  [facility] [place]     Platform used for Video Visit: Felipe Dale MD, MS  Infectious Disease    Time:  A total of 35 minutes was spent in care of the patient today. 25 minutes were spent on the video, with an additional 10 minutes spent on chart review and care coordination.

## 2021-04-16 NOTE — PROGRESS NOTES
Fuentes Estrada is a 66 year old man who is being evaluated via a billable video visit.      How would you like to obtain your AVS? MyChart  If the video visit is dropped, the invitation should be resent by: Text to cell phone: [phone]  Will anyone else be joining your video visit? No      INFECTIOUS DISEASE RETURN VISIT for hepatic abscess, follow-up after hospital discharge       SUBJECTIVE:     Fuentes was admitted to the hospital 3/27/2021 - 4/6/2021. He presented with elevated bilirubin and jaundice a few days after the drain was capped. He was not febrile, but he did have some chills. Imaging did not reveal significant re-accumulation of fluid. Fuentes then developed 2 episodes of low grade fevers, rigors, and hypotension. Cultures were done off of the old drain directly, so this was difficult to interpret.     Assessment from my colleague Dr. Shah that saw Fuentes inpatient for Infectious Disease consultation:   Unclear if the Enterobacter cloacae growing in the cultures sent from the old drain are representative of drain colonization vs infection, but given the unclear cause of his decompensation this admission and concern for possible abdominal infection (though with negative imaging/work-up) and continued drain output, would favor also covering for it for now with Bactrim. In addition, Augmentin would cover the prior Strep that grew in his abdominal cultures in addition to anaerobes empirically.     Since hospital discharge 10 days ago, Fuentes and his wife report that abdominal ascites is re-accumulating and there is paracentesis ordered for next Tuesday. Drain has not had much output, ramirez yellow color. No fevers. Poor appetite that Fuentes thinks is mostly related to antibiotics.       Recent history:     Fuentes Estrada is a 65 year old man with HTN, DM, and history of CVA on Plavix, diagnosed with hilar cholangiocarcinoma this past spring 2020. He had radical extrahepatic bile duct resection and R hepatectomy in  "May 2020. The presence of metastatic disease was identified by EUS biopsy in August 2020.     He developed an abscess and a biliary drain was placed, managed by IR. Sinograms identified a communication to the biliary tree. He failed a capping trial with development of a fever. He underwent sinograms and cavitary sclerotherapy every 2 weeks starting 8/25/2020. Drain was removed on 11/9/2020, but he presented just 11/18/2020 with cholangitis, fever 100.8 F, bilirubin 3.2, elevated alk phos.     12/11/2020 CT scan with findings concerning for an abscess at the operative site along the posterior aspect of the liver with gas seen in this fluid collection. He was started on ciprofloxacin and Flagyl. There was further discussion about management of this recurrent abscess with Dr. Purcell, Dr. Hale and Dr. Strauss. It was felt that aspiration was unlikely to prevent recurrence of the abscess. If a biliary drain was placed, it would likely be permanent.     12/21/20 visit with Dr. Beard, antibiotic course of cipro/Flagyl was extended and he finished 12/31/20.     2/2/2021 admission for nausea, vomiting and FTT. Fuentes underwent ERCP for possible internal drainage, but ERCP was not feasible due to \"edematous and tortuous biliary limb obviating passage.\" Wife tells me he was given more cipro/Flagyl at one point, and was taking it still at the time of biliary drain placement by IR, done on 2/15/2021. Cultures from drainage growing Strep anginosus. Penicillin was prescribed.    Sinogram 3/1/2021 with no fistula identified and 10mL of residual cavity. The drain was putting out just 5-10ccs per day. Plan was for repeat CT abdomen/pelvis and same day sinogram 3/25/2021.       I have reviewed and updated the patient's Past Medical History, Social History, Family History and Medication List.     OBJECTIVE:     Medications reviewed             Allergies   Allergen Reactions     Metformin Other (See Comments)     \" I think my kidneys " "shut down\"       Examination via video visit:     GENERAL: Patient is awake and alert but appears chronically ill   HEENT: The video is too grainy for me to tell if the eyes appear icteric or not. EOM appear intact.   RESPIRATORY: I do not see any increased work of breathing and no cough was noted.   SKIN: no rashes are visible   PSYCH: Affect is dull. Speech is limited.     The rest of a comprehensive physical examination is deferred due to the public health emergency video visit restrictions.     Labs and Imaging:    Hospitalization records reviewed     Microbiology     2/15/2021 culture of drainage when biliary drain was placed.   Moderate growth   Streptococcus anginosus   This organism is susceptible to ampicillin, penicillin, vancomycin and the cephalosporins.     4/3/2021 culture from drainage of existing (old) biliary drain     Susceptibility             Enterobacter cloacae complex (1) Enterobacter cloacae complex (2)     MARBELLA MARBELLA     CEFEPIME <=1 ug/mL Sensitive <=1 ug/mL Sensitive     CEFTAZIDIME >=64 ug/mL Resistant 32 ug/mL Resistant     CEFTRIAXONE 8 ug/mL Resistant 2 ug/mL Intermediate     CIPROFLOXACIN >=4 ug/mL Resistant >=4 ug/mL Resistant     GENTAMICIN <=1 ug/mL Sensitive <=1 ug/mL Sensitive     LEVOFLOXACIN >=8 ug/mL Resistant >=8 ug/mL Resistant     MEROPENEM <=0.25 ug/mL Sensitive <=0.25 ug/mL Sensitive     Piperacillin/Tazo >=128 ug/mL Resistant >=128 ug/mL Resistant     TOBRAMYCIN <=1 ug/mL Sensitive <=1 ug/mL Sensitive     Trimethoprim/Sulfa <=1/19 ug/mL Sensitive <=1/19 ug/mL Sensitive      ASSESSMENT and PLAN:     History of hepatic abscess with drain remaining in situ.     The concern is that the capping of the drain led to his most recent hospitalization. Whether or not he had active infection is a bit unclear. He is now 10 days after hospital discharge, still taking a combination of Augmentin and Bactrim.     Fuentes feels that he eats better when he is not taking antibiotics, and he wants " to stop them if he can.   I think this is reasonable. Imaging did not suggest there was significant re-accumulation of fluid or abscess collection. He may still have fistulous drainage, and the capping of the drain led to blocking the outlet. I did tell his wife that if he developed fever and looked very sick, she could give him one dose each of Augmentin and Bactrim as a life saving measure on the way to the hospital.     He may have the drain indefinitely. IR is planning on ongoing monitoring, however.     I gave the patient a follow-up appt with me to check in on 5/4/2021. They have my contact information should Fuentes develop fevers in the interim.    Video-Visit Details    Type of service:  Video Visit    Video Start Time: 1:05 PM  Video End Time:  1:30 PM    Originating Location (pt. Location): Home    Distant Location (provider location):  [facility] [place]     Platform used for Video Visit: Felipe Dale MD, MS  Infectious Disease    Time:  A total of 35 minutes was spent in care of the patient today. 25 minutes were spent on the video, with an additional 10 minutes spent on chart review and care coordination.

## 2021-04-22 NOTE — IP AVS SNAPSHOT
After Visit Summary Template Not Found    This Print Group is only intended to be used in the After Visit Summary and can only be used in a report that uses a released After Visit Summary Template.                       MRN:[id number]                      After Visit Summary   4/22/2021    Fuentes Estrada    MRN: [id number]           Visit Information        Department      4/22/2021  8:53 AM [facility] [address] [place]          Review of your medicines      UNREVIEWED medicines. Ask your doctor about these medicines       Dose / Directions   atorvastatin 40 MG tablet  Commonly known as: LIPITOR      Dose: 40 mg  Take 40 mg by mouth At Bedtime  Refills: 0     clopidogrel 75 MG tablet  Commonly known as: PLAVIX      Dose: 75 mg  Take 75 mg by mouth At Bedtime  Refills: 0     dronabinol 5 MG capsule  Commonly known as: MARINOL  Used for: Hilar cholangiocarcinoma (H)      Take 2 capsules in am and 1 capsule in pm (before meals)  Quantity: 90 capsule  Refills: 1     fenofibrate 160 MG tablet  Commonly known as: TRIGLIDE/LOFIBRA      Dose: 160 mg  Take 160 mg by mouth At Bedtime  Refills: 0     furosemide 20 MG tablet  Commonly known as: LASIX      Dose: 20 mg  Take 20 mg by mouth Every Monday, Wednesday, Friday, and Saturday morning  Refills: 0     iron 325 (65 Fe) MG tablet  Used for: Iron deficiency anemia due to chronic blood loss      Dose: 1 tablet  Take 1 tablet by mouth 3 times daily (with meals)  Quantity: 180 tablet  Refills: 1     lactobacillus rhamnosus (GG) capsule      Dose: 1 capsule  Take 1 capsule by mouth daily  Refills: 0     lisinopril 5 MG tablet  Commonly known as: ZESTRIL      Dose: 5 mg  Take 5 mg by mouth every evening  Refills: 0     LORazepam 0.5 MG tablet  Commonly known as: ATIVAN  Used for: Hilar cholangiocarcinoma (H)      Dose: 0.5 mg  Take 1 tablet (0.5 mg) by mouth every 4 hours as needed (Anxiety, Nausea/Vomiting or Sleep)  Quantity: 30 tablet  Refills: 5     magnesium  gluconate 30 MG tablet  Used for: Hilar cholangiocarcinoma (H)      Dose: 30 mg  Take 1 tablet (30 mg) by mouth daily  Quantity: 90 tablet  Refills: 3     ondansetron 8 MG tablet  Commonly known as: ZOFRAN  Used for: Hilar cholangiocarcinoma (H)      Dose: 8 mg  Take 1 tablet (8 mg) by mouth every 8 hours as needed (Nausea/Vomiting)  Quantity: 30 tablet  Refills: 5     oxyCODONE 5 MG tablet  Commonly known as: ROXICODONE  Used for: Cholangiocarcinoma (H)      Dose: 5-10 mg  Take 1-2 tablets (5-10 mg) by mouth every 4 hours as needed for moderate to severe pain  Quantity: 20 tablet  Refills: 0     prochlorperazine 10 MG tablet  Commonly known as: COMPAZINE  Used for: Hilar cholangiocarcinoma (H)      Dose: 5 mg  Take 0.5 tablets (5 mg) by mouth every 6 hours as needed (Nausea/Vomiting)  Quantity: 30 tablet  Refills: 5     sildenafil 20 MG tablet  Commonly known as: REVATIO      Dose: 20 mg  Take 20 mg by mouth as needed  Refills: 0     sodium chloride (PF) 0.9% PF flush  Used for: Cholangiocarcinoma (H), Intra-abdominal abscess (H)      Dose: 10 mL  10 mLs by Intracatheter route every 12 hours  Quantity: 420 mL  Refills: 1     tadalafil 10 MG tablet  Commonly known as: CIALIS      Dose: 10 mg  Take 10 mg by mouth daily as needed  Refills: 0     vitamin D3 250 mcg (34809 units) capsule  Commonly known as: CHOLECALCIFEROL      Dose: 1 capsule  Take 1 capsule by mouth daily  Refills: 0     zolpidem 10 MG tablet  Commonly known as: AMBIEN  Used for: Hilar cholangiocarcinoma (H)      Dose: 10 mg  Take 1 tablet (10 mg) by mouth nightly as needed for sleep  Quantity: 30 tablet  Refills: 0              Protect others around you: Learn how to safely use, store and throw away your medicines at www.disposemymeds.org.       Follow-ups after your visit       Your next 10 appointments already scheduled    Apr 27, 2021  3:30 PM  (Arrive by 3:15 PM)  Video Visit with VICKI [facility]  ([facility] ) [address] [address]  [phone]   Please Note: This is a virtual visit; there is no need to come to the facility.       Apr 29, 2021  2:00 PM  Level 1 with NL INFUSION CHAIR 2  [facility] [place] ([facility] ) [address] [place] [address]  [phone]      May 04, 2021  3:30 PM  (Arrive by 3:15 PM)  Video Visit with Yuri Dale MD  [facility] [place] ([facility] ) [address] [address]  [phone]   Please Note: This is a virtual visit; there is no need to come to the facility.       May 06, 2021 12:30 PM  Level 1 with NL INFUSION CHAIR 8  [facility] [place] ([facility] ) [address] [place] [address] [address] [place] [address]  [phone]      Jun 02, 2021  1:30 PM  Level 1 with NL INFUSION CHAIR 3  [facility] [place] ([facility] ) [address] [place] [address]  [phone]      Jun 02, 2021  2:30 PM  (Arrive by 2:00 PM)  CT CHEST ABDOMEN PELVIS W/O & W CONTRAST with PHCT1  [facility] ([facility] ) [address] [facility] [address]  [phone]   How do I prepare for my exam? (Food and drink instructions)  **You will have contrast for this exam.**  No Food and Drink Restrictions    How do I prepare for my exam? (Other instructions)  Please arrive 30 minutes early for your CT.  Once in the department you might be asked to drink water 15-20 minutes prior to your exam.  If indicated you may be asked to drink an oral contrast in advance of your CT.  If this is the case, the imaging team will let you know or be in contact with you prior to your appointment    If you have diabetes:  Continue to take your  metformin medication on the day of your exam    What should I wear: Please wear loose clothing, such as a sweat suit or jogging clothes. Avoid snaps, zippers and other metal. We may ask you to undress and put on a hospital gown.    How long does the exam take: Most scans take less than 20 minutes.    What should I bring: Please bring any scans or X-rays taken at other hospitals, if similar tests were done. It is safest to leave personal items at home.    Do I need a : No  is needed.    What do I need to tell my doctor?  Be sure to tell your doctor:  * If you have any allergies.  * If there's any chance you are pregnant.  * If you are breastfeeding.    What should I do after the exam: No restrictions, You may resume normal activities.    What is this test: A CT (computed tomography) scan is a series of pictures that allows us to look inside your body. The scanner creates images of the body in cross sections, much like slices of bread. This helps us see any problems more clearly. You may receive contrast (X-ray dye) before or during your scan. You will be asked to drink the contrast.    Who should I call with questions: If you have any questions, please call the Imaging Department where you will have your exam. Directions, parking instructions, and other information is available on our website, [email or web address].     Jun 07, 2021  3:00 PM  Telephone Visit with Aydin Beard MD  [facility] ([facility] ) [address] [place] [address] [phone]   Please Note: This is a virtual visit; there is no need to come to the facility.          Care Instructions       Further instructions from your care team       [facility]  Discharge Instructions for   Percutaneous Perihepatic  Tube Placement    After you go home:    Have an adult stay with you for 24 hours.    Drink plenty of fluids.    Resume a  "regular diet unless otherwise ordered by your physician.      For 24 Hours:    Relax and take it easy.    Do not drive or operate machines at home or at work.    No alcohol for 24 hours.    Do not make any important or legal decisions.    Do not do any strenuous exercise or lifting greater that 10 lbs for at least  2 days following your procedure.    CALL THE PHYSICIAN IF:    You start bleeding from the procedure site. If you do start to bleed from the site lie down and hold some pressure on the site. Your physician will tell you if you need to return to the hospital.    You develop nausea or vomiting.    You develop hives or a rash or any unexplained itching.    ADDITIONAL INSTRUCTIONS:  Please call for the following problems:  1. Nothing draining from the tube. Check that tube is not kinked.  2. Drainage leaking around tube.  3. drainage becomes very foul smelling or new or fresh blood in urine  4. The skin around the tube is red, painful, or has drainage.  5. You have pain in your back, over your kidney.  6. You have a fever of 100.5 F and chills  7. You feel nauseated and \"just not right.\"  8. Flush tube twice a day with 10cc of normal saline and subtract it from output.  9. Do not tamper with the tube.    Change the dressing initially the next day to check the insertion site.  After that change every other day.  Clean around tube site with washcloth and antibacterial soap.      [facility] INTERVENTIONAL RADIOLOGY DEPARTMENT  Procedure Physician:Dr Alex Walker Date:April 22, 2021  Telephone Numbers:  [phone]     Monday-Friday 8:00AM-4:30PM                       [phone]     After 4:30 PM Monday-Friday, Weekends and Holidays. Ask for Interventional Radiologist on Call. Someone is available 24 hours a day.  [facility] toll free number: [phone] Monday- Friday 8:00AM -4:30PM.    I    Additional Information About Your Visit       [employer] Information    [employer] gives you secure access to your electronic health " "record. If you see a primary care provider, you can also send messages to your care team and make appointments. If you have questions, please call your primary care clinic.  If you do not have a primary care provider, please call [phone] and they will assist you.       Care EveryWhere ID    This is your Care EveryWhere ID. This could be used by other organizations to access your [place] medical records  [id number]       Your Vitals Were  Most recent update: 4/22/2021 11:57 AM    Blood Pressure   93/56 (BP Location: Right arm)          Pulse   68          Temperature   97.5  F (36.4  C) (Oral)          Respirations   18          Height   1.854 m (6' 1\")             Weight   83.9 kg (185 lb)    Pulse Oximetry   96%    BMI (Body Mass Index)   24.41 kg/m           Primary Care Provider Office Phone # Fax #    Tolu Noland PA-C [phone]      Equal Access to Services    DINESH SANTOS AH: Hadii aura ku hadasho Soomaali, waaxda luqadaha, qaybta kaalmada adeegyada, stephanie clevelandn sumeet gonzalez . So [facility] [phone].    ATENCIÓN: Si habla español, tiene a kaufman disposición servicios gratuitos de asistencia lingüística. Llame al [phone].    We comply with applicable federal and state civil rights laws, including the [place] Human Rights Act. We do not discriminate on the basis of race, color, creed, Denominational, national origin, marital status, age, disability, sex, sexual orientation, or gender identity.    If you would like an itemization of your charges they will now be available in [employer] 30 days after discharge. To access the itemized statements in [employer] go to billing/billing summary. From there select view account. There will be multiple tabs showing an overview of your account, detail, payments, and communications. From the communications tab you can see your monthly statements, your itemized statements, and any billing letters generated for your account. If you do not have a " [employer] account and need help getting access please contact [employer] support at [phone].  If you would prefer to have your itemized statements mailed please contact our automated itemized bill request line at [phone] option  2.       Thank you!    Thank you for choosing [facility] for your care. Our goal is always to provide you with excellent care. Hearing back from our patients is one way we can continue to improve our services. Please take a few minutes to complete the written survey that you may receive in the mail after you visit with us. Thank you!            Medication List      ASK your doctor about these medications          Morning Afternoon Evening Bedtime As Needed    atorvastatin 40 MG tablet  Also known as: LIPITOR  INSTRUCTIONS: Take 40 mg by mouth At Bedtime                     clopidogrel 75 MG tablet  Also known as: PLAVIX  INSTRUCTIONS: Take 75 mg by mouth At Bedtime                     dronabinol 5 MG capsule  Also known as: MARINOL  INSTRUCTIONS: Take 2 capsules in am and 1 capsule in pm (before meals)                     fenofibrate 160 MG tablet  Also known as: TRIGLIDE/LOFIBRA  INSTRUCTIONS: Take 160 mg by mouth At Bedtime                     furosemide 20 MG tablet  Also known as: LASIX  INSTRUCTIONS: Take 20 mg by mouth Every Monday, Wednesday, Friday, and Saturday morning                     iron 325 (65 Fe) MG tablet  INSTRUCTIONS: Take 1 tablet by mouth 3 times daily (with meals)                     lactobacillus rhamnosus (GG) capsule  INSTRUCTIONS: Take 1 capsule by mouth daily                     lisinopril 5 MG tablet  Also known as: ZESTRIL  INSTRUCTIONS: Take 5 mg by mouth every evening                     LORazepam 0.5 MG tablet  Also known as: ATIVAN  INSTRUCTIONS: Take 1 tablet (0.5 mg) by mouth every 4 hours as needed (Anxiety, Nausea/Vomiting or Sleep)                     magnesium gluconate 30 MG tablet  INSTRUCTIONS: Take 1 tablet (30 mg) by mouth daily                      ondansetron 8 MG tablet  Also known as: ZOFRAN  INSTRUCTIONS: Take 1 tablet (8 mg) by mouth every 8 hours as needed (Nausea/Vomiting)                     oxyCODONE 5 MG tablet  Also known as: ROXICODONE  INSTRUCTIONS: Take 1-2 tablets (5-10 mg) by mouth every 4 hours as needed for moderate to severe pain                     prochlorperazine 10 MG tablet  Also known as: COMPAZINE  INSTRUCTIONS: Take 0.5 tablets (5 mg) by mouth every 6 hours as needed (Nausea/Vomiting)                     sildenafil 20 MG tablet  Also known as: REVATIO  INSTRUCTIONS: Take 20 mg by mouth as needed                     sodium chloride (PF) 0.9% PF flush  INSTRUCTIONS: 10 mLs by Intracatheter route every 12 hours  Doctor's comments: Please dispense as 10 mL syringes for drain flush  LAST TAKEN: April 22, 2021  9:24 AM                     tadalafil 10 MG tablet  Also known as: CIALIS  INSTRUCTIONS: Take 10 mg by mouth daily as needed                     vitamin D3 250 mcg (15509 units) capsule  Also known as: CHOLECALCIFEROL  INSTRUCTIONS: Take 1 capsule by mouth daily                     zolpidem 10 MG tablet  Also known as: AMBIEN  INSTRUCTIONS: Take 1 tablet (10 mg) by mouth nightly as needed for sleep

## 2021-04-22 NOTE — DISCHARGE INSTRUCTIONS
"[facility]  Discharge Instructions for   Percutaneous Perihepatic  Tube Placement    After you go home:    Have an adult stay with you for 24 hours.    Drink plenty of fluids.    Resume a regular diet unless otherwise ordered by your physician.      For 24 Hours:    Relax and take it easy.    Do not drive or operate machines at home or at work.    No alcohol for 24 hours.    Do not make any important or legal decisions.    Do not do any strenuous exercise or lifting greater that 10 lbs for at least  2 days following your procedure.    CALL THE PHYSICIAN IF:    You start bleeding from the procedure site. If you do start to bleed from the site lie down and hold some pressure on the site. Your physician will tell you if you need to return to the hospital.    You develop nausea or vomiting.    You develop hives or a rash or any unexplained itching.    ADDITIONAL INSTRUCTIONS:  Please call for the following problems:  1. Nothing draining from the tube. Check that tube is not kinked.  2. Drainage leaking around tube.  3. drainage becomes very foul smelling or new or fresh blood in urine  4. The skin around the tube is red, painful, or has drainage.  5. You have pain in your back, over your kidney.  6. You have a fever of 100.5 F and chills  7. You feel nauseated and \"just not right.\"  8. Flush tube twice a day with 10cc of normal saline and subtract it from output.  9. Do not tamper with the tube.    Change the dressing initially the next day to check the insertion site.  After that change every other day.  Clean around tube site with washcloth and antibacterial soap.      [facility] INTERVENTIONAL RADIOLOGY DEPARTMENT  Procedure Physician:Dr Alex Walker Date:April 22, 2021  Telephone Numbers:  [phone]     Monday-Friday 8:00AM-4:30PM                       [phone]     After 4:30 PM Monday-Friday, Weekends and Holidays. Ask for Interventional Radiologist on Call. Someone is available 24 hours a " day.  [facility] toll free number: [phone] Monday- Friday 8:00AM -4:30PM.    I

## 2021-04-22 NOTE — PROGRESS NOTES
Patient tolerated recovery stage well. VSS, right flank bilary tube site clean/dry/intact, no hematoma, and denies pain. Patient tolerated PO food and fluids. Teaching was done and discharge instructions were given. Patient ambulated, voided, and port was flushed with saline and with heparin. Patient discharged from the hospital via wheel chair to home with wife .

## 2021-04-22 NOTE — PROGRESS NOTES
Pt returned from IR via liter accompanied by nurse at 1115.Right lateral abd drain site C/D/I no hematoma and denies pain.Pt very sleepy but arouses to voice.

## 2021-04-22 NOTE — PRE-PROCEDURE
GENERAL PRE-PROCEDURE:   Procedure:  Sinogram of Perihepatic fluid collection/biloma, possible drain replacement  Date/Time:  4/22/2021 10:00 AM    Verbal consent obtained?: Yes    Written consent obtained?: Yes    Risks and benefits: Risks, benefits and alternatives were discussed    Consent given by:  Patient  Patient states understanding of procedure being performed: Yes    Patient's understanding of procedure matches consent: Yes    Procedure consent matches procedure scheduled: Yes    Expected level of sedation:  Moderate  Appropriately NPO:  Yes  ASA Class:  Class 3- Severe systemic disease, definite functional limitations  Mallampati  :  Grade 4- soft palate obscured by base of tongue  Lungs:  Lungs clear with good breath sounds bilaterally  Heart:  Normal heart sounds and rate  History & Physical reviewed:  History and physical reviewed and no updates needed  Statement of review:  I have reviewed the lab findings, diagnostic data, medications, and the plan for sedation

## 2021-04-22 NOTE — IR NOTE
Patient Name: Fuentes Estrada  Medical Record Number: [id number]  Today's Date: 4/22/2021    Procedure: sinogram of janelle-hepatic fluid collection and  drain replacement  Proceduralist: Perfecto Purcell and Aaron    Procedure Start: 1040  Procedure end: 1100  Sedation medications administered: versed  2 Mg., fentanyl  100 Mcg.    Report given to:   Maricruz CASTRO RN      Other Notes: Pt arrived to IR room 2  from . Consent reviewed. Pt denies any questions or concerns regarding procedure. Pt positioned left lateral decub and monitored per protocol. Pt tolerated procedure without any noted complications. Pt transferred back to .

## 2021-04-22 NOTE — PROGRESS NOTES
Social Work Follow-Up Encounter Visit  Oncology Clinic    Data/Intervention:  Patient Name:  Fuentes Estrada  /Age:  1955 (66 year old)    Reason for Follow-Up:  Social Supports    Collaborated With:    -Patient's wife Robyn  -    Assessment:  Following up on referral from patient's care team for social supports SW called and spoke with patient's wife. SW introduced themselves, explained their role and briefly provided education on different supportive resources. Pateint's wife expressed interest in learning more about supportive resources and asked SW to share their contact information via my chart. Pateint's wife agreed to follow back up when they were able to. SW provided contact information via my chart.     Plan:  Previously provided patient/family with writer's contact information and availability.   SW will remain available as needed. SW will await patient's wife return call.     Mimi GREEN, STACEY  - Oncology  Phone : [phone]  Pager: [phone]

## 2021-04-22 NOTE — PROGRESS NOTES
Arrived to [address] for sinogram procedure in IR. AFVSS. Denies pain. Slightly lethargic; but easily arouseable. Pt's wife, Robyn (#[phone]) states pt finished course abx last Friday and that he has had diarrhea last night and this AM. Labs last 4/20. BG 76 mg/dl. Pt states not feeling like he has low BG. JÚNIOR Olvera PA-C notified & D5 infusion hung per verbal order. Consent done. Dr. Purcell present to speak w/ pt & wife. Pre procedural cares complete.

## 2021-04-22 NOTE — IP AVS SNAPSHOT
[facility] [address] [address]                                    After Visit Summary   4/22/2021    Fuentes Estrada    MRN: [id number]           After Visit Summary Signature Page    I have received my discharge instructions, and my questions have been answered. I have discussed any challenges I see with this plan with the nurse or doctor.    ..........................................................................................................................................  Patient/Patient Representative Signature      ..........................................................................................................................................  Patient Representative Print Name and Relationship to Patient    ..................................................               ................................................  Date                                   Time    ..........................................................................................................................................  Reviewed by Signature/Title    ...................................................              ..............................................  Date                                               Time          22EPIC Rev 08/18

## 2021-04-22 NOTE — PROCEDURES
[facility]    Procedure: IR Procedure Note    Date/Time: 4/22/2021 11:17 AM  Performed by: Alex Walker MD  Authorized by: Alex Walker MD     UNIVERSAL PROTOCOL   Site Marked: NA  Prior Images Obtained and Reviewed:  Yes  Required items: Required blood products, implants, devices and special equipment available    Patient identity confirmed:  Verbally with patient, arm band, provided demographic data and hospital-assigned identification number  Patient was reevaluated immediately before administering moderate or deep sedation or anesthesia  Confirmation Checklist:  Patient's identity using two indicators, relevant allergies, procedure was appropriate and matched the consent or emergent situation and correct equipment/implants were available  Time out: Immediately prior to the procedure a time out was called    Universal Protocol: the Joint Commission Universal Protocol was followed    Preparation: Patient was prepped and draped in usual sterile fashion           ANESTHESIA    Anesthesia: Local infiltration  Local Anesthetic:  Lidocaine 1% without epinephrine      SEDATION    Patient Sedated: Yes    Sedation Type:  Moderate (conscious) sedation  Sedation:  Fentanyl and midazolam  Vital signs: Vital signs monitored during sedation    See dictated procedure note for full details.  Findings: No fistula    Specimens: none    Complications: None    Condition: Stable    Plan: 1 hr bedrest  Flush bid    PROCEDURE   Patient Tolerance:  Patient tolerated the procedure well with no immediate complications    Length of time physician/provider present for 1:1 monitoring during sedation: 20

## 2021-04-27 NOTE — LETTER
"    4/27/2021         RE: Fuentes Estrada  [address]  [facility] [address]        Dear Colleague,    Thank you for referring your patient, Fuentes Estrada, to the [facility]. Please see a copy of my visit note below.    .    Fuentes is a 66 year old who is being evaluated via a billable video visit.      How would you like to obtain your AVS? MyChart  If the video visit is dropped, the invitation should be resent by: Send to e-mail at: [email or web address]  Will anyone else be joining your video visit? No     Patient needs refills for Zofran, Ambien 5mg, Ativan.     Vitals - Patient Reported  Systolic (Patient Reported): 104  Diastolic (Patient Reported): 66  Weight (Patient Reported): 83.9 kg (185 lb)  Height (Patient Reported): 185.4 cm (6' 1\")  BMI (Based on Pt Reported Ht/Wt): 24.41  Temperature (Patient Reported): 97.5  F (36.4  C)  Pulse (Patient Reported): 72  Pain Score: No Pain (0)    Kimmy LOUISE        Video Start Time: 338  Video-Visit Details    Type of service:  Video Visit    Video End Time:408    Originating Location (pt. Location): Home    Distant Location (provider location):  [facility]     Platform used for Video Visit: [employer]        Reason for Visit: f/u metastatic cholangiocarcinoma    Oncology HPI:   Fuentes Estrada is a 65 year old man with a prior hx of HTN, DM, CVA on plavix who presented with elevated LFTs in the spring of 2020. 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 On May 12, 2020 he underwent  A radical extrahepatic bile duct resection and R hepatectomy.   He had all of his disease resected with negative margins and one positive lymph node.  He had a complicated postoperative course with a bile leak and abscess that had delayed starting his adjuvant chemotherapy.       On imaging in July 2020,  he appeared to be developing increasing nodularity in the resection bed and regional lymph nodes that has led to an EUS with a " fine-needle aspiration of the lymph nodes on 8/11/20,  demonstrating the presence of metastatic disease.      On 8/27/20, he initiated treatment with palliative intent Cisplatin/Gemcitabine.      His abscess/biliary drain was managed by IR. Sinograms identified a communication to the biliary tree. He failed a capping trial with development of a fever. He underwent sinograms and cavitary sclerotherapy every 2 weeks starting August 25, 2020.  Drain was removed on 11/9/2020 9/28/20: port placement     Admission to [facility] 10/9/20-10/11/20 with septic shock from infectious enterocolitis, all cultures NGTD. Chemo delayed one week for recovery.  Cis/[place] restarted 10/21.     Admission to [facility] 11/18/20-11/19/20 with cholangitis - fever 100.8, bilirubin 3.2, elevated alk phos. MRCP showed no stones or strictures. Paracentesis removed 1L of fluid, no signs of SBP. He was dismissed on a 7-day course of Flagyl and ciprofloxacin.  C5D8 was delayed due to hospitalization.     He was seen on 12/14/2020 with an interim CT scan which showed findings concerning for abscess at the operative site along the posterior aspect of liver with gas seen in this fluid collection.  Patient was also having chills, chemotherapy was held and he was started on ciprofloxacin and Flagyl.  There was further discussion about management of this recurrent abscess with Dr. Purcell, Dr. Hale and Dr. Strauss about the abscess.  It was felt that aspiration is not likely to prevent recurrence of the abscess. A biliary drain is possible, but likely to be permanent. It has been concluded an endoscopic procedure is not possible. At 12/21/20 visit with Dr. Beard, antibiotic course was extended and he finished 12/31/20. Disease on 12/11/20 was stable.      Admission from 3/27/21 to 4/6/21: presented with biliary obstruction after capping drain. MRCP was equivocal. LFTS improved with uncappnig the drain.  Developed fevers/chills on 3/31 ,  started on broad spectrum antibioitics. Hepatic drain with fluid growing gram negative rods    Interval history:   -Fuentes is tired and remains weak. Struggles to eat because he feels full. Sometimes feels that food is in his throat and won't go down. Denies difficulty swallowing fluids. Denies choking/coughing, emesis. Appetite is better off of antibiotics and is eating more. Taking in protein shakes with >50 gm of protein.  -was discontinued from antibiotics after his last visit with ID. He was off for a week, then redeveloped shaking chills and a temp of 100. He had an emesis. Robyn gave him tylenol and resumed the antibiotics as he stilll had some supply.at home. He took another dose in the AM and then stopped. He has not had recurrence of chills or fever. Abscess drain is producing a small amount of fluid after flushing.   -bowels are regular. Urine is light in color  -BP has been in the 100-110/50-60 range. Robyn is wondering if he can discontinue lisinopril  -no dizziness    Current Outpatient Medications   Medication Sig Dispense Refill     atorvastatin (LIPITOR) 40 MG tablet Take 40 mg by mouth At Bedtime        clopidogrel (PLAVIX) 75 MG tablet Take 75 mg by mouth At Bedtime        dronabinol (MARINOL) 5 MG capsule Take 2 capsules in am and 1 capsule in pm (before meals) 90 capsule 1     fenofibrate (TRIGLIDE/LOFIBRA) 160 MG tablet Take 160 mg by mouth At Bedtime        Ferrous Sulfate (IRON) 325 (65 Fe) MG tablet Take 1 tablet by mouth 3 times daily (with meals) 180 tablet 1     furosemide (LASIX) 20 MG tablet Take 20 mg by mouth Every Monday, Wednesday, Friday, and Saturday morning       lactobacillus rhamnosus, GG, (CULTURELL) capsule Take 1 capsule by mouth daily       lisinopril (ZESTRIL) 5 MG tablet Take 5 mg by mouth every evening        LORazepam (ATIVAN) 0.5 MG tablet Take 1 tablet (0.5 mg) by mouth every 4 hours as needed (Anxiety, Nausea/Vomiting or Sleep) 30 tablet 5     magnesium gluconate 30 MG  "tablet Take 1 tablet (30 mg) by mouth daily (Patient taking differently: Take 30 mg by mouth 2 times daily ) 90 tablet 3     ondansetron (ZOFRAN) 8 MG tablet Take 1 tablet (8 mg) by mouth every 8 hours as needed (Nausea/Vomiting) 30 tablet 5     oxyCODONE (ROXICODONE) 5 MG tablet Take 1-2 tablets (5-10 mg) by mouth every 4 hours as needed for moderate to severe pain 20 tablet 0     prochlorperazine (COMPAZINE) 10 MG tablet Take 0.5 tablets (5 mg) by mouth every 6 hours as needed (Nausea/Vomiting) 30 tablet 5     sildenafil (REVATIO) 20 MG tablet Take 20 mg by mouth as needed       sodium chloride, PF, 0.9% PF flush 10 mLs by Intracatheter route every 12 hours 420 mL 1     tadalafil (CIALIS) 10 MG tablet Take 10 mg by mouth daily as needed       vitamin D3 (CHOLECALCIFEROL) 250 mcg (07349 units) capsule Take 1 capsule by mouth daily       zolpidem (AMBIEN) 10 MG tablet Take 1 tablet (10 mg) by mouth nightly as needed for sleep 30 tablet 0          Allergies   Allergen Reactions     Metformin Other (See Comments)     \" I think my kidneys shut down\"         Video physical exam  General: Patient appears chronically ill, in NAD  Skin: No visualized rash or lesions on visualized skin  Eyes: EOMI, no erythema, sclera icterus or discharge noted  Resp: Appears to be breathing comfortably without accessory muscle usage, speaking in full sentences, no cough  MSK: Appears to have normal range of motion based on visualized movements  Neurologic: No apparent tremors, facial movements symmetric  Psych: affect pleasant,, alert and oriented    The rest of a comprehensive physical examination is deferred due to PHE (public health emergency) video restrictions     There were no vitals taken for this visit.  Wt Readings from Last 4 Encounters:   04/22/21 83.9 kg (185 lb)   04/16/21 91.2 kg (201 lb)   04/03/21 84.5 kg (186 lb 4.6 oz)   03/25/21 74.8 kg (165 lb)         Labs:   Results for SARAH PAINTING (MRN [id number]) as of 4/29/2021 " 12:43   Ref. Range 4/14/2021 16:08 4/20/2021 10:32   Sodium Latest Ref Range: 133 - 144 mmol/L 137 136   Potassium Latest Ref Range: 3.4 - 5.3 mmol/L 3.9 3.9   Chloride Latest Ref Range: 94 - 109 mmol/L 107 103   Carbon Dioxide Latest Ref Range: 20 - 32 mmol/L 25 26   Urea Nitrogen Latest Ref Range: 7 - 30 mg/dL 12 13   Creatinine Latest Ref Range: 0.66 - 1.25 mg/dL 0.81 0.88   GFR Estimate Latest Ref Range: >60 mL/min/1.73_m2 >90 88   GFR Estimate If Black Latest Ref Range: >60 mL/min/1.73_m2 >90 >90   Calcium Latest Ref Range: 8.5 - 10.1 mg/dL 8.4 (L) 8.4 (L)   Anion Gap Latest Ref Range: 3 - 14 mmol/L 5 7   Albumin Latest Ref Range: 3.4 - 5.0 g/dL 1.8 (L) 1.7 (L)   Protein Total Latest Ref Range: 6.8 - 8.8 g/dL 6.4 (L) 6.3 (L)   Bilirubin Total Latest Ref Range: 0.2 - 1.3 mg/dL 2.9 (H) 3.0 (H)   Alkaline Phosphatase Latest Ref Range: 40 - 150 U/L 682 (H) 649 (H)   ALT Latest Ref Range: 0 - 70 U/L 36 42   AST Latest Ref Range: 0 - 45 U/L 75 (H) 107 (H)       Imaging: CT of the Abdomen and Pelvis with contrast, 4/22/2021 8:58 AM.     Comparison: CT abdomen and pelvis 4/1/2021.     History: hx biloma, evaluate drain fluid collection; Biloma.      Technique: Axial images of the  abdomen and pelvis were obtained with  contrast. Coronal reconstructions were provided. Images were reviewed  in bone, lung, and soft tissue windows.      Total DLP: 1085 mGy*cm.     Findings:     Chest:   Heart size is normal. No pericardial effusion. Severe multivessel  coronary artery calcifications. Small sliding hiatal hernia. Small  right greater than left pleural effusions with adjacent compressive  atelectasis, increased since 4/1/2021.     Abdomen and Pelvis:  Postsurgical partial right hepatectomy changes. Stable positioning of  a lateral, intercostal approach right upper abdominal drain with  pigtail catheter terminating within a hypoattenuating fluid collection  posterior to the remaining right hepatic lobe measuring 5.7 x 2.5  cm  (series 3, image 76), previously 5.2 x 1.1 cm when using a similar  measuring technique. This collection demonstrates mild peripheral  enhancement and coarse rim calcifications. No intra or extrahepatic  biliary duct dilation. No focal hepatic lesion. Postcholecystectomy  changes. Interval decreased focus of air within the common bile duct  (series 3, image 163). Spleen size is within normal limits. Homogenous  enhancement of the pancreas. Main pancreatic duct is not dilated.     No adrenal mass. Symmetric renal enhancement. No hydronephrosis,  calcified stone, or contour deforming mass. Bladder is decompressed  and normal in appearance. Prostate is normal.     Postsurgical Bianca-en-Y hepaticojejunostomy changes. Diffuse wall  thickening of the stomach, small, and large bowel. No small or large  bowel dilation. Diverticulosis without additional evidence to suggest  acute diverticulitis. The appendix is not well-visualized, however  there are no significant inflammatory changes in the right lower  quadrant. No free intraperitoneal air. No pneumatosis or portal venous  gas. Large volume ascites.     Abdominal aorta and major branches are normal in caliber with  scattered predominantly aortoiliac atherosclerotic calcifications.  There is extensive portosystemic shunting in the anterior abdomen and  mesentery. New non-occlusive thrombus in the superior mesenteric  vein(series 3, image 189), not extending to the portal vein.     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 No mesenteric,  retroperitoneal, or pelvic adenopathy.     Lung bases:     Bones and Soft Tissues: No suspicious osseous lesion. Mild  degenerative changes of the thoracolumbar spine. No suspicious mass.  Diffuse anasarca. Coarse subcutaneous calcification in the left  gluteal region.                                                                      Impression:   In this patient with metastatic cholangiocarcinoma status post  right  hepatectomy with radical extrahepatic bile duct resection, portal  lymph node resection, Bianca-en-Y hepaticojejunostomy:  1. New non-occlusive thrombus in the superior mesenteric vein, not  extending to the portal vein.  2. Interval mild increase in size of posterior right hepatic  collection measuring 5.7 x 2.5 cm, previously 5.2 x 1.1 cm. Pigtailed  right intracostal abdominal drain in stable position.  3. Findings suggestive for portal hypertension with likely  superimposed fluid overload including splenomegaly, large volume  ascites, interval increased small greater than right pleural  effusions, diffuse gastric and bowel wall thickening, extensive  portosystemic shunting.     [Urgent Result: Partially occlusive thrombus in the SMV]     Finding was identified on 4/22/2021 11:48 AM.      Leeanne Lake NP was contacted by Dr. Wolfe at 4/22/2021 1:18 PM and  verbalized understanding of the urgent finding.      I have personally reviewed the examination and initial interpretation  and I agree with the findings.     LUANN WOLFE MD    Impression/plan:   Abdominal abscess  -drained, fluid collection being managed by Dr. Purcell and Dr. Dale  -LFTs are elevated. Reviewed imaging with Dr. Beard. No evidence of biliary obstruction/dilation on imaging. No change to plan for continued observation. No plan to treat the SMV thrombosis with anticoagulation as he has high risk of bleeding due to portal HTN an existing use of plavix       Recurrent cholangiocarcinoma with metastatic disease  -initiated on cisplatin/gemcitabine on 8/27/20. Off of treatment since 12/9/20 due to the abscess/fluid collection  -repeat CT CAP imaging in June to assess for progression    Anemia,  s/t chronic disease   -stable      Ascites/edema s/t portal HTN  -cytology negative when checked on 3/11/21  -continue with resumed furosemide.      Malnutrition  -low albumin likely in part related to nutrition but also liver disease  -continue  protein shakes, small frequent meals.     Possible dysphagia?  -difficult to ascertain if he is having dysphagia vs decreased volume/capacity due to ascites. Robyn feels he is doing some better with food volumes since stopping anbitiotics  -monitor, if he has continued difficulty with swallowing would recommend a swallow evaluation        DM   -no longer on any medications due to hx of hypoglycemia  -monitor glucoses    Hx of CVA , L carotid stenosis, has mild short term memory deficits  -on plavix, atorvastatin  -during recent hospitalization had lethargy, disorientation. CT head was baseline. CTA with 60-70% stenosis of the L proximal ICA. Brain MRI without any acute stroke.  Mentation improved with treatment of infection     Hx of htn,  -on lisinopril 5 mg. Will review BPs with his PCP and determine if he can stop lisinopril    Deconditioning  -remains weak/tired, but a little stronger. Home PT/OT        Again, thank you for allowing me to participate in the care of your patient.        Sincerely,        VICKI Burt CNP

## 2021-04-27 NOTE — PROGRESS NOTES
"Fuentes is a 66 year old who is being evaluated via a billable video visit.      How would you like to obtain your AVS? MyChart  If the video visit is dropped, the invitation should be resent by: Send to e-mail at: [email or web address]  Will anyone else be joining your video visit? No     Patient needs refills for Zofran, Ambien 5mg, Ativan.     Vitals - Patient Reported  Systolic (Patient Reported): 104  Diastolic (Patient Reported): 66  Weight (Patient Reported): 83.9 kg (185 lb)  Height (Patient Reported): 185.4 cm (6' 1\")  BMI (Based on Pt Reported Ht/Wt): 24.41  Temperature (Patient Reported): 97.5  F (36.4  C)  Pulse (Patient Reported): 72  Pain Score: No Pain (0)    Kimmy LOUISE        Video Start Time: 338  Video-Visit Details    Type of service:  Video Visit    Video End Time:408    Originating Location (pt. Location): Home    Distant Location (provider location):  [facility]     Platform used for Video Visit: [employer]"

## 2021-04-29 NOTE — PROGRESS NOTES
Reason for Visit: f/u metastatic cholangiocarcinoma    Oncology HPI:   Fuentes Estrada is a 65 year old man with a prior hx of HTN, DM, CVA on plavix who presented with elevated LFTs in the spring of 2020. 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 On May 12, 2020 he underwent  A radical extrahepatic bile duct resection and R hepatectomy.   He had all of his disease resected with negative margins and one positive lymph node.  He had a complicated postoperative course with a bile leak and abscess that had delayed starting his adjuvant chemotherapy.       On imaging in July 2020,  he appeared to be developing increasing nodularity in the resection bed and regional lymph nodes that has led to an EUS with a fine-needle aspiration of the lymph nodes on 8/11/20,  demonstrating the presence of metastatic disease.      On 8/27/20, he initiated treatment with palliative intent Cisplatin/Gemcitabine.      His abscess/biliary drain was managed by IR. Sinograms identified a communication to the biliary tree. He failed a capping trial with development of a fever. He underwent sinograms and cavitary sclerotherapy every 2 weeks starting August 25, 2020.  Drain was removed on 11/9/2020 9/28/20: port placement     Admission to [facility] 10/9/20-10/11/20 with septic shock from infectious enterocolitis, all cultures NGTD. Chemo delayed one week for recovery.  Cis/Lorain restarted 10/21.     Admission to [facility] 11/18/20-11/19/20 with cholangitis - fever 100.8, bilirubin 3.2, elevated alk phos. MRCP showed no stones or strictures. Paracentesis removed 1L of fluid, no signs of SBP. He was dismissed on a 7-day course of Flagyl and ciprofloxacin.  C5D8 was delayed due to hospitalization.     He was seen on 12/14/2020 with an interim CT scan which showed findings concerning for abscess at the operative site along the posterior aspect of liver with gas seen in this fluid collection.  Patient was also having chills,  chemotherapy was held and he was started on ciprofloxacin and Flagyl.  There was further discussion about management of this recurrent abscess with Dr. Purcell, Dr. Hale and Dr. Strauss about the abscess.  It was felt that aspiration is not likely to prevent recurrence of the abscess. A biliary drain is possible, but likely to be permanent. It has been concluded an endoscopic procedure is not possible. At 12/21/20 visit with Dr. Beard, antibiotic course was extended and he finished 12/31/20. Disease on 12/11/20 was stable.      Admission from 3/27/21 to 4/6/21: presented with biliary obstruction after capping drain. MRCP was equivocal. LFTS improved with uncappnig the drain.  Developed fevers/chills on 3/31 , started on broad spectrum antibioitics. Hepatic drain with fluid growing gram negative rods    Interval history:   -Fuentes is tired and remains weak. Struggles to eat because he feels full. Sometimes feels that food is in his throat and won't go down. Denies difficulty swallowing fluids. Denies choking/coughing, emesis. Appetite is better off of antibiotics and is eating more. Taking in protein shakes with >50 gm of protein.  -was discontinued from antibiotics after his last visit with ID. He was off for a week, then redeveloped shaking chills and a temp of 100. He had an emesis. Robyn gave him tylenol and resumed the antibiotics as he stilll had some supply.at home. He took another dose in the AM and then stopped. He has not had recurrence of chills or fever. Abscess drain is producing a small amount of fluid after flushing.   -bowels are regular. Urine is light in color  -BP has been in the 100-110/50-60 range. Robyn is wondering if he can discontinue lisinopril  -no dizziness    Current Outpatient Medications   Medication Sig Dispense Refill     atorvastatin (LIPITOR) 40 MG tablet Take 40 mg by mouth At Bedtime        clopidogrel (PLAVIX) 75 MG tablet Take 75 mg by mouth At Bedtime        dronabinol  "(MARINOL) 5 MG capsule Take 2 capsules in am and 1 capsule in pm (before meals) 90 capsule 1     fenofibrate (TRIGLIDE/LOFIBRA) 160 MG tablet Take 160 mg by mouth At Bedtime        Ferrous Sulfate (IRON) 325 (65 Fe) MG tablet Take 1 tablet by mouth 3 times daily (with meals) 180 tablet 1     furosemide (LASIX) 20 MG tablet Take 20 mg by mouth Every Monday, Wednesday, Friday, and Saturday morning       lactobacillus rhamnosus, GG, (CULTURELL) capsule Take 1 capsule by mouth daily       lisinopril (ZESTRIL) 5 MG tablet Take 5 mg by mouth every evening        LORazepam (ATIVAN) 0.5 MG tablet Take 1 tablet (0.5 mg) by mouth every 4 hours as needed (Anxiety, Nausea/Vomiting or Sleep) 30 tablet 5     magnesium gluconate 30 MG tablet Take 1 tablet (30 mg) by mouth daily (Patient taking differently: Take 30 mg by mouth 2 times daily ) 90 tablet 3     ondansetron (ZOFRAN) 8 MG tablet Take 1 tablet (8 mg) by mouth every 8 hours as needed (Nausea/Vomiting) 30 tablet 5     oxyCODONE (ROXICODONE) 5 MG tablet Take 1-2 tablets (5-10 mg) by mouth every 4 hours as needed for moderate to severe pain 20 tablet 0     prochlorperazine (COMPAZINE) 10 MG tablet Take 0.5 tablets (5 mg) by mouth every 6 hours as needed (Nausea/Vomiting) 30 tablet 5     sildenafil (REVATIO) 20 MG tablet Take 20 mg by mouth as needed       sodium chloride, PF, 0.9% PF flush 10 mLs by Intracatheter route every 12 hours 420 mL 1     tadalafil (CIALIS) 10 MG tablet Take 10 mg by mouth daily as needed       vitamin D3 (CHOLECALCIFEROL) 250 mcg (27695 units) capsule Take 1 capsule by mouth daily       zolpidem (AMBIEN) 10 MG tablet Take 1 tablet (10 mg) by mouth nightly as needed for sleep 30 tablet 0          Allergies   Allergen Reactions     Metformin Other (See Comments)     \" I think my kidneys shut down\"         Video physical exam  General: Patient appears chronically ill, in NAD  Skin: No visualized rash or lesions on visualized skin  Eyes: EOMI, no " erythema, sclera icterus or discharge noted  Resp: Appears to be breathing comfortably without accessory muscle usage, speaking in full sentences, no cough  MSK: Appears to have normal range of motion based on visualized movements  Neurologic: No apparent tremors, facial movements symmetric  Psych: affect pleasant,, alert and oriented    The rest of a comprehensive physical examination is deferred due to PHE (public health emergency) video restrictions     There were no vitals taken for this visit.  Wt Readings from Last 4 Encounters:   04/22/21 83.9 kg (185 lb)   04/16/21 91.2 kg (201 lb)   04/03/21 84.5 kg (186 lb 4.6 oz)   03/25/21 74.8 kg (165 lb)         Labs:   Results for SARAH PAINTING (MRN [id number]) as of 4/29/2021 12:43   Ref. Range 4/14/2021 16:08 4/20/2021 10:32   Sodium Latest Ref Range: 133 - 144 mmol/L 137 136   Potassium Latest Ref Range: 3.4 - 5.3 mmol/L 3.9 3.9   Chloride Latest Ref Range: 94 - 109 mmol/L 107 103   Carbon Dioxide Latest Ref Range: 20 - 32 mmol/L 25 26   Urea Nitrogen Latest Ref Range: 7 - 30 mg/dL 12 13   Creatinine Latest Ref Range: 0.66 - 1.25 mg/dL 0.81 0.88   GFR Estimate Latest Ref Range: >60 mL/min/1.73_m2 >90 88   GFR Estimate If Black Latest Ref Range: >60 mL/min/1.73_m2 >90 >90   Calcium Latest Ref Range: 8.5 - 10.1 mg/dL 8.4 (L) 8.4 (L)   Anion Gap Latest Ref Range: 3 - 14 mmol/L 5 7   Albumin Latest Ref Range: 3.4 - 5.0 g/dL 1.8 (L) 1.7 (L)   Protein Total Latest Ref Range: 6.8 - 8.8 g/dL 6.4 (L) 6.3 (L)   Bilirubin Total Latest Ref Range: 0.2 - 1.3 mg/dL 2.9 (H) 3.0 (H)   Alkaline Phosphatase Latest Ref Range: 40 - 150 U/L 682 (H) 649 (H)   ALT Latest Ref Range: 0 - 70 U/L 36 42   AST Latest Ref Range: 0 - 45 U/L 75 (H) 107 (H)       Imaging: CT of the Abdomen and Pelvis with contrast, 4/22/2021 8:58 AM.     Comparison: CT abdomen and pelvis 4/1/2021.     History: hx biloma, evaluate drain fluid collection; Biloma.      Technique: Axial images of the  abdomen and  pelvis were obtained with  contrast. Coronal reconstructions were provided. Images were reviewed  in bone, lung, and soft tissue windows.      Total DLP: 1085 mGy*cm.     Findings:     Chest:   Heart size is normal. No pericardial effusion. Severe multivessel  coronary artery calcifications. Small sliding hiatal hernia. Small  right greater than left pleural effusions with adjacent compressive  atelectasis, increased since 4/1/2021.     Abdomen and Pelvis:  Postsurgical partial right hepatectomy changes. Stable positioning of  a lateral, intercostal approach right upper abdominal drain with  pigtail catheter terminating within a hypoattenuating fluid collection  posterior to the remaining right hepatic lobe measuring 5.7 x 2.5 cm  (series 3, image 76), previously 5.2 x 1.1 cm when using a similar  measuring technique. This collection demonstrates mild peripheral  enhancement and coarse rim calcifications. No intra or extrahepatic  biliary duct dilation. No focal hepatic lesion. Postcholecystectomy  changes. Interval decreased focus of air within the common bile duct  (series 3, image 163). Spleen size is within normal limits. Homogenous  enhancement of the pancreas. Main pancreatic duct is not dilated.     No adrenal mass. Symmetric renal enhancement. No hydronephrosis,  calcified stone, or contour deforming mass. Bladder is decompressed  and normal in appearance. Prostate is normal.     Postsurgical Bianca-en-Y hepaticojejunostomy changes. Diffuse wall  thickening of the stomach, small, and large bowel. No small or large  bowel dilation. Diverticulosis without additional evidence to suggest  acute diverticulitis. The appendix is not well-visualized, however  there are no significant inflammatory changes in the right lower  quadrant. No free intraperitoneal air. No pneumatosis or portal venous  gas. Large volume ascites.     Abdominal aorta and major branches are normal in caliber with  scattered predominantly  aortoiliac atherosclerotic calcifications.  There is extensive portosystemic shunting in the anterior abdomen and  mesentery. New non-occlusive thrombus in the superior mesenteric  vein(series 3, image 189), not extending to the portal vein.     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 No mesenteric,  retroperitoneal, or pelvic adenopathy.     Lung bases:     Bones and Soft Tissues: No suspicious osseous lesion. Mild  degenerative changes of the thoracolumbar spine. No suspicious mass.  Diffuse anasarca. Coarse subcutaneous calcification in the left  gluteal region.                                                                      Impression:   In this patient with metastatic cholangiocarcinoma status post right  hepatectomy with radical extrahepatic bile duct resection, portal  lymph node resection, Bianca-en-Y hepaticojejunostomy:  1. New non-occlusive thrombus in the superior mesenteric vein, not  extending to the portal vein.  2. Interval mild increase in size of posterior right hepatic  collection measuring 5.7 x 2.5 cm, previously 5.2 x 1.1 cm. Pigtailed  right intracostal abdominal drain in stable position.  3. Findings suggestive for portal hypertension with likely  superimposed fluid overload including splenomegaly, large volume  ascites, interval increased small greater than right pleural  effusions, diffuse gastric and bowel wall thickening, extensive  portosystemic shunting.     [Urgent Result: Partially occlusive thrombus in the SMV]     Finding was identified on 4/22/2021 11:48 AM.      Leeanne Lake NP was contacted by Dr. Wolfe at 4/22/2021 1:18 PM and  verbalized understanding of the urgent finding.      I have personally reviewed the examination and initial interpretation  and I agree with the findings.     LUANN WOLFE MD    Impression/plan:   Abdominal abscess  -drained, fluid collection being managed by Dr. Purcell and Dr. Dale  -LFTs are elevated. Reviewed imaging with   Kisha. No evidence of biliary obstruction/dilation on imaging. No change to plan for continued observation. No plan to treat the SMV thrombosis with anticoagulation as he has high risk of bleeding due to portal HTN an existing use of plavix       Recurrent cholangiocarcinoma with metastatic disease  -initiated on cisplatin/gemcitabine on 8/27/20. Off of treatment since 12/9/20 due to the abscess/fluid collection  -repeat CT CAP imaging in June to assess for progression    Anemia,  s/t chronic disease   -stable      Ascites/edema s/t portal HTN  -cytology negative when checked on 3/11/21  -continue with resumed furosemide.      Malnutrition  -low albumin likely in part related to nutrition but also liver disease  -continue protein shakes, small frequent meals.     Possible dysphagia?  -difficult to ascertain if he is having dysphagia vs decreased volume/capacity due to ascites. Robyn feels he is doing some better with food volumes since stopping anbitiotics  -monitor, if he has continued difficulty with swallowing would recommend a swallow evaluation        DM   -no longer on any medications due to hx of hypoglycemia  -monitor glucoses    Hx of CVA , L carotid stenosis, has mild short term memory deficits  -on plavix, atorvastatin  -during recent hospitalization had lethargy, disorientation. CT head was baseline. CTA with 60-70% stenosis of the L proximal ICA. Brain MRI without any acute stroke.  Mentation improved with treatment of infection     Hx of htn,  -on lisinopril 5 mg. Will review BPs with his PCP and determine if he can stop lisinopril    Deconditioning  -remains weak/tired, but a little stronger. Home PT/OT

## 2021-05-04 NOTE — PROGRESS NOTES
Chief Complaint   Patient presents with     Follow Up     Weight 83.9 kg (185 lb).    Fuentes Estrada is a 66 year old man who is being evaluated via a billable video visit.      How would you like to obtain your AVS? MyChart  If the video visit is dropped, the invitation should be resent by: Other e-mail: USE [email or web address]- PATIENT IS IN THE WAITING ROOM  Will anyone else be joining your video visit? No    SUBJECTIVE:      Fuentes had the sinogram as planned 4/22/2021 and the drain was replaced.  It continues to put out a low output.  On 4/23/2021, the very next day, he developed a low grade fever to 100.6 F and vomited.  His wife had some leftover antibiotics Augmentin and Bactrim at home, so she gave him these.  She gave two doses of each, and he seemed much better so she stopped and did not reach out further.    Since then, Fuentes has done fairly well. No fevers since. He feels better off antibiotics in terms of appetite and GI disturbance.  They are doing a protein shake every evening, and this also seems to be helping with energy.    Next plan is 5/27 for sinogram with CT.        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 He had radical extrahepatic bile duct resection and R hepatectomy in May 2020. The presence of metastatic disease was identified by EUS biopsy in August 2020.      He developed an abscess and a biliary drain was placed, managed by IR. Sinograms identified a communication to the biliary tree. He failed a capping trial with development of a fever. He underwent sinograms and cavitary sclerotherapy every 2 weeks starting 8/25/2020. Drain was removed on 11/9/2020, but he presented just 11/18/2020 with cholangitis, fever 100.8 F, bilirubin 3.2, elevated alk phos.      12/11/2020 CT scan with findings concerning for an abscess at the operative site along the posterior aspect of the liver with gas  "seen in this fluid collection. He was started on ciprofloxacin and Flagyl. There was further discussion about management of this recurrent abscess with Dr. Purcell, Dr. Hale and Dr. Strauss. It was felt that aspiration was unlikely to prevent recurrence of the abscess. If a biliary drain was placed, it would likely be permanent.      12/21/20 visit with Dr. Beard, antibiotic course of cipro/Flagyl was extended and he finished 12/31/20.     2/2/2021 admission for nausea, vomiting and FTT. Fuentes underwent ERCP for possible internal drainage, but ERCP was not feasible due to \"edematous and tortuous biliary limb obviating passage.\" Wife tells me he was given more cipro/Flagyl at one point, and was taking it still at the time of biliary drain placement by IR, done on 2/15/2021. Cultures from drainage growing Strep anginosus. Penicillin was prescribed.     Sinogram 3/1/2021 with no fistula identified and 10mL of residual cavity. The drain was putting out just 5-10ccs per day. Plan was for repeat CT abdomen/pelvis and same day sinogram 3/25/2021.  Fuentes was admitted to the hospital just 2 days later 3/27/2021 - 4/6/2021. He presented with elevated bilirubin and jaundice a few days after the drain was capped. He was not febrile, but he did have some chills. Imaging did not reveal significant re-accumulation of fluid. Fuentes then developed 2 episodes of low grade fevers, rigors, and hypotension. Cultures were done off of the old drain directly, so this was difficult to interpret.  Enterobacter cloacae grew from these old drain cultures.  He received a course of Bactrim (to target the Enterobacter) and Augmentin to cover the prior Strep that grew in his abdominal cultures in addition to anaerobes empirically.  I did stop this antibiotic course 4/16/2021.       I have reviewed and updated the patient's Past Medical History, Social History, Family History and Medication List.      OBJECTIVE:      Medications reviewed           " "        Allergies   Allergen Reactions     Metformin Other (See Comments)       \" I think my kidneys shut down\"         Examination via video visit:      GENERAL: Patient is awake and alert but appears chronically ill   HEENT: The video is too grainy for me to tell if the eyes appear icteric or not. EOM appear intact.   RESPIRATORY: I do not see any increased work of breathing and no cough was noted.   SKIN: no rashes are visible   PSYCH: Affect is dull. Speech is limited.      The rest of a comprehensive physical examination is deferred due to the public health emergency video visit restrictions.      Labs and Imaging:     Hospitalization records reviewed      Microbiology      2/15/2021 culture of drainage when biliary drain was placed.   Moderate growth   Streptococcus anginosus   This organism is susceptible to ampicillin, penicillin, vancomycin and the cephalosporins.      4/3/2021 culture from drainage of existing (old) biliary drain      Susceptibility                     Enterobacter cloacae complex (1) Enterobacter cloacae complex (2)       MARBELLA MARBELLA       CEFEPIME <=1 ug/mL Sensitive <=1 ug/mL Sensitive       CEFTAZIDIME >=64 ug/mL Resistant 32 ug/mL Resistant       CEFTRIAXONE 8 ug/mL Resistant 2 ug/mL Intermediate       CIPROFLOXACIN >=4 ug/mL Resistant >=4 ug/mL Resistant       GENTAMICIN <=1 ug/mL Sensitive <=1 ug/mL Sensitive       LEVOFLOXACIN >=8 ug/mL Resistant >=8 ug/mL Resistant       MEROPENEM <=0.25 ug/mL Sensitive <=0.25 ug/mL Sensitive       Piperacillin/Tazo >=128 ug/mL Resistant >=128 ug/mL Resistant       TOBRAMYCIN <=1 ug/mL Sensitive <=1 ug/mL Sensitive       Trimethoprim/Sulfa <=1/19 ug/mL Sensitive <=1/19 ug/mL Sensitive        ASSESSMENT and PLAN:      History of hepatic abscess with drain remaining in situ.  Hepatic abscess is resolved, but likely that there is a biliary fistula.    Metastatic cholangiocarcinoma, s/p radical extrahepatic bile duct resection and R hepatectomy in May " 2020. The presence of metastatic disease was identified by EUS biopsy in August 2020.       The capping of the drain is likely what led to his most recent hospitalization. He may have the drain indefinitely. IR is planning on ongoing monitoring, however, with next sinogram and CT planned for 5/27/2021.      Fuentes feels that he eats better when he is not taking antibiotics, and he wants to avoid taking them if he does not definitively need them.  I think this is reasonable. Recent imaging does not show significant re-accumulation of fluid or abscess collection. The drain seems to be necessary.  He may still have fistulous drainage, and the capping of the drain led to blocking the outlet.      I messaged with Dr. Purcell and they confirmed that the patient receives one dose of IV Unasyn when the drain is evaluated and/or replaced.    I recommended to Fuentes's wife to also give him the Augmentin and Bactrim 12 hours after the procedure, and then one more time 12 hours later.   He developed fever and chills after the last drain evaluation, and likely he had some transient bacteremia then.     I gave the patient a follow-up appt with me to check in on 6/4/2021. They have my contact information should Fuentes develop fevers in the interim.      Video-Visit Details    Type of service:  Video Visit    Video Start Time: 3:30 PM  Video End Time:  3:50 PM    Originating Location (pt. Location): Home    Distant Location (provider location):  [facility] [place]     Platform used for Video Visit: Felipe Dale MD, MS  Infectious Disease    Time:  A total of 30 minutes was spent in care of the patient today. 20 minutes were spent on the video, with an additional 10 minutes spent on chart review, orders, and care coordination.

## 2021-05-04 NOTE — LETTER
5/4/2021       RE: Fuentes Estrada  [address] [place]  [facility] [address]     Dear Colleague,    Thank you for referring your patient, Fuentes Estrada, to the [facility] [place] at [facility]. Please see a copy of my visit note below.    Chief Complaint   Patient presents with     Follow Up     Weight 83.9 kg (185 lb).    Fuentes Estrada is a 66 year old man who is being evaluated via a billable video visit.      How would you like to obtain your AVS? CROSSROADS SYSTEMShart  If the video visit is dropped, the invitation should be resent by: Other e-mail: USE ChipIn- PATIENT IS IN THE WAITING ROOM  Will anyone else be joining your video visit? No        Video-Visit Details    Type of service:  Video Visit    Video Start Time: 3:30 PM  Video End Time:  3:50 PM    Originating Location (pt. Location): Home    Distant Location (provider location):  [facility] [place]     Platform used for Video Visit: PatWell

## 2021-05-06 NOTE — PROGRESS NOTES
Infusion Nursing Note:  Fuentes Estrada presents today for Port Labs.    Patient seen by provider today: No   present during visit today: Not Applicable.    Note: N/A.  Patient  did meet criteria for an asymptomatic covid-19 PCR test in infusion today. Patient declined the covid-19 test.    Intravenous Access:  Labs drawn without difficulty.  Implanted Port.    Treatment Conditions:  Not Applicable.      Post Infusion Assessment:  Blood return noted.  Site patent and intact, free from redness, edema or discomfort.  No evidence of extravasations.  Access discontinued per protocol.       Discharge Plan:   Discharge instructions reviewed with: Patient.  Patient and/or family verbalized understanding of discharge instructions and all questions answered.  Patient discharged in stable condition accompanied by: self/Wife  Departure Mode: Wheelchair.    Lisa Dong RN

## 2021-05-07 NOTE — TELEPHONE ENCOUNTER
"[facility] Telephone Call    Called patient in follow up to yesterday's message.  Seems like he slept better and is still in bed.     Robyn will check his vitals this am when he gets up.      Eyes are a tint of yellow yesterday, appetite \"not there\" and this has been ongoing to quite some time.  He has extra \"fluid in his belly\" again.  Previous paracentesis last 4/20/21.  She thinks a bit less now at that he is doing better from a fluid standpoint since he has added more protein to his diet. 50 grams protein at night along with other protein during the day.     Robyn will send my chart message with vitals.    He is now taking one Ativan every night for sleep starting about 3 weeks ago. Currently only using this medication for sleep.    Call to pharmacy to discuss refill status and patient has no refills of prescription done 8/27/20      Mary Solis RN   BSN, HNBC, STAR-T  [facility]      "

## 2021-05-11 NOTE — LETTER
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     Discharge Needs Assessment:  Needs      Most Recent Value   Equipment Currently Used at Home  walker, rolling      Follow-up plan:    Future Appointments   Date Time Provider Department Center   2021  6:00 AM Maliha Rodriguez OT [facility]   2021  4:20 PM Leeanne Lake APRN CNP [facility]   2021  2:15 PM Radha Santillan, PT PHPT [facility]   2021  2:15 PM Radha Santillan, PT PHPT [facility]   2021 12:30 PM NL INFUSION CHAIR 3 [facility]   2021  1:00 PM PHCT1 PHCT [facility]   3/1/2021  3:30 PM Aydin Beard MD [facility]           Referrals     Future Labs/Procedures    Home care nursing referral     Comments:    Please fax discharge orders to [facility],  [place] Office    Ph:  [phone]    Fax: [phone]    Skilled home care RN for initial home safety evaluation and 1-3 times a week to evaluate medication management, nutrition and hydration evaluation, endurance evaluation, and general status evaluation after discharge from the acute care hospital setting.    Skilled home care RN to assist with management and education reinforcement as designated in MD team discharge orders/plan of care.  Please admit Mr. Estrada to home care as soon as staffing allows and or 2021 will suffice as an admission date.   Patient will discharge to home with spouse, Ms. Robyn Estrada who is patients designated caregiver in home setting.    Skilled home care Physical Therapy and Occupational Therapy to evaluate and treat in home setting per recommendations of the inpatient Rehabilitation Consult Team.            Ashatni Fernández RN       arthritis

## 2021-05-14 NOTE — TELEPHONE ENCOUNTER
"[facility] Telephone Call    Called patient in follow up from my chart message.  Fuentes has not been sleeping well, tossing and turning and feels that his fatigue is somewhat related to this.     Wants to sleep all day because not sleeping at night.     They are trying Melatonin supplement now that was suggested by PCP and better night last night.  PCP also started Mirtazapine in an attempt to increase his appetite but wife stopped this due to possible side effects that include difficulty sleeping.     Skin color yellow tint and eyes are yellow tinted also.     Denies fever chills pain    Slight nausea two times, no vomiting.  Eating \"not the best but will drink protein shake if not able to eat a meal\"  Ate pretty well yesterday two protein shakes, gelato and maryjo cookies.      Per wife his abdomen remains the same in appearance.     He is doing PT and works well with them.    Robyn was wondering if he should have repeat labs.  Will send message to care team.      They will work with sleep over the weekend and call or send message on Monday with update.     Mary Solis RN   BSN, HNBC, STAR-T  [facility]      "

## 2021-05-22 NOTE — TELEPHONE ENCOUNTER
Signed Consent to Communicate with Robyn, wife and caller,is in chart.    Fuentes was doing really well on 5/18/21. PT went well, had energy and some appetite.    Since WednesdayTom has gotten worse.  Not eating.  Days and nights mixed up  Vomiting  Increased weakness.  Restless nights  Sleeping during the day.           Robyn is concerned enough to call.  She doesn't want to overreact.    She agreed that she has seen significant changes in the past four days.    I recommended ER. Robyn is unsure about taking Fuentes in to an ER. She said she'll wait overnight and see how things go and will take him in tomorrow.  I told her too that she can change her mind and take him in today yet. She understands. She then sounded less confident in her decision to wait until tomorrow.  She knows we're available 24/7 and to call us back as needed.    COVID 19 Nurse Triage Plan/Patient Instructions    Please be aware that novel coronavirus (COVID-19) may be circulating in the community. If you develop symptoms such as fever, cough, or SOB or if you have concerns about the presence of another infection including coronavirus (COVID-19), please contact your health care provider or visit [email or web address].     Disposition/Instructions    ED Visit recommended. Follow protocol based instructions.     Bring Your Own Device:  Please also bring your smart device(s) (smart phones, tablets, laptops) and their charging cables for your personal use and to communicate with your care team during your visit.    Thank you for taking steps to prevent the spread of this virus.  o Limit your contact with others.  o Wear a simple mask to cover your cough.  o Wash your hands well and often.    Resources    [facility]: About COVID-19: [email or web address]    CDC: What to Do If You're Sick: www.cdc.gov/coronavirus/2019-ncov/about/steps-when-sick.html    CDC: Ending Home Isolation:  www.cdc.gov/coronavirus/2019-ncov/hcp/disposition-in-home-patients.html     CDC: Caring for Someone: www.cdc.gov/coronavirus/2019-ncov/if-you-are-sick/care-for-someone.html     [facility]: Interim Guidance for Hospital Discharge to Home: www.health.[facility].[place].us/diseases/coronavirus/hcp/hospdischarge.pdf    [facility] clinical trials (COVID-19 research studies): [email or web address]     Below are the COVID-19 hotlines at the [place] Department of Health ([facility]). Interpreters are available.   o For health questions: Call 637-726-3260 or 1-794.379.8499 (7 a.m. to 7 p.m.)  o For questions about schools and childcare: Call 563-435-3228 or 1-274.372.4058 (7 a.m. to 7 p.m.)       Reason for Disposition    Patient sounds very sick or weak to the triager    Additional Information    Negative: Severe difficulty breathing (e.g., struggling for each breath, speaks in single words)    Negative: Shock suspected (e.g., cold/pale/clammy skin, too weak to stand, low BP, rapid pulse)    Negative: Difficult to awaken or acting confused (e.g., disoriented, slurred speech)    Negative: [1] Fainted > 15 minutes ago AND [2] still feels too weak or dizzy to stand    Negative: [1] SEVERE weakness (i.e., unable to walk or barely able to walk, requires support) AND [2] new onset or worsening    Negative: Sounds like a life-threatening emergency to the triager    Negative: [1] Drinking very little AND [2] dehydration suspected (e.g., no urine > 12 hours, very dry mouth, very lightheaded)    Protocols used: WEAKNESS (GENERALIZED) AND FATIGUE-TITO SOL RN [facility]

## 2021-05-25 NOTE — PROGRESS NOTES
RN Care Coordination Note  Placed call to patient's wife to review mychart message and recent hospitalization. 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 Reviewed for most oncology patients this is a life long medication. Wife is concerned about cost of medication. We reviewed After 20 days of 15mg BID, dosing will change to 20mg daily. He will be due for dosing change around time of Dr Beard on 6/7/21.     Wife reports patient is doing fairly well. He slept well last night and is currently working with PT. Reviewed timing for Dr Beard appointment. Wife feels this timing will work well, she does not feel he needs to be seen sooner at this point. We will plan to cancel upcoming CT. Writer has requested a copy of local imaging.     Encouraged wife to keep up updated and call with any additional questions or concerns.          Chey Arias RN, BSN, OCN   RN Care Coordinator   [facility]

## 2021-05-25 NOTE — TELEPHONE ENCOUNTER
I returned Robyn's call.  Fuentes had a great sleep 5/17 and did well at PT on 5/18.  Then the rest of the week started declining.  His appetite isn't great, he was low energy, and not fully orientated.  She states he would be in the bathroom and then state he needed to go to the bathroom.  Pt's drain changed from ramirez yellow minimal out puts to darker bronze color.  She is flushing BID with 5cc, site looks good.  On Saturday 5/22 on the way back from the bathroom he became very SOB and she helped him to the ground.  She called 911 and he was taken to local hospital.  They Did chest CT and found PE.  He was taken off his plavix and placed on xarelto.  He was discharged to home on Tuesday 5/24.  She said he was dehydrated and got fluids, his mentation is better after discharge, he is back more to his normal self.  He is still weak and tires easily. He still has no appetite, but is eating little bits and some homemade protein shakes.  DANIELA Dumont, RN, BSN  Interventional Radiology Nurse Coordinator   Phone:  [phone]

## 2021-05-27 NOTE — PROGRESS NOTES
Patient tolerated recovery stage well. VSS, right lateral chest bili drain and paracenetisis site clean/dry/intact, no hematoma, and denies pain. Patient tolerated PO food and fluids. Teaching was done and discharge instructions were given. Patient ambulated, voided, his port was flushed with saline and the with 500 units of heparin and needle was removed. Patient discharged from the hospital via wheel chair to home with wife.

## 2021-05-27 NOTE — IR NOTE
Patient Name: Fuentes Estrada  Medical Record Number: [id number]  Today's Date: 5/27/2021    Procedure: Paracentesis and sinogram and drain exchange  Proceduralist: Dr. Purcell    Procedure Start: 1147, 1233  Procedure end: 1252  Sedation medications administered: 75 mcg fentanyl and 1.5 mg versed    Report given to: Maricruz CASTELLANOS 2A  : MYRTLE    Other Notes: Pt arrived to IR room 2 from 2A. Consent reviewed. Pt denies any questions or concerns regarding procedure. Pt positioned supine and lateral right side up and monitored per protocol. Pt tolerated procedure without any noted complications. Pt transferred back to .    4,000 ml ascites removed. 12.5 g 25% albumin given.

## 2021-05-27 NOTE — PRE-PROCEDURE
GENERAL PRE-PROCEDURE:   Procedure:  1. Paracentesis 2. Sinogram with possible drain exchange or reposition  Date/Time:  5/27/2021 11:00 AM    Risks and benefits: Risks, benefits and alternatives were discussed    Consent given by:  Patient  Patient states understanding of procedure being performed: Yes    Patient's understanding of procedure matches consent: Yes    Procedure consent matches procedure scheduled: Yes    Expected level of sedation:  Moderate  Appropriately NPO:  Yes  ASA Class:  Class 3- Severe systemic disease, definite functional limitations  Mallampati  :  Grade 1- soft palate, uvula, tonsillar pillars, and posterior pharyngeal wall visible  Lungs:  Lungs clear with good breath sounds bilaterally  Heart:  Normal heart sounds and rate  History & Physical reviewed:  History and physical reviewed and no updates needed  Statement of review:  I have reviewed the lab findings, diagnostic data, medications, and the plan for sedation

## 2021-05-27 NOTE — IP AVS SNAPSHOT
[facility] [address] [address]                                    After Visit Summary   5/27/2021    Fuentes Estrada    MRN: [id number]           After Visit Summary Signature Page    I have received my discharge instructions, and my questions have been answered. I have discussed any challenges I see with this plan with the nurse or doctor.    ..........................................................................................................................................  Patient/Patient Representative Signature      ..........................................................................................................................................  Patient Representative Print Name and Relationship to Patient    ..................................................               ................................................  Date                                   Time    ..........................................................................................................................................  Reviewed by Signature/Title    ...................................................              ..............................................  Date                                               Time          22EPIC Rev 08/18

## 2021-05-27 NOTE — DISCHARGE INSTRUCTIONS
"[facility]  Interventional Radiology   Drainage Tube Instructions      AFTER YOU GO HOME:    Have an adult stay with you for 24 hours.     Drink plenty of fluids and resume your regular diet.    For 24 hours:       Do not drive or operate machinery at home or at work    No alcoholic beverages    Do not make any important legal decisions    No heavy lifting greater than 10 lb until instructed by your physician     Call Your Physician if:    You develop nausea or vomiting.    Your develop hives or rash or unexplained itching    Additional Instructions: Please call for the following problems:    No fluid draining from the tube, check that the tube is not kinked or if stop cock is closed.    Flush with 5 cc's Normal Saline twice a day, empty bag and monitor output daily subtracting amount inserted.    Skin around tube is red, painful or has any drainage.    You have increased pain in your abdomen    Fever greater than 100.5 F and chills    You feel nauseated and  \"just not right\"    Recheck in on month [phone]      Change dressing every 48 hour or when it gets wet    Interventional Radiology Department    Physician:Dr Reta Purcell  Date:May 27, 2021  Telephone numbers: [phone]...Monday-Friday 8:00am-4:30pm                                  [phone]... After 4:30 Monday-Friday, Weekends and Holiday. Ask for the Interventional Radiologist on call. Someone is on call 24 hours a day.                                  "

## 2021-05-27 NOTE — IP AVS SNAPSHOT
After Visit Summary Template Not Found    This Print Group is only intended to be used in the After Visit Summary and can only be used in a report that uses a released After Visit Summary Template.                       MRN:[id number]                      After Visit Summary   5/27/2021    Fuentes Estrada    MRN: [id number]           Visit Information        Department      5/27/2021  9:43 AM [facility] [address] [place]          Review of your medicines      UNREVIEWED medicines. Ask your doctor about these medicines       Dose / Directions   atorvastatin 40 MG tablet  Commonly known as: LIPITOR      Dose: 40 mg  Take 40 mg by mouth At Bedtime  Refills: 0     dronabinol 5 MG capsule  Commonly known as: MARINOL  Used for: Hilar cholangiocarcinoma (H)      Take 2 capsules in am and 1 capsule in pm (before meals)  Quantity: 90 capsule  Refills: 1     fenofibrate 160 MG tablet  Commonly known as: TRIGLIDE/LOFIBRA      Dose: 160 mg  Take 160 mg by mouth At Bedtime  Refills: 0     furosemide 20 MG tablet  Commonly known as: LASIX      Dose: 20 mg  Take 1 tablet (20 mg) by mouth daily  Refills: 0     iron 325 (65 Fe) MG tablet  Used for: Iron deficiency anemia due to chronic blood loss      Dose: 1 tablet  Take 1 tablet by mouth 3 times daily (with meals)  Quantity: 180 tablet  Refills: 1     lactobacillus rhamnosus (GG) capsule      Dose: 1 capsule  Take 1 capsule by mouth daily  Refills: 0     lisinopril 5 MG tablet  Commonly known as: ZESTRIL      Dose: 5 mg  Take 5 mg by mouth every evening  Refills: 0     LORazepam 0.5 MG tablet  Commonly known as: ATIVAN  Used for: Hilar cholangiocarcinoma (H)      Dose: 0.5 mg  Take 1 tablet (0.5 mg) by mouth every 4 hours as needed (Anxiety, Nausea/Vomiting or Sleep)  Quantity: 30 tablet  Refills: 5     magnesium gluconate 30 MG tablet  Used for: Hilar cholangiocarcinoma (H)      Dose: 30 mg  Take 1 tablet (30 mg) by mouth daily  Quantity: 90 tablet  Refills: 3      mirtazapine 7.5 MG tablet  Commonly known as: REMERON      take 1 tablet by mouth every evening  Refills: 0     ondansetron 8 MG tablet  Commonly known as: ZOFRAN  Used for: Hilar cholangiocarcinoma (H)      Dose: 8 mg  Take 1 tablet (8 mg) by mouth every 8 hours as needed (Nausea/Vomiting)  Quantity: 30 tablet  Refills: 5     oxyCODONE 5 MG tablet  Commonly known as: ROXICODONE  Used for: Cholangiocarcinoma (H)      Dose: 5-10 mg  Take 1-2 tablets (5-10 mg) by mouth every 4 hours as needed for moderate to severe pain  Quantity: 20 tablet  Refills: 0     prochlorperazine 10 MG tablet  Commonly known as: COMPAZINE  Used for: Hilar cholangiocarcinoma (H)      Dose: 5 mg  Take 0.5 tablets (5 mg) by mouth every 6 hours as needed (Nausea/Vomiting)  Quantity: 30 tablet  Refills: 5     rivaroxaban ANTICOAGULANT 15 MG Tabs tablet  Commonly known as: XARELTO      Dose: 15 mg  Take 15 mg by mouth 2 times daily  Refills: 0     sildenafil 20 MG tablet  Commonly known as: REVATIO      Dose: 20 mg  Take 20 mg by mouth as needed  Refills: 0     sodium chloride (PF) 0.9% PF flush  Used for: Cholangiocarcinoma (H), Intra-abdominal abscess (H)      Dose: 10 mL  10 mLs by Intracatheter route every 12 hours  Quantity: 420 mL  Refills: 1     tadalafil 10 MG tablet  Commonly known as: CIALIS      Dose: 10 mg  Take 10 mg by mouth daily as needed  Refills: 0     vitamin D3 250 mcg (03216 units) capsule  Commonly known as: CHOLECALCIFEROL      Dose: 1 capsule  Take 1 capsule by mouth daily  Refills: 0     zolpidem 10 MG tablet  Commonly known as: AMBIEN  Used for: Hilar cholangiocarcinoma (H)      Dose: 10 mg  Take 1 tablet (10 mg) by mouth nightly as needed for sleep  Quantity: 30 tablet  Refills: 0              Protect others around you: Learn how to safely use, store and throw away your medicines at www.disposemymeds.org.       Follow-ups after your visit       Your next 10 appointments already scheduled    Jun 02, 2021  1:30  "PM  Infusion Visit with PH INFUSION CHAIR 3  [facility] [place] ([facility] ) [address] [place] [id number] [phone]      Jun 04, 2021  2:30 PM  (Arrive by 2:15 PM)  Video Visit with Yuri Dale MD  [facility] [place] ([facility] ) [address] [place] [id number] [phone]   Please Note: This is a virtual visit; there is no need to come to the facility.       Jun 07, 2021  3:00 PM  (Arrive by 2:45 PM)  Return Visit with Aydin Beard MD  [facility] ([facility] ) [address] [place] [id number] [phone]         Care Instructions       Further instructions from your care team       [facility]  Interventional Radiology   Drainage Tube Instructions      AFTER YOU GO HOME:    Have an adult stay with you for 24 hours.     Drink plenty of fluids and resume your regular diet.    For 24 hours:       Do not drive or operate machinery at home or at work    No alcoholic beverages    Do not make any important legal decisions    No heavy lifting greater than 10 lb until instructed by your physician     Call Your Physician if:    You develop nausea or vomiting.    Your develop hives or rash or unexplained itching    Additional Instructions: Please call for the following problems:    No fluid draining from the tube, check that the tube is not kinked or if stop cock is closed.    Flush with 5 cc's Normal Saline twice a day, empty bag and monitor output daily subtracting amount inserted.    Skin around tube is red, painful or has any drainage.    You have increased pain in your abdomen    Fever greater than 100.5 F and chills    You feel nauseated and  \"just not right\"    Recheck in on month [phone]      Change dressing every 48 hour or when it gets " "[facility]    Interventional Radiology Department    Physician:Dr Reta Purcell  Date:May 27, 2021  Telephone numbers: [phone]...Monday-Friday 8:00am-4:30pm                                  [phone]... After 4:30 Monday-Friday, Weekends and Holiday. Ask for the Interventional Radiologist on call. Someone is on call 24 hours a day.                                    Additional Information About Your Visit       [employer] Information    [employer] gives you secure access to your electronic health record. If you see a primary care provider, you can also send messages to your care team and make appointments. If you have questions, please call your primary care clinic.  If you do not have a primary care provider, please call [phone] and they will assist you.       Care EveryWhere ID    This is your Care EveryWhere ID. This could be used by other organizations to access your [place] medical records  [id number]       Your Vitals Were  Most recent update: 5/27/2021  1:55 PM    Blood Pressure   112/71 (BP Location: Right arm)          Pulse   62          Temperature   98.3  F (36.8  C) (Oral)          Respirations   16          Height   1.854 m (6' 0.99\")             Weight   83.9 kg (185 lb)    Pulse Oximetry   96%    BMI (Body Mass Index)   24.41 kg/m           Primary Care Provider Office Phone # Fax #    Tolu Noland PA-C [phone]      Equal Access to Services    [facility] AH: Hadii aura ku hadasho Soomaali, waaxda luqadaha, qaybta kaalmada adeegyada, stephanie ceballos haykarla gonzalez . So [facility] [phone].    ATENCIÓN: Si habla español, tiene a kaufman disposición servicios gratuitos de asistencia lingüística. Llame al [phone].    We comply with applicable federal and state civil rights laws, including the [place] Human Rights Act. We do not discriminate on the basis of race, color, creed, Sabianist, national origin, marital status, age, disability, sex, sexual orientation, or gender " identity.    If you would like an itemization of your charges they will now be available in [employer] 30 days after discharge. To access the itemized statements in [employer] go to billing/billing summary. From there select view account. There will be multiple tabs showing an overview of your account, detail, payments, and communications. From the communications tab you can see your monthly statements, your itemized statements, and any billing letters generated for your account. If you do not have a [employer] account and need help getting access please contact [employer] support at [phone].  If you would prefer to have your itemized statements mailed please contact our automated itemized bill request line at [phone] option  2.       Thank you!    Thank you for choosing [facility] for your care. Our goal is always to provide you with excellent care. Hearing back from our patients is one way we can continue to improve our services. Please take a few minutes to complete the written survey that you may receive in the mail after you visit with us. Thank you!            Medication List      ASK your doctor about these medications          Morning Afternoon Evening Bedtime As Needed    atorvastatin 40 MG tablet  Also known as: LIPITOR  INSTRUCTIONS: Take 40 mg by mouth At Bedtime                     dronabinol 5 MG capsule  Also known as: MARINOL  INSTRUCTIONS: Take 2 capsules in am and 1 capsule in pm (before meals)                     fenofibrate 160 MG tablet  Also known as: TRIGLIDE/LOFIBRA  INSTRUCTIONS: Take 160 mg by mouth At Bedtime                     furosemide 20 MG tablet  Also known as: LASIX  INSTRUCTIONS: Take 1 tablet (20 mg) by mouth daily                     iron 325 (65 Fe) MG tablet  INSTRUCTIONS: Take 1 tablet by mouth 3 times daily (with meals)                     lactobacillus rhamnosus (GG) capsule  INSTRUCTIONS: Take 1 capsule by mouth daily                     lisinopril 5 MG tablet  Also known as:  ZESTRIL  INSTRUCTIONS: Take 5 mg by mouth every evening                     LORazepam 0.5 MG tablet  Also known as: ATIVAN  INSTRUCTIONS: Take 1 tablet (0.5 mg) by mouth every 4 hours as needed (Anxiety, Nausea/Vomiting or Sleep)                     magnesium gluconate 30 MG tablet  INSTRUCTIONS: Take 1 tablet (30 mg) by mouth daily                     mirtazapine 7.5 MG tablet  Also known as: REMERON  INSTRUCTIONS: take 1 tablet by mouth every evening                     ondansetron 8 MG tablet  Also known as: ZOFRAN  INSTRUCTIONS: Take 1 tablet (8 mg) by mouth every 8 hours as needed (Nausea/Vomiting)  LAST TAKEN: Ask your nurse or doctor                     oxyCODONE 5 MG tablet  Also known as: ROXICODONE  INSTRUCTIONS: Take 1-2 tablets (5-10 mg) by mouth every 4 hours as needed for moderate to severe pain                     prochlorperazine 10 MG tablet  Also known as: COMPAZINE  INSTRUCTIONS: Take 0.5 tablets (5 mg) by mouth every 6 hours as needed (Nausea/Vomiting)                     rivaroxaban ANTICOAGULANT 15 MG Tabs tablet  Also known as: XARELTO  INSTRUCTIONS: Take 15 mg by mouth 2 times daily                     sildenafil 20 MG tablet  Also known as: REVATIO  INSTRUCTIONS: Take 20 mg by mouth as needed                     sodium chloride (PF) 0.9% PF flush  INSTRUCTIONS: 10 mLs by Intracatheter route every 12 hours  Doctor's comments: Please dispense as 10 mL syringes for drain flush  LAST TAKEN: May 27, 2021 11:18 AM                     tadalafil 10 MG tablet  Also known as: CIALIS  INSTRUCTIONS: Take 10 mg by mouth daily as needed                     vitamin D3 250 mcg (22242 units) capsule  Also known as: CHOLECALCIFEROL  INSTRUCTIONS: Take 1 capsule by mouth daily                     zolpidem 10 MG tablet  Also known as: AMBIEN  INSTRUCTIONS: Take 1 tablet (10 mg) by mouth nightly as needed for sleep

## 2021-05-27 NOTE — PROGRESS NOTES
Pt prepped for sinogram with bili tube change and paracentesis.Consent signed and questions answered.PIV placed and lab sent and ABX's started.Pt had an emisis and zofran was given.Pt bili tube has blood tinged color in it and Dr Purcell saw that.

## 2021-05-27 NOTE — PROGRESS NOTES
Pt has returned to unit 2a s/p paracentesis and sinogram with drain exchange. Right lateral chest site CDI, soft, flat, non tender, drainage bag to gravity with clear, light yellow output in tubing. RLQ paracentesis site CDI, soft, flat, non tender. Pt drowsy, easily arouses to voice. Pt declines PO and would like to rest. Spouse at bedside.

## 2021-05-27 NOTE — PROCEDURES
[facility]    Procedure: IR Procedure Note    Date/Time: 5/27/2021 1:20 PM  Performed by: Reta Purcell MD  Authorized by: Reta Purcell MD     UNIVERSAL PROTOCOL   Site Marked: NA  Prior Images Obtained and Reviewed:  Yes  Required items: Required blood products, implants, devices and special equipment available    Patient identity confirmed:  Verbally with patient, arm band, provided demographic data and hospital-assigned identification number  Patient was reevaluated immediately before administering moderate or deep sedation or anesthesia  Confirmation Checklist:  Patient's identity using two indicators, relevant allergies, procedure was appropriate and matched the consent or emergent situation and correct equipment/implants were available  Time out: Immediately prior to the procedure a time out was called    Universal Protocol: the Joint Commission Universal Protocol was followed    Preparation: Patient was prepped and draped in usual sterile fashion           ANESTHESIA    Anesthesia: Local infiltration  Local Anesthetic:  Lidocaine 1% without epinephrine  Anesthetic Total (mL):  15      SEDATION    Patient Sedated: Yes    Sedation Type:  Moderate (conscious) sedation  Sedation:  Fentanyl and midazolam  Vital signs: Vital signs monitored during sedation    See dictated procedure note for full details.  Findings: Fentanyl 75 mch, versed 1.5 mg, sed time 60 minutes    Specimens: none    Complications: None    Condition: Stable    Plan: Resume drain cares per orders  Sinogram in 4 weeks      PROCEDURE   Patient Tolerance:  Patient tolerated the procedure well with no immediate complications  Describe Procedure: Paracentesis removed 4 L ascites, albumin administered per discussion with Leeanne Lake CNP  Sinogram with partially clogged drain, drain slightly retracted, small cavity, no fistula. 100.2 Fr Alexandro plascencia  Length of time  physician/provider present for 1:1 monitoring during sedation: 60

## 2021-05-29 NOTE — TELEPHONE ENCOUNTER
"Soniya daughter calling.  Says dad just admitted into ER [facility], diagnosed with pneumonia and previous PE. Diagnosed earlier this week.     Reason she is calling is to \"let his medical team know to get discussion going re malnourishment.\"  Feels this needs to be revisited too.      patient.    Daughter would really like to talk with someone today, sounds like appointment later on this week.     Dr. Goncalves on call -paged who noted that patient has.  6/7/2020 appointment, he mentioned checking next week about getting dietician consult, moving up this appointment, and discussing other concerns.     Shared with Soniya who appreciated the support. She is understanding that it will depend to what comes of present ER visit up [place] that is occurring now.                   "

## 2021-05-29 NOTE — TELEPHONE ENCOUNTER
"Interventional radiology telephone note    Patient's wife calls this morning regarding patient's drain output.  Patient had a sinogram on 5/27/2021 showing no fistulous connection with the biliary system and a very small cavity.  The drain was downsized to a 10.2 [place].    Patient's wife states that she opened the bag to flush the drain today and the output was approximately 40 cc with some blood in it.  Wife states that patient was recently admitted to the hospital for pulmonary embolism.  Immediately preceding hospital admission, patient did have some blood-tinged output which was new.  He was recently started on Xarelto following that time.  This is a first-time following Xarelto that he has had bloody output out of the drain.  Patient's wife is concerned that the output is increased and has become bloody.    Patient's wife also states that he normally has a temperature in the low 97 range.  Patient had chills last night and vomited.  His temperature today is 99.1 which is \"high for him\".  Has not been given anything for treatment.  No significant abdominal pain.    Patient's wife was informed that if the bloody output increases or if patient develops a fever (100.2), that they are to report to the emergency department of their choice.  Patient may require a second sinogram if the bloody output significantly increases/becomes rosalba blood.  This is likely related to friable tissue in the collection bed.  Also if output significantly increases, may need a another sinogram to evaluate for possible development of fistulous connection after patient has vomited which would increase intra-abdominal pressure.  Patient can be given Tylenol as instructed by the hepatology team to relieve a fever (100.2) if needed.    Patient's wife acknowledged her understanding of the situation and was agreeable to this plan.    Stanley Benavidez, DO on 5/29/2021 at 7:53 AM    "

## 2021-05-29 NOTE — TELEPHONE ENCOUNTER
A week ago today he was diagnosed with a PE in his lung and started on Xarelto.  He had a drain check and they changed the drain on 5/27/21.  During the night he had chills and threw up. T 99.4  This morning he was 98.4 and now 99.1 orally. Temp right now is 100.4, he feels horse shit and tired, thirsty.  Care advice is to talk with PCP with in 4 hours. Call to on call , Dr Foster at 8:41 am. Second page at 8:54. Call back at 8:56, have patient go in to be seen in the ER.  Call back to the patient to let her know what the Dr said to do.  She will take him to the ED by their home as they are 2 hours away from the [place].    Patient's wife verbalized understanding and agrees with plan.    Jihan Rojo Nurse Triage Advisor 9:02 AM 5/29/2021    Reason for Disposition    [1] Fever > 100.0 F (37.8 C) AND [2] surgery in the last month    Additional Information    Negative: Shock suspected (e.g., cold/pale/clammy skin, too weak to stand, low BP, rapid pulse)    Negative: Difficult to awaken or acting confused (e.g., disoriented, slurred speech)    Negative: [1] Difficulty breathing AND [2] bluish lips, tongue or face    Negative: New onset rash with multiple purple (or blood-colored) spots or dots    Negative: Sounds like a life-threatening emergency to the triager    Negative: [1] Headache AND [2] stiff neck (can't touch chin to chest)    Negative: Difficulty breathing    Negative: IV drug abuse    Negative: [1] Drinking very little AND [2] dehydration suspected (e.g., no urine > 12 hours, very dry mouth, very lightheaded)    Negative: Patient sounds very sick or weak to the triager  (Exception: mild weakness and hasn't taken fever medicine)    Negative: Fever > 104 F (40 C)    Negative: [1] Fever > 101 F (38.3 C) AND [2] age > 60    Negative: [1] Fever > 100.0 F (37.8 C) AND [2] bedridden (e.g., nursing home patient, CVA, chronic illness, recovering from surgery)    Negative: [1] Fever > 100.0 F (37.8 C) AND  [2] indwelling urinary catheter (e.g., Em, Coude)    Negative: [1] Fever > 100.0 F (37.8 C) AND [2] has port (portacath), central line, or PICC line    Negative: [1] Fever > 100.0 F (37.8 C) AND [2] diabetes mellitus or weak immune system (e.g., HIV positive, cancer chemo, splenectomy, organ transplant, chronic steroids)    Protocols used: FEVER-A-AH

## 2021-06-01 NOTE — PROGRESS NOTES
"RN Care Coordination Note  Patient admitted to [facility]. Spoke with Dr Rodriguez, regarding patient. He reports patient presented to ED with increased SOB and possible fever. States had temp of 100.5, dose of Unicen given. He also report when thoracentesis was attempted \"it was hemorraghic.\" Dr Rodriguez feels patient would be better suited for transfer to [facility]. Provided him with Admission number to request transfer.     Placed call to patient's wife. Reviewed patient status and plan for Dr Rodriguez to request transfer. Patients wife will plan to hand carry imaging disc since [facility] is not able to push imaging to us. Answered all questions to her stated satisfaction.           Chey Arias RN, BSN, OCN   RN Care Coordinator   [facility]          "

## 2021-06-01 NOTE — PROGRESS NOTES
[facility]  Transfer Triage Note    Date of call: 06/01/21  Time of call: 3:13 PM    Is pandemic COVID-19 a concern? NO    Reason for transfer: Further diagnostic work up, management, and consultation for specialized care   Diagnosis:  Hemorrhagic left pleural effusion, Oncology patient    Outside Records: Not available  Additional records requested to be faxed to [phone].    Stability of Patient: Patient is vitally stable, with no critical labs, and will likely remain stable throughout the transfer process  ICU: No    Expected Time of Arrival for Transfer: greater than 24 hours    Arrival Location:  [address] [address] Phone: [phone]    Recommendations for Management and Stabilization: Given    Additional Comments :    [facility]  H/o Cholangio CA with Dr. Beard  S/p surgical intervention    Acutely developed shortness of breath, found to have PE, treated with Xarelto, discharged home.    Friday, 100.4F, more short of breath  + PNA suspected  Over the weekend, no further fevers  Hb stable 9 rage.  Over the weekend, expanding L pleural effusion--residual fluid from ascites or other process in the chest.    Frankly Hemorrhagic fluid LEFT THORA  500 ML    Because of this, felt tied to PE and issues of AC on Xarelto.    Facility does not have reversal agent    Small tiny pneumothorax s/p Thora procedure  90% on 2L to 96% on room air    Hemorrhagic nature of the pleural effusion, request for [facility] admission.  Spoke with Dr. Headley    Not clear if will require reversal but if still bleeding, will require PLT and or PCC or Plasma.  Pending assessment, may require IR/IP for Chest tube insertion.    Plan:  No current pt placement beds available.   P2P prior to transfer when a bed is available--tomorrow possibly.   Med Surg bed   Sending facility will try to push imaging to [facility] system.    Lakhwinder Crews MD    Received  updated P2P today    Since yesterday, underwent left thora c/b small PTX  Also with GNR in blood, prelim  Remains in med/surg bed at sending facility  Unfortunately still no beds available at [facility], ED has multiple in holding  Continue to manage at sending facility and sending provider to reach out if any clinical change, with ICU consult if needed.     Nickolas Mendez MD    Addendum: 6/3/21 P2P call  -Patient remains stable, however given GNR bacteremia from port, and his other issues as above, OSH team continues to request a transfer. They are aware that he might not have a bed for about 24 hours. They did not need any specialist support at this time    Addendum Danette 3, 2021  Discussed with the hospitalist, this afternoon patient biliary drain came out.  No signs of peritonitis at the moment, patient stays hemodynamically stable.Case was discussed with interventional radiology recommended transfer to the U when bed is available meanwhile continue treatment of gram-negative bacteremia.    Addendum June 4, 2021  ID specialist Dr. Elder called requesting a priority transfer for patient given the potential for complications in the setting of resistant Enterobacter bacteremia and biliary drain dysfunction. Agreed for transferring the patient as soon as bed is available in [facility]  Laron Gonzalez MD

## 2021-06-01 NOTE — PROGRESS NOTES
[facility]  Transfer Triage Note    Date of call: 06/01/21  Time of call: 3:13 PM    Is pandemic COVID-19 a concern? NO    Reason for transfer: Further diagnostic work up, management, and consultation for specialized care   Diagnosis:  Hemorrhagic left pleural effusion, Oncology patient    Outside Records: Not available  Additional records requested to be faxed to [phone].    Stability of Patient: Patient is vitally stable, with no critical labs, and will likely remain stable throughout the transfer process  ICU: No    Expected Time of Arrival for Transfer: greater than 24 hours    Arrival Location:  [address] [address] Phone: [phone]    Recommendations for Management and Stabilization: Given    Additional Comments :    [facility]  H/o [place]ol[place]io CA with Dr. Beard  S/p surgical intervention    Acutely developed shortness of breath, found to have PE, treated with Xarelto, discharged home.    Friday, 100.4F, more short of breath  + PNA suspected  Over the weekend, no further fevers  Hb stable 9 rage.  Over the weekend, expanding L pleural effusion--residual fluid from ascites or other process in the chest.    Frankly Hemorrhagic fluid LEFT THORA  500 ML    Because of this, felt tied to PE and issues of AC on Xarelto.    Facility does not have reversal agent    Small tiny pneumothorax s/p Thora procedure  90% on 2L to 96% on room air    Hemorrhagic nature of the pleural effusion, request for [facility] admission.  Spoke with Dr. Headley    Not clear if will require reversal but if still bleeding, will require PLT and or PCC or Plasma.  Pending assessment, may require IR/IP for Chest tube insertion.    Plan:  No current pt placement beds available.   P2P prior to transfer when a bed is available--tomorrow possibly.   Med Surg bed   Sending facility will try to push imaging to [facility] system.    Lakhwinder Crews MD

## 2021-06-03 NOTE — TELEPHONE ENCOUNTER
Received call from patient's wife that the patient's abdominal drain came out. Per wife, patient is admitted to an outside hospital about 2 hours away with fevers, possibly from port site. Patient on antibiotics. Drain placed on 5/27 in IR by Dr. Purcell. Per wife, drain was having output of approximately 10 ml/day. Patient is awaiting transfer to the . Once patient is transferred to the , IR can replace the drain nonemergently (possibly 6/4/2021 or as an outpatient). Patient's wife understood and agreed with plan.    Plan discussed with IR staff, Dr. Loo.      Britt Mcfadden MD  Radiology resident, PGY-3

## 2021-06-03 NOTE — TELEPHONE ENCOUNTER
"[facility] Triage Telephone Call    Called by patient's wife to discuss that patient has been admitted to local hospital and they are waiting for bed to be available at the [facility] for transfer.     He has infection in port; also \"fluid in left lung\"  \"PE in left lung\"  No change in pneumothorax since admission per wife report.      Per wife report MD at home hospital talked with on call oncologist over the weekend and per wife report they are unsure what to do now.     Wife relates that [facility] has alerted oncology team to see if they can get a bed.      Called admissions and they are currently in holding pattern due to hospital being full but patient is on the list.  Informed wife.     Mary Solis RN BSN, HNBC, STAR-T          "

## 2021-06-04 PROBLEM — R78.81 BACTEREMIA DUE TO OTHER BACTERIA: Status: ACTIVE | Noted: 2021-01-01

## 2021-06-04 PROBLEM — B96.89 BACTEREMIA DUE TO OTHER BACTERIA: Status: ACTIVE | Noted: 2021-01-01

## 2021-06-04 NOTE — PROGRESS NOTES
Fuentes Estrada is a 66 year old man who is being evaluated via a billable video visit.      Patient is hospitalized currently at [facility].    How would you like to obtain your AVS? MyChart  If the video visit is dropped, the invitation should be resent by: Text to cell phone: [phone]  Will anyone else be joining your video visit? No        SUBJECTIVE:      This was our planned follow-up visit after recent sinogram and biliary drain replacement that was done on 5/27/2021.  Dr. Purcell noted that the sinogram did not identify a clear biliary fistula, but since there was a small amount of contrast in the draining small bowel loop, a small biliary connection to the cavity was likely. They exchanged the existing 10 [place]ican drain for a 10.2 [place] Mc-Zaman drainage catheter with the plan to drain to gravity with flushes twice daily.    By Saturday 5/29/2021, Fuentes had low grade fever and was generally not feeling well.  They presented to their closest hospital ED in the [facility].  He is currently hospitalized, waiting for a bed to open up at [facility] for transfer.    I do not have all the details of the hospitalization, but I did speak briefly with his current hospitalist there who was in the room during my video visit.   He told me that the patient is bacteremic with Enterobacter cloacae that is highly resistant, and is currently receiving IV meropenem.  Blood cultures were off the patient's port, but he has not had any peripheral blood cultures to compare.  The biliary drain was accidentally pulled out yesterday 6/3/2021.   Fuentes is awake during my encounter, and the hospitalist told me that he is hemodynamically stable.         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 He had radical extrahepatic bile duct resection and R hepatectomy in May 2020. The presence of  "metastatic disease was identified by EUS biopsy in August 2020.      He developed an abscess and a biliary drain was placed, managed by IR. Sinograms identified a communication to the biliary tree. He failed a capping trial with development of a fever. He underwent sinograms and cavitary sclerotherapy every 2 weeks starting 8/25/2020. Drain was removed on 11/9/2020, but he presented just 11/18/2020 with cholangitis, fever 100.8 F, bilirubin 3.2, elevated alk phos.      12/11/2020 CT scan with findings concerning for an abscess at the operative site along the posterior aspect of the liver with gas seen in this fluid collection. He was started on ciprofloxacin and Flagyl. There was further discussion about management of this recurrent abscess with Dr. Purcell, Dr. Hale and Dr. Strauss. It was felt that aspiration was unlikely to prevent recurrence of the abscess. If a biliary drain was placed, it would likely be permanent.      12/21/20 visit with Dr. Beard, antibiotic course of cipro/Flagyl was extended and he finished 12/31/20.     2/2/2021 admission for nausea, vomiting and FTT. Fuentes underwent ERCP for possible internal drainage, but ERCP was not feasible due to \"edematous and tortuous biliary limb obviating passage.\" Wife tells me he was given more cipro/Flagyl at one point, and was taking it still at the time of biliary drain placement by IR, done on 2/15/2021. Cultures from drainage growing Strep anginosus. Penicillin was prescribed.     Sinogram 3/1/2021 with no fistula identified and 10mL of residual cavity. The drain was putting out just 5-10ccs per day. Plan was for repeat CT abdomen/pelvis and same day sinogram 3/25/2021.  Fuentes was admitted to the hospital just 2 days later 3/27/2021 - 4/6/2021. He presented with elevated bilirubin and jaundice a few days after the drain was capped. He was not febrile, but he did have some chills. Imaging did not reveal significant re-accumulation of fluid. Fuentes then " "developed 2 episodes of low grade fevers, rigors, and hypotension. Cultures were done off of the old drain directly, so this was difficult to interpret.  Enterobacter cloacae grew from these old drain cultures.  He received a course of Bactrim (to target the Enterobacter) and Augmentin to cover the prior Strep that grew in his abdominal cultures in addition to anaerobes empirically.  I did stop this antibiotic course 4/16/2021.    Fuentes had fever develop again within 24 hrs after the next sinogram 4/22.  Wife Robyn gave him a couple of doses of the antibiotics that they had leftover, and the fevers went away, so they left it alone.   When I saw them 5/4, I suggested to give a couple of days of Bactrim/Augmentin after the next sinogram 5/27/2021.  They did not remember to do this, and perhaps I was not clear enough with these recommendations.       I have reviewed and updated the patient's Past Medical History, Social History, Family History and Medication List.      OBJECTIVE:      Medications reviewed                   Allergies   Allergen Reactions     Metformin Other (See Comments)       \" I think my kidneys shut down\"         Examination via video visit:      GENERAL: Patient is awake but appears chronically ill. He does not talk to me directly during this visit.  HEENT: The video is too grainy for me to tell if the eyes appear icteric or not. EOM appear intact.   RESPIRATORY: I do not see any increased work of breathing and no cough was noted.   SKIN: no rashes are visible   PSYCH: Affect is dull.     The rest of a comprehensive physical examination is deferred due to the public health emergency video visit restrictions.      Labs and Imaging:     Current hospitalization records not available to me     Microbiology      2/15/2021 culture of drainage when biliary drain was placed.   Moderate growth   Streptococcus anginosus   This organism is susceptible to ampicillin, penicillin, vancomycin and the " cephalosporins.      4/3/2021 culture from drainage of existing (old) biliary drain      Susceptibility                     Enterobacter cloacae complex (1) Enterobacter cloacae complex (2)       MARBELLA MARBELLA       CEFEPIME <=1 ug/mL Sensitive <=1 ug/mL Sensitive       CEFTAZIDIME >=64 ug/mL Resistant 32 ug/mL Resistant       CEFTRIAXONE 8 ug/mL Resistant 2 ug/mL Intermediate       CIPROFLOXACIN >=4 ug/mL Resistant >=4 ug/mL Resistant       GENTAMICIN <=1 ug/mL Sensitive <=1 ug/mL Sensitive       LEVOFLOXACIN >=8 ug/mL Resistant >=8 ug/mL Resistant       MEROPENEM <=0.25 ug/mL Sensitive <=0.25 ug/mL Sensitive       Piperacillin/Tazo >=128 ug/mL Resistant >=128 ug/mL Resistant       TOBRAMYCIN <=1 ug/mL Sensitive <=1 ug/mL Sensitive       Trimethoprim/Sulfa <=1/19 ug/mL Sensitive <=1/19 ug/mL Sensitive       Per hospitalist, most recent blood cultures are also growing Enterobacter cloacae that is highly resistant, and they have him on meropenem.         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   His biliary drain has fallen out.   I feel strongly that the patient would be best served at [facility], where his specialists have full knowledge of his complex care needs and we can further address the cause of this bacteremia.    I spoke with Dr. Garvey, the hospitalist working on triage tonight for [facility] 6/4/2021.  Patient placement was also on the phone.  I was told that Mr. Estrada is high on the priority list for a bed at this time, and they agree that his transfer is necessary.   There is still no bed available, but he will hopefully have one soon.    Please consult my colleagues in Infectious Disease to  help manage this once Mr. Estrada is transferred to our facility.          I will gladly see Mr. Estrada in the outpatient setting for hospital discharge follow-up.            Video-Visit Details    Type of service:  Video Visit    Video Start Time: 2:52 PM  Video End Time: 3:14 PM    Originating Location (pt. Location): [facility]    Distant Location (provider location):  [facility] INFECTIOUS DISEASE CLINIC [place]     Platform used for Video Visit: Felipe Dale MD, MS  Infectious Disease    Time:  A total of 72 minutes was spent in care of the patient today.  22 minutes were spent on the video  30 minutes were spent on chart review and care coordination, including discussion with patient's current hospitalist and with our [facility] triage.  20 minutes were spent on documentation on the day of service.

## 2021-06-04 NOTE — LETTER
6/4/2021       RE: Fuentes Estrada  [address] [place] [address]     Dear Colleague,    Thank you for referring your patient, Fuentes Estrada, to the [facility] INFECTIOUS DISEASE CLINIC [place] [facility]. Please see a copy of my visit note below.      Fuentes Estrada is a 66 year old man who is being evaluated via a billable video visit.      Patient is hospitalized currently at [facility].    How would you like to obtain your AVS? MyChart  If the video visit is dropped, the invitation should be resent by: Text to cell phone: [phone]  Will anyone else be joining your video visit? No        SUBJECTIVE:      This was our planned follow-up visit after recent sinogram and biliary drain replacement that was done on 5/27/2021.  Dr. Purcell noted that the sinogram did not identify a clear biliary fistula, but since there was a small amount of contrast in the draining small bowel loop, a small biliary connection to the cavity was likely. They exchanged the existing 10 [place] drain for a 10.2 [place] Mc-Zaman drainage catheter with the plan to drain to gravity with flushes twice daily.    By Saturday 5/29/2021, Fuentes had low grade fever and was generally not feeling well.  They presented to their closest hospital ED in the [facility].  He is currently hospitalized, waiting for a bed to open up at [facility] for transfer.    I do not have all the details of the hospitalization, but I did speak briefly with his current hospitalist there who was in the room during my video visit.   He told me that the patient is bacteremic with Enterobacter cloacae that is highly resistant, and is currently receiving IV meropenem.  Blood cultures were off the patient's port, but he has not had any peripheral blood cultures to compare.  The biliary drain was accidentally pulled out yesterday 6/3/2021.   Fuentes is awake during my encounter, and the hospitalist  "told me that he is hemodynamically stable.         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 He had radical extrahepatic bile duct resection and R hepatectomy in May 2020. The presence of metastatic disease was identified by EUS biopsy in August 2020.      He developed an abscess and a biliary drain was placed, managed by IR. Sinograms identified a communication to the biliary tree. He failed a capping trial with development of a fever. He underwent sinograms and cavitary sclerotherapy every 2 weeks starting 8/25/2020. Drain was removed on 11/9/2020, but he presented just 11/18/2020 with cholangitis, fever 100.8 F, bilirubin 3.2, elevated alk phos.      12/11/2020 CT scan with findings concerning for an abscess at the operative site along the posterior aspect of the liver with gas seen in this fluid collection. He was started on ciprofloxacin and Flagyl. There was further discussion about management of this recurrent abscess with Dr. Purcell, Dr. Hale and Dr. Strauss. It was felt that aspiration was unlikely to prevent recurrence of the abscess. If a biliary drain was placed, it would likely be permanent.      12/21/20 visit with Dr. Beard, antibiotic course of cipro/Flagyl was extended and he finished 12/31/20.     2/2/2021 admission for nausea, vomiting and FTT. Fuentes underwent ERCP for possible internal drainage, but ERCP was not feasible due to \"edematous and tortuous biliary limb obviating passage.\" Wife tells me he was given more cipro/Flagyl at one point, and was taking it still at the time of biliary drain placement by IR, done on 2/15/2021. Cultures from drainage growing Strep anginosus. Penicillin was prescribed.     Sinogram 3/1/2021 with no fistula identified and 10mL of residual cavity. The drain was putting out just 5-10ccs per day. Plan was for repeat CT abdomen/pelvis and same day " "sinogram 3/25/2021.  Fuentes was admitted to the hospital just 2 days later 3/27/2021 - 4/6/2021. He presented with elevated bilirubin and jaundice a few days after the drain was capped. He was not febrile, but he did have some chills. Imaging did not reveal significant re-accumulation of fluid. Fuentes then developed 2 episodes of low grade fevers, rigors, and hypotension. Cultures were done off of the old drain directly, so this was difficult to interpret.  Enterobacter cloacae grew from these old drain cultures.  He received a course of Bactrim (to target the Enterobacter) and Augmentin to cover the prior Strep that grew in his abdominal cultures in addition to anaerobes empirically.  I did stop this antibiotic course 4/16/2021.    Fuentes had fever develop again within 24 hrs after the next sinogram 4/22.  Wife Robyn gave him a couple of doses of the antibiotics that they had leftover, and the fevers went away, so they left it alone.   When I saw them 5/4, I suggested to give a couple of days of Bactrim/Augmentin after the next sinogram 5/27/2021.  They did not remember to do this, and perhaps I was not clear enough with these recommendations.       I have reviewed and updated the patient's Past Medical History, Social History, Family History and Medication List.      OBJECTIVE:      Medications reviewed                   Allergies   Allergen Reactions     Metformin Other (See Comments)       \" I think my kidneys shut down\"         Examination via video visit:      GENERAL: Patient is awake but appears chronically ill. He does not talk to me directly during this visit.  HEENT: The video is too grainy for me to tell if the eyes appear icteric or not. EOM appear intact.   RESPIRATORY: I do not see any increased work of breathing and no cough was noted.   SKIN: no rashes are visible   PSYCH: Affect is dull.     The rest of a comprehensive physical examination is deferred due to the public health emergency video visit " restrictions.      Labs and Imaging:     Current hospitalization records not available to me     Microbiology      2/15/2021 culture of drainage when biliary drain was placed.   Moderate growth   Streptococcus anginosus   This organism is susceptible to ampicillin, penicillin, vancomycin and the cephalosporins.      4/3/2021 culture from drainage of existing (old) biliary drain      Susceptibility                     Enterobacter cloacae complex (1) Enterobacter cloacae complex (2)       MARBELLA MARBELLA       CEFEPIME <=1 ug/mL Sensitive <=1 ug/mL Sensitive       CEFTAZIDIME >=64 ug/mL Resistant 32 ug/mL Resistant       CEFTRIAXONE 8 ug/mL Resistant 2 ug/mL Intermediate       CIPROFLOXACIN >=4 ug/mL Resistant >=4 ug/mL Resistant       GENTAMICIN <=1 ug/mL Sensitive <=1 ug/mL Sensitive       LEVOFLOXACIN >=8 ug/mL Resistant >=8 ug/mL Resistant       MEROPENEM <=0.25 ug/mL Sensitive <=0.25 ug/mL Sensitive       Piperacillin/Tazo >=128 ug/mL Resistant >=128 ug/mL Resistant       TOBRAMYCIN <=1 ug/mL Sensitive <=1 ug/mL Sensitive       Trimethoprim/Sulfa <=1/19 ug/mL Sensitive <=1/19 ug/mL Sensitive       Per hospitalist, most recent blood cultures are also growing Enterobacter cloacae that is highly resistant, and they have him on meropenem.         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   His biliary drain has fallen out.   I feel strongly that the patient would be best served at [facility], where his specialists have full knowledge of his complex care needs and we can further address the cause of this bacteremia.    I spoke with Dr. Garvey, the hospitalist working on triage tonight for [facility] 6/4/2021.   Patient placement was also on the phone.  I was told that Mr. Estrada is high on the priority list for a bed at this time, and they agree that his transfer is necessary.   There is still no bed available, but he will hopefully have one soon.    Please consult my colleagues in Infectious Disease to help manage this once Mr. Estrada is transferred to our facility.          I will gladly see Mr. Estrada in the outpatient setting for hospital discharge follow-up.            Video-Visit Details    Type of service:  Video Visit    Video Start Time: 2:52 PM  Video End Time: 3:14 PM    Originating Location (pt. Location): [facility]    Distant Location (provider location):  [facility] INFECTIOUS DISEASE CLINIC [place]     Platform used for Video Visit: Felipe Dale MD, MS  Infectious Disease    Time:  A total of 72 minutes was spent in care of the patient today.  22 minutes were spent on the video  30 minutes were spent on chart review and care coordination, including discussion with patient's current hospitalist and with our [facility] triage.  20 minutes were spent on documentation on the day of service.

## 2021-06-05 NOTE — PROVIDER NOTIFICATION
Spoke with Dr. Green     0105: Pt phos 2.3, not on phos replacement protocol. MD placed order for phos replacement and order PO phos for AM. Writer clarified with MD, okay to wait until AM for phos replacement. Will continue to monitor.

## 2021-06-05 NOTE — H&P
[facility]    History and Physical - Hospitalist Service, Gold night       Date of Admission:  6/4/2021    Assessment & Plan   Fuentes Estrada is a 66 year old male admitted on 6/4/2021. He has hx of cholangiocarcinoma, recurrent biliary abscess and cholangitis s/p biliary drain, and recently diagnosed small PE on xarelto who underwent routine biliary drain exchange on 5/27 by IR, developed fever on 5/29 and admitted to [facility] found to have enterobacter bacteremia. His hospital course there was complicated by left large hemorrhagic pleural effusion s/p thora complicated by small apical pneumothorax and biliary drain fall out. He was transferred to [facility] for further multidisciplinary care.    Enterobacter bacteremia  History of recurrent biliary abscess and cholangitis s/p biliary drain that fell out on 6/3  He underwent routine sinogram and drain exchange by IR on 5/27. He was admitted to [facility] on 5/29 with low grade fever, and was found to have Enterobacter coloacae bacteremia. He had a virtual visit with [facility] ID Dr Dale on 6/4. The source was felt to be liver/biliary track. His biliary drain fell out on 6/3. Agreed with continuing Meropenem given the sensitivity. He has multiple prior history of cholangitis and since his biliary drain fell out on 6/3, he is at increased risk of decompensation.  - continue meropenem 1g q8h  - follow up blood culture today  - ID consult  - Consult IR for biliary drain replacement  - for now, continue to use port    Cholangiocarcinoma  Diagnosed in 2020 s/p radical extrahepatic bile duct resection and R hepatectomy in May 2020. The presence of metastatic disease was identified by EUS biopsy in August 2020. He developed an abscess and a biliary drain was placed, managed by IR. He has been having multiple cholangitis/abscess since then requiring series of antibiotics. Not currently on chemotherapy, followed by  Dr Beard.  - oncology consult    L hemorrhagic pleural effusion  Small apical pneumothorax s/p thoracentesis at OSH  He was noted to be more dyspneic with L large pleural effusion at OSH. He underwent thoracentesis at OSH on 6/1 and 500cc of hemorrhagic fluid was removed. Following the thora, he was noted to have a small apical pneumothorax. On transfer, he is breathing comfortably on RA.   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 He was started on xarelto. Of note, CT a/p on 4/22 showed new non-occlusive thrombus in the superior mesenteric vein, not extending to the portal vein. Per oncology note, there was no plan to treat SMV thrombosis given high risk of bleeding. On admission to OSH, he was on xarelto for PE, but this was discontinued after he was found to have hemorrhagic pleural effusion. There was a note indicating about possibly starting lovenox interim.  - hold AC for now  - as above, request radiology to overread OSH imaging  - discuss with oncology regarding AC plan    Anemia  Hb 8.8 at OSH on 6/4.   - continue to monitor H/H  - goal Hb >7    Hypertension  Takes aldactone 25mg, lasix 20mg daily and lisinopril 5mg daily  - continue    Malnutrition  Reported >20lbs weight loss in the setting of chronic illness  - Nutrition consult requested  - continue marionl    Diabetes  Currently not taking any medication. A1c 5.6 in 3/2021    History of CVA  He was on plavix, but not on this med on transfer.     Diet: Regular Diet Adult    DVT Prophylaxis: Pneumatic Compression Devices  Em Catheter: not present  Code Status: Full Code           Disposition Plan   Expected discharge: 4 - 7 days, recommended to prior living arrangement once bacteremia  treated and drain replaced.  Entered: Virginia Green MD 06/04/2021, 11:05 PM     The patient's care was discussed with the Bedside Nurse and Patient.    Virginia Green MD  [facility]  Contact information available via [facility] Paging/Directory  Please see sign in/sign out for up to date coverage information    ______________________________________________________________________    Chief Complaint   Transfer for further management of hemorrhagic pleural effusion, enterobacter bacteremia, drain replacement    History is obtained from the patient and OSH record    History of Present Illness   Fuentes Estrada is a 66 year old male who has history of cholangiocarcinoma s/p surgical resection with metastatic disease. He has been having recurrent issues with biliary abscess and cholangitis, and is managed with biliary drain. IR has been routinely exchanging the drain most recently on 5/27. He developed low grade fever and feeling unwell on 5/29, and was admitted to [facility]. He was found to have another enterobacter bacteremia. He was started on vancomycin and zosyn initially, but this was switched to meropenem based on sensitivity. During this admission there, on 6/3, his biliary drain fell out. He was also found to have L large pleural effusion and underwent thoracentesis on 6/1. Effusion was hemorrhagic and 500ml was removed. Of note, this was complicated by small apical pneumothorax.   Of note, per OSH record, he was diagnosed with small PE several weeks ago, and was started on xarelto. After he was found to have hemorrhagic effusion, his AC was stopped. It is unclear whether he was started on lovenox prior to transfer.    On transfer, he is very exhausted. Denied pain. Has been having BM. He has been having LE edema for sometime.    Review of Systems    The 10 point Review of Systems is negative other than noted in the HPI or here.     Past Medical  History    I have reviewed this patient's medical history and updated it with pertinent information if needed.   Past Medical History:   Diagnosis Date     Cerebrovascular accident (CVA) (H) 12/2010     Cholangiocarcinoma (H)      Diabetes (H)      Essential hypertension, benign     Hypertension, Benign     Nonspecific abnormal results of liver function study     Hx of elevated LFT's       Past Surgical History   I have reviewed this patient's surgical history and updated it with pertinent information if needed.  Past Surgical History:   Procedure Laterality Date     ENDOSCOPIC RETROGRADE CHOLANGIOPANCREATOGRAM N/A 3/31/2020    Procedure: ENDOSCOPIC RETROGRADE CHOLANGIOPANCREATOGRAPHY, WITH with biliary sphincterotomy, biopsies and brushings, biliary stent placement;  Surgeon: Win Strauss MD;  Location: [facility] OR     ENDOSCOPIC RETROGRADE CHOLANGIOPANCREATOGRAM N/A 4/6/2020    Procedure: ENDOSCOPIC RETROGRADE CHOLANGIOPANCREATOGRAPHY;  Surgeon: Win Strauss MD;  Location: [facility] OR     ENDOSCOPIC RETROGRADE CHOLANGIOPANCREATOGRAM WITH SPYGLASS  4/16/2020    Procedure: ENDOSCOPIC RETROGRADE CHOLANGIOPANCREATOGRAPHY, WITH DIRECT DUCT VISUALIZATION, USING PANCREATICOBILIARY FIBEROPTIC PROBE; Bile Duct Stent Exchange and Biopsy,balloon sweep of bile duct;  Surgeon: Win Strauss MD;  Location: [facility] OR     ENDOSCOPIC ULTRASOUND UPPER GASTROINTESTINAL TRACT (GI) N/A 4/16/2020    Procedure: ENDOSCOPIC ULTRASOUND, ESOPHAGOSCOPY / UPPER GASTROINTESTINAL TRACT (GI)with Fine Needle biopsy;  Surgeon: Cody Baumann MD;  Location: [facility] OR     ENDOSCOPIC ULTRASOUND UPPER GASTROINTESTINAL TRACT (GI) N/A 8/11/2020    Procedure: ENDOSCOPIC ULTRASOUND, ESOPHAGOSCOPY / UPPER GASTROINTESTINAL TRACT (GI);  Surgeon: Guru Bailey Rossi MD;  Location: [facility] GI     ENTEROSCOPY SMALL BOWEL N/A 2/2/2021    Procedure: Enteroscopy small bowel;  Surgeon: Chiki Swan MD;  Location: [facility] OR      ESOPHAGOSCOPY, GASTROSCOPY, DUODENOSCOPY (EGD), COMBINED N/A 4/6/2020    Procedure: ESOPHAGOGASTRODUODENOSCOPY (EGD);  Surgeon: Win Strauss MD;  Location: [facility] OR     ESOPHAGOSCOPY, GASTROSCOPY, DUODENOSCOPY (EGD), COMBINED N/A 8/11/2020    Procedure: Esophagogastroduodenoscopy, With Fine Needle Aspiration Biopsy, With Endoscopic Ultrasound Guidance;  Surgeon: Guru Bailey Rossi MD;  Location: [facility] GI     EXPLORE COMMON BILE DUCT N/A 5/12/2020    Procedure: extra-hepatic bile duct resection;  Surgeon: Oswaldo Hale MD;  Location: [facility] OR     HC KNEE SCOPE, DIAGNOSTIC  1995    Arthroscopy, Knee; left     HC VASECTOMY UNILAT/BILAT W POSTOP SEMEN      Vasectomy     HEPATECTOMY PARTIAL N/A 5/12/2020    Procedure: Exploratory Laparotomy, Open right hepatectomy, Radical Extra-Hepatic Bile Duct Resection, Portal Lymph Node Dissection, Intraoperative Ultrasound, Intra Air-Cholangiogram ,Bianca-En-Y Left Hepaticojejunostomy, Excision Skin Lesion;  Surgeon: Oswaldo Hale MD;  Location: [facility] OR     INSERT PORT VASCULAR ACCESS Right 9/28/2020    Procedure: ultrasound guided right internal venous access port placement with flouroscopy;  Surgeon: Fercho Wayne DO;  Location: [facility] OR     IR ABSCESS TUBE CHANGE  6/12/2020     IR FOLLOW UP VISIT OUTPATIENT  7/24/2020     IR FOLLOW UP VISIT OUTPATIENT  8/3/2020     IR FOLLOW UP VISIT OUTPATIENT  10/19/2020     IR PARACENTESIS  5/27/2021     IR SINOGRAM INJECTION DIAGNOSTIC  7/14/2020     IR SINOGRAM INJECTION DIAGNOSTIC  9/14/2020     IR SINOGRAM INJECTION DIAGNOSTIC  10/13/2020     IR SINOGRAM INJECTION DIAGNOSTIC  3/1/2021     IR SINOGRAM INJECTION DIAGNOSTIC  3/25/2021     IR SINOGRAM INJECTION DIAGNOSTIC  4/22/2021     IR SINOGRAM INJECTION DIAGNOSTIC  5/27/2021     IR SINOGRAM INJECTION THERAPEUTIC  8/25/2020     IR SINOGRAM INJECTION THERAPEUTIC  9/25/2020     IR SINOGRAM INJECTION THERAPEUTIC  10/2/2020     IR SINOGRAM INJECTION  THERAPEUTIC  9/18/2020     IR THORACENTESIS  5/16/2020     LYMPHADENECTOMY ABDOMINAL N/A 5/12/2020    Procedure: portal lymph node dissection, intraoperative liver ultrasound;  Surgeon: Oswaldo Hale MD;  Location:  OR       Social History   I have reviewed this patient's social history and updated it with pertinent information if needed.  Social History     Tobacco Use     Smoking status: Never Smoker     Smokeless tobacco: Never Used   Substance Use Topics     Alcohol use: Not Currently     Comment: occaisional      Drug use: No       Family History   I have reviewed this patient's family history and updated it with pertinent information if needed.  Family History   Problem Relation Age of Onset     Arthritis Mother         RA     Diabetes Father         diet controlled     Hypertension Father        Prior to Admission Medications   Prior to Admission Medications   Prescriptions Last Dose Informant Patient Reported? Taking?   Ferrous Sulfate (IRON) 325 (65 Fe) MG tablet  Spouse/Significant Other No No   Sig: Take 1 tablet by mouth 3 times daily (with meals)   LORazepam (ATIVAN) 0.5 MG tablet  Spouse/Significant Other No No   Sig: Take 1 tablet (0.5 mg) by mouth every 4 hours as needed (Anxiety, Nausea/Vomiting or Sleep)   MEROPENEM IV   Yes No   Sig: Inject into the vein every 8 hours   atorvastatin (LIPITOR) 40 MG tablet  Spouse/Significant Other Yes No   Sig: Take 40 mg by mouth At Bedtime    dronabinol (MARINOL) 5 MG capsule  Spouse/Significant Other No No   Sig: Take 2 capsules in am and 1 capsule in pm (before meals)   fenofibrate (TRIGLIDE/LOFIBRA) 160 MG tablet  Spouse/Significant Other Yes No   Sig: Take 160 mg by mouth At Bedtime    furosemide (LASIX) 20 MG tablet  Spouse/Significant Other Yes No   Sig: Take 1 tablet (20 mg) by mouth daily   lactobacillus rhamnosus, GG, (CULTURELL) capsule  Spouse/Significant Other Yes No   Sig: Take 1 capsule by mouth daily   lisinopril (ZESTRIL) 5 MG tablet   "Spouse/Significant Other Yes No   Sig: Take 5 mg by mouth every evening    magnesium gluconate 30 MG tablet  Spouse/Significant Other No No   Sig: Take 1 tablet (30 mg) by mouth daily   Patient taking differently: Take 30 mg by mouth 2 times daily    mirtazapine (REMERON) 7.5 MG tablet  Spouse/Significant Other Yes No   Sig: take 1 tablet by mouth every evening   ondansetron (ZOFRAN) 8 MG tablet Unknown at Unknown time Spouse/Significant Other No No   Sig: Take 1 tablet (8 mg) by mouth every 8 hours as needed (Nausea/Vomiting)   oxyCODONE (ROXICODONE) 5 MG tablet  Spouse/Significant Other No No   Sig: Take 1-2 tablets (5-10 mg) by mouth every 4 hours as needed for moderate to severe pain   Patient not taking: Reported on 6/4/2021   prochlorperazine (COMPAZINE) 10 MG tablet  Spouse/Significant Other No No   Sig: Take 0.5 tablets (5 mg) by mouth every 6 hours as needed (Nausea/Vomiting)   Patient not taking: Reported on 6/4/2021   rivaroxaban ANTICOAGULANT (XARELTO) 15 MG TABS tablet   Yes No   Sig: Take 15 mg by mouth 2 times daily   sildenafil (REVATIO) 20 MG tablet  Spouse/Significant Other Yes No   Sig: Take 20 mg by mouth as needed   sodium chloride, PF, 0.9% PF flush  Spouse/Significant Other No No   Sig: 10 mLs by Intracatheter route every 12 hours   tadalafil (CIALIS) 10 MG tablet  Spouse/Significant Other Yes No   Sig: Take 10 mg by mouth daily as needed   vitamin D3 (CHOLECALCIFEROL) 250 mcg (85108 units) capsule  Spouse/Significant Other Yes No   Sig: Take 1 capsule by mouth daily   zolpidem (AMBIEN) 10 MG tablet 6/3/2021 at 2118  No No   Sig: Take 1 tablet (10 mg) by mouth nightly as needed for sleep      Facility-Administered Medications: None     Allergies   Allergies   Allergen Reactions     Metformin Other (See Comments)     \" I think my kidneys shut down\"       Physical Exam   Vital Signs: Temp: 97.8  F (36.6  C) Temp src: Oral BP: (!) 141/94 Pulse: 65   Resp: 22 SpO2: 94 % O2 Device: None (Room air)  "   Weight: 172 lbs 4.8 oz    General Appearance: awake, appears cachectic, exhausted  Eyes: non icteric  HEENT: MMM  Respiratory: distant breath sound bilaterally  Cardiovascular: RRR  GI: distended, 5cm abdominal wall hernia present, easily reducible. Tympanic, soft non tender  Skin: LE edema 2+ bilaterally  Musculoskeletal: no joint swelling  Neurologic: non focal  Psychiatric: stable mood    Data   Data reviewed today: I reviewed all medications, new labs and imaging results over the last 24 hours.    No lab results found in last 7 days.    No results found for this or any previous visit (from the past 24 hour(s)).

## 2021-06-05 NOTE — PLAN OF CARE
Neuro: Pt remains alert & orientated .   Cardiac: VSS. Cardioscope NSR without ectopy .             Respiratory: No acute distress noted pt on room air and O2 sat wnl.   GI/: pt voiding freely    Diet/appetite: regular diet no c/o nausea. Appetite poor and dietary consult done.   Activity: assitance x1 with walker   Pain:denies    Skin: No new deficits noted.  puncture   site dressing, CDI.   LDA's: port infusing TKO between abx. PIV SL.   Labs:  no additional labs Plan: Continue with POC. Notify primary team with changes.

## 2021-06-05 NOTE — CONSULTS
Interventional Radiology Consult Note    Follow-up note from initial consult note earlier today.    Patient is status post CT chest abdomen pelvis.  There has been some reaccumulation of the perihepatic fluid collection at the free liver margin, area of previous drain placement.  This appears to be multiloculated measures approximately 2.8 x 5.1 cm in axial dimension.  Again, access to this would likely have to be intercostal.  These drains are typically painful for the patient and brings increased risk of empyema with the potential transpleural approach.  Conversation with infectious disease should occur to weigh the risks and benefits of replacing the drain.  Patient appears to be clinically stable at this time managed with antibiotics.    This is a nonurgent or emergent situation from our standpoint.  If drain is requested to be replaced, this could be completed sometime next week. Interventional radiology will follow up with primary team on Monday morning.    Stanley Benavidez DO on 6/5/2021 at 3:05 PM

## 2021-06-05 NOTE — CONSULTS
[facility]   Hematology/Oncology Consult Note    Fuentes Estrada MRN# [id number]   Age: 66 year old YOB: 1955          Reason for Consult:   Cholangiocarcinoma         Assessment and Plan:   Fuentes Estrada is a 66 year old male with a history of metastatic cholangiocarcinoma, oncology history outlined below, not been on any therapy since 12/2020 (last gemcitabine/abraxane)  due to recurrent issues with biliary fluid collection/abscesses who is currently admitted for recurrent infection with Enterobacter  cloacae bacteremia, being treated with meropenem as well as need for replacement of right upper quadrant perihepatic drain, which fell out 2 days ago.  He has a history of a small pulmonary embolism (recently diagnosed in 5/20/2021) as well as a prior noted SMV thrombus (had not been on anticoagulation as he was thought to be too high risk for bleeding).  He was recently started on Xarelto for the PE but then developed a hemorrhagic pleural effusion.  At this time we feel that the risks of anticoagulation outweigh the benefits, especially since he is hemodynamically stable and will also require procedures possibly in the upcoming days.      Problem list:   #Metastatic cholangiocarcinoma, not currently on cancer directed therapy  #Recurrent right upper quadrant/biliary fluid collections, abscesses  #Enterobacter cloacae bacteremia  #Small PE  #history of SMV thrombosis      Summary of Recommendations:    Appreciate IR involvement for replacement of the right upper quadrant perihepatic drain    Do not recommend resumption of anticoagulation as current risks outweigh benefits    Appreciate infectious diseases involvement given his complex ID history and current Enterobacter cloacae bacteremia    Would recommend CT chest abdomen pelvis as he is due for restaging scans this month anyway    Will need PT/OT     We will notify Dr. Beard of his admission    Thank  you for involving us in the care of this patient. We will continue to follow during the hospitalization.    Patient was seen and plan of care developed with Dr. Molina           History of Present Illness:   History obtained from chart review and confirmed with patient.    Fuentes Estrada is a 66 year old male with a past medical history of metastatic cholangiocarcinoma, oncology history outlined below, who has been off any chemotherapy since December 2020 and who has recurrent issues with biliary infection/abscesses requiring recurrent procedures and drainage.  He recently underwent a biliary drain exchange on 5/27/2021.  He developed a fever 2 days later and was admitted to the hospital in [facility].  He was found to have blood cultures positive for Enterobacter cloacae (which he has grown out multiple times) and he is currently on meropenem.  He was also started on Xarelto for a small pulmonary embolism.  He then developed a hemorrhagic pleural effusion requiring drainage and anticoagulation is since on hold.  At time of interview he appears ill and tired.  He repeatedly asks when he can get home to which we replied we have many issues to take care of, including replacement of the drain that fell out 2 days ago, control of the infection, etc.    Oncology HPI:   Fuentes Estrada is a 65 year old man with a prior hx of HTN, DM, CVA on plavix who presented with elevated LFTs in the spring of 2020. 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 On May 12, 2020 he underwent  A radical extrahepatic bile duct resection and R hepatectomy.   He had all of his disease resected with negative margins and one positive lymph node.  He had a complicated postoperative course with a bile leak and abscess that had delayed starting his adjuvant chemotherapy.       On imaging in July 2020,  he appeared to be developing increasing nodularity in the resection bed and regional lymph nodes that has led to an EUS with a fine-needle  aspiration of the lymph nodes on 8/11/20,  demonstrating the presence of metastatic disease.      On 8/27/20, he initiated treatment with palliative intent Cisplatin/Gemcitabine.      His abscess/biliary drain was managed by IR. Sinograms identified a communication to the biliary tree. He failed a capping trial with development of a fever. He underwent sinograms and cavitary sclerotherapy every 2 weeks starting August 25, 2020.  Drain was removed on 11/9/2020 9/28/20: port placement     Admission to [facility] 10/9/20-10/11/20 with septic shock from infectious enterocolitis, all cultures NGTD. Chemo delayed one week for recovery.  Cis/Grimes restarted 10/21.     Admission to [facility] 11/18/20-11/19/20 with cholangitis - fever 100.8, bilirubin 3.2, elevated alk phos. MRCP showed no stones or strictures. Paracentesis removed 1L of fluid, no signs of SBP. He was dismissed on a 7-day course of Flagyl and ciprofloxacin.  C5D8 was delayed due to hospitalization.     He was seen on 12/14/2020 with an interim CT scan which showed findings concerning for abscess at the operative site along the posterior aspect of liver with gas seen in this fluid collection.  Patient was also having chills, chemotherapy was held and he was started on ciprofloxacin and Flagyl.  There was further discussion about management of this recurrent abscess with Dr. Purcell, Dr. Hale and Dr. Strauss about the abscess.  It was felt that aspiration is not likely to prevent recurrence of the abscess. A biliary drain is possible, but likely to be permanent. It has been concluded an endoscopic procedure is not possible. At 12/21/20 visit with Dr. Beard, antibiotic course was extended and he finished 12/31/20. Disease on 12/11/20 was stable.      Admission from 3/27/21 to 4/6/21: presented with biliary obstruction after capping drain. MRCP was equivocal. LFTS improved with uncappnig the drain.  Developed fevers/chills on 3/31 , started on broad  spectrum antibioitics. Hepatic drain with fluid growing gram negative rods          Review of Systems:   A comprehensive ROS was performed with the patient and was found to be negative with the exception of that noted in the HPI above.       Past Medical History:     Past Medical History:   Diagnosis Date     Cerebrovascular accident (CVA) (H) 12/2010     Cholangiocarcinoma (H)      Diabetes (H)      Essential hypertension, benign     Hypertension, Benign     Nonspecific abnormal results of liver function study     Hx of elevated LFT's            Past Surgical History:     Past Surgical History:   Procedure Laterality Date     ENDOSCOPIC RETROGRADE CHOLANGIOPANCREATOGRAM N/A 3/31/2020    Procedure: ENDOSCOPIC RETROGRADE CHOLANGIOPANCREATOGRAPHY, WITH with biliary sphincterotomy, biopsies and brushings, biliary stent placement;  Surgeon: Win Strauss MD;  Location: [facility] OR     ENDOSCOPIC RETROGRADE CHOLANGIOPANCREATOGRAM N/A 4/6/2020    Procedure: ENDOSCOPIC RETROGRADE CHOLANGIOPANCREATOGRAPHY;  Surgeon: Win Strauss MD;  Location: [facility] OR     ENDOSCOPIC RETROGRADE CHOLANGIOPANCREATOGRAM WITH SPYGLASS  4/16/2020    Procedure: ENDOSCOPIC RETROGRADE CHOLANGIOPANCREATOGRAPHY, WITH DIRECT DUCT VISUALIZATION, USING PANCREATICOBILIARY FIBEROPTIC PROBE; Bile Duct Stent Exchange and Biopsy,balloon sweep of bile duct;  Surgeon: Win Strauss MD;  Location: [facility] OR     ENDOSCOPIC ULTRASOUND UPPER GASTROINTESTINAL TRACT (GI) N/A 4/16/2020    Procedure: ENDOSCOPIC ULTRASOUND, ESOPHAGOSCOPY / UPPER GASTROINTESTINAL TRACT (GI)with Fine Needle biopsy;  Surgeon: Cody Baumann MD;  Location: [facility] OR     ENDOSCOPIC ULTRASOUND UPPER GASTROINTESTINAL TRACT (GI) N/A 8/11/2020    Procedure: ENDOSCOPIC ULTRASOUND, ESOPHAGOSCOPY / UPPER GASTROINTESTINAL TRACT (GI);  Surgeon: Guru Bailey Rossi MD;  Location: [facility] GI     ENTEROSCOPY SMALL BOWEL N/A 2/2/2021    Procedure: Enteroscopy small bowel;   Surgeon: Chiki Swan MD;  Location: [facility] OR     ESOPHAGOSCOPY, GASTROSCOPY, DUODENOSCOPY (EGD), COMBINED N/A 4/6/2020    Procedure: ESOPHAGOGASTRODUODENOSCOPY (EGD);  Surgeon: Win Strauss MD;  Location: [facility] OR     ESOPHAGOSCOPY, GASTROSCOPY, DUODENOSCOPY (EGD), COMBINED N/A 8/11/2020    Procedure: Esophagogastroduodenoscopy, With Fine Needle Aspiration Biopsy, With Endoscopic Ultrasound Guidance;  Surgeon: Guru Bailey Rossi MD;  Location: [facility] GI     EXPLORE COMMON BILE DUCT N/A 5/12/2020    Procedure: extra-hepatic bile duct resection;  Surgeon: Oswaldo Hale MD;  Location: [facility] OR     HC KNEE SCOPE, DIAGNOSTIC  1995    Arthroscopy, Knee; left     HC VASECTOMY UNILAT/BILAT W POSTOP SEMEN      Vasectomy     HEPATECTOMY PARTIAL N/A 5/12/2020    Procedure: Exploratory Laparotomy, Open right hepatectomy, Radical Extra-Hepatic Bile Duct Resection, Portal Lymph Node Dissection, Intraoperative Ultrasound, Intra Air-Cholangiogram ,Bianca-En-Y Left Hepaticojejunostomy, Excision Skin Lesion;  Surgeon: Oswaldo Hale MD;  Location: [facility] OR     INSERT PORT VASCULAR ACCESS Right 9/28/2020    Procedure: ultrasound guided right internal venous access port placement with flouroscopy;  Surgeon: Fercho Wayne DO;  Location: [facility] OR     IR ABSCESS TUBE CHANGE  6/12/2020     IR FOLLOW UP VISIT OUTPATIENT  7/24/2020     IR FOLLOW UP VISIT OUTPATIENT  8/3/2020     IR FOLLOW UP VISIT OUTPATIENT  10/19/2020     IR PARACENTESIS  5/27/2021     IR SINOGRAM INJECTION DIAGNOSTIC  7/14/2020     IR SINOGRAM INJECTION DIAGNOSTIC  9/14/2020     IR SINOGRAM INJECTION DIAGNOSTIC  10/13/2020     IR SINOGRAM INJECTION DIAGNOSTIC  3/1/2021     IR SINOGRAM INJECTION DIAGNOSTIC  3/25/2021     IR SINOGRAM INJECTION DIAGNOSTIC  4/22/2021     IR SINOGRAM INJECTION DIAGNOSTIC  5/27/2021     IR SINOGRAM INJECTION THERAPEUTIC  8/25/2020     IR SINOGRAM INJECTION THERAPEUTIC  9/25/2020     IR SINOGRAM INJECTION  THERAPEUTIC  10/2/2020     IR SINOGRAM INJECTION THERAPEUTIC  9/18/2020     IR THORACENTESIS  5/16/2020     LYMPHADENECTOMY ABDOMINAL N/A 5/12/2020    Procedure: portal lymph node dissection, intraoperative liver ultrasound;  Surgeon: Oswaldo Hale MD;  Location:  OR            Social History:     Social History     Socioeconomic History     Marital status:      Spouse name: Robyn     Number of children: 2     Years of education: 14     Highest education level: Not on file   Occupational History     Occupation: self employed   Social Needs     Financial resource strain: Not on file     Food insecurity     Worry: Not on file     Inability: Not on file     Transportation needs     Medical: Not on file     Non-medical: Not on file   Tobacco Use     Smoking status: Never Smoker     Smokeless tobacco: Never Used   Substance and Sexual Activity     Alcohol use: Not Currently     Comment: occaisional      Drug use: No     Sexual activity: Yes     Partners: Female   Lifestyle     Physical activity     Days per week: Not on file     Minutes per session: Not on file     Stress: Not on file   Relationships     Social connections     Talks on phone: Not on file     Gets together: Not on file     Attends Christianity service: Not on file     Active member of club or organization: Not on file     Attends meetings of clubs or organizations: Not on file     Relationship status: Not on file     Intimate partner violence     Fear of current or ex partner: Not on file     Emotionally abused: Not on file     Physically abused: Not on file     Forced sexual activity: Not on file   Other Topics Concern      Service No     Blood Transfusions No     Caffeine Concern No     Occupational Exposure No     Hobby Hazards No     Sleep Concern No     Stress Concern No     Weight Concern No     Special Diet No     Back Care No     Exercise Yes     Bike Helmet No     Seat Belt Yes     Self-Exams No     Parent/sibling w/ CABG, MI  "or angioplasty before 65F 55M? Not Asked   Social History Narrative     Not on file            Family History:     Family History   Problem Relation Age of Onset     Arthritis Mother         RA     Diabetes Father         diet controlled     Hypertension Father             Allergies:     Allergies   Allergen Reactions     Metformin Other (See Comments)     \" I think my kidneys shut down\"            Medications:     Medications Prior to Admission   Medication Sig Dispense Refill Last Dose     atorvastatin (LIPITOR) 40 MG tablet Take 40 mg by mouth At Bedtime         dronabinol (MARINOL) 5 MG capsule Take 2 capsules in am and 1 capsule in pm (before meals) 90 capsule 1      fenofibrate (TRIGLIDE/LOFIBRA) 160 MG tablet Take 160 mg by mouth At Bedtime         Ferrous Sulfate (IRON) 325 (65 Fe) MG tablet Take 1 tablet by mouth 3 times daily (with meals) 180 tablet 1      furosemide (LASIX) 20 MG tablet Take 1 tablet (20 mg) by mouth daily        lactobacillus rhamnosus, GG, (CULTURELL) capsule Take 1 capsule by mouth daily        lisinopril (ZESTRIL) 5 MG tablet Take 5 mg by mouth every evening         LORazepam (ATIVAN) 0.5 MG tablet Take 1 tablet (0.5 mg) by mouth every 4 hours as needed (Anxiety, Nausea/Vomiting or Sleep) 30 tablet 5      magnesium gluconate 30 MG tablet Take 1 tablet (30 mg) by mouth daily (Patient taking differently: Take 30 mg by mouth 2 times daily ) 90 tablet 3      MEROPENEM IV Inject into the vein every 8 hours        mirtazapine (REMERON) 7.5 MG tablet take 1 tablet by mouth every evening        ondansetron (ZOFRAN) 8 MG tablet Take 1 tablet (8 mg) by mouth every 8 hours as needed (Nausea/Vomiting) 30 tablet 5 Unknown at Unknown time     oxyCODONE (ROXICODONE) 5 MG tablet Take 1-2 tablets (5-10 mg) by mouth every 4 hours as needed for moderate to severe pain (Patient not taking: Reported on 6/4/2021) 20 tablet 0      prochlorperazine (COMPAZINE) 10 MG tablet Take 0.5 tablets (5 mg) by mouth " "every 6 hours as needed (Nausea/Vomiting) (Patient not taking: Reported on 6/4/2021) 30 tablet 5      rivaroxaban ANTICOAGULANT (XARELTO) 15 MG TABS tablet Take 15 mg by mouth 2 times daily        sildenafil (REVATIO) 20 MG tablet Take 20 mg by mouth as needed        sodium chloride, PF, 0.9% PF flush 10 mLs by Intracatheter route every 12 hours 420 mL 1      tadalafil (CIALIS) 10 MG tablet Take 10 mg by mouth daily as needed        vitamin D3 (CHOLECALCIFEROL) 250 mcg (53292 units) capsule Take 1 capsule by mouth daily        zolpidem (AMBIEN) 10 MG tablet Take 1 tablet (10 mg) by mouth nightly as needed for sleep 30 tablet 0 6/3/2021 at 2118            Physical Exam:   /87 (BP Location: Right arm)   Pulse 72   Temp 98.2  F (36.8  C) (Oral)   Resp 16   Ht 1.854 m (6' 1\")   Wt 78.1 kg (172 lb 1.6 oz)   SpO2 95%   BMI 22.71 kg/m    Vitals:    06/04/21 2114 06/05/21 0431   Weight: 78.2 kg (172 lb 4.8 oz) 78.1 kg (172 lb 1.6 oz)     General: Appears ill,  Lying in bed, in no acute distress. Temporal wasting noted.   Heme/Lymph: No overt bleeding.  Skin: No concerning lesions,  on exposed surfaces.  HEENT: NCAT. EOMI, anicteric sclera.   Respiratory: Non-labored breathing  Cardiovascular: Regular rate and rhythm.   Extremities: No extremity edema.   Neurologic: A&O x 3,         Data:   I have personally reviewed the following labs/imaging:  CBC  Recent Labs   Lab 06/05/21 0528 06/04/21  2337   WBC 3.5* 3.3*   RBC 3.06* 2.85*   HGB 9.5* 8.9*   HCT 28.4* 26.7*   MCV 93 94   MCH 31.0 31.2   MCHC 33.5 33.3   RDW 18.9* 19.2*   * 109*     CMP  Recent Labs   Lab 06/05/21  0528 06/05/21  0111 06/04/21  2337     --  137   POTASSIUM 3.4  --  3.5   CHLORIDE 105  --  105   CO2 27  --  29   ANIONGAP 5  --  2*   GLC 90  --  142*   BUN 17  --  18   CR 0.85  --  0.93   GFRESTIMATED 89  --  84   GFRESTBLACK >90  --  >90   ERIC 7.8*  --  7.9*   MAG 1.8  --  1.8   PHOS  --  2.2* 2.3*   PROTTOTAL  --   --  6.4* "   ALBUMIN  --   --  1.0*   BILITOTAL  --   --  3.0*   ALKPHOS  --   --  592*   AST  --   --  113*   ALT  --   --  43     INR  Recent Labs   Lab 06/04/21  2337   INR 1.79*

## 2021-06-05 NOTE — PROGRESS NOTES
[facility]    Medicine Progress Note - Hospitalist Service, Gold 11       Date of Admission:  6/4/2021  Assessment & Plan       Fuentes Estrada is a 66 year old male admitted on 6/4/2021. He has hx of cholangiocarcinoma, recurrent biliary abscess and cholangitis s/p biliary drain, and recently diagnosed small PE on xarelto who underwent routine biliary drain exchange on 5/27 by IR, developed fever on 5/29 and admitted to [facility] found to have enterobacter bacteremia. His hospital course there was complicated by left large hemorrhagic pleural effusion s/p thora complicated by small apical pneumothorax and biliary drain fall out. He was transferred to [facility] for further multidisciplinary care.     Changes today:  - discussed with IR/onc  - CT CAP with contrast for malignancy monitoring and to eval previous collections  - with poor PO - calorie counts and nutrition consult  - PT/OT    Enterobacter bacteremia  History of recurrent biliary abscess and cholangitis s/p biliary drain that fell out on 6/3  He underwent routine sinogram and drain exchange by IR on 5/27. He was admitted to [facility] on 5/29 with low grade fever, and was found to have Enterobacter coloacae bacteremia. He had a virtual visit with [facility] ID Dr Dale on 6/4. The source was felt to be liver/biliary track. His biliary drain fell out on 6/3. Agreed with continuing Meropenem given the sensitivity. He has multiple prior history of cholangitis and since his biliary drain fell out on 6/3, he is at increased risk of decompensation.  - continue meropenem 1g q8h  - follow up blood culture   - ID consult  - Consult I     Cholangiocarcinoma  Diagnosed in 2020 s/p radical extrahepatic bile duct resection and R hepatectomy in May 2020. The presence of metastatic disease was identified by EUS biopsy in August 2020. He developed an abscess and a biliary drain was placed, managed by IR. He has  been having multiple cholangitis/abscess since then requiring series of antibiotics. Not currently on chemotherapy, followed by Dr Beard.  - oncology consult     L hemorrhagic pleural effusion  Small apical pneumothorax s/p thoracentesis at OSH  He was noted to be more dyspneic with L large pleural effusion at OSH. He underwent thoracentesis at OSH on 6/1 and 500cc of hemorrhagic fluid was removed. Following the thora, he was noted to have a small apical pneumothorax.   - appears stable at this time, follow clinically     Recently diagnosed small PE  Non-occlusive thrombus in portal vein, not on AC for this  Per OSH record, he had a CT chest on 5/22 that showed acute nonocclusive PE in the L lower lobe segmental arteries. He was started on xarelto. Of note, CT a/p on 4/22 showed new non-occlusive thrombus in the superior mesenteric vein, not extending to the portal vein. Per oncology note, there was no plan to treat SMV thrombosis given high risk of bleeding. On admission to OSH, he was on xarelto for PE, but this was discontinued after he was found to have hemorrhagic pleural effusion. There was a note indicating about possibly starting lovenox interim.  - hold AC for now per oncology     Anemia  Hb 8.8 at OSH on 6/4.   - continue to monitor H/H  - goal Hb >7     Hypertension  Takes aldactone 25mg, lasix 20mg daily and lisinopril 5mg daily  - continue     Malnutrition  Reported >20lbs weight loss in the setting of chronic illness  - Nutrition consult   - continue marionl  - calorie counts     Diabetes  Currently not taking any medication. A1c 5.6 in 3/2021     History of CVA  He was on plavix previously, will clarify. Regardless, hold for now in setting of recent bleed and pending procedure.     Diet: Regular Diet Adult  Calorie Counts    DVT Prophylaxis: Anti-embolisim stockings (TEDs)  Em Catheter: not present  Code Status: Full Code           Disposition Plan   Expected discharge: 4 - 7 days, recommended to  prior living arrangement once antibiotic plan established.  Entered: Adam Singh MD 06/05/2021, 2:08 PM       The patient's care was discussed with the Bedside Nurse, Patient and onc/IR Consultant.    Adam Singh MD  Hospitalist Service, [address]  Contact information available via [facility] Paging/Directory  Please see sign in/sign out for up to date coverage information  ______________________________________________________________________    Interval History   Stable since admission late last night. Notes ongoing abdominal pain, fatigue.  No cough. Nausea. Poor appetite.  No diarrhea.  Wife at bedside.    Data reviewed today: I reviewed all medications, new labs and imaging results over the last 24 hours. I personally reviewed no images or EKG's today.    Physical Exam   Vital Signs: Temp: 98.2  F (36.8  C) Temp src: Oral BP: 136/87 Pulse: 72   Resp: 16 SpO2: 95 % O2 Device: None (Room air)    Weight: 172 lbs 1.6 oz  General Appearance: awake, appears cachectic, exhausted  Eyes: non icteric  HEENT: MMM  Respiratory: distant breath sound bilaterally  Cardiovascular: RRR  GI: distended, Tympanic, soft non tender  Skin: LE edema 2+ bilaterally  Musculoskeletal: no joint swelling  Neurologic: non focal  Psychiatric: stable mood    Data

## 2021-06-05 NOTE — CONSULTS
[facility] CONSULT[facility]     Patient:  Fuentes Estrada   YOB: 1955  Date of Visit:  06/05/2021  Date of Admission: 6/4/2021  Consult Requester:Adam Singh,*          Assessment and Recommendations:   ID Problem List:  1. Enterobacter bacteremia likely 2/2 biliary source  2. Recurrent biliary abscess s/p drain with potential small biliary connection to existing cavity noted on 5/27 sinogram. Drain fell out 6/3 at OSH.  3. 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 Abdominal ascites s/p paracentesis 5/27 with 4L removed  5. Suspected acute on chronic severe malnutrition, albumin 1.0  6. DM2    Recommendations:  1. Collect 2 sets of BC (peripheral and Port) today and daily for now - ordered for you  2. Continue meropenem 1g q8h  3. Follow CT AP  4. Agree with IR evaluation for possible drain replacement / sinogram  5. Agree with Dietitian evaluation for nutritional support to promote healing.    Discussion:  Fuentes Estrada is a 66 year old male with PMH of HTN, DM2, CVA, PE (Xarelto), and hilar cholangiocarcinoma s/p radical extrahepatic bile duct resection and R hepatectomy (5/2020) with subsequent metastatic disease (8/2020) s/p palliative gem/cis (off since 12/9/20). Course has been complicated by recurrent biliary abscess s/p drain and cholangitis. He transferred from [facility] to [facility] on 6/4 due to findings of E cloacae bacteremia for additional management.     Source is likely biliary given known history with recurrent abscess and recent drain exchange. Drain fell out while at [facility], and although it chronically has low output, he has a history of reaccumlation / clinical worsening with drain capping. IR has been consulted to evaluation for drain placement. If drain is replaced, a repeat sinogram  might be helpful to clarify possible contrast seen in small bowel on last exam. He will also have CT AP w/ contrast today, which may also help to clarify fluid collection status. If there is in fact a small biliary connection as noted on last sinogram source control will continue to be an issue until connection can heal or otherwise be closed. Nutrition status will certainly play a role in healing and agree with dietician evaluation and enteral feeding support if deemed appropriate.     Thank you for the consult. ID will continue to follow with you.     Blessing Dangelo PA-C   Pronouns: she/her/hers  Infectious Diseases  Pager: [phone]  06/05/2021  ________________________________________________________________    Consult Question: known to ID, enterobacter bacteremia, hx of cholangiocarcinoma and recurrent abscess/cholangitis  Admission Diagnosis: Bacteremia due to other bacteria [R78.81, B96.89]         History of Present Illness:   History obtained from review of chart, review of paper transfer records, and discussion with patient and patient's wife.     Fuentes Estrada is a 66 year old male with PMH of HTN, DM2, CVA, PE (Xarelto), and hilar cholangiocarcinoma s/p radical extrahepatic bile duct resection and R hepatectomy (5/2020) with subsequent metastatic disease (8/2020) s/p palliative gem/cis (off since 12/9/20). Course has been complicated by biliary abscess s/p drain and cholangitis. He transferred from [facility] to [facility] on 6/4 due to findings of E cloacae bacteremia for additional management.     On 5/27, IR preformed paracentesis with 4L removed. Then sinogram was done which did not reveal biliary fistula, but on later review there was possible small amount of contrast in the small bowel suggesting small biliary connection to existing cavity. Drain was exchanged and planned to return in 1 month for sinogram. On 5/29 patient developed temp to 99.1 and malaise. He went to local [facility] where BC taken from Port was positive for a resistant Enterobacter cloacae (sensitivities included in transfer paperwork). No paired peripheral BC was collected. Current source of bacteremia was felt to be liver/biliary tract. Of note, during admission March-April 2021 E cloacae was cultured from old drain, which made it unclear whether it reflected colonization vs infection. He was covered with Bactrim as well as Augmentin for Strep anginosus also isolated from abdominal fluid cultures during that admission (antibiotics stopped 4/16 by Dr. Dale outpatient). He also underwent thoracentesis which was complicated by small pneumothorax. His biliary drain also fell out on 6/3. He had virtual visit with Dr. Dale on 6/4 who urged transfer to [facility] for additional specialist care.     Majority of history provided by patient's wife, Robyn, who confirmed above history. Reports in the past patient has been unable to tolerate capping trials of abdominal drain and plan from IR was to just leave it in place despite minimal output. Robyn also notes recent blood tinged drainage from drain. Xarelto is currently on hold in part due to this. She is also worried about poor nutritional status for some months. She also wonders if this might be exacerbated by ascites.          Review of Systems:   10 point review of systems was negative except for noted as above in HPI.            Past Medical History:     Past Medical History:   Diagnosis Date     Cerebrovascular accident (CVA) (H) 12/2010     Cholangiocarcinoma (H)      Diabetes (H)      Essential hypertension, benign     Hypertension, Benign     Nonspecific abnormal results of liver function study     Hx of elevated LFT's            Past Surgical History:     Past Surgical History:   Procedure Laterality Date     ENDOSCOPIC RETROGRADE CHOLANGIOPANCREATOGRAM N/A 3/31/2020    Procedure: ENDOSCOPIC RETROGRADE CHOLANGIOPANCREATOGRAPHY, WITH with biliary sphincterotomy, biopsies  and brushings, biliary stent placement;  Surgeon: Win Strauss MD;  Location: [facility] OR     ENDOSCOPIC RETROGRADE CHOLANGIOPANCREATOGRAM N/A 4/6/2020    Procedure: ENDOSCOPIC RETROGRADE CHOLANGIOPANCREATOGRAPHY;  Surgeon: Win Strauss MD;  Location: [facility] OR     ENDOSCOPIC RETROGRADE CHOLANGIOPANCREATOGRAM WITH SPYGLASS  4/16/2020    Procedure: ENDOSCOPIC RETROGRADE CHOLANGIOPANCREATOGRAPHY, WITH DIRECT DUCT VISUALIZATION, USING PANCREATICOBILIARY FIBEROPTIC PROBE; Bile Duct Stent Exchange and Biopsy,balloon sweep of bile duct;  Surgeon: Win Strauss MD;  Location: [facility] OR     ENDOSCOPIC ULTRASOUND UPPER GASTROINTESTINAL TRACT (GI) N/A 4/16/2020    Procedure: ENDOSCOPIC ULTRASOUND, ESOPHAGOSCOPY / UPPER GASTROINTESTINAL TRACT (GI)with Fine Needle biopsy;  Surgeon: Cody Baumann MD;  Location: [facility] OR     ENDOSCOPIC ULTRASOUND UPPER GASTROINTESTINAL TRACT (GI) N/A 8/11/2020    Procedure: ENDOSCOPIC ULTRASOUND, ESOPHAGOSCOPY / UPPER GASTROINTESTINAL TRACT (GI);  Surgeon: Guru Bailey Rossi MD;  Location: [facility] GI     ENTEROSCOPY SMALL BOWEL N/A 2/2/2021    Procedure: Enteroscopy small bowel;  Surgeon: Chiki Swan MD;  Location: [facility]     ESOPHAGOSCOPY, GASTROSCOPY, DUODENOSCOPY (EGD), COMBINED N/A 4/6/2020    Procedure: ESOPHAGOGASTRODUODENOSCOPY (EGD);  Surgeon: Win Strauss MD;  Location[facility]     ESOPHAGOSCOPY, GASTROSCOPY, DUODENOSCOPY (EGD), COMBINED N/A 8/11/2020    Procedure: Esophagogastroduodenoscopy, With Fine Needle Aspiration Biopsy, With Endoscopic Ultrasound Guidance;  Surgeon: Guru Bailey Rossi MD;  Location: [facility]     EXPLORE COMMON BILE DUCT N/A 5/12/2020    Procedure: extra-hepatic bile duct resection;  Surgeon: Oswaldo Hale MD;  Location: [facility] OR     HC KNEE SCOPE, DIAGNOSTIC  1995    Arthroscopy, Knee; left     HC VASECTOMY UNILAT/BILAT W POSTOP SEMEN      Vasectomy     HEPATECTOMY PARTIAL N/A 5/12/2020     Procedure: Exploratory Laparotomy, Open right hepatectomy, Radical Extra-Hepatic Bile Duct Resection, Portal Lymph Node Dissection, Intraoperative Ultrasound, Intra Air-Cholangiogram ,Bianca-En-Y Left Hepaticojejunostomy, Excision Skin Lesion;  Surgeon: Oswaldo Hale MD;  Location: [facility]     INSERT PORT VASCULAR ACCESS Right 9/28/2020    Procedure: ultrasound guided right internal venous access port placement with flouroscopy;  Surgeon: Fercho Wayne DO;  Location: [facility]     IR ABSCESS TUBE CHANGE  6/12/2020     IR FOLLOW UP VISIT OUTPATIENT  7/24/2020     IR FOLLOW UP VISIT OUTPATIENT  8/3/2020     IR FOLLOW UP VISIT OUTPATIENT  10/19/2020     IR PARACENTESIS  5/27/2021     IR SINOGRAM INJECTION DIAGNOSTIC  7/14/2020     IR SINOGRAM INJECTION DIAGNOSTIC  9/14/2020     IR SINOGRAM INJECTION DIAGNOSTIC  10/13/2020     IR SINOGRAM INJECTION DIAGNOSTIC  3/1/2021     IR SINOGRAM INJECTION DIAGNOSTIC  3/25/2021     IR SINOGRAM INJECTION DIAGNOSTIC  4/22/2021     IR SINOGRAM INJECTION DIAGNOSTIC  5/27/2021     IR SINOGRAM INJECTION THERAPEUTIC  8/25/2020     IR SINOGRAM INJECTION THERAPEUTIC  9/25/2020     IR SINOGRAM INJECTION THERAPEUTIC  10/2/2020     IR SINOGRAM INJECTION THERAPEUTIC  9/18/2020     IR THORACENTESIS  5/16/2020     LYMPHADENECTOMY ABDOMINAL N/A 5/12/2020    Procedure: portal lymph node dissection, intraoperative liver ultrasound;  Surgeon: Oswaldo Hale MD;  Location: [facility] OR            Family History:   Reviewed and non-contributory.   Family History   Problem Relation Age of Onset     Arthritis Mother         RA     Diabetes Father         diet controlled     Hypertension Father             Social History:     Social History     Tobacco Use     Smoking status: Never Smoker     Smokeless tobacco: Never Used   Substance Use Topics     Alcohol use: Not Currently     Comment: occaisional      History   Sexual Activity     Sexual activity: Yes     Partners: Female            Current  "Medications:       atorvastatin  40 mg Oral At Bedtime     cholecalciferol  250 mcg Oral Daily     dronabinol  5 mg Oral BID     fenofibrate  160 mg Oral At Bedtime     heparin  5 mL Intracatheter Q28 Days     heparin lock flush  5-10 mL Intracatheter Q24H     lactobacillus rhamnosus (GG)  1 capsule Oral Daily     lisinopril  5 mg Oral QPM     magnesium gluconate  500 mg Oral BID     meropenem  1 g Intravenous Q8H     mirtazapine  7.5 mg Oral At Bedtime     potassium & sodium phosphates  1 packet Oral TID     potassium chloride  40 mEq Oral Once     sodium chloride (PF)  3 mL Intracatheter Q8H            Allergies:     Allergies   Allergen Reactions     Metformin Other (See Comments)     \" I think my kidneys shut down\"            Physical Exam:   Vitals were reviewed  Temp:  [97.6  F (36.4  C)-97.8  F (36.6  C)] 97.8  F (36.6  C)  Pulse:  [61-65] 61  Resp:  [15-22] 15  BP: (118-141)/(72-94) 127/86  SpO2:  [92 %-96 %] 95 %    Vitals:    06/04/21 2114 06/05/21 0431   Weight: 78.2 kg (172 lb 4.8 oz) 78.1 kg (172 lb 1.6 oz)       Constitutional: Chronically malnourished appearing adult male seen lying in bed, sleeping for majority of interview, but when awake he is alert and able to participate in conversation. NAD.   HEENT: NC/AT, temporal wasting, EOMI, sclera clear, conjunctiva normal, OP with MMM  Respiratory: No increased work of breathing, CTAB, no crackles or wheezing.  Cardiovascular: RRR, no murmur noted. Trace to 1+ edema of bilateral ankles  GI: soft, somewhat distended, non-tender.  Skin: Warm, dry, well-perfused. No bruising, bleeding, rashes, or lesions on limited exam.  Musculoskeletal: Extremities grossly normal, non-tender  Neurologic: A&O. Answers questions appropriately, speech normal.  Neuropsychiatric: Calm. Affect appropriate to situation.  Vascular access:  Port on right chest CDI, non-tender, no surrounding erythema.         Laboratory Data:     Microbiology:  Culture Micro   Date Value Ref Range " Status   06/04/2021 No growth after 6 hours  Preliminary   04/05/2021 No growth  Final   04/03/2021 Culture negative after 30 days  Final   04/03/2021 Moderate growth  Enterobacter cloacae complex   (A)  Final   04/03/2021 (A)  Final    Moderate growth  Strain 2  Enterobacter cloacae complex     04/03/2021 No anaerobes isolated  Final   04/03/2021   Final    Since this specimen was not transported in the proper anaerobic transport media, the   absence of anaerobes in this culture does not rule out the presence of anaerobes in this   specimen.     04/02/2021 No growth  Final   04/02/2021 No anaerobes isolated  Final   04/02/2021 Culture negative after 4 weeks  Final   04/01/2021 No growth  Final   04/01/2021 No growth  Final   03/31/2021 No growth  Final   03/31/2021 No growth  Final   03/27/2021 No growth  Final   03/27/2021 No growth  Final   03/11/2021 No growth  Final   02/15/2021 No anaerobes isolated  Final   02/15/2021 Culture negative after 4 weeks  Final   02/15/2021 (A)  Final    Moderate growth  Streptococcus anginosus  This organism is susceptible to ampicillin, penicillin, vancomycin and the cephalosporins.   If treatment is required AND your patient is allergic to penicillin, contact the   Microbiology Lab within 5 days to request susceptibility testing.  Testing unavailable due to 's backorder.     02/15/2021   Final    Critical Value/Significant Value called to and read back by  JOSE CRUZ COHEN [email or web address] 2/18/21. SCG     02/01/2021 No growth  Final   02/01/2021 No growth  Final   11/18/2020 No growth  Final   11/18/2020   Final    No anaerobes isolated  Since this specimen was not transported in the proper anaerobic transport media, the   absence of anaerobes in this culture does not rule out the presence of anaerobes in this   specimen.     05/16/2020 Light growth  Enterococcus faecium   (A)  Final   05/16/2020   Final    Critical Value/Significant Value, preliminary result only, called to  and read back by  Cody Wagner RN on 5.17.20 at 1141. bw     05/16/2020 No growth  Final   05/14/2020 No growth  Final   05/14/2020 No growth  Final   05/12/2020 (A)  Final    Light growth  Veillonella species  Susceptibility testing not routinely done     05/12/2020 (A)  Final    Light growth  Prevotella species  Beta lactamase positive  Susceptibility testing not routinely done     05/12/2020 Heavy growth  Enterococcus faecium   (A)  Final   05/12/2020 (A)  Final    Light growth  Streptococcus anginosus  Susceptibility testing not routinely done     05/12/2020 (A)  Final    Light growth  Coagulase negative Staphylococcus  Susceptibility testing not routinely done         Inflammatory Markers    Recent Labs   Lab Test 04/02/21  0423 04/01/21  2044 04/01/21  0622 03/28/21  0713 03/27/21  1913 02/02/21  0632   CRP 57.0* 53.0* 55.0* 87.0* 100.0* 14.0*       Metabolic Studies       Recent Labs   Lab Test 06/05/21  0528 06/05/21  0111 06/04/21  2337 05/27/21  1045 05/10/21  1558 05/06/21  1300 04/20/21  1032 04/14/21  1608 04/02/21  0109 04/02/21  0109 04/01/21  2129 03/27/21  1913 03/27/21  1913     --  137  --  133 132* 136 137   < >  --   --    < > 129*   POTASSIUM 3.4  --  3.5  --  3.6 3.8 3.9 3.9   < >  --   --    < > 4.1   CHLORIDE 105  --  105  --  100 98 103 107   < >  --   --    < > 96   CO2 27  --  29  --  29 30 26 25   < >  --   --    < > 28   ANIONGAP 5  --  2*  --  4 4 7 5   < >  --   --    < > 5   BUN 17  --  18  --  23 22 13 12   < >  --   --    < > 22   CR 0.85  --  0.93  --  1.02 1.00 0.88 0.81   < >  --   --    < > 0.98   GFRESTIMATED 89  --  84  --  75 77 88 >90   < >  --   --    < > 79   GLC 90  --  142* 88 122* 107* 124* 126*   < >  --   --    < > 124*   A1C  --   --   --   --   --   --   --   --   --   --   --   --  5.6   ERIC 7.8*  --  7.9*  --  8.6 8.4* 8.4* 8.4*   < >  --   --    < > 9.2   PHOS  --  2.2* 2.3*  --   --   --   --   --    < >  --   --    < > 2.7   MAG 1.8  --  1.8  --   --    --   --   --    < >  --   --    < > 1.7   LACT  --   --   --   --   --   --   --   --   --  2.1* 4.1*  --  1.1    < > = values in this interval not displayed.       Hepatic Studies    Recent Labs   Lab Test 06/04/21  2337 05/10/21  1558 05/06/21  1300 04/20/21  1032 04/14/21  1608 04/06/21  0629   BILITOTAL 3.0* 6.8* 7.3* 3.0* 2.9* 4.0*   ALKPHOS 592* 928* 969* 649* 682* 703*   ALBUMIN 1.0* 1.4* 1.4* 1.7* 1.8* 1.5*   * 130* 155* 107* 75* 89*   ALT 43 45 57 42 36 42       Pancreatitis testing    Recent Labs   Lab Test 04/01/21 2044 03/27/21  1913 02/02/21  0632 02/01/21  1638 04/16/20  0851 04/07/20  2202 04/07/20  0604 04/06/20  0613 03/31/20  1300 03/31/20  0902   AMYLASE  --   --  75  --  164* 302*  --  76 169* 191*   LIPASE 102 123 196 111 585* 2,301* 3,722* 830* 2,170* 2,520*       Hematology Studies      Recent Labs   Lab Test 06/05/21 0528 06/04/21  2337 05/10/21  1558 05/06/21  1300 04/20/21  1032 04/14/21  1608 04/14/21  1608 04/02/21  0540 04/02/21  0540   WBC 3.5* 3.3* 6.3 7.4 3.5*  --  6.3   < > 10.7   ANEU  --  1.9 4.2 5.7 2.1  --  4.2  --  9.6*   ALYM  --  1.0 1.7 1.1 1.1  --  1.5  --  0.6*   JUANCHO  --  0.2 0.4 0.5 0.3  --  0.4  --  0.4   AEOS  --  0.1 0.0 0.0 0.0  --  0.1  --  0.0   HGB 9.5* 8.9* 10.1* 9.8* 9.4*  --  9.5*   < > 6.7*   HCT 28.4* 26.7* 29.7* 28.6* 27.9*  --  28.0*   < > 19.8*   * 109* 199 205 166   < > 156   < > 132*    < > = values in this interval not displayed.       Arterial Blood Gas Testing    Recent Labs   Lab Test 04/01/21  1945 05/12/20  1646 05/12/20  1500 05/12/20  1400 05/12/20  1300 05/12/20  1100   PH  --  7.46* 7.44 7.43 7.45 7.45   PCO2  --  29* 32* 35 33* 32*   PO2  --  156* 164* 153* 159* 179*   HCO3  --  20* 22 23 23 23   O2PER 21 30 40 40 40 40        Urine Studies     Recent Labs   Lab Test 04/01/21  0956 03/28/21  1555 05/14/20  0158   URINEPH 5.5 5.5 5.5   NITRITE Negative Negative Negative   LEUKEST Negative Negative Trace*   WBCU <1 3 4        Vancomycin Levels     Recent Labs   Lab Test 04/04/21  0740   VANCOMYCIN 24.8       Last check of C difficile  C Diff Toxin B PCR   Date Value Ref Range Status   04/05/2021 Negative NEG^Negative Final     Comment:     Negative: C. difficile target DNA sequences NOT detected, presumed negative   for C.difficile toxin B or the number of bacteria present may be below the   limit of detection for the test.  FDA approved assay performed using Neovasc GeneXpert real-time PCR.  A negative result does not exclude actual disease due to C. difficile and may   be due to improper collection, handling and storage of the specimen or the   number of organisms in the specimen is below the detection limit of the assay.                Imaging:   CXR 6/5  IMPRESSION:      1. Left hydropneumothorax, with small left apical pneumatic component,  and small to moderate basilar fluid component.  2. Left basilar opacities, differential includes atelectasis versus  infection.

## 2021-06-05 NOTE — CONSULTS
INTERVENTIONAL RADIOLOGY CONSULT    Patient is a 66-year-old male with history of cholangiocarcinoma status post partial hepatectomy with recurrent biliary abscess/cholangitis and fluid collection along the free edge of the liver requiring recurrent drain placement.  Patient is recently admitted for Enterobacter bacteremia from outside institution.  Interventional radiology consulted for possible drain replacement.    Patient was admitted to outside hospital on 5/29/2021 and found to have bacteremia.  Patient had a left-sided thoracentesis which was complicated by a large hemothorax.  Of note, patient was on anticoagulation for right lower lobe pulmonary embolism.  The patient's drain that was draining the collection along the posterior aspect of the right lobe of the liver fell out during that hospital admission.  Patient was transferred to our institution for further care.    - Covid: Negative, 6/4/2021  - Anticoagulation: Anticoagulation is currently held due to left-sided hemothorax.      B/P: 127/86, T: 97.8, P: 61, R: 15       CBC RESULTS:   Recent Labs   Lab Test 06/05/21  0528   WBC 3.5*   RBC 3.06*   HGB 9.5*   HCT 28.4*   MCV 93   MCH 31.0   MCHC 33.5   RDW 18.9*   *      Lab Results   Component Value Date    INR 1.79 06/04/2021    INR 4.56 05/27/2021     Lab Results   Component Value Date     06/05/2021     06/04/2021       Imaging: Sinogram 5/27/2021 demonstrated near complete decompression of the cavity with possible small fistulous connection between the biliary system and a small bowel loop.  Patient's 10 [place] drain was exchanged for a 10.2 [place] drain [place] due to some bloody output after recently started anticoagulation.    PLAN: Interventional radiology consulted for right upper quadrant perihepatic drain replacement.  There is a possible fistulous connection with this collection and the small bowel on previous sinogram.  At this point, patient is vitally stable  adequately treated with antibiotics.  No evidence for urgent or emergent intervention.  No updated cross-sectional imaging is available.    Recommendations:  -Continue antibiotic therapy.  -CT abdomen pelvis with contrast to evaluate for collection reaccumulation.  Patient's collection is historically difficult to get to requiring an intercostal drain.  This puts patient at a higher risk of transpleural route and subsequent empyema.  -Continue to hold anticoagulation until CT evaluation.      Discussed with Dr. Hurtado, IR staff.    Stanley Benavidez DO  Interventional Radiology Resident  Pager [phone]  Call Pager for After Hours: [phone]

## 2021-06-05 NOTE — PLAN OF CARE
[id number]:   Neuro: Alert, disoriented to time (knew month stated it was 2001) otherwise oriented. Forgetful. Flat affect.   Cardiac: SR with HR 60s-80s. AVSS.   Respiratory: O2 sats stable on RA  GI/: voiding spontaneously. No BM this shift.   Diet/appetite: regular diet, no nausea. Minimal appetite.   Activity:  Ax1 w/ walker   Pain: denies pain this shift.   Skin: No new deficits noted. Gall bladder drain site dressing, CDI.   LDA's: port infusing TKO between abx. PIV SL.   Labs: pt triggered sepsis, lactic 1.3. K 3.4, replacement ordered. Mg 1.8, no replacement. Phos 2.2, MD ordered replacement.     Plan: Continue with POC. Notify primary team with changes.

## 2021-06-05 NOTE — PROGRESS NOTES
Transfer  Transferred from: [facility] shun ryan   Via: EMS  Reason for transfer: Pt appropriate for 6B- worsened patient condition  Family: Aware of transfer  Belongings: Received with pt  Chart: Received with pt  Medications: pt had vitamin D3 with him, sent to security.   Code Status verified on armband: yes  2 RN Skin Assessment Completed By: Joanne GUIDRY RN and Blake COBURN RN. R arm skin tear, abrasion on R knee and ankle.   Med rec completed: no, pharmacy consult placed.   Bed surface reassessed with algorithm and charted: yes  New bed surface ordered: no  Suction/Ambu bag/Flowmeter at bedside: yes    Report received from: OS RN   Pt status: Pt is disoriented to time, knew it was June, didn't know date stated it was 2001. Pt is AVSS on RA. Port is accessed, waiting for MD approval before use. Able to ambulate assist x1 to the bed, unsteady on feet. Bed alarm for safety, waiting for MD to see pt.

## 2021-06-06 NOTE — PROGRESS NOTES
Procedure Note    Attending: Abram Baca MD  Resident: N/A  Procedure: Diagnostic and therapeutic paracentesis  Indication: Abdominal distention   Pre-procedure diagnosis: CholangioCA  Post-procedure diagnosis: CholangioCA    The risks and benefits of the procedure were explained to patient who expressed understanding and opted to proceed.  Consent was obtained and placed in the chart.  A time out was performed.  An area of ascites was located and marked using ultrasound guidance in the right lower quadrant; the area was prepped and draped in the usual sterile fashion.  ~5 ml of 1% lidocaine was instilled and ascites located.  .The 5 Fr Phone Warrior paracentesis catheter and needle were inserted under US guidance then the needle removed.  The apparatus was connected to vacuum bottles and a total of 4000 ml of clear yellow fluid removed.   A specimen was sent for analysis. The catheter was withdrawn and the area dressed.    Patient tolerated the procedure well with no immediate complications.  The primary team was informed of the procedure.     Sania Baca MD  [facility]   Internal Medicine   [phone]  DOS:  June 6, 2021        a

## 2021-06-06 NOTE — PROVIDER NOTIFICATION
Paged Antolin from ID:  [id number]: Fuentes Estrada  pt's wife would like to speak with you or Dr. Paige about the possibility of recommending a feeding tube for nutrition and has requested ID specifically.   Thank you, [place] [phone]     Orders: called back and discussed plan to pass on to round with team and pt/family tomorrow and work on a plan/reccomendation for nutrition intervention/infection

## 2021-06-06 NOTE — PROGRESS NOTES
Paged Dr. Singh:  [id number]: Fuentes Estrada  Pt wife is now at bedside when you are available. She had some questions they are written on the board in room otherwise you can call me ahead and I can let you know.   [place] CASTELL[place] [phone]    Questions from wife:  - since he had a para will his albumin need replacing?  - pt is on iron supplements at home, should be he here. Pt doesn't tolerate oral iron so wife has supplement primary at home recommended and is wonder if that should be used. Talked with pharmacy and we don't have the supplement they use but wife said she can supply  -Wife wondering about BMP vs. CMP labs since he gets CMP at home  - questions about lasix as he has received lasix in the past at home       Called back- MD coming to bedside to talk with pt and pt wife

## 2021-06-06 NOTE — CONSULTS
[facility]    Pulmonary, Allergy, Critical Care and Sleep Medicine    Initial Consultation  06/06/2021    Fuentes Estrada MRN# [id number]   Age: 66 year old YOB: 1955     Date of Admission: 6/4/2021  Reason for Consultation: Adam Singh  Requesting Team: Gold 11    Primary care provider: Tolu Noland     Assessment and Recommendations:    Fuentes Estrada is a 65 yo M w metastatic cholangiocarcinoma (s/p extrahepatic bile duct resection and R hepatectomy 5/2020, s/p palliative chemo 8-12/2020) c/b cholangitis and recurrent perihepatic/biliary abscess s/p biliary drain, PE (5/22/21), hx CVA. Underwent biliary drain exchange 5/27 with onset of fevers 5/29 prompting presentation to [facility] with enterobacter bacteremia. Also noted to have large L pleural effusion with thora revealing 500 ml hemorrhagic fluid. Transferred to [facility] due to complex care. Pulmonary consulted for imaging here revealing persistent large L pleural effusion with additional PTX    # Chronic pleural effusions, L>R  # Pneumothorax in setting of recent thoracentesis    Pneumothorax may be iatrogenic, introduced during recent thora. Unfortunately we do not have any imaging between procedure and now to determine if it is improving or not. However, the fact that the patient has been stable and asymptomatic from the thora on 6/1 to now 6/6 is reassuring. Would recommend placing him on some O2 (2L) to help resorption. Would also repeat CXR tomorrow to reassess PTX.    Regarding the effusion, unfortunately the OSH did not send for any labs other than culture (no growth). His prior thora on his right effusion in 5/2020 was similarly sent for only culture, so we are unable to determine the etiology of these chronic effusions including even whether they are exudates vs transudates, and whether his malignancy is involved. This left effusion dates back to at least 1/2021 and has steadily grown since then.  There is not a significant increase since his 5/22 CT when he was diagnosed with PE (so, prior to starting AC) so I am not convinced on the role of his AC. In order to make any assessment, we need more information with more labs on his pleural fluid. We could consider chest tube placement vs thoracentesis although the latter could then necessitate subsequent chest tube based on fluid studies results. However, given that the patient is asymptomatic and his GOC/prognosis is unclear (cannot find recent discussions of either in the chart although his malignancy prognosis does appear concerning), it is unclear whether the risks and benefits favor chest tube placement. We presented the options to the patient and his wife and for now they would like to hold off on further procedures since he is asymptomatic.     Recs  - Place on O2 for PTX resorption  - Repeat CXR tomorrow (ordered for you)    Seen and discussed with Dr Darrell Rodriguez MD  Pulmonary and Critical Care Fellow   Pager [phone]    Chief Complaint and History of Present Illness:    CC:  Effusion, pneumothorax    HPI:   Underwent outpatient biliary drain exchange 5/27 with onset of fevers 5/29 prompting presentation to [facility] 6/1 with enterobacter bacteremia. Also noted to have large L pleural effusion with thora revealing 500 ml bloody fluid - per [facility] lab, only culture was sent on this fluid with no growth (final report). AC was held given the bloody nature of this effusion. Transferred to [facility] due to complex care.     Here he has been AF, on room air, and HDS. Being treated with stanton, with IR deferring biliary drain replacement given radiographic improvement of abscess, c/f tract to small bowel, and general stability. He underwent re-imaging of the chest with CXR c/f small apical L PTX, and follow-up CT Chest confirming hydro/hemo-pneumothorax. He reports he does not currently have nor ever had dyspnea, chest pain, chest  tightness. No recent pneumonias. Did not note relief with thora at OSH. Has had one prior thora 5/16/20 on the right which also did not provide relief and also was only sent for culture (no growth).    Review of Systems:  Complete 12 point ROS negative unless mentioned in HPI    Histories, Prior to Admission Medications, Allergies:    Past Medical History:  Past Medical History:   Diagnosis Date     Cerebrovascular accident (CVA) (H) 12/2010     Cholangiocarcinoma (H)      Diabetes (H)      Essential hypertension, benign     Hypertension, Benign     Nonspecific abnormal results of liver function study     Hx of elevated LFT's       Past Surgical History:  Past Surgical History:   Procedure Laterality Date     ENDOSCOPIC RETROGRADE CHOLANGIOPANCREATOGRAM N/A 3/31/2020    Procedure: ENDOSCOPIC RETROGRADE CHOLANGIOPANCREATOGRAPHY, WITH with biliary sphincterotomy, biopsies and brushings, biliary stent placement;  Surgeon: Win Strauss MD;  Location: [facility] OR     ENDOSCOPIC RETROGRADE CHOLANGIOPANCREATOGRAM N/A 4/6/2020    Procedure: ENDOSCOPIC RETROGRADE CHOLANGIOPANCREATOGRAPHY;  Surgeon: Win Strauss MD;  Location: [facility] OR     ENDOSCOPIC RETROGRADE CHOLANGIOPANCREATOGRAM WITH SPYGLASS  4/16/2020    Procedure: ENDOSCOPIC RETROGRADE CHOLANGIOPANCREATOGRAPHY, WITH DIRECT DUCT VISUALIZATION, USING PANCREATICOBILIARY FIBEROPTIC PROBE; Bile Duct Stent Exchange and Biopsy,balloon sweep of bile duct;  Surgeon: Win Strauss MD;  Location: [facility] OR     ENDOSCOPIC ULTRASOUND UPPER GASTROINTESTINAL TRACT (GI) N/A 4/16/2020    Procedure: ENDOSCOPIC ULTRASOUND, ESOPHAGOSCOPY / UPPER GASTROINTESTINAL TRACT (GI)with Fine Needle biopsy;  Surgeon: Cody Baumann MD;  Location: [facility] OR     ENDOSCOPIC ULTRASOUND UPPER GASTROINTESTINAL TRACT (GI) N/A 8/11/2020    Procedure: ENDOSCOPIC ULTRASOUND, ESOPHAGOSCOPY / UPPER GASTROINTESTINAL TRACT (GI);  Surgeon: Guru Bailey Rossi MD;  Location: [facility]  GI     ENTEROSCOPY SMALL BOWEL N/A 2/2/2021    Procedure: Enteroscopy small bowel;  Surgeon: Chiki Swan MD;  Location: [facility] OR     ESOPHAGOSCOPY, GASTROSCOPY, DUODENOSCOPY (EGD), COMBINED N/A 4/6/2020    Procedure: ESOPHAGOGASTRODUODENOSCOPY (EGD);  Surgeon: Win Strauss MD;  Location: [facility] OR     ESOPHAGOSCOPY, GASTROSCOPY, DUODENOSCOPY (EGD), COMBINED N/A 8/11/2020    Procedure: Esophagogastroduodenoscopy, With Fine Needle Aspiration Biopsy, With Endoscopic Ultrasound Guidance;  Surgeon: Guru Bailey Rossi MD;  Location: [facility] GI     EXPLORE COMMON BILE DUCT N/A 5/12/2020    Procedure: extra-hepatic bile duct resection;  Surgeon: Oswaldo Hale MD;  Location: [facility] OR     HC KNEE SCOPE, DIAGNOSTIC  1995    Arthroscopy, Knee; left     HC VASECTOMY UNILAT/BILAT W POSTOP SEMEN      Vasectomy     HEPATECTOMY PARTIAL N/A 5/12/2020    Procedure: Exploratory Laparotomy, Open right hepatectomy, Radical Extra-Hepatic Bile Duct Resection, Portal Lymph Node Dissection, Intraoperative Ultrasound, Intra Air-Cholangiogram ,Bianca-En-Y Left Hepaticojejunostomy, Excision Skin Lesion;  Surgeon: Oswaldo Hale MD;  Location: [facility] OR     INSERT PORT VASCULAR ACCESS Right 9/28/2020    Procedure: ultrasound guided right internal venous access port placement with flouroscopy;  Surgeon: Fercho Wayne DO;  Location: [facility] OR     IR ABSCESS TUBE CHANGE  6/12/2020     IR FOLLOW UP VISIT OUTPATIENT  7/24/2020     IR FOLLOW UP VISIT OUTPATIENT  8/3/2020     IR FOLLOW UP VISIT OUTPATIENT  10/19/2020     IR PARACENTESIS  5/27/2021     IR SINOGRAM INJECTION DIAGNOSTIC  7/14/2020     IR SINOGRAM INJECTION DIAGNOSTIC  9/14/2020     IR SINOGRAM INJECTION DIAGNOSTIC  10/13/2020     IR SINOGRAM INJECTION DIAGNOSTIC  3/1/2021     IR SINOGRAM INJECTION DIAGNOSTIC  3/25/2021     IR SINOGRAM INJECTION DIAGNOSTIC  4/22/2021     IR SINOGRAM INJECTION DIAGNOSTIC  5/27/2021     IR SINOGRAM INJECTION THERAPEUTIC   8/25/2020     IR SINOGRAM INJECTION THERAPEUTIC  9/25/2020     IR SINOGRAM INJECTION THERAPEUTIC  10/2/2020     IR SINOGRAM INJECTION THERAPEUTIC  9/18/2020     IR THORACENTESIS  5/16/2020     LYMPHADENECTOMY ABDOMINAL N/A 5/12/2020    Procedure: portal lymph node dissection, intraoperative liver ultrasound;  Surgeon: Oswaldo Hale MD;  Location:  OR       Past Social History:  Social History     Socioeconomic History     Marital status:      Spouse name: Robyn     Number of children: 2     Years of education: 14     Highest education level: Not on file   Occupational History     Occupation: self employed   Social Needs     Financial resource strain: Not on file     Food insecurity     Worry: Not on file     Inability: Not on file     Transportation needs     Medical: Not on file     Non-medical: Not on file   Tobacco Use     Smoking status: Never Smoker     Smokeless tobacco: Never Used   Substance and Sexual Activity     Alcohol use: Not Currently     Comment: occaisional      Drug use: No     Sexual activity: Yes     Partners: Female   Lifestyle     Physical activity     Days per week: Not on file     Minutes per session: Not on file     Stress: Not on file   Relationships     Social connections     Talks on phone: Not on file     Gets together: Not on file     Attends Rastafari service: Not on file     Active member of club or organization: Not on file     Attends meetings of clubs or organizations: Not on file     Relationship status: Not on file     Intimate partner violence     Fear of current or ex partner: Not on file     Emotionally abused: Not on file     Physically abused: Not on file     Forced sexual activity: Not on file   Other Topics Concern      Service No     Blood Transfusions No     Caffeine Concern No     Occupational Exposure No     Hobby Hazards No     Sleep Concern No     Stress Concern No     Weight Concern No     Special Diet No     Back Care No     Exercise Yes      "Bike Helmet No     Seat Belt Yes     Self-Exams No     Parent/sibling w/ CABG, MI or angioplasty before 65F 55M? Not Asked   Social History Narrative     Not on file     Never smoker    Family History:  Family History   Problem Relation Age of Onset     Arthritis Mother         RA     Diabetes Father         diet controlled     Hypertension Father        Medications:    atorvastatin  40 mg Oral At Bedtime     cholecalciferol  250 mcg Oral Daily     dronabinol  5 mg Oral BID     fenofibrate  160 mg Oral At Bedtime     heparin  5 mL Intracatheter Q28 Days     heparin lock flush  5-10 mL Intracatheter Q24H     lactobacillus rhamnosus (GG)  1 capsule Oral Daily     lisinopril  5 mg Oral QPM     magnesium gluconate  500 mg Oral BID     meropenem  1 g Intravenous Q8H     mirtazapine  7.5 mg Oral At Bedtime     multivitamin w/minerals  1 tablet Oral Daily     potassium & sodium phosphates  1 packet Oral TID     sodium chloride (PF)  3 mL Intracatheter Q8H     vitamin B1  100 mg Oral Daily     acetaminophen, heparin lock flush, lidocaine 4%, lidocaine 4%, lidocaine (buffered or not buffered), lidocaine (buffered or not buffered), LORazepam, - MEDICATION INSTRUCTIONS -, melatonin, ondansetron **OR** ondansetron **OR** ondansetron, sodium chloride (PF), sodium chloride (PF), sodium chloride (PF)    Allergies:     Allergies   Allergen Reactions     Metformin Other (See Comments)     \" I think my kidneys shut down\"       Physical Exam:    Temp:  [97.7  F (36.5  C)-98.3  F (36.8  C)] 98.3  F (36.8  C)  Pulse:  [68-74] 74  Resp:  [14-18] 16  BP: (111-142)/(74-99) 117/89  SpO2:  [93 %-98 %] 93 %    Intake/Output Summary (Last 24 hours) at 6/6/2021 0905  Last data filed at 6/6/2021 0902  Gross per 24 hour   Intake 723 ml   Output 300 ml   Net 423 ml     General: laying in bed, NAD, grossly cachectic  HEENT: NCAT, significant temporal wasting  Eyes: EOMI grossly, anicteric  Neck: no palpable lymphadenopathy, no JVD noted  Chest: non " labored, CTA with diminished sounds at bases  Cardiac: RRR, no murmurs  Abdomen: Soft, protuberant, palpable ascites  Msk: thin extremities, no deformities with grossly normal ROM  Neuro: A&Ox3, no focal deficits   Skin: no rash noted, no wound  Psych: cooperative, blunted affect    Laboratory, imaging, and microbiologic data:    All laboratory and imaging data reviewed.    Notable for:    CT Chest 6/5/21: moderate L pleural effusion with associated atelectasis, some air as well antigravity, smaller R effusion    CT Chests/abdomen of the last two years demonstrate hx of R effusion in 2020 which resolved, this L effusion developed sometime in 1/2021 and has been growing since. No significant difference in size or HU since 5/22 CT Chest which also demonstrated nonobstructive RLL PE

## 2021-06-06 NOTE — PROGRESS NOTES
[facility]    Medicine Progress Note - Hospitalist Service, Gold 11       Date of Admission:  6/4/2021  Assessment & Plan       Fuentes Estrada is a 66 year old male admitted on 6/4/2021. He has hx of cholangiocarcinoma, recurrent biliary abscess and cholangitis s/p biliary drain, and recently diagnosed small PE on xarelto who underwent routine biliary drain exchange on 5/27 by IR, developed fever on 5/29 and admitted to [facility] found to have enterobacter bacteremia. His hospital course there was complicated by left large hemorrhagic pleural effusion s/p thora complicated by small apical pneumothorax and biliary drain fall out. He was transferred to [facility] for further multidisciplinary care.     Changes today:  - pulmonary consult  - CAPS consult for para  - ID following  - IR to discuss next steps tomorrow  - Calorie counts/nutrition consult  - follow cultures  - continue to hold AC per onc  - follow daily to resume lasix as needed  - would consider discussion of psychiatry consult vs health psychology in coming days    Enterobacter bacteremia  History of recurrent biliary abscess and cholangitis s/p biliary drain that fell out on 6/3  He underwent routine sinogram and drain exchange by IR on 5/27. He was admitted to [facility] on 5/29 with low grade fever, and was found to have Enterobacter coloacae bacteremia. He had a virtual visit with [facility] ID Dr Dale on 6/4. The source was felt to be liver/biliary track. His biliary drain fell out on 6/3. Agreed with continuing Meropenem given the sensitivity. He has multiple prior history of cholangitis and since his biliary drain fell out on 6/3, he is at increased risk of decompensation.  - continue meropenem 1g q8h  - follow up blood culture   - ID consult  - Consult ID     Cholangiocarcinoma  Diagnosed in 2020 s/p radical extrahepatic bile duct resection and R hepatectomy in May 2020. The presence of  metastatic disease was identified by EUS biopsy in August 2020. He developed an abscess and a biliary drain was placed, managed by IR. He has been having multiple cholangitis/abscess since then requiring series of antibiotics. Not currently on chemotherapy, followed by Dr Beard.  - oncology consult, katia walton     L hemorrhagic pleural effusion  Small apical pneumothorax s/p thoracentesis at OSH  He was noted to be more dyspneic with L large pleural effusion at OSH. He underwent thoracentesis at OSH on 6/1 and 500cc of hemorrhagic fluid was removed. Following the thora, he was noted to have a small apical pneumothorax.   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 He was started on xarelto. Of note, CT a/p on 4/22 showed new non-occlusive thrombus in the superior mesenteric vein, not extending to the portal vein. Per oncology note, there was no plan to treat SMV thrombosis given high risk of bleeding. On admission to OSH, he was on xarelto for PE, but this was discontinued after he was found to have hemorrhagic pleural effusion. There was a note indicating about possibly starting lovenox interim.  - hold AC for now per oncology     Anemia  Hb 8.8 at OSH on 6/4.   - continue to monitor H/H  - goal Hb >7     Hypertension  Takes aldactone 25mg, lasix 20mg daily and lisinopril 5mg daily  - continue     Malnutrition  Reported >20lbs weight loss in the setting of chronic illness  - Nutrition consult   - continue marinol  - calorie counts     Diabetes  Currently not taking any medication. A1c 5.6 in 3/2021     History of CVA  He was on plavix previously, will clarify. Regardless, hold for now in setting of recent bleed and pending procedure.     Diet: Regular Diet Adult  Calorie Counts  Snacks/Supplements Adult:  Ensure Clear; With Meals  Snacks/Supplements Adult: Boost Breeze; Between Meals    DVT Prophylaxis: Anti-embolisim stockings (TEDs)  Em Catheter: not present  Code Status: Full Code           Disposition Plan   Expected discharge: 4 - 7 days, recommended to prior living arrangement once antibiotic plan established.  Entered: Adam Singh MD 06/06/2021, 3:06 PM     The patient's care was discussed with the Bedside Nurse, Patient and onc/IR Consultant.    Adam Singh MD  Hospitalist Service, [address]  Contact information available via [facility] Paging/Directory  Please see sign in/sign out for up to date coverage information  ______________________________________________________________________    Interval History   Notes mild abdominal pain.  No cough. Nausea. Poor appetite.  No diarrhea.  Wife at bedside. No change in symptoms since admission.    Data reviewed today: I reviewed all medications, new labs and imaging results over the last 24 hours. I personally reviewed no images or EKG's today.    Physical Exam   Vital Signs: Temp: 97.7  F (36.5  C) Temp src: Oral BP: 114/76 Pulse: 74   Resp: 16 SpO2: 96 % O2 Device: None (Room air)    Weight: 166 lbs 7.16 oz  General Appearance: awake, appears cachectic, exhausted  Eyes: non icteric  HEENT: MMM  Respiratory: distant breath sound bilaterally  Cardiovascular: RRR  GI: distended, Tympanic, soft non tender  Skin: LE edema 2+ bilaterally  Musculoskeletal: no joint swelling  Neurologic: non focal  Psychiatric: flat, withdrawn    Data

## 2021-06-06 NOTE — PROGRESS NOTES
Brief Oncology note:      Fuentes Estrada is a 66 year old male with metastatic cholangiocarcinoma, but has not been on any systemic therapy since 12/2020 (last gemcitabine/abraxane) due to recurrent infection and biliary fluid collection/abscesses, again admitted for Enterobacter  cloacae bacteremia and recent drain falling off.   He also a PE (diagnosed in 5/20/2021) and chronic SMV thrombus (had not been on anticoagulation as he was thought to be too high risk for bleeding) and was on Xarelto for the PE but then developed a hemorrhagic pleural effusion and thus anticoagulation ahs been on hold.    Chart and labs reviewed. No fever overnight. Did get paracentesis yesterday. CT CAP with ascites, rt hydropneumothorax and fluid collection inferior to liver. No obvious evidence of disease       Recommendations  --continue to Hold xarelto for now  -Continue antibiotics per ID team/primary.   -Agree with paracentesis- send for cytology, agree with pulmonology consult for left hydropneumothorax  -Agree with nutrition consult  -IR consult for drain replacement (will be evaluated by Dr. Purcell on Monday)      Miki Molina MD    Hematology, Oncology and Transplantation

## 2021-06-06 NOTE — PROGRESS NOTES
06/06/21 1400   Quick Adds   Type of Visit Initial Occupational Therapy Evaluation   Living Environment   People in home spouse   Current Living Arrangements house   Home Accessibility no concerns   Transportation Anticipated family or friend will provide   Self-Care   Usual Activity Tolerance moderate   Current Activity Tolerance poor   Equipment Currently Used at Home commode chair;grab bar, toilet;grab bar, tub/shower;raised toilet seat;shower chair;walker, rolling   Disability/Function   Hearing Difficulty or Deaf no   Wear Glasses or Blind yes   Vision Management glasses`   Concentrating, Remembering or Making Decisions Difficulty no   Difficulty Communicating no   Difficulty Eating/Swallowing no   Walking or Climbing Stairs Difficulty yes   Walking or Climbing Stairs ambulation difficulty, requires equipment;ambulation difficulty, assistance 1 person   Dressing/Bathing Difficulty yes   Dressing/Bathing bathing difficulty, requires equipment;bathing difficulty, assistance 1 person;dressing difficulty, assistance 1 person   Toileting issues yes   Toileting Assistance toileting difficulty, requires equipment;toileting difficulty, assistance 1 person   Fall history within last six months yes   Number of times patient has fallen within last six months 1   Change in Functional Status Since Onset of Current Illness/Injury yes   General Information   Referring Physician Adam Singh   Patient/Family Therapy Goal Statement (OT) return home to, build strength and increase ADL I   Additional Occupational Profile Info/Pertinent History of Current Problem Fuentes Estrada is a 66 year old male admitted on 6/4/2021. 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 His hospital  course there was complicated by left large hemorrhagic pleural effusion s/p thora complicated by small apical pneumothorax and biliary drain fall out. He was transferred to [facility] for further multidisciplinary care.   Existing Precautions/Restrictions fall   General Observations and Info pt with supportive SO at bedside.    Cognitive Status Examination   Orientation Status orientation to person, place and time   Affect/Mental Status (Cognitive) flat/blunted affect   Memory Deficit moderate deficit   Cognitive Status Comments further testing required.    Visual Perception   Visual Impairment/Limitations WFL   Sensory   Sensory Quick Adds No deficits were identified   Range of Motion Comprehensive   General Range of Motion no range of motion deficits identified   Strength Comprehensive (MMT)   General Manual Muscle Testing (MMT) Assessment other (see comments)   Comment, General Manual Muscle Testing (MMT) Assessment overall deconditioning in B UE,  strength significantly diminished.    Coordination   Coordination Comments pt with poor fine motor control, pt unable to accurately manage sivlerware.    Bed Mobility   Comment (Bed Mobility) pt refusing OOB   Activities of Daily Living   BADL Assessment/Intervention upper body dressing   Upper Body Dressing Assessment/Training   Pettis Level (Upper Body Dressing) maximum assist (25% patient effort)   Instrumental Activities of Daily Living (IADL)   IADL Comments SO manages all basic ADL/IADL. SO amnages medications.    Clinical Impression   Criteria for Skilled Therapeutic Interventions Met (OT) yes   OT Diagnosis decreased ADL I   Assessment of Occupational Performance 5 or more Performance Deficits   Identified Performance Deficits dressing, bathing, toileting, G/H, leisure.    Planned Therapy Interventions (OT) ADL retraining;IADL retraining;cognition;strengthening;transfer training;home program guidelines;progressive activity/exercise;risk factor education    Clinical Decision Making Complexity (OT) low complexity   Therapy Frequency (OT) 5x/week   Predicted Duration of Therapy 2 2weeks   Risk & Benefits of therapy have been explained evaluation/treatment results reviewed;care plan/treatment goals reviewed;risks/benefits reviewed;current/potential barriers reviewed;participants voiced agreement with care plan;participants included;patient;spouse/significant other   Comment-Clinical Impression Pt presents to OT with decreased cognitive skills and general deconditioning leading to decreased ADL I. Pt endorsing wanting to increase ADL I, however, upon educatoin pt with limited particiaption in therapy today.    OT Discharge Planning    OT Discharge Recommendation (DC Rec) Home with assist;home with home care occupational therapy;Transitional Care Facility   OT Rationale for DC Rec pending progress.    OT Brief overview of current status  Pt refusing OOB today. Pt with overall deconidtioning and at risk of further decompensation.    Total Evaluation Time (Minutes)   Total Evaluation Time (Minutes) 4

## 2021-06-06 NOTE — PROGRESS NOTES
"   06/06/21 0910   Quick Adds   Type of Visit Initial PT Evaluation   Living Environment   People in home spouse   Current Living Arrangements house   Home Accessibility no concerns   Transportation Anticipated family or friend will provide   Living Environment Comments Pt reports no PAXTON and lives in one level home. Pt has walk-in shower. Pt states his spouse works from home and is able to provide assist as needed.   Self-Care   Usual Activity Tolerance moderate   Current Activity Tolerance fair   Regular Exercise   (Pt reports he was receiving  PT.)   Equipment Currently Used at Home raised toilet seat;walker, rolling   Activity/Exercise/Self-Care Comment Pt states he uses FWW for mobility. Pt reports his wife assists with all ADLs.   Disability/Function   Hearing Difficulty or Deaf no   Wear Glasses or Blind yes   Vision Management Reading glasses.   Fall history within last six months yes   Number of times patient has fallen within last six months 1   Change in Functional Status Since Onset of Current Illness/Injury yes   General Information   Onset of Illness/Injury or Date of Surgery 06/04/21   Referring Physician Adam Singh MD    Patient/Family Therapy Goals Statement (PT) Pt does not state goal.   Pertinent History of Current Problem (include personal factors and/or comorbidities that impact the POC) Per chart \"Fuentes Estrada is a 66 year old male admitted on 6/4/2021. 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 His hospital course there was complicated by left large hemorrhagic pleural effusion s/p thora complicated by small apical pneumothorax and biliary drain fall out. He was transferred to [facility] for further multidisciplinary care.\"   Existing " Precautions/Restrictions fall   General Observations Activity - up ad yonas.   Cognition   Orientation Status (Cognition) disoriented to;time   Affect/Mental Status (Cognition) flat/blunted affect   Follows Commands (Cognition) WNL   Pain Assessment   Patient Currently in Pain No   Posture    Posture Forward head position;Protracted shoulders   Range of Motion (ROM)   ROM Comment B LE ROM WFL.   Strength   Strength Comments B LE strength at least >3+/5; gross functional weakness/deconditioning present.   Bed Mobility   Comment (Bed Mobility) Pt completes supine <> sit with Antonino.   Transfers   Transfer Safety Comments Pt transfers sit <> stand with FWW and CGA-Antonino; pending surface height.   Gait/Stairs (Locomotion)   Comment (Gait/Stairs) Pt ambulates with FWW and CGA-SBA at slow gait speed with decreased step length/height B. Pt with forward flexed posture and downward gaze.   Balance   Balance Comments Pt requires FWW and CGA-SBA for standing balance.   Clinical Impression   Criteria for Skilled Therapeutic Intervention yes, treatment indicated   PT Diagnosis (PT) Impaired functional mobility   Influenced by the following impairments Weakness, impaired balance, decreased activity tolerance   Functional limitations due to impairments Bed mobility, transfers, gait, functional endurance   Clinical Presentation Evolving/Changing   Clinical Presentation Rationale Clinical judgement   Clinical Decision Making (Complexity) moderate complexity   Therapy Frequency (PT) 5x/week   Predicted Duration of Therapy Intervention (days/wks) 1 week   Planned Therapy Interventions (PT) balance training;bed mobility training;gait training;home exercise program;patient/family education;postural re-education;strengthening;transfer training   Risk & Benefits of therapy have been explained evaluation/treatment results reviewed;care plan/treatment goals reviewed;risks/benefits reviewed;patient   PT Discharge Planning    PT Discharge  Recommendation (DC Rec) home with assist;home with home care physical therapy   PT Rationale for DC Rec Pending progress in therapies and level of support available at home - anticipate pt will be able to return home with assist +  PT when medically cleared for discharge.   Total Evaluation Time   Total Evaluation Time (Minutes) 8

## 2021-06-06 NOTE — PLAN OF CARE
[id number]:   Neuro: Alert, disoriented to time (knew month stated it was 2001) otherwise oriented. Forgetful. Flat affect.   Cardiac: SR with HR 60s-80s. AVSS.             Respiratory: O2 sats stable on RA  GI/: voiding spontaneously. BM x1 this shift.   Diet/appetite: regular diet, PRN zofran given x1 for nausea. Minimal appetite.   Activity:  Ax1 w/ walker   Pain: denies pain this shift.   Skin: No new deficits noted. Gall bladder drain site dressing, CDI.   LDA's: port infusing TKO between abx. PIVx2 SL.   Labs: K 3.6, mg 1.8, no replacement needed. Phos 2.4, replacement ordered, recheck tomorrow AM.       Plan: Continue with POC. Notify primary team with changes.

## 2021-06-07 NOTE — PLAN OF CARE
[id number]:   Neuro: Alert, disoriented to time (knew month stated it was 2001) otherwise oriented. Forgetful. Flat affect.   Cardiac: SR with HR 60s-80s. AVSS.             Respiratory: O2 sats stable on RA  GI/: due to void. No BM this shift, last BM 6/5  Diet/appetite: regular diet. Little to no appetite, educated on importance of nutrition for healing but pt still refused protein drinks and any other snacks offered.   Activity:  Ax1 w/ walker   Pain: denies pain this shift.   Skin: No new deficits noted. Gall bladder drain site dressing, CDI.   LDA's: port infusing TKO between abx. PIVx2 SL.   Labs: phos 2.4, replacement ordered, recheck tomorrow AM.      Plan: Continue with POC. Notify primary team with changes.

## 2021-06-07 NOTE — PLAN OF CARE
Neuro: A&Ox3, forgetful, flat affect  Cardiac: SR 60s-80s. VSS.   Respiratory: Sating 94% on RA, on 2L NC per pulm request. LLL dim.  GI/: Adequate urine output. BM X2. Declining to save output.   Diet/appetite: Regular diet, calorie counts. Ate 1/2 bowl of cereal, 1/2 brownie and 1/2 slice of apple pie this shift.  Activity:  A1 w walker to BR, generalized weakness  Pain: Denies  Skin: Blanchable erythema to coccyx, mepiplex in place  LDA's: Port, PIV x 2     Plan: Phos being replaced, recheck in AM. Continue with POC. Notify primary team with changes.

## 2021-06-07 NOTE — PROGRESS NOTES
"Calorie Count    Intake recorded for: 6/6/2021  Total Kcals: 625 Total Protein: 20g    Kcals from Hospital Food: 625   Protein: 20g    Kcals from Outside Food (average):0 Protein: 0g    # Meals Ordered from Kitchen: 2 meals ordered    # Meals Recorded: 1 recorded (50% pineapple)    # Supplements Recorded: 250% Ensure Clear    Post-it note on calorie counts sheet says \"bar from cafeteria w/ butter,\" but no intake percentage listed, and type of bar not specified. Epic food record shows intake @ 0845, 1203, & 1534 w/ notes stating \"small amount of fruit.\" Pt only ordered fruit once on 6/6, and that intake is recorded elsewhere Not enough information to accurately calculate nutritionals.               "

## 2021-06-07 NOTE — PROGRESS NOTES
"Pulmonary progress note    S: no acute events overnight; patient's breathing feels at baseline    O:   Vital signs:  Temp: 97.4  F (36.3  C) Temp src: Oral BP: 108/72 Pulse: 63   Resp: 16 SpO2: 95 % O2 Device: None (Room air) Oxygen Delivery: 2 LPM Height: 185.4 cm (6' 1\") Weight: 75.5 kg (166 lb 7.2 oz)  Estimated body mass index is 21.96 kg/m  as calculated from the following:    Height as of this encounter: 1.854 m (6' 1\").    Weight as of this encounter: 75.5 kg (166 lb 7.2 oz).      Intake/Output Summary (Last 24 hours) at 6/7/2021 0822  Last data filed at 6/7/2021 0400  Gross per 24 hour   Intake 1405 ml   Output --   Net 1405 ml         Exam:   General: asleep in bed upon arrival; awakens to voice  Neuro: speech clear  Pulm: lung sounds coarse/distant on the left, clear on right, patient on room air breathing normally  CV: RRR  Abd: soft, non-tender  Skin: no rashes or jaundice    Imaging:   None new yet    A/P: 66 year old man with metastatic cholangiocarcinoma and chronic L pleural effusion who presents with continued effusion s/p thoracentesis at outside hospital with resultant small pneumothorax. Patient is currently asymptomatic from both the effusion and the pneumothorax.    -left pneumothorax appears significantly smaller this morning  -no indication for L chest tube placement or therapeutic thoracentesis from our perspective-- it is likely that this represents a sympathetic effusion from his ascites-- tapping to test for cytology is a decision we would leave up to the primary/oncology teams  -pulmonology will sign off, please call with questions    Augie Diop MD  SICU fellow  Pulmonary consults service       Attending note:  Patient seen, examined and discussed with Dr. Diop. All data reviewed. Agree with the assessment and plan as outlined in the above note.  Resolved left sided pneumothorax.  Etiology of left sided effusion is not clear, likely from recurrent ascites.  Can see if can " Left message for patient to call back.     add biochemical labs, cell count, and cytology to recent pleural fluid.    April Decker MD  [phone]

## 2021-06-07 NOTE — PHARMACY-ADMISSION MEDICATION HISTORY
Admission Medication History Completed by Pharmacy    See [facility] Admission Navigator for allergy information, preferred outpatient pharmacy, prior to admission medications and immunization status.     Medication History Sources:     MAR from [facility], dispense history    Changes made to PTA medication list (reason):    Added: Enoxaparin 80mg daily, spironolactone 25mg BID    Deleted: NaCl flush, prochlorperazine, sildenafil, oxycodone    Changed: Meropenem to 1g q8H    Additional Information:    Patient placed on enoxaparin 80mg daily (not full treatment dosing intentional), last rivaroxaban dose was on6/1. Lisinopril noted to have been on hold at OSH.    Patient recently on clopidogrel which was stopped at 5/22 admission and patient was started on rivaroxaban.    Prior to Admission medications    Medication Sig Last Dose Taking? Auth Provider   atorvastatin (LIPITOR) 40 MG tablet Take 40 mg by mouth At Bedtime  Past Week at Unknown time Yes Reported, Patient   dronabinol (MARINOL) 5 MG capsule Take 2 capsules in am and 1 capsule in pm (before meals) Past Week at Unknown time Yes Leeanne Lake APRN CNP   enoxaparin ANTICOAGULANT (LOVENOX) 80 MG/0.8ML syringe Inject 80 mg Subcutaneous daily 6/4/2021 at 1709 Yes Unknown, Entered By History   fenofibrate (TRIGLIDE/LOFIBRA) 160 MG tablet Take 160 mg by mouth At Bedtime  Past Week at Unknown time Yes Reported, Patient   Ferrous Sulfate (IRON) 325 (65 Fe) MG tablet Take 1 tablet by mouth 3 times daily (with meals) Past Month at Unknown time Yes Hussain Irizarry DO   furosemide (LASIX) 20 MG tablet Take 1 tablet (20 mg) by mouth daily 6/4/2021 at Unknown time Yes Leeanne Lake APRN CNP   lactobacillus rhamnosus, GG, (CULTURELL) capsule Take 1 capsule by mouth daily Past Month at Unknown time Yes Unknown, Entered By History   lisinopril (ZESTRIL) 5 MG tablet Take 5 mg by mouth every evening  Past Month at HOLD Yes Amanda Li APRN CNP    LORazepam (ATIVAN) 0.5 MG tablet Take 1 tablet (0.5 mg) by mouth every 4 hours as needed (Anxiety, Nausea/Vomiting or Sleep) Past Month at Unknown time Yes Aydin Beard MD   magnesium gluconate 30 MG tablet Take 1 tablet (30 mg) by mouth daily Past Week at Unknown time Yes Aydin Beard MD   MEROPENEM IV Inject 1 g into the vein every 8 hours  6/4/2021 at 1204 Yes Reported, Patient   ondansetron (ZOFRAN) 8 MG tablet Take 1 tablet (8 mg) by mouth every 8 hours as needed (Nausea/Vomiting) Past Month at Unknown time Yes Aydin Beard MD   rivaroxaban ANTICOAGULANT (XARELTO) 15 MG TABS tablet Take 15 mg by mouth 2 times daily 6/1/2021 at Unknown time Yes Reported, Patient   spironolactone (ALDACTONE) 25 MG tablet Take 25 mg by mouth 2 times daily 6/4/2021 at 1737 Yes Unknown, Entered By History   vitamin D3 (CHOLECALCIFEROL) 250 mcg (10525 units) capsule Take 1 capsule by mouth daily Past Week at Unknown time Yes Unknown, Entered By History   zolpidem (AMBIEN) 10 MG tablet Take 1 tablet (10 mg) by mouth nightly as needed for sleep Past Week at 2118 Yes Aydin Beard MD   mirtazapine (REMERON) 7.5 MG tablet take 1 tablet by mouth every evening Unknown at Unknown time  Reported, Patient   tadalafil (CIALIS) 10 MG tablet Take 10 mg by mouth daily as needed Unknown at Unknown time  Unknown, Entered By History       Date completed: 06/07/21    Medication history completed by: Roxana Anand [facility]

## 2021-06-07 NOTE — CONSULTS
"    Interventional Radiology Follow-up Note    This is a 66 year old male admitted on 6/4/2021. He has hx of cholangiocarcinoma, recurrent biliary abscess and cholangitis s/p biliary drain, and recently diagnosed small PE on xarelto who underwent routine biliary drain exchange on 5/27 by IR, developed fever on 5/29 and admitted to [facility] found to have enterobacter bacteremia. His hospital course there was complicated by reported left large hemorrhagic pleural effusion s/p thora complicated by small apical pneumothorax and biliary drain fall out. Please see original IR consult note and follow-up from 6/5. Pt remains HDS. No leukocytosis. Afebrile. On IV meropenem. Resting on exam. Not conversant.     Case and imaging was reviewed with Dr. Purcell from IR. Drain replacement through old drain tract not possible at the site is no longer draining. Would require a new drain placement under CT guidance. High likelihood of being transpleural given the location of the collection which would result in infected right pleural space and may necessitate need for concomitant right chest tube placement. Recommend against drain placement at this time given stability from infection standpoint. Encourage goals of care conversation and discussion regarding quality of life with drain in place vs long term IV antibiotics. Understand from oncologic standpoint cancer is stable, but patient continues to clinically worsen. Now with large volume ascites and malnutrition from chronic illness.     Recommendations were reviewed with Dr. Singh and patient's spouse at the bedside. IR will continue to follow.    Vitals:   /82 (BP Location: Right arm)   Pulse 64   Temp 97.5  F (36.4  C) (Oral)   Resp 16   Ht 1.854 m (6' 1\")   Wt 75.5 kg (166 lb 7.2 oz)   SpO2 100%   BMI 21.96 kg/m      Pertinent Labs:     Lab Results   Component Value Date    WBC 4.4 06/07/2021    WBC 3.6 (L) 06/06/2021    WBC 3.5 (L) 06/05/2021 "       Lab Results   Component Value Date    HGB 9.2 06/07/2021    HGB 9.4 06/06/2021    HGB 9.5 06/05/2021       Lab Results   Component Value Date     06/07/2021     06/06/2021     06/05/2021       Lab Results   Component Value Date    INR 1.79 (H) 06/04/2021    PTT 37 04/03/2021       Lab Results   Component Value Date    POTASSIUM 3.6 06/07/2021        VICKI Perez CNP  Interventional Radiology   Pager: [phone]

## 2021-06-07 NOTE — PLAN OF CARE
"/75 (BP Location: Right arm)   Pulse 67   Temp 97.9  F (36.6  C) (Oral)   Resp 15   Ht 1.854 m (6' 1\")   Wt 75.5 kg (166 lb 7.2 oz)   SpO2 98%   BMI 21.96 kg/m      Neuro: A&Ox3, intermittently confused about date and place, able to make needs known, wife at bedside most of the day, flat affect and withdrawn  Cardiac: SR- 60's-70's, BP's 110's-120's-70's-80's, afebrile  Respiratory:  Maintaining adequate O2 sats via RA, towards end of shift order to put pt on 2L NC to help with PTX entered, pt currently on 2L NC  GI/: one void this shift, unmeasured- JAI with bladder scanning d/t ascites in abd, No BM this shift  Diet/appetite:  Regular diet orders with supplements, dagoberto counts 3 days starting 6/6 (see note in dagoberto count envelope for intake, nutrition slip got thrown, minimal intake), poor toleration of diet, poor appetite  Activity:  Assist of 1 up to bathroom with walker and GB  Pain: Denies Pain  Skin: blanchable redness on buttock, mepi applied, no new deficits noted  LDA's: R- Port- TKO and ABX  R- PIV: 50ml/hr LR  L-PIV: SL    Plan: Continue to encourage oral intake, Chest XR tomorrow morning, pt worked with PT and OT today, discussion about biliary tube with IR tomorrow.Nursing will continue to follow the POC and update the MD team with concerns.    France Mars RN  6B Intermediate Care Unit    "

## 2021-06-07 NOTE — PROGRESS NOTES
[facility]    Medicine Progress Note - Hospitalist Service, Gold 11       Date of Admission:  6/4/2021  Assessment & Plan         Fuentes Estrada is a 66 year old male admitted on 6/4/2021. He has hx of cholangiocarcinoma, recurrent biliary abscess and cholangitis s/p biliary drain, and recently diagnosed small PE on xarelto who underwent routine biliary drain exchange on 5/27 by IR, developed fever on 5/29 and admitted to [facility] found to have enterobacter bacteremia. His hospital course there was complicated by left large hemorrhagic pleural effusion s/p thora complicated by small apical pneumothorax and biliary drain fall out. He was transferred to [facility] for further multidisciplinary care.     Changes today:  - Appreciate discussion with IR - drainage not recommended at this time  - per pulm, defer chest tube  - continue current anti-microbials, ID following  - holding AC per onc, resumption pending  - discussion with wife and patient re: nutritional status and long term goals at bedside today.  They will consider whether more aggressive nutritional support is something they want to evaluate for (ie would GI consider GT, would he be candidate for TPN, or would short term NG/J be adequate to improve his nutritional status to a point he could take more PO or have further consideration of a procedure)  - open to discussing palliative care consult further tomorrow  - continue calorie counts    Enterobacter bacteremia  History of recurrent biliary abscess and cholangitis s/p biliary drain that fell out on 6/3  He underwent routine sinogram and drain exchange by IR on 5/27. He was admitted to [facility] on 5/29 with low grade fever, and was found to have Enterobacter coloacae bacteremia. He had a virtual visit with [facility] ID Dr Dale on 6/4. The source was felt to be liver/biliary track. His biliary drain fell out on 6/3. Agreed with continuing  Meropenem given the sensitivity. He has multiple prior history of cholangitis and since his biliary drain fell out on 6/3, he is at increased risk of decompensation.  - continue meropenem   - follow up blood culture   - ID consult  - Consult ID     Cholangiocarcinoma  Diagnosed in 2020 s/p radical extrahepatic bile duct resection and R hepatectomy in May 2020. The presence of metastatic disease was identified by EUS biopsy in August 2020. He developed an abscess and a biliary drain was placed, managed by IR. He has been having multiple cholangitis/abscess since then requiring series of antibiotics. Not currently on chemotherapy, followed by Dr Beard.  Per onc, no evidence of active disease at this point.  - oncology consult, appreciate recs     L hemorrhagic pleural effusion  Small apical pneumothorax s/p thoracentesis at OSH  He was noted to be more dyspneic with L large pleural effusion at OSH. He underwent thoracentesis at OSH on 6/1 and 500cc of hemorrhagic fluid was removed. Following the thora, he was noted to have a small apical pneumothorax.   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 He was started on xarelto. Of note, CT a/p on 4/22 showed new non-occlusive thrombus in the superior mesenteric vein, not extending to the portal vein. Per oncology note, there was no plan to treat SMV thrombosis given high risk of bleeding. On admission to OSH, he was on xarelto for PE, but this was discontinued after he was found to have hemorrhagic pleural effusion. There was a note indicating about possibly starting lovenox interim.  - hold AC for now per oncology     Anemia  Hb 8.8 at OSH on 6/4.   - continue to monitor H/H  - goal Hb >7     Hypertension  Takes aldactone 25mg, lasix  20mg daily and lisinopril 5mg daily  - continue     Malnutrition  Reported >20lbs weight loss in the setting of chronic illness  - Nutrition consult   - continue marinol  - calorie counts     Diabetes  Currently not taking any medication. A1c 5.6 in 3/2021     History of CVA  He was on plavix previously, will clarify. Regardless, hold for now in setting of recent bleed and pending procedure.     Diet: Regular Diet Adult  Calorie Counts  Snacks/Supplements Adult: Ensure Clear; With Meals  Snacks/Supplements Adult: Other; please send boost soothe; Between Meals    DVT Prophylaxis: Anti-embolisim stockings (TEDs)  Em Catheter: not present  Code Status: Full Code           Disposition Plan   Expected discharge: 4 - 7 days, recommended to prior living arrangement once antibiotic plan established.  Entered: Adam Singh MD 06/07/2021, 3:16 PM     The patient's care was discussed with the Bedside Nurse, Patient and onc/IR Consultant.    Adam Singh MD  Hospitalist Service, [address]  Contact information available via [facility] Paging/Directory  Please see sign in/sign out for up to date coverage information  ______________________________________________________________________    Interval History   Notes mild abdominal pain.  No cough. Nausea. Poor appetite.  No diarrhea.  Wife at bedside. No change in symptoms since admission.  Discussed extensively today nutritional status, infection, malignancy, and options going forward. They will discuss further.    Data reviewed today: I reviewed all medications, new labs and imaging results over the last 24 hours. I personally reviewed no images or EKG's today.    Physical Exam   Vital Signs: Temp: 97.5  F (36.4  C) Temp src: Oral BP: 130/82 Pulse: 64   Resp: 16 SpO2: 100 % O2 Device: Nasal cannula Oxygen Delivery: 2 LPM  Weight: 166 lbs 7.16 oz  General Appearance: awake, appears cachectic  Eyes: non  icteric  HEENT: MMM  Respiratory: decreased breath sounds bilaterally, no wheeze  Cardiovascular: RRR  GI: distended, Tympanic, soft non tender  Skin: LE edema 2+ bilaterally  Musculoskeletal: no joint swelling  Neurologic: non focal  Psychiatric: flat, withdrawn    Data

## 2021-06-08 NOTE — PLAN OF CARE
Neuro: A&Ox3, disoriented to time,forgetful, flat affect, bed alarms in place  Cardiac: SR 50s-60ss. VSS.     Respiratory: Sating 94% on RA, on 2L NC per pulm request. LLL dim.  GI/: Adequate urine output. Loose, watery BM X1. Declining to save output.   Diet/appetite: Regular diet, calorie counts. Ate one bowl of cream of mushroom soup with wife's encouragement and some ensure clear.  Activity:  A1 w walker to BR, generalized weakness  Pain: Denies  Skin: Blanchable erythema to coccyx, mepiplex in place  LDA's: Port, PIV x 2      Plan: Continue with POC. Notify primary team with changes.

## 2021-06-08 NOTE — PROGRESS NOTES
Calorie Count  Intake recorded for: 6/7  Total Kcals: 434 Total Protein: 8g  Kcals from Hospital Food: 434  Protein: 8g  Kcals from Outside Food (average):0 Protein: 0g  # Meals Ordered from Kitchen: 1 meal   # Meals Recorded: 1 meal - 100% apple juice, 60% frosted mini wheats w/ whole milk, 50% apple pie  # Supplements Recorded: 0

## 2021-06-08 NOTE — PROGRESS NOTES
General ID Service: Follow-up Note      Patient:  Fuentes Estrada, Date of birth 1955, Medical record number [id number]  Date of Visit:  June 7, 2021  Reason for consult: bacteremia         Assessment and Recommendations:     ID Problem List:  1. Enterobacter bacteremia likely 2/2 biliary source  2. Recurrent biliary abscess s/p drain with potential small biliary connection to existing cavity noted on 5/27 sinogram. Drain fell out 6/3 at OSH. Now with fluid collection re-accumulation/increase on repeat CT abdomen.  3. 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 Abdominal ascites s/p paracentesis 5/27 with 4L removed  5. Suspected acute on chronic severe malnutrition  6. Pleural effusion     Recommendations:  1. Continue IV meropenem  2. Follow up repeat blood cultures  3. Agree with nutritional optimization as able  4. This infection would be highly unlikely curable with antibiotics alone (in setting of fluid re-accumulation and possible biliary fistula) without appropriate source control procedure     Discussion:  Fuentes Estrada is a 66 year old male with PMH of HTN, DM2, CVA, PE (Xarelto), and hilar cholangiocarcinoma s/p radical extrahepatic bile duct resection and R hepatectomy (5/2020) with subsequent metastatic disease (8/2020) s/p palliative gem/cis (off since 12/9/20). Course has been complicated by recurrent biliary abscess s/p drain and cholangitis. He transferred from [facility] to [facility] on 6/4 due to findings of E cloacae bacteremia for additional management.      Source is likely biliary given known history with recurrent abscess and recent drain exchange. Drain fell out while at [facility], and he had subsequent bacteremia and fluid re-accumulation-- CT AP showed increased fluid collection around the liver. IR has been consulted for  drainage and source control, but they deemed any procedure too risky from their standpoint. This infection will be unlikely curable with antibiotics alone (in setting of fluid accumulation and possible biliary fistula) without appropriate source control procedure. Additionally, on long term antibiotics he would be at high risk at developing more highly resistant intraabdominal and systemic infections, as already evidenced by this current Enterobacter bacteremia from likely biliary source that is now resistant to all cephalosporins. IV antibiotics without a source control procedure would likely only be a temporizing measure and should not be considered a permanent or even long-term solution as opposed to drain replacement.        Thank you for allowing us to participate in the care of this patient. ID will continue to follow.    Attestation:  Lakhwinder Shah MD  Infectious Diseases     06/07/21            Interval History:     Patient remains afebrile and minimal abdominal discomfort. Pulmonary evaluated patient and declined tapping L pleural effusion. IR evaluated patient and decided drainage would be too risky from their standpoint.         Review of Systems:   Full 6 point ROS obtained, pertinent positives and negatives as above.            Current Antimicrobials   IV meropenem         Physical Exam:   Ranges for vital signs:  Temp:  [97.4  F (36.3  C)-98  F (36.7  C)] 97.8  F (36.6  C)  Pulse:  [63-80] 63  Resp:  [16-18] 17  BP: (108-130)/(72-82) 125/79  SpO2:  [95 %-100 %] 98 %    Intake/Output Summary (Last 24 hours) at 6/7/2021 1919  Last data filed at 6/7/2021 1600  Gross per 24 hour   Intake 1625 ml   Output --   Net 1625 ml     Exam:  GENERAL: chronically malnourished-appearing, laying in bed in no acute distress.   ENT:  Head is normocephalic, atraumatic. Temporal wasting.  LUNGS:  Unlabored breathing.   CARDIOVASCULAR:  Regular rate.  ABDOMEN:  Mildly distended, soft, nontender.  EXT: Extremities warm  and well perfused.   SKIN:  No acute rashes.    NEUROLOGIC:  Grossly nonfocal.         Laboratory Data:     Inflammatory Markers    Recent Labs   Lab Test 04/02/21  0423 04/01/21  2044 04/01/21  0622 03/28/21  0713 03/27/21  1913 02/02/21  0632   CRP 57.0* 53.0* 55.0* 87.0* 100.0* 14.0*       Hematology Studies    Recent Labs   Lab Test 06/07/21  0454 06/06/21  0455 06/05/21  0528 06/04/21  2337 05/10/21  1558 05/06/21  1300 04/20/21  1032 04/14/21  1608 04/14/21  1608 04/02/21  0540 04/02/21  0540   WBC 4.4 3.6* 3.5* 3.3* 6.3 7.4 3.5*  --  6.3   < > 10.7   ANEU  --   --   --  1.9 4.2 5.7 2.1  --  4.2  --  9.6*   AEOS  --   --   --  0.1 0.0 0.0 0.0  --  0.1  --  0.0   HGB 9.2* 9.4* 9.5* 8.9* 10.1* 9.8* 9.4*  --  9.5*   < > 6.7*   MCV 93 94 93 94 95 92 96  --  95   < > 93   * 122* 116* 109* 199 205 166   < > 156   < > 132*    < > = values in this interval not displayed.       Metabolic Studies     Recent Labs   Lab Test 06/07/21 0454 06/06/21 0455 06/05/21 2020 06/05/21  0528 06/04/21  2337 05/10/21  1558    137  --  138 137 133   POTASSIUM 3.6 3.8 4.0 3.4 3.5 3.6   CHLORIDE 107 106  --  105 105 100   CO2 27 29  --  27 29 29   BUN 16 17  --  17 18 23   CR 0.84 0.87  --  0.85 0.93 1.02   GFRESTIMATED 90 89  --  89 84 75       Hepatic Studies    Recent Labs   Lab Test 06/07/21  0454 06/04/21  2337 05/10/21  1558 05/06/21  1300 04/20/21  1032 04/14/21  1608   BILITOTAL 3.1* 3.0* 6.8* 7.3* 3.0* 2.9*   ALKPHOS 650* 592* 928* 969* 649* 682*   ALBUMIN 1.0* 1.0* 1.4* 1.4* 1.7* 1.8*   * 113* 130* 155* 107* 75*   ALT 49 43 45 57 42 36       Microbiology:  Culture Micro   Date Value Ref Range Status   06/07/2021 No growth after 11 hours  Preliminary   06/07/2021 No growth after 11 hours  Preliminary   06/06/2021 Culture negative monitoring continues  Preliminary   06/06/2021 No growth after 1 day  Preliminary   06/06/2021 No growth after 1 day  Preliminary   06/05/2021 No growth after 2 days  Preliminary    06/05/2021 No growth after 2 days  Preliminary   06/04/2021 No growth after 2 days  Preliminary   04/05/2021 No growth  Final   04/03/2021 Culture negative after 30 days  Final   04/03/2021 Moderate growth  Enterobacter cloacae complex   (A)  Final   04/03/2021 (A)  Final    Moderate growth  Strain 2  Enterobacter cloacae complex     04/03/2021 No anaerobes isolated  Final   04/03/2021   Final    Since this specimen was not transported in the proper anaerobic transport media, the   absence of anaerobes in this culture does not rule out the presence of anaerobes in this   specimen.     04/02/2021 No growth  Final   04/02/2021 No anaerobes isolated  Final   04/02/2021 Culture negative after 4 weeks  Final   04/01/2021 No growth  Final   04/01/2021 No growth  Final   03/31/2021 No growth  Final   03/31/2021 No growth  Final   03/27/2021 No growth  Final   03/27/2021 No growth  Final   03/11/2021 No growth  Final   02/15/2021 No anaerobes isolated  Final   02/15/2021 Culture negative after 4 weeks  Final   02/15/2021 (A)  Final    Moderate growth  Streptococcus anginosus  This organism is susceptible to ampicillin, penicillin, vancomycin and the cephalosporins.   If treatment is required AND your patient is allergic to penicillin, contact the   Microbiology Lab within 5 days to request susceptibility testing.  Testing unavailable due to 's backorder.     02/15/2021   Final    Critical Value/Significant Value called to and read back by  JOSE CRUZ COHEN @[facility] 2/18/21. MONTSE     02/01/2021 No growth  Final   02/01/2021 No growth  Final   11/18/2020 No growth  Final   11/18/2020   Final    No anaerobes isolated  Since this specimen was not transported in the proper anaerobic transport media, the   absence of anaerobes in this culture does not rule out the presence of anaerobes in this   specimen.     05/16/2020 Light growth  Enterococcus faecium   (A)  Final   05/16/2020   Final    Critical Value/Significant  Value, preliminary result only, called to and read back by  Cody Wagner RN on 20 at 1141. bw     2020 No growth  Final   2020 No growth  Final   2020 No growth  Final   2020 (A)  Final    Light growth  Veillonella species  Susceptibility testing not routinely done     2020 (A)  Final    Light growth  Prevotella species  Beta lactamase positive  Susceptibility testing not routinely done     2020 Heavy growth  Enterococcus faecium   (A)  Final   2020 (A)  Final    Light growth  Streptococcus anginosus  Susceptibility testing not routinely done     2020 (A)  Final    Light growth  Coagulase negative Staphylococcus  Susceptibility testing not routinely done                  Imagin/5/21 CT chest/abdomen/pelvis  IMPRESSION:   1. Left hydropneumothorax, with small pneumatic component and moderate  to large fluid component with overlying compressive atelectasis of the  left lung. Small right pleural effusion.  2. Massive abdominal pelvic ascites, increased from comparison exam on  2021. No discrete loculated collection or abdominal abscess  identified.  3. Postsurgical changes of partial right hepatectomy with increased  size of the hypodense collection along the inferior margin of the  liver following removal of the percutaneous intercostal drain.

## 2021-06-08 NOTE — PROGRESS NOTES
[facility]    Medicine Progress Note - Hospitalist Service, Gold 11       Date of Admission:  6/4/2021  Assessment & Plan         Fuentes Estrada is a 66 year old male admitted on 6/4/2021. He has hx of cholangiocarcinoma, recurrent biliary abscess and cholangitis s/p biliary drain, and recently diagnosed small PE on xarelto who underwent routine biliary drain exchange on 5/27 by IR, developed fever on 5/29 and admitted to [facility] found to have enterobacter bacteremia. His hospital course there was complicated by left large hemorrhagic pleural effusion s/p thora complicated by small apical pneumothorax and biliary drain fall out. He was transferred to [facility] for further multidisciplinary care.     Changes today:  - continue current anti-microbials, ID following  - holding AC per onc, resumption pending  - Had discussion with patient and wife re nutrition plan, will do stepwise approach for the time being with supplements, Palliative consult for consideration of appetite stimulants and consideration of NG.  - Will revisit goals given the infection with how source control cannot be achieved  - continue calorie counts    Enterobacter bacteremia  History of recurrent biliary abscess and cholangitis s/p biliary drain that fell out on 6/3  He underwent routine sinogram and drain exchange by IR on 5/27. He was admitted to [facility] on 5/29 with low grade fever, and was found to have Enterobacter coloacae bacteremia. He had a virtual visit with [facility] ID Dr Dale on 6/4. The source was felt to be liver/biliary track. His biliary drain fell out on 6/3. Agreed with continuing Meropenem given the sensitivity. He has multiple prior history of cholangitis and since his biliary drain fell out on 6/3, he is at increased risk of decompensation.  - continue meropenem   - follow up blood culture   - ID consult, appreciate  recommendations     Cholangiocarcinoma  Diagnosed in 2020 s/p radical extrahepatic bile duct resection and R hepatectomy in May 2020. The presence of metastatic disease was identified by EUS biopsy in August 2020. He developed an abscess and a biliary drain was placed, managed by IR. He has been having multiple cholangitis/abscess since then requiring series of antibiotics. Not currently on chemotherapy, followed by Dr Beard.  Per onc, no evidence of active disease at this point.  - oncology consult, appreciate recs     L hemorrhagic pleural effusion  Small apical pneumothorax s/p thoracentesis at OSH  He was noted to be more dyspneic with L large pleural effusion at OSH. He underwent thoracentesis at OSH on 6/1 and 500cc of hemorrhagic fluid was removed. Following the thora, he was noted to have a small apical pneumothorax.   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 He was started on xarelto. Of note, CT a/p on 4/22 showed new non-occlusive thrombus in the superior mesenteric vein, not extending to the portal vein. Per oncology note, there was no plan to treat SMV thrombosis given high risk of bleeding. On admission to OSH, he was on xarelto for PE, but this was discontinued after he was found to have hemorrhagic pleural effusion. There was a note indicating about possibly starting lovenox interim.  - hold AC for now per oncology     Anemia  Hb 8.8 at OSH on 6/4.   - continue to monitor H/H  - goal Hb >7     Hypertension  Takes aldactone 25mg, lasix 20mg daily and lisinopril 5mg daily  - continue     Malnutrition  Reported >20lbs weight loss in the setting of chronic illness  - Nutrition consult   - Palliative consult  - Patient and wife considering NG for tube feeds  -  continue marinol  - calorie counts     Diabetes  Currently not taking any medication. A1c 5.6 in 3/2021     History of CVA  He was on plavix previously, will clarify. Regardless, hold for now in setting of recent bleed and pending procedure.     Diet: Regular Diet Adult  Calorie Counts  Snacks/Supplements Adult: Ensure Clear; With Meals  Snacks/Supplements Adult: Other; please send boost soothe; Between Meals  Snacks/Supplements Adult: Magic Cup; Between Meals  Snacks/Supplements Pediatric: Ensure Enlive; Between Meals    DVT Prophylaxis: Anti-embolisim stockings (TEDs)  Em Catheter: not present  Code Status: Full Code           Disposition Plan   Expected discharge: 4 - 7 days, recommended to prior living arrangement once antibiotic plan established and nutrition plan established.  Entered: Liana Rehman MD 06/08/2021, 1:03 PM     The patient's care was discussed with the Bedside Nurse, Patient and Oncology Consultant.    Liana Rehman MD  Hospitalist Service, [address]  Contact information available via [facility] Paging/Directory  Please see sign in/sign out for up to date coverage information  ______________________________________________________________________    Interval History   Nursing notes reviewed, no acute events overnight.  No pain.  Feeling a little hungry. Open to trying different supplements and appetite stimulants.  Feeling tired.    Data reviewed today: I reviewed all medications, new labs and imaging results over the last 24 hours. I personally reviewed no images or EKG's today.    Physical Exam   Vital Signs: Temp: 97.6  F (36.4  C) Temp src: Oral BP: 107/71 Pulse: 57   Resp: 15 SpO2: 99 % O2 Device: Nasal cannula Oxygen Delivery: 2 LPM  Weight: 167 lbs 12.32 oz  General Appearance: awake, appears cachectic  Eyes: non icteric  HEENT: MMM  Respiratory: decreased breath sounds bilaterally, no wheeze  Cardiovascular: RRR  GI:  NABS, soft, NT, distended  Skin: LE edema 2+ bilaterally  Musculoskeletal: no joint swelling  Neurologic: Alert, interactive  Psychiatric: flat, withdrawn    Data

## 2021-06-08 NOTE — PLAN OF CARE
Neuro: A&Ox4, forgetful at times, flat affect. Afebrile. Slept most of shift.  Cardiac: SR. HR 60-70's. VSS.   Respiratory: Sating >92% on 2L NC, sats greater than 92% on RA, but per bedside report, pulmonary team wants patient to keep 2L oxygen on.   GI/: Adequate urine output, loose BM X1.   Diet/appetite: Tolerating regular diet with calorie counts. Very poor appetite.   Activity:  Assist of 1 with walker and gait belt, up to bathroom.    Pain: Denies, at acceptable level on current regimen.   Skin: No new deficits noted.  LDA's: Right chest port, left and right PIVs-saline locked.     Plan: Continue with POC. Notify primary team with changes.

## 2021-06-08 NOTE — PROGRESS NOTES
Hematology / Oncology  Daily Progress Note   Date of Service: 06/08/2021  Patient: Fuentes Estrada  MRN: [id number]  Admission Date: 6/4/2021  Hospital Day # 4  Cancer Diagnosis: Metastatic cholangiocarcinoma  Primary Outpatient Oncologist: Dr. Beard  Current Treatment Plan: Has not been on treatment since 12/2020     Summary & Recommendations:   - Discussed nutritional status with wife. They will discuss if patient is amendable to NG tube. We would like to start some form of supplemental nutrition in the near future. Unsure if PEG would be possible given recurrent ascites. If family wishes to pursue supplemental nutrition, consider GI and IR consult for various forms of PEG tube placement.   - We will continue to follow and discuss options and goals of care as conversations progress.   - Continue to hold Xarelto.    Assessment & Plan:   Fuentes Estrada is a 66 year old male with a past medical history of HTN, DM, CVA on Plavix, and metastatic cholangiocarcinoma, who has been off any chemotherapy since December 2020 and who has recurrent issues with biliary infection/abscesses requiring recurrent procedures and drainage. He recently underwent a biliary drain exchange on 5/27/2021. He developed a fever 2 days later and was admitted to the hospital in [facility]. He was found to have blood cultures positive for Enterobacter cloacae (which he has grown out multiple times) and is currently on meropenem.    #Metastatic cholangiocarcinoma, not currently on cancer directed therapy  See initial consult note 6/5 for full oncologic history. In brief, patient presented with elevated LFTs in the spring of 2020. 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 On 5/12/20 he underwent a radical extrahepatic bile duct resection and R hepatectomy with negative margins and one positive lymph node. He had a complicated postoperative course with a bile leak and abscess that had delayed starting his adjuvant chemotherapy. In  "7/2020, metastatic disease confirmed in LN. In 8/2020, he initiated treatment with palliative intent cis/gem. He continued to have issues with abscess/biliary drain, recurrent ascites.   - He has not had any treatment since 12/2020 d/t infectious issues and unsure if this will be able to change in the near future  - Dr. Beard notified of admission  - We will arrange oncologic follow up when discharge is imminent    #Enterobacter cloacae bacteremia  #Recurrent right upper quadrant/biliary fluid collections, abscesses  #Severe malnutrition secondary to acute on chronic illness  - Appreciate infectious diseases involvement given his complex ID history and current Enterobacter cloacae bacteremia    Continue IV meropenem    \"This infection would be highly unlikely curable with antibiotics alone (in setting of fluid re-accumulation and possible biliary fistula) without appropriate source control procedure\"  - IR recommends against against drain replacement. \"Drain replacement through old drain tract not possible at the site is no longer draining. Would require a new drain placement under CT guidance. High likelihood of being transpleural given the location of the collection which would result in infected right pleural space and may necessitate need for concomitant right chest tube placement. Recommend against drain placement at this time given stability from infection standpoint.\"  - Discussed with wife, Robyn, on 6/8 that we are in a tough spot with infection and nutrition. They will discuss if patient would be amendable to NG placement for TF.    #Small PE  #History of SMV thrombosis  - Do not recommend resumption of anticoagulation as current risks outweigh benefits    Patient was seen and plan of care was discussed with attending physician Dr. Molina.    Thank you for the opportunity to partake in this patient's plan of care. Please do not hesitate to page with questions. We will continue to follow.     Mary " "JULES Gaona   Hematology/Oncology   Pager: [phone]  ___________________________________________________________________    Subjective & Interval History:    No acute events noted overnight. Patient is sleeping comfortably during our visit. Discussion with wife regarding difficult situation that patient is in from an ID and nutrition standpoint. She states, \"I don't know how we got to this point.\" When patient wakes up, they will discuss if he is amendable to NG tube, as PEG tube is unlikely to be possible with recurrent ascites. Wife's questions were answered at bedside.     Physical Exam:    Blood pressure 107/71, pulse 57, temperature 97.6  F (36.4  C), temperature source Oral, resp. rate 15, height 1.854 m (6' 1\"), weight 76.1 kg (167 lb 12.3 oz), SpO2 99 %.    General: lying in bed asleep, no acute distress  Resp: normal respiratory effort on ambient air  MSK: warm and well-perfused, normal tone  Skin: no rashes on limited exam, no jaundice    Labs & Studies: I personally reviewed the following studies:  ROUTINE LABS (Last four results):  CMP  Recent Labs   Lab 06/08/21  0439 06/07/21  0454 06/06/21  0455 06/05/21 2020 06/05/21  0528 06/05/21  0111 06/04/21  2337    139 137  --  138  --  137   POTASSIUM 3.5 3.6 3.8 4.0 3.4  --  3.5   CHLORIDE 109 107 106  --  105  --  105   CO2 28 27 29  --  27  --  29   ANIONGAP 3 4 2*  --  5  --  2*   GLC 78 89 94  --  90  --  142*   BUN 15 16 17  --  17  --  18   CR 0.77 0.84 0.87  --  0.85  --  0.93   GFRESTIMATED >90 90 89  --  89  --  84   GFRESTBLACK >90 >90 >90  --  >90  --  >90   ERIC 7.4* 8.1* 8.0*  --  7.8*  --  7.9*   MAG  --  1.8 1.8  --  1.8  --  1.8   PHOS 2.6 2.4* 2.4*  --   --  2.2* 2.3*   PROTTOTAL  --  6.8  --   --   --   --  6.4*   ALBUMIN  --  1.0*  --   --   --   --  1.0*   BILITOTAL  --  3.1*  --   --   --   --  3.0*   ALKPHOS  --  650*  --   --   --   --  592*   AST  --  150*  --   --   --   --  113*   ALT  --  49  --   --   --   --  43 "     CBC  Recent Labs   Lab 06/08/21  0439 06/07/21  0454 06/06/21  0455 06/05/21  0528   WBC 3.6* 4.4 3.6* 3.5*   RBC 2.90* 2.96* 3.05* 3.06*   HGB 9.0* 9.2* 9.4* 9.5*   HCT 27.1* 27.4* 28.6* 28.4*   MCV 93 93 94 93   MCH 31.0 31.1 30.8 31.0   MCHC 33.2 33.6 32.9 33.5   RDW 18.6* 18.9* 19.2* 18.9*   * 116* 122* 116*     INR  Recent Labs   Lab 06/04/21  2337   INR 1.79*     Medications list for reference:  Current Facility-Administered Medications   Medication     acetaminophen (TYLENOL) tablet 650 mg     atorvastatin (LIPITOR) tablet 40 mg     cholecalciferol (VITAMIN D3) 125 mcg (5000 units) capsule 250 mcg     dronabinol (MARINOL) capsule 5 mg     fenofibrate (TRIGLIDE/LOFIBRA) tablet 160 mg     heparin 100 UNIT/ML injection 5 mL     heparin lock flush 10 UNIT/ML injection 5-10 mL     heparin lock flush 10 UNIT/ML injection 5-10 mL     lactobacillus rhamnosus (GG) (CULTURELL) capsule 1 capsule     lidocaine (LMX4) cream     lidocaine 1 % 0.1-1 mL     lisinopril (ZESTRIL) tablet 5 mg     LORazepam (ATIVAN) tablet 0.5 mg     magnesium gluconate (MAGONATE) tablet 500 mg     Medication Instruction     melatonin tablet 1 mg     meropenem (MERREM) 1 g vial to attach to  mL bag     mirtazapine (REMERON) tablet TABS 7.5 mg     multivitamin w/minerals (THERA-VIT-M) tablet 1 tablet     ondansetron (ZOFRAN) injection 8 mg    Or     ondansetron (ZOFRAN-ODT) ODT tab 8 mg    Or     ondansetron (ZOFRAN) tablet 8 mg     sodium chloride (PF) 0.9% PF flush 10-20 mL     sodium chloride (PF) 0.9% PF flush 10-20 mL     sodium chloride (PF) 0.9% PF flush 3 mL     sodium chloride (PF) 0.9% PF flush 3 mL     thiamine (B-1) tablet 100 mg

## 2021-06-08 NOTE — CONSULTS
Care Management Initial Consult    General Information  Assessment completed with: Patient, Wife, Robyn  Type of CM/SW Visit: Offer D/C Planning    Primary Care Provider verified and updated as needed: Yes   Readmission within the last 30 days: no previous admission in last 30 days                Communication Assessment  Patient's communication style: spoken language (English or Bilingual)    Hearing Difficulty or Deaf: no   Wear Glasses or Blind: yes    Cognitive  Cognitive/Neuro/Behavioral: .WDL except  Level of Consciousness: alert  Arousal Level: opens eyes spontaneously  Orientation: disoriented to, time  Mood/Behavior: flat affect, cooperative  Best Language: 0 - No aphasia  Speech: spontaneous, clear, logical    Living Environment:   People in home: spouse     Current living Arrangements: house      Able to return to prior arrangements: yes       Family/Social Support:  Care provided by: spouse/significant other  Provides care for:    Marital Status:              Description of Support System: Supportive, Involved         Current Resources:   Patient receiving home care services: Yes([facility])  Skilled Home Care Services: Skilled Nursing, Physicial Therapy, Occupational Therapy  Community Resources:    Equipment currently used at home: commode chair, grab bar, toilet, grab bar, tub/shower, raised toilet seat, walker, rolling  Supplies currently used at home:      Employment/Financial:          Financial Concerns:   None identified          Lifestyle & Psychosocial Needs:        Socioeconomic History     Marital status:      Spouse name: Robyn     Number of children: 2     Years of education: 14     Highest education level: Not on file   Occupational History     Occupation: self employed     Tobacco Use     Smoking status: Never Smoker     Smokeless tobacco: Never Used   Substance and Sexual Activity     Alcohol use: Not Currently     Comment: occaisional      Drug use: No     Sexual  activity: Yes     Partners: Female       Functional Status:  Prior to admission patient needed assistance with ADLS, med management.                Values/Beliefs:  Spiritual, Cultural Beliefs, Worship Practices, Values that affect care:                 Additional Information:  Pt with metastatic cholangiocarcinoma, recurrent biliary abscess and cholangitis s/p biliary drain. Pt admitted with enterobacter bactremia, on IV abx. Palliative consult is pending.  Prior to admission Pt was living with his Wife in [facility]. [facility] has been providing intermittent skilled nursing, PT/OT visits.  I have met with Pt and his Wife, Robyn to introduce myself and care coordinator role.  I have added RN PT/OT to the discharge orders.  CC will follow.      Reyna Quigley RN   6B care coordinator #[phone]

## 2021-06-08 NOTE — CONSULTS
"[facility]  Palliative Care Consultation Note    Patient: Fuentes Estrada  Date of Admission:  6/4/2021    Requesting Clinician / Team: Dr Rehman  Reason for consult: Symptom management  Goals of care  Patient and family support    Recommendations:    Anorexia will increase marinol 10mg BID to see if that helps boost his appetite. When marinol was increased in October to 5mg in the morning and 2.5mg at night, Fuentes did have a slight increase in his appetite.     Consider increased mirtazapine to help with appetite.     NG trial - Fuentes's wife, Robyn, spoke with Dr Rehman this morning and wants to talk with Fuentes farfan about an NG tube trial and after she talks to Fuentes, she then might want me to add to the discussion but she luis like to have the initial discussion alone.     Goals: Fuentes continues to tell Robyn that he \"is not done\" and he wants to stay around and he wants to continue to fight.          Thank you for the opportunity to participate in the care of this patient and family. Our team: will continue to follow.     During regular M-F work hours -- if you are not sure who specifically to contact -- please contact us by sending a text page to our team consult pager at [phone].    After regular work hours and on weekends/holidays, you can call our answering service at [phone]. Also, who's on call for us is available in [facility] Smart Web.     Radha Carson MD / Palliative Medicine / Pager [phone]     Total time spent was 80 minutes spent in reviewing record, patient examination and family counseling, discussion with primary team and documentation.  >50% of time was spent counseling and/or coordination of care regarding symptom management, plan of care, goals of care.      Assessments:  Fuentes Estrada is a 66 year old male with a past medical history significant for cholangiocarcinoma, recurrent biliary abscess and cholangitis s/p biliary drain, and " "recently diagnosed small PE on xarelto who underwent routine biliary drain exchange on 5/27 by IR, developed fever on 5/29 and admitted to [facility] found to have enterobacter bacteremia. His hospital course there was complicated by left large hemorrhagic pleural effusion s/p thora complicated by small apical pneumothorax and biliary drain fall out. He was transferred to [facility] on 6/4 for further multidisciplinary care. Palliative care was consulted for symptom management, support, goals.    Today, the patient was seen for:  Cholangiocarcinoma  Biliary abscess  Anorexia  FTT  Goals of care  Support       Prognosis, Goals, & Planning:      Functional Status just prior to hospitalization: 2 (Ambulatory and capable of all selfcare but unable to carry out any work activities; may need help with IADLs up and about > 50% of waking hours)      Prognosis, Goals, and/or Advance Care Planning were addressed today: Yes   Fuentes's goals are to \"keep fighting\". He \"isn't ready to give up\". He wants to be around longer, be with his family. His parents lived into their 90s and he is hoping to live longer.       Patient's decision making preferences: not assessed          Patient has decision-making capacity today for complex decisions: Yes            I have concerns about the patient/family's health literacy today: No           Patient has a completed Health Care Directive: Yes, and on file.      Code status: Full Code    Coping, Meaning, & Spirituality:   Mood, coping, and/or meaning in the context of serious illness were addressed today: Yes  The most important thing to Fuentes and what gives him the most support is being with his family. He grew up Gnosticism and when his parents were alive, he would go to [employer] but no longer regularly attends [employer].     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 Misses it a lot.     History of Present Illness:  History gathered today from: family/loved ones, medical chart, medical team members    Fuentes Estrada is a 66 year old male with a past medical history significant for cholangiocarcinoma, recurrent biliary abscess and cholangitis s/p biliary drain, and recently diagnosed small PE on xarelto who underwent routine biliary drain exchange on 5/27 by IR, developed fever on 5/29 and admitted to [facility] found to have enterobacter bacteremia. His hospital course there was complicated by left large hemorrhagic pleural effusion s/p thora complicated by small apical pneumothorax and biliary drain fall out. He was transferred to [facility] on 6/4 for further multidisciplinary care.    Dx with cholangiocarcinoma in 4/2020 and then had surgery in 5/2020 and had to stay here at the  alone (no visitors allowed due to covid) and that was really hard. Last chemo was in 12/2020 and since then, dealing with one infection after the next and it feels like each infection takes more out of him and he isn't able to get back to the baseline he was at before the infections. All the antibiotics caused him to loose appetite and caused funny taste of food. His wife didn't realize how much weight he had lost until his daughter visited in 3/2021 and she was shocked by how much weight he had lost.     ROS:  Besides above, a complete 10+ ROS was reviewed and is unremarkable      Past Medical History:  Past Medical History:   Diagnosis Date     Cerebrovascular accident (CVA) (H) 12/2010     Cholangiocarcinoma (H)      Diabetes (H)      Essential hypertension, benign     Hypertension, Benign     Nonspecific abnormal results of  liver function study     Hx of elevated LFT's        Past Surgical History:  Past Surgical History:   Procedure Laterality Date     ENDOSCOPIC RETROGRADE CHOLANGIOPANCREATOGRAM N/A 3/31/2020    Procedure: ENDOSCOPIC RETROGRADE CHOLANGIOPANCREATOGRAPHY, WITH with biliary sphincterotomy, biopsies and brushings, biliary stent placement;  Surgeon: Win Strauss MD;  Location: [facility] OR     ENDOSCOPIC RETROGRADE CHOLANGIOPANCREATOGRAM N/A 4/6/2020    Procedure: ENDOSCOPIC RETROGRADE CHOLANGIOPANCREATOGRAPHY;  Surgeon: Win Strauss MD;  Location: [facility] OR     ENDOSCOPIC RETROGRADE CHOLANGIOPANCREATOGRAM WITH SPYGLASS  4/16/2020    Procedure: ENDOSCOPIC RETROGRADE CHOLANGIOPANCREATOGRAPHY, WITH DIRECT DUCT VISUALIZATION, USING PANCREATICOBILIARY FIBEROPTIC PROBE; Bile Duct Stent Exchange and Biopsy,balloon sweep of bile duct;  Surgeon: Win Strauss MD;  Location: [facility] OR     ENDOSCOPIC ULTRASOUND UPPER GASTROINTESTINAL TRACT (GI) N/A 4/16/2020    Procedure: ENDOSCOPIC ULTRASOUND, ESOPHAGOSCOPY / UPPER GASTROINTESTINAL TRACT (GI)with Fine Needle biopsy;  Surgeon: Cody Baumann MD;  Location: [facility] OR     ENDOSCOPIC ULTRASOUND UPPER GASTROINTESTINAL TRACT (GI) N/A 8/11/2020    Procedure: ENDOSCOPIC ULTRASOUND, ESOPHAGOSCOPY / UPPER GASTROINTESTINAL TRACT (GI);  Surgeon: Guru Bailey Rossi MD;  Location: [facility] GI     ENTEROSCOPY SMALL BOWEL N/A 2/2/2021    Procedure: Enteroscopy small bowel;  Surgeon: Chiki Swan MD;  Location: [facility] OR     ESOPHAGOSCOPY, GASTROSCOPY, DUODENOSCOPY (EGD), COMBINED N/A 4/6/2020    Procedure: ESOPHAGOGASTRODUODENOSCOPY (EGD);  Surgeon: Win Strauss MD;  Location: [facility] OR     ESOPHAGOSCOPY, GASTROSCOPY, DUODENOSCOPY (EGD), COMBINED N/A 8/11/2020    Procedure: Esophagogastroduodenoscopy, With Fine Needle Aspiration Biopsy, With Endoscopic Ultrasound Guidance;  Surgeon: Guru Bailey Rossi MD;  Location: [facility] GI     EXPLORE  COMMON BILE DUCT N/A 5/12/2020    Procedure: extra-hepatic bile duct resection;  Surgeon: Oswaldo Hale MD;  Location: [facility] OR     HC KNEE SCOPE, DIAGNOSTIC  1995    Arthroscopy, Knee; left     HC VASECTOMY UNILAT/BILAT W POSTOP SEMEN      Vasectomy     HEPATECTOMY PARTIAL N/A 5/12/2020    Procedure: Exploratory Laparotomy, Open right hepatectomy, Radical Extra-Hepatic Bile Duct Resection, Portal Lymph Node Dissection, Intraoperative Ultrasound, Intra Air-Cholangiogram ,Bianca-En-Y Left Hepaticojejunostomy, Excision Skin Lesion;  Surgeon: Oswaldo Hale MD;  Location: [facility] OR     INSERT PORT VASCULAR ACCESS Right 9/28/2020    Procedure: ultrasound guided right internal venous access port placement with flouroscopy;  Surgeon: Fercho Wayne DO;  Location: [facility] OR     IR ABSCESS TUBE CHANGE  6/12/2020     IR FOLLOW UP VISIT OUTPATIENT  7/24/2020     IR FOLLOW UP VISIT OUTPATIENT  8/3/2020     IR FOLLOW UP VISIT OUTPATIENT  10/19/2020     IR PARACENTESIS  5/27/2021     IR SINOGRAM INJECTION DIAGNOSTIC  7/14/2020     IR SINOGRAM INJECTION DIAGNOSTIC  9/14/2020     IR SINOGRAM INJECTION DIAGNOSTIC  10/13/2020     IR SINOGRAM INJECTION DIAGNOSTIC  3/1/2021     IR SINOGRAM INJECTION DIAGNOSTIC  3/25/2021     IR SINOGRAM INJECTION DIAGNOSTIC  4/22/2021     IR SINOGRAM INJECTION DIAGNOSTIC  5/27/2021     IR SINOGRAM INJECTION THERAPEUTIC  8/25/2020     IR SINOGRAM INJECTION THERAPEUTIC  9/25/2020     IR SINOGRAM INJECTION THERAPEUTIC  10/2/2020     IR SINOGRAM INJECTION THERAPEUTIC  9/18/2020     IR THORACENTESIS  5/16/2020     LYMPHADENECTOMY ABDOMINAL N/A 5/12/2020    Procedure: portal lymph node dissection, intraoperative liver ultrasound;  Surgeon: Oswaldo Hale MD;  Location: [facility] OR         Family History:  Family History   Problem Relation Age of Onset     Arthritis Mother         RA     Diabetes Father         diet controlled     Hypertension Father          Allergies:  Allergies   Allergen Reactions  "    Metformin Other (See Comments)     \" I think my kidneys shut down\"        Medications:  I have reviewed this patient's medication profile and medications from this hospitalization.   Noted scheduled meds are:  marinol 5mg BID  Mirtazapine 7.5mg at bedtime      Physical Exam:  Vital Signs: Temp: 97.6  F (36.4  C) Temp src: Oral BP: 107/71 Pulse: 57   Resp: 15 SpO2: 99 % O2 Device: Nasal cannula Oxygen Delivery: 2 LPM  Weight: 167 lbs 12.32 oz  Gen: lying in bed, awake, cachetic, appears chronically ill, in NAD  Eyes: Conjunctiva clear. Sclera anicteric   HENT: NCAT; +temporal wasting, mucous membranes slightly dry  Resp: no increased work of breathing, speaking full sentences  Msk: no gross deformity, + sarcopenia  Skin:  no jaundice  Neuro: A&O x 3; CN II-XII grossly intact;   Mental status/Psych: alert, able to engage; sensorium intact    Data reviewed:  Reviewed recent labs and pertinent imaging  GFR > 90  Platelets 113      "

## 2021-06-09 NOTE — PLAN OF CARE
Neuro: A&Ox4. Flat affect.   Cardiac: SR. VSS. Afebrile.    Respiratory: Sating >95% on 2L NC.  GI/: Adequate urine output. BM X1  Diet/appetite: Poor appetite. Needing encouragement.   Activity:  Assist of 1, up to chair and in halls.  Pain: At acceptable level on current regimen. Denies of pain.   Skin: Frail skin. No new deficits noted.  LDA's: Port TKO and PIV SL     Plan: Continue with POC. Notify primary team with changes.

## 2021-06-09 NOTE — PLAN OF CARE
"NEURO: A&O x4, flat affect  VITALS: Blood pressure 114/87, pulse 70, temperature 97.5  F (36.4  C), temperature source Oral, resp. rate 16, height 1.854 m (6' 1\"), weight 77.4 kg (170 lb 10.2 oz), SpO2 97 %.  PAIN: Denies  CARDIAC: SR/SB  RESPIRATORY: 2L NC, LS clear  GI: Bowel sounds active, no BM this shift, very poor PO intake, NG placed for nutritional support, placement confirmed with KUB, waiting for pump to start TF  : Adequate UOP  LDA: PIV & Port a cath  IV: port a cath TKO for abx and PIV saline locked  ACTIVITY: Up with Ax1  GOALS: Start TF    "

## 2021-06-09 NOTE — PROGRESS NOTES
"CLINICAL NUTRITION SERVICES - REASSESSMENT NOTE     Nutrition Prescription    RECOMMENDATIONS FOR MDs/PROVIDERS TO ORDER:  Order electrolyte replacements     Malnutrition Status:    Severe malnutrition in the context of acute on chronic illness     Recommendations already ordered by Registered Dietitian (RD):  Once tube is placed and in confirmed position- start osmolite 1.5 @ 10 mL/hr and advance by 10 mL q 8 hours to goal   --Hold advancement if K<3, Mg<1.5 or Po4 <2, keep TF at current volume and replace lytes  Osmolite 1.5 Russell @ goal of  60ml/hr  (1440ml/day)  will provide: 2160 kcals (27 kcal/kg), 90 g PRO (1.1 g/kg), 1097 ml free H20, 293 g CHO, and 0 g fiber daily.  Water flush of 60 mL q 4 hours     Future/Additional Recommendations:  Monitor tolerance of enteral nutrition initiation- labs 2/2 risk of refeeding and ability to reach goal volume  Continue to monitor ability to take PO - goal for TF + enteral nutrition to meet 100% of estimated needs      EVALUATION OF THE PROGRESS TOWARD GOALS   Diet: Regular + Ensure enlive between meals + Boost Soothe  Intake: 0-100%, mostly 25-50%     Calorie Count:   6/6         Total Kcals: 625       Total Protein: 20g- some items not documented   6/7         Total Kcals: 434       Total Protein: 8g  6/8         Total Kcals: 540       Total Protein: 12g  Average intake per data received 533 kcal and 13 g protein     NEW FINDINGS   Received consult \"Order TF per MNT guidelines\"   Plan to place an NG today     Weight Trends:  06/09/21 0517 77.4 kg (170 lb 10.2 oz)   06/08/21 0313 76.1 kg (167 lb 12.3 oz)   06/06/21 0412 75.5 kg (166 lb 7.2 oz)   06/05/21 0431 78.1 kg (172 lb 1.6 oz)   06/04/21 2114 78.2 kg (172 lb 4.8 oz)     GI: Last bowel movement, 6/9    Skin: No new deficits     Labs: Po4 2.4 (L), Total Bili 3.1 (H)- Direct bili 2.6 (H)   Medications: Cholecalciferol, culturell, Magonate, Remeron, Thera-Vit-M     MALNUTRITION  % Intake: </= 50% for >/= 5 days (severe) + " severely reduced intake prior to admission  % Weight Loss: > 2% in 1 week (severe)  Subcutaneous Fat Loss: Facial region:  Severe   Muscle Loss: severe global loss   Fluid Accumulation/Edema: None noted  Malnutrition Diagnosis: Severe malnutrition in the context of acute on chronic illness     Previous Goals   Patient to consume % of nutritionally adequate meal trays TID, or the equivalent with supplements/snacks.  Evaluation: Not met    Previous Nutrition Diagnosis  Inadequate oral intake related to acute on chronic illness, poor appetite as evidenced by severe fat and muscle wasting, reported very poor intake.     Evaluation:Modified     CURRENT NUTRITION DIAGNOSIS  Inadequate oral intake related to acute on chronic illness, poor appetite as evidenced by severe fat and muscle wasting, poor intake per calorie count.       INTERVENTIONS  Implementation  Collaboration with other providers  Enteral Nutrition - Initiate  Feeding tube flush    Goals  Total avg nutritional intake to meet a minimum of 25 kcal/kg and 1 g PRO/kg daily (per dosing wt 78 kg).    Monitoring/Evaluation  Progress toward goals will be monitored and evaluated per protocol.    Keiry Clark RD, KEN  6B pager: [phone]

## 2021-06-09 NOTE — PLAN OF CARE
A/O X4, flat affect. Able to make needs known, uses call light appropriately. HR SB/SR 50-60's, VSS; afebrile; Lung sounds clear/dim on 2L of O2 per NC with sats maintained above 95%. adequate urine output. No bm this shift. Regular diet, poor appetite, needing encouragement. Denies pain. Right port TKO. Right  piv saline locked. phos replaced, re check for tomorrow. Wife at bedside. Will continue to  follow plan of care.

## 2021-06-09 NOTE — PROGRESS NOTES
[facility]  Palliative Care Daily Progress Note       Recommendations & Counseling       Anorexia: continue increased marinol 10mg BID for now. Pt tried mirtazapine in past and had adverse rxn to higher dose of 15mg (very restless at night with vivid dreams). Lower dose of 7.5 did not affect him adversely. Will continue that for now and consider stopping it and adding zyprexa, low dose, at night to help with appetite and sleep. Pt also has medical cannabis at home that he can use once discharged.     Fuentes agreed to trial of NG tube. Will be placed within coming day.     Fuentes having a very difficult time sleeping in the hospital because of the noise and interuptions. At home he feels he sleeps well.     Goals: Fuentes wants to do everything to continue living.        Thank you for the opportunity to continue to participate in the care of this patient and family.  Please feel free to contact on-call palliative provider with any emergent needs.  We can be reached via team pager [phone] (answered 8-4:30 Monday-Friday); after-hours answering service ([phone]);     Radha Carson MD / Palliative Medicine / Pager [phone]     Total time spent was 40 minutes spent in reviewing record, patient examination and family counseling, discussion with primary team and documentation. >50% of time was spent counseling and/or coordination of care regarding symptom management, plan of care.     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 His hospital course there was complicated by left large hemorrhagic pleural effusion s/p thora complicated  by small apical pneumothorax and biliary drain fall out. He was transferred to [facility] on 6/4 for further multidisciplinary care. Palliative care was consulted for symptom management, support, goals.      Today, the patient was seen for:  Cholangiocarcinoma  Biliary abscess  Anorexia  FTT  Support       Prognosis, Goals, or Advance Care Planning was addressed today with: No.     Mood, coping, and/or meaning in the context of serious illness were addressed today: Yes.              Interval History:     Chart review/discussion with unit or clinical team members:   No acute events over night    Per patient or family/caregivers today:  Fuentes is frustrated having to stay in the hospital. He wants to go home. No appetite. Not able to sleep due to interruptions and noise.    Key Palliative Symptoms:     # Dyspnea severity the last 12 hours: none  # Nausea severity the last 12 hours: none             Review of Systems:     Besides above, a complete 10+ ROS was reviewed and is unremarkable           Medications:     I have reviewed this patient's medication profile and medications during this hospitalization.    Noted meds:    Dronabinol 10mg BID (increased from 5mg BID on 6/8)  Mirtazapine 7.5mg at bedtime    zofran PRN            Physical Exam:   Vitals were reviewed  Temp: 98  F (36.7  C) Temp src: Oral BP: 125/79 Pulse: 63   Resp: 16 SpO2: 100 % O2 Device: Nasal cannula Oxygen Delivery: 2 LPM  Gen: sitting in bedside chair, cachetic, appears chronically ill, in NAD   Eyes: Conjunctiva clear. Sclera anicteric .  HENT: NCAT; +temporal wasting, mucous membranes slightly dry  Resp: no increased work of breathing, speaking full sentences  Skin:  no jaundice  Neuro: A&O x 3; CN II-XII grossly intact;   Mental status/Psych: able to engage, frustrated; sensorium intact             Data Reviewed:     Reviewed recent labs and pertinent imaging

## 2021-06-09 NOTE — PROGRESS NOTES
[facility]    Medicine Progress Note - Hospitalist Service, Gold 11       Date of Admission:  6/4/2021  Assessment & Plan         Fuentes Estrada is a 66 year old male admitted on 6/4/2021. He has hx of cholangiocarcinoma, recurrent biliary abscess and cholangitis s/p biliary drain, and recently diagnosed small PE on xarelto who underwent routine biliary drain exchange on 5/27 by IR, developed fever on 5/29 and admitted to [facility] found to have enterobacter bacteremia. His hospital course there was complicated by left large hemorrhagic pleural effusion s/p thora complicated by small apical pneumothorax and biliary drain fall out. He was transferred to [facility] for further multidisciplinary care.     Changes today:  - continue current anti-microbials, ID following  - holding AC per onc, resumption pending  - NG placement today with start of tube feeds.  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 He was admitted to [facility] on 5/29 with low grade fever, and was found to have Enterobacter coloacae bacteremia. He had a virtual visit with [facility] ID Dr Dale on 6/4. The source was felt to be liver/biliary track. His biliary drain fell out on 6/3. Agreed with continuing Meropenem given the sensitivity. He has multiple prior history of cholangitis and since  his biliary drain fell out on 6/3, he is at increased risk of decompensation.  - continue meropenem   - follow up blood culture   - ID consult, appreciate recommendations     Cholangiocarcinoma  Diagnosed in 2020 s/p radical extrahepatic bile duct resection and R hepatectomy in May 2020. The presence of metastatic disease was identified by EUS biopsy in August 2020. He developed an abscess and a biliary drain was placed, managed by IR. He has been having multiple cholangitis/abscess since then requiring series of antibiotics. Not currently on chemotherapy, followed by Dr Beard.  Per onc, no evidence of active disease at this point.  - oncology consult, appreciate recs     L hemorrhagic pleural effusion  Small apical pneumothorax s/p thoracentesis at OSH  He was noted to be more dyspneic with L large pleural effusion at OSH. He underwent thoracentesis at OSH on 6/1 and 500cc of hemorrhagic fluid was removed. Following the thora, he was noted to have a small apical pneumothorax.   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 He was started on xarelto. Of note, CT a/p on 4/22 showed new non-occlusive thrombus in the superior mesenteric vein, not extending to the portal vein. Per oncology note, there was no plan to treat SMV thrombosis given high risk of bleeding. On admission to OSH, he was on xarelto for PE, but this was discontinued after he was found to have hemorrhagic pleural effusion. There was a note indicating about possibly starting lovenox interim.  - hold AC for now per oncology     Anemia  Hb 8.8 at OSH on 6/4.   - continue to monitor H/H  - goal Hb >7     Hypertension  Takes aldactone 25mg, lasix 20mg daily and lisinopril 5mg daily  - continue     Malnutrition  Reported  >20lbs weight loss in the setting of chronic illness  - Nutrition consult   - Palliative consult  - NG with tube feeds (may need to use supplements like Ensure or other nutritious liquids if cost of tube feeds is too much as an outpatient)  - Increasing dose of marinol per Palliative  - Family is looking into medical marijuana  - calorie counts     Diabetes  Currently not taking any medication. A1c 5.6 in 3/2021     History of CVA  He was on plavix previously, will clarify. Regardless, hold for now in setting of recent bleed and pending procedure.     Diet: Regular Diet Adult  Snacks/Supplements Adult: Ensure Clear; With Meals  Snacks/Supplements Adult: Other; please send boost soothe; Between Meals  Snacks/Supplements Pediatric: Ensure Enlive; Between Meals  Adult Formula Drip Feeding: Continuous Osmolite 1.5; Nasogastric tube; Goal Rate: 60; mL/hr; Medication - Feeding Tube Flush Frequency: At least 15-30 mL water before and after medication administration and with tube clogging; Amount to Send (Nutr...    DVT Prophylaxis: Anti-embolisim stockings (TEDs)  Em Catheter: not present  Code Status: Full Code           Disposition Plan   Expected discharge: 2-4 days, recommended to prior living arrangement once antibiotic plan established and nutrition plan established and no risk of refeeding syndrome.  Entered: Liana Rehman MD 06/09/2021, 5:30 PM     The patient's care was discussed with the Bedside Nurse, Patient and Oncology, Palliative Consultant.    Liana Rehman MD  Hospitalist Service, [address]  Contact information available via [facility] Paging/Directory  Please see sign in/sign out for up to date coverage information  ______________________________________________________________________    Interval History   Nursing notes reviewed, no acute events overnight.  No pain, no nausea, no dyspnea.  Interested in trying NG with tube feeds.   Wants to leave the hospital as soon as possible.    Data reviewed today: I reviewed all medications, new labs and imaging results over the last 24 hours. I personally reviewed no images or EKG's today.    Physical Exam   Vital Signs: Temp: 97.5  F (36.4  C) Temp src: Oral BP: 114/87 Pulse: 70   Resp: 16 SpO2: 97 % O2 Device: Nasal cannula Oxygen Delivery: 2 LPM  Weight: 170 lbs 10.18 oz  General Appearance: awake, appears cachectic  Eyes: Faint icterus bilaterally, EOMI  HEENT: MMM  Respiratory: decreased breath sounds bilaterally, no wheeze  Cardiovascular: RRR  GI: NABS, soft, NT, distended  Skin: LE edema 2+ bilaterally  Musculoskeletal: no joint swelling  Neurologic: Alert, interactive  Psychiatric: Full affect    Data

## 2021-06-09 NOTE — PLAN OF CARE
"NEURO: A&O x4, flat affect  VITALS: Blood pressure 126/86, pulse 69, temperature 98.1  F (36.7  C), temperature source Oral, resp. rate 16, height 1.854 m (6' 1\"), weight 76.1 kg (167 lb 12.3 oz), SpO2 96 %.  PAIN: Denies  CARDIAC: SR  RESPIRATORY: 2L NC  GI: Bowel sounds active, no BM, poor PO intake  : Adequate UOP  LDA: PIV, port a cath  IV: Port TKO and PIV saline locked  ACTIVITY: Not OOB this shift, repositions independently in bed  GOALS: Rest    "

## 2021-06-09 NOTE — PROGRESS NOTES
Calorie Count  Intake recorded for: 6/8  Total Kcals: 540 Total Protein: 12g  Kcals from Hospital Food: 540  Protein: 12g  Kcals from Outside Food (average):0 Protein: 0g  # Meals Ordered from Kitchen: 1 meal   # Meals Recorded: 1 meal - 100% mushroom soup, 50% apple pie, vitality water  # Supplements Recorded: 100% 1 Ensure Clear

## 2021-06-09 NOTE — PROGRESS NOTES
"PALLIATIVE CARE SOCIAL WORK Progress Note   [facility] [address]    REFERRAL SOURCE: Palliative team    Initial contact made with wife Robyn today by phone. Introduced role of PCSW as part of palliative team and visit set up for tomorrow morning at 1100am.   Robyn continues to stay at [facility] locally so she can be with Fuentes daily; couple's home is [place] hours north in [place], [place]. 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   Robyn notes that she continues to be amazed by Fuentes's mood and outlook as he continues to state, \"I'm not going anywhere\" referring to his desire to heal from his cancer and infections.        Plan: PCSW will plan to visit Fuentes and Robyn later morning 6/10 for continued assessment of support and adjustment to illness needs.    Dolores Mckinley [facility]  Palliative Care   Pager [phone]    [facility] Inpatient Team Consult pager [phone] (M-F 8-4:30)  After-hours Answering Service [phone]      "

## 2021-06-10 NOTE — PROGRESS NOTES
[facility] PROGRESS NOTE     Patient:  Fuentes Estrada   YOB: 1955, MRN: [id number]  Date of Visit: 06/10/2021  Date of Admission: 6/4/2021  Consult Requester: Liana Rehman, *          Assessment and Recommendations:   ID Problem List:  1. Enterobacter bacteremia likely 2/2 biliary source (BC + 5/29)  2. Recurrent biliary abscess s/p drain with potential small biliary connection to existing cavity noted on 5/27 sinogram. Drain fell out 6/3 at OSH.  3. 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 Abdominal ascites s/p paracentesis 5/27 with 4L removed  5. Acute on chronic severe malnutrition, albumin 1.0  6. Pleural effusion  7. DM2    Recommendations:  1. Continue IV meropenem 1g q8h  2. Follow up repeat blood cultures - no growth thus far  3. Agree with nutritional optimization as able  4. This infection would be highly unlikely curable with antibiotics alone (in setting of fluid re-accumulation and possible biliary fistula) without appropriate source control procedure.    Discussion:  Fuentes Estrada is a 66 year old male with PMH of HTN, DM2, CVA, PE (Xarelto), and hilar cholangiocarcinoma s/p radical extrahepatic bile duct resection and R hepatectomy (5/2020) with subsequent metastatic disease (8/2020) s/p palliative gem/cis (off since 12/9/20). Course has been complicated by recurrent biliary abscess s/p drain and cholangitis. He transferred from [facility] to [facility] on 6/4 due to findings of E cloacae bacteremia for additional management.      Source is likely biliary given known history with recurrent abscess and recent drain exchange. Drain fell out while at [facility], and he has fluid re-accumulation as seen on CT AP  That showed increased fluid collection around the liver. IR has been consulted for  drainage and source control, but they deemed any procedure too risky from their standpoint. This infection will be unlikely curable with antibiotics alone (in setting of fluid accumulation and possible biliary fistula) without appropriate source control procedure. Additionally, on long term antibiotics he would be at high risk at developing more highly resistant intraabdominal and systemic infections, as already evidenced by this current Enterobacter bacteremia from likely biliary source that is now resistant to all cephalosporins. IV antibiotics without a source control procedure would likely only be a temporizing measure and should not be considered a permanent or even long-term solution as opposed to drain replacement.     Thank you for the consult. ID will continue to follow with you.    Blessing Dangelo PA-C   Pronouns: she/her/hers  Infectious Diseases  Pager: [phone]  06/10/2021         Interval History and Events:   Afebrile. NG tube placed yesterday accidentally removed by patient overnight. Attempting to replace at bedside this AM. Fuentes reports no abdominal pain today. Tolerating small amount of oral intake. Robyn reports he recently had food craving for hot dog, has not been having food cravings for a long time prior.          Antimicrobial Treatment:   - meropenem (started at OSH)         Review of Systems:   Targeted 4 point ROS was completed with pertinent positives and negatives are detailed above.         HPI:   Adopted from initial consult note on 6/5:    Fuentes Estrada is a 66 year old male with PMH of HTN, DM2, CVA, PE (Xarelto), and hilar cholangiocarcinoma s/p radical extrahepatic bile duct resection and R hepatectomy (5/2020) with subsequent metastatic disease (8/2020) s/p palliative gem/cis (off since 12/9/20). Course has been complicated by biliary abscess s/p drain and cholangitis. He transferred from [facility] to [facility] on 6/4 due to findings of E cloacae bacteremia for additional  management.      On 5/27, IR preformed paracentesis with 4L removed. Then sinogram was done which did not reveal biliary fistula, but on later review there was possible small amount of contrast in the small bowel suggesting small biliary connection to existing cavity. Drain was exchanged and planned to return in 1 month for sinogram. On 5/29 patient developed temp to 99.1 and malaise. He went to local [facility] where BC taken from Port was positive for a resistant Enterobacter cloacae (sensitivities included in transfer paperwork). No paired peripheral BC was collected. Current source of bacteremia was felt to be liver/biliary tract. Of note, during admission March-April 2021 E cloacae was cultured from old drain, which made it unclear whether it reflected colonization vs infection. He was covered with Bactrim as well as Augmentin for Strep anginosus also isolated from abdominal fluid cultures during that admission (antibiotics stopped 4/16 by Dr. Dale outpatient). He also underwent thoracentesis of pleural effusion which was complicated by small pneumothorax. His biliary drain also fell out on 6/3. He had virtual visit with Dr. Dale on 6/4 who urged transfer to [facility] for additional specialist care.      Majority of history provided by patient's wife, Robyn, who confirmed above history. Reports in the past patient has been unable to tolerate capping trials of abdominal drain and plan from IR was to just leave it in place despite minimal output. Robyn also notes recent blood tinged drainage from drain. Xarelto is currently on hold in part due to this. She is also worried about poor nutritional status for some months. She also wonders if this might be exacerbated by ascites.             Physical Examination:   Temp:  [97.5  F (36.4  C)-98  F (36.7  C)] 97.7  F (36.5  C)  Pulse:  [60-70] 60  Resp:  [16-18] 18  BP: (113-127)/(72-87) 113/72  SpO2:  [97 %-100 %] 99 %    I/O last 3 completed shifts:  In: 1287  [P.O.:957; I.V.:120; NG/GT:200]  Out: -     Vitals:    06/04/21 2114 06/05/21 0431 06/06/21 0412 06/08/21 0313   Weight: 78.2 kg (172 lb 4.8 oz) 78.1 kg (172 lb 1.6 oz) 75.5 kg (166 lb 7.2 oz) 76.1 kg (167 lb 12.3 oz)    06/09/21 0517   Weight: 77.4 kg (170 lb 10.2 oz)       Constitutional: Chronically malnourished appearing adult male seen lying in bed, NAD.   HEENT: NC/AT, temporal wasting, EOMI, sclera clear, conjunctiva normal, OP with MMM  Respiratory: No increased work of breathing  GI: somewhat distended, non-tender.  Neurologic: A&O. Answers questions appropriately, speech normal.  Neuropsychiatric: Calm. Affect appropriate to situation.  Vascular access:  Port on right chest CDI, non-tender, no surrounding erythema.         Medications:       atorvastatin  40 mg Oral At Bedtime     cholecalciferol  250 mcg Oral Daily     dronabinol  10 mg Oral BID     fenofibrate  160 mg Oral At Bedtime     heparin  5 mL Intracatheter Q28 Days     heparin lock flush  5-10 mL Intracatheter Q24H     lactobacillus rhamnosus (GG)  1 capsule Oral Daily     lisinopril  5 mg Oral QPM     magnesium gluconate  500 mg Oral BID     meropenem  1 g Intravenous Q8H     mirtazapine  7.5 mg Oral At Bedtime     multivitamin w/minerals  1 tablet Oral Daily     sodium chloride (PF)  3 mL Intracatheter Q8H       Antiinfectives:  Anti-infectives (From now, onward)    Start     Dose/Rate Route Frequency Ordered Stop    06/04/21 2300  meropenem (MERREM) 1 g vial to attach to  mL bag      1 g  over 30 Minutes Intravenous EVERY 8 HOURS 06/04/21 2238            Infusions/Drips:    dextrose       - MEDICATION INSTRUCTIONS -              Laboratory Data:     Microbiology:  Culture Micro   Date Value Ref Range Status   06/07/2021 No growth after 3 days  Preliminary   06/07/2021 No growth after 3 days  Preliminary   06/06/2021 Culture negative monitoring continues  Preliminary   06/06/2021 No growth after 4 days  Preliminary   06/06/2021 No growth  after 4 days  Preliminary   06/05/2021 No growth after 5 days  Preliminary   06/05/2021 No growth after 5 days  Preliminary   06/04/2021 No growth after 5 days  Preliminary   04/05/2021 No growth  Final   04/03/2021 Culture negative after 30 days  Final   04/03/2021 Moderate growth  Enterobacter cloacae complex   (A)  Final   04/03/2021 (A)  Final    Moderate growth  Strain 2  Enterobacter cloacae complex     04/03/2021 No anaerobes isolated  Final   04/03/2021   Final    Since this specimen was not transported in the proper anaerobic transport media, the   absence of anaerobes in this culture does not rule out the presence of anaerobes in this   specimen.         Inflammatory Markers    Recent Labs   Lab Test 04/02/21  0423 04/01/21 2044 04/01/21 0622 03/28/21  0713 03/27/21 1913 02/02/21  0632   CRP 57.0* 53.0* 55.0* 87.0* 100.0* 14.0*       Metabolic Studies       Recent Labs   Lab Test 06/10/21  0632 06/09/21  0543 06/08/21  0439 06/08/21  0439 06/07/21  0454 06/06/21  0455 06/05/21 2020 06/05/21  0528 04/02/21  0109 04/02/21 0109 04/01/21 2129 03/27/21 1913 03/27/21 1913    139  --  140 139 137  --  138   < >  --   --    < > 129*   POTASSIUM 3.5 3.5  --  3.5 3.6 3.8 4.0 3.4   < >  --   --    < > 4.1   CHLORIDE 107 108  --  109 107 106  --  105   < >  --   --    < > 96   CO2 26 27  --  28 27 29  --  27   < >  --   --    < > 28   ANIONGAP 3 4  --  3 4 2*  --  5   < >  --   --    < > 5   BUN 17 14  --  15 16 17  --  17   < >  --   --    < > 22   CR 0.93 0.74  --  0.77 0.84 0.87  --  0.85   < >  --   --    < > 0.98   GFRESTIMATED 84 >90  --  >90 90 89  --  89   < >  --   --    < > 79   * 75  --  78 89 94  --  90   < >  --   --    < > 124*   A1C  --   --   --   --   --   --   --   --   --   --   --   --  5.6   ERIC 7.3* 7.6*  --  7.4* 8.1* 8.0*  --  7.8*   < >  --   --    < > 9.2   PHOS 2.6 2.4*  --  2.6 2.4* 2.4*  --   --    < >  --   --    < > 2.7   MAG 1.8 1.6   < >  --  1.8 1.8  --  1.8   < >   --   --    < > 1.7   LACT  --   --   --   --   --   --   --   --   --  2.1* 4.1*  --  1.1    < > = values in this interval not displayed.       Hepatic Studies    Recent Labs   Lab Test 06/09/21  0543 06/07/21  0454 06/04/21  2337 05/10/21  1558 05/06/21  1300 04/20/21  1032   BILITOTAL 3.1* 3.1* 3.0* 6.8* 7.3* 3.0*   ALKPHOS 672* 650* 592* 928* 969* 649*   ALBUMIN 1.0* 1.0* 1.0* 1.4* 1.4* 1.7*   * 150* 113* 130* 155* 107*   ALT 56 49 43 45 57 42       Pancreatitis testing    Recent Labs   Lab Test 04/01/21  2044 03/27/21  1913 02/02/21  0632 02/01/21  1638 04/16/20  0851 04/07/20  2202 04/07/20  0604 04/06/20  0613 03/31/20  1300 03/31/20  0902   AMYLASE  --   --  75  --  164* 302*  --  76 169* 191*   LIPASE 102 123 196 111 585* 2,301* 3,722* 830* 2,170* 2,520*       Hematology Studies      Recent Labs   Lab Test 06/10/21  0632 06/09/21  0543 06/08/21  0439 06/07/21  0454 06/06/21  0455 06/05/21  0528 06/04/21  2337 05/10/21  1558 05/06/21  1300 04/20/21  1032 04/14/21  1608 04/14/21  1608 04/02/21  0540 04/02/21  0540   WBC 3.5* 5.1 3.6* 4.4 3.6* 3.5* 3.3* 6.3 7.4 3.5*  --  6.3   < > 10.7   ANEU  --   --   --   --   --   --  1.9 4.2 5.7 2.1  --  4.2  --  9.6*   ALYM  --   --   --   --   --   --  1.0 1.7 1.1 1.1  --  1.5  --  0.6*   JUANCHO  --   --   --   --   --   --  0.2 0.4 0.5 0.3  --  0.4  --  0.4   AEOS  --   --   --   --   --   --  0.1 0.0 0.0 0.0  --  0.1  --  0.0   HGB 8.4* 9.7* 9.0* 9.2* 9.4* 9.5* 8.9* 10.1* 9.8* 9.4*  --  9.5*   < > 6.7*   HCT 25.5* 28.9* 27.1* 27.4* 28.6* 28.4* 26.7* 29.7* 28.6* 27.9*  --  28.0*   < > 19.8*   * 144* 113* 116* 122* 116* 109* 199 205 166   < > 156   < > 132*    < > = values in this interval not displayed.       Arterial Blood Gas Testing    Recent Labs   Lab Test 04/01/21  1945 05/12/20  1646 05/12/20  1500 05/12/20  1400 05/12/20  1300 05/12/20  1100   PH  --  7.46* 7.44 7.43 7.45 7.45   PCO2  --  29* 32* 35 33* 32*   PO2  --  156* 164* 153* 159* 179*   HCO3   --  20* 22 23 23 23   O2PER 21 30 40 40 40 40        Urine Studies     Recent Labs   Lab Test 04/01/21  0956 03/28/21  1555 05/14/20  0158   URINEPH 5.5 5.5 5.5   NITRITE Negative Negative Negative   LEUKEST Negative Negative Trace*   WBCU <1 3 4       Body Fluid Studies  Recent Labs   Lab Test 06/06/21  0910 04/05/21  1540 04/02/21  1630 04/02/21  1630   FTYP Ascites Ascites  --  Ascites   FCOL Yellow Yellow  --  Yellow   FAPR Slightly Cloudy Slightly Cloudy  --  Clear   FWBC 165 42  --  80   FNEU 1 36  --  13   FLYM 72 23  --  30   FMONO 27  --   --  57   FALB 0.2  --   --   --    FTP 1.2  --   --   --    GS No organisms seen  Rare  WBC'S seen    Quantification of host cells and microbiological organisms was done on a cytocentrifuged   preparation.    --    < > No organisms seen  Many  WBC'S seen  predominantly mononuclear cells    Quantification of host cells and microbiological organisms was done on a cytocentrifuged   preparation.      < > = values in this interval not displayed.       Drug Level Monitoring  Recent Labs   Lab Test 04/04/21  0740   VANCOMYCIN 24.8       Last check of C difficile  C Diff Toxin B PCR   Date Value Ref Range Status   04/05/2021 Negative NEG^Negative Final     Comment:     Negative: C. difficile target DNA sequences NOT detected, presumed negative   for C.difficile toxin B or the number of bacteria present may be below the   limit of detection for the test.  FDA approved assay performed using [employer] GeneXpert real-time PCR.  A negative result does not exclude actual disease due to C. difficile and may   be due to improper collection, handling and storage of the specimen or the   number of organisms in the specimen is below the detection limit of the assay.         Respiratory Virus Testing    Recent Labs   Lab Test 03/27/21 2032   INFZA Negative   INFZB Negative       COVID-19 Testing  Recent Labs   Lab Test 06/04/21  2319 05/25/21  1524 04/20/21  1055 04/16/21  1549  03/21/21  1115 03/21/21  1115   [id number] Nasopharyngeal Nasopharyngeal Nasopharyngeal Nasopharyngeal   < > Nasopharyngeal   SARSCOVRES NEGATIVE NEGATIVE NEGATIVE NEGATIVE   < > NEGATIVE   [id number]  --  Nasopharyngeal Nasopharyngeal Nasopharyngeal  --  Nasopharyngeal   [id number]  --  Test received-See reflex to IDDL test SARS CoV2 (COVID-19) Virus RT-PCR Test received-See reflex to IDDL test SARS CoV2 (COVID-19) Virus RT-PCR Test received-See reflex to IDDL test SARS CoV2 (COVID-19) Virus RT-PCR  --  Test received-See reflex to IDDL test SARS CoV2 (COVID-19) Virus RT-PCR    < > = values in this interval not displayed.            Imaging:   AXR 6/9  Impression: Tip of the enteric gastric tube projects over the proximal  gastric body.    CXR 6/5  IMPRESSION:      1. Left hydropneumothorax, with small left apical pneumatic component,  and small to moderate basilar fluid component.  2. Left basilar opacities, differential includes atelectasis versus  infection.

## 2021-06-10 NOTE — CONSULTS
"Surgical Oncology Consult    We were asked by Dr. Rehman to evaluate Mr. Perez for possible surgical source control of a janelle-hepatic abscess.     Mr. Estrada is a 66 year old with a complex past medical and surgical history, most notably for cholangiocarcinoma status post extra-hepatic bile duct resection and right hepatectomy (5/2020). His postoperative course was complicated by peritoneal metastasis for which he recievd palliative gemcitabine and cisplatin. His course has been further complicated by recurrent biliary abscesses requiring multiple IR drain placements for drainage. He was admitted here on 6/4/2021 from an outside hospital with bacteremia. Since his admission infectious disease, hematology/oncology, palliative care and interventional radiology have been involved in his care. He has a recurrent janelle-hepatic abscesses for which drains have been placed and been removed.     His past medical, surgical, social, family histories has been reviewed. Medications and allergies were reviewed in Epic.     On exam,   /77 (BP Location: Left arm)   Pulse 71   Temp 96.9  F (36.1  C) (Axillary)   Resp 16   Ht 1.854 m (6' 1\")   Wt 77.4 kg (170 lb 10.2 oz)   SpO2 97%   BMI 22.51 kg/m      A cachectic appearing man in no acute distress  Awake, alert and appropriate  Non-labored breathing on room air  NGT in place, clamped, feeds not running  Full exam deferred this afternoon.     Labs and imaging reviewed.     66 year old man with metastatic cholangiocarcinoma after right hepatectomy with complicated course including biliary leak and recurrent abscesses. This is a very complex situation given his recurrent malignancy, ongoing infection as well as possibly worsening liver function and overall cachexia. At this point, there is no role for a surgical intervention  - this would be very high risk for complications without being able to offer any therapeutic benefit. Furthermore, he clinically is not sick from " these abscesses, and despite repeated attempts at drainage, there has always been minimal output. Discussed with IR and at this point seems reasonable to hold on any intervention and to continue to treat with long-term antibiotics. Appreciate involvement of IR, hematology/oncology, medicine, infectious disease and palliative care. Would recommend ongoing discussions about goals of care, etc.     Seen with Dr. Hale.     Karolyn Bethea MD  General Surgery Resident  Pager: [phone]

## 2021-06-10 NOTE — PROGRESS NOTES
[facility]  Palliative Care Daily Progress Note       Recommendations & Counseling       Anorexia - Fuentes trying NG tube feeds    Continue marinol 10mg BID    Stop mirtazapine - Fuentes tried this medication in April / May, 2021 for appetite and lower dose of 7.5mg did not help appetite and higher dose of 15mg caused significant restlessness at night along with confusion, vivid dreams and medication was stopped in May but not taken off medication list so restarted on admission.    Trial zyprexa 2.5mg at bedtime for increased appetite and improved sleep. I am slightly worried about dry mouth side effect so will follow closely.        Thank you for the opportunity to continue to participate in the care of this patient and family.  Please feel free to contact on-call palliative provider with any emergent needs.  We can be reached via team pager [phone] (answered 8-4:30 Monday-Friday); after-hours answering service ([phone]);     Radha Carson MD / Palliative Medicine / Pager [phone]     Total time spent was 35 minutes spent in reviewing record, patient examination and family counseling, discussion with primary team and documentation. >50% of time was spent counseling and/or coordination of care regarding symptom management, plan of care.     Assessments          Fuentes Estrada is a 66 year old male with a past medical history significant for cholangiocarcinoma, recurrent biliary abscess and cholangitis s/p biliary drain, and recently diagnosed small PE on xarelto who underwent routine biliary drain exchange on 5/27 by IR, developed fever on 5/29 and admitted to [facility] found to have enterobacter bacteremia. His hospital course there was complicated by left large hemorrhagic pleural effusion s/p thora complicated by small apical pneumothorax and biliary drain fall out. He was transferred to [facility] on 6/4 for further multidisciplinary care.  Palliative care was consulted for symptom management, support, goals.      Today, the patient was seen for:  Cholangiocarcinoma  Biliary abscess  Anorexia  FTT  Support     Prognosis, Goals, or Advance Care Planning was addressed today with: No.     Mood, coping, and/or meaning in the context of serious illness were addressed today: No.              Interval History:     Chart review/discussion with unit or clinical team members:   Pt pulled out NG tube at 2:30am.     Per patient or family/caregivers today:  Did not sleep last night. Does not remember pulling out NG tube. Very tired this morning.     Key Palliative Symptoms:     # Dyspnea severity the last 12 hours: none  # Nausea severity the last 12 hours: none             Review of Systems:     Besides above, a complete 10+ ROS was reviewed and is unremarkable           Medications:     I have reviewed this patient's medication profile and medications during this hospitalization.             Physical Exam:   Vitals were reviewed  Temp: 97.7  F (36.5  C) Temp src: Oral BP: 113/72 Pulse: 60   Resp: 18 SpO2: 99 % O2 Device: Nasal cannula Oxygen Delivery: 2 LPM  Gen: lying in bed, intermittently sleeping, cachetic, in NAD  Eyes: Conjunctiva clear. Sclera anicteric   HENT: NCAT; +temporal wasting, NGTube in place, mucous membranes moist  Resp: no increased work of breathing  Mental status/Psych: tired, intermittently sleeping, did not engage in conversation;             Data Reviewed:     Reviewed recent labs and pertinent imaging  QTc 453

## 2021-06-10 NOTE — PROGRESS NOTES
[facility]    Medicine Progress Note - Hospitalist Service, Gold 11       Date of Admission:  6/4/2021  Assessment & Plan         Fuentes Estrada is a 66 year old male admitted on 6/4/2021. He has hx of cholangiocarcinoma, recurrent biliary abscess and cholangitis s/p biliary drain, and recently diagnosed small PE on xarelto who underwent routine biliary drain exchange on 5/27 by IR, developed fever on 5/29 and admitted to [facility] found to have enterobacter bacteremia. His hospital course there was complicated by left large hemorrhagic pleural effusion s/p thora complicated by small apical pneumothorax and biliary drain fall out. He was transferred to [facility] for further multidisciplinary care.     Changes today:  - continue current anti-microbials, ID following  - holding AC per onc, resumption pending  - starting tube feeds today (NG fell out overnight and had to be replaced).  Reviewed that this is a short term option, insurance won't cover outpatient tube feeds and this would need to be out of pocket, otherwise can put other liquids down NG such as Ensure in order to supplement nutrition  - Appreciate Nutrition ordering tube feeds and monitoring for refeeding syndrome  - Appreciate Palliative assistance with appetite stimulants  - Consult Surg Onc on request of ID to see if there are any options for source control  - Discussed with IR regarding source control, per discussion with IR PA, staff have been hesitant to do any potentially risky procedures given his relative stability at this time.  No need for reimaging unless his clinical status changes.  - Pending ability to control his infection, will have goals of care conversation    Enterobacter bacteremia  History of recurrent biliary abscess and cholangitis s/p biliary drain that fell out on 6/3  He underwent routine sinogram and drain exchange by IR on 5/27. He was admitted to [facility] on  5/29 with low grade fever, and was found to have Enterobacter coloacae bacteremia. He had a virtual visit with [facility] ID Dr Dale on 6/4. The source was felt to be liver/biliary track. His biliary drain fell out on 6/3. Agreed with continuing Meropenem given the sensitivity. He has multiple prior history of cholangitis and since his biliary drain fell out on 6/3, he is at increased risk of decompensation.  - continue meropenem   - follow up blood culture   - ID consult, appreciate recommendations  - Surg Onc consult, appreciate recommendations  - IR consult, appreciate recommendations     Cholangiocarcinoma  Diagnosed in 2020 s/p radical extrahepatic bile duct resection and R hepatectomy in May 2020. The presence of metastatic disease was identified by EUS biopsy in August 2020. He developed an abscess and a biliary drain was placed, managed by IR. He has been having multiple cholangitis/abscess since then requiring series of antibiotics. Not currently on chemotherapy, followed by Dr Beard.  Per onc, no evidence of active disease at this point.  - oncology consult, appreciate recs     L hemorrhagic pleural effusion  Small apical pneumothorax s/p thoracentesis at OSH  He was noted to be more dyspneic with L large pleural effusion at OSH. He underwent thoracentesis at OSH on 6/1 and 500cc of hemorrhagic fluid was removed. Following the thora, he was noted to have a small apical pneumothorax.   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 He was started on xarelto. Of note, CT a/p on 4/22 showed new non-occlusive thrombus in the superior mesenteric vein, not extending to the portal vein. Per oncology note, there was no plan to treat SMV thrombosis given high risk of bleeding. On  admission to OSH, he was on xarelto for PE, but this was discontinued after he was found to have hemorrhagic pleural effusion. There was a note indicating about possibly starting lovenox interim.  - hold AC for now per oncology     Anemia  Hb 8.8 at OSH on 6/4.   - continue to monitor H/H  - goal Hb >7     Hypertension  Takes aldactone 25mg, lasix 20mg daily and lisinopril 5mg daily  - continue     Malnutrition  Reported >20lbs weight loss in the setting of chronic illness  - Nutrition consult   - Palliative consult  - NG with tube feeds (may need to use supplements like Ensure or other nutritious liquids if cost of tube feeds is too much as an outpatient)  - Appetite stimulants per Palliative  - Family is looking into medical marijuana  - calorie counts     Diabetes  Currently not taking any medication. A1c 5.6 in 3/2021     History of CVA  He was on plavix previously, will clarify. Regardless, hold for now in setting of recent bleed and pending procedures.     Diet: Regular Diet Adult  Snacks/Supplements Adult: Ensure Clear; With Meals  Snacks/Supplements Adult: Other; please send boost soothe; Between Meals  Snacks/Supplements Pediatric: Ensure Enlive; Between Meals    DVT Prophylaxis: Anti-embolisim stockings (TEDs)  Em Catheter: not present  Code Status: Full Code           Disposition Plan   Expected discharge: 2-4 days, recommended to prior living arrangement once antibiotic plan established and nutrition plan established and no risk of refeeding syndrome.  Entered: Liana Rehman MD 06/10/2021, 5:51 PM     The patient's care was discussed with the Bedside Nurse, Patient and Oncology, Palliative Consultant.    Liana Rehman MD  Hospitalist Service, [address]  Contact information available via [facility] Paging/Directory  Please see sign in/sign out for up to date coverage  information  ______________________________________________________________________    Interval History   Nursing notes reviewed, no acute events overnight.  NG fell out overnight, replaced this AM.  No nausea, pain under control.  Abdomen is distended but not causing dyspnea or early satiety or pain.    Data reviewed today: I reviewed all medications, new labs and imaging results over the last 24 hours. I personally reviewed no images or EKG's today.    Physical Exam   Vital Signs: Temp: 96.9  F (36.1  C) Temp src: Axillary BP: 108/77 Pulse: 71   Resp: 16 SpO2: 97 % O2 Device: None (Room air) Oxygen Delivery: 2 LPM  Weight: 170 lbs 10.18 oz  General Appearance: awake, appears cachectic  Eyes: Faint icterus bilaterally, EOMI  HEENT: MMM  Respiratory: decreased breath sounds bilaterally, no wheeze  Cardiovascular: RRR  GI: NABS, soft, NT, distended  Skin: LE edema 2+ bilaterally  Musculoskeletal: no joint swelling  Neurologic: Alert, interactive  Psychiatric: Flat affect    Data

## 2021-06-10 NOTE — PROGRESS NOTES
Patient is awake alert oriented stand by 1 assist. Vitals stable throughout night. Patient pulled out NG tube around 2:30 am. Notified Dr. Castellanos who is going to pass to day shift team to reassess need for feeding tube. No new orders. Will continue to monitor.

## 2021-06-10 NOTE — PROGRESS NOTES
Hematology / Oncology  Daily Progress Note   Date of Service: 06/10/2021  Patient: Fuentse Estrada  MRN: [id number]  Admission Date: 6/4/2021  Hospital Day # 6  Cancer Diagnosis: Metastatic cholangiocarcinoma  Primary Outpatient Oncologist: Dr. Beard  Current Treatment Plan: Has not been on treatment since 12/2020      Summary & Recommendations:   - Restart anticoagulation with Lovenox 40 mg daily. Will eventually increase dose to treatment dose if no e/o re-bleed.   - NG tube placed yesterday but fell out overnight. Replaced today and patient is tolerating well. This is short term solution. Recommend seeing if GI or IR would be amendable to PEG placement. Continue to optimize nutritional status. Marinol for appetite stimulation per primary team.  - Appreciate ID and primary team's management of abdominal abscess.      Assessment & Plan:   Fuentes Estrada is a 66 year old male with a past medical history of HTN, DM, CVA on Plavix, and metastatic cholangiocarcinoma, who has been off any chemotherapy since December 2020 and who has recurrent issues with biliary infection/abscesses requiring recurrent procedures and drainage. He recently underwent a biliary drain exchange on 5/27/2021. He developed a fever 2 days later and was admitted to the hospital in [facility]. He was found to have blood cultures positive for Enterobacter cloacae (which he has grown out multiple times) and is currently on meropenem.     #Metastatic cholangiocarcinoma, not currently on cancer directed therapy  See initial consult note 6/5 for full oncologic history. In brief, patient presented with elevated LFTs in the spring of 2020. 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 On 5/12/20 he underwent a radical extrahepatic bile duct resection and R hepatectomy with negative margins and one positive lymph node. He had a complicated postoperative course with a bile leak and abscess that had delayed starting his adjuvant chemotherapy. In  "7/2020, metastatic disease confirmed in LN. In 8/2020, he initiated treatment with palliative intent cis/gem. He continued to have issues with abscess/biliary drain, recurrent ascites.   - He has not had any treatment since 12/2020 d/t infectious issues and unsure if this will be able to change in the near future  - Cancer not driving current acute issues. No evidence of disease.   - Dr. Beard notified of admission  - We will arrange oncologic follow up when discharge is imminent     #Enterobacter cloacae bacteremia  #Recurrent right upper quadrant/biliary fluid collections, abscesses  #Severe malnutrition secondary to acute on chronic illness  - Appreciate infectious diseases involvement given his complex ID history and current Enterobacter cloacae bacteremia  ? Continue IV meropenem  ? \"This infection would be highly unlikely curable with antibiotics alone (in setting of fluid re-accumulation and possible biliary fistula) without appropriate source control procedure\"  - IR recommends against against drain replacement. \"Drain replacement through old drain tract not possible at the site is no longer draining. Would require a new drain placement under CT guidance. High likelihood of being transpleural given the location of the collection which would result in infected right pleural space and may necessitate need for concomitant right chest tube placement. Recommend against drain placement at this time given stability from infection standpoint.\"  - Discussed with wife, Robyn, on 6/8 that we are in a tough spot with infection and nutrition. NG placed x6/9.  - GOC: Per palliative note, Fuentes wants to do everything to continue living.      #Small PE  #History of SMV thrombosis  - Restart anticoagulation with Lovenox 40 mg daily. Will eventually increase dose to treatment dose if no e/o re-bleed.      Patient was seen and plan of care was discussed with attending physician Dr. Molina.     Thank you for the opportunity to " "partake in this patient's plan of care. Please do not hesitate to page with questions. We will continue to follow.     Mary Gaona PA-C   Hematology/Oncology   Pager: [phone]  ___________________________________________________________________    Subjective & Interval History:    NG fell out overnight. Replaced this morning and patient is tolerating well. Patient is awake this morning, responsive. Discussed plan with wife at bedside.     Physical Exam:    Blood pressure 113/72, pulse 60, temperature 97.7  F (36.5  C), temperature source Oral, resp. rate 18, height 1.854 m (6' 1\"), weight 77.4 kg (170 lb 10.2 oz), SpO2 99 %.    General: lying in bed, no acute distress  HEENT: sclera anicteric, EOMI, MMM  Neck: supple, normal ROM  Resp: normal respiratory effort on ambient air  MSK: warm and well-perfused, normal tone  Skin: no rashes on limited exam, no jaundice  Neuro: Alert and interactive, moves all extremities equally, no focal deficits    Labs & Studies: I personally reviewed the following studies:  ROUTINE LABS (Last four results):  CMP  Recent Labs   Lab 06/10/21  0632 06/09/21  0543 06/08/21  2054 06/08/21  0439 06/07/21  0454 06/04/21  2337 06/04/21  2337    139  --  140 139   < > 137   POTASSIUM 3.5 3.5  --  3.5 3.6   < > 3.5   CHLORIDE 107 108  --  109 107   < > 105   CO2 26 27  --  28 27   < > 29   ANIONGAP 3 4  --  3 4   < > 2*   * 75  --  78 89   < > 142*   BUN 17 14  --  15 16   < > 18   CR 0.93 0.74  --  0.77 0.84   < > 0.93   GFRESTIMATED 84 >90  --  >90 90   < > 84   GFRESTBLACK >90 >90  --  >90 >90   < > >90   ERIC 7.3* 7.6*  --  7.4* 8.1*   < > 7.9*   MAG 1.8 1.6 1.7  --  1.8   < > 1.8   PHOS 2.6 2.4*  --  2.6 2.4*   < > 2.3*   PROTTOTAL  --  6.6*  --   --  6.8  --  6.4*   ALBUMIN  --  1.0*  --   --  1.0*  --  1.0*   BILITOTAL  --  3.1*  --   --  3.1*  --  3.0*   ALKPHOS  --  672*  --   --  650*  --  592*   AST  --  164*  --   --  150*  --  113*   ALT  --  56  --   --  49  --  43 "    < > = values in this interval not displayed.     CBC  Recent Labs   Lab 06/10/21  0632 06/09/21  0543 06/08/21  0439 06/07/21  0454   WBC 3.5* 5.1 3.6* 4.4   RBC 2.70* 3.15* 2.90* 2.96*   HGB 8.4* 9.7* 9.0* 9.2*   HCT 25.5* 28.9* 27.1* 27.4*   MCV 94 92 93 93   MCH 31.1 30.8 31.0 31.1   MCHC 32.9 33.6 33.2 33.6   RDW 18.4* 18.6* 18.6* 18.9*   * 144* 113* 116*     INR  Recent Labs   Lab 06/04/21  2337   INR 1.79*     Medications list for reference:  Current Facility-Administered Medications   Medication     acetaminophen (TYLENOL) tablet 650 mg     atorvastatin (LIPITOR) tablet 40 mg     cholecalciferol (VITAMIN D3) 125 mcg (5000 units) capsule 250 mcg     dextrose 10% infusion     dronabinol (MARINOL) capsule 10 mg     enoxaparin ANTICOAGULANT (LOVENOX) injection 40 mg     fenofibrate (TRIGLIDE/LOFIBRA) tablet 160 mg     heparin 100 UNIT/ML injection 5 mL     heparin lock flush 10 UNIT/ML injection 5-10 mL     heparin lock flush 10 UNIT/ML injection 5-10 mL     lactobacillus rhamnosus (GG) (CULTURELL) capsule 1 capsule     lidocaine (LMX4) cream     lidocaine 1 % 0.1-1 mL     lisinopril (ZESTRIL) tablet 5 mg     LORazepam (ATIVAN) tablet 0.5 mg     magnesium gluconate (MAGONATE) tablet 500 mg     Medication Instruction     melatonin tablet 1 mg     meropenem (MERREM) 1 g vial to attach to  mL bag     multivitamin w/minerals (THERA-VIT-M) tablet 1 tablet     OLANZapine (zyPREXA) tablet 2.5 mg     ondansetron (ZOFRAN) injection 8 mg    Or     ondansetron (ZOFRAN-ODT) ODT tab 8 mg    Or     ondansetron (ZOFRAN) tablet 8 mg     sodium chloride (PF) 0.9% PF flush 10-20 mL     sodium chloride (PF) 0.9% PF flush 10-20 mL     sodium chloride (PF) 0.9% PF flush 3 mL     sodium chloride (PF) 0.9% PF flush 3 mL

## 2021-06-10 NOTE — PROGRESS NOTES
"PALLIATIVE CARE SOCIAL WORK Progress Note   [facility] [address]    Follow up     PCSW visit this AM with Fuentes and wife Robyn. Fuentes awake upon MSW arrival of visit, makes eye contact and offers brief but appropriate responses to comments/questions. Denied anxiety as a concern, no coping concerns. When asked how Fuentes has coped over the last year with diagnosis, surgeries, infections, hospitalizations he simply stated, \"I just did it.\" Very stoic presentation.  NG tube replaced after pt pulled out overnight; Robyn notes this tube did not go in as easily as previous tube, Fuentes noted \"it feels fine.\"  Fuentes kept eyes closed for much of visit though when Robyn offered to turn tv volume up so he could watch his show, Fuentes stated he wanted to listen to conversation with PCSW. Robyn spent much of visit talking about couple's family, son Ramón and daughter Soniya as well as granddaughter Rom. Family is a source of support for Fuentes and Robyn as well as motivation for Fuentes to continue to fight as he states. Robyn also identifies her continued work with the local [employer] a welcome distraction at times, she continues to work full time remotely.  Fuentes has faced multiple complications since diagnosis 3/2020. Though affect appears flat, Fuentes denies concerns or support needs. Robyn notes that Fuentes remains focused on fighting to get better. Robyn open to continued PCSW check ins.      Plan: PCSW remains available for ongoing support to Fuentes and wife Robyn; anticipate Robyn will remain most open to continued PS&S support. PCSW card left with Robyn.    Dolores Mckinley [facility]  Palliative Care   Pager [phone]    [facility] Inpatient Team Consult pager [phone] (M-F 8-4:30)  After-hours Answering Service [phone]      "

## 2021-06-10 NOTE — PLAN OF CARE
Neuro: A&Ox4. Withdrawn. Wife giving most answers  Cardiac: SR. VSS.   Respiratory: Sating 98 on RA.  GI/: No urine output this shift. Unable to bladder scan due to ascities. Notified provider, 20mg IV lasix ordered and given. No BM this shift  Diet/appetite: NG tube placed and verified, need new orders from dietician. Started at 20ml/hr to advance to goal of 60ml/hr, on a regular diet. Taking bites and sips. No nausea reported today  Activity:  Assist of 1 with GB and walker, up to chair today for 2 hours, walked in hallway with PT  Pain: At acceptable level on current regimen.   Skin: No new deficits noted.  LDA's: Port and PIV, NG tube    Plan: Transfer orders, TF started, worked with PT, iv abx

## 2021-06-11 PROBLEM — N17.9 ACUTE KIDNEY FAILURE, UNSPECIFIED (H): Status: ACTIVE | Noted: 2021-01-01

## 2021-06-11 NOTE — PROGRESS NOTES
[facility]  Palliative Care Daily Progress Note       Recommendations & Counseling       Goals of care: Fuentes and his wife learned today that time is very limited (see details below) and now want to focus on getting home with hospice.   is working with Fuentes and his wife and will try to see if he can get home with hospice in the next couple of days.    Fuentes and his wife are shocked by this news as they were still hoping that the infections could be treated in time could have years left to live.  Hearing that time is likely limited to weeks, they are planning on getting family together so everyone can be around when time goes home with hospice.  We will plan to continue all current treatments in the hospital until he is discharged with the hope of giving him more time at home with hospice.    I was not able to address CODE STATUS today as Fuentes was too distressed by this news and wanted to be left alone.  Dr. Rehman will try to address CODE STATUS in the coming days.    Continue IV antibiotics until ready for discharge and then transition to oral antibiotics as per Dr. Rehman.    Fuentes can continue with NG tube feeds until discharge as long as he is not bothered by the tube.    Continue Marinol 10 mg twice daily and Zyprexa 2.5 mg nightly       Thank you for the opportunity to continue to participate in the care of this patient and family.  Please feel free to contact on-call palliative provider with any emergent needs.  We can be reached via team pager [phone] (answered 8-4:30 Monday-Friday); after-hours answering service ([phone]);     Radha Carson MD / Palliative Medicine / Pager [phone]     Total time spent was 40 minutes spent in reviewing record, patient examination and family counseling, discussion with primary team and documentation. >50% of time was spent counseling and/or coordination of care regarding plan of care, goals, symptom  management.     Assessments          Fuentes Estrada is a 66 year old male with a past medical history significant for cholangiocarcinoma, recurrent biliary abscess and cholangitis s/p biliary drain, and recently diagnosed small PE on xarelto who underwent routine biliary drain exchange on 5/27 by IR, developed fever on 5/29 and admitted to [facility] found to have enterobacter bacteremia. His hospital course there was complicated by left large hemorrhagic pleural effusion s/p thora complicated by small apical pneumothorax and biliary drain fall out. He was transferred to [facility] on 6/4 for further care. The source of infection was felt to be liver/biliary track.     Today, the patient was seen for:  Cholangiocarcinoma  Biliary abscess  Anorexia  FTT  Support   Goals of care    Prognosis, Goals, or Advance Care Planning was addressed today with: Yes. Fuentes and his wife heard from both the surgical oncologist, Dr. Hale, as well as their oncologist, Dr. Beard, and learned that there were no surgical interventions to treat the infection and there is no further cancer directed treatment available to him due to his infection as well as severe malnutrition and frailty.  I met with Doctor Maritza, Fuentes, and his wife, Robyn, and we discussed what Dr. Hale and Dr. Crow had communicated and gave the prognosis of likely weeks left to live.  Fuentes stated that all he wanted was to be able to go home and be surrounded by his family. Robyn asked what the best way was to get him home and we discussed hospice care at home. Fuentes did not want to participate in further conversations as he was very distressed and upset by this news.  I spoke with Robyn outside of the room and she wondered if going to different cancer centers would have had a different outcome.  She was planning on contacting her son and daughter and knew that they would come to help as soon as possible.    Mood, coping, and/or meaning in the context of serious  illness were addressed today: No.              Interval History:     Chart review/discussion with unit or clinical team members:   Rica Bethea and Geoffrey's note reviewed and discussed with Dr Bethea. No surgical options             Review of Systems:     Besides above, a complete 10+ ROS was reviewed and is unremarkable           Medications:     I have reviewed this patient's medication profile and medications during this hospitalization.    Noted meds:    Enoxaparin  marinol 10mg BID  Lipitor  Olanzapine 2.5mg at bedtime - started 6/10           Physical Exam:   Vitals were reviewed  Temp: 98.4  F (36.9  C) Temp src: Oral BP: 112/70 Pulse: 64   Resp: 15 SpO2: 100 % O2 Device: Nasal cannula Oxygen Delivery: 2 LPM  Gen: Alert, cachectic, lying in bed, in no acute distress  Eyes: Conjunctiva clear. Sclera anicteric   HENT: NCAT; + temporal wasting, mucous membranes moist  Resp: No increased work of breathing, speaking full sentences  Msk: no gross deformity, + sarcopenia  Skin:  no jaundice  Neuro: A&O x 3; CN II-XII grossly intact;   Mental status/Psych: Tired, alert, appropriate; sensorium intact             Data Reviewed:     Reviewed recent labs and pertinent imaging  GFR 66 - decreasing, bili t 2.7; ALT/AST 51/156, platelets 98; alb 1

## 2021-06-11 NOTE — PROGRESS NOTES
CLINICAL NUTRITION SERVICES - BRIEF NOTE      Nutrition Prescription     RECOMMENDATIONS FOR MDs/PROVIDERS TO ORDER:  None at this time      Recommendations already ordered by Registered Dietitian (RD):  Osmolite 1.5 Russell @ goal of  60ml/hr  (1440ml/day)  will provide: 2160 kcals (27 kcal/kg), 90 g PRO (1.1 g/kg), 1097 ml free H20, 293 g CHO, and 0 g fiber daily.  Water flush of 60 mL q 4 hours   -from 20 ml/hr, advance by 10 mL q 6 hours to goal      Future/Additional Recommendations:  Monitor tolerance of enteral nutrition and ability to achieve goal volume   Continue to monitor ability to take PO     *Please see full assessment note from 6/10/21    New Findings:  Nurse reported that tube feeding was running at 20 ml/hr but no order.   -Order was discontinued yesterday due to FT being pulled out on 6/9.     FT replaced 6/10, imaging confirmed gastric position     Interventions  Enteral Nutrition - Initiate    RD to follow per protocol.    Keiry Clark RD, LD  6B pager: [phone]

## 2021-06-11 NOTE — PLAN OF CARE
Neuro: A&Ox4.  Cardiac: SR. VSS. B/P: 117/80, T: 97.7, P: 67, R: 16  Respiratory: Sating >92% on 2L O2 for comfort  GI/: unmeasured void, unknown urine output  Diet/appetite: TF @ 20 per pt care order. Need TF orders placed.  Activity:  Assist of 1  Pain: Denies pain  Skin: No new deficits noted.  LDA's: port TKO    Plan: Continue with POC. Notify primary team with changes.

## 2021-06-11 NOTE — PLAN OF CARE
OT 5B: cancel, pt declining therapy upon AM attempt, unable to check back in with schedule demands, will reschedule.

## 2021-06-11 NOTE — PROGRESS NOTES
[facility] PROGRESS NOTE     Patient:  Fuentes Estrada   YOB: 1955, MRN: [id number]  Date of Visit: 06/11/2021  Date of Admission: 6/4/2021  Consult Requester: Liana Rehman, *          Assessment and Recommendations:   ID Problem List:  1. Enterobacter bacteremia likely 2/2 biliary source (BC + 5/29 from Port only)  2. Recurrent biliary abscess s/p drain with potential small biliary connection to existing cavity noted on 5/27 sinogram. Drain fell out 6/3 at OSH.  3. 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 Abdominal ascites s/p paracentesis 5/27 with 4L removed  5. Acute on chronic severe malnutrition, albumin 1.0  6. Pleural effusion  7. DM2    Recommendations:   1. Continue IV meropenem 1g q8h  2. Follow up repeat blood cultures - no growth thus far  3. Agree with nutritional optimization as able  4. This infection is highly unlikely to be curable with antibiotics alone (in setting of fluid re-accumulation and possible biliary fistula) without appropriate source control procedure.  5. Start gentamicin locks for Port as only positive blood culture was collected from Port (see detailed instructions below). As he was not persistently bacteremic and has no signs of skin infection involving Port, Port salvage is appropriate.     Instructions for gentamicin lock therapy:    1. Prior to instillation of gentamicin catheter lock therapy, withdraw contents from catheter lumen  2. Flush catheter with normal saline  3. Instill gentamicin lock solution to fill catheter lumen (may range from ~3-5 mLs), administering nurse will determine the exact volume to administer to fill the lumen  4. Pharmacy will dispense a standard 5 mL syringe  5. Label the catheter  Do Not Use - Antimicrobial Lock   6. Allow the gentamicin lock solution to  dwell for 12 hours.  7. After dwell time is complete, aspirate gentamicin lock solution from the catheter lumen  8. Flush catheter with normal saline before using the line to administer medication    [facility] will continue to follow peripherally over the weekend. Dr. Shah (pager [phone]) is available for questions or to discuss changes in patient's clinical status.    Discussion:  Fuentes Estrada is a 66 year old male with PMH of HTN, DM2, CVA, PE (Xarelto), and hilar cholangiocarcinoma s/p radical extrahepatic bile duct resection and R hepatectomy (5/2020) with subsequent metastatic disease (8/2020) s/p palliative gem/cis (off since 12/9/20). Course has been complicated by recurrent biliary abscess s/p drain and cholangitis. He transferred from [facility] to [facility] on 6/4 due to findings of E cloacae bacteremia for additional management.      Source is likely biliary given known history with recurrent abscess and recent drain exchange. Drain fell out while at [facility], and he has fluid re-accumulation as seen on CT AP  That showed increased fluid collection around the liver. IR has been consulted for drainage and source control, but they deemed any procedure too risky from their standpoint. This infection will be unlikely curable with antibiotics alone (in setting of fluid accumulation and possible biliary fistula) without appropriate source control procedure. Additionally, on long term antibiotics he would be at high risk at developing more highly resistant intraabdominal and systemic infections, as already evidenced by this current Enterobacter bacteremia from likely biliary source that is now resistant to all cephalosporins. IV antibiotics without a source control procedure would likely only be a temporizing measure and should not be considered a permanent or even long-term solution as opposed to drain replacement.     Thank you for the consult. ID will continue to follow with  you.    Blessing Dangelo PA-C   Pronouns: she/her/hers  Infectious Diseases  Pager: [phone]  06/11/2021         Interval History and Events:   Afebrile. NG tube replaced. Fuentes reports no abdominal pain or bloating today. Tolerating TF thus far without nausea or vomiting. Poor sleep due to distractions in room.         Antimicrobial Treatment:   - meropenem (started at OSH)         Review of Systems:   Targeted 4 point ROS was completed with pertinent positives and negatives are detailed above.         HPI:   Adopted from initial consult note on 6/5:    Fuentes Estrada is a 66 year old male with PMH of HTN, DM2, CVA, PE (Xarelto), and hilar cholangiocarcinoma s/p radical extrahepatic bile duct resection and R hepatectomy (5/2020) with subsequent metastatic disease (8/2020) s/p palliative gem/cis (off since 12/9/20). Course has been complicated by biliary abscess s/p drain and cholangitis. He transferred from [facility] to [facility] on 6/4 due to findings of E cloacae bacteremia for additional management.      On 5/27, IR preformed paracentesis with 4L removed. Then sinogram was done which did not reveal biliary fistula, but on later review there was possible small amount of contrast in the small bowel suggesting small biliary connection to existing cavity. Drain was exchanged and planned to return in 1 month for sinogram. On 5/29 patient developed temp to 99.1 and malaise. He went to local [facility] where BC taken from Port was positive for a resistant Enterobacter cloacae (sensitivities included in transfer paperwork). Peripheral BC was collected and negative. Current source of bacteremia was felt to be liver/biliary tract. Of note, during admission March-April 2021 E cloacae was cultured from old drain, which made it unclear whether it reflected colonization vs infection. He was covered with Bactrim as well as Augmentin for Strep anginosus also isolated from abdominal fluid cultures during that  admission (antibiotics stopped 4/16 by Dr. Dale outpatient). He also underwent thoracentesis of pleural effusion which was complicated by small pneumothorax. His biliary drain also fell out on 6/3. He had virtual visit with Dr. Dale on 6/4 who urged transfer to [facility] for additional specialist care.      Majority of history provided by patient's wife, Robyn, who confirmed above history. Reports in the past patient has been unable to tolerate capping trials of abdominal drain and plan from IR was to just leave it in place despite minimal output. Robyn also notes recent blood tinged drainage from drain. Xarelto is currently on hold in part due to this. She is also worried about poor nutritional status for some months. She also wonders if this might be exacerbated by ascites.             Physical Examination:   Temp:  [96.9  F (36.1  C)-98  F (36.7  C)] 97.6  F (36.4  C)  Pulse:  [59-78] 59  Resp:  [16-18] 16  BP: ()/(58-80) 125/78  SpO2:  [97 %-100 %] 100 %    I/O last 3 completed shifts:  In: 750 [P.O.:300; I.V.:10; NG/GT:180]  Out: -     Vitals:    06/04/21 2114 06/05/21 0431 06/06/21 0412 06/08/21 0313   Weight: 78.2 kg (172 lb 4.8 oz) 78.1 kg (172 lb 1.6 oz) 75.5 kg (166 lb 7.2 oz) 76.1 kg (167 lb 12.3 oz)    06/09/21 0517   Weight: 77.4 kg (170 lb 10.2 oz)       Constitutional: Chronically malnourished appearing adult male seen lying in bed, NAD.   HEENT: NC/AT, temporal wasting, EOMI, sclera clear, conjunctiva normal, OP with MMM  CV: RRR, no murmur  Respiratory: No increased work of breathing, CTAB in anterior fields  GI: +BS, somewhat distended, non-tender.  Neurologic: A&O. Answers questions appropriately, speech normal.  Neuropsychiatric: Calm. Affect appropriate to situation.  Vascular access:  Port on right chest CDI, non-tender, no surrounding erythema.         Medications:       atorvastatin  40 mg Oral At Bedtime     cholecalciferol  250 mcg Oral Daily     dronabinol  10 mg Oral BID     enoxaparin  ANTICOAGULANT  40 mg Subcutaneous Q24H     fenofibrate  160 mg Oral At Bedtime     heparin  5 mL Intracatheter Q28 Days     heparin lock flush  5-10 mL Intracatheter Q24H     lactobacillus rhamnosus (GG)  1 capsule Oral Daily     lisinopril  5 mg Oral QPM     magnesium gluconate  500 mg Oral BID     meropenem  1 g Intravenous Q8H     multivitamin w/minerals  1 tablet Oral Daily     OLANZapine  2.5 mg Oral At Bedtime     sodium chloride (PF)  3 mL Intracatheter Q8H       Antiinfectives:  Anti-infectives (From now, onward)    Start     Dose/Rate Route Frequency Ordered Stop    06/04/21 2300  meropenem (MERREM) 1 g vial to attach to  mL bag      1 g  over 30 Minutes Intravenous EVERY 8 HOURS 06/04/21 2238            Infusions/Drips:    dextrose       dextrose       - MEDICATION INSTRUCTIONS -              Laboratory Data:     Microbiology:  Culture Micro   Date Value Ref Range Status   06/07/2021 No growth after 4 days  Preliminary   06/07/2021 No growth after 4 days  Preliminary   06/06/2021 No growth  Final   06/06/2021 No growth after 5 days  Preliminary   06/06/2021 No growth after 5 days  Preliminary   06/05/2021 No growth  Final   06/05/2021 No growth  Final   06/04/2021 No growth  Final   04/05/2021 No growth  Final   04/03/2021 Culture negative after 30 days  Final   04/03/2021 Moderate growth  Enterobacter cloacae complex   (A)  Final   04/03/2021 (A)  Final    Moderate growth  Strain 2  Enterobacter cloacae complex     04/03/2021 No anaerobes isolated  Final   04/03/2021   Final    Since this specimen was not transported in the proper anaerobic transport media, the   absence of anaerobes in this culture does not rule out the presence of anaerobes in this   specimen.         Inflammatory Markers    Recent Labs   Lab Test 04/02/21  0423 04/01/21  2044 04/01/21  0622 03/28/21  0713 03/27/21  1913 02/02/21  0632   CRP 57.0* 53.0* 55.0* 87.0* 100.0* 14.0*       Metabolic Studies       Recent Labs   Lab Test  06/11/21  0545 06/10/21  0632 06/09/21  0543 06/08/21  0439 06/08/21  0439 06/07/21  0454 06/06/21 0455 04/02/21 0109 04/02/21 0109 04/01/21 2129 03/27/21 1913 03/27/21 1913    137 139  --  140 139 137   < >  --   --    < > 129*   POTASSIUM 4.1 3.5 3.5  --  3.5 3.6 3.8   < >  --   --    < > 4.1   CHLORIDE 105 107 108  --  109 107 106   < >  --   --    < > 96   CO2 28 26 27  --  28 27 29   < >  --   --    < > 28   ANIONGAP 4 3 4  --  3 4 2*   < >  --   --    < > 5   BUN 21 17 14  --  15 16 17   < >  --   --    < > 22   CR 1.13 0.93 0.74  --  0.77 0.84 0.87   < >  --   --    < > 0.98   GFRESTIMATED 66 84 >90  --  >90 90 89   < >  --   --    < > 79   * 111* 75  --  78 89 94   < >  --   --    < > 124*   A1C  --   --   --   --   --   --   --   --   --   --   --  5.6   ERIC 7.9* 7.3* 7.6*  --  7.4* 8.1* 8.0*   < >  --   --    < > 9.2   PHOS 2.6 2.6 2.4*  --  2.6 2.4* 2.4*   < >  --   --    < > 2.7   MAG 1.8 1.8 1.6   < >  --  1.8 1.8   < >  --   --    < > 1.7   LACT  --   --   --   --   --   --   --   --  2.1* 4.1*  --  1.1    < > = values in this interval not displayed.       Hepatic Studies    Recent Labs   Lab Test 06/11/21  0545 06/09/21  0543 06/07/21 0454 06/04/21  2337 05/10/21  1558 05/06/21  1300   BILITOTAL 2.7* 3.1* 3.1* 3.0* 6.8* 7.3*   ALKPHOS 655* 672* 650* 592* 928* 969*   ALBUMIN 1.0* 1.0* 1.0* 1.0* 1.4* 1.4*   * 164* 150* 113* 130* 155*   ALT 51 56 49 43 45 57       Pancreatitis testing    Recent Labs   Lab Test 04/01/21  2044 03/27/21  1913 02/02/21  0632 02/01/21  1638 04/16/20  0851 04/07/20  2202 04/07/20  0604 04/06/20  0613 03/31/20  1300 03/31/20  0902   AMYLASE  --   --  75  --  164* 302*  --  76 169* 191*   LIPASE 102 123 196 111 585* 2,301* 3,722* 830* 2,170* 2,520*       Hematology Studies      Recent Labs   Lab Test 06/11/21  0545 06/10/21  0632 06/09/21  0543 06/08/21  0439 06/07/21  0454 06/06/21  0455 06/04/21  2337 06/04/21  2337 05/10/21  1558 05/06/21  1300  04/20/21  1032 04/14/21  1608 04/14/21  1608 04/02/21  0540 04/02/21  0540   WBC 3.1* 3.5* 5.1 3.6* 4.4 3.6*   < > 3.3* 6.3 7.4 3.5*  --  6.3   < > 10.7   ANEU  --   --   --   --   --   --   --  1.9 4.2 5.7 2.1  --  4.2  --  9.6*   ALYM  --   --   --   --   --   --   --  1.0 1.7 1.1 1.1  --  1.5  --  0.6*   JUANCHO  --   --   --   --   --   --   --  0.2 0.4 0.5 0.3  --  0.4  --  0.4   AEOS  --   --   --   --   --   --   --  0.1 0.0 0.0 0.0  --  0.1  --  0.0   HGB 8.1* 8.4* 9.7* 9.0* 9.2* 9.4*   < > 8.9* 10.1* 9.8* 9.4*  --  9.5*   < > 6.7*   HCT 24.4* 25.5* 28.9* 27.1* 27.4* 28.6*   < > 26.7* 29.7* 28.6* 27.9*  --  28.0*   < > 19.8*   PLT 98* 100* 144* 113* 116* 122*   < > 109* 199 205 166   < > 156   < > 132*    < > = values in this interval not displayed.       Arterial Blood Gas Testing    Recent Labs   Lab Test 04/01/21  1945 05/12/20  1646 05/12/20  1500 05/12/20  1400 05/12/20  1300 05/12/20  1100   PH  --  7.46* 7.44 7.43 7.45 7.45   PCO2  --  29* 32* 35 33* 32*   PO2  --  156* 164* 153* 159* 179*   HCO3  --  20* 22 23 23 23   O2PER 21 30 40 40 40 40        Urine Studies     Recent Labs   Lab Test 04/01/21  0956 03/28/21  1555 05/14/20  0158   URINEPH 5.5 5.5 5.5   NITRITE Negative Negative Negative   LEUKEST Negative Negative Trace*   WBCU <1 3 4       Body Fluid Studies  Recent Labs   Lab Test 06/06/21  0910 04/05/21  1540 04/02/21  1630 04/02/21  1630   FTYP Ascites Ascites  --  Ascites   FCOL Yellow Yellow  --  Yellow   FAPR Slightly Cloudy Slightly Cloudy  --  Clear   FWBC 165 42  --  80   FNEU 1 36  --  13   FLYM 72 23  --  30   FMONO 27  --   --  57   FALB 0.2  --   --   --    FTP 1.2  --   --   --    GS No organisms seen  Rare  WBC'S seen    Quantification of host cells and microbiological organisms was done on a cytocentrifuged   preparation.    --    < > No organisms seen  Many  WBC'S seen  predominantly mononuclear cells    Quantification of host cells and microbiological organisms was done on a  cytocentrifuged   preparation.      < > = values in this interval not displayed.       Drug Level Monitoring  Recent Labs   Lab Test 04/04/21  0740   VANCOMYCIN 24.8       Last check of C difficile  C Diff Toxin B PCR   Date Value Ref Range Status   04/05/2021 Negative NEG^Negative Final     Comment:     Negative: C. difficile target DNA sequences NOT detected, presumed negative   for C.difficile toxin B or the number of bacteria present may be below the   limit of detection for the test.  FDA approved assay performed using Box[employer] real-time PCR.  A negative result does not exclude actual disease due to C. difficile and may   be due to improper collection, handling and storage of the specimen or the   number of organisms in the specimen is below the detection limit of the assay.         Respiratory Virus Testing    Recent Labs   Lab Test 03/27/21 2032   INFZA Negative   INFZB Negative       COVID-19 Testing  Recent Labs   Lab Test 06/04/21  2319 05/25/21  1524 04/20/21  1055 04/16/21  1549 03/21/21  1115 03/21/21  1115   [id number] Nasopharyngeal Nasopharyngeal Nasopharyngeal Nasopharyngeal   < > Nasopharyngeal   SARSCOVRES NEGATIVE NEGATIVE NEGATIVE NEGATIVE   < > NEGATIVE   [id number]  --  Nasopharyngeal Nasopharyngeal Nasopharyngeal  --  Nasopharyngeal   [id number]  --  Test received-See reflex to IDDL test SARS CoV2 (COVID-19) Virus RT-PCR Test received-See reflex to IDDL test SARS CoV2 (COVID-19) Virus RT-PCR Test received-See reflex to IDDL test SARS CoV2 (COVID-19) Virus RT-PCR  --  Test received-See reflex to IDDL test SARS CoV2 (COVID-19) Virus RT-PCR    < > = values in this interval not displayed.            Imaging:   AXR 6/10  Impression: Gastric tube tip and sidehole project over the stomach.    AXR 6/9  Impression: Tip of the enteric gastric tube projects over the proximal  gastric body.    CXR 6/5  IMPRESSION:      1. Left hydropneumothorax, with small left apical pneumatic component,  and  small to moderate basilar fluid component.  2. Left basilar opacities, differential includes atelectasis versus  infection.

## 2021-06-11 NOTE — PROGRESS NOTES
Care Management Follow Up    Length of Stay (days): 7    Expected Discharge Date: 06/12/21     Concerns to be Addressed: discharge planning     Patient plan of care discussed at interdisciplinary rounds: Yes    Anticipated Discharge Disposition: Home with hospice and 24 hour support from wife/children     Anticipated Discharge Services: Hospice  Anticipated Discharge DME: None    Patient/family educated on Medicare website which has current facility and service quality ratings: yes  Education Provided on the Discharge Plan: Yes   Patient/Family in Agreement with the Plan: yes    Referrals Placed by CM/SW: Hospice  Private pay costs discussed: transportation costs    Additional Information:  SW received communication from pt's attending MD and palliative care provider that pt is now a home hospice discharge as there are no more surgical options for him. Met with patient's wife Bri introduced self and explained role. Discussed request received to meet with patient to arrange hospice at time of discharge; patient and family confirmed.     explained the hospice philosophy, and focus on pain management, symptom control and end of life decision making.  explained insurance coverage for hospice services and equipment.  explained that hospice is supplemental services that come to wherever Fuentes is; and support Fuentes in living out the remainder of Fuentes's life, however much he has left, the way that he wants to. Patient and family in agreement with hospice and would like this arranged at discharge.     Social Work discussed hospice agency choice. Provided the Medicare Compare list for hospice services to assist with choice for referrals. Patient and family requested a referral be sent to [facility] in order to have services arranged at discharge.     DEX spoke with Day at [facility] ph: [phone] f: [phone], initiated referral. They may be able to do a start of care  tomorrow, 6/12. She will have their RN coordinate with pt's wife tomorrow. Updated pt's wife Bri, who would like to speak with provider about pt's IV/oral abx - paged Dr. Rehman. Discussed likely need for stretcher ride upon discharge and potential OOP costs for this.     SW will leave handoff for weekend SW.      NORMA Gallo, [facility]  [address]   [facility]  Phone: [phone]  Pager: [phone]

## 2021-06-11 NOTE — PHARMACY-CONSULT NOTE
Pharmacy Tube Feeding Consult    Medication reviewed for administration by feeding tube and for potential food/drug interactions.    Recommendation: No changes are needed at this time.     Pharmacy will continue to follow as new medications are ordered.    Philly Tello, [employer]

## 2021-06-11 NOTE — PLAN OF CARE
[id number] Tube feedings increased to 30 mL/hour. Advance 10 mL q6hr with a goal of 60 mL. Blood sugar 109. NG tube at 65 cm; landmark unchanged. Wife present at bedside; possible care conference next week to determine goals of care. A/O x4; flat affect. Right foot swelling > left foot; denies pain and pulses present. Lower extremities elevated. Abdomen firm and distended; jaundice. Stable on 2L NC; 100%. Plan of care: continue with IV antibiotics. Lovenox dose increased. Monitor plt and s/s of re-bleeding. Sleeping in between cares.

## 2021-06-11 NOTE — PROGRESS NOTES
[facility]    Medicine Progress Note - Hospitalist Service, Gold 11       Date of Admission:  6/4/2021  Assessment & Plan         Fuentes Estrada is a 66 year old male admitted on 6/4/2021. He has hx of cholangiocarcinoma, recurrent biliary abscess and cholangitis s/p biliary drain, and recently diagnosed small PE on xarelto who underwent routine biliary drain exchange on 5/27 by IR, developed fever on 5/29 and admitted to [facility] found to have enterobacter bacteremia. His hospital course there was complicated by left large hemorrhagic pleural effusion s/p thora complicated by small apical pneumothorax and biliary drain fall out. He was transferred to [facility] for further multidisciplinary care.     Changes today:  -Appreciate involvement of primary surgical oncologist Dr. Hale and his team and interventional radiology.  Through their extensive discussions with each other and with Fuentes and his family, there is no feasible means of his draining his infection.  Any procedural approach would be extremely high risk.  His infection is at a point where it is considered incurable.  -Patient was recommended to pursue hospice cares.  He is in agreement and would like to go home as soon as possible  -We will continue IV antibiotics while inpatient, will transition to Augmentin 875 twice daily and Bactrim DS 1 tab twice daily on discharge.  -We will perform gentamicin Port lock while inpatient  -Appreciate involvement of social work to arrange hospice agency and explore transportation options to facilitate discharge to home as soon as possible, may discharge in 1 or 2 days.      Enterobacter bacteremia  History of recurrent biliary abscess and cholangitis s/p biliary drain that fell out on 6/3  He underwent routine sinogram and drain exchange by IR on 5/27. He was admitted to [facility] on 5/29 with low grade fever, and was found to have Enterobacter coloacae  bacteremia. He had a virtual visit with [facility] ID Dr Dale on 6/4. The source was felt to be liver/biliary track. His biliary drain fell out on 6/3. Agreed with continuing Meropenem given the sensitivity. He has multiple prior history of cholangitis and since his biliary drain fell out on 6/3, he is at increased risk of decompensation.  No procedural means of draining infection without conducting a procedure that would be extremely dangerous.  His infection is considered incurable.  Mr. Estrada will pursue hospice.  - continue meropenem until discharge  -We will transition to oral Augmentin and oral Bactrim on discharge, will continue indefinitely.    - ID consult, appreciate recommendations  - Surg Onc consult, appreciate recommendations  - IR consult, appreciate recommendations     Cholangiocarcinoma  Diagnosed in 2020 s/p radical extrahepatic bile duct resection and R hepatectomy in May 2020. The presence of metastatic disease was identified by EUS biopsy in August 2020. He developed an abscess and a biliary drain was placed, managed by IR. He has been having multiple cholangitis/abscess since then requiring series of antibiotics. Not currently on chemotherapy, followed by Dr Beard.  Per onc, no evidence of active disease at this point.  - oncology consult, appreciate recs     L hemorrhagic pleural effusion  Small apical pneumothorax s/p thoracentesis at OSH  He was noted to be more dyspneic with L large pleural effusion at OSH. He underwent thoracentesis at OSH on 6/1 and 500cc of hemorrhagic fluid was removed. Following the thora, he was noted to have a small apical pneumothorax.   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 He was started on xarelto. Of note, CT a/p on 4/22  showed new non-occlusive thrombus in the superior mesenteric vein, not extending to the portal vein. Per oncology note, there was no plan to treat SMV thrombosis given high risk of bleeding. On admission to OSH, he was on xarelto for PE, but this was discontinued after he was found to have hemorrhagic pleural effusion. There was a note indicating about possibly starting lovenox interim.  - hold AC for now per oncology     Anemia  Hb 8.8 at OSH on 6/4.   - continue to monitor H/H  - goal Hb >7     Hypertension  Takes aldactone 25mg, lasix 20mg daily and lisinopril 5mg daily  - continue, may discontinue when transitioning to hospice     Malnutrition  Reported >20lbs weight loss in the setting of chronic illness  - Nutrition consult   - Palliative consult  -Discontinuing NG per patient request     Diabetes  Currently not taking any medication. A1c 5.6 in 3/2021     History of CVA  Holding Plavix.     Diet: Regular Diet Adult  Snacks/Supplements Adult: Ensure Clear; With Meals  Snacks/Supplements Adult: Other; please send boost soothe; Between Meals  Snacks/Supplements Pediatric: Ensure Enlive; Between Meals  Adult Formula Drip Feeding: Continuous Osmolite 1.5; Nasogastric tube; Goal Rate: 60; mL/hr; Medication - Feeding Tube Flush Frequency: At least 15-30 mL water before and after medication administration and with tube clogging; Amount to Send (Nutr...    DVT Prophylaxis: Anti-embolisim stockings (TEDs)  Em Catheter: not present  Code Status: Full Code           Disposition Plan   Expected discharge: 1-2 days, recommended to prior living arrangement once Hospice cares arranged, transportation arranged.  Entered: Liana Rehman MD 06/11/2021, 4:11 PM     The patient's care was discussed with the Bedside Nurse, Patient and Oncology, Palliative, surgical oncology Consultant.    Liana Rehman MD  Hospitalist Service, [address]  Contact  information available via [facility] Paging/Directory  Please see sign in/sign out for up to date coverage information  ______________________________________________________________________    Interval History   Nursing notes reviewed, no acute events overnight.  Distraught over the news that his infection is incurable.  No pain, no nausea, no dyspnea.  Would like to transition to hospice and discharged home as soon as possible.    Data reviewed today: I reviewed all medications, new labs and imaging results over the last 24 hours. I personally reviewed no images or EKG's today.    Physical Exam   Vital Signs: Temp: 98.4  F (36.9  C) Temp src: Oral BP: 112/70 Pulse: 64   Resp: 15 SpO2: 100 % O2 Device: Nasal cannula Oxygen Delivery: 2 LPM  Weight: 170 lbs 10.18 oz  General Appearance: awake, appears cachectic  Eyes: Faint icterus bilaterally, EOMI  HEENT: MMM  Respiratory:  Breathing comfortably on room air  GI: distended  Skin: LE edema 2+ bilaterally  Musculoskeletal: no joint swelling  Neurologic: Alert, interactive  Psychiatric:  Distraught    Data

## 2021-06-11 NOTE — PROGRESS NOTES
Hematology / Oncology  Daily Progress Note   Date of Service: 06/11/2021  Patient: Fuentes Estrada  MRN: [id number]  Admission Date: 6/4/2021  Hospital Day # 7  Cancer Diagnosis: Metastatic cholangiocarcinoma  Primary Outpatient Oncologist: Dr. Beard  Current Treatment Plan: Has not been on treatment since 12/2020      Summary & Recommendations:   - Increase Lovenox to treatment dose 1 mg/kg BID (ordered for you). Hold for plt <50. Monitor for rebleed given recent h/o hemorrhagic pleural effusion.   - Per surgical oncology, PEG placement is not recommended as carcinomatosis would prevent healing. NG tube replaced yesterday and is tolerating well.   - Appreciate ID and primary team's management of abdominal abscess. No surgical options per multiple teams.   - Discussion with Dr. Beard today, who spoke with wife today:    Plan for the remainder of admission should be to come up with antibiotic plan and nutrition plan, and then discharge to home    Dr. Beard explained prognosis of infection to wife    Wife will discuss the possibility of hospice with kids  - Oncology to follow peripherally over the weekend.      Assessment & Plan:   Fuentes Estrada is a 66 year old male with a past medical history of HTN, DM, CVA on Plavix, and metastatic cholangiocarcinoma, who has been off any chemotherapy since December 2020 and who has recurrent issues with biliary infection/abscesses requiring recurrent procedures and drainage. He recently underwent a biliary drain exchange on 5/27/2021. He developed a fever 2 days later and was admitted to the hospital in [facility]. He was found to have blood cultures positive for Enterobacter cloacae (which he has grown out multiple times) and is currently on meropenem.     #Metastatic cholangiocarcinoma, not currently on cancer directed therapy  See initial consult note 6/5 for full oncologic history. In brief, patient presented with elevated LFTs in the spring of 2020. He was found  "to have a hilar cholangiocarcinoma. On 5/12/20 he underwent a radical extrahepatic bile duct resection and R hepatectomy with negative margins and one positive lymph node. He had a complicated postoperative course with a bile leak and abscess that had delayed starting his adjuvant chemotherapy. In 7/2020, metastatic disease confirmed in LN. In 8/2020, he initiated treatment with palliative intent cis/gem. He continued to have issues with abscess/biliary drain, recurrent ascites.   - He has not had any treatment since 12/2020 d/t infectious issues and unsure if this will be able to change in the near future  - Cancer not driving current acute issues. No evidence of disease.   - Dr. Beard notified of admission  - We will arrange oncologic follow up when discharge is imminent     #Enterobacter cloacae bacteremia  #Recurrent right upper quadrant/biliary fluid collections, abscesses  #Severe malnutrition secondary to acute on chronic illness  - Appreciate infectious diseases involvement given his complex ID history and current Enterobacter cloacae bacteremia  ? Continue IV meropenem  ? \"This infection would be highly unlikely curable with antibiotics alone (in setting of fluid re-accumulation and possible biliary fistula) without appropriate source control procedure\"  - IR recommends against against drain replacement. \"Drain replacement through old drain tract not possible at the site is no longer draining. Would require a new drain placement under CT guidance. High likelihood of being transpleural given the location of the collection which would result in infected right pleural space and may necessitate need for concomitant right chest tube placement. Recommend against drain placement at this time given stability from infection standpoint.\"  - Surgical oncology also does not recommend surgical intervention  - Discussed with wife, Robyn, on 6/8 that we are in a tough spot with infection and nutrition. NG placed x6/9. " "PEG placement not possible d/t carcinomatosis that will prevent healing per surgical oncology.  - Marinol and Zyprexa for appetite stimulation per primary team.   - GOC: Per palliative note, Fuentes wants to do everything to continue living.      #Small PE  #History of SMV thrombosis  - Restart therapeutic anticoagulation with Lovenox 1 mg/kg BID. Hold for plt <50K.  - On apixiban PTA  - Therapeutic anticoagulation was briefly on hold for hemorrhagic pleural effusion.      Patient was seen and plan of care was discussed with attending physician Dr. Molina.     Thank you for the opportunity to partake in this patient's plan of care. Please do not hesitate to page with questions. We will continue to follow.     Mary Gaona PA-C   Hematology/Oncology   Pager: [phone]  ___________________________________________________________________    Subjective & Interval History:    No acute events noted overnight. NG replaced yesterday and tolerated well overnight. Patient reports that insertion was more uncomfortable but now NG feels fine. Discussed re-initiation of therapeutic AC. Patient aware to notify us of bleeding.     Physical Exam:    Blood pressure 112/70, pulse 64, temperature 98.4  F (36.9  C), temperature source Oral, resp. rate 15, height 1.854 m (6' 1\"), weight 77.4 kg (170 lb 10.2 oz), SpO2 100 %.    General: lying in bed, no acute distress  HEENT: sclera anicteric, EOMI, MMM  Neck: supple, normal ROM  Resp: normal respiratory effort on NC  MSK: warm and well-perfused, normal tone  Skin: no rashes on limited exam, no jaundice  Neuro: Alert and interactive, moves all extremities equally, no focal deficits    Labs & Studies: I personally reviewed the following studies:  ROUTINE LABS (Last four results):  CMP  Recent Labs   Lab 06/11/21  0545 06/10/21  0632 06/09/21  0543 06/08/21  2054 06/08/21  0439 06/07/21  0454 06/04/21  2337 06/04/21  2337    137 139  --  140 139   < > 137   POTASSIUM 4.1 3.5 3.5  --  " 3.5 3.6   < > 3.5   CHLORIDE 105 107 108  --  109 107   < > 105   CO2 28 26 27  --  28 27   < > 29   ANIONGAP 4 3 4  --  3 4   < > 2*   * 111* 75  --  78 89   < > 142*   BUN 21 17 14  --  15 16   < > 18   CR 1.13 0.93 0.74  --  0.77 0.84   < > 0.93   GFRESTIMATED 66 84 >90  --  >90 90   < > 84   GFRESTBLACK 77 >90 >90  --  >90 >90   < > >90   ERIC 7.9* 7.3* 7.6*  --  7.4* 8.1*   < > 7.9*   MAG 1.8 1.8 1.6 1.7  --  1.8   < > 1.8   PHOS 2.6 2.6 2.4*  --  2.6 2.4*   < > 2.3*   PROTTOTAL 6.3*  --  6.6*  --   --  6.8  --  6.4*   ALBUMIN 1.0*  --  1.0*  --   --  1.0*  --  1.0*   BILITOTAL 2.7*  --  3.1*  --   --  3.1*  --  3.0*   ALKPHOS 655*  --  672*  --   --  650*  --  592*   *  --  164*  --   --  150*  --  113*   ALT 51  --  56  --   --  49  --  43    < > = values in this interval not displayed.     CBC  Recent Labs   Lab 06/11/21  0545 06/10/21  0632 06/09/21  0543 06/08/21  0439   WBC 3.1* 3.5* 5.1 3.6*   RBC 2.62* 2.70* 3.15* 2.90*   HGB 8.1* 8.4* 9.7* 9.0*   HCT 24.4* 25.5* 28.9* 27.1*   MCV 93 94 92 93   MCH 30.9 31.1 30.8 31.0   MCHC 33.2 32.9 33.6 33.2   RDW 18.3* 18.4* 18.6* 18.6*   PLT 98* 100* 144* 113*     INR  Recent Labs   Lab 06/04/21  2337   INR 1.79*     Medications list for reference:  Current Facility-Administered Medications   Medication     acetaminophen (TYLENOL) tablet 650 mg     atorvastatin (LIPITOR) tablet 40 mg     cholecalciferol (VITAMIN D3) 125 mcg (5000 units) capsule 250 mcg     dextrose 10% infusion     dextrose 10% infusion     dronabinol (MARINOL) capsule 10 mg     enoxaparin ANTICOAGULANT (LOVENOX) injection 40 mg     fenofibrate (TRIGLIDE/LOFIBRA) tablet 160 mg     heparin 100 UNIT/ML injection 5 mL     heparin lock flush 10 UNIT/ML injection 5-10 mL     heparin lock flush 10 UNIT/ML injection 5-10 mL     lactobacillus rhamnosus (GG) (CULTURELL) capsule 1 capsule     lidocaine (LMX4) cream     lidocaine 1 % 0.1-1 mL     lisinopril (ZESTRIL) tablet 5 mg     LORazepam  (ATIVAN) tablet 0.5 mg     magnesium gluconate (MAGONATE) tablet 500 mg     Medication Instruction     melatonin tablet 1 mg     meropenem (MERREM) 1 g vial to attach to  mL bag     multivitamin w/minerals (THERA-VIT-M) tablet 1 tablet     OLANZapine (zyPREXA) tablet 2.5 mg     ondansetron (ZOFRAN) injection 8 mg    Or     ondansetron (ZOFRAN-ODT) ODT tab 8 mg    Or     ondansetron (ZOFRAN) tablet 8 mg     sodium chloride (PF) 0.9% PF flush 10-20 mL     sodium chloride (PF) 0.9% PF flush 10-20 mL     sodium chloride (PF) 0.9% PF flush 3 mL     sodium chloride (PF) 0.9% PF flush 3 mL

## 2021-06-11 NOTE — PLAN OF CARE
PT 5C: CANCEL- Pt declines OOB activity this afternoon 2/2 poor sleep and feeling tired. PT rescheduled.

## 2021-06-11 NOTE — PROGRESS NOTES
Pt remain very sleeping. Tube feeding running at 20; but new order by dietitian was placed and will be follow as order. Pt tolerated all scheduled medications this morning. Pt transfer to  ( room: 3311), report was given to Nurse Ceballos. Pt remain on continue care monitoring.

## 2021-06-11 NOTE — PROGRESS NOTES
Reason for transfer: Not longer needing 6B level care  Transferred from: 6B   Report received from: Blanco Wright RN   2 RN skin assessment completed by: Marito RN + Kortney RN   Bed algorithm reevaluated: Yes   Was Pulsate ordered?: Already on pulsate   Belongings: I Phone, , eye glasses     Skin: Right upper chest blister found under tele patch, redness present behind right ear. Blanchable redness on sacrum; mepi in place. Generalized abrasion/scabbing on upper extremities; no open areas.

## 2021-06-11 NOTE — PROGRESS NOTES
"Surgery Note    No acute events overnight. Tolerating tube feeds at thirty. No fevers/chills.   Blood pressure 112/70, pulse 64, temperature 98.4  F (36.9  C), temperature source Oral, resp. rate 15, height 1.854 m (6' 1\"), weight 77.4 kg (170 lb 10.2 oz), SpO2 100 %.    Laying in bed in no acute distress  Awake, alert, appropriate  Interactive in conversation  Cachectic with temporal wasting and scleral icterus    Labs and imaging reviewed.     66 year old man with metastatic cholangiocarcinoma after right hepatectomy with complicated course including biliary leak and recurrent abscesses. This is a very complex situation given his recurrent malignancy, ongoing infection as well as possibly worsening liver function and overall cachexia and malnutrition.     Dr. Hale had a long discussion with the patient and his wife regarding his current clinical condition as well as prognosis with his peritoneal cholangiocarcinoma and also ongoing medical co-morbidities. We emphasized that his life expectancy is probably on the order of months and not years. We discussed with the patient and his wife the importance of discussing goals of care in a mult-disciplinary framework to maximize his desires. At this point do not feel there is much we can offer him surgically that would provide any meaningful improvement. Particularly for management of his persistent abscess- this procedure would be significantly high risk with unclear benefit. Also discussed with him that we do not feel that a PEG tube would be a viable option as well given his multi-factorial ascites this would be a high risk procedure and prone possibly to many wound and infectious complications. We further broached the topic and possibility of hospice with the patient and his wife, to help achieve his goals hopefully at home and outside of the hospital setting.    Discussed the above with Mr. Estrada' primary hospital team as well as the palliative care team. Would " recommend arranging for a multi-disciplinary care conference early next week - ideally with the patient's outpatient oncology team as well. We greatly appreciate your assistance in caring for Mr. Estrada.     Please call/page with any questions or concerns.     Karolyn Bethea MD  General Surgery Resident  Pager: [phone]    I saw Mr. Estrada and his wife with the surgical resident. I reviewed his labs and imaging, and I communicated with his other caregivers to coordinate care. I agree with and dictated the assessment and plan of care described in the resident note. I spent >30 minutes in discussion of all issues described above.    Oswaldo Hale MD, FACS

## 2021-06-12 NOTE — PLAN OF CARE
Pt alert and oriented x4. Agitated and uncooperative this evening after discussion with providers regarding poor prognosis. Decision made to transition to hospice cares tomorrow. Pt refused VS, assessments, meal, port needle change, and some medications. NG removed per MD order. Up to BR with assist of 2 and walker. Pt requests minimal interruptions overnight. Wife requests to be notified with any changes overnight. Port a cath currently gentamycin locked for 12hrs. See MAR for instructions of removal at 0930.

## 2021-06-12 NOTE — PLAN OF CARE
OT/5B: CANCEL - patient adamantly refusing any OT treatment this morning as he is preparing to discharge home. He reported no needs from this therapist today.

## 2021-06-12 NOTE — PLAN OF CARE
Physical Therapy Discharge Summary    Reason for therapy discharge:    Discharged to home with home therapy.    Progress towards therapy goal(s). See goals on Care Plan in [facility] electronic health record for goal details.  Goals partially met.  Barriers to achieving goals:   discharge from facility.    Therapy recommendation(s):    Continued therapy is recommended.  Rationale/Recommendations:  to improve functional mobility, strength and activity tolerance.

## 2021-06-12 NOTE — PROGRESS NOTES
Pt refused to take oral pain medications but did take IV antibiotics. Pt refused morning assessment.

## 2021-06-12 NOTE — PLAN OF CARE
"  Pt is A/O x 4. He is very tired and weak. PIV with NS 0.9/L running at TKO. Pt seems resigned. He is refusing all cares. He declined Vital signs to be done all night. \" No, I don't want anything\".  He seems to be sleeping well and did not call for anything for pain. Plan is to discharge pt home on Hospice today. Social work working with Hospice nurse for arrangement for pt. Continue to monitor pt and follow plan of care.      Problem: Adult Inpatient Plan of Care  Goal: Patient-Specific Goal (Individualized)  Outcome: No Change     Problem: Adult Inpatient Plan of Care  Goal: Plan of Care Review  Outcome: No Change     Problem: Fatigue (Oncology Care)  Goal: Improved Activity Tolerance  Outcome: No Change  Intervention: Promote Energy Conservation  Recent Flowsheet Documentation  Taken 6/12/2021 0000 by Madina Dukes RN  Activity Management:   ambulated to bathroom   activity adjusted per tolerance   up ad yonas     Problem: Adult Inpatient Plan of Care  Goal: Optimal Comfort and Wellbeing  Outcome: No Change     Problem: Fatigue (Oncology Care)  Goal: Improved Activity Tolerance  Intervention: Promote Energy Conservation  Recent Flowsheet Documentation  Taken 6/12/2021 0000 by Madina Dukes RN  Activity Management:   ambulated to bathroom   activity adjusted per tolerance   up ad yonas     Problem: Pain Acute (Oncology Care)  Goal: Optimal Pain Control  Outcome: No Change     Problem: Pain Acute (Oncology Care)  Goal: Optimal Pain Control  Outcome: No Change     Problem: Adult Inpatient Plan of Care  Goal: Absence of Hospital-Acquired Illness or Injury  Intervention: Prevent Infection  Recent Flowsheet Documentation  Taken 6/12/2021 0000 by Madina Dukes RN  Infection Prevention:   rest/sleep promoted   single patient room provided   equipment surfaces disinfected   environmental surveillance performed   hand hygiene promoted   personal protective equipment utilized   visitors restricted/screened   "   Problem: Adult Inpatient Plan of Care  Goal: Absence of Hospital-Acquired Illness or Injury  Intervention: Prevent and Manage VTE (Venous Thromboembolism) Risk  Recent Flowsheet Documentation  Taken 6/12/2021 0000 by Madina Dukes RN  VTE Prevention/Management: anticoagulant therapy maintained

## 2021-06-12 NOTE — DISCHARGE SUMMARY
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 No follow up labs or test are needed.    Appointments on [place] and/or [facility] (with [facility] or [facility]   provider or service). Call [phone] if you haven't heard regarding   these appointments within 7 days of discharge.             Unresulted Labs Ordered in the Past 30 Days of this Admission     Date and Time Order Name Status Description    6/6/2021 2200 Blood culture Preliminary     6/6/2021 2200 Blood culture Preliminary       These results will be followed up by me.    Discharge Disposition   Discharged to home on hospice  Condition at discharge: Stable      Hospital Course   Fuentes Estrada is a 66 year old male admitted on 6/4/2021. He has hx of cholangiocarcinoma, recurrent biliary abscess and cholangitis s/p biliary drain, and recently diagnosed small PE on xarelto who underwent routine biliary drain exchange on 5/27 by IR, developed fever on 5/29 and admitted to [facility] found to have enterobacter bacteremia. His hospital course there was complicated  by left large hemorrhagic pleural effusion s/p thora complicated by small apical pneumothorax and biliary drain fall out. He was transferred to [facility] for further multidisciplinary care.    Enterobacter bacteremia secondary to recurrent biliary abscess and cholangitis in the setting of a history of cholangiocarcinoma status post radical extrahepatic bile duct resection and right hepatectomy in May 2020  History of recurrent cholangitis with biliary abscesses.  Will collections had been drained, however his drain fell out on Danette 3.  After extensive discussion with surgical oncology and interventional radiology, given the location of his fluid collection, there would be no way to reaccess his fluid collection through the prior site at that site was no longer draining.  A different approach would very likely result in an empyema and be at high risk of death for the procedure itself.  Without means of achieving source control, his Enterobacter bacteremia is incurable and will lead to his death.  With this knowledge, had multiple discussions with surgical oncology, interventional radiology, infectious disease, palliative care.  He and his wife elected to pursue hospice cares and discharge home.  Send him home on oral Augmentin and oral Bactrim as a temporizing measure.    Left hemorrhagic pleural effusion, small apical pneumothorax status post thoracentesis at outside hospital  Prior to admission here, he was noted to have a large left pleural effusion and underwent thoracentesis.  He had a small apical pneumothorax but was clinically stable.  Monitor while inpatient and provided oxygen to assist with resorption.    Small PE and nonocclusive thrombus in portal vein  Thrombosis in the recent past, but given his hemorrhagic pleural effusion anticoagulation was discontinued.  Given transition to hospice, did not resume anticoagulation.    Anemia secondary to chronic disease  Stable while  inpatient.    Hypertension  Discontinued antihypertensives upon discharge to home hospice.    Malnutrition   Reported a significant weight loss in the setting of acute on chronic illness.  Trialed NG with tube feeds.  However, given discharge on home hospice, NG was removed.    Diabetes  Glucoses stable while inpatient, not on any medications.    History of CVA  Given his hemorrhagic pleural effusion, Plavix was held.  Given discharge to home hospice, discontinued Plavix.    Consultations This Hospital Stay   VASCULAR ACCESS CARE ADULT IP CONSULT  NUTRITION SERVICES ADULT IP CONSULT  MEDICATION HISTORY IP PHARMACY CONSULT  ONCOLOGY ADULT IP CONSULT  INFECTIOUS DISEASE GENERAL ADULT IP CONSULT  INTERVENTIONAL RADIOLOGY ADULT/PEDS IP CONSULT  VASCULAR ACCESS CARE ADULT IP CONSULT  PHYSICAL THERAPY ADULT IP CONSULT  OCCUPATIONAL THERAPY ADULT IP CONSULT  INTERNAL MEDICINE PROCEDURE TEAM ADULT IP CONSULT [facility]  PULMONARY GENERAL ADULT IP CONSULT  PALLIATIVE CARE ADULT IP CONSULT  CARE MANAGEMENT / SOCIAL WORK IP CONSULT  NUTRITION SERVICES ADULT IP CONSULT  PHARMACY IP CONSULT  SURGICAL ONCOLOGY ADULT IP CONSULT  PHARMACY IP CONSULT    Code Status   Full Code    Time Spent on this Encounter   I, Liana Rehman MD, personally saw the patient today and spent greater than 30 minutes discharging this patient.       Liana Rehman MD  [facility] [address] [address]  Phone: [phone]  ______________________________________________________________________    Physical Exam   Vital Signs:     BP: 123/66 Pulse: 66            Weight: 184 lbs 1.35 oz  General Appearance: Sitting in bed in NAD  Respiratory: Diminished on left, clear on right.  Breathing comfortably on room air  Cardiovascular: RRR, no murmurs, rubs, gallops, peripheral pulses intact  GI: NABS, soft, NT, distended  Skin: No rashes  Neuro: Alert, appropriately interactive       Primary Care  Physician   Tolu Noland    Discharge Orders      Home care nursing referral      Home Care PT Referral for Hospital Discharge      Home Care OT Referral for Hospital Discharge      Adult [facility] Follow-up and recommended labs and tests    Follow up with [facility] within 1-2 days  for hospital follow- up. No follow up labs or test are needed.    Appointments on [place] and/or [facility] (with [facility] or [facility] provider or service). Call [phone] if you haven't heard regarding these appointments within 7 days of discharge.     Activity    Your activity upon discharge: activity as tolerated     Reason for your hospital stay    You were admitted for determination of how to manage an infected fluid collection adjacent to your liver that has been causing a blood stream infection.  Unfortunately, source control via drainage cannot be done and this infection will not be able to be cured.  You decided to be discharged home on hospice.     Full Code     Diet    Follow this diet upon discharge: Regular       Significant Results and Procedures   Most Recent 3 CBC's:  Recent Labs   Lab Test 06/11/21  0545 06/10/21  0632 06/09/21  0543   WBC 3.1* 3.5* 5.1   HGB 8.1* 8.4* 9.7*   MCV 93 94 92   PLT 98* 100* 144*     Most Recent 3 BMP's:  Recent Labs   Lab Test 06/11/21  0545 06/10/21  0632 06/09/21  0543    137 139   POTASSIUM 4.1 3.5 3.5   CHLORIDE 105 107 108   CO2 28 26 27   BUN 21 17 14   CR 1.13 0.93 0.74   ANIONGAP 4 3 4   ERIC 7.9* 7.3* 7.6*   * 111* 75   ,   Results for orders placed or performed during the hospital encounter of 06/04/21   XR Chest Port 1 View     Value    Radiologist flags Hydropneumothorax (Urgent)    Narrative    EXAM: XR CHEST PORT 1 VIEW  6/5/2021 9:15 AM     HISTORY:  recent L large pleural effusion and thora at OSH, per  report, complicated by small apical pneumothorax, re evaluate effusion  and pneumothorax       COMPARISON:  Chest x-ray 3/31/2021    FINDINGS:    Portable semiupright view of the chest. Right chest wall port catheter  tip projects over the high SVC. The trachea is midline. Cardiac  silhouette is partially obscured. Small to moderate left pleural  effusion with overlying opacities. Streaky perihilar opacities. No  significant right pleural effusion. Small left pneumothorax..      Impression    IMPRESSION:     1. Left hydropneumothorax, with small left apical pneumatic component,  and small to moderate basilar fluid component.  2. Left basilar opacities, differential includes atelectasis versus  infection.    [Urgent Result: Hydropneumothorax]    Finding was identified on 6/5/2021 9:23 AM.     Dr. Singh  was contacted by Dr. Mason at 6/5/2021 9:34 AM and  verbalized understanding of the urgent finding.     I have personally reviewed the examination and initial interpretation  and I agree with the findings.    ROBBIN CHANDRA MD   CT [facility]    Narrative    EXAMINATION: [facility], [facility]  6/7/2021 8:39  AM      CLINICAL HISTORY: FEVER    COMPARISON: MR abdomen 3/30/2021 , CT CAP 2/26/2021        PROCEDURE COMMENTS: Outside over of noncontrast chest and abdomen  dated 5/31/2021, and CT CAP with IV contrast 5/22/2021     FINDINGS:    CT CAP 5/22/2021, with appropriate comparisons made of the chest and  upper abdomen noncontrast CT chest/abdomen 5/31/2021.    Chest:    Normal thyroid. Normal configuration of the great vessels. Normal size  heart, aorta, and pulmonary artery. Central tracheobronchial tree is  patent. No significant mediastinal or axillary lymphadenopathy.    Unreported right lower lobe pulmonary embolism visible 5/22/2021,  although, reported left lower lobe embolus is not well appreciated.  Comparison not possible with subsequent 5/31/2021 noncontrast exam  (series 3 image 44-53; 5/22/2021).     Moderate left and trace right pleural effusions with associated  compressive atelectasis.    Abdomen/pelvis:  The  liver and biliary system, spleen, pancreas are normal in  appearance.  Normal adrenal glands. Kidneys are normal in appearance. No dilation  of the urinary collecting system. Partially distended bladder without  evidence of bladder wall abnormality.    There are no abnormally dilated or thickened loops of small bowel or  colon.    Small bowel anastomosis present.    Chronic changes of right hepatectomy with pigtail catheter coiled  posterior to the inferior right hepatic lobe on 5/31/2021, this is  retracted into the subcutaneous soft tissues of the right chest wall  on 5/22/2021 (series 3 image 70, 5/22/2021; Series 3 image 75,  5/31/2021). Large volume ascites which is similar appearing on both  exams.    Bones:   No concerning osseous or soft tissue findings.      Impression    IMPRESSION:  1. Right lower lobe pulmonary embolism seen on 5/22/2021 which is  unable to be compared on noncontrast CT 5/31/2021. Reported left lower  lobe pulmonary embolism is not well appreciated.  2. Moderate left and trace right pleural effusions with associated  compressive atelectasis. Some component of overlying  infectious/inflammatory process is not completely excluded.  3. Right approach pigtail catheter has retracted into the subcutaneous  soft tissues on exam 5/31/2021, previously positioned at the hepatic  flexure. The collection posterior to the lingula on image 74 series 3  is smaller than prospective CT exam from 6/5/2021.  4. large volume ascites appears similar on exams dated 5/22/2021 and  5/31/2021  5. small bowel proximal colonic mural edema, likely related to portal  congestion.  6. otherwise findings in agreement with those described on prior  outside reports.  7. Please refer to more recent CT chest abdomen pelvis dated 6/5/2021    I have personally reviewed the examination and initial interpretation  and I agree with the findings.    MARIA C POWELL MD   [facility]    Narrative    EXAMINATION: [facility], [facility]  6/7/2021 8:39  AM      CLINICAL HISTORY: FEVER    COMPARISON: MR abdomen 3/30/2021 , CT CAP 2/26/2021        PROCEDURE COMMENTS: Outside over of noncontrast chest and abdomen  dated 5/31/2021, and CT CAP with IV contrast 5/22/2021     FINDINGS:    CT CAP 5/22/2021, with appropriate comparisons made of the chest and  upper abdomen noncontrast CT chest/abdomen 5/31/2021.    Chest:    Normal thyroid. Normal configuration of the great vessels. Normal size  heart, aorta, and pulmonary artery. Central tracheobronchial tree is  patent. No significant mediastinal or axillary lymphadenopathy.    Unreported right lower lobe pulmonary embolism visible 5/22/2021,  although, reported left lower lobe embolus is not well appreciated.  Comparison not possible with subsequent 5/31/2021 noncontrast exam  (series 3 image 44-53; 5/22/2021).     Moderate left and trace right pleural effusions with associated  compressive atelectasis.    Abdomen/pelvis:  The liver and biliary system, spleen, pancreas are normal in  appearance.  Normal adrenal glands. Kidneys are normal in appearance. No dilation  of the urinary collecting system. Partially distended bladder without  evidence of bladder wall abnormality.    There are no abnormally dilated or thickened loops of small bowel or  colon.    Small bowel anastomosis present.    Chronic changes of right hepatectomy with pigtail catheter coiled  posterior to the inferior right hepatic lobe on 5/31/2021, this is  retracted into the subcutaneous soft tissues of the right chest wall  on 5/22/2021 (series 3 image 70, 5/22/2021; Series 3 image 75,  5/31/2021). Large volume ascites which is similar appearing on both  exams.    Bones:   No concerning osseous or soft tissue findings.      Impression    IMPRESSION:  1. Right lower lobe pulmonary embolism seen on 5/22/2021 which is  unable to be compared on noncontrast CT 5/31/2021. Reported left lower  lobe pulmonary embolism  is not well appreciated.  2. Moderate left and trace right pleural effusions with associated  compressive atelectasis. Some component of overlying  infectious/inflammatory process is not completely excluded.  3. Right approach pigtail catheter has retracted into the subcutaneous  soft tissues on exam 5/31/2021, previously positioned at the hepatic  flexure. The collection posterior to the lingula on image 74 series 3  is smaller than prospective CT exam from 6/5/2021.  4. large volume ascites appears similar on exams dated 5/22/2021 and  5/31/2021  5. small bowel proximal colonic mural edema, likely related to portal  congestion.  6. otherwise findings in agreement with those described on prior  outside reports.  7. Please refer to more recent CT chest abdomen pelvis dated 6/5/2021    I have personally reviewed the examination and initial interpretation  and I agree with the findings.    MARIA C POWELL MD   CT Chest/Abdomen/Pelvis w Contrast    Narrative    EXAMINATION: CT CHEST/ABDOMEN/PELVIS W CONTRAST, 6/5/2021 1:52 PM    INDICATION: Abdominal abscess/infection suspected; Sepsis; eval for  recurrent collection per IR    COMPARISON STUDY: Site CT chest, and pelvis 5/22/2021    TECHNIQUE: CT scan of the chest, abdomen and pelvis was performed on  multidetector CT scanner using volumetric acquisition technique and  images were reconstructed in multiple planes with variable thickness  and reviewed on dedicated workstations.     CONTRAST: Isovue 370.   Without oral contrast.    CT scan radiation dose is optimized to minimum requisite dose using  automated dose modulation techniques.    FINDINGS:    Lines and tubes: Right chest wall port catheter tip terminates in the  low SVC.    Chest:   Mediastinum: Thyroid gland is normal. Cardiac size within normal  limits. No significant pericardial effusion. Normal caliber aorta and  main pulmonary artery. Moderate coronary artery calcium with coronary  stents in place. Aberrant  retroesophageal right subclavian artery. No  suspicious thoracic lymphadenopathy. Esophagus is normal in caliber.  Small sliding hiatal hernia.    Lungs: The airway is patent. Left hydropneumothorax with large pleural  fluid component resulting in compressive atelectasis of the left lower  lobe. Subsegmental atelectasis involving the posterior aspect of the  left upper lobe. Small to moderate pneumatic component tracking along  the anterior portion of the lung. Small right pleural effusion with  overlying compressive atelectasis.    Abdomen and Pelvis:  Liver: Postsurgical changes of right hepatectomy. Interval removal of  the right intercostal approach percutaneous drain into the hypodense  collection along the inferior margin of the liver, which has increased  in size since removing the drain, now measuring approximately 6.1 x  2.4 cm in axial dimension, previously measured 3.7 x 1.2 cm on  5/22/2021.    Biliary System: Gallbladder surgically absent. No intrahepatic or  extrahepatic biliary dilatation. Unchanged small focus of air in the  distal common bile duct (series 3, image 301).    Pancreas: Normal enhancement of the pancreatic parenchyma. No ductal  dilation.    Adrenal glands: No mass or nodules     Spleen: Normal.     Kidneys: No suspicious mass, obstructing calculus or hydronephrosis.  Urinary bladder is decompressed.    Gastrointestinal tract: Post surgical changes of hepaticojejunostomy.  No abnormally dilated loops of small or large bowel.     Mesentery/peritoneum/retroperitoneum: Massive abdominal and pelvic  ascites. No free air.    Vasculature: Patent major abdominal vasculature.      Lymph nodes: Postsurgical changes of portal lymph node dissection. No  suspicious abdominal and pelvic lymphadenopathy.    Osseous structures: No aggressive or acute osseous lesion.        Soft tissues: Diffuse body wall anasarca.      Impression    IMPRESSION:   1. Left hydropneumothorax, with small pneumatic  component and moderate  to large fluid component with overlying compressive atelectasis of the  left lung. Small right pleural effusion.  2. Massive abdominal pelvic ascites, increased from comparison exam on  5/22/2021. No discrete loculated collection or abdominal abscess  identified.  3. Postsurgical changes of partial right hepatectomy with increased  size of the hypodense collection along the inferior margin of the  liver following removal of the percutaneous intercostal drain.    I have personally reviewed the examination and initial interpretation  and I agree with the findings.    MARIA C POWELL MD   XR Chest Port 1 View    Narrative    Exam: XR CHEST PORT 1 VIEW, 6/7/2021 10:51 AM    Indication: ptx    Comparison: 6/5/2021 CT, 6/5/2021 radiograph.    Findings:   Semiupright portable AP view of the chest. Right IJ Port-A-Cath tip  projects over the mid SVC.  Midline trachea. Stable partially obscured cardiac silhouette.  Indistinct pulmonary vasculature. Left greater than right perihilar  streaky opacities. Left retrocardiac opacities with silhouetted left  hemidiaphragm. Small left pleural effusion as well as stable small  left apical pneumothorax. No right pleural effusion or pneumothorax.      Impression    Impression:   1. Stable appearance of small left hydropneumothorax.  2. Streaky perihilar opacities and left basilar opacities, atelectasis  versus infection/consolidation.    I have personally reviewed the examination and initial interpretation  and I agree with the findings.    DONATO ELLINGTON MD   XR Abdomen Port 1 View    Narrative    Exam: XR ABDOMEN PORT 1 VIEWS, 6/9/2021 4:33 PM    Indication: Confirm NG placement    Comparison: CT cap 6/5/2021    Findings:   AP portable single view of the chest. Tip of the gastric tube projects  over the proximal gastric body. Nonobstructive bowel gas pattern in  the partially visualized upper abdomen. Surgical clips in the right  quadrant. Left basilar hazy  opacity with moderate left-sided pleural  effusion.      Impression    Impression: Tip of the enteric gastric tube projects over the proximal  gastric body.    I have personally reviewed the examination and initial interpretation  and I agree with the findings.    JANET SCHWAB MD   XR Abdomen Port 1 View    Narrative    Exam: XR ABDOMEN PORT 1 VIEWS, 6/10/2021 4:02 PM    Indication: NG placement    Comparison: 6/9/2021    Findings:   AP portable view of the abdomen. Gastric tube tip projects over the  stomach. Multiple surgical clips project in the right upper abdomen.  Nonobstructive bowel gas pattern. No pneumatosis or portal venous gas.  Left pleural effusion. Degenerative changes of the spine.      Impression    Impression: Gastric tube tip and sidehole project over the stomach.    I have personally reviewed the examination and initial interpretation  and I agree with the findings.    AUDI PARNELL MD       Discharge Medications   Discharge Medication List as of 6/12/2021 11:41 AM      START taking these medications    Details   acetaminophen (TYLENOL) 325 MG tablet Take 2 tablets (650 mg) by mouth every 4 hours as needed for mild pain or fever, No Print Out      amoxicillin-clavulanate (AUGMENTIN) 875-125 MG tablet Take 1 tablet by mouth 2 times daily, Disp-60 tablet, R-0, E-Prescribe      OLANZapine (ZYPREXA) 2.5 MG tablet Take 1 tablet (2.5 mg) by mouth At Bedtime, Disp-30 tablet, R-0, E-Prescribe      ondansetron (ZOFRAN-ODT) 8 MG ODT tab Take 1 tablet (8 mg) by mouth every 8 hours as needed for nausea or vomiting, Disp-30 tablet, R-0, E-Prescribe      sulfamethoxazole-trimethoprim (BACTRIM DS) 800-160 MG tablet Take 1 tablet by mouth 2 times daily, Disp-60 tablet, R-0, E-Prescribe         CONTINUE these medications which have CHANGED    Details   LORazepam (ATIVAN) 0.5 MG tablet Take 1 tablet (0.5 mg) by mouth every 4 hours as needed (Anxiety, Nausea/Vomiting or Sleep), Disp-30 tablet, R-0,  "Local Print         STOP taking these medications       atorvastatin (LIPITOR) 40 MG tablet Comments:   Reason for Stopping:         dronabinol (MARINOL) 5 MG capsule Comments:   Reason for Stopping:         enoxaparin ANTICOAGULANT (LOVENOX) 80 MG/0.8ML syringe Comments:   Reason for Stopping:         fenofibrate (TRIGLIDE/LOFIBRA) 160 MG tablet Comments:   Reason for Stopping:         Ferrous Sulfate (IRON) 325 (65 Fe) MG tablet Comments:   Reason for Stopping:         furosemide (LASIX) 20 MG tablet Comments:   Reason for Stopping:         lactobacillus rhamnosus, GG, (CULTURELL) capsule Comments:   Reason for Stopping:         lisinopril (ZESTRIL) 5 MG tablet Comments:   Reason for Stopping:         magnesium gluconate 30 MG tablet Comments:   Reason for Stopping:         MEROPENEM IV Comments:   Reason for Stopping:         mirtazapine (REMERON) 7.5 MG tablet Comments:   Reason for Stopping:         ondansetron (ZOFRAN) 8 MG tablet Comments:   Reason for Stopping:         rivaroxaban ANTICOAGULANT (XARELTO) 15 MG TABS tablet Comments:   Reason for Stopping:         spironolactone (ALDACTONE) 25 MG tablet Comments:   Reason for Stopping:         tadalafil (CIALIS) 10 MG tablet Comments:   Reason for Stopping:         vitamin D3 (CHOLECALCIFEROL) 250 mcg (03164 units) capsule Comments:   Reason for Stopping:         zolpidem (AMBIEN) 10 MG tablet Comments:   Reason for Stopping:             Allergies   Allergies   Allergen Reactions     Metformin Other (See Comments)     \" I think my kidneys shut down\"     "

## 2021-06-12 NOTE — PLAN OF CARE
Writer went over all discharge paper work with pt and spouse. Medications were picked up from pharmacy prior to discharge. Pt had PIV and needle to port a cath removed per orders. Pt brought to front entrance by staff in wheelchair, pt is discharging to home on hospice. Pt left floor at 1200.

## 2021-06-14 NOTE — PLAN OF CARE
Occupational Therapy Discharge Summary    Reason for therapy discharge:    Discharged to home with home therapy.    Progress towards therapy goal(s). See goals on Care Plan in [facility] electronic health record for goal details.  Goals partially met.  Barriers to achieving goals:   discharge from facility.    Therapy recommendation(s):    Continued therapy is recommended.  Rationale/Recommendations:  Pt would benefit from further therapy to progress independence with all mobility and ADLs within home environment.

## 2021-06-14 NOTE — PROGRESS NOTES
Clinic Care Coordination Contact  [facility]/Voicemail    Clinical Data: Care Coordinator Outreach- Transition Call Management    Outreach attempted x 1.  Left message on [phone] voicemail with call back information and requested return call.    Plan: [facility] team     Deisy Sebastian MA  [facility]

## 2021-06-15 NOTE — PROGRESS NOTES
RN Care Coordination Note  Placed call to patient's wife. Patient is comfortable and feeling reasonably well. Patient has enrolled in hospice and is being visited frequently. Offered support to patient's wife. Answered all questions. Encouraged her to call with any needs in the future.       Chey Arias RN, BSN, OCN   RN Care Coordinator   [facility]

## 2021-06-15 NOTE — PROGRESS NOTES
Clinic Care Coordination Contact      Patient has a follow up appointment with a provider within 24-48 hours of hospital discharge. Will cancel/close post-hospital call rom [facility] at this time.    Patient has had a transplant within 6 months and should be contacted by the transplant RNCC. Will cancel/close post-hospital call [facility] at this time.     Patient has discharged to another healthcare facility, skilled nursing facility or group home. Will /cancel close post-hospital call [facility] at this time.     Andree Cobb MA  [facility], [facility]

## 2021-06-18 NOTE — TELEPHONE ENCOUNTER
Pt is scheduled to have a paracentesis done on Wednesday, 6/23/2021 at the [facility].    Pt's wife states that the pt is very uncomfortable, and is unable to wait until Wednesday to have this procedure done.  Pt resides closest to the [facility].    I called [place] ED, spoke with Marita Fuchs RN.  Shila stated that the ED MD said that they would most likely not be able to do the paracentesis. They advised that the pt go to the [facility].    I tried to call Robyn twice, and left messages on her voicemail requesting a call back.  Deb Persaud RN 06/18/21 10:29 AM  [facility] Nurse Advisor

## 2021-06-18 NOTE — TELEPHONE ENCOUNTER
Spoke with Robyn.  Advised that the pt should go to the [facility] ED, as instructed by the pt's provider.  Robyn verbalized understanding.  Deb Persaud RN 06/18/21 10:32 AM  [facility] Nurse Advisor

## 2021-06-19 PROBLEM — C22.1 CHOLANGIOCARCINOMA (H): Chronic | Status: ACTIVE | Noted: 2021-01-01

## 2021-06-19 PROBLEM — Z98.890 STATUS POST ABDOMINAL PARACENTESIS: Status: ACTIVE | Noted: 2021-01-01

## 2021-06-19 PROBLEM — E87.1 HYPONATREMIA: Status: ACTIVE | Noted: 2021-01-01

## 2021-06-19 PROBLEM — N17.9 ACUTE RENAL FAILURE, UNSPECIFIED ACUTE RENAL FAILURE TYPE (H): Status: ACTIVE | Noted: 2021-01-01

## 2021-06-19 PROBLEM — R18.0 MALIGNANT ASCITES (H): Chronic | Status: ACTIVE | Noted: 2021-01-01

## 2021-06-19 PROBLEM — R11.2 NON-INTRACTABLE VOMITING WITH NAUSEA: Status: ACTIVE | Noted: 2021-01-01

## 2021-06-19 PROBLEM — E46 MALNUTRITION, UNSPECIFIED TYPE (H): Chronic | Status: ACTIVE | Noted: 2021-01-01

## 2021-06-19 PROBLEM — D64.9 NORMOCYTIC NORMOCHROMIC ANEMIA: Chronic | Status: ACTIVE | Noted: 2021-01-01

## 2021-06-19 PROBLEM — R18.0 MALIGNANT ASCITES (H): Status: ACTIVE | Noted: 2021-01-01

## 2021-06-19 PROBLEM — D72.810 LYMPHOPENIA: Status: ACTIVE | Noted: 2021-01-01

## 2021-06-19 NOTE — H&P
[facility]    History and Physical - Hospitalist Service, Gold Night       Date of Admission:  6/19/2021    Assessment & Plan   Fuentes Estrada is a 66 year old male admitted on 6/19/2021. He  has a past medical history of Cerebrovascular accident (CVA) (H) (12/2010), Cholangiocarcinoma (H), Diabetes (H), essential hypertension on hospice who presented to the hospital with dehydration, ascites, nausea, and vomiting. He is willing to revoke hospice for admission to the hospital for IVF fluids and treatment of nausea and vomiting. He is admitted to the medicine service for observation.    #nausea with vomiting  Emesis multiple times today. Unable to take PO in ED without emesis. Zofran helpful.    -schedule ondansetron every 6 hours    -prn compazine    -prn lorazepam    #fluid volume deficit  #acute kidney injury  #hyponatremia, mild  Admit with creatinine 1.93. Baseline <1.0. Fluid resuscitated in ED with 1L NS plus 125mL/hr NS plus 50mg albumin.    -decrease fluid rate to 75mL/hr given ascites    -add d5 to fluids given poor PO intake    -check creatinine and sodium in AM    #hilar cholangiocarcinoma  #s/p radical extrahepatic bile duct resection with right hepatectomy (05/2020)  On home hospice with desire to discharge tomorrow AM if feeling better. Paracentesis today for palliative reason with 3L off.    -hydration and nausea management as above then return home when improved    -continue comfort management approach    #normocytic, normochromic anemia  Secondary to chronic disease processes.     -stable. No current intervention required    #leukopenia  #lymphocytopenia  Secondary to chronic disease processes    -stable. No current intervention required    #hx diabetes mellitus    -not a current concern, monitor BG    #hx essential hypertension    -no current intervention, monitor     #malnutrition  In setting of chronic illness.       Diet: Regular Diet Adult  DVT  Prophylaxis: none at this time - pt comfort/hospice cares with exception of IVF, nausea management  Em Catheter: not present  Code Status: No CPR- Do NOT Intubate    Disposition Plan   Expected discharge: Tomorrow, recommended to prior living arrangement once renal function improved.  Entered: Genevieve Rivera 06/19/2021, 7:15 PM     The patient's care was discussed with the Attending Physician, Dr. Cates, Patient and Patient's Family.    Genevieve RAZO [facility]  Contact information available via [facility] Paging/Directory  Please see sign in/sign out for up to date coverage information    ______________________________________________________________________    Chief Complaint   Ascites, nausea, and vomiting    History is obtained from the electronic health record and patient's spouse.    History of Present Illness   Fuentes Estrada is a 66 year old male with past medical history as listed above who is admitted for IV fluid, nausea management, and paracentesis. He states that he would like to be treated for these problems and discharge as soon as possible, preferably tomorrow morning. In discussion with his wife, he has not had the nausea and vomiting prior to this week. They were planning to go get a COVID test in preparation for planned paracentesis on Monday; while driving to the appointment, Fuentes had emesis and feeling generally unwell. They changed their plan and presented to [facility] instead for further care including paracentesis (6/19), IVF, and nausea management. He is feeling somewhat better. We discussed scheduling zofran for him and continuing the IV fluids overnight.    Review of Systems    The 5 point Review of Systems is negative other than noted in the HPI or here.    Past Medical History    I have reviewed this patient's medical history and updated it with pertinent information if needed.   Past Medical History:   Diagnosis Date     Cerebrovascular  accident (CVA) (H) 12/2010     Cholangiocarcinoma (H)      Diabetes (H)      Essential hypertension, benign     Hypertension, Benign     Nonspecific abnormal results of liver function study     Hx of elevated LFT's       Past Surgical History   I have reviewed this patient's surgical history and updated it with pertinent information if needed.  Past Surgical History:   Procedure Laterality Date     ENDOSCOPIC RETROGRADE CHOLANGIOPANCREATOGRAM N/A 3/31/2020    Procedure: ENDOSCOPIC RETROGRADE CHOLANGIOPANCREATOGRAPHY, WITH with biliary sphincterotomy, biopsies and brushings, biliary stent placement;  Surgeon: Win Strauss MD;  Location: [facility] OR     ENDOSCOPIC RETROGRADE CHOLANGIOPANCREATOGRAM N/A 4/6/2020    Procedure: ENDOSCOPIC RETROGRADE CHOLANGIOPANCREATOGRAPHY;  Surgeon: Win Strauss MD;  Location: [facility] OR     ENDOSCOPIC RETROGRADE CHOLANGIOPANCREATOGRAM WITH SPYGLASS  4/16/2020    Procedure: ENDOSCOPIC RETROGRADE CHOLANGIOPANCREATOGRAPHY, WITH DIRECT DUCT VISUALIZATION, USING PANCREATICOBILIARY FIBEROPTIC PROBE; Bile Duct Stent Exchange and Biopsy,balloon sweep of bile duct;  Surgeon: Win Strauss MD;  Location: [facility] OR     ENDOSCOPIC ULTRASOUND UPPER GASTROINTESTINAL TRACT (GI) N/A 4/16/2020    Procedure: ENDOSCOPIC ULTRASOUND, ESOPHAGOSCOPY / UPPER GASTROINTESTINAL TRACT (GI)with Fine Needle biopsy;  Surgeon: Cody Baumann MD;  Location: [facility]     ENDOSCOPIC ULTRASOUND UPPER GASTROINTESTINAL TRACT (GI) N/A 8/11/2020    Procedure: ENDOSCOPIC ULTRASOUND, ESOPHAGOSCOPY / UPPER GASTROINTESTINAL TRACT (GI);  Surgeon: Guru Bailey Rossi MD;  Location: [facility] GI     ENTEROSCOPY SMALL BOWEL N/A 2/2/2021    Procedure: Enteroscopy small bowel;  Surgeon: Chiki Swan MD;  Location: [facility]     ESOPHAGOSCOPY, GASTROSCOPY, DUODENOSCOPY (EGD), COMBINED N/A 4/6/2020    Procedure: ESOPHAGOGASTRODUODENOSCOPY (EGD);  Surgeon: Win Strauss MD;  [facility]: [facility]      ESOPHAGOSCOPY, GASTROSCOPY, DUODENOSCOPY (EGD), COMBINED N/A 8/11/2020    Procedure: Esophagogastroduodenoscopy, With Fine Needle Aspiration Biopsy, With Endoscopic Ultrasound Guidance;  Surgeon: Guru Bailey Rossi MD;  Location: [facility]     EXPLORE COMMON BILE DUCT N/A 5/12/2020    Procedure: extra-hepatic bile duct resection;  Surgeon: Oswaldo Hale MD;  Location: [facility] OR     HC KNEE SCOPE, DIAGNOSTIC  1995    Arthroscopy, Knee; left     HC VASECTOMY UNILAT/BILAT W POSTOP SEMEN      Vasectomy     HEPATECTOMY PARTIAL N/A 5/12/2020    Procedure: Exploratory Laparotomy, Open right hepatectomy, Radical Extra-Hepatic Bile Duct Resection, Portal Lymph Node Dissection, Intraoperative Ultrasound, Intra Air-Cholangiogram ,Bianca-En-Y Left Hepaticojejunostomy, Excision Skin Lesion;  Surgeon: Oswaldo Hale MD;  Location: [facility] OR     INSERT PORT VASCULAR ACCESS Right 9/28/2020    Procedure: ultrasound guided right internal venous access port placement with flouroscopy;  Surgeon: Fercho Wayne DO;  Location: [facility] OR     IR ABSCESS TUBE CHANGE  6/12/2020     IR FOLLOW UP VISIT OUTPATIENT  7/24/2020     IR FOLLOW UP VISIT OUTPATIENT  8/3/2020     IR FOLLOW UP VISIT OUTPATIENT  10/19/2020     IR PARACENTESIS  5/27/2021     IR SINOGRAM INJECTION DIAGNOSTIC  7/14/2020     IR SINOGRAM INJECTION DIAGNOSTIC  9/14/2020     IR SINOGRAM INJECTION DIAGNOSTIC  10/13/2020     IR SINOGRAM INJECTION DIAGNOSTIC  3/1/2021     IR SINOGRAM INJECTION DIAGNOSTIC  3/25/2021     IR SINOGRAM INJECTION DIAGNOSTIC  4/22/2021     IR SINOGRAM INJECTION DIAGNOSTIC  5/27/2021     IR SINOGRAM INJECTION THERAPEUTIC  8/25/2020     IR SINOGRAM INJECTION THERAPEUTIC  9/25/2020     IR SINOGRAM INJECTION THERAPEUTIC  10/2/2020     IR SINOGRAM INJECTION THERAPEUTIC  9/18/2020     IR THORACENTESIS  5/16/2020     LYMPHADENECTOMY ABDOMINAL N/A 5/12/2020    Procedure: portal lymph node dissection, intraoperative liver ultrasound;  Surgeon:  "Oswaldo Hale MD;  Location: [facility] OR       Social History   I have reviewed this patient's social history and updated it with pertinent information if needed.  Social History     Tobacco Use     Smoking status: Never Smoker     Smokeless tobacco: Never Used   Substance Use Topics     Alcohol use: Not Currently     Comment: occaisional      Drug use: No       Family History   I have reviewed this patient's family history and updated it with pertinent information if needed.  Family History   Problem Relation Age of Onset     Arthritis Mother         RA     Diabetes Father         diet controlled     Hypertension Father        Prior to Admission Medications   Prior to Admission Medications   Prescriptions Last Dose Informant Patient Reported? Taking?   LORazepam (ATIVAN) 0.5 MG tablet   No No   Sig: Take 1 tablet (0.5 mg) by mouth every 4 hours as needed (Anxiety, Nausea/Vomiting or Sleep)   OLANZapine (ZYPREXA) 2.5 MG tablet   No No   Sig: Take 1 tablet (2.5 mg) by mouth At Bedtime   acetaminophen (TYLENOL) 325 MG tablet   No No   Sig: Take 2 tablets (650 mg) by mouth every 4 hours as needed for mild pain or fever   amoxicillin-clavulanate (AUGMENTIN) 875-125 MG tablet   No No   Sig: Take 1 tablet by mouth 2 times daily   ondansetron (ZOFRAN-ODT) 8 MG ODT tab   No No   Sig: Take 1 tablet (8 mg) by mouth every 8 hours as needed for nausea or vomiting   sulfamethoxazole-trimethoprim (BACTRIM DS) 800-160 MG tablet   No No   Sig: Take 1 tablet by mouth 2 times daily      Facility-Administered Medications: None     Allergies   Allergies   Allergen Reactions     Metformin Other (See Comments)     \" I think my kidneys shut down\"       Physical Exam   Vital Signs: Temp: 97.3  F (36.3  C) Temp src: Oral BP: 117/77 Pulse: 90   Resp: 16 SpO2: 96 % O2 Device: None (Room air)    Weight: 0 lbs 0 oz    General Appearance: drowsy, resting comfortably in bed. Cachectic  Eyes: pupils equal  HEENT: negative  Respiratory: LS " CTAB  Cardiovascular: RRR, no murmur auscultated  GI: abd soft. S/p paracentesis  Genitourinary: deferred  Skin: pale, no rashes to exposed skin  Musculoskeletal: generalized weakness, severe muscle loss  Neurologic: at  Baseline without focal deficits  Psychiatric: negative    Data   Data reviewed today: I reviewed all medications, new labs and imaging results over the last 24 hours. I personally reviewed no images or EKG's today.    Recent Labs   Lab 06/19/21  1116   WBC 2.3*   HGB 8.9*   MCV 91      INR 1.56*   *   POTASSIUM 4.2   CHLORIDE 101   CO2 24   BUN 34*   CR 1.93*   ANIONGAP 7   ERIC 8.7   *   ALBUMIN 1.3*   PROTTOTAL 7.8   BILITOTAL 2.8*   ALKPHOS 704*   ALT 48   *

## 2021-06-19 NOTE — ED PROVIDER NOTES
ED Provider Note  [facility] EMERGENCY DEPARTMENT ([facility])  June 19, 2021    History     Chief Complaint   Patient presents with     Abdominal Pain     HPI  Fuentes Estrada is a 66 year old male with a past medical history including CVA, hilar cholangiocarcinoma, diabetes, mixed hyperlipidemia, enterocolitis, partial hepatectomy (5/2020) who presents to the Emergency Department for evaluation of abdominal pain and distention. Patient reports that since his last paracentesis in May he has had increasing fluid buildup in his abdomen, with associated diffuse abdominal pain for the last 3 days.  Wife accompanying endorses 3 episodes of emesis today.  He denies blood or bile in vomit, melena, fevers since he last the hospital.  Denies issue with bowel movements.  Has not eaten in the last 24 hours.  Has not had an appetite for the past 3 days.  His wife notes that his cancer has not had any progression.  She also notes that he had a drain in to address an abscess not the ascites, came out on accident 3 weeks ago.  Patient endorses generalized weakness, but no focal weakness or fever.    Past Medical History  Past Medical History:   Diagnosis Date     Cerebrovascular accident (CVA) (H) 12/2010     Cholangiocarcinoma (H)      Diabetes (H)      Essential hypertension, benign     Hypertension, Benign     Nonspecific abnormal results of liver function study     Hx of elevated LFT's     Past Surgical History:   Procedure Laterality Date     ENDOSCOPIC RETROGRADE CHOLANGIOPANCREATOGRAM N/A 3/31/2020    Procedure: ENDOSCOPIC RETROGRADE CHOLANGIOPANCREATOGRAPHY, WITH with biliary sphincterotomy, biopsies and brushings, biliary stent placement;  Surgeon: Win Strauss MD;  Location: [facility] OR     ENDOSCOPIC RETROGRADE CHOLANGIOPANCREATOGRAM N/A 4/6/2020    Procedure: ENDOSCOPIC RETROGRADE CHOLANGIOPANCREATOGRAPHY;  Surgeon: Win Strauss MD;  Location:  OR      ENDOSCOPIC RETROGRADE CHOLANGIOPANCREATOGRAM WITH SPYGLASS  4/16/2020    Procedure: ENDOSCOPIC RETROGRADE CHOLANGIOPANCREATOGRAPHY, WITH DIRECT DUCT VISUALIZATION, USING PANCREATICOBILIARY FIBEROPTIC PROBE; Bile Duct Stent Exchange and Biopsy,balloon sweep of bile duct;  Surgeon: Win Strauss MD;  Location: [facility] OR     ENDOSCOPIC ULTRASOUND UPPER GASTROINTESTINAL TRACT (GI) N/A 4/16/2020    Procedure: ENDOSCOPIC ULTRASOUND, ESOPHAGOSCOPY / UPPER GASTROINTESTINAL TRACT (GI)with Fine Needle biopsy;  Surgeon: Cody Baumann MD;  Location: [facility] OR     ENDOSCOPIC ULTRASOUND UPPER GASTROINTESTINAL TRACT (GI) N/A 8/11/2020    Procedure: ENDOSCOPIC ULTRASOUND, ESOPHAGOSCOPY / UPPER GASTROINTESTINAL TRACT (GI);  Surgeon: Guru Bailey Rossi MD;  Location: [facility] GI     ENTEROSCOPY SMALL BOWEL N/A 2/2/2021    Procedure: Enteroscopy small bowel;  Surgeon: Chiki Swan MD;  Location: [facility] OR     ESOPHAGOSCOPY, GASTROSCOPY, DUODENOSCOPY (EGD), COMBINED N/A 4/6/2020    Procedure: ESOPHAGOGASTRODUODENOSCOPY (EGD);  Surgeon: Win Strauss MD;  Location: [facility] OR     ESOPHAGOSCOPY, GASTROSCOPY, DUODENOSCOPY (EGD), COMBINED N/A 8/11/2020    Procedure: Esophagogastroduodenoscopy, With Fine Needle Aspiration Biopsy, With Endoscopic Ultrasound Guidance;  Surgeon: Guru Bailey Rossi MD;  Location: [facility] GI     EXPLORE COMMON BILE DUCT N/A 5/12/2020    Procedure: extra-hepatic bile duct resection;  Surgeon: Oswaldo Hale MD;  Location: [facility] OR     HC KNEE SCOPE, DIAGNOSTIC  1995    Arthroscopy, Knee; left     HC VASECTOMY UNILAT/BILAT W POSTOP SEMEN      Vasectomy     HEPATECTOMY PARTIAL N/A 5/12/2020    Procedure: Exploratory Laparotomy, Open right hepatectomy, Radical Extra-Hepatic Bile Duct Resection, Portal Lymph Node Dissection, Intraoperative Ultrasound, Intra Air-Cholangiogram ,Bianca-En-Y Left Hepaticojejunostomy, Excision Skin Lesion;  Surgeon: Oswaldo Hale MD;   "Location: [facility] OR     INSERT PORT VASCULAR ACCESS Right 9/28/2020    Procedure: ultrasound guided right internal venous access port placement with flouroscopy;  Surgeon: Fercho Wayne DO;  Location: [facility] OR     IR ABSCESS TUBE CHANGE  6/12/2020     IR FOLLOW UP VISIT OUTPATIENT  7/24/2020     IR FOLLOW UP VISIT OUTPATIENT  8/3/2020     IR FOLLOW UP VISIT OUTPATIENT  10/19/2020     IR PARACENTESIS  5/27/2021     IR SINOGRAM INJECTION DIAGNOSTIC  7/14/2020     IR SINOGRAM INJECTION DIAGNOSTIC  9/14/2020     IR SINOGRAM INJECTION DIAGNOSTIC  10/13/2020     IR SINOGRAM INJECTION DIAGNOSTIC  3/1/2021     IR SINOGRAM INJECTION DIAGNOSTIC  3/25/2021     IR SINOGRAM INJECTION DIAGNOSTIC  4/22/2021     IR SINOGRAM INJECTION DIAGNOSTIC  5/27/2021     IR SINOGRAM INJECTION THERAPEUTIC  8/25/2020     IR SINOGRAM INJECTION THERAPEUTIC  9/25/2020     IR SINOGRAM INJECTION THERAPEUTIC  10/2/2020     IR SINOGRAM INJECTION THERAPEUTIC  9/18/2020     IR THORACENTESIS  5/16/2020     LYMPHADENECTOMY ABDOMINAL N/A 5/12/2020    Procedure: portal lymph node dissection, intraoperative liver ultrasound;  Surgeon: Oswaldo Hale MD;  Location: [facility] OR     acetaminophen (TYLENOL) 325 MG tablet  amoxicillin-clavulanate (AUGMENTIN) 875-125 MG tablet  LORazepam (ATIVAN) 0.5 MG tablet  OLANZapine (ZYPREXA) 2.5 MG tablet  ondansetron (ZOFRAN-ODT) 8 MG ODT tab  sulfamethoxazole-trimethoprim (BACTRIM DS) 800-160 MG tablet      Allergies   Allergen Reactions     Metformin Other (See Comments)     \" I think my kidneys shut down\"     Family History  Family History   Problem Relation Age of Onset     Arthritis Mother         RA     Diabetes Father         diet controlled     Hypertension Father      Social History   Social History     Tobacco Use     Smoking status: Never Smoker     Smokeless tobacco: Never Used   Substance Use Topics     Alcohol use: Not Currently     Comment: occaisional      Drug use: No      Past medical history, past " "surgical history, medications, allergies, family history, and social history were reviewed with the patient. No additional pertinent items.       Review of Systems   Constitutional: Positive for appetite change. Negative for fever.   HENT: Negative for congestion.    Eyes: Negative for redness.   Respiratory: Negative for shortness of breath.    Cardiovascular: Negative for chest pain.   Gastrointestinal: Positive for abdominal distention and abdominal pain.   Genitourinary: Negative for difficulty urinating.   Musculoskeletal: Negative for arthralgias and neck stiffness.   Skin: Negative for color change.   Neurological: Positive for weakness. Negative for headaches.   Psychiatric/Behavioral: Negative for confusion.   All other systems reviewed and are negative.    A complete review of systems was performed with pertinent positives and negatives noted in the HPI, and all other systems negative.    Physical Exam   BP: 117/77  Pulse: 90  Temp: 97.3  F (36.3  C)  Resp: 16  Height: 185.4 cm (6' 1\")  SpO2: 96 %  Physical Exam  Constitutional:       Appearance: He is cachectic. He is ill-appearing. He is not diaphoretic.   HENT:      Head: Atraumatic.   Eyes:      General: No scleral icterus.     Pupils: Pupils are equal, round, and reactive to light.   Cardiovascular:      Heart sounds: Normal heart sounds.   Pulmonary:      Effort: No respiratory distress.      Breath sounds: Normal breath sounds.   Abdominal:      General: Bowel sounds are normal.      Palpations: Abdomen is soft.      Tenderness: There is generalized abdominal tenderness. There is no rebound.       Musculoskeletal:         General: No tenderness.      Right lower leg: Edema (1+) present.      Left lower leg: Edema (1+) present.   Skin:     General: Skin is warm.      Findings: No rash.         ED Course     12:05 PM  The patient was seen and examined by Nickolas Alvarado MD in Room ED17.    Procedures            Results for orders placed or " performed during the hospital encounter of 06/19/21   Comprehensive metabolic panel     Status: Abnormal   Result Value Ref Range    Sodium 132 (L) 133 - 144 mmol/L    Potassium 4.2 3.4 - 5.3 mmol/L    Chloride 101 94 - 109 mmol/L    Carbon Dioxide 24 20 - 32 mmol/L    Anion Gap 7 3 - 14 mmol/L    Glucose 100 (H) 70 - 99 mg/dL    Urea Nitrogen 34 (H) 7 - 30 mg/dL    Creatinine 1.93 (H) 0.66 - 1.25 mg/dL    GFR Estimate 35 (L) >60 mL/min/[1.73_m2]    GFR Estimate If Black 40 (L) >60 mL/min/[1.73_m2]    Calcium 8.7 8.5 - 10.1 mg/dL    Bilirubin Total 2.8 (H) 0.2 - 1.3 mg/dL    Albumin 1.3 (L) 3.4 - 5.0 g/dL    Protein Total 7.8 6.8 - 8.8 g/dL    Alkaline Phosphatase 704 (H) 40 - 150 U/L    ALT 48 0 - 70 U/L     (H) 0 - 45 U/L   CBC with platelets differential     Status: Abnormal   Result Value Ref Range    WBC 2.3 (L) 4.0 - 11.0 10e9/L    RBC Count 2.89 (L) 4.4 - 5.9 10e12/L    Hemoglobin 8.9 (L) 13.3 - 17.7 g/dL    Hematocrit 26.3 (L) 40.0 - 53.0 %    MCV 91 78 - 100 fl    MCH 30.8 26.5 - 33.0 pg    MCHC 33.8 31.5 - 36.5 g/dL    RDW 17.7 (H) 10.0 - 15.0 %    Platelet Count 232 150 - 450 10e9/L    Diff Method Manual Differential     % Neutrophils 81.6 %    % Lymphocytes 13.8 %    % Monocytes 3.7 %    % Eosinophils 0.9 %    % Basophils 0.0 %    Absolute Neutrophil 1.9 1.6 - 8.3 10e9/L    Absolute Lymphocytes 0.3 (L) 0.8 - 5.3 10e9/L    Absolute Monocytes 0.1 0.0 - 1.3 10e9/L    Absolute Eosinophils 0.0 0.0 - 0.7 10e9/L    Absolute Basophils 0.0 0.0 - 0.2 10e9/L    Anisocytosis Slight     Poikilocytosis Slight     Polychromasia Slight     Wayne Cells Slight     Microcytes Present     Platelet Estimate Confirming automated cell count    INR     Status: Abnormal   Result Value Ref Range    INR 1.56 (H) 0.86 - 1.14   Cell count with differential fluid     Status: None   Result Value Ref Range    Body Fluid Analysis Source Ascites     Color Fluid Yellow     Appearance Fluid Clear     WBC Fluid 109 /uL     Medications    ondansetron (ZOFRAN) injection 4 mg (4 mg Intravenous Given 6/19/21 1530)   0.9% sodium chloride BOLUS (0 mLs Intravenous Stopped 6/19/21 1444)     Followed by   sodium chloride 0.9% infusion ( Intravenous New Bag 6/19/21 1709)   sodium chloride 0.9% infusion (has no administration in time range)   fentaNYL (PF) (SUBLIMAZE) injection 25 mcg (25 mcg Intravenous Given 6/19/21 1228)   albumin human 25 % injection 12.5 g (0 g Intravenous Stopped 6/19/21 1446)   albumin human 25 % injection 37.5 g (0 g Intravenous Stopped 6/19/21 1529)        Assessments & Plan (with Medical Decision Making)   The patient has abdominal distention and has significant ascites on exam.  He has secondary vomiting without any bilious emesis.  He has a history of a liver abscess but the drain is out and he is not having any fever or chills.  After initially saw the patient his wife inform me that he was enrolled in hospice this week after leaving the hospital.  I spoke with the hospice doctor on call, Dr. Mimi Brown who stated that he can revoke the hospice if he needs to be admitted for hydration.  He was found to have more than doubling of his creatinine and likely has a combination of being prerenal from the vomiting as well as possible early hepatorenal syndrome.  The caps team evaluated the patient and drained 3 L of ascites.  After that his abdomen was soft and much less tender.  The fluid does not appear to be infected based on the total white count of the fluid of 109.  The patient felt better and initially preferred to go home.  We talked at length about his goals with his underlying cancer and the importance to be able to stay hydrated.  After another dose of Zofran he tried some sips of fluid and 1 bite of pudding and then started vomiting again.  He does not feel that he can manage his symptoms and stay hydrated at home and would prefer to revoke hospice and be admitted to the hospital.  His wife states that she spoke with the  coordinator for [facility] and they would make the arrangements in the paperwork.  He was initially given a dose of IV Dilaudid for the pain and will continue to get IV fluids and will be admitted to the internal medicine service.    I have reviewed the nursing notes. I have reviewed the findings, diagnosis, plan and need for follow up with the patient.    New Prescriptions    No medications on file       Final diagnoses:   Acute renal failure, unspecified acute renal failure type (H)   Cholangiocarcinoma (H)     I, Nellie Vera, am serving as a trained medical scribe to document services personally performed by Joshua Alvarado MD, based on the provider's statements to me.   I,Joshua Alvarado MD, was physically present and have reviewed and verified the accuracy of this note documented by Nellie Vera.    --    [facility] EMERGENCY DEPARTMENT  6/19/2021     Nickolas Alvarado MD  06/19/21 [id number]

## 2021-06-19 NOTE — TELEPHONE ENCOUNTER
Patient provided with personal cell phone to get phone number for his daughter  Patient verbalized understanding that once he has gotten the phone numbers, RN will take phone back and put it with his other belongings        Amelia Aguilar RN  06/11/18 [id number] Wife Robyn mojica. Says Oncologist Dr Beard ---NP paracentesis standing orders. [place], are on the way.

## 2021-06-19 NOTE — ED TRIAGE NOTES
Coming in with increasing abdominal pain over the last three days. Last paracentesis in April. +N/V this morning.

## 2021-06-19 NOTE — LETTER
Transition Communication Hand-off for Care Transitions to Next Level of Care Provider    Name: Funetes Estrada  : 1955  MRN #: [id number]  Primary Care Provider: Tolu Noland     [facility] [address] [facility] [facility] [address]     Reason for Hospitalization:  Cholangiocarcinoma (H) [C22.1]  Acute renal failure, unspecified acute renal failure type (H) [N17.9]  Admit Date/Time: 2021 10:59 AM  Discharge Date: 21         Reason for Communication Hand-off Referral: Multiple providers/specialties    Discharge Plan:  Fuentes discharge today back to home with plan to re-enroll in [facility]. He and his medical team here had discussed having a Pleurx drain for drainage. Unfortunately, this could not be done during hospitalization. He did wish to discharge home today with his family so agreed he would follow-up with his hospice team and PCP office.     Follow-up plan:    Future Appointments   Date Time Provider Department Center   2021  8:10 AM [facility]       Any outstanding tests or procedures:              Key Recommendations:      NORMA Ram    AVS/Discharge Summary is the source of truth; this is a helpful guide for improved communication of patient story

## 2021-06-20 NOTE — PROGRESS NOTES
Care Management Discharge Note    Discharge Date: 06/20/21  Expected Time of Departure: (12:00)    Discharge Disposition: Home, Hospice    Discharge Services:      Discharge DME:      Discharge Transportation: family or friend will provide(spouse Robyn is with patient)    Private pay costs discussed: Not applicable    PAS Confirmation Code: (not applicable)  Patient/family educated on Medicare website which has current facility and service quality ratings: other (see comments)(not applicable)    Education Provided on the Discharge Plan:    Persons Notified of Discharge Plans: patient's spouse, bedside RN, charge RN, hospice agency, medical team  Patient/Family in Agreement with the Plan: yes    Handoff Referral Completed: Yes    Additional Information:  Weekend SW alerted that patient, Fuentes, is stable for a discharge back to home with spouse and re-enrollment with hospice. Two questions (1) able to re-enroll today? and (2) getting pleurx catheter for comfort measures?    Consulted with Fuentes's hospice team, [facility] [phone]. Since he was hospitalized, he was dis-enrolled from hospice and will need to have a visit from RN staff to re-enroll him. They are unsure of RN availability but will try to accommodate this afternoon >14:00. Explained to Fuentes and his spouse, they verbalized understanding but do wish to discharge asap to drive home (~2 hours) since they want to spend time with family for Father's Day. They decided to leave hospital and head home. Aware that if hospice RN unable to see them today, they are NOT officially re-enrolled in hospice and will have to wait until Monday. Discussed that he needed urgent support/cares that cannot be managed at home, would need to use local ED or urgent care. Spouse and Fuentes verbalized ability to manage cares for now.     Discussed pleurx cath with hospice team. Noted we are unable to get this placed at hospital before he leaves. They cannot assist with getting these  and would refer Fuentes to his PCP or other medical team for this. It may need to be done in a hospital setting if outpatient clinics cannot provider this procedure. They will be able to manage it once it is inserted. Hospice RN will talk this over directly with Fuentes and his spouse.     [facility] [phone], orders faxed to [phone]    Sydnie Palmer, [facility], [facility]  Social Work Services/Care Management, Casual staff  [facility]      Weekend Social Work (avail on [employer]):  4A, 4C, 4E, 5A, 5B pager [phone]  6A, 6B, 6C, 6D  pager [phone]  7A, 7B, 7C, 7D, 5C pager [phone]  on-call/after hours  pager [phone]    Weekend RN Care Coordinator  pager [phone]  (home d/c with needs incl home care, assisted living facility returns, durable medical equip, IV antibiotics)

## 2021-06-20 NOTE — PROGRESS NOTES
Observation Note:  -tolerating oral intake to maintain hydration     Getting IV fluids and scheduled Zofran q6h. Denies nausea at this time, no intake overnight except with bedtime med. Continue to monitor.

## 2021-06-20 NOTE — PROGRESS NOTES
Observation Note:  -tolerating oral intake to maintain hydration    Pt had 100mL emesis on arrival to unit. Getting IV fluids and scheduled Zofran. Continue to monitor.

## 2021-06-20 NOTE — PROGRESS NOTES
Admitted/transferred from: ED  2 RN full   skin assessment completed by Donna Sood, JASMIN and Shila CHATTERJEE  Skin assessment finding: issues found scattered scabs, abdominal contour irregular    Interventions/actions: skin interventions turn and reposistion independently, education provided     Will continue to monitor.

## 2021-06-20 NOTE — PROGRESS NOTES
Observation Note:  -tolerating oral intake to maintain hydration    Getting IV fluids and scheduled Zofran. Able to tolerate PO med at bedtime with sips of water. Continue to monitor.

## 2021-06-20 NOTE — RESULT ENCOUNTER NOTE
This result was routed to the hospitalist pool after discharge.  Result was available to inpatient provider prior to discharge.  No action needed.

## 2021-06-20 NOTE — DISCHARGE SUMMARY
[facility]  Hospitalist Discharge Summary      Date of Admission:  6/19/2021  Date of Discharge:  6/20/2021  Discharging Provider: Gloria Mccollum DO  Discharge Team: Hospitalist Service, Gold 3    Discharge Diagnoses   Nausea and Vomiting   JARETH  Hypovolemia   Hyponatremia   Ascites  Enterobacter bacteremia secondary to recurrent biliary abscess and cholangitis, inability to achieve source control (last admission, after which he was discharge home with hospice 6/12)   History of Metastatic Cholangiocarcinoma s/p radical extrahepatic bile duct resection with right hepatectomy (05/2020), no evidence of current disease per oncology note 6/11  Anemia   Leukopenia   DM  HTN  Malnutrition     Follow-ups Needed After Discharge   Follow-up Appointments     Adult [facility] Follow-up and recommended labs and tests      Follow-up with your hospice agency for any needs you may have.     Appointments on [place] and/or [facility] (with [facility] or [facility]   provider or service). Call [phone] if you haven't heard regarding   these appointments within 7 days of discharge.             Unresulted Labs Ordered in the Past 30 Days of this Admission     Date and Time Order Name Status Description    6/19/2021 1222 fluid culture - Aerobic Bacterial Preliminary       These results will be followed up by NA    Discharge Disposition   Admited to hospice care.   Agency: [facility].  Discharged to home    Hospital Course   Fuentes Estrada is a 66 year old male admitted on 6/19/2021. He  has a past medical history of Cerebrovascular accident (CVA) (H) (12/2010), Cholangiocarcinoma (H), Diabetes (H), essential hypertension on hospice who presented to the hospital with dehydration, ascites, nausea, and vomiting. Of note, he was discharge home with hospice on 6/12 in setting of Enterobacter bacteremia secondary to recurrent biliary abscess and cholangitis with inability to obtain source  "control. He chose to revoke hospice temporarily for admission to hospital for IVF and treatment of nausea. He was given IV fluids and scheduled IV antiemetics. He had a bedside paracentesis. He felt symptomatically improved by next morning, was tolerating PO without any further nausea or vomiting. He requested to be discharged back home ASAP with hospice. Discussed with social work, hospice company aware of patient's decision and discharge, will send RN for re-enrollment as soon as possible (see SW note 6/20).   Patient and wife asked about pleurx catheter for comfort. This would be reasonable in setting of his goals of care, but he did not desire to stay in the hospital for this. Recommended they discuss with the hospice agency about getting this done as an outpatient.   No changes in PTA medications were made during this admission.      Enterobacter bacteremia secondary to recurrent biliary abscess and cholangitis in the setting of a history of cholangiocarcinoma status post radical extrahepatic bile duct resection and right hepatectomy in May 2020  -Continue PO Augmentin and Oral Bactrim as temporizing measure, as per initial discharge home to hospice 6/12     -Continue comfort care approach, discharge home with hospice per patient's wishes     JARETH, Hypovolemia, Hyponatremia  Nausea Vomiting  Recurrent Ascites   Likely due to above. Received IVF and antiemetics with improvement. Tolerating PO before discharge. Had paracentesis this admission.   -Has antiemetics at home, can receive more from hospice if needed   -To discuss palliative pleurx with hospice agency per patient's wishes       Metastatic cholangiocarcinoma, not currently on cancer directed therapy  Per oncology consult note 6/11: \"See initial consult note 6/5 for full oncologic history. In brief, patient presented with elevated LFTs in the spring of 2020. 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 On 5/12/20 he underwent a radical extrahepatic " "bile duct resection and R hepatectomy with negative margins and one positive lymph node. He had a complicated postoperative course with a bile leak and abscess that had delayed starting his adjuvant chemotherapy. In 7/2020, metastatic disease confirmed in LN. In 8/2020, he initiated treatment with palliative intent cis/gem. He continued to have issues with abscess/biliary drain, recurrent ascites.   - He has not had any treatment since 12/2020 d/t infectious issues and unsure if this will be able to change in the near future  - Cancer not driving current acute issues. No evidence of disease.\"   - Home with hospice as above     Consultations This Hospital Stay   INTERNAL MEDICINE PROCEDURE TEAM ADULT IP CONSULT [place] - PARACENTESIS    Code Status   Prior    Time Spent on this Encounter   IGloria DO, personally saw the patient today and spent greater than 30 minutes discharging this patient.       Gloria Mccollum DO  [facility] [address] [place]  [address] [address]  Phone: [phone]  ______________________________________________________________________    Physical Exam   Vital Signs: Temp: 97.7  F (36.5  C) Temp src: Temporal BP: 99/53 Pulse: 71   Resp: 16 SpO2: 93 % O2 Device: None (Room air)    Weight: 0 lbs 0 oz  Constitutional: no apparent distress, pleasant and cooperative, cachectic   Cardiovascular: regular rate and rhythm, normal S1 and S2, no murmurs, rubs, or gallops  noted, 1+ bilateral lower extremity edema   Respiratory: clear to auscultation bilaterally, no wheezing, rales, or rhonchi, normal work of breathing   GI: abdomen soft, non tender,+ distended, bowel sounds present   Neuro: alert and oriented, no gross focal deficits noted        Primary Care Physician   Tolu Noland    Discharge Orders      Reason for your hospital stay    You were hospitalized for nausea and vomiting which improved with IV fluids, Zofran, and a paracentesis. You will be discharged " back home with hospice according to your wishes.     Adult [facility] Follow-up and recommended labs and tests    Follow-up with your hospice agency for any needs you may have.     Appointments on [place] and/or [facility] (with [facility] or [facility] provider or service). Call [phone] if you haven't heard regarding these appointments within 7 days of discharge.     Activity    Your activity upon discharge: activity as tolerated     Diet    Follow this diet upon discharge: Orders Placed This Encounter      Regular Diet Adult       Significant Results and Procedures   Results for orders placed or performed during the hospital encounter of 06/04/21   XR Chest Port 1 View     Value    Radiologist flags Hydropneumothorax (Urgent)    Narrative    EXAM: XR CHEST PORT 1 VIEW  6/5/2021 9:15 AM     HISTORY:  recent L large pleural effusion and thora at OSH, per  report, complicated by small apical pneumothorax, re evaluate effusion  and pneumothorax       COMPARISON:  Chest x-ray 3/31/2021    FINDINGS:   Portable semiupright view of the chest. Right chest wall port catheter  tip projects over the high SVC. The trachea is midline. Cardiac  silhouette is partially obscured. Small to moderate left pleural  effusion with overlying opacities. Streaky perihilar opacities. No  significant right pleural effusion. Small left pneumothorax..      Impression    IMPRESSION:     1. Left hydropneumothorax, with small left apical pneumatic component,  and small to moderate basilar fluid component.  2. Left basilar opacities, differential includes atelectasis versus  infection.    [Urgent Result: Hydropneumothorax]    Finding was identified on 6/5/2021 9:23 AM.     Dr. Singh  was contacted by Dr. Mason at 6/5/2021 9:34 AM and  verbalized understanding of the urgent finding.     I have personally reviewed the examination and initial interpretation  and I agree with the findings.    ROBBIN CHANDRA MD   CT [facility]    Narrative     EXAMINATION: [facility], [facility]  6/7/2021 8:39  AM      CLINICAL HISTORY: FEVER    COMPARISON: MR abdomen 3/30/2021 , CT CAP 2/26/2021        PROCEDURE COMMENTS: Outside over of noncontrast chest and abdomen  dated 5/31/2021, and CT CAP with IV contrast 5/22/2021     FINDINGS:    CT CAP 5/22/2021, with appropriate comparisons made of the chest and  upper abdomen noncontrast CT chest/abdomen 5/31/2021.    Chest:    Normal thyroid. Normal configuration of the great vessels. Normal size  heart, aorta, and pulmonary artery. Central tracheobronchial tree is  patent. No significant mediastinal or axillary lymphadenopathy.    Unreported right lower lobe pulmonary embolism visible 5/22/2021,  although, reported left lower lobe embolus is not well appreciated.  Comparison not possible with subsequent 5/31/2021 noncontrast exam  (series 3 image 44-53; 5/22/2021).     Moderate left and trace right pleural effusions with associated  compressive atelectasis.    Abdomen/pelvis:  The liver and biliary system, spleen, pancreas are normal in  appearance.  Normal adrenal glands. Kidneys are normal in appearance. No dilation  of the urinary collecting system. Partially distended bladder without  evidence of bladder wall abnormality.    There are no abnormally dilated or thickened loops of small bowel or  colon.    Small bowel anastomosis present.    Chronic changes of right hepatectomy with pigtail catheter coiled  posterior to the inferior right hepatic lobe on 5/31/2021, this is  retracted into the subcutaneous soft tissues of the right chest wall  on 5/22/2021 (series 3 image 70, 5/22/2021; Series 3 image 75,  5/31/2021). Large volume ascites which is similar appearing on both  exams.    Bones:   No concerning osseous or soft tissue findings.      Impression    IMPRESSION:  1. Right lower lobe pulmonary embolism seen on 5/22/2021 which is  unable to be compared on noncontrast CT 5/31/2021. Reported left  lower  lobe pulmonary embolism is not well appreciated.  2. Moderate left and trace right pleural effusions with associated  compressive atelectasis. Some component of overlying  infectious/inflammatory process is not completely excluded.  3. Right approach pigtail catheter has retracted into the subcutaneous  soft tissues on exam 5/31/2021, previously positioned at the hepatic  flexure. The collection posterior to the lingula on image 74 series 3  is smaller than prospective CT exam from 6/5/2021.  4. large volume ascites appears similar on exams dated 5/22/2021 and  5/31/2021  5. small bowel proximal colonic mural edema, likely related to portal  congestion.  6. otherwise findings in agreement with those described on prior  outside reports.  7. Please refer to more recent CT chest abdomen pelvis dated 6/5/2021    I have personally reviewed the examination and initial interpretation  and I agree with the findings.    MARIA C POWELL MD   CT [facility]    Narrative    EXAMINATION: [facility], [facility]  6/7/2021 8:39  AM      CLINICAL HISTORY: FEVER    COMPARISON: MR abdomen 3/30/2021 , CT CAP 2/26/2021        PROCEDURE COMMENTS: Outside over of noncontrast chest and abdomen  dated 5/31/2021, and CT CAP with IV contrast 5/22/2021     FINDINGS:    CT CAP 5/22/2021, with appropriate comparisons made of the chest and  upper abdomen noncontrast CT chest/abdomen 5/31/2021.    Chest:    Normal thyroid. Normal configuration of the great vessels. Normal size  heart, aorta, and pulmonary artery. Central tracheobronchial tree is  patent. No significant mediastinal or axillary lymphadenopathy.    Unreported right lower lobe pulmonary embolism visible 5/22/2021,  although, reported left lower lobe embolus is not well appreciated.  Comparison not possible with subsequent 5/31/2021 noncontrast exam  (series 3 image 44-53; 5/22/2021).     Moderate left and trace right pleural effusions with  associated  compressive atelectasis.    Abdomen/pelvis:  The liver and biliary system, spleen, pancreas are normal in  appearance.  Normal adrenal glands. Kidneys are normal in appearance. No dilation  of the urinary collecting system. Partially distended bladder without  evidence of bladder wall abnormality.    There are no abnormally dilated or thickened loops of small bowel or  colon.    Small bowel anastomosis present.    Chronic changes of right hepatectomy with pigtail catheter coiled  posterior to the inferior right hepatic lobe on 5/31/2021, this is  retracted into the subcutaneous soft tissues of the right chest wall  on 5/22/2021 (series 3 image 70, 5/22/2021; Series 3 image 75,  5/31/2021). Large volume ascites which is similar appearing on both  exams.    Bones:   No concerning osseous or soft tissue findings.      Impression    IMPRESSION:  1. Right lower lobe pulmonary embolism seen on 5/22/2021 which is  unable to be compared on noncontrast CT 5/31/2021. Reported left lower  lobe pulmonary embolism is not well appreciated.  2. Moderate left and trace right pleural effusions with associated  compressive atelectasis. Some component of overlying  infectious/inflammatory process is not completely excluded.  3. Right approach pigtail catheter has retracted into the subcutaneous  soft tissues on exam 5/31/2021, previously positioned at the hepatic  flexure. The collection posterior to the lingula on image 74 series 3  is smaller than prospective CT exam from 6/5/2021.  4. large volume ascites appears similar on exams dated 5/22/2021 and  5/31/2021  5. small bowel proximal colonic mural edema, likely related to portal  congestion.  6. otherwise findings in agreement with those described on prior  outside reports.  7. Please refer to more recent CT chest abdomen pelvis dated 6/5/2021    I have personally reviewed the examination and initial interpretation  and I agree with the findings.    MARIA C POWELL MD    CT Chest/Abdomen/Pelvis w Contrast    Narrative    EXAMINATION: CT CHEST/ABDOMEN/PELVIS W CONTRAST, 6/5/2021 1:52 PM    INDICATION: Abdominal abscess/infection suspected; Sepsis; eval for  recurrent collection per IR    COMPARISON STUDY: Site CT chest, and pelvis 5/22/2021    TECHNIQUE: CT scan of the chest, abdomen and pelvis was performed on  multidetector CT scanner using volumetric acquisition technique and  images were reconstructed in multiple planes with variable thickness  and reviewed on dedicated workstations.     CONTRAST: Isovue 370.   Without oral contrast.    CT scan radiation dose is optimized to minimum requisite dose using  automated dose modulation techniques.    FINDINGS:    Lines and tubes: Right chest wall port catheter tip terminates in the  low SVC.    Chest:   Mediastinum: Thyroid gland is normal. Cardiac size within normal  limits. No significant pericardial effusion. Normal caliber aorta and  main pulmonary artery. Moderate coronary artery calcium with coronary  stents in place. Aberrant retroesophageal right subclavian artery. No  suspicious thoracic lymphadenopathy. Esophagus is normal in caliber.  Small sliding hiatal hernia.    Lungs: The airway is patent. Left hydropneumothorax with large pleural  fluid component resulting in compressive atelectasis of the left lower  lobe. Subsegmental atelectasis involving the posterior aspect of the  left upper lobe. Small to moderate pneumatic component tracking along  the anterior portion of the lung. Small right pleural effusion with  overlying compressive atelectasis.    Abdomen and Pelvis:  Liver: Postsurgical changes of right hepatectomy. Interval removal of  the right intercostal approach percutaneous drain into the hypodense  collection along the inferior margin of the liver, which has increased  in size since removing the drain, now measuring approximately 6.1 x  2.4 cm in axial dimension, previously measured 3.7 x 1.2 cm  on  5/22/2021.    Biliary System: Gallbladder surgically absent. No intrahepatic or  extrahepatic biliary dilatation. Unchanged small focus of air in the  distal common bile duct (series 3, image 301).    Pancreas: Normal enhancement of the pancreatic parenchyma. No ductal  dilation.    Adrenal glands: No mass or nodules     Spleen: Normal.     Kidneys: No suspicious mass, obstructing calculus or hydronephrosis.  Urinary bladder is decompressed.    Gastrointestinal tract: Post surgical changes of hepaticojejunostomy.  No abnormally dilated loops of small or large bowel.     Mesentery/peritoneum/retroperitoneum: Massive abdominal and pelvic  ascites. No free air.    Vasculature: Patent major abdominal vasculature.      Lymph nodes: Postsurgical changes of portal lymph node dissection. No  suspicious abdominal and pelvic lymphadenopathy.    Osseous structures: No aggressive or acute osseous lesion.        Soft tissues: Diffuse body wall anasarca.      Impression    IMPRESSION:   1. Left hydropneumothorax, with small pneumatic component and moderate  to large fluid component with overlying compressive atelectasis of the  left lung. Small right pleural effusion.  2. Massive abdominal pelvic ascites, increased from comparison exam on  5/22/2021. No discrete loculated collection or abdominal abscess  identified.  3. Postsurgical changes of partial right hepatectomy with increased  size of the hypodense collection along the inferior margin of the  liver following removal of the percutaneous intercostal drain.    I have personally reviewed the examination and initial interpretation  and I agree with the findings.    MARIA C POWELL MD   XR Chest Port 1 View    Narrative    Exam: XR CHEST PORT 1 VIEW, 6/7/2021 10:51 AM    Indication: ptx    Comparison: 6/5/2021 CT, 6/5/2021 radiograph.    Findings:   Semiupright portable AP view of the chest. Right IJ Port-A-Cath tip  projects over the mid SVC.  Midline trachea. Stable  partially obscured cardiac silhouette.  Indistinct pulmonary vasculature. Left greater than right perihilar  streaky opacities. Left retrocardiac opacities with silhouetted left  hemidiaphragm. Small left pleural effusion as well as stable small  left apical pneumothorax. No right pleural effusion or pneumothorax.      Impression    Impression:   1. Stable appearance of small left hydropneumothorax.  2. Streaky perihilar opacities and left basilar opacities, atelectasis  versus infection/consolidation.    I have personally reviewed the examination and initial interpretation  and I agree with the findings.    DONATO ELLINGTON MD   XR Abdomen Port 1 View    Narrative    Exam: XR ABDOMEN PORT 1 VIEWS, 6/9/2021 4:33 PM    Indication: Confirm NG placement    Comparison: CT cap 6/5/2021    Findings:   AP portable single view of the chest. Tip of the gastric tube projects  over the proximal gastric body. Nonobstructive bowel gas pattern in  the partially visualized upper abdomen. Surgical clips in the right  quadrant. Left basilar hazy opacity with moderate left-sided pleural  effusion.      Impression    Impression: Tip of the enteric gastric tube projects over the proximal  gastric body.    I have personally reviewed the examination and initial interpretation  and I agree with the findings.    JANET SCHWAB MD   XR Abdomen Port 1 View    Narrative    Exam: XR ABDOMEN PORT 1 VIEWS, 6/10/2021 4:02 PM    Indication: NG placement    Comparison: 6/9/2021    Findings:   AP portable view of the abdomen. Gastric tube tip projects over the  stomach. Multiple surgical clips project in the right upper abdomen.  Nonobstructive bowel gas pattern. No pneumatosis or portal venous gas.  Left pleural effusion. Degenerative changes of the spine.      Impression    Impression: Gastric tube tip and sidehole project over the stomach.    I have personally reviewed the examination and initial interpretation  and I agree with the  "findings.    AUDI PARNELL MD       Discharge Medications   Discharge Medication List as of 6/20/2021 10:59 AM      CONTINUE these medications which have NOT CHANGED    Details   acetaminophen (TYLENOL) 325 MG tablet Take 2 tablets (650 mg) by mouth every 4 hours as needed for mild pain or fever, No Print Out      amoxicillin-clavulanate (AUGMENTIN) 875-125 MG tablet Take 1 tablet by mouth 2 times daily, Disp-60 tablet, R-0, E-Prescribe      LORazepam (ATIVAN) 0.5 MG tablet Take 1 tablet (0.5 mg) by mouth every 4 hours as needed (Anxiety, Nausea/Vomiting or Sleep), Disp-30 tablet, R-0, Local Print      OLANZapine (ZYPREXA) 2.5 MG tablet Take 1 tablet (2.5 mg) by mouth At Bedtime, Disp-30 tablet, R-0, E-Prescribe      ondansetron (ZOFRAN-ODT) 8 MG ODT tab Take 1 tablet (8 mg) by mouth every 8 hours as needed for nausea or vomiting, Disp-30 tablet, R-0, E-Prescribe      sulfamethoxazole-trimethoprim (BACTRIM DS) 800-160 MG tablet Take 1 tablet by mouth 2 times daily, Disp-60 tablet, R-0, E-Prescribe           Allergies   Allergies   Allergen Reactions     Metformin Other (See Comments)     \" I think my kidneys shut down\"     "

## 2021-06-20 NOTE — PLAN OF CARE
Pt AOx4. VSS on ra. Tolerating reg diet. +flatus. BM today. Voiding but not always saving. Denies pain. Denies nausea. Tolerating reg diet. Port heparin locked with 28 day heparin; port needled removed. Discharge instructions went over with pt and pt wife. Pt will discharge home with wife.     Observation goal  -Tolerating oral intake:met

## 2021-06-20 NOTE — PROGRESS NOTES
MARIETTA letter discussed with patient and patient's spouse, Debbie over phone and letter given to RN to be signed by patient and placed in his chart.    Anupam RNCC, MSN

## 2021-06-21 NOTE — PROGRESS NOTES
Outpatient IR Referral    Patient is a 66 year old male with a history of cholangiocarcinoma, recurrent biliary abscess and cholangitis s/p biliary drain (which fell out 6/3), malignant ascites requiring paracentesis and recently diagnosed small PE who has opted to enroll in hospice given malnutrition, recurrent infection and clinical decompensation. IR referral was placed for urgent abdominal pleurx placement prior to hospice enrollment. [place].     Order in place in epic for procedure. COVID PCR order placed. IR  updated and will coordinate. Pt should not have paracentesis at OSH prior to IR procedure.    Primary team- oncology RN made aware of IR recommendations.     VICKI Perez CNP  Interventional Radiology   IR on-call pager: [phone]

## 2021-06-21 NOTE — TELEPHONE ENCOUNTER
Message left for Dr. Beard (oncology) to call ASAP to Dr. Tiwari to discuss.............................    ......SIDNEY Worley CMA  ([facility])

## 2021-06-21 NOTE — PROGRESS NOTES
Fuentes is a 66 year old who is being evaluated via a billable telephone visit.      What phone number would you like to be contacted at?[phone]  How would you like to obtain your AVS? Braden Petersen   Fuentes is a 66 year old who presents with cholangiocarcinoma status post a right hepatic lobectomy and chronic a right upper quadrant abscess.  Most the history is obtained from his wife.  He was seen at the [facility] on Friday was admitted for paracentesis.  A peritoneal Pleurx catheter was suggested for palliation.  He is currently on hospice.  The wife is looking for someone to put the Pleurx catheter in.  He does have a paracentesis scheduled for Wednesday.  They do not want to come in to see me in person today.  He also sounds like he has a midline hernia talking to his wife.    Objective       Vitals:  No vitals were obtained today due to virtual visit.    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  I was asked to place a Pleurx peritoneal catheter for palliation.  This is not unreasonable in the situation, though I am not examined the patient myself yet.  It appears he has had extensive multiple abdominal procedures.  I did explain to the wife that this would mostly need to be done under general anesthesia.  She expressed understanding.  I also spoke to Dr. Beard his oncologist and he feels the risk of putting the catheter in given the chronic abscess is worth the benefit.  He is also currently scheduled for paracentesis here on Wednesday.  After discussion with the patient's wife the plan at the time is to have me evaluate him when he comes in a Wednesday first paracentesis.  Proceed with a paracentesis for palliation then plan for an elective Pleurx placement after the catheter is ordered and I examined him and feel can be done  safely at [facility].  She is agreed with that plan.  I will see the patient Wednesday morning.    Enrique Tiwari MD, FACS    Phone call duration: 30 minutes

## 2021-06-21 NOTE — TELEPHONE ENCOUNTER
Silvana from [facility] called requesting provider to provider consult today about possible pleurx catheter placement.    Paged Dr. Beard at 10:35am.

## 2021-06-21 NOTE — PROGRESS NOTES
"RN CARE COORDINATION NOTE      Incoming:  Received a vmm from Bri, patients wife, who is trying to arrange for a Pleurx placement and needs orders from Dr Beard. Patient was seen in the ER over the weekend. He declined waiting for the Pleurx to be  Placed prior to discharge. Due to new Medicare guidelines, patient is unable to re-enroll onto hospice until the pleurx is placed. They have been informed by hospice if he is re-admitted and then discharges for a procedure Medicare will not allow him to re-enroll for a \"third time\".   Bri has already been in touch with [facility] to get the drain placed this week. If there is a delay in scheduling she will contact writer for assistance in scheduling at a different location.   Encouraged her to dagoberto writer with additional questions or concerns, she is aware that writer will be covering for Chey Arias [facility] all week.   Orders entered.       Tiera Lopez MSN, RN, OCN  RN Care Coordinator  [facility]  [phone]    "

## 2021-06-21 NOTE — LETTER
6/21/2021         RE: Feuntes Estrada  [address] [place] [address]        Dear Colleague,    Thank you for referring your patient, Fuentes Estrada, to the [facility]. Please see a copy of my visit note below.    Fuentes is a 66 year old who is being evaluated via a billable telephone visit.      What phone number would you like to be contacted at?[phone]  How would you like to obtain your AVS? MyChart        Subjective   Fuentes is a 66 year old who presents with cholangiocarcinoma status post a right hepatic lobectomy and chronic a right upper quadrant abscess.  Most the history is obtained from his wife.  He was seen at the [facility] on Friday was admitted for paracentesis.  A peritoneal Pleurx catheter was suggested for palliation.  He is currently on hospice.  The wife is looking for someone to put the Pleurx catheter in.  He does have a paracentesis scheduled for Wednesday.  They do not want to come in to see me in person today.  He also sounds like he has a midline hernia talking to his wife.    Objective       Vitals:  No vitals were obtained today due to virtual visit.    All imaging and charts reviewed from the [facility]    Assessment/plan:  This is a 66-year-old gentleman with metastatic cholangiocarcinoma chronic right upper quadrant abscess and ascites requiring multiple paracenteses.  I was asked to place a Pleurx peritoneal catheter for palliation.  This is not unreasonable in the situation, though I am not examined the patient myself yet.  It appears he has had extensive multiple abdominal procedures.  I did explain to the wife that this would mostly need to be done under general anesthesia.  She expressed understanding.  I also spoke to Dr. Beard his oncologist and he feels the risk of putting the catheter in given the chronic abscess is worth the benefit.  He is also currently scheduled for paracentesis here on Wednesday.  After discussion with the patient's wife the plan at  the time is to have me evaluate him when he comes in a Wednesday first paracentesis.  Proceed with a paracentesis for palliation then plan for an elective Pleurx placement after the catheter is ordered and I examined him and feel can be done safely at [facility].  She is agreed with that plan.  I will see the patient Wednesday morning.    Enrique Tiwari MD, FACS    Phone call duration: 30 minutes      Again, thank you for allowing me to participate in the care of your patient.        Sincerely,        Enrique Tiwari MD

## 2021-06-22 NOTE — TELEPHONE ENCOUNTER
Pt and pt's wife called back and Dr Beard was able to get Fuetnes in tomorrow 6/23 at the [facility] to have the peritoneal pleurx placed with IR. Thank You Evelyne

## 2021-06-23 NOTE — CONSULTS
Met with Fuentes's wife, Robyn, to go over draining and caring for the Pleurx. Robyn stated she only wanted an overview because hospice would be the one's draining and caring for it. She asked appropriate questions throughout the video and demonstration.

## 2021-06-23 NOTE — PROGRESS NOTES
Patient returned to 2A post pleurx catheter placement. Patient is sleepy but arousable. Catheter site in LUQ is covered with gauze and CDI. Wife is at bedside.

## 2021-06-23 NOTE — PROCEDURES
[facility]    Procedure: IR Procedure Note    Date/Time: 6/23/2021 2:43 PM  Performed by: Giovanni Vallejo PA-C  Authorized by: Giovanni Vallejo PA-C     UNIVERSAL PROTOCOL   Site Marked: NA  Prior Images Obtained and Reviewed:  Yes  Required items: Required blood products, implants, devices and special equipment available    Patient identity confirmed:  Verbally with patient, arm band, provided demographic data and hospital-assigned identification number  Patient was reevaluated immediately before administering moderate or deep sedation or anesthesia  Confirmation Checklist:  Patient's identity using two indicators, relevant allergies, procedure was appropriate and matched the consent or emergent situation and correct equipment/implants were available  Time out: Immediately prior to the procedure a time out was called    Universal Protocol: the Joint Commission Universal Protocol was followed    Preparation: Patient was prepped and draped in usual sterile fashion           ANESTHESIA    Anesthesia: Local infiltration  Local Anesthetic:  Lidocaine 1% without epinephrine  Anesthetic Total (mL):  15      SEDATION    Patient Sedated: Yes    Sedation Type:  Moderate (conscious) sedation  Sedation:  Fentanyl and midazolam  Vital signs: Vital signs monitored during sedation    Fluoroscopy Time: 1 minute(s)  See dictated procedure note for full details.  Findings: Moderate sedation for pleurex placement - 1 mg of midazolam and 50 mcg of fentanyl. Patient tolerated the procedure well.     Specimens: none    Complications: None    Condition: Stable    Plan: 1 hour bedrest prior to discharge    PROCEDURE   Patient Tolerance:  Patient tolerated the procedure well with no immediate complications  Describe Procedure: Completed image guided tunneled abdominal pleurx catheter placement. Patient tolerated the procedure well. A total of 8 liters of clear yellow ascites was removed  at the completion of line placement. No immediate complication.   Length of time physician/provider present for 1:1 monitoring during sedation: 20

## 2021-06-23 NOTE — PRE-PROCEDURE
GENERAL PRE-PROCEDURE:   Procedure:  Tunneled pleurex placement  Date/Time:  6/23/2021 1:26 PM    Verbal consent obtained?: Yes    Written consent obtained?: Yes    Risks and benefits: Risks, benefits and alternatives were discussed    Consent given by:  Patient  Patient states understanding of procedure being performed: Yes    Patient's understanding of procedure matches consent: Yes    Procedure consent matches procedure scheduled: Yes    Expected level of sedation:  Moderate  Appropriately NPO:  Yes  ASA Class:  Class 2- mild systemic disease, no acute problems, no functional limitations  Mallampati  :  Grade 2- soft palate, base of uvula, tonsillar pillars, and portion of posterior pharyngeal wall visible  Lungs:  Lungs clear with good breath sounds bilaterally  Heart:  Normal heart sounds and rate  History & Physical reviewed:  History and physical reviewed and no updates needed  Statement of review:  I have reviewed the lab findings, diagnostic data, medications, and the plan for sedation

## 2021-06-23 NOTE — IP AVS SNAPSHOT
After Visit Summary Template Not Found    This Print Group is only intended to be used in the After Visit Summary and can only be used in a report that uses a released After Visit Summary Template.                       MRN:[id number]                      After Visit Summary   6/23/2021    Fuentes Estrada    MRN: [id number]           Visit Information        Department      6/23/2021 12:44 PM [facility] [address] [place]          Review of your medicines      UNREVIEWED medicines. Ask your doctor about these medicines       Dose / Directions   acetaminophen 325 MG tablet  Commonly known as: TYLENOL  Used for: Hilar cholangiocarcinoma (H)      Dose: 650 mg  Take 2 tablets (650 mg) by mouth every 4 hours as needed for mild pain or fever  Quantity:    Refills: 0     amoxicillin-clavulanate 875-125 MG tablet  Commonly known as: AUGMENTIN  Used for: Bacteremia due to other bacteria, Intra-abdominal abscess (H)      Dose: 1 tablet  Take 1 tablet by mouth 2 times daily  Quantity: 60 tablet  Refills: 0     furosemide 20 MG tablet  Commonly known as: LASIX      Dose: 20 mg  Take 20 mg by mouth every 48 hours  Refills: 0     LORazepam 0.5 MG tablet  Commonly known as: ATIVAN  Used for: Hilar cholangiocarcinoma (H)      Dose: 0.5 mg  Take 1 tablet (0.5 mg) by mouth every 4 hours as needed (Anxiety, Nausea/Vomiting or Sleep)  Quantity: 30 tablet  Refills: 0     OLANZapine 2.5 MG tablet  Commonly known as: zyPREXA  Used for: Hilar cholangiocarcinoma (H)      Dose: 2.5 mg  Take 1 tablet (2.5 mg) by mouth At Bedtime  Quantity: 30 tablet  Refills: 0     ondansetron 8 MG ODT tab  Commonly known as: ZOFRAN-ODT  Used for: Hilar cholangiocarcinoma (H)      Dose: 8 mg  Take 1 tablet (8 mg) by mouth every 8 hours as needed for nausea or vomiting  Quantity: 30 tablet  Refills: 0     sulfamethoxazole-trimethoprim 800-160 MG tablet  Commonly known as: BACTRIM DS  Used for: Bacteremia due to other bacteria, Intra-abdominal abscess  (H)      Dose: 1 tablet  Take 1 tablet by mouth 2 times daily  Quantity: 60 tablet  Refills: 0              Protect others around you: Learn how to safely use, store and throw away your medicines at www.disposemymeds.org.       Follow-ups after your visit       Care Instructions       Further instructions from your care team       [facility]  Interventional Radiology   Pleur-X Tube Instructions      AFTER YOU GO HOME:    Have an adult stay with you for 24 hours.     Drink plenty of fluids and resume your regular diet.    For 24 hours:       Do not drive or operate machinery at home or at work    No alcoholic beverages    Do not make any important legal decisions    No heavy lifting greater than 10 lb until instructed by your physician       Drain cares and dressing changes to be completed by hospice team.      Interventional Radiology Department    Physician: Giovanni Vallejo PA-C                               Date:June 23, 2021  Telephone numbers: [phone]...Monday-Friday 8:00am-4:30pm                                  [phone]... After 4:30 Monday-Friday, Weekends and Holiday. Ask for the Interventional Radiologist on call. Someone is on call 24 hours a day.                                    Additional Information About Your Visit       [facility] Information    [facility] gives you secure access to your electronic health record. If you see a primary care provider, you can also send messages to your care team and make appointments. If you have questions, please call your primary care clinic.  If you do not have a primary care provider, please call [phone] and they will assist you.       Care EveryWhere ID    This is your Care EveryWhere ID. This could be used by other organizations to access your [place] medical records  [id number]       Your Vitals Were  Most recent update: 6/23/2021  3:56 PM    Blood Pressure   130/77          Pulse   62          Temperature   97.9  F (36.6  C) (Oral)  "         Respirations   14          Height   1.854 m (6' 0.99\")             Weight   73 kg (161 lb)    Pulse Oximetry   99%    BMI (Body Mass Index)   21.25 kg/m           Primary Care Provider Office Phone # Fax #    Tolu Noland PA-C [phone]      Equal Access to Services    [facility] : Hadii aad ku hadasho Soomaali, waaxda luqadaha, qaybta kaalmada adeegyada, waxay idiin hayaan adeeg kharash la'aan . So [facility] [phone].    ATENCIÓN: Si habla español, tiene a kaufman disposición servicios gratuitos de asistencia lingüística. Llame al [phone].    We comply with applicable federal and state civil rights laws, including the [place] Human Rights Act. We do not discriminate on the basis of race, color, creed, Temple, national origin, marital status, age, disability, sex, sexual orientation, or gender identity.    If you would like an itemization of your charges they will now be available in [facility]day 30 days after discharge. To access the itemized statements in Bday go to billing/billing summary. From there select view account. There will be multiple tabs showing an overview of your account, detail, payments, and communications. From the communications tab you can see your monthly statements, your itemized statements, and any billing letters generated for your account. If you do not have a Bday account and need help getting access please contact Bday support at [phone].  If you would prefer to have your itemized statements mailed please contact our automated itemized bill request line at [phone] option  2.       Thank you!    Thank you for choosing [facility] for your care. Our goal is always to provide you with excellent care. Hearing back from our patients is one way we can continue to improve our services. Please take a few minutes to complete the written survey that you may receive in the mail after you visit with us. Thank you!            Medication List      ASK your " doctor about these medications          Morning Afternoon Evening Bedtime As Needed    acetaminophen 325 MG tablet  Also known as: TYLENOL  INSTRUCTIONS: Take 2 tablets (650 mg) by mouth every 4 hours as needed for mild pain or fever                     amoxicillin-clavulanate 875-125 MG tablet  Also known as: AUGMENTIN  INSTRUCTIONS: Take 1 tablet by mouth 2 times daily                     furosemide 20 MG tablet  Also known as: LASIX  INSTRUCTIONS: Take 20 mg by mouth every 48 hours                     LORazepam 0.5 MG tablet  Also known as: ATIVAN  INSTRUCTIONS: Take 1 tablet (0.5 mg) by mouth every 4 hours as needed (Anxiety, Nausea/Vomiting or Sleep)                     OLANZapine 2.5 MG tablet  Also known as: zyPREXA  INSTRUCTIONS: Take 1 tablet (2.5 mg) by mouth At Bedtime                     ondansetron 8 MG ODT tab  Also known as: ZOFRAN-ODT  INSTRUCTIONS: Take 1 tablet (8 mg) by mouth every 8 hours as needed for nausea or vomiting                     sulfamethoxazole-trimethoprim 800-160 MG tablet  Also known as: BACTRIM DS  INSTRUCTIONS: Take 1 tablet by mouth 2 times daily

## 2021-06-23 NOTE — PROGRESS NOTES
Arrived to [address] for pleurex cath placement procedure in IR. AFVSS. Denies pain. Port accessed. BG 74 mg/dl. [facility] IR RN to notify IR team. Preprocedural cares complete. Pt on his way to procedure now.

## 2021-06-23 NOTE — DISCHARGE INSTRUCTIONS
[facility]  Interventional Radiology   Pleur-X Tube Instructions      AFTER YOU GO HOME:    Have an adult stay with you for 24 hours.     Drink plenty of fluids and resume your regular diet.    For 24 hours:       Do not drive or operate machinery at home or at work    No alcoholic beverages    Do not make any important legal decisions    No heavy lifting greater than 10 lb until instructed by your physician       Drain cares and dressing changes to be completed by hospice team.      Interventional Radiology Department    Physician: Giovanni Vallejo PA-C                               Date:June 23, 2021  Telephone numbers: [phone]...Monday-Friday 8:00am-4:30pm                                  [phone]... After 4:30 Monday-Friday, Weekends and Holiday. Ask for the Interventional Radiologist on call. Someone is on call 24 hours a day.

## 2021-06-23 NOTE — IP AVS SNAPSHOT
[facility] [address] [address]                                    After Visit Summary   6/23/2021    Fuentes Estrada    MRN: [id number]           After Visit Summary Signature Page    I have received my discharge instructions, and my questions have been answered. I have discussed any challenges I see with this plan with the nurse or doctor.    ..........................................................................................................................................  Patient/Patient Representative Signature      ..........................................................................................................................................  Patient Representative Print Name and Relationship to Patient    ..................................................               ................................................  Date                                   Time    ..........................................................................................................................................  Reviewed by Signature/Title    ...................................................              ..............................................  Date                                               Time          22EPIC Rev 08/18

## 2021-06-23 NOTE — PROGRESS NOTES
Patient meets discharge criteria post Pleur-X drain placement. Patient and wife verbalized understanding. Port de-accessed. Patient left via wheelchair for discharge home with all belongings and accompanied by his wife.

## 2021-07-27 NOTE — PROGRESS NOTES
"Outpatient Physical Therapy Discharge Note     Patient: Fuentes Estrada  : 1955    Beginning/End Dates of Reporting Period:  21 to 3/16/21    Referring Provider: VICKI Mae CNP    Therapy Diagnosis: Decreased strength and endurance, impaired balance, and fatigue consistent with cancer sequlae and cancer treatment      Client Self Report: external drain placed, abx changed and now abcess is much better.  Pt states today is a \"medium\" day today as far as energy etc. Pt recently had paracentesis which drained 7L of fuid off abdomen. feeling much better able to eat and move.           Goals:  Goal Identifier Walking    Goal Description Pt will ambulate 200 feet MOD I with least AD with no increase in fatigue in order to increase mobility within home    Target Date 21   Date Met   (Still very weak but still walking with SBA with WW. )   Progress (detail required for progress note):     Goal Identifier Toileting   Goal Description Pt will perform sit to stand from toilet height chair with no assist and no UE use with good stability and no symptoms of lightheadedness or undue fatigue to increase independence with ADLs.    Target Date 21   Date Met   (still requiring high mat for sit<> stand. fatigues easily.)   Progress (detail required for progress note):     Goal Identifier endurance/fatigue    Goal Description Pt able to tolerate washing and dressing including shower with only minimal fatigue and only 45 minutes of rest or less in order to continue with day so pt is able to attend appointments with decreased difficulty.   Target Date 21   Date Met   (apetite starting to  again. still very low energy. )   Progress (detail required for progress note):       Pt seen for 4 visits. Hospitalized multiple times since last visit to OP PT.       Plan:  Discharge from therapy.    Discharge:    Reason for Discharge: Change in medical status.    Equipment Issued: none    Discharge Plan: " Other services: Hospital and home health PT.

## 2021-12-28 NOTE — ADDENDUM NOTE
Addendum  created 05/13/20 1249 by Ray Aleman MD    Clinical Note Signed, Cosign clinical note with attestation       [FreeTextEntry1] : EKG - NSR @ 60, nml axis, borderline LAE, no ST or T wave changes compared to prior

## 2023-01-17 NOTE — PROGRESS NOTES
Bed: 18  Expected date: 1/17/23  Expected time:   Means of arrival:   Comments:  Triage Wall   [facility]: Post-Discharge Note  SITUATION                                                      Admission:    Admission Date: 11/18/20   Reason for Admission: Cholangitis  Discharge:   Discharge Date: 11/19/20  Discharge Diagnosis: Cholangitis    BACKGROUND                                                      Fuentes Estrada is a 65 year old male admitted on 11/18/2020. He has a history of cholangiocarcinoma s/p right hepatectomy and radical extrahepatic bile duct resection, DM, HTN, and CVA who presented to the [place] ED with fevers.  Labs concerning for possible cholangitis, and CT revealed small fluid collection near prior drain.  Mr. Estrada was transferred here for MRCP and further GI evaluation.    ASSESSMENT      Discharge Assessment  Patient reports symptoms are: Improved  Does the patient have all of their medications?: Yes  Does patient know what their new medications are for?: Yes  Does patient have a follow-up appointment scheduled?: Yes  Does patient have any other questions or concerns?: No    Post-op  Did the patient have surgery or a procedure: No  Fever: No  Chills: No  Eating & Drinking: eating and drinking without complaints/concerns  PO Intake: regular diet  Bowel Function: normal  Urinary Status: voiding without complaint/concerns        PLAN                                                      Outpatient Plan:  Follow-up Appointments     Adult [facility] Follow-up and recommended labs and tests      Follow up with oncology as planned on 12/1/20     Appointments on [place] and/or [facility] (with [facility] or [facility]   provider or service). Call [phone] if you haven't heard regarding   these appointments within 7 days of discharge    Future Appointments   Date Time Provider Department Center   11/24/2020  9:00 AM [facility]   11/24/2020  2:40 PM Leeanne Lake APRN CNP [facility]   12/1/2020  1:00 PM Leeanne Lake APRN CNP [facility]    12/2/2020  8:00 AM NL INFUSION CHAIR 2 [facility]   12/9/2020  9:00 AM NL INFUSION CHAIR 9 [facility]   12/18/2020 11:00 AM NL INFUSION CHAIR 7 [facility]   12/18/2020 12:00 PM PH[facility]   12/21/2020  5:30 PM Aydin Beard MD [facility]   12/23/2020  8:00 AM NL INFUSION CHAIR 10 [facility]   12/30/2020  8:00 AM NL INFUSION CHAIR 10 [facility]           [facility]

## 2025-02-25 NOTE — PROCEDURES
Lot # (Optional): [id number]
Procedure Note:    Procedure Name: Therapeutic paracentesis  Procedurist (primary): Tracee  Pre-procedure diagnosis: ascites  Post-procedure diagnosis: ascites  Indication: ascites, discomfort    Consent was obtained from pt and placed in the chart.   Ultrasound used to locate ascites; optimal pocket found to be in RLQ, and marked.  Universal protocol performed with nursing.   5ml of 1% lidocaine used anesthetize with 25 gauge needle.   Yueh catheter used to obtain fluid, which was yellow, translucent in appearance, and was labeled, for if labs were planned to be sent to PCP.  3L total fluid obtained per discussion with primary team.  50g albumin ordered given worsening renal function.      Patient tolerated without any apparent complications.    Laron Easley MD  Med-Peds Hospitalist    
Performed By: Fang Craft MA
Detail Level: None
Expiration Date (Optional): 06/23/2026
Urine Pregnancy Test Result: negative

## 2025-03-05 NOTE — BRIEF OP NOTE
[facility], [place]    Brief Operative Note    Pre-operative diagnosis: Biliary stricture [K83.1]  Post-operative diagnosis Same as pre-operative diagnosis    Procedure: Procedure(s):  ENDOSCOPIC RETROGRADE CHOLANGIOPANCREATOGRAPHY, WITH with biliary sphincterotomy, biopsies and brushings, biliary stent placement  Surgeon: Surgeon(s) and Role:     * Win Strauss MD - Primary     * Philip Solorio MD - Fellow - Assisting  Anesthesia: General   Estimated blood loss: None  Drains: None  Specimens:   ID Type Source Tests Collected by Time Destination   1 : bile duct stricture brushing Brushing Bile Duct CYTOLOGY NON GYN Win Strauss MD 3/31/2020 10:43 AM    A : bile duct stricture biopsies Biopsy Bile Duct SURGICAL PATHOLOGY EXAM Win Strauss MD 3/31/2020 10:42 AM        Complications: None.  Implants:   Implant Name Type Inv. Item Serial No.  Lot No. LRB No. Used Action   STENT COTTON-SEARS ([place]) BILIARY SOF-FLEX [id number] Stent STENT COTTON-SEARS ([place]) BILIARY SOF-FLEX [id number]  [employer] [id number] N/A 1 Implanted     Findings:     - The major papilla appeared to be ulcerated.   - Biliary cannulation followed by cholangiogram demonstrated a single moderate biliary stricture in the middle third of the main bile duct.  It was sampled with forceps and brushed for cytology.  - Inadvertent and superficial cannulation of the ventral duct. It was note stented because a wire could not be placed.  - Successful placement of 10Fr by 7cm Sofflex plastic stent was placed into the common bile duct.       Recommendations:  - Observe patient in PACU for possible discharge same day.   - Ice chips for now.  - Hold anticoagulation for 48 hours.  - Repeat ERCP for stent exchange pending pathology and staging.          Philip Solorio MD  Interventional Endoscopy Fellow        Spoke with patient's daughter Karol (verbal RAJESH on file) to discuss results below. Antibiotic sent to patient's preferred pharmacy. Patient's daughter has no further questions or concerns at this time.

## 2025-03-17 NOTE — PATIENT INSTRUCTIONS
Take Home medications:Zofran, Compazine and Ativan filled at [facility] pharmacy in [place]. Discussed indication and directions for use.   Return for next day of chemotherapy on Wed. 9/2/20.   right reverse total shoulder replacement

## (undated) DEVICE — GLOVE PROTEXIS MICRO 7.5  2D73PM75

## (undated) DEVICE — ENDO FORCEP ENDOJAW BIOPSY 2.0MMX155CM FB-221K

## (undated) DEVICE — VESSEL LOOPS YELLOW MAXI 31145694

## (undated) DEVICE — SURGICEL ABSORBABLE HEMOSTAT SNOW 4"X4" 2083

## (undated) DEVICE — ENDO TUBING CO2 SMARTCAP STERILE DISP 100145CO2EXT

## (undated) DEVICE — ENDO SNARE POLYPECTOMY OVAL 15MM LOOP SD-240U-15

## (undated) DEVICE — BIOPSY VALVE BIOSHIELD 00711135

## (undated) DEVICE — SU ETHILON 3-0 PS-1 18" 1663H

## (undated) DEVICE — STPL RELOAD REG TISSUE ECHELON 60 X 3.6MM BLUE GST60B

## (undated) DEVICE — KIT CONNECTOR FOR OLYMPUS ENDOSCOPES DEFENDO 100310

## (undated) DEVICE — STPL ENDO LINEAR CUT ECHELON GST 45MM COMPACT PCEE45A

## (undated) DEVICE — SU PROLENE 5-0 RB-1DA 36"  8556H

## (undated) DEVICE — STPL ENDO LINEAR CUT ECHELON GST 60MM COMPACT PCEE60A

## (undated) DEVICE — GLOVE PROTEXIS W/NEU-THERA 7.5  2D73TE75

## (undated) DEVICE — KIT ENDO FIRST STEP DISINFECTANT 200ML W/POUCH EP-4

## (undated) DEVICE — SUCTION MANIFOLD DORNOCH ULTRA CART UL-CL500

## (undated) DEVICE — GUIDEWIRE NOVAGOLD .018X260CM STR TIP M00552000

## (undated) DEVICE — PAD CHUX UNDERPAD 23X24" 7136

## (undated) DEVICE — SU SILK 3-0 SH 30" K832H

## (undated) DEVICE — BLADE KNIFE SURG 11 371111

## (undated) DEVICE — PREP CHLORAPREP 26ML TINTED ORANGE  260815

## (undated) DEVICE — INTR ENDOSCOPIC STENT FUSION OASIS 09.0FRX200CM

## (undated) DEVICE — SU MONOCRYL 4-0 PS-2 27" UND Y426H

## (undated) DEVICE — ENDO BITE BLOCK ADULT OMNI-BLOC

## (undated) DEVICE — SYR 30ML LL W/O NDL 302832

## (undated) DEVICE — DRSG MEDIPORE 3 1/2X13 3/4" 3573

## (undated) DEVICE — PITCHER STERILE 1000ML  SSK9004A

## (undated) DEVICE — SOL NACL 0.9% IRRIG 1000ML BOTTLE 2F7124

## (undated) DEVICE — BALLOON EXTRACTION 15X1950MM 3.2MM TL B-V243Q-A

## (undated) DEVICE — DRAPE LAP TRANSVERSE 29421

## (undated) DEVICE — DECANTER VIAL 2006S

## (undated) DEVICE — DRAPE C-ARM 60X42" 1013

## (undated) DEVICE — ENDO ADPT CATH ROTATABLE SIDE ARM G22677

## (undated) DEVICE — ENDO NDL ASPIRATION ULTRASOUND 22GA ACQUIRE M00555540

## (undated) DEVICE — PACK MINOR PROCEDURE CUSTOM

## (undated) DEVICE — SYR 10ML LL W/O NDL 302995

## (undated) DEVICE — SU PROLENE 4-0 SHDA 36" 8521H

## (undated) DEVICE — ENDO FUSION OMNI-TOME G31903

## (undated) DEVICE — NDL ECLIPSE 18GA 1.5"

## (undated) DEVICE — CLIP HORIZON SM RED WIDE SLOT 001201

## (undated) DEVICE — SU SILK 4-0 TIE 12X30" A303H

## (undated) DEVICE — DRAPE SHEET REV FOLD 3/4 9349

## (undated) DEVICE — SOL WATER IRRIG 1000ML BOTTLE 2F7114

## (undated) DEVICE — SU MONOCRYL 4-0 PS-2 18" UND Y496G

## (undated) DEVICE — CANNULA BILIARY CONTOUR ERCP .018"X210CM 5-4-3 TIP

## (undated) DEVICE — ENDO DEVICE LOCKING AND BIOPSY CAP M00545261

## (undated) DEVICE — ENDO FUSION OMNI-TOME 21 FS-OMNI-21 G48675

## (undated) DEVICE — SPONGE LAP 18X18" X8435

## (undated) DEVICE — CATH TRAY FOLEY SURESTEP 16FR W/URNE MTR STLK LATEX A303316A

## (undated) DEVICE — PACK ENDOSCOPY GI CUSTOM UMMC

## (undated) DEVICE — SU VICRYL 3-0 SH 27" UND J416H

## (undated) DEVICE — SU PROLENE 4-0 RB-1DA 36" 8557H

## (undated) DEVICE — WIPES FOLEY CARE SURESTEP PROVON DFC100

## (undated) DEVICE — GOWN IMPERVIOUS BREATHABLE SMART LG 89015

## (undated) DEVICE — Device

## (undated) DEVICE — WIRE GUIDE 0.025"X450CM ANG VISIGLIDE G-240-2545A

## (undated) DEVICE — WIRE GUIDE 0.025"X450CM STR VISIGLIDE G-240-2545S

## (undated) DEVICE — SUCTION MANIFOLD NEPTUNE 2 SYS 4 PORT 0702-020-000

## (undated) DEVICE — SPECIMEN CONTAINER 3OZ W/FORMALIN 59901

## (undated) DEVICE — CLIP HORIZON MED BLUE 002200

## (undated) DEVICE — ENDO CAP AND TUBING STERILE FOR ENDOGATOR  100130

## (undated) DEVICE — STPL LINEAR CUT 60X3.5MM TX60B

## (undated) DEVICE — ESU HARMONIC ACE LAPAROSCOPIC SHEARS 5MMX23CM HAR23

## (undated) DEVICE — SU VICRYL 2-0 SH 27" UND J417H

## (undated) DEVICE — LINEN TOWEL PACK X6 WHITE 5487

## (undated) DEVICE — SU SILK 2-0 TIE 12X30" A305H

## (undated) DEVICE — GLOVE PROTEXIS BLUE W/NEU-THERA 8.0  2D73EB80

## (undated) DEVICE — ESU HANDPIECE ABC BEND-A-BEAM 3" 134003

## (undated) DEVICE — SYR 03ML LL W/O NDL 309657

## (undated) DEVICE — SOL SODIUM CHLORIDE 250ML

## (undated) DEVICE — SPONGE KITTNER 30-101

## (undated) DEVICE — TUBING SUCTION 10'X3/16" N510

## (undated) DEVICE — SU PROLENE 6-0 RB-2DA 30" 8711H

## (undated) DEVICE — ESU GROUND PAD ADULT W/CORD E7507

## (undated) DEVICE — SU SILK 3-0 SH CR 8X18" C013D

## (undated) DEVICE — SU PDS II 4-0 RB-1 27" Z304H

## (undated) DEVICE — CATH SPYSCOPE DIGITAL ACCESS DS DISP M00546600

## (undated) DEVICE — DRAIN JACKSON PRATT CHANNEL 15FR ROUND HUBLESS SIL JP-2228

## (undated) DEVICE — BLADE CLIPPER SGL USE 9680

## (undated) DEVICE — LINEN TOWEL PACK X30 5481

## (undated) DEVICE — ENDO FCP GRASPING 6.5MMX2.4MMX230CM RAT TOOTH DGR-276-5

## (undated) DEVICE — SU SILK 3-0 TIE 12X30" A304H

## (undated) DEVICE — DRAPE C-ARM

## (undated) DEVICE — TOURNIQUET VASCULAR KIT ARGYLE 8888-585000

## (undated) DEVICE — WIPE PREMOIST CLEANSING WASHCLOTHS 7988

## (undated) DEVICE — GUIDEWIRE NOVAGOLD .018X480CM STR TIP M00552010

## (undated) DEVICE — FCP BIOPSY SPYBITE 1.0MMX266CM 4627

## (undated) DEVICE — DRAIN JACKSON PRATT RESERVOIR 100ML SU130-1305

## (undated) DEVICE — DRAPE IOBAN INCISE 23X17" 6650EZ

## (undated) DEVICE — SU CHROMIC 1 BP 27" 48G

## (undated) DEVICE — SU SILK 1 TIE 6X30" A307H

## (undated) DEVICE — SU PDS II 0 TP-1 60" Z991G

## (undated) DEVICE — SU SILK 0 TIE 6X30" A306H

## (undated) DEVICE — PROBE BIPOLAR HEMOSTASIS GOLD 07FRX210CM M00560150

## (undated) DEVICE — NDL COUNTER 40CT  31142311

## (undated) DEVICE — ADH SKIN CLOSURE PREMIERPRO EXOFIN 1.0ML 3470

## (undated) DEVICE — CLIP HORIZON LG ORANGE 004200

## (undated) DEVICE — STPL POWERED ECHELON VASC 35MM PVE35A

## (undated) DEVICE — COVER TRANSDUCER PROBE 7X24" 610-575

## (undated) DEVICE — ESU ELEC BLADE 2.75" COATED/INSULATED E1455

## (undated) DEVICE — STOCKING SLEEVE COMPRESSION CALF MED

## (undated) RX ORDER — LEVOFLOXACIN 5 MG/ML
INJECTION, SOLUTION INTRAVENOUS
Status: DISPENSED
Start: 2020-03-31

## (undated) RX ORDER — PROPOFOL 10 MG/ML
INJECTION, EMULSION INTRAVENOUS
Status: DISPENSED
Start: 2020-05-12

## (undated) RX ORDER — ONDANSETRON 2 MG/ML
INJECTION INTRAMUSCULAR; INTRAVENOUS
Status: DISPENSED
Start: 2020-03-31

## (undated) RX ORDER — PROPOFOL 10 MG/ML
INJECTION, EMULSION INTRAVENOUS
Status: DISPENSED
Start: 2020-04-06

## (undated) RX ORDER — LABETALOL 20 MG/4 ML (5 MG/ML) INTRAVENOUS SYRINGE
Status: DISPENSED
Start: 2020-04-06

## (undated) RX ORDER — LIDOCAINE HYDROCHLORIDE 10 MG/ML
INJECTION, SOLUTION EPIDURAL; INFILTRATION; INTRACAUDAL; PERINEURAL
Status: DISPENSED
Start: 2020-01-01

## (undated) RX ORDER — ACETAMINOPHEN 325 MG/1
TABLET ORAL
Status: DISPENSED
Start: 2020-05-12

## (undated) RX ORDER — FENTANYL CITRATE 50 UG/ML
INJECTION, SOLUTION INTRAMUSCULAR; INTRAVENOUS
Status: DISPENSED
Start: 2020-06-12

## (undated) RX ORDER — FENTANYL CITRATE 50 UG/ML
INJECTION, SOLUTION INTRAMUSCULAR; INTRAVENOUS
Status: DISPENSED
Start: 2021-01-01

## (undated) RX ORDER — LIDOCAINE HYDROCHLORIDE 20 MG/ML
INJECTION, SOLUTION EPIDURAL; INFILTRATION; INTRACAUDAL; PERINEURAL
Status: DISPENSED
Start: 2020-04-06

## (undated) RX ORDER — FENTANYL CITRATE 50 UG/ML
INJECTION, SOLUTION INTRAMUSCULAR; INTRAVENOUS
Status: DISPENSED
Start: 2020-01-01

## (undated) RX ORDER — CEFAZOLIN SODIUM 2 G/100ML
INJECTION, SOLUTION INTRAVENOUS
Status: DISPENSED
Start: 2020-01-01

## (undated) RX ORDER — FENTANYL CITRATE 50 UG/ML
INJECTION, SOLUTION INTRAMUSCULAR; INTRAVENOUS
Status: DISPENSED
Start: 2020-04-06

## (undated) RX ORDER — FENTANYL CITRATE 50 UG/ML
INJECTION, SOLUTION INTRAMUSCULAR; INTRAVENOUS
Status: DISPENSED
Start: 2020-03-31

## (undated) RX ORDER — FENTANYL CITRATE 50 UG/ML
INJECTION, SOLUTION INTRAMUSCULAR; INTRAVENOUS
Status: DISPENSED
Start: 2020-05-12

## (undated) RX ORDER — HEPARIN SODIUM 5000 [USP'U]/.5ML
INJECTION, SOLUTION INTRAVENOUS; SUBCUTANEOUS
Status: DISPENSED
Start: 2020-05-12

## (undated) RX ORDER — SODIUM CHLORIDE 9 MG/ML
INJECTION, SOLUTION INTRAVENOUS
Status: DISPENSED
Start: 2020-01-01

## (undated) RX ORDER — FENTANYL CITRATE 50 UG/ML
INJECTION, SOLUTION INTRAMUSCULAR; INTRAVENOUS
Status: DISPENSED
Start: 2020-04-16

## (undated) RX ORDER — GLYCOPYRROLATE 0.2 MG/ML
INJECTION, SOLUTION INTRAMUSCULAR; INTRAVENOUS
Status: DISPENSED
Start: 2020-04-06

## (undated) RX ORDER — HEPARIN SODIUM (PORCINE) LOCK FLUSH IV SOLN 100 UNIT/ML 100 UNIT/ML
SOLUTION INTRAVENOUS
Status: DISPENSED
Start: 2020-01-01

## (undated) RX ORDER — SODIUM CHLORIDE 9 MG/ML
INJECTION, SOLUTION INTRAVENOUS
Status: DISPENSED
Start: 2021-01-01

## (undated) RX ORDER — AMPICILLIN AND SULBACTAM 2; 1 G/1; G/1
INJECTION, POWDER, FOR SOLUTION INTRAMUSCULAR; INTRAVENOUS
Status: DISPENSED
Start: 2021-01-01

## (undated) RX ORDER — HEPARIN SODIUM (PORCINE) LOCK FLUSH IV SOLN 100 UNIT/ML 100 UNIT/ML
SOLUTION INTRAVENOUS
Status: DISPENSED
Start: 2021-01-01

## (undated) RX ORDER — ESMOLOL HYDROCHLORIDE 10 MG/ML
INJECTION INTRAVENOUS
Status: DISPENSED
Start: 2020-04-06

## (undated) RX ORDER — EPHEDRINE SULFATE 50 MG/ML
INJECTION, SOLUTION INTRAMUSCULAR; INTRAVENOUS; SUBCUTANEOUS
Status: DISPENSED
Start: 2020-04-16

## (undated) RX ORDER — FENTANYL CITRATE 50 UG/ML
INJECTION, SOLUTION INTRAMUSCULAR; INTRAVENOUS
Status: DISPENSED
Start: 2020-05-16

## (undated) RX ORDER — CEFOXITIN 2 G/1
INJECTION, POWDER, FOR SOLUTION INTRAVENOUS
Status: DISPENSED
Start: 2020-05-12

## (undated) RX ORDER — INDOMETHACIN 50 MG/1
SUPPOSITORY RECTAL
Status: DISPENSED
Start: 2020-04-16

## (undated) RX ORDER — CEFAZOLIN SODIUM 2 G/100ML
INJECTION, SOLUTION INTRAVENOUS
Status: DISPENSED
Start: 2021-01-01

## (undated) RX ORDER — PROPOFOL 10 MG/ML
INJECTION, EMULSION INTRAVENOUS
Status: DISPENSED
Start: 2020-01-01

## (undated) RX ORDER — CALCIUM CHLORIDE 100 MG/ML
INJECTION INTRAVENOUS; INTRAVENTRICULAR
Status: DISPENSED
Start: 2020-05-12

## (undated) RX ORDER — ONDANSETRON 2 MG/ML
INJECTION INTRAMUSCULAR; INTRAVENOUS
Status: DISPENSED
Start: 2020-04-06

## (undated) RX ORDER — ALBUMIN (HUMAN) 12.5 G/50ML
SOLUTION INTRAVENOUS
Status: DISPENSED
Start: 2021-01-01

## (undated) RX ORDER — EPHEDRINE SULFATE 50 MG/ML
INJECTION, SOLUTION INTRAMUSCULAR; INTRAVENOUS; SUBCUTANEOUS
Status: DISPENSED
Start: 2020-03-31

## (undated) RX ORDER — LIDOCAINE HYDROCHLORIDE 20 MG/ML
INJECTION, SOLUTION EPIDURAL; INFILTRATION; INTRACAUDAL; PERINEURAL
Status: DISPENSED
Start: 2020-01-01

## (undated) RX ORDER — LIDOCAINE HYDROCHLORIDE 10 MG/ML
INJECTION, SOLUTION EPIDURAL; INFILTRATION; INTRACAUDAL; PERINEURAL
Status: DISPENSED
Start: 2020-06-12

## (undated) RX ORDER — LIDOCAINE HYDROCHLORIDE 10 MG/ML
INJECTION, SOLUTION EPIDURAL; INFILTRATION; INTRACAUDAL; PERINEURAL
Status: DISPENSED
Start: 2021-01-01

## (undated) RX ORDER — ONDANSETRON 2 MG/ML
INJECTION INTRAMUSCULAR; INTRAVENOUS
Status: DISPENSED
Start: 2021-01-01

## (undated) RX ORDER — DEXAMETHASONE SODIUM PHOSPHATE 4 MG/ML
INJECTION, SOLUTION INTRA-ARTICULAR; INTRALESIONAL; INTRAMUSCULAR; INTRAVENOUS; SOFT TISSUE
Status: DISPENSED
Start: 2020-05-12

## (undated) RX ORDER — AMPICILLIN AND SULBACTAM 2; 1 G/1; G/1
INJECTION, POWDER, FOR SOLUTION INTRAMUSCULAR; INTRAVENOUS
Status: DISPENSED
Start: 2020-06-12

## (undated) RX ORDER — GABAPENTIN 300 MG/1
CAPSULE ORAL
Status: DISPENSED
Start: 2020-05-12

## (undated) RX ORDER — LIDOCAINE HYDROCHLORIDE 10 MG/ML
INJECTION, SOLUTION EPIDURAL; INFILTRATION; INTRACAUDAL; PERINEURAL
Status: DISPENSED
Start: 2020-05-16

## (undated) RX ORDER — FENTANYL CITRATE 50 UG/ML
INJECTION, SOLUTION INTRAMUSCULAR; INTRAVENOUS
Status: DISPENSED
Start: 2020-04-05

## (undated) RX ORDER — PROPOFOL 10 MG/ML
INJECTION, EMULSION INTRAVENOUS
Status: DISPENSED
Start: 2020-04-16

## (undated) RX ORDER — EPHEDRINE SULFATE 50 MG/ML
INJECTION, SOLUTION INTRAMUSCULAR; INTRAVENOUS; SUBCUTANEOUS
Status: DISPENSED
Start: 2020-04-06

## (undated) RX ORDER — SODIUM CHLORIDE 9 MG/ML
INJECTION, SOLUTION INTRAVENOUS
Status: DISPENSED
Start: 2020-06-12

## (undated) RX ORDER — PHENYLEPHRINE HCL IN 0.9% NACL 1 MG/10 ML
SYRINGE (ML) INTRAVENOUS
Status: DISPENSED
Start: 2020-04-16

## (undated) RX ORDER — LIDOCAINE HYDROCHLORIDE 20 MG/ML
INJECTION, SOLUTION EPIDURAL; INFILTRATION; INTRACAUDAL; PERINEURAL
Status: DISPENSED
Start: 2020-03-31

## (undated) RX ORDER — PHENYLEPHRINE HCL IN 0.9% NACL 1 MG/10 ML
SYRINGE (ML) INTRAVENOUS
Status: DISPENSED
Start: 2020-03-31

## (undated) RX ORDER — GLYCOPYRROLATE 0.2 MG/ML
INJECTION, SOLUTION INTRAMUSCULAR; INTRAVENOUS
Status: DISPENSED
Start: 2020-04-16

## (undated) RX ORDER — PHENYLEPHRINE HCL IN 0.9% NACL 1 MG/10 ML
SYRINGE (ML) INTRAVENOUS
Status: DISPENSED
Start: 2020-04-06

## (undated) RX ORDER — DEXAMETHASONE SODIUM PHOSPHATE 4 MG/ML
INJECTION, SOLUTION INTRA-ARTICULAR; INTRALESIONAL; INTRAMUSCULAR; INTRAVENOUS; SOFT TISSUE
Status: DISPENSED
Start: 2020-04-06

## (undated) RX ORDER — BUPIVACAINE HYDROCHLORIDE AND EPINEPHRINE 2.5; 5 MG/ML; UG/ML
INJECTION, SOLUTION EPIDURAL; INFILTRATION; INTRACAUDAL; PERINEURAL
Status: DISPENSED
Start: 2020-01-01

## (undated) RX ORDER — GLYCOPYRROLATE 0.2 MG/ML
INJECTION, SOLUTION INTRAMUSCULAR; INTRAVENOUS
Status: DISPENSED
Start: 2020-05-12

## (undated) RX ORDER — PROPOFOL 10 MG/ML
INJECTION, EMULSION INTRAVENOUS
Status: DISPENSED
Start: 2020-03-31

## (undated) RX ORDER — AMPICILLIN AND SULBACTAM 2; 1 G/1; G/1
INJECTION, POWDER, FOR SOLUTION INTRAMUSCULAR; INTRAVENOUS
Status: DISPENSED
Start: 2020-01-01

## (undated) RX ORDER — DEXTROSE MONOHYDRATE 50 MG/ML
INJECTION, SOLUTION INTRAVENOUS
Status: DISPENSED
Start: 2021-01-01

## (undated) RX ORDER — WATER 10 ML/10ML
INJECTION INTRAMUSCULAR; INTRAVENOUS; SUBCUTANEOUS
Status: DISPENSED
Start: 2020-04-06

## (undated) RX ORDER — ONDANSETRON 2 MG/ML
INJECTION INTRAMUSCULAR; INTRAVENOUS
Status: DISPENSED
Start: 2020-04-16